# Patient Record
Sex: FEMALE | Race: WHITE | Employment: OTHER | URBAN - METROPOLITAN AREA
[De-identification: names, ages, dates, MRNs, and addresses within clinical notes are randomized per-mention and may not be internally consistent; named-entity substitution may affect disease eponyms.]

---

## 2021-07-26 ENCOUNTER — OFFICE VISIT (OUTPATIENT)
Dept: ORTHOPEDIC SURGERY | Age: 66
End: 2021-07-26
Payer: MEDICARE

## 2021-07-26 VITALS — HEIGHT: 65 IN | WEIGHT: 293 LBS | BODY MASS INDEX: 48.82 KG/M2

## 2021-07-26 DIAGNOSIS — E66.01 MORBID OBESITY WITH BMI OF 50.0-59.9, ADULT (HCC): ICD-10-CM

## 2021-07-26 DIAGNOSIS — M25.551 RIGHT HIP PAIN: Primary | ICD-10-CM

## 2021-07-26 DIAGNOSIS — M16.11 PRIMARY OSTEOARTHRITIS OF RIGHT HIP: ICD-10-CM

## 2021-07-26 DIAGNOSIS — Z01.818 PRE-OP TESTING: ICD-10-CM

## 2021-07-26 PROCEDURE — 1036F TOBACCO NON-USER: CPT | Performed by: ORTHOPAEDIC SURGERY

## 2021-07-26 PROCEDURE — 1090F PRES/ABSN URINE INCON ASSESS: CPT | Performed by: ORTHOPAEDIC SURGERY

## 2021-07-26 PROCEDURE — 99214 OFFICE O/P EST MOD 30 MIN: CPT | Performed by: ORTHOPAEDIC SURGERY

## 2021-07-26 PROCEDURE — 1123F ACP DISCUSS/DSCN MKR DOCD: CPT | Performed by: ORTHOPAEDIC SURGERY

## 2021-07-26 PROCEDURE — G8417 CALC BMI ABV UP PARAM F/U: HCPCS | Performed by: ORTHOPAEDIC SURGERY

## 2021-07-26 PROCEDURE — 3017F COLORECTAL CA SCREEN DOC REV: CPT | Performed by: ORTHOPAEDIC SURGERY

## 2021-07-26 PROCEDURE — 4040F PNEUMOC VAC/ADMIN/RCVD: CPT | Performed by: ORTHOPAEDIC SURGERY

## 2021-07-26 PROCEDURE — G8400 PT W/DXA NO RESULTS DOC: HCPCS | Performed by: ORTHOPAEDIC SURGERY

## 2021-07-26 PROCEDURE — G8427 DOCREV CUR MEDS BY ELIG CLIN: HCPCS | Performed by: ORTHOPAEDIC SURGERY

## 2021-07-26 RX ORDER — LATANOPROST 50 UG/ML
SOLUTION/ DROPS OPHTHALMIC
COMMUNITY
Start: 2021-06-23

## 2021-07-26 NOTE — LETTER
[facility] Ortho & Spine  Surgery Scheduling Form:  [facility]    DEMOGRAPHICS:                                                                                                              .    Patient Name:  Ekta Law  Patient :  1955   Patient SS#:      Patient Phone:  [phone] (home)  Alt. Patient Phone:    Patient Address:  [id number]    PCP:  Ricci EGAN    Payor/Plan Subscr  Sex Relation Sub. Ins. ID Effective Group Num   1. MEDICARE - ME* MARKLEY,MARGARET 1955 Female Self [id number] 20                                    [address] [address]   2. Rigoberto Melara* 1955 Female Self [id number]                                   [address]     DIAGNOSIS & PROCEDURE:                                                                                            .    Diagnosis:   Right  hip osteoarthritis M13.11  Operation:  Right  total hip arthroplasty posterior approach 41575  Location: [facility] MAIN  IF LEFT HIP, SCHEDULE ONLY IN ROOM 4  IF RIGHT HIP, SCHEDULE ONLY IN ROOM 3  Provider:  Suhail Sanders. MARK Rand INFORMATION:                                                                                         .    Requested Date:   OR Time:  12:00                      Patient Arrival Time:  10:00  OR Time Required: 140  Minutes  Anesthesia:  General  Equipment:   Janae ADVANCED  Mini C-Arm:  No   Standard C-Arm:  Yes  Status:  SDA  PAT Required:  Yes  Comments:Allergies: Keflex [cephalexin] and Codeine  Body mass index is 51.25 kg/m².        21   BILLING INFORMATION:                                                                                                    .    Procedure:       CPT Code Modifier  Right  total hip arthroplasty posterior approach    ORTHOPAEDIC SURGERY PRE-SURGICAL PHYSICIAN ORDERS    Ekta Law  1955  Date of Surgery: 11-8-21 Right  total hip arthroplasty posterior approach    Keflex [cephalexin] and Codeine  History & Physical:   [ ] By Surgeon   By PCP [ X]  Referrals:  [ ] Medical/Cardiac Clearance by _____________________________  [place] ] Joint Replacement Class  [ ] Physical Therapy  [ ] Crutch Walking Instructions   Weight Bearing Status _____________  [ ] Occupational Therapy  [place] ] Smoking Cessation Instructions  [ ] Other ________________________________________________    Pre Admission Testing:  [ ] Hemoglobin & Hematocrit      [X] Comprehensive Metabolic Panel  [place] ] CBC with differential              [place] ] Type & Screen  [ ] CBC without differential       [place] ] Type & Crossmatch 2 units-if total hip  [place] ] PT/INR                                   [ ] Autologous Blood _____ units  [place] ] PTT                                      [place] ]  EKG  [ ] Urinalysis                               [place] ]  25 Hydroxy Vitamin D  [place] ] Urinalysis reflex to C & S      [X] Hemoglobin A1C  [ ] Basic Metabolic Panel         [place] ] MRSA-nasal  [ X] Other Anesthesia Guidelines    Orders to be initiated upon admission to same day surgery:  [place] ] Fasting blood sugar by fingerstick [ ] Aloha Robe  [place] ] PT/INR (pt takes Warafin/Coumadin)     [ ] Interscalene Right or Left  [place] ] HCG _X___ Urine _____ Serum              [ ] Femoral Right or Left  [X] Rapid Covid test if patient does not have proof of vaccination  [ ] Other ______________________________________________    IV Fluids and Medications:  1. Place IV catheter for IV access. The RN may use 0.1ml of 1% Lidocaine SQ      Per site for IV starts up to maximum of 0.5ml.  2. Infuse Lactated Ringers IV fluid at 50ml per hour. For diabetic or renal impaired patient, infuse 0.9% Normal Saline at 50ml/hr. 3. Cefazolin 2g IV <119.9kg (264lbs) OR 3g if >120kg (142TGU), given within one hour of incision time. For those allergic to Cephalosporins, give Clindamycin 900mg IV within one hour of incision time.    If the pre-op nasal culture for MRSA/MSSA was positive, add Vancomycin 1 gm IVPB if <80kg (176lbs) OR 1.5gm  80kg-120kg (176-264lbs) OR 2gm  >120kg (264lbs). Reduce dose of Vancomycin to 500 mg IVPB if PT < 55 kg or serum creatinine > 2 mg/dl (Vancomycin must be administered over 1 hour).   4. Other medications: _________________________________________    Additional Orders:  [place] ] Knee high anti-embolism stockings and antithrombic compression pumps (apply in Pre-op)        Physican Signature:                                                           Date: 7/26/2021 Time: 10:13 AM

## 2021-07-26 NOTE — PROGRESS NOTES
Leni Osgood Abbott, MD  [address], [address] [place] [address] Drive  [address]  [address] [facility], [address]    History of Present Illness:  Chief Complaint   Patient presents with    Hip Pain     New problem, RT hip pain for 2 years, NKISusie Molina is a 77 y.o. female here for evaluation of right hip pain. She is here today with her relative. Pain assessment has been completed and is documented below. Patient was referred here for orthopaedic evaluation by Joaquina Houston MD.  She complains of right hip pain for the past 2 years. She denies any recent falls or injuries for the past 2 years. She feels that her pain occasionally refers down into her right knee. She has started using CBD oil and has been able to discontinue some of her medications. Two of them being pain medication and blood pressure medication. She has been diagnosed as diabetic and has been trying to monitor her diet. She has been loosing some weight. Her next goal, is to get down to 287 lb and then eventually down to 250 lb. When she gained herself around tax season, she was up to 320 lb. She has since, retired and feels that it will contribute to helping her loose weight. She loves to back goodies and is trying to give them away as well. However, she hasn't been able to cook, as her kitchen is being remolded for the past 3 weeks.   She feels that the x-rays today, increased her pain      Pain Assessment  Location of Pain: Pelvis  Location Modifiers: Right  Severity of Pain: 5  Quality of Pain: Aching, Dull  Duration of Pain: Persistent  Frequency of Pain: Constant  Aggravating Factors: Walking, Standing, Stairs  Limiting Behavior: Yes  Relieving Factors: Rest  Result of Injury: No  Work-Related Injury: No  Are there other pain locations you wish to document?: No    Medication Review:  Current Outpatient Medications   Medication Sig Dispense Refill    latanoprost (XALATAN) 0.005 % ophthalmic solution INSTILL 1 DROP INTO EACH EYE AT BEDTIME      meloxicam (MOBIC) 15 MG tablet       carvedilol (COREG) 6.25 MG tablet Take 6.25 mg by mouth 2 times daily (with meals)      pravastatin (PRAVACHOL) 20 MG tablet Take 20 mg by mouth nightly       hydrochlorothiazide (HYDRODIURIL) 25 MG tablet Take 25 mg by mouth daily      NONFORMULARY cbd prn       No current facility-administered medications for this visit. Review of Systems:  Relevant review of systems reviewed and can be found in the Media section of patient's chart. Medical History:  Past Medical History:   Diagnosis Date    Arthritis     Asthma     mild    Diabetes mellitus ([address]     borderline    High cholesterol     Hypertension         Past Surgical History:   Procedure Laterality Date    TOTAL KNEE ARTHROPLASTY Right 07/14/2015    TOTAL KNEE ARTHROPLASTY Left 10/14/15    TKA      Allergies, social and family histories, and medications were reviewed and updated as appropriate. General Exam:  Vital Signs:  Ht 5' 5\" (1.651 m)   Wt (!) 308 lb (139.7 kg)   BMI 51.25 kg/m²    Constitutional: Patient is adequately groomed with morbid obesity noted. Mental Status: The patient is oriented to time, place and person. The patient's mood and affect are appropriate. Neurological: The patient has good coordination. There is no focal weakness or sensory deficit. Focal examination of right hip is as follows: Adiposity makes muscle contours difficult to assess but no gross atrophy. Pain with logroll of the right hip as well as any active hip flexion or abduction. Flexion limited to 85 degrees. Gross motor strength intact throughout the right lower extremity with initiation of hip flexion and abduction 4/5 secondary to pain. Calf soft with negative Homans' sign. Well-healed incision from right total knee arthroplasty.     Radiology:     X-rays obtained today have been reviewed in the office:  2 views with AP pelvis; right hip. Impression: No evidence of acute fracture or dislocation. No lytic or blastic areas within the iliac wings or femoral metaphyseal regions. Servere right hip arthritis spherical shape of the femoral head and superior wear resulting in slight shortening of the right lower extremity. Left hip shows well-preserved femoral acetabular joint space. Office Orders/Procedures:  Orders Placed This Encounter   Procedures    Culture, MRSA, Screening     Standing Status:   Future     Number of Occurrences:   1     Standing Expiration Date:   7/26/2022    XR HIP 2-3 VW W PELVIS RIGHT     Standing Status:   Future     Number of Occurrences:   1     Standing Expiration Date:   8/26/2021    APTT     Standing Status:   Future     Number of Occurrences:   1     Standing Expiration Date:   7/26/2022     Order Specific Question:   Daily Heparin Dose? Answer:   na    CBC Auto Differential     Standing Status:   Future     Number of Occurrences:   1     Standing Expiration Date:   7/26/2022    Comprehensive Metabolic Panel     Standing Status:   Future     Number of Occurrences:   1     Standing Expiration Date:   7/26/2022    Hemoglobin A1C     Standing Status:   Future     Number of Occurrences:   1     Standing Expiration Date:   7/26/2022    Protime-INR     Standing Status:   Future     Number of Occurrences:   1     Standing Expiration Date:   7/26/2022     Order Specific Question:   Daily Coumadin Dose? Answer:   na    Urinalysis Reflex to Culture     Standing Status:   Future     Number of Occurrences:   1     Standing Expiration Date:   7/26/2022     Order Specific Question:   SPECIFY(EX-CATH,MIDSTREAM,CYSTO,ETC)?      Answer:   midstream    Vitamin D 25 Hydroxy     Standing Status:   Future     Number of Occurrences:   1     Standing Expiration Date:   7/26/2022    Ambulatory referral to Physical Therapy     Referral Priority:   Routine     Referral Type: Eval and Treat     Referral Reason:   Specialty Services Required     Number of Visits Requested:   1    EKG 12 Lead     Standing Status:   Future     Number of Occurrences:   1     Standing Expiration Date:   7/26/2022     Order Specific Question:   Reason for Exam?     Answer:   Pre-op    Type and Screen     Standing Status:   Future     Number of Occurrences:   1     Standing Expiration Date:   7/26/2022       Impression:   Diagnosis Orders   1. Right hip pain  XR HIP 2-3 VW W PELVIS RIGHT   2. Primary osteoarthritis of right hip  APTT    CBC Auto Differential    Comprehensive Metabolic Panel    EKG 12 Lead    Hemoglobin A1C    Culture, MRSA, Screening    Protime-INR    Type and Screen    Urinalysis Reflex to Culture    Vitamin D 25 Hydroxy    Ambulatory referral to Physical Therapy   3. Morbid obesity with BMI of 50.0-59.9, adult ([facility] [address]     4. Pre-op testing  APTT    CBC Auto Differential    Comprehensive Metabolic Panel    EKG 12 Lead    Hemoglobin A1C    Culture, MRSA, Screening    Protime-INR    Type and Screen    Urinalysis Reflex to Culture    Vitamin D 25 Hydroxy    Ambulatory referral to Physical Therapy        Treatment Plan:  I have discussed treatment options with the patient and her relative. After reviewing her x-rays, I believe that she would be a candidate for a right total hip replacement. If she could loose about 15 - 20 lbs that would help to be able to proceed with surgery, as every pound down helps her to reduce her risk. She will also need to continue to bring her sugar levels down to reduce her risk factors. We discussed the option of proceeding onto elective right total hip arthroplasty. I reviewed with her the nature the procedure as well as the risks and advantages of joint replacement. We reviewed the risks of surgery and she understands that they include but are not limited to:  Infection, risk to neurovascular structures, stiffness and pain, potential for further surgery, anesthetic misadventure, component failure or dislocation, fracture of the bone, medical complications such as heart attacks, strokes, blood clots and pneumonia all of which could threaten her life or limb. We reviewed discharge destination as outlined below. We also reviewed the demand matching grid and will use advanced demand Janae bearing surface system. She will undergo her preadmission testing as well as preoperative physical therapy assessment. She will also attend the preoperative joint education class. Unless there are items that we need to address through testing that would delay her surgery, at this point I will see her at the time of surgery and she will follow back. Should additional questions arise prior to surgery, she was asked to call the office for any clarifications that are necessary. RAPT  RISK ASSESSMENT and PREDICTION TOOL    Name: Felisa Conley  YOB: 1955  Surgeon: Dr Billy Campbell         Value Score    1). What is your age group? 50 - 65 years  = 2      66 - 75 years = 1     > 75 years = 0       Your score = 1   2). Gender? Male = 2     Female = 1       Your score = 1   3). How far on average can you walk? Two blocks or more (+/- rest) = 2    (a block is 200 aseomg=315 ft)  1 - 2 blocks (+/- rest) = 1     Housebound (most of time) = 0       Your score = 2   4). Which gait aid do you use? None = 2    (more often than not) Single-point stick = 1     Crutches/walker = 0       Your score = 0   5). Do you use community supports? None or one per week = 1    (home help, meals on wheels, district nursing) Two or more per week = 0       Your score = 1   6). Will you live with someone who can care for you after your operation? yes = 3     no = 0       Your score = 3    Your Total Score (out of 12) = 8       Key: Destination at discharge from acute care predicted by score.   Score < 6  = extended inpatient rehabilitation  Score 6 - 9  = additional intervention to discharge

## 2021-10-08 NOTE — PROGRESS NOTES
The patient will attend class on___11/1/2021____________  The patent will get ordered PATs on__ week 10/18/21_________________________________  The patient will see PCP within 30 days of scheduled surgery___ 10/19/2021___________  COVID_11/1/21 [facility]________

## 2021-10-08 NOTE — PROGRESS NOTES
Name_______________________________________Printed:____________________  Date and time of surgery__11/8 1200______________________Arrival Time:_1000_______________   1. The instructions given regarding when and if a patient needs to stop oral intake prior to surgery varies. Follow the specific instructions you were given                  _x__Nothing to eat or to drink after Midnight the night before.                   ____Carbo loading or ERAS instructions will be given to select patients-if you have been given those instructions -please do the following                           The evening before your surgery after dinner before midnight drink 40 ounces of gatorade. If you are diabetic use sugar free. The morning of surgery drink 40 ounces of water. This needs to be finished 3 hours prior to your surgery start time. 2. Take the following pills with a small sip of water on the morning of surgery__ coreg_________________________________________________                  Do not take blood pressure medications ending in pril or sartan the irina prior to surgery or the morning of surgery_   3. Aspirin, Ibuprofen, Advil, Naproxen, Vitamin E and other Anti-inflammatory products and supplements should be stopped for 5 -7days before surgery or as directed by your physician. 4. Check with your Doctor regarding stopping Plavix, Coumadin,Eliquis, Lovenox,Effient,Pradaxa,Xarelto, Fragmin or other blood thinners and follow their instructions. 5. Do not smoke, and do not drink any alcoholic beverages 24 hours prior to surgery. This includes NA Beer. Refrain from the usage of any recreational drugs. 6. You may brush your teeth and gargle the morning of surgery. DO NOT SWALLOW WATER   7. You MUST make arrangements for a responsible adult to stay on site while you are here and take you home after your surgery. You will not be allowed to leave alone or drive yourself home.   It is strongly suggested someone stay with you the first 24 hrs. Your surgery will be cancelled if you do not have a ride home. 8. A parent/legal guardian must accompany a child scheduled for surgery and plan to stay at the hospital until the child is discharged. Please do not bring other children with you. 9. Please wear simple, loose fitting clothing to the hospital.  [place] not bring valuables (money, credit cards, checkbooks, etc.) Do not wear any makeup (including no eye makeup) or nail polish on your fingers or toes. 10. DO NOT wear any jewelry or piercings on day of surgery. All body piercing jewelry must be removed. 11. If you have ___dentures, they will be removed before going to the OR; we will provide you a container. If you wear ___contact lenses or ___glasses, they will be removed; please bring a case for them. 12. Please see your family doctor/pediatrician for a history & physical and/or concerning medications. Bring any test results/reports from your physician's office. PCP__________________Phone___________H&P Appt. Date________             13 If you  have a Living Will and Durable Power of  for Healthcare, please bring in a copy. 15. Notify your Surgeon if you develop any illness between now and surgery  time, cough, cold, fever, sore throat, nausea, vomiting, etc.  Please notify your surgeon if you experience dizziness, shortness of breath or blurred vision between now & the time of your surgery             15. DO NOT shave your operative site 96 hours prior to surgery. For face & neck surgery, men may use an electric razor 48 hours prior to surgery. 16. Shower the night before or morning of surgery using an antibacterial soap or as you have been instructed. 17. To provide excellent care visitors will be limited to one in the room at any given time. 18.  Please bring picture ID and insurance card.              19.  Visit our web site for additional information:  [email or web address]              20.During flu season no children under the age of 15 are permitted in the hospital for the safety of all patients. 21. If you take a long acting insulin in the evening only  take half of your usual  dose the night  before your procedure              22. If you use a c-pap please bring DOS if staying overnight,             23.For your convenience [facility] has a pharmacy on site to fill your prescriptions. 24. If you use oxygen and have a portable tank please bring it  with you the DOS             25. Bring a complete list of all your medications with name and dose include any supplements. 26. Other__________________________________________   *Please call pre admission testing if you any further questions   Chaka Sanz   [address]    [place] [address] [place]  [phone]   [facility] [address] [place]SE [place]   [phone]           COVID TESTING    __x_ Covid test to be done 3-5 days prior to scheduled surgery -patient aware they are REQUIRED to bring a copy of the negative result DOS-if they receive a positive result to notify their surgeon         If known - indicate where patient is getting covid test done ____11/1/2021 [facility] when comes for class_______________________________________________________    ___ Rapid - DOS    ___ Other___________________________________      Karole Pun POLICY(subject to change)    There is a one visitor policy at [facility] for all surgeries and endoscopies. Whether the visitor can stay or will be asked to wait in the car will depend on the current policy and if social distancing can be maintained. The policy is subject to change at any time. Please make sure the visitor has a cell phone that is on,charged and able to accept calls, as this may be the way that the staff communicates with them. Pain management is NO VISITOR policyThe patients ride is expected to remain in the car with a cell phone for communication. If the ride is leaving the hospital grounds please make sure they are back in time for pickup. Have the patient inform the staff on arrival what their rides plans are while the patient is in the facility. At the MAIN there is one visitor allowed. Please note that the visitor policy is subject to change. All above information reviewed with patient in person or by phone. Patient verbalizes understanding. All questions and concerns addressed.                                                                                                  Patient/Rep____per phone/pt________________                                                                                                                                    PRE OP INSTRUCTIONS

## 2021-10-12 ENCOUNTER — HOSPITAL ENCOUNTER (OUTPATIENT)
Age: 66
Discharge: HOME | End: 2021-10-12
Payer: MEDICARE

## 2021-10-12 DIAGNOSIS — M16.11 PRIMARY OSTEOARTHRITIS OF RIGHT HIP: ICD-10-CM

## 2021-10-12 DIAGNOSIS — Z01.818 PRE-OP TESTING: ICD-10-CM

## 2021-10-12 LAB
A/G RATIO: 1.6 (ref 1.1–2.2)
ABO/RH: NORMAL
ALBUMIN SERPL-MCNC: 4.5 G/DL (ref 3.4–5)
ALP BLD-CCNC: 63 U/L (ref 40–129)
ALT SERPL-CCNC: 28 U/L (ref 10–40)
ANION GAP SERPL CALCULATED.3IONS-SCNC: 17 MMOL/L (ref 3–16)
ANTIBODY SCREEN: NORMAL
APTT: 32.8 SEC (ref 26.2–38.6)
AST SERPL-CCNC: 23 U/L (ref 15–37)
BACTERIA: ABNORMAL /HPF
BASOPHILS ABSOLUTE: 0.1 K/UL (ref 0–0.2)
BASOPHILS RELATIVE PERCENT: 0.9 %
BILIRUB SERPL-MCNC: 0.9 MG/DL (ref 0–1)
BILIRUBIN URINE: ABNORMAL
BLOOD, URINE: NEGATIVE
BUN BLDV-MCNC: 16 MG/DL (ref 7–20)
CALCIUM SERPL-MCNC: 10 MG/DL (ref 8.3–10.6)
CHLORIDE BLD-SCNC: 96 MMOL/L (ref 99–110)
CLARITY: ABNORMAL
CO2: 25 MMOL/L (ref 21–32)
COLOR: YELLOW
CREAT SERPL-MCNC: <0.5 MG/DL (ref 0.6–1.2)
EKG ATRIAL RATE: 75 BPM
EKG DIAGNOSIS: NORMAL
EKG P AXIS: 26 DEGREES
EKG P-R INTERVAL: 244 MS
EKG Q-T INTERVAL: 700 MS
EKG QRS DURATION: 108 MS
EKG QTC CALCULATION (BAZETT): 781 MS
EKG R AXIS: 2 DEGREES
EKG T AXIS: 43 DEGREES
EKG VENTRICULAR RATE: 75 BPM
EOSINOPHILS ABSOLUTE: 0.2 K/UL (ref 0–0.6)
EOSINOPHILS RELATIVE PERCENT: 3.2 %
EPITHELIAL CELLS, UA: 11 /HPF (ref 0–5)
GFR AFRICAN AMERICAN: >60
GFR NON-AFRICAN AMERICAN: >60
GLOBULIN: 2.8 G/DL
GLUCOSE BLD-MCNC: 167 MG/DL (ref 70–99)
GLUCOSE URINE: 100 MG/DL
HCT VFR BLD CALC: 46.5 % (ref 36–48)
HEMOGLOBIN: 15.6 G/DL (ref 12–16)
INR BLD: 1.02 (ref 0.88–1.12)
KETONES, URINE: ABNORMAL MG/DL
LEUKOCYTE ESTERASE, URINE: ABNORMAL
LYMPHOCYTES ABSOLUTE: 1.3 K/UL (ref 1–5.1)
LYMPHOCYTES RELATIVE PERCENT: 20.9 %
MCH RBC QN AUTO: 29.6 PG (ref 26–34)
MCHC RBC AUTO-ENTMCNC: 33.5 G/DL (ref 31–36)
MCV RBC AUTO: 88.5 FL (ref 80–100)
MICROSCOPIC EXAMINATION: YES
MONOCYTES ABSOLUTE: 0.5 K/UL (ref 0–1.3)
MONOCYTES RELATIVE PERCENT: 8 %
NEUTROPHILS ABSOLUTE: 4.1 K/UL (ref 1.7–7.7)
NEUTROPHILS RELATIVE PERCENT: 67 %
NITRITE, URINE: NEGATIVE
PDW BLD-RTO: 14.6 % (ref 12.4–15.4)
PH UA: 6 (ref 5–8)
PLATELET # BLD: 260 K/UL (ref 135–450)
PMV BLD AUTO: 8.2 FL (ref 5–10.5)
POTASSIUM SERPL-SCNC: 3.3 MMOL/L (ref 3.5–5.1)
PROTEIN UA: NEGATIVE MG/DL
PROTHROMBIN TIME: 11.5 SEC (ref 9.9–12.7)
RBC # BLD: 5.26 M/UL (ref 4–5.2)
RBC UA: 7 /HPF (ref 0–4)
SODIUM BLD-SCNC: 138 MMOL/L (ref 136–145)
SPECIFIC GRAVITY UA: 1.02 (ref 1–1.03)
TOTAL PROTEIN: 7.3 G/DL (ref 6.4–8.2)
URINE REFLEX TO CULTURE: YES
URINE TYPE: ABNORMAL
UROBILINOGEN, URINE: 1 E.U./DL
VITAMIN D 25-HYDROXY: 14.6 NG/ML
WBC # BLD: 6.1 K/UL (ref 4–11)
WBC UA: 89 /HPF (ref 0–5)

## 2021-10-12 PROCEDURE — 85610 PROTHROMBIN TIME: CPT

## 2021-10-12 PROCEDURE — 93010 ELECTROCARDIOGRAM REPORT: CPT | Performed by: INTERNAL MEDICINE

## 2021-10-12 PROCEDURE — 85730 THROMBOPLASTIN TIME PARTIAL: CPT

## 2021-10-12 PROCEDURE — 83036 HEMOGLOBIN GLYCOSYLATED A1C: CPT

## 2021-10-12 PROCEDURE — 86900 BLOOD TYPING SEROLOGIC ABO: CPT

## 2021-10-12 PROCEDURE — 80053 COMPREHEN METABOLIC PANEL: CPT

## 2021-10-12 PROCEDURE — 86850 RBC ANTIBODY SCREEN: CPT

## 2021-10-12 PROCEDURE — 86901 BLOOD TYPING SEROLOGIC RH(D): CPT

## 2021-10-12 PROCEDURE — 85025 COMPLETE CBC W/AUTO DIFF WBC: CPT

## 2021-10-12 PROCEDURE — 82306 VITAMIN D 25 HYDROXY: CPT

## 2021-10-12 PROCEDURE — 81001 URINALYSIS AUTO W/SCOPE: CPT

## 2021-10-12 PROCEDURE — 93005 ELECTROCARDIOGRAM TRACING: CPT

## 2021-10-12 PROCEDURE — 87086 URINE CULTURE/COLONY COUNT: CPT

## 2021-10-12 PROCEDURE — 87081 CULTURE SCREEN ONLY: CPT

## 2021-10-12 PROCEDURE — 36415 COLL VENOUS BLD VENIPUNCTURE: CPT

## 2021-10-13 LAB
ESTIMATED AVERAGE GLUCOSE: 177.2 MG/DL
HBA1C MFR BLD: 7.8 %
URINE CULTURE, ROUTINE: NORMAL

## 2021-10-14 LAB — MRSA CULTURE ONLY: NORMAL

## 2021-10-19 ENCOUNTER — ANESTHESIA EVENT (OUTPATIENT)
Dept: OPERATING ROOM | Age: 66
End: 2021-10-19
Payer: MEDICARE

## 2021-10-19 ENCOUNTER — TELEPHONE (OUTPATIENT)
Dept: ORTHOPEDIC SURGERY | Age: 66
End: 2021-10-19

## 2021-11-01 ENCOUNTER — HOSPITAL ENCOUNTER (OUTPATIENT)
Age: 66
Discharge: HOME | End: 2021-11-01
Payer: MEDICARE

## 2021-11-01 DIAGNOSIS — M16.11 PRIMARY OSTEOARTHRITIS OF RIGHT HIP: Primary | ICD-10-CM

## 2021-11-01 DIAGNOSIS — Z01.818 PREOP TESTING: ICD-10-CM

## 2021-11-01 PROCEDURE — U0003 INFECTIOUS AGENT DETECTION BY NUCLEIC ACID (DNA OR RNA); SEVERE ACUTE RESPIRATORY SYNDROME CORONAVIRUS 2 (SARS-COV-2) (CORONAVIRUS DISEASE [COVID-19]), AMPLIFIED PROBE TECHNIQUE, MAKING USE OF HIGH THROUGHPUT TECHNOLOGIES AS DESCRIBED BY CMS-2020-01-R: HCPCS

## 2021-11-01 PROCEDURE — U0005 INFEC AGEN DETEC AMPLI PROBE: HCPCS

## 2021-11-01 NOTE — PROGRESS NOTES
Patient attended Joint Education class on 11/1/2021. Patient verified surgery for Total Hip replacement and received patient information and educational Joint folder including education handouts on common medications, pre-operative checklist, and points to remember . Patient given handout instructions on Pre-operative Showering techniques and the use of anti-septic 5 days before surgery. Paper copy of powerpoint given to patient. Hibiclens bottle given to patient to take home. Patient aware they need to have labs, covid testing and H&P done before surgery. If they have had their covid vaccines, they have been asked to bring their immunization card on the day of surgery. We also discussed in depth about pain medication and we cannot refill prescriptions early due to state regulations. Answered all of patient's questions in class. Post-test score 5/5    DOS: 11/8/2021  Dr Rosa Davis: home care. Patient offered [facility] introductory home care and prehab visit. She accepted. Faxed order and facesheet to [facility].     Selvin Apodaca RN  Orthopedic Navigator  [phone]

## 2021-11-02 LAB — SARS-COV-2: NOT DETECTED

## 2021-11-02 NOTE — PROGRESS NOTES
Informed patient that [facility] is unable to service the [facility] for her home pre-hab visit. Tried asking other [facility] but they do not offer introductory home care visits/pre-hab visits.

## 2021-11-05 RX ORDER — MULTIVIT-MIN/IRON/FOLIC ACID/K 18-600-40
2000 CAPSULE ORAL DAILY
Qty: 30 CAPSULE | Refills: 5 | Status: SHIPPED | OUTPATIENT
Start: 2021-11-05 | End: 2021-12-05

## 2021-11-05 NOTE — TELEPHONE ENCOUNTER
Sarah Graham vitamin D levels were found to be low during the preoperative screening. Maintaining vitamin D levels within a normal range are correlated with better healing of joint replacements and lower risk of component failure. A prescription for vitamin D has been sent to her pharmacy and should be taken daily. This should be continued for 6 months and follow-up with reassessment with her primary care physician regarding continued treatment and testing would be recommended after 6 months. She needs to call her PCP to see if she should be taking a potassium supplement.

## 2021-11-05 NOTE — DISCHARGE INSTR - OTHER ORDERS
333  [facility] for Joint Replacement Model  Notification Letter    [facility] is participating in a New Care Improvement Initiative from Ainsley Campos    [facility] is participating in a Medicare initiative called the [address] [place] [address] for Joint Replacement (CJR) model. The CJR model aims to promote quality and financial accountability for care surrounding lower-extremity joint replacement (LEJR) procedures, commonly referred to as hip and knee replacements and/or other major leg procedures. [address] participation in the [address] [place] [address],[address] [place] [address] should not restrict your access to care for your medical condition or your freedom to choose your health care providers and services. All existing Medicare beneficiary protections continue to be available to you. These include the ability to report concerns of substandard care to Quality Improvement Organizations and 1-800-MEDICARE. The CJR model aims to help give you better care. The CJR model aims to support better and more efficient care for beneficiaries undergoing LEJR procedures. A CJR episode of care is typically defined as an admission of an eligible Medicare beneficiary to a hospital participating in the [address] [place] [address],[address] [place] [address] that eventually results in a discharge paid under Medicare Severity-Diagnosis Related Groups (MS-DRG) 469 (major joint replacement or reattachment of lower extremity with major complications or comorbidities) or 470 (major joint replacement or reattachment of lower extremity without major complications or comorbidities). The CJR episode of care continues for 90 days following discharge.  This model tests bundled payment and quality measurement for an episode of care associated with LEJR procedures to encourage hospitals, physicians, and post-acute care providers to work together to improve the quality and coordination of care from the initial hospitalization through recovery. Through this bundled payment model, [facility] will receive additional payments if quality and spending performance are strong or, if not, potentially have to repay Medicare for a portion of the spending for care surrounding a lower extremity joint replacement procedure. Medicare is using the CJR model to encourage [facility] to work more closely with your doctors and other health care providers that help patients recover after discharge from the hospital, including nursing homes (skilled nursing facilities), home health agencies, inpatient rehabilitation facilities, and long-term care hospitals. The goal of the model is to encourage these providers and suppliers to provide you with better, more coordinated care during and following your hospital stay. The model is expected to lower the cost of care to [facility] but your costs for covered care will not increase due to these changes. [facility] is working closely with the doctors and other health care providers and suppliers who will care for you during and following your hospital stay and extending through the recovery period. By working together, your health care providers and suppliers are planning more efficient, high quality care as you undergo treatment. Medicare will monitor your care to ensure you and others are receiving high quality care. It's your choice which hospital, doctor, or other providers you use. You have the right to choose which hospital, doctor, or other post-hospital stay health care provider you use. To find a different doctor, visit Medicare's Physician Compare website, HDTapes.co.nz, or call 1-800-MEDICARE (428 5359). TTY users should call 7-133.173.2773. To find a different hospital, visit Anal[employer]Mailana or call 1-800-MEDICARE (151 8499).  TTY users should call 4-635.762.2894. To find a different skilled nursing facility, visit [facility] website, [email or web address] [email or web address]/, or call 1-800-MEDICARE (5-983.133.5492). TTY users should call 8-795.578.6740. To find a different home health agency, visit [employer] website, [employer], or call 1-800-MEDICARE (0-664.151.2529). TTY users should call 3-110.176.9804. If you believe that your care is adversely affected or have concerns about substandard care, you may call 1-800-MEDICARE or contact your state's Quality Improvement Organization by going to: [employer]. For an explanation of how patients can access their health care records and beneficiary claims data, please visit [facility]. Get more information     If you have questions or want more information about the Comprehensive Care for Joint Replacement (CJR) model, call Cassius Torres RN, Care Transitions, [facility] at [phone] or call 1-800-MEDICARE. You can also find additional information at https://innovation.cms.gov/initiatives/cjr.

## 2021-11-08 ENCOUNTER — ANESTHESIA (OUTPATIENT)
Dept: OPERATING ROOM | Age: 66
End: 2021-11-08
Payer: MEDICARE

## 2021-11-08 ENCOUNTER — HOSPITAL ENCOUNTER (OUTPATIENT)
Age: 66
Setting detail: OBSERVATION
Discharge: HOME | End: 2021-11-09
Attending: ORTHOPAEDIC SURGERY | Admitting: ORTHOPAEDIC SURGERY
Payer: MEDICARE

## 2021-11-08 ENCOUNTER — APPOINTMENT (OUTPATIENT)
Dept: GENERAL RADIOLOGY | Age: 66
End: 2021-11-08
Attending: ORTHOPAEDIC SURGERY
Payer: MEDICARE

## 2021-11-08 VITALS
RESPIRATION RATE: 25 BRPM | OXYGEN SATURATION: 99 % | SYSTOLIC BLOOD PRESSURE: 109 MMHG | TEMPERATURE: 96.8 F | DIASTOLIC BLOOD PRESSURE: 56 MMHG

## 2021-11-08 DIAGNOSIS — Z01.818 PREOP TESTING: Primary | ICD-10-CM

## 2021-11-08 DIAGNOSIS — M16.11 PRIMARY OSTEOARTHRITIS OF RIGHT HIP: ICD-10-CM

## 2021-11-08 LAB
ABO/RH: NORMAL
ANTIBODY SCREEN: NORMAL
GLUCOSE BLD-MCNC: 161 MG/DL (ref 70–99)
GLUCOSE BLD-MCNC: 182 MG/DL (ref 70–99)
GLUCOSE BLD-MCNC: 227 MG/DL (ref 70–99)
GLUCOSE BLD-MCNC: 245 MG/DL (ref 70–99)
PERFORMED ON: ABNORMAL

## 2021-11-08 PROCEDURE — G0378 HOSPITAL OBSERVATION PER HR: HCPCS

## 2021-11-08 PROCEDURE — 99024 POSTOP FOLLOW-UP VISIT: CPT | Performed by: NURSE PRACTITIONER

## 2021-11-08 PROCEDURE — 2500000003 HC RX 250 WO HCPCS: Performed by: NURSE PRACTITIONER

## 2021-11-08 PROCEDURE — 6360000002 HC RX W HCPCS: Performed by: ORTHOPAEDIC SURGERY

## 2021-11-08 PROCEDURE — 2500000003 HC RX 250 WO HCPCS: Performed by: NURSE ANESTHETIST, CERTIFIED REGISTERED

## 2021-11-08 PROCEDURE — 2720000010 HC SURG SUPPLY STERILE: Performed by: ORTHOPAEDIC SURGERY

## 2021-11-08 PROCEDURE — 86900 BLOOD TYPING SEROLOGIC ABO: CPT

## 2021-11-08 PROCEDURE — 6370000000 HC RX 637 (ALT 250 FOR IP): Performed by: NURSE PRACTITIONER

## 2021-11-08 PROCEDURE — C1776 JOINT DEVICE (IMPLANTABLE): HCPCS | Performed by: ORTHOPAEDIC SURGERY

## 2021-11-08 PROCEDURE — 97165 OT EVAL LOW COMPLEX 30 MIN: CPT

## 2021-11-08 PROCEDURE — 97162 PT EVAL MOD COMPLEX 30 MIN: CPT

## 2021-11-08 PROCEDURE — 94150 VITAL CAPACITY TEST: CPT

## 2021-11-08 PROCEDURE — 6360000002 HC RX W HCPCS: Performed by: NURSE ANESTHETIST, CERTIFIED REGISTERED

## 2021-11-08 PROCEDURE — 27130 TOTAL HIP ARTHROPLASTY: CPT | Performed by: NURSE PRACTITIONER

## 2021-11-08 PROCEDURE — 6360000002 HC RX W HCPCS: Performed by: ANESTHESIOLOGY

## 2021-11-08 PROCEDURE — 97535 SELF CARE MNGMENT TRAINING: CPT

## 2021-11-08 PROCEDURE — 2580000003 HC RX 258: Performed by: NURSE PRACTITIONER

## 2021-11-08 PROCEDURE — 73501 X-RAY EXAM HIP UNI 1 VIEW: CPT

## 2021-11-08 PROCEDURE — 3700000001 HC ADD 15 MINUTES (ANESTHESIA): Performed by: ORTHOPAEDIC SURGERY

## 2021-11-08 PROCEDURE — 97530 THERAPEUTIC ACTIVITIES: CPT

## 2021-11-08 PROCEDURE — 7100000001 HC PACU RECOVERY - ADDTL 15 MIN: Performed by: ORTHOPAEDIC SURGERY

## 2021-11-08 PROCEDURE — 3700000000 HC ANESTHESIA ATTENDED CARE: Performed by: ORTHOPAEDIC SURGERY

## 2021-11-08 PROCEDURE — 27130 TOTAL HIP ARTHROPLASTY: CPT | Performed by: ORTHOPAEDIC SURGERY

## 2021-11-08 PROCEDURE — 2700000000 HC OXYGEN THERAPY PER DAY

## 2021-11-08 PROCEDURE — 3600000004 HC SURGERY LEVEL 4 BASE: Performed by: ORTHOPAEDIC SURGERY

## 2021-11-08 PROCEDURE — 94760 N-INVAS EAR/PLS OXIMETRY 1: CPT

## 2021-11-08 PROCEDURE — 2580000003 HC RX 258: Performed by: NURSE ANESTHETIST, CERTIFIED REGISTERED

## 2021-11-08 PROCEDURE — 3600000014 HC SURGERY LEVEL 4 ADDTL 15MIN: Performed by: ORTHOPAEDIC SURGERY

## 2021-11-08 PROCEDURE — 86850 RBC ANTIBODY SCREEN: CPT

## 2021-11-08 PROCEDURE — 36415 COLL VENOUS BLD VENIPUNCTURE: CPT

## 2021-11-08 PROCEDURE — 6370000000 HC RX 637 (ALT 250 FOR IP): Performed by: ANESTHESIOLOGY

## 2021-11-08 PROCEDURE — 2580000003 HC RX 258: Performed by: ORTHOPAEDIC SURGERY

## 2021-11-08 PROCEDURE — 2709999900 HC NON-CHARGEABLE SUPPLY: Performed by: ORTHOPAEDIC SURGERY

## 2021-11-08 PROCEDURE — 7100000000 HC PACU RECOVERY - FIRST 15 MIN: Performed by: ORTHOPAEDIC SURGERY

## 2021-11-08 PROCEDURE — 2500000003 HC RX 250 WO HCPCS: Performed by: ORTHOPAEDIC SURGERY

## 2021-11-08 PROCEDURE — 86901 BLOOD TYPING SEROLOGIC RH(D): CPT

## 2021-11-08 PROCEDURE — APPNB45 APP NON BILLABLE 31-45 MINUTES: Performed by: NURSE PRACTITIONER

## 2021-11-08 DEVICE — LINER ACET HW F 40 MM VIVACIT-E G7: Type: IMPLANTABLE DEVICE | Site: HIP | Status: FUNCTIONAL

## 2021-11-08 DEVICE — HEAD FEM DIA40MM HIP BIOLOX DELT OPT FOR G7 ACET SYS: Type: IMPLANTABLE DEVICE | Site: HIP | Status: FUNCTIONAL

## 2021-11-08 DEVICE — IMPLANTABLE DEVICE: Type: IMPLANTABLE DEVICE | Site: HIP | Status: FUNCTIONAL

## 2021-11-08 DEVICE — BONE SCREW 6.5X25 SELF-TAP: Type: IMPLANTABLE DEVICE | Site: HIP | Status: FUNCTIONAL

## 2021-11-08 DEVICE — SHELL ACET SZ F DIA54MM 4 H OSSEOTI LIMIT H 2 MOBILITY G7: Type: IMPLANTABLE DEVICE | Site: HIP | Status: FUNCTIONAL

## 2021-11-08 DEVICE — SLEEVE FEM -6MM OFFSET HIP TYP 1 TAPR FOR CERAMIC BIOLOX: Type: IMPLANTABLE DEVICE | Site: HIP | Status: FUNCTIONAL

## 2021-11-08 RX ORDER — DEXTROSE MONOHYDRATE 25 G/50ML
12.5 INJECTION, SOLUTION INTRAVENOUS PRN
Status: DISCONTINUED | OUTPATIENT
Start: 2021-11-08 | End: 2021-11-09 | Stop reason: HOSPADM

## 2021-11-08 RX ORDER — GLYCOPYRROLATE 1 MG/5 ML
SYRINGE (ML) INTRAVENOUS PRN
Status: DISCONTINUED | OUTPATIENT
Start: 2021-11-08 | End: 2021-11-08 | Stop reason: SDUPTHER

## 2021-11-08 RX ORDER — APREPITANT 40 MG/1
40 CAPSULE ORAL ONCE
Status: COMPLETED | OUTPATIENT
Start: 2021-11-08 | End: 2021-11-08

## 2021-11-08 RX ORDER — EPHEDRINE SULFATE/0.9% NACL/PF 50 MG/5 ML
SYRINGE (ML) INTRAVENOUS PRN
Status: DISCONTINUED | OUTPATIENT
Start: 2021-11-08 | End: 2021-11-08 | Stop reason: SDUPTHER

## 2021-11-08 RX ORDER — SODIUM CHLORIDE 9 MG/ML
25 INJECTION, SOLUTION INTRAVENOUS PRN
Status: DISCONTINUED | OUTPATIENT
Start: 2021-11-08 | End: 2021-11-09 | Stop reason: HOSPADM

## 2021-11-08 RX ORDER — MAGNESIUM HYDROXIDE 1200 MG/15ML
LIQUID ORAL CONTINUOUS PRN
Status: COMPLETED | OUTPATIENT
Start: 2021-11-08 | End: 2021-11-08

## 2021-11-08 RX ORDER — OXYCODONE HYDROCHLORIDE AND ACETAMINOPHEN 5; 325 MG/1; MG/1
1 TABLET ORAL
Status: DISCONTINUED | OUTPATIENT
Start: 2021-11-08 | End: 2021-11-08 | Stop reason: HOSPADM

## 2021-11-08 RX ORDER — SUCCINYLCHOLINE/SOD CL,ISO/PF 200MG/10ML
SYRINGE (ML) INTRAVENOUS PRN
Status: DISCONTINUED | OUTPATIENT
Start: 2021-11-08 | End: 2021-11-08 | Stop reason: SDUPTHER

## 2021-11-08 RX ORDER — CARVEDILOL 6.25 MG/1
6.25 TABLET ORAL 2 TIMES DAILY WITH MEALS
Status: DISCONTINUED | OUTPATIENT
Start: 2021-11-09 | End: 2021-11-09 | Stop reason: HOSPADM

## 2021-11-08 RX ORDER — SODIUM CHLORIDE 0.9 % (FLUSH) 0.9 %
5-40 SYRINGE (ML) INJECTION EVERY 12 HOURS SCHEDULED
Status: DISCONTINUED | OUTPATIENT
Start: 2021-11-08 | End: 2021-11-09 | Stop reason: HOSPADM

## 2021-11-08 RX ORDER — INSULIN LISPRO 100 [IU]/ML
0-3 INJECTION, SOLUTION INTRAVENOUS; SUBCUTANEOUS NIGHTLY
Status: DISCONTINUED | OUTPATIENT
Start: 2021-11-08 | End: 2021-11-09 | Stop reason: HOSPADM

## 2021-11-08 RX ORDER — ACETAMINOPHEN 325 MG/1
650 TABLET ORAL ONCE
Status: COMPLETED | OUTPATIENT
Start: 2021-11-08 | End: 2021-11-08

## 2021-11-08 RX ORDER — DEXAMETHASONE SODIUM PHOSPHATE 4 MG/ML
INJECTION, SOLUTION INTRA-ARTICULAR; INTRALESIONAL; INTRAMUSCULAR; INTRAVENOUS; SOFT TISSUE PRN
Status: DISCONTINUED | OUTPATIENT
Start: 2021-11-08 | End: 2021-11-08 | Stop reason: SDUPTHER

## 2021-11-08 RX ORDER — ONDANSETRON 2 MG/ML
INJECTION INTRAMUSCULAR; INTRAVENOUS PRN
Status: DISCONTINUED | OUTPATIENT
Start: 2021-11-08 | End: 2021-11-08 | Stop reason: SDUPTHER

## 2021-11-08 RX ORDER — CELECOXIB 200 MG/1
200 CAPSULE ORAL ONCE
Status: COMPLETED | OUTPATIENT
Start: 2021-11-08 | End: 2021-11-08

## 2021-11-08 RX ORDER — HYDRALAZINE HYDROCHLORIDE 20 MG/ML
5 INJECTION INTRAMUSCULAR; INTRAVENOUS EVERY 10 MIN PRN
Status: DISCONTINUED | OUTPATIENT
Start: 2021-11-08 | End: 2021-11-08 | Stop reason: HOSPADM

## 2021-11-08 RX ORDER — LATANOPROST 50 UG/ML
1 SOLUTION/ DROPS OPHTHALMIC DAILY
Status: DISCONTINUED | OUTPATIENT
Start: 2021-11-09 | End: 2021-11-09 | Stop reason: HOSPADM

## 2021-11-08 RX ORDER — CLINDAMYCIN PHOSPHATE 900 MG/50ML
900 INJECTION INTRAVENOUS EVERY 8 HOURS
Status: COMPLETED | OUTPATIENT
Start: 2021-11-08 | End: 2021-11-09

## 2021-11-08 RX ORDER — BUPIVACAINE HYDROCHLORIDE AND EPINEPHRINE 5; 5 MG/ML; UG/ML
INJECTION, SOLUTION PERINEURAL
Status: COMPLETED | OUTPATIENT
Start: 2021-11-08 | End: 2021-11-08

## 2021-11-08 RX ORDER — OXYCODONE HYDROCHLORIDE 5 MG/1
10 TABLET ORAL EVERY 4 HOURS PRN
Status: DISCONTINUED | OUTPATIENT
Start: 2021-11-08 | End: 2021-11-09 | Stop reason: HOSPADM

## 2021-11-08 RX ORDER — SODIUM CHLORIDE, SODIUM LACTATE, POTASSIUM CHLORIDE, CALCIUM CHLORIDE 600; 310; 30; 20 MG/100ML; MG/100ML; MG/100ML; MG/100ML
INJECTION, SOLUTION INTRAVENOUS CONTINUOUS
Status: DISCONTINUED | OUTPATIENT
Start: 2021-11-08 | End: 2021-11-08

## 2021-11-08 RX ORDER — DIPHENHYDRAMINE HYDROCHLORIDE 50 MG/ML
25 INJECTION INTRAMUSCULAR; INTRAVENOUS EVERY 6 HOURS PRN
Status: DISCONTINUED | OUTPATIENT
Start: 2021-11-08 | End: 2021-11-09 | Stop reason: HOSPADM

## 2021-11-08 RX ORDER — ONDANSETRON 2 MG/ML
4 INJECTION INTRAMUSCULAR; INTRAVENOUS EVERY 6 HOURS PRN
Status: DISCONTINUED | OUTPATIENT
Start: 2021-11-08 | End: 2021-11-09 | Stop reason: HOSPADM

## 2021-11-08 RX ORDER — ONDANSETRON 2 MG/ML
4 INJECTION INTRAMUSCULAR; INTRAVENOUS
Status: DISCONTINUED | OUTPATIENT
Start: 2021-11-08 | End: 2021-11-08 | Stop reason: HOSPADM

## 2021-11-08 RX ORDER — SODIUM CHLORIDE 0.9 % (FLUSH) 0.9 %
10 SYRINGE (ML) INJECTION PRN
Status: DISCONTINUED | OUTPATIENT
Start: 2021-11-08 | End: 2021-11-08 | Stop reason: HOSPADM

## 2021-11-08 RX ORDER — HYDROMORPHONE HCL 110MG/55ML
0.5 PATIENT CONTROLLED ANALGESIA SYRINGE INTRAVENOUS EVERY 5 MIN PRN
Status: COMPLETED | OUTPATIENT
Start: 2021-11-08 | End: 2021-11-08

## 2021-11-08 RX ORDER — SODIUM CHLORIDE 9 MG/ML
INJECTION, SOLUTION INTRAVENOUS CONTINUOUS PRN
Status: DISCONTINUED | OUTPATIENT
Start: 2021-11-08 | End: 2021-11-08 | Stop reason: SDUPTHER

## 2021-11-08 RX ORDER — SODIUM CHLORIDE 0.9 % (FLUSH) 0.9 %
10 SYRINGE (ML) INJECTION EVERY 12 HOURS SCHEDULED
Status: DISCONTINUED | OUTPATIENT
Start: 2021-11-08 | End: 2021-11-08 | Stop reason: HOSPADM

## 2021-11-08 RX ORDER — PRAVASTATIN SODIUM 20 MG
20 TABLET ORAL NIGHTLY
Status: DISCONTINUED | OUTPATIENT
Start: 2021-11-08 | End: 2021-11-09 | Stop reason: HOSPADM

## 2021-11-08 RX ORDER — SODIUM CHLORIDE 9 MG/ML
INJECTION, SOLUTION INTRAVENOUS CONTINUOUS
Status: DISCONTINUED | OUTPATIENT
Start: 2021-11-08 | End: 2021-11-09 | Stop reason: HOSPADM

## 2021-11-08 RX ORDER — PHENYLEPHRINE HCL IN 0.9% NACL 1 MG/10 ML
SYRINGE (ML) INTRAVENOUS PRN
Status: DISCONTINUED | OUTPATIENT
Start: 2021-11-08 | End: 2021-11-08 | Stop reason: SDUPTHER

## 2021-11-08 RX ORDER — MAGNESIUM HYDROXIDE 1200 MG/15ML
LIQUID ORAL
Status: COMPLETED | OUTPATIENT
Start: 2021-11-08 | End: 2021-11-08

## 2021-11-08 RX ORDER — FENTANYL CITRATE 50 UG/ML
INJECTION, SOLUTION INTRAMUSCULAR; INTRAVENOUS PRN
Status: DISCONTINUED | OUTPATIENT
Start: 2021-11-08 | End: 2021-11-08 | Stop reason: SDUPTHER

## 2021-11-08 RX ORDER — SENNA AND DOCUSATE SODIUM 50; 8.6 MG/1; MG/1
1 TABLET, FILM COATED ORAL 2 TIMES DAILY
Status: DISCONTINUED | OUTPATIENT
Start: 2021-11-08 | End: 2021-11-09 | Stop reason: HOSPADM

## 2021-11-08 RX ORDER — SODIUM CHLORIDE 9 MG/ML
25 INJECTION, SOLUTION INTRAVENOUS PRN
Status: DISCONTINUED | OUTPATIENT
Start: 2021-11-08 | End: 2021-11-08 | Stop reason: HOSPADM

## 2021-11-08 RX ORDER — SODIUM CHLORIDE 9 MG/ML
INJECTION, SOLUTION INTRAVENOUS CONTINUOUS
Status: DISCONTINUED | OUTPATIENT
Start: 2021-11-08 | End: 2021-11-08

## 2021-11-08 RX ORDER — INSULIN LISPRO 100 [IU]/ML
0.08 INJECTION, SOLUTION INTRAVENOUS; SUBCUTANEOUS
Status: DISCONTINUED | OUTPATIENT
Start: 2021-11-08 | End: 2021-11-09 | Stop reason: HOSPADM

## 2021-11-08 RX ORDER — LIDOCAINE HYDROCHLORIDE 10 MG/ML
1 INJECTION, SOLUTION EPIDURAL; INFILTRATION; INTRACAUDAL; PERINEURAL
Status: DISCONTINUED | OUTPATIENT
Start: 2021-11-08 | End: 2021-11-08 | Stop reason: HOSPADM

## 2021-11-08 RX ORDER — CLINDAMYCIN PHOSPHATE 900 MG/50ML
900 INJECTION INTRAVENOUS
Status: COMPLETED | OUTPATIENT
Start: 2021-11-08 | End: 2021-11-08

## 2021-11-08 RX ORDER — HYDROCHLOROTHIAZIDE 25 MG/1
25 TABLET ORAL DAILY
Status: DISCONTINUED | OUTPATIENT
Start: 2021-11-09 | End: 2021-11-09 | Stop reason: HOSPADM

## 2021-11-08 RX ORDER — SODIUM CHLORIDE 0.9 % (FLUSH) 0.9 %
5-40 SYRINGE (ML) INJECTION PRN
Status: DISCONTINUED | OUTPATIENT
Start: 2021-11-08 | End: 2021-11-09 | Stop reason: HOSPADM

## 2021-11-08 RX ORDER — KETAMINE HCL IN NACL, ISO-OSM 100MG/10ML
SYRINGE (ML) INJECTION PRN
Status: DISCONTINUED | OUTPATIENT
Start: 2021-11-08 | End: 2021-11-08 | Stop reason: SDUPTHER

## 2021-11-08 RX ORDER — MORPHINE SULFATE 4 MG/ML
4 INJECTION, SOLUTION INTRAMUSCULAR; INTRAVENOUS
Status: DISCONTINUED | OUTPATIENT
Start: 2021-11-08 | End: 2021-11-09 | Stop reason: HOSPADM

## 2021-11-08 RX ORDER — INSULIN LISPRO 100 [IU]/ML
0-6 INJECTION, SOLUTION INTRAVENOUS; SUBCUTANEOUS
Status: DISCONTINUED | OUTPATIENT
Start: 2021-11-08 | End: 2021-11-09 | Stop reason: HOSPADM

## 2021-11-08 RX ORDER — MEPERIDINE HYDROCHLORIDE 25 MG/ML
12.5 INJECTION INTRAMUSCULAR; INTRAVENOUS; SUBCUTANEOUS EVERY 5 MIN PRN
Status: DISCONTINUED | OUTPATIENT
Start: 2021-11-08 | End: 2021-11-08 | Stop reason: HOSPADM

## 2021-11-08 RX ORDER — DIPHENHYDRAMINE HCL 25 MG
25 TABLET ORAL EVERY 6 HOURS PRN
Status: DISCONTINUED | OUTPATIENT
Start: 2021-11-08 | End: 2021-11-09 | Stop reason: HOSPADM

## 2021-11-08 RX ORDER — ONDANSETRON 4 MG/1
4 TABLET, ORALLY DISINTEGRATING ORAL EVERY 8 HOURS PRN
Status: DISCONTINUED | OUTPATIENT
Start: 2021-11-08 | End: 2021-11-09 | Stop reason: HOSPADM

## 2021-11-08 RX ORDER — LIDOCAINE HYDROCHLORIDE 10 MG/ML
0.5 INJECTION, SOLUTION EPIDURAL; INFILTRATION; INTRACAUDAL; PERINEURAL ONCE
Status: DISCONTINUED | OUTPATIENT
Start: 2021-11-08 | End: 2021-11-08 | Stop reason: HOSPADM

## 2021-11-08 RX ORDER — DEXTROSE MONOHYDRATE 50 MG/ML
100 INJECTION, SOLUTION INTRAVENOUS PRN
Status: DISCONTINUED | OUTPATIENT
Start: 2021-11-08 | End: 2021-11-09 | Stop reason: HOSPADM

## 2021-11-08 RX ORDER — ROCURONIUM BROMIDE 10 MG/ML
INJECTION, SOLUTION INTRAVENOUS PRN
Status: DISCONTINUED | OUTPATIENT
Start: 2021-11-08 | End: 2021-11-08 | Stop reason: SDUPTHER

## 2021-11-08 RX ORDER — OXYCODONE HYDROCHLORIDE 5 MG/1
5 TABLET ORAL EVERY 4 HOURS PRN
Status: DISCONTINUED | OUTPATIENT
Start: 2021-11-08 | End: 2021-11-09 | Stop reason: HOSPADM

## 2021-11-08 RX ORDER — ACETAMINOPHEN 500 MG
1000 TABLET ORAL 3 TIMES DAILY
Status: DISCONTINUED | OUTPATIENT
Start: 2021-11-08 | End: 2021-11-09 | Stop reason: HOSPADM

## 2021-11-08 RX ORDER — PROPOFOL 10 MG/ML
INJECTION, EMULSION INTRAVENOUS PRN
Status: DISCONTINUED | OUTPATIENT
Start: 2021-11-08 | End: 2021-11-08 | Stop reason: SDUPTHER

## 2021-11-08 RX ORDER — VANCOMYCIN HYDROCHLORIDE 1 G/20ML
INJECTION, POWDER, LYOPHILIZED, FOR SOLUTION INTRAVENOUS
Status: COMPLETED | OUTPATIENT
Start: 2021-11-08 | End: 2021-11-08

## 2021-11-08 RX ORDER — VITAMIN B COMPLEX
2000 TABLET ORAL DAILY
Status: DISCONTINUED | OUTPATIENT
Start: 2021-11-09 | End: 2021-11-09 | Stop reason: HOSPADM

## 2021-11-08 RX ORDER — MORPHINE SULFATE 2 MG/ML
2 INJECTION, SOLUTION INTRAMUSCULAR; INTRAVENOUS
Status: DISCONTINUED | OUTPATIENT
Start: 2021-11-08 | End: 2021-11-09 | Stop reason: HOSPADM

## 2021-11-08 RX ORDER — LABETALOL HYDROCHLORIDE 5 MG/ML
5 INJECTION, SOLUTION INTRAVENOUS EVERY 10 MIN PRN
Status: DISCONTINUED | OUTPATIENT
Start: 2021-11-08 | End: 2021-11-08 | Stop reason: HOSPADM

## 2021-11-08 RX ORDER — NICOTINE POLACRILEX 4 MG
15 LOZENGE BUCCAL PRN
Status: DISCONTINUED | OUTPATIENT
Start: 2021-11-08 | End: 2021-11-09 | Stop reason: HOSPADM

## 2021-11-08 RX ORDER — LIDOCAINE HYDROCHLORIDE 20 MG/ML
INJECTION, SOLUTION EPIDURAL; INFILTRATION; INTRACAUDAL; PERINEURAL PRN
Status: DISCONTINUED | OUTPATIENT
Start: 2021-11-08 | End: 2021-11-08 | Stop reason: SDUPTHER

## 2021-11-08 RX ADMIN — Medication 180 MG: at 12:00

## 2021-11-08 RX ADMIN — Medication 10 MG: at 12:52

## 2021-11-08 RX ADMIN — INSULIN LISPRO 1 UNITS: 100 INJECTION, SOLUTION INTRAVENOUS; SUBCUTANEOUS at 23:28

## 2021-11-08 RX ADMIN — TRANEXAMIC ACID 1000 MG: 1 INJECTION, SOLUTION INTRAVENOUS at 13:16

## 2021-11-08 RX ADMIN — ROCURONIUM BROMIDE 10 MG: 10 INJECTION, SOLUTION INTRAVENOUS at 12:00

## 2021-11-08 RX ADMIN — APREPITANT 40 MG: 40 CAPSULE ORAL at 10:59

## 2021-11-08 RX ADMIN — LIDOCAINE HYDROCHLORIDE 100 MG: 20 INJECTION, SOLUTION EPIDURAL; INFILTRATION; INTRACAUDAL; PERINEURAL at 12:03

## 2021-11-08 RX ADMIN — SUGAMMADEX 100 MG: 100 INJECTION, SOLUTION INTRAVENOUS at 13:51

## 2021-11-08 RX ADMIN — ROCURONIUM BROMIDE 20 MG: 10 INJECTION, SOLUTION INTRAVENOUS at 13:00

## 2021-11-08 RX ADMIN — SODIUM CHLORIDE: 9 INJECTION, SOLUTION INTRAVENOUS at 15:35

## 2021-11-08 RX ADMIN — INSULIN LISPRO 10 UNITS: 100 INJECTION, SOLUTION INTRAVENOUS; SUBCUTANEOUS at 18:31

## 2021-11-08 RX ADMIN — Medication 0.2 MG: at 13:41

## 2021-11-08 RX ADMIN — ACETAMINOPHEN 1000 MG: 500 TABLET ORAL at 20:25

## 2021-11-08 RX ADMIN — SODIUM CHLORIDE: 9 INJECTION, SOLUTION INTRAVENOUS at 13:00

## 2021-11-08 RX ADMIN — Medication 10 MG: at 13:10

## 2021-11-08 RX ADMIN — ONDANSETRON 4 MG: 2 INJECTION INTRAMUSCULAR; INTRAVENOUS at 13:10

## 2021-11-08 RX ADMIN — ACETAMINOPHEN 650 MG: 325 TABLET ORAL at 10:59

## 2021-11-08 RX ADMIN — Medication 10 MG: at 13:04

## 2021-11-08 RX ADMIN — DEXAMETHASONE SODIUM PHOSPHATE 8 MG: 4 INJECTION, SOLUTION INTRAMUSCULAR; INTRAVENOUS at 13:10

## 2021-11-08 RX ADMIN — TRANEXAMIC ACID 1000 MG: 1 INJECTION, SOLUTION INTRAVENOUS at 11:50

## 2021-11-08 RX ADMIN — SODIUM CHLORIDE: 9 INJECTION, SOLUTION INTRAVENOUS at 11:56

## 2021-11-08 RX ADMIN — Medication 100 MCG: at 12:16

## 2021-11-08 RX ADMIN — Medication 100 MCG: at 13:14

## 2021-11-08 RX ADMIN — CELECOXIB 200 MG: 200 CAPSULE ORAL at 10:59

## 2021-11-08 RX ADMIN — SODIUM CHLORIDE: 9 INJECTION, SOLUTION INTRAVENOUS at 10:59

## 2021-11-08 RX ADMIN — HYDROMORPHONE HYDROCHLORIDE 0.5 MG: 2 INJECTION, SOLUTION INTRAMUSCULAR; INTRAVENOUS; SUBCUTANEOUS at 15:12

## 2021-11-08 RX ADMIN — INSULIN GLARGINE 32 UNITS: 100 INJECTION, SOLUTION SUBCUTANEOUS at 23:29

## 2021-11-08 RX ADMIN — HYDROMORPHONE HYDROCHLORIDE 0.5 MG: 2 INJECTION, SOLUTION INTRAMUSCULAR; INTRAVENOUS; SUBCUTANEOUS at 14:47

## 2021-11-08 RX ADMIN — ACETAMINOPHEN 1000 MG: 500 TABLET ORAL at 17:32

## 2021-11-08 RX ADMIN — PRAVASTATIN SODIUM 20 MG: 20 TABLET ORAL at 20:25

## 2021-11-08 RX ADMIN — ROCURONIUM BROMIDE 40 MG: 10 INJECTION, SOLUTION INTRAVENOUS at 12:08

## 2021-11-08 RX ADMIN — HYDROMORPHONE HYDROCHLORIDE 0.5 MG: 2 INJECTION, SOLUTION INTRAMUSCULAR; INTRAVENOUS; SUBCUTANEOUS at 14:39

## 2021-11-08 RX ADMIN — Medication 200 MCG: at 13:41

## 2021-11-08 RX ADMIN — HYDROMORPHONE HYDROCHLORIDE 0.5 MG: 2 INJECTION, SOLUTION INTRAMUSCULAR; INTRAVENOUS; SUBCUTANEOUS at 14:56

## 2021-11-08 RX ADMIN — Medication 100 MCG: at 12:33

## 2021-11-08 RX ADMIN — Medication 10 MG: at 13:33

## 2021-11-08 RX ADMIN — CLINDAMYCIN PHOSPHATE 900 MG: 900 INJECTION, SOLUTION INTRAVENOUS at 12:05

## 2021-11-08 RX ADMIN — Medication 0.2 MG: at 12:33

## 2021-11-08 RX ADMIN — Medication 100 MCG: at 12:59

## 2021-11-08 RX ADMIN — INSULIN LISPRO 2 UNITS: 100 INJECTION, SOLUTION INTRAVENOUS; SUBCUTANEOUS at 18:31

## 2021-11-08 RX ADMIN — Medication 30 MG: at 13:08

## 2021-11-08 RX ADMIN — DOCUSATE SODIUM 50 MG AND SENNOSIDES 8.6 MG 1 TABLET: 8.6; 5 TABLET, FILM COATED ORAL at 20:25

## 2021-11-08 RX ADMIN — Medication 10 MG: at 12:36

## 2021-11-08 RX ADMIN — CLINDAMYCIN PHOSPHATE 900 MG: 900 INJECTION, SOLUTION INTRAVENOUS at 20:28

## 2021-11-08 RX ADMIN — FENTANYL CITRATE 50 MCG: 50 INJECTION, SOLUTION INTRAMUSCULAR; INTRAVENOUS at 12:00

## 2021-11-08 RX ADMIN — ROCURONIUM BROMIDE 20 MG: 10 INJECTION, SOLUTION INTRAVENOUS at 12:23

## 2021-11-08 RX ADMIN — PROPOFOL 200 MG: 10 INJECTION, EMULSION INTRAVENOUS at 12:00

## 2021-11-08 RX ADMIN — Medication 200 MCG: at 14:00

## 2021-11-08 ASSESSMENT — PULMONARY FUNCTION TESTS
PIF_VALUE: 3
PIF_VALUE: 20
PIF_VALUE: 17
PIF_VALUE: 20
PIF_VALUE: 21
PIF_VALUE: 0
PIF_VALUE: 21
PIF_VALUE: 20
PIF_VALUE: 19
PIF_VALUE: 20
PIF_VALUE: 20
PIF_VALUE: 3
PIF_VALUE: 20
PIF_VALUE: 1
PIF_VALUE: 18
PIF_VALUE: 21
PIF_VALUE: 20
PIF_VALUE: 21
PIF_VALUE: 13
PIF_VALUE: 21
PIF_VALUE: 21
PIF_VALUE: 22
PIF_VALUE: 3
PIF_VALUE: 21
PIF_VALUE: 19
PIF_VALUE: 21
PIF_VALUE: 20
PIF_VALUE: 20
PIF_VALUE: 21
PIF_VALUE: 21
PIF_VALUE: 20
PIF_VALUE: 21
PIF_VALUE: 20
PIF_VALUE: 20
PIF_VALUE: 21
PIF_VALUE: 21
PIF_VALUE: 1
PIF_VALUE: 20
PIF_VALUE: 19
PIF_VALUE: 21
PIF_VALUE: 1
PIF_VALUE: 21
PIF_VALUE: 20
PIF_VALUE: 20
PIF_VALUE: 19
PIF_VALUE: 20
PIF_VALUE: 20
PIF_VALUE: 21
PIF_VALUE: 21
PIF_VALUE: 1
PIF_VALUE: 20
PIF_VALUE: 20
PIF_VALUE: 21
PIF_VALUE: 21
PIF_VALUE: 20
PIF_VALUE: 20
PIF_VALUE: 15
PIF_VALUE: 17
PIF_VALUE: 21
PIF_VALUE: 2
PIF_VALUE: 20
PIF_VALUE: 21
PIF_VALUE: 9
PIF_VALUE: 0
PIF_VALUE: 21
PIF_VALUE: 14
PIF_VALUE: 20
PIF_VALUE: 21
PIF_VALUE: 20
PIF_VALUE: 21
PIF_VALUE: 21
PIF_VALUE: 0
PIF_VALUE: 20
PIF_VALUE: 26
PIF_VALUE: 20
PIF_VALUE: 21
PIF_VALUE: 20
PIF_VALUE: 21
PIF_VALUE: 20
PIF_VALUE: 16
PIF_VALUE: 20
PIF_VALUE: 3
PIF_VALUE: 20
PIF_VALUE: 23
PIF_VALUE: 21
PIF_VALUE: 1
PIF_VALUE: 20
PIF_VALUE: 21
PIF_VALUE: 21
PIF_VALUE: 20
PIF_VALUE: 0
PIF_VALUE: 20
PIF_VALUE: 20
PIF_VALUE: 21
PIF_VALUE: 2
PIF_VALUE: 23
PIF_VALUE: 21
PIF_VALUE: 20
PIF_VALUE: 21
PIF_VALUE: 20
PIF_VALUE: 15
PIF_VALUE: 20
PIF_VALUE: 21
PIF_VALUE: 3
PIF_VALUE: 19
PIF_VALUE: 20
PIF_VALUE: 21
PIF_VALUE: 21

## 2021-11-08 ASSESSMENT — PAIN DESCRIPTION - PAIN TYPE
TYPE: SURGICAL PAIN

## 2021-11-08 ASSESSMENT — PAIN - FUNCTIONAL ASSESSMENT: PAIN_FUNCTIONAL_ASSESSMENT: ACTIVITIES ARE NOT PREVENTED

## 2021-11-08 ASSESSMENT — PAIN SCALES - GENERAL
PAINLEVEL_OUTOF10: 0
PAINLEVEL_OUTOF10: 2
PAINLEVEL_OUTOF10: 3
PAINLEVEL_OUTOF10: 7
PAINLEVEL_OUTOF10: 2
PAINLEVEL_OUTOF10: 0
PAINLEVEL_OUTOF10: 3
PAINLEVEL_OUTOF10: 9
PAINLEVEL_OUTOF10: 10
PAINLEVEL_OUTOF10: 3
PAINLEVEL_OUTOF10: 2

## 2021-11-08 ASSESSMENT — PAIN DESCRIPTION - LOCATION
LOCATION: HIP

## 2021-11-08 ASSESSMENT — PAIN DESCRIPTION - ORIENTATION
ORIENTATION: RIGHT

## 2021-11-08 ASSESSMENT — PAIN DESCRIPTION - DESCRIPTORS
DESCRIPTORS: ACHING
DESCRIPTORS: DULL

## 2021-11-08 ASSESSMENT — PAIN DESCRIPTION - ONSET: ONSET: ON-GOING

## 2021-11-08 ASSESSMENT — PAIN DESCRIPTION - FREQUENCY: FREQUENCY: CONTINUOUS

## 2021-11-08 ASSESSMENT — PAIN DESCRIPTION - PROGRESSION: CLINICAL_PROGRESSION: NOT CHANGED

## 2021-11-08 ASSESSMENT — LIFESTYLE VARIABLES: SMOKING_STATUS: 0

## 2021-11-08 NOTE — PROGRESS NOTES
Incentive Spirometry education and demonstration completed by Respiratory Therapy Yes      Response to education: Very Good     Teaching Time: 5 minutes    Minimum Predicted Vital Capacity - 510 mL. Patient's Actual Vital Capacity - 1000 mL. Turning over to Nursing for routine follow-up Yes.     Comments: continue spirometer q2h w/a    Electronically signed by Barbi Isabel RCP on 11/8/2021 at 4:24 PM

## 2021-11-08 NOTE — H&P
Patient seen and examined. I have reviewed the history and physical and examined the patient and find no relevant changes. Surgery plan reviewed. BP (!) 184/91   Pulse 81   Temp 96 °F (35.6 °C) (Temporal)   Resp 20   Ht 5' 5\" (1.651 m)   Wt 285 lb (129.3 kg)   SpO2 94%   BMI 47.43 kg/m²     The patient was counseled at length about the risks of yolanda Covid-19 during their perioperative period and any recovery window from their procedure. The patient was made aware that yolanda Covid-19  may worsen their prognosis for recovering from their procedure  and lend to a higher morbidity and/or mortality risk. All material risks, benefits, and reasonable alternatives including postponing the procedure were discussed. The patient does wish to proceed with the procedure at this time. Site marked and consent verified. All questions answered and antibiotic verified. Ready for right total hip arthroplasty    Annika Allison.  [place], [address] [place] [address] and Sports Medicine  11/08/21  11:17 AM

## 2021-11-08 NOTE — BRIEF OP NOTE
Brief Postoperative Note      Patient: Lisa Marks  YOB: 1955  MRN: [id number]    Date of Procedure: 11/8/2021    Pre-Op Diagnosis: M13.11 RIGHT HIP OSTEOARTHRITIS  Morbid Obesity BMI 47.43    Post-Op Diagnosis: Same       Procedure(s):  RIGHT TOTAL HIP ARTHROPLASTY POSTERIOR APPROACH - Andrea Fairchild ADVANCED    Surgeon(s):  Rand Jonas MD    Assistant:  Surgical Assistant: Liat Guillen    Anesthesia: General    Estimated Blood Loss (mL): 331     Complications: None    Specimens:   * No specimens in log *    Implants:  Implant Name Type Inv. Item Serial No.  Lot No. LRB No. Used Action   BONE SCREW 6.5X25 SELF-TAP  BONE SCREW 6.5X25 SELF-TAP  [employer]- [id number] Right 1 Implanted   SHELL ACET SZ F [id number] 4 H OSSEOTI LIMIT H 2 MOBILITY G7  SHELL ACET SZ F [id number] 4 H OSSEOTI LIMIT H 2 MOBILITY G7  [employer]- [id number] Right 1 Implanted   LINER ACET HW F 40 MM VIVACIT-E G7  LINER ACET HW F 40 MM VIVACIT-E G7  [employer]- [id number] Right 1 Implanted   HEAD FEM [id number] HIP BIOLOX DELT OPT FOR G7 ACET SYS  HEAD FEM [id number] HIP BIOLOX DELT OPT FOR G7 ACET SYS  [employer]- [id number] Right 1 Implanted   TLOC 133 MP SP T1 PPS HO 8X101  TLOC 133 MP SP T1 PPS HO 8X101  [employer]- [id number] Right 1 Implanted   SLEEVE FEM -6MM OFFSET HIP TYP 1 TAPR FOR CERAMIC BIOLOX  SLEEVE FEM -6MM OFFSET HIP TYP 1 TAPR FOR [place]  [employer]- [id number] Right 1 Implanted         Drains: * No LDAs found *    Findings: severe arthritic changes right hip joint.     Electronically signed by Lars Mendoza MD on 11/8/2021 at 1:57 PM

## 2021-11-08 NOTE — ANESTHESIA POSTPROCEDURE EVALUATION
Department of Anesthesiology  Postprocedure Note    Patient: Jayant Wiley  MRN: [id number]  [place]: 1955  Date of evaluation: 11/8/2021  Time:  2:20 PM     Procedure Summary     Date: 11/08/21 Room / Location: [address]    Anesthesia Start: 1156 Anesthesia Stop: 9788    Procedure: RIGHT TOTAL HIP ARTHROPLASTY POSTERIOR APPROACH - Stephanie Renee (Right Hip) Diagnosis: (M13.11 RIGHT HIP OSTEOARTHRITIS)    Surgeons: Wagner Ojeda MD Responsible Provider: Lynnette Lam MD    Anesthesia Type: general ASA Status: 3          Anesthesia Type: general    Michelle Phase I: Michelle Score: 8    Michelle Phase II:      Last vitals: Reviewed and per EMR flowsheets.        Anesthesia Post Evaluation    Patient location during evaluation: PACU  Patient participation: complete - patient participated  Level of consciousness: awake and alert  Airway patency: patent  Nausea & Vomiting: no vomiting and no nausea  Complications: no  Cardiovascular status: hemodynamically stable  Respiratory status: acceptable  Hydration status: stable

## 2021-11-08 NOTE — PROGRESS NOTES
in place; Bed alarm in place; Call light within reach; Nurse notified; Left in bed; Patient at risk for falls; Gait belt           Patient Diagnosis(es): The encounter diagnosis was Preop testing. has a past medical history of Arthritis, Asthma, Diabetes mellitus ([address] High cholesterol, and Hypertension. has a past surgical history that includes Total knee arthroplasty (Right, 07/14/2015); Total knee arthroplasty (Left, 10/14/15); and Total hip arthroplasty (Right, 11/8/2021).     Treatment Diagnosis: Above deficits associated with R LOUIS      Restrictions  Restrictions/Precautions  Restrictions/Precautions: Fall Risk, Weight Bearing (HIGH FALL RISK)  Lower Extremity Weight Bearing Restrictions  Right Lower Extremity Weight Bearing: Weight Bearing As Tolerated  Position Activity Restriction  Hip Precautions: No hip flexion > 90 degrees, No ADduction, No hip internal rotation, Posterior hip precautions (Per chart review, posterior hip precautions and no SLR of surgical extremity)  Other position/activity restrictions: S/P R LOUIS 11/8/21    Subjective   General  Chart Reviewed: Yes  Family / Caregiver Present: No (Sister leaving as therapy entered room)  Diagnosis: R LOUIS  Subjective  Subjective: Patient supine in bed, agreeable to evaluation  Patient Currently in Pain: Yes  Pain Assessment  Pain Assessment: 0-10  Pain Level: 3  Pain Type: Surgical pain  Pain Location: Hip  Pain Orientation: Right  Pain Descriptors: Aching  Pre Treatment Pain Screening  Intervention List: Patient able to continue with treatment  Vital Signs  Level of Consciousness: Alert (0)  Patient Currently in Pain: Yes  Oxygen Therapy  SpO2: 95 %  Pulse Oximeter Device Mode: Intermittent  O2 Device: Nasal cannula  O2 Flow Rate (L/min): 2.5 L/min  Social/Functional History  Social/Functional History  Lives With: Alone (pt states sister will be staying with her at home upon d/c)  Type of Home: House  Home Layout: One level, Able to Live on Main level with bedroom/bathroom, Performs ADL's on one level  Home Access: Stairs to enter with rails  Entrance Stairs - Number of Steps: 2 MILLIE  Entrance Stairs - Rails: Both  Bathroom Shower/Tub: Walk-in shower  Bathroom Toilet: Handicap height  Bathroom Equipment: Grab bars in shower, Hand-held shower, Shower chair, Grab bars around toilet  Home Equipment: 4 wheeled walker, Cane, Crutches, Standard walker, Grab bars  ADL Assistance: Independent  Homemaking Assistance: Independent (has groceries delivered)  Ambulation Assistance: Independent  Transfer Assistance: Independent  Active : Yes  Occupation: Retired  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 Objective   Vision: Impaired  Vision Exceptions: Wears glasses at all times  Hearing: Exceptions to [facility]  Hearing Exceptions: Hard of hearing/hearing concerns    Orientation  Overall Orientation Status: Within Functional Limits     Balance  Sitting Balance: Stand by assistance  Standing Balance: Unable to assess(comment) (Patient groggy, briefly light headed at EOB, unable to safely attempt stance)  ADL  Feeding: Setup  Grooming: Setup  LE Dressing: Dependent/Total (removing foam pillow, donning socks)  Toileting: None  Tone RUE  RUE Tone: Normotonic  Tone LUE  LUE Tone: Normotonic  Coordination  Movements Are Fluid And Coordinated: Yes     Bed mobility  Supine to Sit: 2 Person assistance (modA of 2)  Sit to Supine: 2 Person assistance (maxA + modA)  Scooting: Stand by assistance   Patient tolerated sitting EOB 20-25 minutes, groggy and closing eyes, able to wean to 1L O2, saturations 94% (from 3L beginning of session)  Transfers  Sit to stand: Unable to assess  Stand to sit: Unable to assess  Vision - Basic Assessment  Patient Visual Report: No visual complaint reported.   Cognition  Overall Cognitive Status: [facility]  Perception  Overall Perceptual Status: WFL     Sensation  Overall Sensation Status: WFL        LUE AROM (degrees)  LUE AROM : WFL  RUE AROM (degrees)  RUE AROM : WFL  LUE Strength  L Hand General: 5/5  RUE Strength  R Hand General: 5/5     Plan  Times per week: 7  Times per day: Daily  Current Treatment Recommendations: Strengthening, Balance Training, Functional Mobility Training, Endurance Training, Safety Education & Training, Self-Care / ADL, Patient/Caregiver Education & Training, Equipment Evaluation, Education, & procurement      AM-PAC Score        AM-PAC Inpatient Daily Activity Raw Score: 18 (11/08/21 1724)  AM-PAC Inpatient ADL T-Scale Score : 38.66 (11/08/21 1724)  ADL Inpatient CMS 0-100% Score: 46.65 (11/08/21 1724)  ADL Inpatient CMS G-Code Modifier : CK (11/08/21 1724)    Goals  Short term goals  Time Frame for Short term goals: Discharge  Short term goal 1: Bed mobility mod I  Short term goal 2: Functional ADL transfers mod I  Short term goal 3: Functional mobility mod I  Short term goal 4: UB ADLs I, LB mod I  Short term goal 5: Functional stance 5-7 minutes mod I  Long term goals  Time Frame for Long term goals : LTG=STG  Patient Goals   Patient goals : Home       Therapy Time   Individual Concurrent Group Co-treatment   Time In 1627         Time Out 1720         Minutes 53              Timed Code Treatment Minutes:   38    Total Treatment Minutes:  53 (total time split with PT 2/2 pt observation)      Shandra Mclean OTR/LADONNA [id number]

## 2021-11-08 NOTE — PROGRESS NOTES
Physical Therapy    Facility/Department: [address] ORTHO/NEURO NURSING  Initial Assessment    NAME: Natalie Moreno  : 1955  MRN: [id number]    Date of Service: 2021    Discharge Recommendations: Natalie Moreno scored a 7/24 on the AM-PAC short mobility form. Current research shows that an AM-PAC score of 18 or greater is typically associated with a discharge to the patient's home setting. Based on the patient's AM-PAC score and their current functional mobility deficits, it is recommended that the patient have 2-3 sessions per week of Physical Therapy at d/c to increase the patient's independence. At this time, this patient demonstrates the endurance and safety to discharge home with HHPT and a follow up treatment frequency of 2-3x/wk. Please see assessment section for further patient specific details. HOME HEALTH CARE: LEVEL 3 SAFETY  - Initial home health evaluation to occur within 24-48 hours, in patient home   - Therapy evaluations in home within 24-48 hours of discharge; including DME and home safety   - Frontload therapy 5 days, then 3x a week   - Therapy to evaluate if patient has [id number] needs for personal care   -  evaluation within 24-48 hours, includes evaluation of resources and insurance to determine [place], [place], LTC, and Medicaid options     If patient discharges prior to next session this note will serve as a discharge summary. Please see below for the latest assessment towards goals. PT Equipment Recommendations  Equipment Needed: Yes  Mobility Devices: Alease Peat: Rolling    Assessment   Body structures, Functions, Activity limitations: Decreased functional mobility ; Decreased ROM; Decreased strength; Decreased balance; Decreased endurance  Assessment: Pt presents with the above deficits s/p R LOUIS impairing her ability to perform functional mobility safely and independently.  Pt with PLOF of independence and currently required 2 person assist for bed mobility and min A for scooting at EOB this date. Pt would benefit from acute PT services to address deficits. Treatment Diagnosis: impaired functional mobility  Prognosis: Good; Fair  Decision Making: Medium Complexity  Clinical Presentation: evolving  PT Education: Goals; PT Role; Plan of Care; Home Exercise Program; Precautions; Orientation; General Safety; Weight-bearing Education; Functional Mobility Training  Patient Education: d/c recommendations, hip precautions, HEP--pt verbalizing understanding  Barriers to Learning: none  REQUIRES PT FOLLOW UP: Yes  Activity Tolerance  Activity Tolerance: Other  Activity Tolerance: pt reporting ligtheadedness upon sitting EOB and groggy while seated EOB; SpO2 at 96% on 4L O2, decreased to 1L and remained at 93% at end of session--RN aware. Patient Diagnosis(es): The encounter diagnosis was Preop testing. has a past medical history of Arthritis, Asthma, Diabetes mellitus ([facility] [address] High cholesterol, and Hypertension. has a past surgical history that includes Total knee arthroplasty (Right, 07/14/2015); Total knee arthroplasty (Left, 10/14/15); and Total hip arthroplasty (Right, 11/8/2021).     Restrictions  Restrictions/Precautions  Restrictions/Precautions: Fall Risk, Weight Bearing (HIGH FALL RISK)  Lower Extremity Weight Bearing Restrictions  Right Lower Extremity Weight Bearing: Weight Bearing As Tolerated  Position Activity Restriction  Hip Precautions: No hip flexion > 90 degrees, No ADduction, No hip internal rotation, Posterior hip precautions (Per chart review, posterior hip precautions and no SLR of surgical extremity)  Other position/activity restrictions: S/P R LOUIS 11/8/21     Vision/Hearing  Vision: Impaired  Vision Exceptions: Wears glasses at all times  Hearing: Exceptions to [facility]  Hearing Exceptions: Hard of hearing/hearing concerns       Subjective  General  Chart Reviewed: Yes  Response To Previous Treatment: Not applicable  Family / Caregiver Present: No  Diagnosis: R LOUIS  Follows Commands: Within Functional Limits  General Comment  Comments: Pt supine in bed upon arrival.  Subjective  Subjective: Pt agreeable to PT/OT max, reporting 1/10 pain in RLE. Pain Screening  Patient Currently in Pain: Yes  Vital Signs  Patient Currently in Pain: Yes       Orientation  Orientation  Overall Orientation Status: Within Functional Limits     Social/Functional History  Social/Functional History  Lives With: Alone (pt states sister will be staying with her at home upon d/c)  Type of Home: [address] [place] [address],[address]: One level, Able to Live on Main level with bedroom/bathroom, Performs ADL's on one level  Home Access: Stairs to enter with rails  Entrance Stairs - Number of Steps: 2 MILLIE  Entrance Stairs - Rails: Both  Bathroom Shower/Tub: Walk-in shower  Bathroom Toilet: Handicap height  Bathroom Equipment: Grab bars in shower, Hand-held shower, Shower chair, Grab bars around toilet  Home Equipment: 4 wheeled walker, Cane, Crutches, Standard walker, Grab bars, Lift chair  ADL Assistance: Independent  Homemaking Assistance: Independent (has groceries delivered)  Ambulation Assistance: Independent  Transfer Assistance: Independent  Active : Yes  Occupation: Retired  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      Cognition   Cognition  Overall Cognitive Status: WFL    Objective  AROM RLE (degrees)  RLE AROM: Exceptions  RLE General AROM: able to perform ~50% AROM of hip and able to perform 100% knee and ankle AROM  AROM LLE (degrees)  LLE AROM : WF  Strength RLE  Strength RLE: Exception  Comment: grossly 2-/5  Strength LLE  Strength LLE: WFL  Comment: grossly 3+/5  Tone RLE  RLE Tone: Normotonic  Tone LLE  LLE Tone: Normotonic  Motor Control  Gross Motor?: WFL     Bed mobility  Supine to Sit: Dependent/Total (modA of 2)  Sit to Supine: Dependent/Total (maxA + modA)  Scooting: Minimal assistance; Stand by assistance (SBA scooting towards EOB; Min A scooting towards HOB)  Comment: required increased time to perform bed mobility tasks, increased assist required for guiding RLE during tasks  Transfers  Comment: Not addressed due to safety concerns as pt reporing lightheadedness seated EOB and grogginess while seated EOB  Ambulation  Ambulation?: No (deferred due to safety concerns)  Stairs/Curb  Stairs?: No     Balance  Posture: Fair  Sitting - Static: Good; - (SBA seated EOB ~20-25 minutes while pt eating from dinner tray)  Sitting - Dynamic: Good; - (SBA seated EOB)  Standing - Static:  (SUMIT)  Standing - Dynamic:  (SUMIT)        Plan   Plan  Times per week: 7x (BID)  Times per day: Twice a day  Current Treatment Recommendations: Strengthening, Balance Training, Functional Mobility Training, Transfer Training, Gait Training, Stair training, Endurance Training, Neuromuscular Re-education, Equipment Evaluation, Education, & procurement, Patient/Caregiver Education & Training, Safety Education & Training, Home Exercise Program  Safety Devices  Type of devices:  All fall risk precautions in place, Call light within reach, Bed alarm in place, Left in bed, Patient at risk for falls, Nurse notified  Restraints  Initially in place: No    G-Code       OutComes Score                                                  AM-PAC Score  AM-PAC Inpatient Mobility Raw Score : 7 (11/08/21 1736)  AM-PAC Inpatient T-Scale Score : 26.42 (11/08/21 1736)  Mobility Inpatient CMS 0-100% Score: 92.36 (11/08/21 1736)  Mobility Inpatient CMS G-Code Modifier : CM (11/08/21 1736)          Goals  Short term goals  Time Frame for Short term goals: upon d/c  Short term goal 1: Pt will perform bed mobility with SBA  Short term goal 2: Pt will perform transfers with LRAD and SBA  Short term goal 3: Pt will ambulate 48' with LRAD and SBA  Short term goal 4: Pt will negotiate 2 steps with BHR and SBA  Short term goal 5: Pt will perform car transfer with LRAD and SBA  Patient Goals   Patient goals : pt did not state       Therapy Time   Individual Concurrent Group Co-treatment   Time In 1623         Time Out 1716         Minutes 53              Timed Code Treatment Minutes:   8    Total Treatment Minutes:  53  Time spent with pt shared with OT as pt is under observation status. As such, pt is being billed 2 units for evaluation.     [address] [place], [place] [id number]

## 2021-11-08 NOTE — PROGRESS NOTES
Pain improving per patient. VSS. Surgical dressing c/d/i. Phase 1 recovery completed, will transfer to T.

## 2021-11-08 NOTE — PROGRESS NOTES
[facility] Orthopedic Surgery   Progress Note    CHIEF COMPLAINT/DIAGNOSIS: S/p RIGHT Total Hip Arthroplasty    SUBJECTIVE: The patient is sitting up in bed on the floor; describes mild hip pain. Sister will be staying with her in private home with two steps to enter. Wants scripts filled at [facility] on [address]. Wants PT at home x 2 weeks and has a RW. OBJECTIVE  Physical    VITALS:  BP (!) 184/91   Pulse 81   Temp 96 °F (35.6 °C) (Temporal)   Resp 20   Ht 5' 5\" (1.651 m)   Wt 285 lb (129.3 kg)   SpO2 94%   BMI 47.43 kg/m²     GENERAL: Alert and oriented x3, in no acute distress. MUSCULOSKELETAL:   Able to dorsi and plantarflex the ankle without issue. INCISION:  Covered with post-op dressing which is c/d/I. ROSIO functioning. Sensory:  Intact to light touch in peroneal and tibial distributions. Vascular:   2+ DP pulses with brisk cap refill;  calf soft and nontender    Data    ALL MEDICATIONS HAVE BEEN REVIEWED    CBC: No results for input(s): WBC, HGB, HCT, PLT in the last 72 hours. BMP: No results for input(s): NA, K, CL, CO2, PHOS, BUN, CREATININE in the last 72 hours. Invalid input(s): CA  INR: No results for input(s): INR in the last 72 hours. Post-op films show stable total hip arthroplasty construct without acute complication. ASSESSMENT:  S/p RIGHT Total Hip Arthroplasty (11/8/21), POD#0  DM II  Obesity  HTN  Asthma    PLAN:   - WB status:  WBAT ; Posterior hip precautions - reviewed with pt. Abduction pillow in place POD#1  - DVT prophylaxis: Eliquis 2.5mg BID x 30 days  - PT/OT  - Pain Control: Current regimen. Due to orthopaedic surgical procedure/condition, patient may require pain medication for up to 6-8 weeks. - FEN: post-op TJA insulin protocol  - ID:  Cleocin x 2 doses post-op. - Dispo: home with home PT tomorrow    Follow-up with Dr. Junito Grimaldo as scheduled. Office # [phone]  No future appointments.     LEE Valadez - CNP  11/8/2021  1:40 PM

## 2021-11-08 NOTE — ANESTHESIA PRE PROCEDURE
Department of Anesthesiology  Preprocedure Note       Name:  Ekta Law   Age:  77 y.o.  :  1955                                          MRN:  [id number]         Date:  2021      Surgeon: Lord Ramirez):  Ethel Lundborg, MD    Procedure: Procedure(s):  RIGHT TOTAL HIP ARTHROPLASTY POSTERIOR APPROACH - [place] ADVANCED    Medications prior to admission:   Prior to Admission medications    Medication Sig Start Date End Date Taking?  Authorizing Provider   NONFORMULARY cbd prn   Yes Historical Provider, MD   Cholecalciferol (VITAMIN D) 50 MCG ( UT) CAPS capsule Take 2,000 Units by mouth daily 21  Ethel Lundborg, MD   latanoprost (XALATAN) 0.005 % ophthalmic solution INSTILL 1 DROP INTO [facility] EYE AT BEDTIME 21   Historical Provider, MD   meloxicam (MOBIC) 15 MG tablet  16   Historical Provider, MD   carvedilol (COREG) 6.25 MG tablet Take 6.25 mg by mouth 2 times daily (with meals)    Historical Provider, MD   pravastatin (PRAVACHOL) 20 MG tablet Take 20 mg by mouth nightly     Historical Provider, MD   hydrochlorothiazide (HYDRODIURIL) 25 MG tablet Take 25 mg by mouth daily    Historical Provider, MD       Current medications:    Current Facility-Administered Medications   Medication Dose Route Frequency Provider Last Rate Last Admin    0.9 % sodium chloride infusion   IntraVENous Continuous Ethel Lundborg, MD        lidocaine PF 1 % injection 0.5 mL  0.5 mL IntraDERmal Once Ethel Lundborg, MD        clindamycin (CLEOCIN) 900 mg in dextrose 5 % 50 mL IVPB  900 mg IntraVENous On Call to [facility], MD        ortho mix injection   Injection On Call Ethel Lundborg, MD        tranexamic acid (CYKLOKAPRON) 1,000 mg in sodium chloride 0.9 % 50 mL IVPB  1,000 mg IntraVENous Once Ethel Lundborg, MD        tranexamic acid (CYKLOKAPRON) 1,000 mg in sodium chloride 0.9 % 50 mL IVPB  1,000 mg IntraVENous On Call to [facility], MD        meperidine (DEMEROL) injection 12.5 mg  12.5 mg IntraVENous Q5 Min PRN Leia Moon MD        HYDROmorphone (DILAUDID) injection 0.5 mg  0.5 mg IntraVENous Q5 Min PRN Leia Moon MD        oxyCODONE-acetaminophen (PERCOCET) 5-325 MG per tablet 1 tablet  1 tablet Oral Once PRN Leia Moon MD        ondansetron [facility]) injection 4 mg  4 mg IntraVENous Once PRN Leia Moon MD        labetalol (NORMODYNE;TRANDATE) injection 5 mg  5 mg IntraVENous Q10 Min PRN Leia Moon MD        hydrALAZINE (APRESOLINE) injection 5 mg  5 mg IntraVENous Q10 Min PRN Leia Moon MD        celecoxib (CELEBREX) capsule 200 mg  200 mg Oral Once Leia Moon MD        acetaminophen (TYLENOL) tablet 650 mg  650 mg Oral Once Leia Moon MD        aprepitant (EMEND) capsule 40 mg  40 mg Oral Once Leia Moon MD           Allergies: Allergies   Allergen Reactions    Keflex [Cephalexin] Itching and Rash    Codeine Nausea And Vomiting       Problem List:    Patient Active Problem List   Diagnosis Code    Asthma J45.909    Gastroesophageal reflux disease K21.9    Hypertension I10    Adiposity E66.9    S/P total knee arthroplasty Z96.659    Primary osteoarthritis of left knee M17.12    Morbid obesity with BMI of 50.0-59.9, adult ([facility]) E66.01, Z68.43       Past Medical History:        Diagnosis Date    Arthritis     Asthma     mild    Diabetes mellitus ([facility] [address]     borderline    High cholesterol     Hypertension        Past Surgical History:        Procedure Laterality Date    TOTAL KNEE ARTHROPLASTY Right 07/14/2015    TOTAL KNEE ARTHROPLASTY Left 10/14/15    TKA       Social History:    Social History     Tobacco Use    Smoking status: Former Smoker    Smokeless tobacco: Never Used   Substance Use Topics    Alcohol use:  No                                Counseling given: Not Answered      Vital Signs (Current):   Vitals: 10/08/21 1327   Weight: 289 lb (131.1 kg)   Height: 5' 5\" (1.651 m)                                              BP Readings from Last 3 Encounters:   09/21/16 116/72   01/18/16 114/65   12/21/15 130/73       NPO Status:                                                                                 BMI:   Wt Readings from Last 3 Encounters:   10/08/21 289 lb (131.1 kg)   07/26/21 (!) 308 lb (139.7 kg)   09/21/16 (!) 310 lb (140.6 kg)     Body mass index is 48.09 kg/m². CBC:   Lab Results   Component Value Date    WBC 6.1 10/12/2021    RBC 5.26 10/12/2021    HGB 15.6 10/12/2021    HCT 46.5 10/12/2021    MCV 88.5 10/12/2021    RDW 14.6 10/12/2021     10/12/2021       CMP:   Lab Results   Component Value Date     10/12/2021    K 3.3 10/12/2021    CL 96 10/12/2021    CO2 25 10/12/2021    BUN 16 10/12/2021    CREATININE <0.5 10/12/2021    GFRAA >60 10/12/2021    AGRATIO 1.6 10/12/2021    LABGLOM >60 10/12/2021    GLUCOSE 167 10/12/2021    PROT 7.3 10/12/2021    CALCIUM 10.0 10/12/2021    BILITOT 0.9 10/12/2021    ALKPHOS 63 10/12/2021    AST 23 10/12/2021    ALT 28 10/12/2021       POC Tests: No results for input(s): POCGLU, POCNA, POCK, POCCL, POCBUN, POCHEMO, POCHCT in the last 72 hours.     Coags:   Lab Results   Component Value Date    PROTIME 11.5 10/12/2021    INR 1.02 10/12/2021    APTT 32.8 10/12/2021       HCG (If Applicable): No results found for: PREGTESTUR, PREGSERUM, HCG, HCGQUANT     ABGs: No results found for: PHART, PO2ART, NSQ2REV, PCJ9BFO, BEART, I6BYHJCW     Type & Screen (If Applicable):  Lab Results   Component Value Date    LABABO O 10/07/2015    [address] Positive 10/07/2015       Drug/Infectious Status (If Applicable):  No results found for: HIV, HEPCAB    COVID-19 Screening (If Applicable):   Lab Results   Component Value Date    COVID19 Not Detected 11/01/2021           Anesthesia Evaluation  Patient summary reviewed and Nursing notes reviewed no history of anesthetic complications: Airway: Mallampati: III  TM distance: >3 FB   Neck ROM: full  Mouth opening: > = 3 FB Dental: normal exam         Pulmonary:normal exam  breath sounds clear to auscultation  (+) asthma (mild per report):     (-) sleep apnea and not a current smoker                           Cardiovascular:    (+) hypertension:, hyperlipidemia    (-) past MI, CAD, CABG/stent, dysrhythmias,  angina and  CHF    ECG reviewed  Rhythm: regular  Rate: normal           Beta Blocker:  Dose within 24 Hrs         Neuro/Psych:   Negative Neuro/Psych ROS     (-) seizures, TIA and CVA           GI/Hepatic/Renal:   (+) GERD: well controlled, morbid obesity     (-) liver disease and no renal disease       Endo/Other:    (+) Diabetes (borderline per report but a1c 7.8)Type II DM, , : arthritis: OA., .    (-) hypothyroidism, hyperthyroidism               Abdominal:             Vascular: Other Findings:             Anesthesia Plan      general     ASA 3       Induction: intravenous. MIPS: Postoperative opioids intended and Prophylactic antiemetics administered. Anesthetic plan and risks discussed with patient. Plan discussed with CRNA.                   Luis Zavaleta MD   11/8/2021

## 2021-11-08 NOTE — PROGRESS NOTES
Patient to PACU from OR. Drowsy. VSS. Surgical dressing to right hip c/d/i. Abductor pillow in place. +1 bilat pedal pulses. Will monitor.

## 2021-11-08 NOTE — PROGRESS NOTES
Patient admitted to room 4473 from PACU, patient alert and oriented x4, VSS. Patient BP elevated, patient states high BP is her baseline. Incision to right hip is CD&I. Abductor pillow is in place and explanation to keep in place for 24 hours. Patient verbalizes understanding. Neuro checks WNL. Patient states her pain level is 3/10 and denies needing pain medicine. Call light within, bed alarm on.  Will continue to monitor  Ghislaine Barillas RN

## 2021-11-09 VITALS
BODY MASS INDEX: 47.48 KG/M2 | WEIGHT: 285 LBS | TEMPERATURE: 98.2 F | HEIGHT: 65 IN | SYSTOLIC BLOOD PRESSURE: 138 MMHG | RESPIRATION RATE: 16 BRPM | HEART RATE: 69 BPM | OXYGEN SATURATION: 93 % | DIASTOLIC BLOOD PRESSURE: 77 MMHG

## 2021-11-09 LAB
ANION GAP SERPL CALCULATED.3IONS-SCNC: 13 MMOL/L (ref 3–16)
BUN BLDV-MCNC: 14 MG/DL (ref 7–20)
CALCIUM SERPL-MCNC: 8.9 MG/DL (ref 8.3–10.6)
CHLORIDE BLD-SCNC: 102 MMOL/L (ref 99–110)
CO2: 23 MMOL/L (ref 21–32)
CREAT SERPL-MCNC: <0.5 MG/DL (ref 0.6–1.2)
GFR AFRICAN AMERICAN: >60
GFR NON-AFRICAN AMERICAN: >60
GLUCOSE BLD-MCNC: 164 MG/DL (ref 70–99)
GLUCOSE BLD-MCNC: 210 MG/DL (ref 70–99)
GLUCOSE BLD-MCNC: 240 MG/DL (ref 70–99)
HCT VFR BLD CALC: 38.7 % (ref 36–48)
HEMOGLOBIN: 12.7 G/DL (ref 12–16)
MCH RBC QN AUTO: 29 PG (ref 26–34)
MCHC RBC AUTO-ENTMCNC: 32.9 G/DL (ref 31–36)
MCV RBC AUTO: 88 FL (ref 80–100)
PDW BLD-RTO: 13.8 % (ref 12.4–15.4)
PERFORMED ON: ABNORMAL
PERFORMED ON: ABNORMAL
PLATELET # BLD: 141 K/UL (ref 135–450)
PMV BLD AUTO: 8.5 FL (ref 5–10.5)
POTASSIUM SERPL-SCNC: 3.5 MMOL/L (ref 3.5–5.1)
RBC # BLD: 4.4 M/UL (ref 4–5.2)
SODIUM BLD-SCNC: 138 MMOL/L (ref 136–145)
WBC # BLD: 7 K/UL (ref 4–11)

## 2021-11-09 PROCEDURE — 80048 BASIC METABOLIC PNL TOTAL CA: CPT

## 2021-11-09 PROCEDURE — 6370000000 HC RX 637 (ALT 250 FOR IP): Performed by: NURSE PRACTITIONER

## 2021-11-09 PROCEDURE — APPNB45 APP NON BILLABLE 31-45 MINUTES: Performed by: NURSE PRACTITIONER

## 2021-11-09 PROCEDURE — 99024 POSTOP FOLLOW-UP VISIT: CPT | Performed by: NURSE PRACTITIONER

## 2021-11-09 PROCEDURE — 97116 GAIT TRAINING THERAPY: CPT

## 2021-11-09 PROCEDURE — 97530 THERAPEUTIC ACTIVITIES: CPT

## 2021-11-09 PROCEDURE — 85027 COMPLETE CBC AUTOMATED: CPT

## 2021-11-09 PROCEDURE — 2580000003 HC RX 258: Performed by: NURSE PRACTITIONER

## 2021-11-09 PROCEDURE — 2500000003 HC RX 250 WO HCPCS: Performed by: NURSE PRACTITIONER

## 2021-11-09 PROCEDURE — G0378 HOSPITAL OBSERVATION PER HR: HCPCS

## 2021-11-09 RX ORDER — HYDROCODONE BITARTRATE AND ACETAMINOPHEN 5; 325 MG/1; MG/1
1-2 TABLET ORAL EVERY 6 HOURS PRN
Qty: 42 TABLET | Refills: 0 | Status: SHIPPED | OUTPATIENT
Start: 2021-11-09 | End: 2021-11-16

## 2021-11-09 RX ADMIN — INSULIN LISPRO 10 UNITS: 100 INJECTION, SOLUTION INTRAVENOUS; SUBCUTANEOUS at 12:32

## 2021-11-09 RX ADMIN — Medication 2000 UNITS: at 08:49

## 2021-11-09 RX ADMIN — SODIUM CHLORIDE 25 ML: 9 INJECTION, SOLUTION INTRAVENOUS at 04:29

## 2021-11-09 RX ADMIN — INSULIN LISPRO 10 UNITS: 100 INJECTION, SOLUTION INTRAVENOUS; SUBCUTANEOUS at 08:57

## 2021-11-09 RX ADMIN — INSULIN LISPRO 1 UNITS: 100 INJECTION, SOLUTION INTRAVENOUS; SUBCUTANEOUS at 12:33

## 2021-11-09 RX ADMIN — OXYCODONE 10 MG: 5 TABLET ORAL at 08:51

## 2021-11-09 RX ADMIN — HYDROCHLOROTHIAZIDE 25 MG: 25 TABLET ORAL at 08:50

## 2021-11-09 RX ADMIN — APIXABAN 2.5 MG: 2.5 TABLET, FILM COATED ORAL at 08:50

## 2021-11-09 RX ADMIN — SODIUM CHLORIDE, PRESERVATIVE FREE 10 ML: 5 INJECTION INTRAVENOUS at 08:56

## 2021-11-09 RX ADMIN — CARVEDILOL 6.25 MG: 6.25 TABLET, FILM COATED ORAL at 08:50

## 2021-11-09 RX ADMIN — OXYCODONE 5 MG: 5 TABLET ORAL at 00:18

## 2021-11-09 RX ADMIN — CLINDAMYCIN PHOSPHATE 900 MG: 900 INJECTION, SOLUTION INTRAVENOUS at 04:31

## 2021-11-09 RX ADMIN — ACETAMINOPHEN 1000 MG: 500 TABLET ORAL at 08:49

## 2021-11-09 RX ADMIN — INSULIN LISPRO 2 UNITS: 100 INJECTION, SOLUTION INTRAVENOUS; SUBCUTANEOUS at 08:58

## 2021-11-09 RX ADMIN — DOCUSATE SODIUM 50 MG AND SENNOSIDES 8.6 MG 1 TABLET: 8.6; 5 TABLET, FILM COATED ORAL at 08:49

## 2021-11-09 ASSESSMENT — PAIN - FUNCTIONAL ASSESSMENT
PAIN_FUNCTIONAL_ASSESSMENT: PREVENTS OR INTERFERES SOME ACTIVE ACTIVITIES AND ADLS
PAIN_FUNCTIONAL_ASSESSMENT: PREVENTS OR INTERFERES SOME ACTIVE ACTIVITIES AND ADLS

## 2021-11-09 ASSESSMENT — PAIN SCALES - GENERAL
PAINLEVEL_OUTOF10: 1
PAINLEVEL_OUTOF10: 6
PAINLEVEL_OUTOF10: 0
PAINLEVEL_OUTOF10: 6
PAINLEVEL_OUTOF10: 0

## 2021-11-09 ASSESSMENT — PAIN DESCRIPTION - PROGRESSION: CLINICAL_PROGRESSION: GRADUALLY WORSENING

## 2021-11-09 ASSESSMENT — PAIN DESCRIPTION - ORIENTATION
ORIENTATION: RIGHT

## 2021-11-09 ASSESSMENT — PAIN DESCRIPTION - ONSET
ONSET: ON-GOING
ONSET: ON-GOING

## 2021-11-09 ASSESSMENT — PAIN DESCRIPTION - LOCATION
LOCATION: HIP

## 2021-11-09 ASSESSMENT — PAIN DESCRIPTION - FREQUENCY
FREQUENCY: INTERMITTENT
FREQUENCY: CONTINUOUS

## 2021-11-09 ASSESSMENT — PAIN DESCRIPTION - DESCRIPTORS
DESCRIPTORS: DULL;SHARP
DESCRIPTORS: DULL;SHARP

## 2021-11-09 ASSESSMENT — PAIN DESCRIPTION - PAIN TYPE
TYPE: SURGICAL PAIN

## 2021-11-09 NOTE — PLAN OF CARE
Problem: Discharge Planning:  Goal: Knowledge of discharge instructions  Description: Knowledge of discharge instructions  11/8/2021 1850 by Jenelle Schulz RN  Outcome: Ongoing     Problem: Infection - Surgical Site:  Goal: Signs of wound healing will improve  Description: Signs of wound healing will improve  11/9/2021 0216 by Anjelica Lee RN  Outcome: Ongoing  11/8/2021 1850 by Jenelle Schulz RN  Outcome: Ongoing     Problem: Mobility - Impaired:  Goal: Achieve maximum mobility level  Description: Achieve maximum mobility level  11/9/2021 0216 by Anjelica Lee RN  Outcome: Ongoing  11/8/2021 1850 by Jenelle Schulz RN  Outcome: Ongoing     Problem: Pain - Acute:  Goal: Pain level will decrease  Description: Pain level will decrease  11/9/2021 0216 by Anjelica Lee RN  Outcome: Ongoing  11/8/2021 1850 by Jenelle Schulz RN  Outcome: Ongoing     Problem: Falls - Risk of:  Goal: Will remain free from falls  Description: Will remain free from falls  11/9/2021 0216 by Anjelica Lee RN  Outcome: Ongoing  11/8/2021 1850 by Jenelle Schulz RN  Outcome: Ongoing  Goal: Absence of physical injury  Description: Absence of physical injury  11/9/2021 0216 by Anjelica Lee RN  Outcome: Ongoing  11/8/2021 1850 by Jenelle Schulz RN  Outcome: Ongoing     Problem: Metabolic:  Goal: Ability to maintain appropriate glucose levels will improve  Description: Ability to maintain appropriate glucose levels will improve  11/9/2021 0216 by Anjelica Lee RN  Outcome: Ongoing  11/8/2021 1850 by Jenelle Schulz RN  Outcome: Ongoing     Problem: Pain:  Goal: Pain level will decrease  Description: Pain level will decrease  11/9/2021 0216 by Anjelica Lee RN  Outcome: Ongoing  11/8/2021 1850 by Jenelle Schulz RN  Outcome: Ongoing  Goal: Control of acute pain  Description: Control of acute pain  11/9/2021 0216 by Anjelica Lee RN  Outcome: Ongoing  11/8/2021 1850 by Jenelle Schulz RN  Outcome: Ongoing  Goal: Control of chronic pain  Description: Control of chronic pain  11/8/2021 1850 by Josue Valle, RN  Outcome: Ongoing

## 2021-11-09 NOTE — PROGRESS NOTES
Assessment complete. Alert, oriented x4. Dressing to right hip clean, dry, intact. Patient reported need to void. Encouraged to ambulate to bathroom, refused d/t fear of the \"wooziness\" she had felt previously when working with therapy. Requested to use bedpan, voided. After bedpan removed, patient incontinent of urine. Cleaned, changed, repositioned. IS and SCDs educated, encouraged. The care plan and education has been reviewed and mutually agreed upon with the patient. In bed, alarm on, bed in lowest position, call light and table within reach. No further needs expressed at this time. 2968  Patient dangled, stood at bedside. Not feeling like she could ambulate at this time d/t pain and weakness in surgical leg. Incontinent of urine while sleeping and while standing at bedside. Cleaned, changed, repositioned. Complaining of increased pain after standing, given PRN oxycodone per MAR. Patient taking in adequate PO, voiding often; IVF stopped per administration instructions. SpO2 ranging 89% to 91% on room air; SpO2 94% on 1L per nasal cannula. 0630  Patient incontinent of urine while sleeping. Expresses need to void. Encouraged to ambulate to bathroom, refused. Voided per bedpan. Patient states that she will walk with therapy today.

## 2021-11-09 NOTE — OP NOTE
Operative Note      Patient: Susan Farah  YOB: 1955  MRN: [id number]    Date of Procedure: 11/8/2021    Pre-Op Diagnosis: M13.11 RIGHT HIP OSTEOARTHRITIS  Morbid Obesity BMI 47.43    Post-Op Diagnosis: Same       Procedure(s):  RIGHT TOTAL HIP ARTHROPLASTY POSTERIOR APPROACH - [place] ADVANCED    Surgeon(s):  Ekaterina Mckee MD    Assistant:   Surgical Assistant: LEE Ann-CNP    Anesthesia: General    Estimated Blood Loss (mL): 032     Complications: None    Specimens:   * No specimens in log *    Implants:  Implant Name Type Inv. Item Serial No.  Lot No. LRB No. Used Action   BONE SCREW 6.5X25 SELF-TAP  BONE SCREW 6.5X25 SELF-TAP  [employer]- [id number] Right 1 Implanted   SHELL ACET SZ F [id number] 4 H OSSEOTI LIMIT H 2 MOBILITY G7  SHELL ACET SZ F [id number] 4 H OSSEOTI LIMIT H 2 MOBILITY G7  [employer]- [id number] Right 1 Implanted   LINER ACET HW F 40 MM VIVACIT-E G7  LINER ACET HW F 40 MM VIVACIT-E G7  [employer]- [id number] Right 1 Implanted   HEAD FEM [id number] HIP BIOLOX DELT OPT FOR G7 ACET SYS  HEAD FEM [id number] HIP BIOLOX DELT OPT FOR G7 ACET SYS  [employer]- [id number] Right 1 Implanted   TLOC 133 MP SP T1 PPS HO [id number]  TLOC 133 MP SP T1 PPS HO 8X101  [employer]- [id number] Right 1 Implanted   SLEEVE FEM -6MM OFFSET HIP TYP 1 TAPR FOR CERAMIC BIOLOX  SLEEVE FEM -6MM OFFSET HIP TYP 1 TAPR FOR CERAMIC BIOLOX  [employer]- [id number] Right 1 Implanted         Drains: * No LDAs found *    Findings: severe arthritic changes right hip    Detailed Description of Procedure:   Operative Report: Indications: The patient is a 77 y.o. female with persistent right hip pain that interferes with daily activity. The she has failed conservative treatment and after reviewing the risks, benefits and alternatives, she has elected to undergo a total hip arthroplasty.   I have reviewed the benefits and draw backs of a posterior approach and she is prepared to proceed, consent was obtained and all questions were answered to her satisfaction. Description:   The patient was identified in the preoperative holding area and the correct site of the right hip was marked. She was then brought to the operating room and transferred to the operative table. General anesthesia was administered. Patient was then positioned into the lateral decubitus position with the right lower extremity up. An axillary roll was positioned and care was taken to ensure the upper extremities were padded and secured. Padding was also provided to the peroneal nerve distribution on the down leg. An EKG lead was applied to the patella on the down leg to aid in intraoperative limb length assessment. The pelvis was secured using the padded hip  positioner system. The right hip area was then prepped and draped in standard sterile fashion. Surgical time out was called verifying necessary data including the administration of preoperative antibiotics. A #10 blade was used to create an incision centered over the posterior one third of the greater trochanter for a posterior approach to the hip. Subcutaneous tissue was dissected with electrocautery and hemostasis was achieved. The iliotibial band was identified and split with a knife and curved Reyes scissors distally and then carried proximally over the greater trochanter and the gluteus medius fascia was split. A Charnley retractor was positioned. The trochanteric bursa was excised. The limb was placed in internal rotation and the short external rotators were taken down from the lateral edge of the bone using electrocautery beginning at the piriformis tendon and extending to the quadratus tendon. This was tagged for later repair. Care was taken to ensure that the sciatic nerve remained posterior to the dissection.   The acetabular labrum was released and the hip was dislocated. The level of the lesser trochanter was identified and the femoral neck cut was then planned and executed with a sagittal saw. The femoral head was removed. The acetabulum was then verified to be free of debris. The femoral head size measured 49-50 millimeters. The acetabulum was then exposed with retractors positioned anteriorly and posteriorly. Care was taken to ensure that the residual labral tissue was excised. Beginning with a 47mm reamer, the acetabulum was reamed sequentially in 2 mm increments to prepare for cup implantation. Care was taken to ensure appropriate abduction and anteversion angles were achieved using visual and bony landmarks. Once we had a good bed of bleeding bone the 54mm acetabular cup was impacted with again careful attention to abduction angle and anteversion approximating 40 degrees and 15-20 degrees respectively. Screw holes were positioned into the acetabular save zone. Once the cup was fully impacted the impactor handle was removed and the acetabular cancellus screw was placed to add initial stability. Area was again thoroughly irrigated and the acetabular liner with 10 degree elevated lip was positioned to reduce risk of posterior dislocation and impacted into position. Attention was then turned to the proximal femur. An elevator was placed beneath the calcar and the femoral canal was then prepared 1st with a box chisel to gain entry followed by an entry reamer. Lateralizing reamer was utilized to avoid varus positioning of the stem. The canal was sequentially broached. Final appropriate size broach was 8. Trialing was performed with a size 40 mm -6 mm head. This showed appropriate restoration of limb length as estimated only operating table. There was good stability to flexion with internal rotation at 90°. Good stability to full extension and external rotation. Hip was dislocated and the trial head and broach were then removed.   Area was thoroughly irrigated with a solution of dilute chlorhexidine followed by pulsatile lavage. Final femoral implant was impacted to the position similar to the trials. Trial reduction was again repeated with the same trial head. Once this was verified to be the appropriate size the final femoral head was impacted on a clean dry trunnion. Hip was again reduced showing good stability and range of motion similar to the trials with no evidence of easy dislocation. The limb was then placed in well-padded Reyes stand with mild abduction and neutral rotation. The short external rotators and posterior capsule were reapproximated with #2 FiberWire through 3 drill tunnels in the greater trochanter. The iliotibial band and gluteus fascia was reapproximated with running #2 Stratafix suture. Skin was closed with 2-0 Vicryl and running 3-0 Monocryl. Dermabond and pernio dressing were applied and allowed to dry. Silver impregnated Therabond dressing was applied. The patient was then returned to the supine position and an abduction pillow was placed. All needle and sponge counts matched the initial count per circulating and scrub personnel x2 prior to ending the case. She was awakened and taken to the recovery room in stable condition having tolerated the procedure well with digits of the operating limb showing good perfusion. ERIC Richardson assisted me during this surgery. His services were required to assist with the procedure by providing help with patient positioning and prepping, exposure, retraction and closure. Due to the patients obesity additional time and effort was required to successfully complete this surgery including extra time spent positioning and prepping the patient, performing surgical exposure and closure of the wound. The time in this case beyond our average operative time for this operation was 30 minutes. Paloma Herr, [address] [place] [address] Orthopaedics and Sports Medicine

## 2021-11-09 NOTE — PROGRESS NOTES
Patient alert and oriented, IV removed with no complications. Reviewed discharge, follow up, and medication instructions with patient. Patient states understanding. Educated patient  to wear vivian hose for 2 weeks and to remove at night and use incentive spirometer and take all medication as directed. Patient given instructions that due to state law, pain medication was written for 7 days and cannot be filled early. Instructed patient to take small dose of narcotic as possible and call the office if refill is needed after 7 days. Waiting on PT eval before discharge. (1) assistive equipment

## 2021-11-09 NOTE — PROGRESS NOTES
[facility] Orthopedic Surgery   Progress Note    CHIEF COMPLAINT/DIAGNOSIS: S/p RIGHT Total Hip Arthroplasty    SUBJECTIVE: The patient is sitting up in the chair. Sister at bedside. He reports doing much better with therapy this morning compared to yesterday. Incontinent of urine which is her baseline. Eating and drinking well. Feels ready to d/c home after afternoon session PT. Wants PT at home x 2 weeks and has a RW. OBJECTIVE  Physical    VITALS:  BP (!) 153/75   Pulse 74   Temp 97.6 °F (36.4 °C) (Oral)   Resp 16   Ht 5' 5\" (1.651 m)   Wt 285 lb (129.3 kg)   SpO2 94%   BMI 47.43 kg/m²     GENERAL: Alert and oriented x3, in no acute distress. MUSCULOSKELETAL:   Able to dorsi and plantarflex the ankle without issue. INCISION:  Covered with post-op dressing which is c/d/I. ROSIO functioning. Sensory:  Intact to light touch in peroneal and tibial distributions. Vascular:   2+ DP pulses with brisk cap refill;  calf soft and nontender    Data    ALL MEDICATIONS HAVE BEEN REVIEWED    CBC:   Recent Labs     11/09/21  0441   WBC 7.0   HGB 12.7   HCT 38.7        BMP:   Recent Labs     11/09/21  0441      K 3.5      CO2 23   BUN 14   CREATININE <0.5*     INR: No results for input(s): INR in the last 72 hours. Post-op films show stable total hip arthroplasty construct without acute complication. ASSESSMENT:  S/p RIGHT Total Hip Arthroplasty (11/8/21), POD#1  DM II  Obesity  HTN  Asthma    PLAN:   - WB status:  WBAT ; Posterior hip precautions - reviewed with pt. Abduction pillow in place POD#1  - DVT prophylaxis: Eliquis 2.5mg BID x 30 days  - PT/OT  - Pain Control: norco to outpatient pharamcy; Due to orthopaedic surgical procedure/condition, patient may require pain medication for up to 6-8 weeks. - FEN: post-op TJA insulin protocol  - ID:  Cleocin x 2 doses post-op completed. - Dispo: home with home PT today    Follow-up with Dr. Mady Vasquez as scheduled.    Office # [phone]  No future appointments.     LEE Art CNP  11/9/2021  8:27 AM

## 2021-11-09 NOTE — DISCHARGE SUMMARY
Patient ID:  Lisseth Molina  [id number]  1955    Admit date: 11/8/2021    Discharge date: 11/9/21    Attending Physician: Clement Massey MD     Admission Diagnoses:   Osteoarthritis of right hip, unspecified osteoarthritis type [M16.11]    Discharge Diagnoses: Active Problems:    Osteoarthritis of right hip  Resolved Problems:    * No resolved hospital problems. *    Past Medical History:   Diagnosis Date    Arthritis     Asthma     mild    Diabetes mellitus ([address]     borderline    High cholesterol     Hypertension        Indication for Admission: Lisseth Molina is a 77 y.o. female who presented with right hip osteoarthritis that was not responsive to conservative measures. she carries a history of DM II, HLD, HTN, ASthma, Obesity. Operations/Procedures Performed:   1. right total hip arthroplasty    Hospital Course: Patient admitted on 11/8/2021 and underwent abovementioned procedure(s) on 11/8/21. Tolerated the procedure well with no complications. Please see full operative report for further details regarding the operation. Postoperatively transferred to the floor in stable condition. Pain controlled post-op with IV/oral pain medication. Diet was advanced and tolerated this well. At time of discharge, the patient was tolerating oral food and hydration, voiding spontaneously, had return of bowel function, was ambulating without difficulty, and pain was controlled on oral medications. The patient was determined to be suitable for discharge and the patient felt comfortable with that decision. Consults: Physical and Occupational Therapy, Social Work    Significant Diagnostic Studies:   Stable post-op films.     Discharge Exam:  BP (!) 153/75   Pulse 82   Temp 98 °F (36.7 °C) (Oral)   Resp 16   Ht 5' 5\" (1.651 m)   Wt 285 lb (129.3 kg)   SpO2 93%   BMI 47.43 kg/m²     Gen - NAD, AOx3   - voiding spontaneously  Ext - smooth nonpainful ROM of operative hip  Operative leg NVI  Discharge condition:  Stable    Discharge Medications:  ALL MEDICATIONS HAVE BEEN REVIEWED:  Current Discharge Medication List      START taking these medications    Details   HYDROcodone-acetaminophen (NORCO) 5-325 MG per tablet Take 1-2 tablets by mouth every 6 hours as needed for Pain for up to 7 days. Intended supply: 3 days. Take lowest dose possible to manage pain  Qty: 42 tablet, Refills: 0    Comments: Reduce doses taken as pain becomes manageable  Associated Diagnoses: Primary osteoarthritis of right hip      apixaban (ELIQUIS) 2.5 MG TABS tablet Take 1 tablet by mouth 2 times daily  Qty: 60 tablet, Refills: 0         CONTINUE these medications which have NOT CHANGED    Details   Cholecalciferol (VITAMIN D) 50 MCG (2000 UT) CAPS capsule Take 2,000 Units by mouth daily  Qty: 30 capsule, Refills: 5      latanoprost (XALATAN) 0.005 % ophthalmic solution INSTILL 1 DROP INTO EACH EYE AT BEDTIME      carvedilol (COREG) 6.25 MG tablet Take 6.25 mg by mouth 2 times daily (with meals)      pravastatin (PRAVACHOL) 20 MG tablet Take 20 mg by mouth nightly       hydrochlorothiazide (HYDRODIURIL) 25 MG tablet Take 25 mg by mouth daily      NONFORMULARY cbd prn         STOP taking these medications       meloxicam (MOBIC) 15 MG tablet Comments:   Reason for Stopping:             Due to patient's orthopaedic surgical procedure/condition, patient may require pain medication for up to 6-8 weeks. Disposition: home with home PT    Patient Instructions: Activity: Ambulate with assistive device very hour while awake; Postero-lateral Hip precautions. Wound Care: Keep dressing (ROSIO) in place x 7 days then remove and leave prineo in place until follow-up appt. Anticoagulation:  Eliquis 2.5mg BID x 30 days    Follow-Up:  Dr. Avelina Long at [facility] office in 2 weeks.      Ruthy Fleischer, APRN - CNP  11/9/2021  10:15 AM    CC: PCP

## 2021-11-09 NOTE — PROGRESS NOTES
CLINICAL PHARMACY NOTE: MEDS TO BEDS    Total # of Prescriptions Filled: 2   The following medications were delivered to the patient:  · Norco 5-325 mg  · Eliquis 2.5 mg    Additional Documentation:    Delivered to Patient=signed  Liz Medrano CPhT

## 2021-11-09 NOTE — DISCHARGE INSTR - COC
Continuity of Care Form    Patient Name: Gilbert Chen   :  1955  MRN:  [id number]    Admit date:  2021  Discharge date:  ***    Code Status Order: Full Code   Advance Directives:      Admitting Physician:  Kateryna Tidwell MD  PCP: [facility]    Discharging Nurse: Uzair Luna Unit/Room#: 9LW-4782/1251-67  Discharging Unit Phone Number: [phone]    Emergency Contact:   Extended Emergency Contact Information  Primary Emergency Contact: [facility] [id number] [place] [address] Phone: [phone]  Mobile Phone: [phone]  Relation: Brother/Sister    Past Surgical History:  Past Surgical History:   Procedure Laterality Date    TOTAL HIP ARTHROPLASTY Right 2021    RIGHT TOTAL HIP ARTHROPLASTY POSTERIOR APPROACH - JOSÉ ANTONIO ADVANCED performed by Kateryna Tidwell MD at [place] [address] Right 2015    TOTAL KNEE ARTHROPLASTY Left 10/14/15    TKA       Immunization History:   Immunization History   Administered Date(s) Administered    COVID-19, J&J, PF, 0.5 mL 03/10/2021       Active Problems:  Patient Active Problem List   Diagnosis Code    Asthma J45.909    Gastroesophageal reflux disease K21.9    Hypertension I10    Adiposity E66.9    S/P total knee arthroplasty Z96.659    Primary osteoarthritis of left knee M17.12    Morbid obesity with BMI of 50.0-59.9, adult ([address] E66.01, Z68.43    Osteoarthritis of right hip M16.11       Isolation/Infection:   Isolation            No Isolation          Patient Infection Status       Infection Onset Added Last Indicated Last Indicated By Review Planned Expiration Resolved Resolved By    None active    Resolved    COVID-19 Rule Out 21 COVID-19 (Ordered)   21 Rule-Out Test Resulted            Nurse Assessment:  Last Vital Signs: BP (!) 153/75   Pulse 74   Temp 97.6 °F (36.4 °C) (Oral)   Resp 16   Ht 5' 5\" (1.651 m)   Wt 285 lb (129.3 kg) SpO2 94%   BMI 47.43 kg/m²     Last documented pain score (0-10 scale): Pain Level: 0  Last Weight:   Wt Readings from Last 1 Encounters:   11/08/21 285 lb (129.3 kg)     Mental Status:  oriented, alert, coherent, logical, thought processes intact, and able to concentrate and follow conversation    IV Access:  - None    Nursing Mobility/ADLs:  Walking   Assisted  Transfer  Assisted  Bathing  Assisted  Dressing  Assisted  Toileting  Assisted  Feeding  [facility] Delivery   whole    Wound Care Documentation and Therapy:        Elimination:  Continence: Bowel: Yes  Bladder: No, wears briefs  Urinary Catheter: None   Colostomy/Ileostomy/Ileal Conduit: no       Date of Last BM: unknown    Intake/Output Summary (Last 24 hours) at 11/9/2021 0827  Last data filed at 11/9/2021 0600  Gross per 24 hour   Intake 2360 ml   Output 1300 ml   Net 1060 ml     I/O last 3 completed shifts: In: 2360 [P.O.:960; I.V.:1400]  Out: 1300 [Urine:950; Blood:350]    Safety Concerns: At Risk for Falls    Impairments/Disabilities:      None    Nutrition Therapy:  Current Nutrition Therapy:   - Oral Diet:  General    Routes of Feeding: Oral  Liquids: No Restrictions  Daily Fluid Restriction: no  Last Modified Barium Swallow with Video (Video Swallowing Test): not done    Treatments at the Time of Hospital Discharge:   Respiratory Treatments: incentive spirometer -10x every hour while awake for 5 days post-op  Oxygen Therapy:  is not on home oxygen therapy. Ventilator:    - No ventilator support    Rehab Therapies: Physical Therapy  Weight Bearing Status/Restrictions: No weight bearing restirctions  Other Medical Equipment (for information only, NOT a DME order):  walker  Other Treatments: Wound Care:   -  Remove overdressing in 1 week;  Maintain Prineo dressing (glued clear dressing over incision);  keep in place until your 2-week post op visit with Dr. Geovanna Hauser. -  Keep incision clean at all times.  Keep pets away from your incision. May shower; no baths. - Continue use of cooling pad/ice pack as needed for pain. - Wear compression hose during the day until your first post op office appointment. - Do only those exercises prescribed by your therapist while in the hospital.    - Avoid constipation by drinking plenty of water and using laxatives/stool softeners as needed. - Do not take NSAIDS (Ibuprofen, Aleve, Advil, diclofenac, naproxen, etc) while taking Eliquis x 30 days. If you take narcotic pain medication, start with the lowest dose (1 tablet) and use 2  tablets only for severe breakthrough pain; if you exhaust your supply before your 2-week follow up appointment,  call the office at [phone]. A refill will not be provided early. 1 tablet is for pain levels between 4-6/10.   2 tablets are for pain levels 7/10. Take nothing or just over the counter medication for pain levels 1-3/10. DVT prophylaxis. Eliquis 2.5 mg twice daily for 30 days to begin day after DC from acute hospital.    (This will replace any home aspirin you might be taking for 30 days)    Follow-Up:  No future appointments. ([phone])    Postero-lateral Approach Hip Precautions:  POSTERIOR HIP PRECAUTIONS:  Dont cross your legs. A pillow or triangular-shaped wedge may be used to block the legs from crossing. Don't roll your leg inward. Dont bend the hip past ninety degrees. To avoid bending past ninety degrees when sitting in a chair, lean back slightly. Dont bend over past 90 degrees at the waist, which may occur if you bend over at the waist to tie your shoes or  items off the floor. Instead, use a reacher to put on your shoes and socks or to  items from the floor. May discard excess narcotic medications at the following facilities:    [facility]. [facility]. [facility]. [address]   [facility] [address]        Patient's personal belongings (please select all that are sent with patient):  Glasses    RN SIGNATURE:  Electronically signed by Latanya Payne RN on 11/9/21 at 8:56 AM EST    CASE MANAGEMENT/SOCIAL WORK SECTION    Inpatient Status Date: 11/8/2021    Readmission Risk Assessment Score:  Readmission Risk              Risk of Unplanned Readmission:  0           Discharging to Facility/ Agency   Name: [address] [place] [address],[facility], [id number]  Phone:[phone]      / signature: Electronically signed by Latanya Payne RN on 11/9/21 at 8:56 AM EST    PHYSICIAN SECTION    Prognosis: Good    Condition at Discharge: Stable    Rehab Potential (if transferring to Rehab): Good    Recommended Labs or Other Treatments After Discharge: ***    Physician Certification: I certify the above information and transfer of Sola Matthews  is necessary for the continuing treatment of the diagnosis listed and that she requires Home Care for less 30 days.      Update Admission H&P: Changes in H&P as follows - S/p RIGHT LOUIS    PHYSICIAN SIGNATURE:  Electronically signed by LEE Dumont CNP on 11/9/21 at 8:28 AM EST

## 2021-11-09 NOTE — PLAN OF CARE
Problem: Discharge Planning:  Goal: Knowledge of discharge instructions  Description: Knowledge of discharge instructions  Outcome: Ongoing     Problem: Infection - Surgical Site:  Goal: Signs of wound healing will improve  Description: Signs of wound healing will improve  11/9/2021 1029 by Malka Carver RN  Outcome: Ongoing  11/9/2021 0216 by Annamarie Chu RN  Outcome: Ongoing     Problem: Mobility - Impaired:  Goal: Achieve maximum mobility level  Description: Achieve maximum mobility level  11/9/2021 1029 by Malka Carver RN  Outcome: Ongoing  11/9/2021 0216 by Annamarie Chu RN  Outcome: Ongoing     Problem: Pain - Acute:  Goal: Pain level will decrease  Description: Pain level will decrease  11/9/2021 1029 by Malka Carver RN  Outcome: Ongoing  11/9/2021 0216 by Annamarie Chu RN  Outcome: Ongoing     Problem: Falls - Risk of:  Goal: Will remain free from falls  Description: Will remain free from falls  11/9/2021 1029 by Malka Carver RN  Outcome: Ongoing  11/9/2021 0216 by Annamarie Chu RN  Outcome: Ongoing  Goal: Absence of physical injury  Description: Absence of physical injury  11/9/2021 1029 by Malka Carver RN  Outcome: Ongoing  11/9/2021 0216 by Annamarie Chu RN  Outcome: Ongoing     Problem: Metabolic:  Goal: Ability to maintain appropriate glucose levels will improve  Description: Ability to maintain appropriate glucose levels will improve  11/9/2021 1029 by Malka Carver RN  Outcome: Ongoing  11/9/2021 0216 by Annamarie Chu RN  Outcome: Ongoing     Problem: Pain:  Goal: Pain level will decrease  Description: Pain level will decrease  11/9/2021 1029 by Malka Carver RN  Outcome: Ongoing  11/9/2021 0216 by Annamarie Chu RN  Outcome: Ongoing  Goal: Control of acute pain  Description: Control of acute pain  11/9/2021 1029 by Malka Carver RN  Outcome: Ongoing  11/9/2021 0216 by Annamarie Chu RN  Outcome: Ongoing  Goal: Control of chronic pain  Description: Control of chronic pain  Outcome: Ongoing

## 2021-11-09 NOTE — PROGRESS NOTES
Occupational Therapy  Facility/Department: [address] ORTHO/NEURO NURSING  Daily Treatment Note  NAME: Nicolas Horne  : 1955  MRN: [id number]    Date of Service: 2021    Discharge Recommendations: Nicolas Horne scored a 18/24 on the AM-PAC ADL Inpatient form. Current research shows that an AM-PAC score of 18 or greater is typically associated with a discharge to the patient's home setting. Based on the patient's AM-PAC score, and their current ADL deficits, it is recommended that the patient have 2-3 sessions per week of Occupational Therapy at d/c to increase the patient's independence. At this time, this patient demonstrates the endurance and safety to discharge home with [facility] and a follow up treatment frequency of 2-3x/wk. Please see assessment section for further patient specific details. HOME HEALTH CARE: LEVEL 1 STANDARD    - Initial home health evaluation to occur within 24-48 hours, in patient home   - Therapy to evaluate with goal of regaining prior level of functioning   - Therapy to evaluate if patient has [address] needs for personal care    If patient discharges prior to next session this note will serve as a discharge summary. Please see below for the latest assessment towards goals. OT Equipment Recommendations  Equipment Needed: No  Other: pt reports she has LB dressing A such as reacher, sock aid, and shoe horn    Assessment   Performance deficits / Impairments: Decreased functional mobility ; Decreased ADL status; Decreased endurance; Decreased balance; Decreased high-level IADLs  Assessment: Pt is limited in the above areas impacting independence in ADLs and functional mobility. Pt would benefit from skilled inpatient OT services to address these deficits.   Treatment Diagnosis: Above deficits associated with R LOUIS  Prognosis: Good  OT Education: OT Role; Plan of Care; Equipment; ADL Adaptive Strategies; Transfer Training  Patient Education: discharge recommendations--patient verbalized understanding  REQUIRES OT FOLLOW UP: Yes  Activity Tolerance  Activity Tolerance: Patient Tolerated treatment well  Safety Devices  Safety Devices in place: Yes  Type of devices: All fall risk precautions in place; Call light within reach; Nurse notified; Gait belt; Left in chair; Chair alarm in place         Patient Diagnosis(es): The primary encounter diagnosis was Preop testing. A diagnosis of Primary osteoarthritis of right hip was also pertinent to this visit. has a past medical history of Arthritis, Asthma, Diabetes mellitus ([address] High cholesterol, and Hypertension. has a past surgical history that includes Total knee arthroplasty (Right, 07/14/2015); Total knee arthroplasty (Left, 10/14/15); and Total hip arthroplasty (Right, 11/8/2021). Restrictions  Restrictions/Precautions  Restrictions/Precautions: Fall Risk, Weight Bearing (high fall risk)  Lower Extremity Weight Bearing Restrictions  Right Lower Extremity Weight Bearing: Weight Bearing As Tolerated  Position Activity Restriction  Hip Precautions: No hip flexion > 90 degrees, No ADduction, No hip internal rotation, Posterior hip precautions  Other position/activity restrictions: S/P R LOUIS 11/8/21  Subjective   General  Chart Reviewed: Yes  Response to previous treatment: Patient with no complaints from previous session  Family / Caregiver Present: Yes (sister arrived near end of session)  Diagnosis: R LOUIS  Subjective  Subjective: Pt supine in bed upon arrival; pleasant and agreeable to cotx.   Pain Assessment  Pain Assessment: 0-10  Pain Level: 6  Pain Type: Surgical pain  Pain Location: Hip  Pain Orientation: Right  Pre Treatment Pain Screening  Intervention List: Patient able to continue with treatment; Nurse/Physician notified  Vital Signs  Patient Currently in Pain: Yes   Orientation  Orientation  Overall Orientation Status: Within Functional Limits  Objective    ADL  Grooming: Setup (cleaning hands with wipes)  LE Bathing: Minimal assistance (washing upper LEs with wipes due to incontinence)  LE Dressing: Maximum assistance (dep don socks, max-mod for underwear doffing/donning)  Toileting: Minimal assistance (pericare and underwear management)  Additional Comments: Pt incontinent of urine following STS from EOB. Ambulated to toilet to finish voiding bladder and complete pericare. Balance  Sitting Balance: Supervision  Standing Balance: Contact guard assistance  Standing Balance  Time: ~10 min total  Activity: functional mobility, transfers, toileting  Comment: RW  Functional Mobility  Functional - Mobility Device: Rolling Walker  Activity: To/from bathroom; Other  Assist Level: Contact guard assistance  Functional Mobility Comments: ~40 ft total in room + ascended/descended 1 step  Toilet Transfers  Toilet - Technique: Ambulating;  To left  Equipment Used: Standard toilet  Toilet Transfer: 2 Person assistance  Toilet Transfers Comments: Stand to sit at toilet min A with max A + min A for sit to stand due to pt \"stuck\" on toilet; pt has handicap height toilet at home with grab bar on L and sink on R  Bed mobility  Supine to Sit: Stand by assistance  Sit to Supine: Stand by assistance  Scooting: Stand by assistance  Comment: increased time  Transfers  Stand Step Transfers: Contact guard assistance  Sit to stand: Minimal assistance  Stand to sit: Minimal assistance  Transfer Comments: EOB>ambulating>toilet>ambulating>recliner>ambulating>recliner                       Cognition  Overall Cognitive Status: WFL     Perception  Overall Perceptual Status: WFL                                   Plan   Plan  Times per week: 7  Times per day: Daily  Current Treatment Recommendations: Strengthening, Balance Training, Functional Mobility Training, Endurance Training, Safety Education & Training, Self-Care / ADL, Patient/Caregiver Education & Training, Equipment Evaluation, Education, & procurement  G-Code     OutComes

## 2021-11-09 NOTE — PROGRESS NOTES
Assessment completed. Patient laying in bed with abductor pillow in place, alert and oriented x 4 and able to make all her needs known. Able to wiggle all her toes and denied any pain at this time. Dressing to right hip CDI. Medications administered as ordered. POC and education reviewed with patient and mutually agreed upon. Safety interventions in place. All needs met at this time. Call light inr each. Will continue to monitor.

## 2021-11-09 NOTE — PROGRESS NOTES
Physical Therapy  Facility/Department: [address] ORTHO/NEURO NURSING  Daily Treatment Note  NAME: Last Zee  : 1955  MRN: [id number]    Date of Service: 2021    Discharge Recommendations: Last Zee scored a 18/24 on the AM-PAC short mobility form. Current research shows that an AM-PAC score of 18 or greater is typically associated with a discharge to the patient's home setting. Based on the patient's AM-PAC score and their current functional mobility deficits, it is recommended that the patient have 2-3 sessions per week of Physical Therapy at d/c to increase the patient's independence. At this time, this patient demonstrates the endurance and safety to discharge home with home services and a follow up treatment frequency of 2-3x/wk. Please see assessment section for further patient specific details. If patient discharges prior to next session this note will serve as a discharge summary. Please see below for the latest assessment towards goals. HOME HEALTH CARE: LEVEL 1 STANDARD    - Initial home health evaluation to occur within 24-48 hours, in patient home   - Therapy to evaluate with goal of regaining prior level of functioning   - Therapy to evaluate if patient has [address] needs for personal care      PT Equipment Recommendations  Equipment Needed: Yes  Mobility Devices: Jarod Pierre: Rolling    Assessment   Body structures, Functions, Activity limitations: Decreased functional mobility ; Decreased ROM; Decreased strength; Decreased balance; Decreased endurance  Assessment: pt reported that during her prior session she was still very tired from surgery and unable to participate as well, however she was feeling better today and showed significant imrpovement. Pt was able to perform transfers and ambulate, and was able to perform bed mobility with less assistance.  Pt still requires assistance with all activities and would require skilled PT to address these issues as well as the listed impairments. Treatment Diagnosis: impaired functional mobility  Prognosis: Good  PT Education: Goals; PT Role; Plan of Care; Home Exercise Program; Precautions; Orientation; General Safety; Weight-bearing Education; Functional Mobility Training  Patient Education: d/c recommendations, hip precautions, HEP--pt verbalizing understanding  Barriers to Learning: none  REQUIRES PT FOLLOW UP: Yes  Activity Tolerance  Activity Tolerance: Patient Tolerated treatment well     Patient Diagnosis(es): The primary encounter diagnosis was Preop testing. A diagnosis of Primary osteoarthritis of right hip was also pertinent to this visit. has a past medical history of Arthritis, Asthma, Diabetes mellitus ([facility] [address] High cholesterol, and Hypertension. has a past surgical history that includes Total knee arthroplasty (Right, 07/14/2015); Total knee arthroplasty (Left, 10/14/15); and Total hip arthroplasty (Right, 11/8/2021). Restrictions  Restrictions/Precautions  Restrictions/Precautions: Fall Risk, Weight Bearing (high fall risk)  Lower Extremity Weight Bearing Restrictions  Right Lower Extremity Weight Bearing: Weight Bearing As Tolerated  Position Activity Restriction  Hip Precautions: No hip flexion > 90 degrees, No ADduction, No hip internal rotation, Posterior hip precautions  Other position/activity restrictions: S/P R LOUIS 11/8/21  Subjective   General  Chart Reviewed: Yes  Response To Previous Treatment: Patient with no complaints from previous session.   Family / Caregiver Present: Yes (pt's sister arrived during the middle of the session)  Subjective  Subjective: pt supine in bed upon arrival and agreeable to PT  Pain Screening  Patient Currently in Pain: Yes  Pain Assessment  Pain Level: 6  Vital Signs  Patient Currently in Pain: Yes       Orientation  Orientation  Overall Orientation Status: Within Functional Limits  Cognition   Cognition  Overall Cognitive Status: WFL  Objective   Bed mobility  Supine to Sit: Stand by assistance  Sit to Supine: Stand by assistance  Scooting: Stand by assistance  Comment: pt required increased time to perform bed mobility but was able to complete without physical assist  Transfers  Sit to Stand: Minimal Assistance (Min A from EOB and recliner; max+min A from toilet with the use of grab bar)  Stand to sit: Minimal Assistance  Car Transfer:  (Car transfer discussed, pt is unable to ambulate to the car simulator. Pt was able to correctly describe her car transfer technique.)  Ambulation  Ambulation?: Yes  Ambulation 1  Surface: level tile  Device: Rolling Walker  Assistance: Contact guard assistance  Quality of Gait: pt utlized the RW well and was able to maintain a normal gait pattern aside from a slight decrease in stance time on her R LE  Gait Deviations: Decreased step length; Decreased step height; Slow Bev  Distance: 30 ft x 2, 10'   Comments: ambulated to bathroom, to recliner, up to curb step, around the room, and back to recliner  Stairs/Curb  Stairs?: Yes  Stairs  # Steps : 1  Curbs: 4\" (pt utilized RW to simulate the railing she has on her steps at home)  Device: Rolling walker  Assistance: Contact guard assistance  Comment: pt was instructed on the proper seqencing for stair climbing and was able to complete with no issue and followed commands perfectly     Balance  Posture: Fair  Sitting - Static: Good; - (IND)  Sitting - Dynamic: Good; - (svn to dress)  Standing - Static: Good; - (svn with RW)  Standing - Dynamic: Fair; + (CGA with ambulation)       Second session: PT applied Jem Hose stockings onto B legs of pt. Pt then performed STS with SBA and ambulated to the bathroom with RW and CGA. Pt then toileted and cleaned, and then required mod A plus min A to stand from the toilet, and pt then ambulated back to EOB. Pt dressed herself with SBA and ambulated 79 ft with RW and CGA. Pt was then educated on HEP.   Pt verbalized understanding and was left in chair with alarm on, needs in reach. Pt felt ready to DC home and did not wish to practice stairs or the car transfer again. AM-PAC Score  AM-PAC Inpatient Mobility Raw Score : 18 (11/09/21 1014)  AM-PAC Inpatient T-Scale Score : 43.63 (11/09/21 1014)  Mobility Inpatient CMS 0-100% Score: 46.58 (11/09/21 1014)  Mobility Inpatient CMS G-Code Modifier : CK (11/09/21 1014)          Goals  Short term goals  Time Frame for Short term goals: upon d/c  Short term goal 1: Pt will perform bed mobility with SBA - met 11/9  Short term goal 2: Pt will perform transfers with LRAD and SBA  Short term goal 3: Pt will ambulate 48' with LRAD and SBA  Short term goal 4: Pt will negotiate 2 steps with BHR and SBA  Short term goal 5: Pt will perform car transfer with LRAD and SBA  Short term goal 6: pt will perform bed mobility independently  Patient Goals   Patient goals : pt did not state    Plan    Plan  Times per week: 7x (BID)  Times per day: Daily  Current Treatment Recommendations: Strengthening, Balance Training, Functional Mobility Training, Transfer Training, Gait Training, Stair training, Endurance Training, Neuromuscular Re-education, Equipment Evaluation, Education, & procurement, Patient/Caregiver Education & Training, Safety Education & Training, Home Exercise Program  Safety Devices  Type of devices: All fall risk precautions in place, Call light within reach, Patient at risk for falls, Nurse notified, Chair alarm in place, Gait belt, Left in chair  Restraints  Initially in place: No     Therapy Time   Individual Concurrent Group Co-treatment   Time In       0904   Time Out       0943   Minutes       44         Second Session Therapy Time:   Individual Concurrent Group Co-treatment   Time In 1317        Time Out 1354        Minutes 37          Timed Code Treatment Minutes:    76  Total Treatment Minutes:   [address], [facility]  Mara Zurita, PT    I agree with the above note.   PT directly observed the SPT with the patient.   Nabeel Green, [place] DPT [id number]

## 2021-11-10 ENCOUNTER — CARE COORDINATION (OUTPATIENT)
Dept: CASE MANAGEMENT | Age: 66
End: 2021-11-10

## 2021-11-10 ENCOUNTER — TELEPHONE (OUTPATIENT)
Dept: INPATIENT UNIT | Age: 66
End: 2021-11-10

## 2021-11-10 NOTE — CARE COORDINATION
[facility] Transitions Initial Follow Up Call    Spoke with: Flor Dorado    Incision status: Dressing clean, dry, and intact    Edema/Swelling: No, patient is using ice. Pain level and status: \"1\" when she is at rest, does have slight discomfort with ambulation. Use of pain medications: Patient is trying not to take pain medication, she has taken twice since she has been home and it has been effective. Bowels: No, CTN encouraged stool softeners and explained rationale. She declined and does not think she is going to have any difficulty with BM's. Home Care Agency active: They have contacted patient and will be starting care today at 2:00. Outpatient therapy: Not indicated at this time. Do you have all of your medications: Yes    Changes in medications: started Eliquis & Norco    Follow up appointments:    No future appointments. Call within 2 business days of discharge: Yes    Patient: Flor Dorado Patient : 1955   MRN: [id number]  Reason for Admission: R Total hip arthroplasty  Discharge Date: 21 RARS: No data recorded    Last Discharge [facility]       Complaint Diagnosis Description Type Department Provider    21  Preop testing . .. Admission (Discharged) Moncho Cardoza MD           Spoke with: Flor Dorado      Transitions of Care Initial Call    Was this an external facility discharge? No Discharge Facility:     Challenges to be reviewed by the provider   Additional needs identified to be addressed with provider: No  none             Method of communication with provider : none      Advance Care Planning:   Does patient have an Advance Directive: not on file; education provided. Was this a readmission?  No  Patient stated reason for admission: Hip replacement  Patients top risk factors for readmission: medical condition-.    Care Transition Nurse (CTN) contacted the patient by telephone to perform post hospital discharge assessment. Verified name and  with patient as identifiers. Provided introduction to self, and explanation of the CTN role. CTN reviewed discharge instructions, medical action plan and red flags with patient who verbalized understanding. Patient given an opportunity to ask questions and does not have any further questions or concerns at this time. Were discharge instructions available to patient? Yes. Reviewed appropriate site of care based on symptoms and resources available to patient including: PCP, Urgent care clinics, Home health and When to call 911. The patient agrees to contact the PCP office for questions related to their healthcare. Medication reconciliation was performed with patient, who verbalizes understanding of administration of home medications. Advised obtaining a 90-day supply of all daily and as-needed medications. Reviewed and educated patient on any new and changed medications related to discharge diagnosis. Was patient discharged with a pulse oximeter? No   CTN provided contact information. Plan for follow-up call in 5-7 days based on severity of symptoms and risk factors. Plan for next call: symptom management-., self management-. and follow up appointment-. Non-face-to-face services provided:  Obtained and reviewed discharge summary and/or continuity of care documents    Care Transitions 24 Hour Call    Care Transitions Interventions         Follow Up  No future appointments.     Gladis Arce RN

## 2021-11-16 ENCOUNTER — CARE COORDINATION (OUTPATIENT)
Dept: CASE MANAGEMENT | Age: 66
End: 2021-11-16

## 2021-11-16 NOTE — CARE COORDINATION
Spoke with patient. Incision status: States dressing dry and intact with no drainage. Edema/Swelling: States no swelling. States wearing vivian hose. Pain level and status: States pain is tolerable. Use of pain medications: States taking Norco prior to therapist coming. Bowels: No complaints. Home Care Agency active: Continues with therapy. Do you have all of your medications: Yes, taking eliquis. Changes in medications: No    Follow up appointments:    No future appointments.   Beatrice Romo BSN  Care Transition Nurse for [facility] bundle  [phone]

## 2021-11-22 ENCOUNTER — CARE COORDINATION (OUTPATIENT)
Dept: CASE MANAGEMENT | Age: 66
End: 2021-11-22

## 2021-11-22 ENCOUNTER — TELEPHONE (OUTPATIENT)
Dept: ORTHOPEDIC SURGERY | Age: 66
End: 2021-11-22

## 2021-11-22 DIAGNOSIS — M16.11 ARTHRITIS OF RIGHT HIP: Primary | ICD-10-CM

## 2021-11-22 RX ORDER — HYDROCODONE BITARTRATE AND ACETAMINOPHEN 5; 325 MG/1; MG/1
1-2 TABLET ORAL EVERY 8 HOURS PRN
Qty: 42 TABLET | Refills: 0 | Status: SHIPPED | OUTPATIENT
Start: 2021-11-22 | End: 2021-11-29

## 2021-11-22 NOTE — TELEPHONE ENCOUNTER
[facility] [phone], is on 6th visit for patient. Patient is not able to attend out patient. Let them know how to proceed.

## 2021-11-29 ENCOUNTER — CARE COORDINATION (OUTPATIENT)
Dept: CASE MANAGEMENT | Age: 66
End: 2021-11-29

## 2021-11-29 NOTE — CARE COORDINATION
Spoke with patient. Incision status: States free from redness or drainage. Edema/Swelling: States swelling present. Pain level and status: States pain is tolerable. Use of pain medications: States took pain med today and has not had one in a few days. Bowels: No complaints. Home Care Agency active: Continues, but will be evaluated for next steps on Wednesday. Do you have all of your medications: Yes    Changes in medications: No    Follow up appointments:    No future appointments.   Ana Laura Mccallum BSN  Care Transition Nurse for ortho bundle  [phone]

## 2021-12-06 ENCOUNTER — CARE COORDINATION (OUTPATIENT)
Dept: CASE MANAGEMENT | Age: 66
End: 2021-12-06

## 2021-12-06 NOTE — CARE COORDINATION
Spoke with patient. States doing well. Incision status: No complications with incision. Edema/Swelling: States no swelling. Pain level and status: States pain is tolerable. Bowels: No complaints. Home Care Agency active: States they should be discharging this week. Do you have all of your medications: Yes    Changes in medications: No    Follow up appointments:    No future appointments.   Ritesh INTERIANO  Care Transition Nurse for ortho bundle  [phone]

## 2021-12-14 ENCOUNTER — CARE COORDINATION (OUTPATIENT)
Dept: CASE MANAGEMENT | Age: 66
End: 2021-12-14

## 2021-12-14 NOTE — CARE COORDINATION
Spoke with patient. Incision status: States free from redness or drainage. Edema/Swelling: States no issues with swelling. Pain level and status: States pain is tolerable. Bowels: No complaints. Home Care Agency active: States therapy is discharging this week. Do you have all of your medications: Yes    Changes in medications: No    Follow up appointments:    No future appointments.   Gill Kaufman BSN  Care Transition Nurse for ortho bundle  [phone]

## 2021-12-22 ENCOUNTER — CARE COORDINATION (OUTPATIENT)
Dept: CASE MANAGEMENT | Age: 66
End: 2021-12-22

## 2021-12-22 NOTE — CARE COORDINATION
Spoke with patient. Incision status: States free from redness or drainage. Edema/Swelling: States no complications with swelling. Pain level and status: States no complications with pain    Bowels: No complaints. Home Care Agency active: Discharged. Doing HEP. Do you have all of your medications: Yes    Changes in medications: No    Follow up appointments:    No future appointments.   Poncho INTERIANO  Care Transition Nurse for ortho bundle  [phone]

## 2022-01-04 ENCOUNTER — CARE COORDINATION (OUTPATIENT)
Dept: CASE MANAGEMENT | Age: 67
End: 2022-01-04

## 2022-01-04 NOTE — CARE COORDINATION
Called patient for updates. Left message for return call.   Reva Israel BSN  Care Transition Nurse for ortho bundle  [phone]

## 2022-01-19 ENCOUNTER — CARE COORDINATION (OUTPATIENT)
Dept: CASE MANAGEMENT | Age: 67
End: 2022-01-19

## 2022-01-19 NOTE — CARE COORDINATION
Called patient for updates. Left message for return call.   Reva Israel BSN  Care Transition Nurse for ortho bundle  [phone]

## 2022-02-03 ENCOUNTER — CARE COORDINATION (OUTPATIENT)
Dept: CASE MANAGEMENT | Age: 67
End: 2022-02-03

## 2022-02-03 NOTE — CARE COORDINATION
Called patient for updates. Left message that bundle program and follow up phone calls are ending. Left message to follow up with Dr. Lottie Carvajal for any complications/concerns. Left message for return call.   Eloisa Metz BSN  Care Transition Nurse for ortho bundle  [phone]

## 2022-11-21 ENCOUNTER — HOSPITAL ENCOUNTER (OUTPATIENT)
Dept: MRI IMAGING | Age: 67
Discharge: HOME | End: 2022-11-21
Payer: MEDICARE

## 2022-11-21 DIAGNOSIS — D33.3 ACOUSTIC NEUROMA (HCC): ICD-10-CM

## 2022-11-21 DIAGNOSIS — D32.9 MENINGIOMA (HCC): ICD-10-CM

## 2022-11-21 PROCEDURE — A9576 INJ PROHANCE MULTIPACK: HCPCS | Performed by: NEUROLOGICAL SURGERY

## 2022-11-21 PROCEDURE — 70552 MRI BRAIN STEM W/DYE: CPT

## 2022-11-21 PROCEDURE — 6360000004 HC RX CONTRAST MEDICATION: Performed by: NEUROLOGICAL SURGERY

## 2022-11-21 RX ADMIN — GADOTERIDOL 20 ML: 279.3 INJECTION, SOLUTION INTRAVENOUS at 11:11

## 2022-11-22 NOTE — PROGRESS NOTES
Place patient label inside box (if no patient label, complete below)  Name:  :  MR#:     Cira Almeida / PROCEDURE  I (we), Morteza Siegelr (Patient Name) authorize DR. JAMES Del Valle (Provider / Tahira Wilson) and/or such assistants as may be selected by him/her, to perform the following operation/procedure(s): RIGHT TRANSMASTOID / ANTERIOR MIDDLE CRANIAL FOSSA , ABDOMINAL FAT GRAFT EXTERNAL AUDITORY CANAL CLOSURE        Note: If unable to obtain consent prior to an emergent procedure, document the emergent reason in the medical record. This procedure has been explained to my (our) satisfaction and included in the explanation was: The intended benefit, nature, and extent of the procedure to be performed; The significant risks involved and the probability of success; Alternative procedures and methods of treatment; The dangers and probable consequences of such alternatives (including no procedure or treatment); The expected consequences of the procedure on my future health; Whether other qualified individuals would be performing important surgical tasks and/or whether  would be present to advise or support the procedure. I (we) understand that there are other risks of infection and other serious complications in the pre-operative/procedural and postoperative/procedural stages of my (our) care. I (we) have asked all of the questions which I (we) thought were important in deciding whether or not to undergo treatment or diagnosis. These questions have been answered to my (our) satisfaction. I (we) understand that no assurance can be given that the procedure will be a success, and no guarantee or warranty of success has been given to me (us).     It has been explained to me (us) that during the course of the operation/procedure, unforeseen conditions may be revealed that necessitate extension of the original procedure(s) or different procedure(s) than those set forth in Paragraph 1. I (we) authorize and request that the above-named physician, his/her assistants or his/her designees, perform procedures as necessary and desirable if deemed to be in my (our) best interest.     Revised 8/2/2021                                                                          Page 1 of 2       I acknowledge that health care personnel may be observing this procedure for the purpose of medical education or other specified purposes as may be necessary as requested and/or approved by my (our) physician. I (we) consent to the disposal by the hospital Pathologist of the removed tissue, parts or organs in accordance with hospital policy. I do ____ do not ____ consent to the use of a local infiltration pain blocking agent that will be used by my provider/surgical provider to help alleviate pain during my procedure. I do ____ do not ____ consent to an emergent blood transfusion in the case of a life-threatening situation that requires blood components to be administered. This consent is valid for 24 hours from the beginning of the procedure. This patient does ____ or does not ____ currently have a DNR status/order. If DNR order is in place, obtain Addendum to the Surgical Consent for ALL Patients with a DNR Order to address cas-operative status for limited intervention or DNR suspension.      I have read and fully understand the above Consent for Operation/Procedure and that all blanks were completed before I signed the consent.   _____________________________       _____________________      ____/____am/pm  Signature of Patient or legal representative      Printed Name / Relationship            Date / Time   ____________________________       _____________________      ____/____am/pm  Witness to Signature                                    Printed Name                    Date / Time    If patient is unable to sign or is a minor, complete the following)  Patient is a minor, ____ years of age, or unable to sign because:   ______________________________________________________________________________________________    If a phone consent is obtained, consent will be documented by using two health care professionals, each affirming that the consenting party has no questions and gives consent for the procedure discussed with the physician/provider.   _____________________          ____________________       _____/_____am/pm   2nd witness to phone consent        Printed name           Date / Time    Informed Consent:  I have provided the explanation described above in section 1 to the patient and/or legal representative.  I have provided the patient and/or legal representative with an opportunity to ask any questions about the proposed operation/procedure.   ___________________________          ____________________         ____/____am/pm  Provider / Proceduralist                            Printed name            Date / Time  Revised 8/2/2021                                                                      Page 2 of 2

## 2022-11-22 NOTE — PROGRESS NOTES
11/22/22 @ [facility] REQUESTING CARDIAC CLEARANCE AND EKG TRACING FROM DR. Sakshi George [phone]/PX    11/23 1340 CE shows note that stress test was completed, but no results and no update to cardiology or PCP notes. Called cardiologist nurse line ([phone]) and LM- need cardiologist to review stress test and fax or enter cardiac clearance ASAP. Notified Ban Go (at [employer]) via message and spoke with Lamar Zuñiga (at [employer]). Lamar Zuñiga will ask Ban Go to also follow up on. Also notified that we LM for patient with instructions, but did not reach for interview. Fredrick Borrego, nurse from cardio, called back. Dr. Teresita Rivera is out of town this week and next week. She will ask doctor on call to review stress test and see if they will clear patient. Called surgeon back and [facility]- notified of above. /    11/23 1540 Cardiac clearance received via fax. Notified Ban Go, at surgeon office. Ban Go will look to see if she has EKG tracing, and will fax if she has. Called PCP and spoke with Grecia- updated that patient had stress test today and was cleared per cardiology. Requested she  notify doctor and fax EKG tracing.  Fredrick Leblanc

## 2022-11-22 NOTE — PROGRESS NOTES
Place patient label inside box (if no patient label, complete below)  Name:  :  MR#:     Waldo Calender / PROCEDURE  I (we)Clara (Patient Name) authorize DR. JEANIE Escalante 32. Kirsten Beck (Provider / Lamar Tamez) and/or such assistants as may be selected by him/her, to perform the following operation/procedure(s): STAGE 1, RIGHT TRANSLABYRINTHINE / RETROLABYRINTHINE CRANIOTOMY POSSIBLE PETROSECTOMY       Note: If unable to obtain consent prior to an emergent procedure, document the emergent reason in the medical record. This procedure has been explained to my (our) satisfaction and included in the explanation was: The intended benefit, nature, and extent of the procedure to be performed; The significant risks involved and the probability of success; Alternative procedures and methods of treatment; The dangers and probable consequences of such alternatives (including no procedure or treatment); The expected consequences of the procedure on my future health; Whether other qualified individuals would be performing important surgical tasks and/or whether  would be present to advise or support the procedure. I (we) understand that there are other risks of infection and other serious complications in the pre-operative/procedural and postoperative/procedural stages of my (our) care. I (we) have asked all of the questions which I (we) thought were important in deciding whether or not to undergo treatment or diagnosis. These questions have been answered to my (our) satisfaction. I (we) understand that no assurance can be given that the procedure will be a success, and no guarantee or warranty of success has been given to me (us).     It has been explained to me (us) that during the course of the operation/procedure, unforeseen conditions may be revealed that necessitate extension of the original procedure(s) or different procedure(s) than those set forth in Paragraph 1. I (we) authorize and request that the above-named physician, his/her assistants or his/her designees, perform procedures as necessary and desirable if deemed to be in my (our) best interest.     Revised 8/2/2021                                                                          Page 1 of 2       I acknowledge that health care personnel may be observing this procedure for the purpose of medical education or other specified purposes as may be necessary as requested and/or approved by my (our) physician. I (we) consent to the disposal by the hospital Pathologist of the removed tissue, parts or organs in accordance with hospital policy. I do ____ do not ____ consent to the use of a local infiltration pain blocking agent that will be used by my provider/surgical provider to help alleviate pain during my procedure. I do ____ do not ____ consent to an emergent blood transfusion in the case of a life-threatening situation that requires blood components to be administered. This consent is valid for 24 hours from the beginning of the procedure. This patient does ____ or does not ____ currently have a DNR status/order. If DNR order is in place, obtain Addendum to the Surgical Consent for ALL Patients with a DNR Order to address cas-operative status for limited intervention or DNR suspension.      I have read and fully understand the above Consent for Operation/Procedure and that all blanks were completed before I signed the consent.   _____________________________       _____________________      ____/____am/pm  Signature of Patient or legal representative      Printed Name / Relationship            Date / Time   ____________________________       _____________________      ____/____am/pm  Witness to Signature                                    Printed Name                    Date / Time    If patient is unable to sign or is a minor, complete the following)  Patient is a minor, ____ years of age, or unable to sign because:   ______________________________________________________________________________________________    If a phone consent is obtained, consent will be documented by using two health care professionals, each affirming that the consenting party has no questions and gives consent for the procedure discussed with the physician/provider.   _____________________          ____________________       _____/_____am/pm   2nd witness to phone consent        Printed name           Date / Time    Informed Consent:  I have provided the explanation described above in section 1 to the patient and/or legal representative.  I have provided the patient and/or legal representative with an opportunity to ask any questions about the proposed operation/procedure.   ___________________________          ____________________         ____/____am/pm  Provider / Proceduralist                            Printed name            Date / Time  Revised 8/2/2021                                                                      Page 2 of 2

## 2022-11-23 RX ORDER — VERAPAMIL HYDROCHLORIDE 120 MG/1
TABLET, FILM COATED ORAL 3 TIMES DAILY
Status: ON HOLD | COMMUNITY
End: 2022-11-30 | Stop reason: CLARIF

## 2022-11-23 RX ORDER — METHIMAZOLE 5 MG/1
10 TABLET ORAL DAILY
Status: ON HOLD | COMMUNITY

## 2022-11-23 RX ORDER — ALBUTEROL SULFATE 90 UG/1
2 AEROSOL, METERED RESPIRATORY (INHALATION) EVERY 4 HOURS PRN
Status: ON HOLD | COMMUNITY
Start: 2021-10-18

## 2022-11-23 RX ORDER — MELOXICAM 15 MG/1
15 TABLET ORAL DAILY
Status: ON HOLD | COMMUNITY
End: 2022-12-07 | Stop reason: HOSPADM

## 2022-11-23 RX ORDER — METFORMIN HYDROCHLORIDE 500 MG/1
1000 TABLET, EXTENDED RELEASE ORAL
Status: ON HOLD | COMMUNITY

## 2022-11-23 RX ORDER — LOSARTAN POTASSIUM 50 MG/1
50 TABLET ORAL DAILY
Status: ON HOLD | COMMUNITY

## 2022-11-23 RX ORDER — OMEPRAZOLE 20 MG/1
CAPSULE, DELAYED RELEASE ORAL
Status: ON HOLD | COMMUNITY
End: 2022-11-30 | Stop reason: CLARIF

## 2022-11-23 NOTE — PROGRESS NOTES
PRE-OP INSTRUCTIONS FOR SURGICAL PATIENTS          Our Pre-admission Testing Nurses tried and were unable to reach you today. Please read the attached instructions if you did not listen to your voicemail. Follow all instructions provided to you from your surgeon's office, including your ARRIVAL TIME. Arrange for someone to drive you home and be with you for the first 24 hours after discharge. NOTE: at this time ONLY 2 ADULTS may accompany you   One person MUST stay at hospital entire time if outpatient surgery  Enter the MAIN entrance located on [address] and report to the surgical desk on the LEFT side of the lobby. Please park in the parking garage or there is free Guide available after 7am for your use. Bring your insurance card & photo ID with you to register. Bring your medication list with you with dose and frequency listed (including over the counter medications)  Contact your ordering physician/surgeon for medication instructions as soon as possible, especially if taking blood thinners, aspirin, heart, or diabetic medication. A Pre-Surgical History and Physical MUST be completed WITHIN 30 DAYS OR LESS prior to your procedure by your Physician or an Urgent Care. - ON CHART  DO NOT EAT ANYTHING AFTER MIDNIGHT PRIOT TO SURGERY. You may have sips of WATER ONLY WITH MEDICATIONS PHYSICIAN DIRECTED YOU TO TAKE. - PLEASE TAKE CARVEDILOL(COREG) DAY OF SURGERY PER ANESTHESIA  No gum, candy, mints, or ice chips day of procedure. Please refrain from drinking alcohol the day before or day of your procedure. Do not use any recreational marijuana at least 24 hours or street drugs (heroin, cocaine) at minimum 5 days prior to your procedure. Please do not smoke the day of your procedure. Dress in loose, comfortable clothing appropriate for redressing after your procedure. Do not wear jewelry (including body piercings), make-up, fingernail polish, lotion, powders, or metal hairclips. Contacts, glasses, dentures, and hearing aids will need to be removed prior to surgery. Bring your case(s) to protect them while you are in surgery. If you use a CPAP, please bring it with you on the day of your procedure. Do not shave or wax for 72 hours prior to procedure near your operative site. Leave all other valuables at home. IF there is a change in your physical condition for some reason, such as: a cold, fever, rash, cuts, sores, or any other infection, especially near your surgical site, contact your surgeon's office immediately. Instructions left on patient's voicemail.   Wandy Anne RN.11/23/2022 .8:14 AM

## 2022-11-28 ENCOUNTER — ANESTHESIA EVENT (OUTPATIENT)
Dept: OPERATING ROOM | Age: 67
DRG: 025 | End: 2022-11-28
Payer: MEDICARE

## 2022-11-28 ENCOUNTER — HOSPITAL ENCOUNTER (INPATIENT)
Age: 67
LOS: 24 days | Discharge: NURSING, REHAB OR LONG-TERM CARE | DRG: 025 | End: 2022-12-22
Attending: NEUROLOGICAL SURGERY | Admitting: NEUROLOGICAL SURGERY
Payer: MEDICARE

## 2022-11-28 ENCOUNTER — ANESTHESIA EVENT (OUTPATIENT)
Dept: OPERATING ROOM | Age: 67
End: 2022-11-28
Payer: MEDICARE

## 2022-11-28 ENCOUNTER — ANESTHESIA (OUTPATIENT)
Dept: OPERATING ROOM | Age: 67
End: 2022-11-28
Payer: MEDICARE

## 2022-11-28 DIAGNOSIS — D33.3 ACOUSTIC NEUROMA (HCC): ICD-10-CM

## 2022-11-28 DIAGNOSIS — D32.9 MENINGIOMA (HCC): ICD-10-CM

## 2022-11-28 DIAGNOSIS — R26.89 BALANCE PROBLEM: ICD-10-CM

## 2022-11-28 DIAGNOSIS — R42 DIZZINESS: ICD-10-CM

## 2022-11-28 PROBLEM — D32.0 CEREBELLOPONTINE ANGLE MENINGIOMA (HCC): Status: ACTIVE | Noted: 2022-11-28

## 2022-11-28 LAB
ABO/RH: NORMAL
ANTIBODY SCREEN: NORMAL
BASE EXCESS ARTERIAL: -3 (ref -3–3)
CALCIUM IONIZED: 1.15 MMOL/L (ref 1.12–1.32)
GLUCOSE BLD-MCNC: 144 MG/DL (ref 70–99)
GLUCOSE BLD-MCNC: 185 MG/DL (ref 70–99)
GLUCOSE BLD-MCNC: 237 MG/DL (ref 70–99)
HCO3 ARTERIAL: 21.2 MMOL/L (ref 21–29)
O2 SAT, ARTERIAL: 99 % (ref 93–100)
PCO2 ARTERIAL: 32.9 MM HG (ref 35–45)
PERFORMED ON: ABNORMAL
PH ARTERIAL: 7.42 (ref 7.35–7.45)
PO2 ARTERIAL: 123.2 MM HG (ref 75–108)
POC POTASSIUM: 3.9 MMOL/L (ref 3.5–5.1)
POC SAMPLE TYPE: ABNORMAL
POC SODIUM: 140 MMOL/L (ref 136–145)
TCO2 ARTERIAL: 22 MMOL/L

## 2022-11-28 PROCEDURE — 2580000003 HC RX 258: Performed by: NEUROLOGICAL SURGERY

## 2022-11-28 PROCEDURE — 82947 ASSAY GLUCOSE BLOOD QUANT: CPT

## 2022-11-28 PROCEDURE — 3700000000 HC ANESTHESIA ATTENDED CARE: Performed by: NEUROLOGICAL SURGERY

## 2022-11-28 PROCEDURE — 7100000000 HC PACU RECOVERY - FIRST 15 MIN: Performed by: NEUROLOGICAL SURGERY

## 2022-11-28 PROCEDURE — 6370000000 HC RX 637 (ALT 250 FOR IP): Performed by: NEUROLOGICAL SURGERY

## 2022-11-28 PROCEDURE — 2500000003 HC RX 250 WO HCPCS

## 2022-11-28 PROCEDURE — 6370000000 HC RX 637 (ALT 250 FOR IP)

## 2022-11-28 PROCEDURE — C1713 ANCHOR/SCREW BN/BN,TIS/BN: HCPCS | Performed by: NEUROLOGICAL SURGERY

## 2022-11-28 PROCEDURE — 2500000003 HC RX 250 WO HCPCS: Performed by: STUDENT IN AN ORGANIZED HEALTH CARE EDUCATION/TRAINING PROGRAM

## 2022-11-28 PROCEDURE — 6360000002 HC RX W HCPCS

## 2022-11-28 PROCEDURE — 82803 BLOOD GASES ANY COMBINATION: CPT

## 2022-11-28 PROCEDURE — 0NB50ZX EXCISION OF RIGHT TEMPORAL BONE, OPEN APPROACH, DIAGNOSTIC: ICD-10-PCS | Performed by: NEUROLOGICAL SURGERY

## 2022-11-28 PROCEDURE — 2580000003 HC RX 258: Performed by: STUDENT IN AN ORGANIZED HEALTH CARE EDUCATION/TRAINING PROGRAM

## 2022-11-28 PROCEDURE — 7100000001 HC PACU RECOVERY - ADDTL 15 MIN: Performed by: NEUROLOGICAL SURGERY

## 2022-11-28 PROCEDURE — 2580000003 HC RX 258: Performed by: ANESTHESIOLOGY

## 2022-11-28 PROCEDURE — 2500000003 HC RX 250 WO HCPCS: Performed by: OTOLARYNGOLOGY

## 2022-11-28 PROCEDURE — 2500000003 HC RX 250 WO HCPCS: Performed by: NEUROLOGICAL SURGERY

## 2022-11-28 PROCEDURE — 86850 RBC ANTIBODY SCREEN: CPT

## 2022-11-28 PROCEDURE — 82330 ASSAY OF CALCIUM: CPT

## 2022-11-28 PROCEDURE — 6360000002 HC RX W HCPCS: Performed by: NURSE ANESTHETIST, CERTIFIED REGISTERED

## 2022-11-28 PROCEDURE — 09U507Z SUPPLEMENT RIGHT MIDDLE EAR WITH AUTOLOGOUS TISSUE SUBSTITUTE, OPEN APPROACH: ICD-10-PCS | Performed by: NEUROLOGICAL SURGERY

## 2022-11-28 PROCEDURE — 2580000003 HC RX 258

## 2022-11-28 PROCEDURE — 0JB80ZZ EXCISION OF ABDOMEN SUBCUTANEOUS TISSUE AND FASCIA, OPEN APPROACH: ICD-10-PCS | Performed by: NEUROLOGICAL SURGERY

## 2022-11-28 PROCEDURE — 2720000010 HC SURG SUPPLY STERILE: Performed by: NEUROLOGICAL SURGERY

## 2022-11-28 PROCEDURE — 09TD0ZZ RESECTION OF RIGHT INNER EAR, OPEN APPROACH: ICD-10-PCS | Performed by: OTOLARYNGOLOGY

## 2022-11-28 PROCEDURE — 86901 BLOOD TYPING SEROLOGIC RH(D): CPT

## 2022-11-28 PROCEDURE — 3600000014 HC SURGERY LEVEL 4 ADDTL 15MIN: Performed by: NEUROLOGICAL SURGERY

## 2022-11-28 PROCEDURE — 09BB0ZX EXCISION OF RIGHT MASTOID SINUS, OPEN APPROACH, DIAGNOSTIC: ICD-10-PCS | Performed by: NEUROLOGICAL SURGERY

## 2022-11-28 PROCEDURE — 84295 ASSAY OF SERUM SODIUM: CPT

## 2022-11-28 PROCEDURE — 6360000002 HC RX W HCPCS: Performed by: NEUROLOGICAL SURGERY

## 2022-11-28 PROCEDURE — 2709999900 HC NON-CHARGEABLE SUPPLY: Performed by: NEUROLOGICAL SURGERY

## 2022-11-28 PROCEDURE — 3700000001 HC ADD 15 MINUTES (ANESTHESIA): Performed by: NEUROLOGICAL SURGERY

## 2022-11-28 PROCEDURE — 3600000004 HC SURGERY LEVEL 4 BASE: Performed by: NEUROLOGICAL SURGERY

## 2022-11-28 PROCEDURE — 2000000000 HC ICU R&B

## 2022-11-28 PROCEDURE — 2700000000 HC OXYGEN THERAPY PER DAY

## 2022-11-28 PROCEDURE — 84132 ASSAY OF SERUM POTASSIUM: CPT

## 2022-11-28 PROCEDURE — 94761 N-INVAS EAR/PLS OXIMETRY MLT: CPT

## 2022-11-28 PROCEDURE — 0NB50ZZ EXCISION OF RIGHT TEMPORAL BONE, OPEN APPROACH: ICD-10-PCS | Performed by: OTOLARYNGOLOGY

## 2022-11-28 PROCEDURE — 86900 BLOOD TYPING SEROLOGIC ABO: CPT

## 2022-11-28 PROCEDURE — 94664 DEMO&/EVAL PT USE INHALER: CPT

## 2022-11-28 PROCEDURE — 4A11X4G MONITORING OF PERIPHERAL NERVOUS ELECTRICAL ACTIVITY, INTRAOPERATIVE, EXTERNAL APPROACH: ICD-10-PCS | Performed by: NEUROLOGICAL SURGERY

## 2022-11-28 DEVICE — SCREW UN3 SLFTP 1.5X4MM: Type: IMPLANTABLE DEVICE | Site: CRANIAL | Status: FUNCTIONAL

## 2022-11-28 DEVICE — STRAIGHT PLATE, 12MM BAR
Type: IMPLANTABLE DEVICE | Site: CRANIAL | Status: FUNCTIONAL
Brand: UNIVERSAL NEURO 3

## 2022-11-28 RX ORDER — SODIUM CHLORIDE 0.9 % (FLUSH) 0.9 %
5-40 SYRINGE (ML) INJECTION PRN
Status: CANCELLED | OUTPATIENT
Start: 2022-11-28

## 2022-11-28 RX ORDER — DEXTROSE MONOHYDRATE 100 MG/ML
INJECTION, SOLUTION INTRAVENOUS CONTINUOUS PRN
Status: DISCONTINUED | OUTPATIENT
Start: 2022-11-28 | End: 2022-12-22 | Stop reason: HOSPADM

## 2022-11-28 RX ORDER — ACETAMINOPHEN 325 MG/1
650 TABLET ORAL EVERY 4 HOURS PRN
Status: DISCONTINUED | OUTPATIENT
Start: 2022-11-28 | End: 2022-12-22 | Stop reason: HOSPADM

## 2022-11-28 RX ORDER — FENTANYL CITRATE 50 UG/ML
INJECTION, SOLUTION INTRAMUSCULAR; INTRAVENOUS PRN
Status: DISCONTINUED | OUTPATIENT
Start: 2022-11-28 | End: 2022-11-28 | Stop reason: SDUPTHER

## 2022-11-28 RX ORDER — LEVETIRACETAM 5 MG/ML
500 INJECTION INTRAVASCULAR EVERY 12 HOURS
Status: DISCONTINUED | OUTPATIENT
Start: 2022-11-28 | End: 2022-11-28

## 2022-11-28 RX ORDER — SODIUM CHLORIDE 9 MG/ML
INJECTION, SOLUTION INTRAVENOUS PRN
Status: DISCONTINUED | OUTPATIENT
Start: 2022-11-28 | End: 2022-12-22 | Stop reason: HOSPADM

## 2022-11-28 RX ORDER — SODIUM CHLORIDE 0.9 % (FLUSH) 0.9 %
5-40 SYRINGE (ML) INJECTION EVERY 12 HOURS SCHEDULED
Status: CANCELLED | OUTPATIENT
Start: 2022-11-28

## 2022-11-28 RX ORDER — VERAPAMIL HYDROCHLORIDE 120 MG/1
120 TABLET, FILM COATED ORAL EVERY 8 HOURS SCHEDULED
Status: CANCELLED | OUTPATIENT
Start: 2022-11-28

## 2022-11-28 RX ORDER — MANNITOL 20 G/100ML
INJECTION, SOLUTION INTRAVENOUS PRN
Status: DISCONTINUED | OUTPATIENT
Start: 2022-11-28 | End: 2022-11-28 | Stop reason: SDUPTHER

## 2022-11-28 RX ORDER — PHENYLEPHRINE HYDROCHLORIDE 10 MG/ML
INJECTION INTRAVENOUS PRN
Status: DISCONTINUED | OUTPATIENT
Start: 2022-11-28 | End: 2022-11-28 | Stop reason: SDUPTHER

## 2022-11-28 RX ORDER — SODIUM CHLORIDE 0.9 % (FLUSH) 0.9 %
5-40 SYRINGE (ML) INJECTION PRN
Status: DISCONTINUED | OUTPATIENT
Start: 2022-11-28 | End: 2022-11-28 | Stop reason: HOSPADM

## 2022-11-28 RX ORDER — CLINDAMYCIN PHOSPHATE 900 MG/50ML
900 INJECTION INTRAVENOUS ONCE
Status: COMPLETED | OUTPATIENT
Start: 2022-11-28 | End: 2022-11-28

## 2022-11-28 RX ORDER — SODIUM CHLORIDE 0.9 % (FLUSH) 0.9 %
5-40 SYRINGE (ML) INJECTION EVERY 12 HOURS SCHEDULED
Status: DISCONTINUED | OUTPATIENT
Start: 2022-11-28 | End: 2022-11-28 | Stop reason: HOSPADM

## 2022-11-28 RX ORDER — ALBUTEROL SULFATE 2.5 MG/3ML
2.5 SOLUTION RESPIRATORY (INHALATION) EVERY 4 HOURS PRN
Status: DISCONTINUED | OUTPATIENT
Start: 2022-11-28 | End: 2022-12-22 | Stop reason: HOSPADM

## 2022-11-28 RX ORDER — OXYCODONE HYDROCHLORIDE 5 MG/1
5 TABLET ORAL EVERY 4 HOURS PRN
Status: CANCELLED | OUTPATIENT
Start: 2022-11-28

## 2022-11-28 RX ORDER — INSULIN LISPRO 100 [IU]/ML
0-4 INJECTION, SOLUTION INTRAVENOUS; SUBCUTANEOUS NIGHTLY
Status: CANCELLED | OUTPATIENT
Start: 2022-11-28

## 2022-11-28 RX ORDER — LATANOPROST 50 UG/ML
1 SOLUTION/ DROPS OPHTHALMIC NIGHTLY
Status: DISCONTINUED | OUTPATIENT
Start: 2022-11-28 | End: 2022-12-22 | Stop reason: HOSPADM

## 2022-11-28 RX ORDER — ONDANSETRON 4 MG/1
4 TABLET, ORALLY DISINTEGRATING ORAL EVERY 8 HOURS PRN
Status: CANCELLED | OUTPATIENT
Start: 2022-11-28

## 2022-11-28 RX ORDER — ENOXAPARIN SODIUM 100 MG/ML
30 INJECTION SUBCUTANEOUS 2 TIMES DAILY
Status: CANCELLED | OUTPATIENT
Start: 2022-11-28

## 2022-11-28 RX ORDER — INSULIN LISPRO 100 [IU]/ML
0-4 INJECTION, SOLUTION INTRAVENOUS; SUBCUTANEOUS
Status: DISCONTINUED | OUTPATIENT
Start: 2022-11-29 | End: 2022-12-03

## 2022-11-28 RX ORDER — OXYCODONE HYDROCHLORIDE 5 MG/1
5 TABLET ORAL EVERY 4 HOURS PRN
Status: DISCONTINUED | OUTPATIENT
Start: 2022-11-28 | End: 2022-12-16

## 2022-11-28 RX ORDER — LABETALOL HYDROCHLORIDE 5 MG/ML
10 INJECTION, SOLUTION INTRAVENOUS
Status: DISCONTINUED | OUTPATIENT
Start: 2022-11-28 | End: 2022-12-01

## 2022-11-28 RX ORDER — ONDANSETRON 2 MG/ML
4 INJECTION INTRAMUSCULAR; INTRAVENOUS EVERY 6 HOURS PRN
Status: DISCONTINUED | OUTPATIENT
Start: 2022-11-28 | End: 2022-12-22 | Stop reason: HOSPADM

## 2022-11-28 RX ORDER — LIDOCAINE HYDROCHLORIDE AND EPINEPHRINE 10; 10 MG/ML; UG/ML
INJECTION, SOLUTION INFILTRATION; PERINEURAL PRN
Status: DISCONTINUED | OUTPATIENT
Start: 2022-11-28 | End: 2022-11-28 | Stop reason: HOSPADM

## 2022-11-28 RX ORDER — ACETAMINOPHEN 325 MG/1
650 TABLET ORAL EVERY 6 HOURS
Status: CANCELLED | OUTPATIENT
Start: 2022-11-28

## 2022-11-28 RX ORDER — SODIUM CHLORIDE 9 MG/ML
INJECTION, SOLUTION INTRAVENOUS PRN
Status: CANCELLED | OUTPATIENT
Start: 2022-11-28

## 2022-11-28 RX ORDER — INSULIN LISPRO 100 [IU]/ML
0-8 INJECTION, SOLUTION INTRAVENOUS; SUBCUTANEOUS
Status: CANCELLED | OUTPATIENT
Start: 2022-11-28

## 2022-11-28 RX ORDER — LOSARTAN POTASSIUM 50 MG/1
50 TABLET ORAL NIGHTLY
Status: DISCONTINUED | OUTPATIENT
Start: 2022-11-28 | End: 2022-12-01

## 2022-11-28 RX ORDER — ONDANSETRON 4 MG/1
4 TABLET, ORALLY DISINTEGRATING ORAL EVERY 8 HOURS PRN
Status: DISCONTINUED | OUTPATIENT
Start: 2022-11-28 | End: 2022-12-22 | Stop reason: HOSPADM

## 2022-11-28 RX ORDER — PROPOFOL 10 MG/ML
INJECTION, EMULSION INTRAVENOUS PRN
Status: DISCONTINUED | OUTPATIENT
Start: 2022-11-28 | End: 2022-11-28 | Stop reason: SDUPTHER

## 2022-11-28 RX ORDER — LEVETIRACETAM 5 MG/ML
500 INJECTION INTRAVASCULAR EVERY 12 HOURS
Status: CANCELLED | OUTPATIENT
Start: 2022-11-28

## 2022-11-28 RX ORDER — SODIUM CHLORIDE, SODIUM LACTATE, POTASSIUM CHLORIDE, CALCIUM CHLORIDE 600; 310; 30; 20 MG/100ML; MG/100ML; MG/100ML; MG/100ML
INJECTION, SOLUTION INTRAVENOUS CONTINUOUS
Status: DISCONTINUED | OUTPATIENT
Start: 2022-11-28 | End: 2022-11-28 | Stop reason: HOSPADM

## 2022-11-28 RX ORDER — METHIMAZOLE 5 MG/1
10 TABLET ORAL DAILY
Status: DISCONTINUED | OUTPATIENT
Start: 2022-11-29 | End: 2022-12-03

## 2022-11-28 RX ORDER — PROCHLORPERAZINE EDISYLATE 5 MG/ML
5 INJECTION INTRAMUSCULAR; INTRAVENOUS
Status: DISCONTINUED | OUTPATIENT
Start: 2022-11-28 | End: 2022-11-28 | Stop reason: HOSPADM

## 2022-11-28 RX ORDER — POLYETHYLENE GLYCOL 3350 17 G/17G
17 POWDER, FOR SOLUTION ORAL DAILY PRN
Status: CANCELLED | OUTPATIENT
Start: 2022-11-28

## 2022-11-28 RX ORDER — MIDAZOLAM HYDROCHLORIDE 1 MG/ML
INJECTION INTRAMUSCULAR; INTRAVENOUS PRN
Status: DISCONTINUED | OUTPATIENT
Start: 2022-11-28 | End: 2022-11-28 | Stop reason: SDUPTHER

## 2022-11-28 RX ORDER — PREDNISONE 20 MG/1
20 TABLET ORAL DAILY
Status: ON HOLD | COMMUNITY
End: 2022-11-30 | Stop reason: CLARIF

## 2022-11-28 RX ORDER — SODIUM CHLORIDE 9 MG/ML
INJECTION, SOLUTION INTRAVENOUS CONTINUOUS PRN
Status: DISCONTINUED | OUTPATIENT
Start: 2022-11-28 | End: 2022-11-28 | Stop reason: SDUPTHER

## 2022-11-28 RX ORDER — MORPHINE SULFATE 2 MG/ML
2 INJECTION, SOLUTION INTRAMUSCULAR; INTRAVENOUS
Status: DISCONTINUED | OUTPATIENT
Start: 2022-11-28 | End: 2022-12-16

## 2022-11-28 RX ORDER — LEVETIRACETAM 500 MG/5ML
INJECTION, SOLUTION, CONCENTRATE INTRAVENOUS PRN
Status: DISCONTINUED | OUTPATIENT
Start: 2022-11-28 | End: 2022-11-28 | Stop reason: SDUPTHER

## 2022-11-28 RX ORDER — SODIUM CHLORIDE 0.9 % (FLUSH) 0.9 %
5-40 SYRINGE (ML) INJECTION PRN
Status: DISCONTINUED | OUTPATIENT
Start: 2022-11-28 | End: 2022-12-13

## 2022-11-28 RX ORDER — SODIUM CHLORIDE 9 MG/ML
INJECTION, SOLUTION INTRAVENOUS PRN
Status: DISCONTINUED | OUTPATIENT
Start: 2022-11-28 | End: 2022-11-28 | Stop reason: HOSPADM

## 2022-11-28 RX ORDER — CARVEDILOL 6.25 MG/1
6.25 TABLET ORAL 2 TIMES DAILY WITH MEALS
Status: CANCELLED | OUTPATIENT
Start: 2022-11-28

## 2022-11-28 RX ORDER — OXYCODONE HYDROCHLORIDE 5 MG/1
10 TABLET ORAL EVERY 4 HOURS PRN
Status: DISCONTINUED | OUTPATIENT
Start: 2022-11-28 | End: 2022-12-16

## 2022-11-28 RX ORDER — ONDANSETRON 2 MG/ML
4 INJECTION INTRAMUSCULAR; INTRAVENOUS EVERY 6 HOURS PRN
Status: CANCELLED | OUTPATIENT
Start: 2022-11-28

## 2022-11-28 RX ORDER — DEXAMETHASONE SODIUM PHOSPHATE 4 MG/ML
INJECTION, SOLUTION INTRA-ARTICULAR; INTRALESIONAL; INTRAMUSCULAR; INTRAVENOUS; SOFT TISSUE PRN
Status: DISCONTINUED | OUTPATIENT
Start: 2022-11-28 | End: 2022-11-28 | Stop reason: SDUPTHER

## 2022-11-28 RX ORDER — PROMETHAZINE HYDROCHLORIDE 25 MG/ML
6.25 INJECTION, SOLUTION INTRAMUSCULAR; INTRAVENOUS EVERY 6 HOURS PRN
Status: DISCONTINUED | OUTPATIENT
Start: 2022-11-28 | End: 2022-12-22 | Stop reason: HOSPADM

## 2022-11-28 RX ORDER — HYDROMORPHONE HCL 110MG/55ML
PATIENT CONTROLLED ANALGESIA SYRINGE INTRAVENOUS PRN
Status: DISCONTINUED | OUTPATIENT
Start: 2022-11-28 | End: 2022-11-28 | Stop reason: SDUPTHER

## 2022-11-28 RX ORDER — PANTOPRAZOLE SODIUM 40 MG/1
40 TABLET, DELAYED RELEASE ORAL
Status: CANCELLED | OUTPATIENT
Start: 2022-11-29

## 2022-11-28 RX ORDER — LIDOCAINE HYDROCHLORIDE 10 MG/ML
1 INJECTION, SOLUTION EPIDURAL; INFILTRATION; INTRACAUDAL; PERINEURAL
Status: DISCONTINUED | OUTPATIENT
Start: 2022-11-28 | End: 2022-11-28 | Stop reason: HOSPADM

## 2022-11-28 RX ORDER — INSULIN LISPRO 100 [IU]/ML
0-4 INJECTION, SOLUTION INTRAVENOUS; SUBCUTANEOUS NIGHTLY
Status: DISCONTINUED | OUTPATIENT
Start: 2022-11-28 | End: 2022-12-03

## 2022-11-28 RX ORDER — LEVETIRACETAM 500 MG/1
500 TABLET ORAL EVERY 12 HOURS
Status: DISCONTINUED | OUTPATIENT
Start: 2022-11-28 | End: 2022-11-28

## 2022-11-28 RX ORDER — LABETALOL HYDROCHLORIDE 5 MG/ML
10 INJECTION, SOLUTION INTRAVENOUS
Status: DISCONTINUED | OUTPATIENT
Start: 2022-11-28 | End: 2022-11-28 | Stop reason: HOSPADM

## 2022-11-28 RX ORDER — GLYCOPYRROLATE 0.2 MG/ML
INJECTION INTRAMUSCULAR; INTRAVENOUS PRN
Status: DISCONTINUED | OUTPATIENT
Start: 2022-11-28 | End: 2022-11-28 | Stop reason: SDUPTHER

## 2022-11-28 RX ORDER — ALBUTEROL SULFATE 2.5 MG/3ML
2.5 SOLUTION RESPIRATORY (INHALATION) EVERY 4 HOURS PRN
Status: CANCELLED | OUTPATIENT
Start: 2022-11-28

## 2022-11-28 RX ORDER — HYDRALAZINE HYDROCHLORIDE 20 MG/ML
10 INJECTION INTRAMUSCULAR; INTRAVENOUS
Status: DISCONTINUED | OUTPATIENT
Start: 2022-11-28 | End: 2022-12-22 | Stop reason: HOSPADM

## 2022-11-28 RX ORDER — SODIUM CHLORIDE 0.9 % (FLUSH) 0.9 %
5-40 SYRINGE (ML) INJECTION EVERY 12 HOURS SCHEDULED
Status: DISCONTINUED | OUTPATIENT
Start: 2022-11-28 | End: 2022-12-22 | Stop reason: HOSPADM

## 2022-11-28 RX ORDER — HYDRALAZINE HYDROCHLORIDE 20 MG/ML
10 INJECTION INTRAMUSCULAR; INTRAVENOUS
Status: DISCONTINUED | OUTPATIENT
Start: 2022-11-28 | End: 2022-11-28 | Stop reason: HOSPADM

## 2022-11-28 RX ORDER — KETAMINE HCL IN NACL, ISO-OSM 20 MG/2 ML
SYRINGE (ML) INJECTION PRN
Status: DISCONTINUED | OUTPATIENT
Start: 2022-11-28 | End: 2022-11-28 | Stop reason: SDUPTHER

## 2022-11-28 RX ORDER — HYDRALAZINE HYDROCHLORIDE 20 MG/ML
INJECTION INTRAMUSCULAR; INTRAVENOUS
Status: COMPLETED
Start: 2022-11-28 | End: 2022-11-28

## 2022-11-28 RX ORDER — ONDANSETRON 2 MG/ML
4 INJECTION INTRAMUSCULAR; INTRAVENOUS
Status: DISCONTINUED | OUTPATIENT
Start: 2022-11-28 | End: 2022-11-28 | Stop reason: HOSPADM

## 2022-11-28 RX ORDER — ESMOLOL HYDROCHLORIDE 10 MG/ML
INJECTION INTRAVENOUS PRN
Status: DISCONTINUED | OUTPATIENT
Start: 2022-11-28 | End: 2022-11-28 | Stop reason: SDUPTHER

## 2022-11-28 RX ORDER — EPHEDRINE SULFATE 50 MG/ML
INJECTION INTRAVENOUS PRN
Status: DISCONTINUED | OUTPATIENT
Start: 2022-11-28 | End: 2022-11-28 | Stop reason: SDUPTHER

## 2022-11-28 RX ORDER — PRAVASTATIN SODIUM 20 MG
20 TABLET ORAL NIGHTLY
Status: CANCELLED | OUTPATIENT
Start: 2022-11-28

## 2022-11-28 RX ORDER — OXYCODONE HYDROCHLORIDE 5 MG/1
10 TABLET ORAL EVERY 4 HOURS PRN
Status: CANCELLED | OUTPATIENT
Start: 2022-11-28

## 2022-11-28 RX ORDER — REMIFENTANIL HYDROCHLORIDE 1 MG/ML
INJECTION, POWDER, LYOPHILIZED, FOR SOLUTION INTRAVENOUS PRN
Status: DISCONTINUED | OUTPATIENT
Start: 2022-11-28 | End: 2022-11-28 | Stop reason: SDUPTHER

## 2022-11-28 RX ORDER — SUCCINYLCHOLINE/SOD CL,ISO/PF 200MG/10ML
SYRINGE (ML) INTRAVENOUS PRN
Status: DISCONTINUED | OUTPATIENT
Start: 2022-11-28 | End: 2022-11-28 | Stop reason: SDUPTHER

## 2022-11-28 RX ORDER — MORPHINE SULFATE 4 MG/ML
2 INJECTION, SOLUTION INTRAMUSCULAR; INTRAVENOUS
Status: CANCELLED | OUTPATIENT
Start: 2022-11-28

## 2022-11-28 RX ORDER — MEPERIDINE HYDROCHLORIDE 25 MG/ML
12.5 INJECTION INTRAMUSCULAR; INTRAVENOUS; SUBCUTANEOUS EVERY 5 MIN PRN
Status: DISCONTINUED | OUTPATIENT
Start: 2022-11-28 | End: 2022-11-28 | Stop reason: HOSPADM

## 2022-11-28 RX ORDER — SODIUM CHLORIDE 9 MG/ML
INJECTION, SOLUTION INTRAVENOUS CONTINUOUS
Status: DISCONTINUED | OUTPATIENT
Start: 2022-11-28 | End: 2022-12-02

## 2022-11-28 RX ORDER — PROPOFOL 10 MG/ML
INJECTION, EMULSION INTRAVENOUS CONTINUOUS PRN
Status: DISCONTINUED | OUTPATIENT
Start: 2022-11-28 | End: 2022-11-28 | Stop reason: SDUPTHER

## 2022-11-28 RX ORDER — NITROGLYCERIN 5 MG/ML
INJECTION, SOLUTION INTRAVENOUS PRN
Status: DISCONTINUED | OUTPATIENT
Start: 2022-11-28 | End: 2022-11-28 | Stop reason: SDUPTHER

## 2022-11-28 RX ORDER — SODIUM CHLORIDE, SODIUM LACTATE, POTASSIUM CHLORIDE, AND CALCIUM CHLORIDE .6; .31; .03; .02 G/100ML; G/100ML; G/100ML; G/100ML
IRRIGANT IRRIGATION PRN
Status: DISCONTINUED | OUTPATIENT
Start: 2022-11-28 | End: 2022-11-28 | Stop reason: HOSPADM

## 2022-11-28 RX ORDER — OXYCODONE HYDROCHLORIDE 5 MG/1
5 TABLET ORAL
Status: DISCONTINUED | OUTPATIENT
Start: 2022-11-28 | End: 2022-11-28 | Stop reason: HOSPADM

## 2022-11-28 RX ORDER — ONDANSETRON 2 MG/ML
INJECTION INTRAMUSCULAR; INTRAVENOUS PRN
Status: DISCONTINUED | OUTPATIENT
Start: 2022-11-28 | End: 2022-11-28 | Stop reason: SDUPTHER

## 2022-11-28 RX ORDER — SENNA AND DOCUSATE SODIUM 50; 8.6 MG/1; MG/1
1 TABLET, FILM COATED ORAL 2 TIMES DAILY
Status: DISCONTINUED | OUTPATIENT
Start: 2022-11-28 | End: 2022-12-11

## 2022-11-28 RX ORDER — SODIUM CHLORIDE 450 MG/100ML
INJECTION, SOLUTION INTRAVENOUS CONTINUOUS
Status: CANCELLED | OUTPATIENT
Start: 2022-11-28

## 2022-11-28 RX ADMIN — HYDROMORPHONE HYDROCHLORIDE 0.8 MG: 2 INJECTION, SOLUTION INTRAMUSCULAR; INTRAVENOUS; SUBCUTANEOUS at 15:58

## 2022-11-28 RX ADMIN — PROPOFOL 120 MG: 10 INJECTION, EMULSION INTRAVENOUS at 07:45

## 2022-11-28 RX ADMIN — PROPOFOL 50 MG: 10 INJECTION, EMULSION INTRAVENOUS at 08:15

## 2022-11-28 RX ADMIN — DEXAMETHASONE SODIUM PHOSPHATE 8 MG: 4 INJECTION, SOLUTION INTRAMUSCULAR; INTRAVENOUS at 07:45

## 2022-11-28 RX ADMIN — MIDAZOLAM HYDROCHLORIDE 2 MG: 2 INJECTION, SOLUTION INTRAMUSCULAR; INTRAVENOUS at 07:42

## 2022-11-28 RX ADMIN — FENTANYL CITRATE 50 MCG: 50 INJECTION, SOLUTION INTRAMUSCULAR; INTRAVENOUS at 07:45

## 2022-11-28 RX ADMIN — SODIUM CHLORIDE, POTASSIUM CHLORIDE, SODIUM LACTATE AND CALCIUM CHLORIDE: 600; 310; 30; 20 INJECTION, SOLUTION INTRAVENOUS at 11:08

## 2022-11-28 RX ADMIN — HYDRALAZINE HYDROCHLORIDE 10 MG: 20 INJECTION INTRAMUSCULAR; INTRAVENOUS at 20:24

## 2022-11-28 RX ADMIN — FENTANYL CITRATE 50 MCG: 50 INJECTION, SOLUTION INTRAMUSCULAR; INTRAVENOUS at 08:35

## 2022-11-28 RX ADMIN — Medication 100 MG: at 07:45

## 2022-11-28 RX ADMIN — SODIUM CHLORIDE, POTASSIUM CHLORIDE, SODIUM LACTATE AND CALCIUM CHLORIDE: 600; 310; 30; 20 INJECTION, SOLUTION INTRAVENOUS at 06:51

## 2022-11-28 RX ADMIN — HYDROMORPHONE HYDROCHLORIDE 0.6 MG: 2 INJECTION, SOLUTION INTRAMUSCULAR; INTRAVENOUS; SUBCUTANEOUS at 16:47

## 2022-11-28 RX ADMIN — NITROGLYCERIN 100 MCG: 5 INJECTION, SOLUTION INTRAVENOUS at 08:43

## 2022-11-28 RX ADMIN — NITROGLYCERIN 100 MCG: 5 INJECTION, SOLUTION INTRAVENOUS at 08:38

## 2022-11-28 RX ADMIN — PROPOFOL 50 MG: 10 INJECTION, EMULSION INTRAVENOUS at 08:34

## 2022-11-28 RX ADMIN — NITROGLYCERIN 100 MCG: 5 INJECTION, SOLUTION INTRAVENOUS at 08:41

## 2022-11-28 RX ADMIN — NITROGLYCERIN 100 MCG: 5 INJECTION, SOLUTION INTRAVENOUS at 08:42

## 2022-11-28 RX ADMIN — PROPOFOL 50 MG: 10 INJECTION, EMULSION INTRAVENOUS at 08:40

## 2022-11-28 RX ADMIN — NITROGLYCERIN 50 MCG: 5 INJECTION, SOLUTION INTRAVENOUS at 08:45

## 2022-11-28 RX ADMIN — NITROGLYCERIN 50 MCG: 5 INJECTION, SOLUTION INTRAVENOUS at 08:36

## 2022-11-28 RX ADMIN — EPHEDRINE SULFATE 5 MG: 50 INJECTION INTRAVENOUS at 08:30

## 2022-11-28 RX ADMIN — LEVETIRACETAM 1000 MG: 100 INJECTION, SOLUTION INTRAVENOUS at 11:11

## 2022-11-28 RX ADMIN — ONDANSETRON 4 MG: 2 INJECTION INTRAMUSCULAR; INTRAVENOUS at 07:45

## 2022-11-28 RX ADMIN — Medication 20 MG: at 07:45

## 2022-11-28 RX ADMIN — PROPOFOL 125 MCG/KG/MIN: 10 INJECTION, EMULSION INTRAVENOUS at 07:45

## 2022-11-28 RX ADMIN — SODIUM CHLORIDE, PRESERVATIVE FREE 10 ML: 5 INJECTION INTRAVENOUS at 20:39

## 2022-11-28 RX ADMIN — PHENYLEPHRINE HYDROCHLORIDE 10 MCG/MIN: 10 INJECTION, SOLUTION INTRAMUSCULAR; INTRAVENOUS; SUBCUTANEOUS at 09:02

## 2022-11-28 RX ADMIN — SODIUM CHLORIDE, POTASSIUM CHLORIDE, SODIUM LACTATE AND CALCIUM CHLORIDE: 600; 310; 30; 20 INJECTION, SOLUTION INTRAVENOUS at 06:20

## 2022-11-28 RX ADMIN — LATANOPROST 1 DROP: 50 SOLUTION OPHTHALMIC at 21:37

## 2022-11-28 RX ADMIN — GLYCOPYRROLATE 0.2 MG: 0.2 INJECTION INTRAMUSCULAR; INTRAVENOUS at 08:26

## 2022-11-28 RX ADMIN — SODIUM CHLORIDE: 9 INJECTION, SOLUTION INTRAVENOUS at 11:45

## 2022-11-28 RX ADMIN — PROMETHAZINE HYDROCHLORIDE 6.25 MG: 25 INJECTION INTRAMUSCULAR; INTRAVENOUS at 21:36

## 2022-11-28 RX ADMIN — SODIUM CHLORIDE 500 MG: 9 INJECTION, SOLUTION INTRAVENOUS at 22:31

## 2022-11-28 RX ADMIN — ONDANSETRON 4 MG: 2 INJECTION INTRAMUSCULAR; INTRAVENOUS at 16:37

## 2022-11-28 RX ADMIN — NITROGLYCERIN 100 MCG: 5 INJECTION, SOLUTION INTRAVENOUS at 08:37

## 2022-11-28 RX ADMIN — PHENYLEPHRINE HYDROCHLORIDE 50 MCG: 10 INJECTION INTRAVENOUS at 09:04

## 2022-11-28 RX ADMIN — Medication 20 MG: at 16:37

## 2022-11-28 RX ADMIN — NITROGLYCERIN 50 MCG: 5 INJECTION, SOLUTION INTRAVENOUS at 08:47

## 2022-11-28 RX ADMIN — HYDROMORPHONE HYDROCHLORIDE 0.6 MG: 2 INJECTION, SOLUTION INTRAMUSCULAR; INTRAVENOUS; SUBCUTANEOUS at 16:55

## 2022-11-28 RX ADMIN — PHENYLEPHRINE HYDROCHLORIDE 20 MCG/MIN: 10 INJECTION, SOLUTION INTRAMUSCULAR; INTRAVENOUS; SUBCUTANEOUS at 08:25

## 2022-11-28 RX ADMIN — ESMOLOL HYDROCHLORIDE 20 MG: 100 INJECTION, SOLUTION INTRAVENOUS at 08:36

## 2022-11-28 RX ADMIN — EPHEDRINE SULFATE 10 MG: 50 INJECTION INTRAVENOUS at 08:09

## 2022-11-28 RX ADMIN — PROPOFOL 50 MG: 10 INJECTION, EMULSION INTRAVENOUS at 16:48

## 2022-11-28 RX ADMIN — PHENYLEPHRINE HYDROCHLORIDE 20 MCG/MIN: 10 INJECTION, SOLUTION INTRAMUSCULAR; INTRAVENOUS; SUBCUTANEOUS at 08:06

## 2022-11-28 RX ADMIN — ONDANSETRON 4 MG: 2 INJECTION INTRAMUSCULAR; INTRAVENOUS at 20:35

## 2022-11-28 RX ADMIN — SODIUM CHLORIDE: 9 INJECTION, SOLUTION INTRAVENOUS at 20:30

## 2022-11-28 RX ADMIN — CLINDAMYCIN PHOSPHATE 900 MG: 900 INJECTION, SOLUTION INTRAVENOUS at 14:14

## 2022-11-28 RX ADMIN — SODIUM CHLORIDE 5 MG/HR: 9 INJECTION, SOLUTION INTRAVENOUS at 20:49

## 2022-11-28 RX ADMIN — PROPOFOL 100 MG: 10 INJECTION, EMULSION INTRAVENOUS at 08:36

## 2022-11-28 RX ADMIN — NITROGLYCERIN 100 MCG: 5 INJECTION, SOLUTION INTRAVENOUS at 08:40

## 2022-11-28 RX ADMIN — MANNITOL 30 G: 20 INJECTION, SOLUTION INTRAVENOUS at 11:17

## 2022-11-28 RX ADMIN — CLINDAMYCIN PHOSPHATE 900 MG: 900 INJECTION, SOLUTION INTRAVENOUS at 07:57

## 2022-11-28 RX ADMIN — LABETALOL HYDROCHLORIDE 10 MG: 5 INJECTION, SOLUTION INTRAVENOUS at 20:36

## 2022-11-28 RX ADMIN — REMIFENTANIL HYDROCHLORIDE 0.12 MCG/KG/MIN: 1 INJECTION, POWDER, LYOPHILIZED, FOR SOLUTION INTRAVENOUS at 07:45

## 2022-11-28 RX ADMIN — GLYCOPYRROLATE 0.2 MG: 0.2 INJECTION INTRAMUSCULAR; INTRAVENOUS at 08:00

## 2022-11-28 RX ADMIN — Medication 160 MG: at 07:46

## 2022-11-28 ASSESSMENT — PAIN - FUNCTIONAL ASSESSMENT: PAIN_FUNCTIONAL_ASSESSMENT: 0-10

## 2022-11-28 ASSESSMENT — PAIN SCALES - GENERAL
PAINLEVEL_OUTOF10: 0
PAINLEVEL_OUTOF10: 0

## 2022-11-28 NOTE — H&P
Andres Annette    [id number]    [facility] Same Day Surgery Update H & P  Department of General Surgery   Surgical Service   Pre-operative History and Physical  Last H & P within the last 30 days. DIAGNOSIS:   Acoustic neuroma (HCC) [D33.3]  Balance problem [R26.89]  Meningioma ([address] [D32.9]  Dizziness [R42]    Procedure(s):  STAGE 1, RIGHT TRANSLABYRINTHINE / RETROLABYRINTHINE CRANIOTOMY POSSIBLE PETROSECTOMY  RIGHT TRANSMASTOID / ANTERIOR MIDDLE CRANIAL FOSSA , ABDOMINAL FAT GRAFT EXTERNAL AUDITORY CANAL CLOSURE    History obtained from: Patient interview and EHR      HISTORY OF PRESENT ILLNESS:   Patient is a morbidly obese (Body mass index is 50.82 kg/m².), 79 y.o. female  with c/o dizziness, HA and vertigo in the setting of large right petroclival mass which is consistent with meningioma. She presents today for the above procedures with Oriana Fowler, Missy Guzman and Paul. Illness Screening: Patient denies fever, chills, worsening cough, or close contact with sick individuals. Past Medical History:        Diagnosis Date    Arthritis     Asthma     mild    Brain tumor ([address]     Diabetes mellitus ([address]     borderline    High cholesterol     Hypertension     Imbalance     DUE TO TUMOR- USING ANKLE BRACE / [place] PER PREOP H&P    Vertigo      Past Surgical History:        Procedure Laterality Date    TOTAL HIP ARTHROPLASTY Right 11/8/2021    RIGHT TOTAL HIP ARTHROPLASTY POSTERIOR APPROACH - JOSÉ ANTONIO ADVANCED performed by Tre Doll MD at [facility] Right 07/14/2015    TOTAL KNEE ARTHROPLASTY Left 10/14/15    TKA       Medications Prior to Admission:      Prior to Admission medications    Medication Sig Start Date End Date Taking?  Authorizing Provider   predniSONE (DELTASONE) 20 MG tablet Take 20 mg by mouth daily Cpmpleted therapy yesterday after 3 day course   Yes Historical Provider, MD   losartan (COZAAR) 50 MG tablet Take by mouth daily 12/6/21  Yes Historical Provider, MD   albuterol sulfate HFA (PROVENTIL;VENTOLIN;PROAIR) 108 (90 Base) MCG/ACT inhaler Inhale 2 puffs into the lungs every 4 hours as needed 10/18/21  Yes Historical Provider, MD   meloxicam (MOBIC) 15 MG tablet TAKE 1 TABLET BY MOUTH ONCE DAILY 9/26/22   Historical Provider, MD   methIMAzole (TAPAZOLE) 5 MG tablet TAKE 2 TABLETS BY MOUTH ONCE DAILY 10/25/22   Historical Provider, MD   verapamil (CALAN) 120 MG tablet Take by mouth 3 times daily  Patient not taking: Reported on 11/28/2022    Historical Provider, MD   omeprazole (PRILOSEC) 20 MG delayed release capsule Take by mouth every morning (before breakfast)  Patient not taking: Reported on 11/28/2022    Historical Provider, MD   metFORMIN (GLUCOPHAGE-XR) 500 MG extended release tablet TAKE 2 TABLETS BY MOUTH AFTER EVENING MEAL 9/7/22   Historical Provider, MD   Cholecalciferol (VITAMIN D) 50 MCG (2000 UT) CAPS capsule Take 2,000 Units by mouth daily 11/5/21 12/5/21  Elizabeth Hadley MD   NONFORMULARY cbd prn    Historical Provider, MD   latanoprost (XALATAN) 0.005 % ophthalmic solution INSTILL 1 DROP INTO EACH EYE AT BEDTIME 6/23/21   Historical Provider, MD   carvedilol (COREG) 6.25 MG tablet Take 6.25 mg by mouth 2 times daily (with meals)    Historical Provider, MD   pravastatin (PRAVACHOL) 20 MG tablet Take 20 mg by mouth nightly     Historical Provider, MD   hydrochlorothiazide (HYDRODIURIL) 25 MG tablet Take 25 mg by mouth daily    Historical Provider, MD         Allergies:  Keflex [cephalexin], Codeine, and Tomato    PHYSICAL EXAM:      BP (!) 171/76   Pulse 60   Temp 97.9 °F (36.6 °C) (Oral)   Resp 20   Ht 5' 5\" (1.651 m)   Wt (!) 305 lb 6.4 oz (138.5 kg)   SpO2 96%   BMI 50.82 kg/m²      Airway:  Airway patent with no audible stridor    Heart:  Regular rate and rhythm, No murmur noted    Lungs:  No increased work of breathing, good air exchange, clear to auscultation bilaterally, no crackles or wheezing    Abdomen:  Soft, non-distended, non-tender, no rebound tenderness or guarding, and no masses palpated    ASSESSMENT AND PLAN     Patient is a 79 y.o. female with above specified procedure planned. 1.  The patients history and physical was obtained and signed off by the pre-admission testing department. Patient seen and focused exam done today- no new changes since last physical exam on 11/18/22     2. Access to ancillary services are available per request of the provider.     LEE Denton - SHELLY     11/28/2022

## 2022-11-28 NOTE — PROGRESS NOTES
Patient arrived to PACU post STAGE 1, RIGHT TRANSLABYRINTHINE / RETROLABYRINTHINE CRANIOTOMY, PETROSECTOMY - Right with Dr. Ishan Hess. VSS on arrival with oral airway in place and non-rebreather. CRNA gave PACU RN report at bedside stating no complications during procedure. Dressing to surgical site clean, dry and intact. Patient shows no signs of pain at this time. Will continue to monitor.

## 2022-11-28 NOTE — PROGRESS NOTES
Reported BP and heart rate to Dr Hetal Alvarez and aware she did not take beta blocker today. He said to not give beta blocker.

## 2022-11-28 NOTE — ANESTHESIA PROCEDURE NOTES
Arterial Line:    An arterial line was placed using surface landmarks, in the OR for the following indication(s): continuous blood pressure monitoring and blood sampling needed. A 20 gauge (size), 1 and 3/8 inch (length), Angiocath (type) catheter was placed, Seldinger technique not used, into the right radial artery, secured by tape. Anesthesia type: Local    Events:  patient tolerated procedure well with no complications and EBL 0mL. 11/28/2022 7:45 AM11/28/2022 7:53 AM  Anesthesiologist: Justino Hernandez MD  Performed: Anesthesiologist   Preanesthetic Checklist  Completed: patient identified, IV checked, site marked, risks and benefits discussed, surgical/procedural consents, equipment checked, pre-op evaluation, timeout performed, anesthesia consent given, oxygen available and monitors applied/VS acknowledged

## 2022-11-28 NOTE — ANESTHESIA POSTPROCEDURE EVALUATION
Department of Anesthesiology  Postprocedure Note    Patient: Ashley Remy  MRN: [id number]  [place]: 1955  Date of evaluation: 11/28/2022      Procedure Summary     Date: 11/28/22 Room / Location: [address]    Anesthesia Start: 0730 Anesthesia Stop: 3999    Procedures:       STAGE 1, RIGHT TRANSLABYRINTHINE / RETROLABYRINTHINE CRANIOTOMY, PETROSECTOMY (Right: Head)      RIGHT TRANSMASTOID / ANTERIOR MIDDLE CRANIAL FOSSA , ABDOMINAL FAT GRAFT EXTERNAL AUDITORY CANAL CLOSURE (Right: Ear) Diagnosis:       Acoustic neuroma ([address]      Balance problem      Meningioma ([address]      Dizziness      (Acoustic neuroma ([address] [D33.3] Balance problem [R26.89] Meningioma ([address] [D32.9] Dizziness [R42])    Surgeons: Cindy Melendrez. Madeline Cody MD; Gustabo Olson MD Responsible Provider: Ari Rodriguez DO    Anesthesia Type: general ASA Status: 3          Anesthesia Type: No value filed.     Michelle Phase I: Michelle Score: 4    Michelle Phase II:        Anesthesia Post Evaluation    Patient location during evaluation: PACU  Patient participation: complete - patient participated  Level of consciousness: awake and alert  Airway patency: patent  Nausea & Vomiting: no nausea and no vomiting  Cardiovascular status: blood pressure returned to baseline  Respiratory status: acceptable  Hydration status: euvolemic

## 2022-11-28 NOTE — H&P
[facility]   H&P    Patient Name: Jeanie Guzmán  Medical Record Number:  [id number]  Primary Care Physician:  Pacheco Aguirre  Date of Admission: 2022  Date of Consultation: 2022    ASSESSMENT  Jesus Heard is a 79 y.o. female,   Patient's main symptom is vertigo, which started in  with falls. Has about 2 episodes a week with true vertigo, has fallen out of bed, has more frequent 'dizziness'. Feels her hearing has decreased. Pt admitted earlier today for two staged petroclival meningioma resection at [facility], [facility]. on  and . Audiogram: AU Mild to moderate SNHL w excellent WRS  ENO% degen compared to uneffected side (nl). VNG: suggestive of CNS pathology (abn smooth pursuits and saccade latencies, neg headshake, with nystagmus w and w/o fixation). ABR abnl on right. PLAN:  Right two staged petroclival meningioma resection via :  POSSIBLE MCF, TL, TRANSMASTOID   ABDOMINAL FAT GRAFT   POSSIBLE EAC CLOSURE   EVOKES       This assessment and plan, and all management questions were discussed with the above mentioned Neurotology attending who agreed.       Patient Active Problem List   Diagnosis    Asthma    Gastroesophageal reflux disease    Hypertension    Adiposity    S/P total knee arthroplasty    Primary osteoarthritis of left knee    Morbid obesity with BMI of 50.0-59.9, adult (HCC)    Osteoarthritis of right hip     Past Surgical History:   Procedure Laterality Date    TOTAL HIP ARTHROPLASTY Right 2021    RIGHT TOTAL HIP ARTHROPLASTY POSTERIOR APPROACH - JOSÉ ANTONIO ADVANCED performed by Lennox Ennis MD at [address] [place] [address] Right 2015    TOTAL KNEE ARTHROPLASTY Left 10/14/15    TKA     Family History   Problem Relation Age of Onset    Cancer Mother     Hypotension Mother     Cancer Father     Cancer Sister     Hypotension Sister Cancer Maternal Grandmother     Cancer Maternal Grandfather     Cancer Paternal Grandmother     Cancer Paternal Grandfather     Hypotension Other     Cancer Other     Diabetes Other     Osteoarthritis Other      Social History     Socioeconomic History    Marital status:      Spouse name: Not on file    Number of children: Not on file    Years of education: Not on file    Highest education level: Not on file   Occupational History    Not on file   Tobacco Use    Smoking status: Former    Smokeless tobacco: Never   Vaping Use    Vaping Use: Not on file   Substance and Sexual Activity    Alcohol use: No    Drug use: Never    Sexual activity: Not on file   Other Topics Concern    Not on file   Social History Narrative    Not on file     Social Determinants of Health     Financial Resource Strain: Not on file   Food Insecurity: Not on file   Transportation Needs: Not on file   Physical Activity: Not on file   Stress: Not on file   Social Connections: Not on file   Intimate Partner Violence: Not on file   Housing Stability: Not on file       DRUG/FOOD ALLERGIES: Keflex [cephalexin], Codeine, and Tomato      CURRENT MEDICATIONS  Prior to Admission medications    Medication Sig Start Date End Date Taking?  Authorizing Provider   predniSONE (DELTASONE) 20 MG tablet Take 20 mg by mouth daily Cpmpleted therapy yesterday after 3 day course   Yes Historical Provider, MD   losartan (COZAAR) 50 MG tablet Take by mouth daily 12/6/21  Yes Historical Provider, MD   albuterol sulfate HFA (PROVENTIL;VENTOLIN;PROAIR) 108 (90 Base) MCG/ACT inhaler Inhale 2 puffs into the lungs every 4 hours as needed 10/18/21  Yes Historical Provider, MD   meloxicam (MOBIC) 15 MG tablet TAKE 1 TABLET BY MOUTH ONCE DAILY 9/26/22   Historical Provider, MD   methIMAzole (TAPAZOLE) 5 MG tablet TAKE 2 TABLETS BY MOUTH ONCE DAILY 10/25/22   Historical Provider, MD   verapamil (CALAN) 120 MG tablet Take by mouth 3 times daily  Patient not taking: Reported on 11/28/2022    Historical Provider, MD   omeprazole (PRILOSEC) 20 MG delayed release capsule Take by mouth every morning (before breakfast)  Patient not taking: Reported on 11/28/2022    Historical Provider, MD   metFORMIN (GLUCOPHAGE-XR) 500 MG extended release tablet TAKE 2 TABLETS BY MOUTH AFTER EVENING MEAL 9/7/22   Historical Provider, MD   Cholecalciferol (VITAMIN D) 50 MCG (2000 UT) CAPS capsule Take 2,000 Units by mouth daily 11/5/21 12/5/21  Gogo Del Real MD   NONFORMULARY cbd prn    Historical Provider, MD   latanoprost (XALATAN) 0.005 % ophthalmic solution INSTILL 1 DROP INTO EACH EYE AT BEDTIME 6/23/21   Historical Provider, MD   carvedilol (COREG) 6.25 MG tablet Take 6.25 mg by mouth 2 times daily (with meals)    Historical Provider, MD   pravastatin (PRAVACHOL) 20 MG tablet Take 20 mg by mouth nightly     Historical Provider, MD   hydrochlorothiazide (HYDRODIURIL) 25 MG tablet Take 25 mg by mouth daily    Historical Provider, MD          Inpatient Meds:  Scheduled:   clindamycin (CLEOCIN) IV  900 mg IntraVENous Once    sodium chloride flush  5-40 mL IntraVENous 2 times per day       Continuous:   lactated ringers 50 mL/hr at 11/28/22 0620    sodium chloride      lactated ringers 50 mL/hr at 11/28/22 0651       REVIEW OF SYSTEMS  The following systems were reviewed and revealed the following in addition to any already discussed in the HPI:      PHYSICAL EXAM  BP (!) 171/76   Pulse 60   Temp 97.9 °F (36.6 °C) (Oral)   Resp 20   Ht 5' 5\" (1.651 m)   Wt (!) 305 lb 6.4 oz (138.5 kg)   SpO2 96%   BMI 50.82 kg/m²     GENERAL: No Acute Distress, Alert and Oriented  EYES: PERRLA, EOMI, Anti-icteric  NOSE: No epistaxis, nasal mucosa within normal limits, no purulent drainage  Ears: External ears have a normal appearance with no masses or lesions. Tuning fork tests performed at 512 Hz. Butt midline. Rinne was positive bilaterally.       Procedure Note:   Otomicroscopic exam was done of both ear's. On the left, other than cerumen the EAC is wnl, TM nl, -fistula test.  On the right other than cerumen, the EAC is wnl, TM nl, -fistula test  NEUROLOGIC: 1/6 House-Brackmann Scale, symmetric, sensation equal bilaterally; no Nystagmus  ORAL CAVITY/OROPHARYNX: No masses or lesions palpated, uvula is midline, moist mucous membranes, pharyngeal walls symmetric  NECK: Normal range of motion, no thyromegaly, trachea is midline, no lymphadenopathy, no neck masses, no crepitus  CHEST: Normal respiratory effort, no retractions    LABORATORY  Past 24 hour labs:   Lab Results   Component Value Date    WBC 7.0 11/09/2021    HGB 12.7 11/09/2021    HCT 38.7 11/09/2021    MCV 88.0 11/09/2021     11/09/2021     Lab Results   Component Value Date    GLUCOSE 240 (H) 11/09/2021    BUN 14 11/09/2021    CREATININE <0.5 (L) 11/09/2021    K 3.5 11/09/2021     11/09/2021     11/09/2021    CALCIUM 8.9 11/09/2021     No results found for: MG  No results found for: PHOS  Lab Results   Component Value Date    ALKPHOS 63 10/12/2021    ALT 28 10/12/2021    AST 23 10/12/2021    BILITOT 0.9 10/12/2021    PROT 7.3 10/12/2021     No results found for: LDH  No results found for: PTT  No results found for: AMYLASE  No results found for: LIPASE    RADIOLOGY  Head MRI 7/18/2022  scans and report reviewed :  36.1 x 24 x 35 mm extra-axial homogeneously enhancing intra and extracanalicular mass lesion epicentered within the right cerebellopontine angle most consistent with meningioma. There is mass effect upon the right cerebellar hemisphere, medulla, nestor and midbrain with abutment of the basilar artery, right vertebral artery, cavernous sinus and right tentorium. There is mass effect upon the fourth ventricle and cerebral aqueduct with no evidence of hydrocephalus. 16.3 x 11 x 13.1 mm extra-axial homogeneously enhancing high right frontal parafalcine meningioma. Bilateral cerebral hemisphere nonacute ischemic changes. No acute ischemic changes are present.

## 2022-11-28 NOTE — ANESTHESIA PRE PROCEDURE
Department of Anesthesiology  Preprocedure Note       Name:  Claudetta Dose   Age:  79 y.o.  :  1955                                          MRN:  [id number]         Date:  2022      Surgeon: Edison Lockhart):  Alberto Davis. MD Abdirahman Carmen MD Johanne Patee, MD Johanne Patee, MD    Procedure: Procedure(s):  STAGE 1, RIGHT TRANSLABYRINTHINE / RETROLABYRINTHINE CRANIOTOMY POSSIBLE PETROSECTOMY  RIGHT TRANSMASTOID / ANTERIOR MIDDLE CRANIAL FOSSA , ABDOMINAL FAT GRAFT EXTERNAL AUDITORY CANAL CLOSURE    Medications prior to admission:   Prior to Admission medications    Medication Sig Start Date End Date Taking?  Authorizing Provider   predniSONE (DELTASONE) 20 MG tablet Take 20 mg by mouth daily Cpmpleted therapy yesterday after 3 day course   Yes Historical Provider, MD   losartan (COZAAR) 50 MG tablet Take by mouth daily 21  Yes Historical Provider, MD   albuterol sulfate HFA (PROVENTIL;VENTOLIN;PROAIR) 108 (90 Base) MCG/ACT inhaler Inhale 2 puffs into the lungs every 4 hours as needed 10/18/21  Yes Historical Provider, MD   meloxicam (MOBIC) 15 MG tablet TAKE 1 TABLET BY MOUTH ONCE DAILY 22   Historical Provider, MD   methIMAzole (TAPAZOLE) 5 MG tablet TAKE 2 TABLETS BY MOUTH ONCE DAILY 10/25/22   Historical Provider, MD   verapamil (CALAN) 120 MG tablet Take by mouth 3 times daily  Patient not taking: Reported on 2022    Historical Provider, MD   omeprazole (PRILOSEC) 20 MG delayed release capsule Take by mouth every morning (before breakfast)  Patient not taking: Reported on 2022    Historical Provider, MD   metFORMIN (GLUCOPHAGE-XR) 500 MG extended release tablet TAKE 2 TABLETS BY MOUTH AFTER EVENING MEAL 22   Historical Provider, MD   Cholecalciferol (VITAMIN D) 50 MCG (2000 UT) CAPS capsule Take 2,000 Units by mouth daily 21  Marquis Conley MD   NONFORMULARY cbd prn    Historical Provider, MD   latanoprost (XALATAN) 0.005 % ophthalmic solution INSTILL 1 DROP INTO EACH EYE AT BEDTIME 6/23/21   Historical Provider, MD   carvedilol (COREG) 6.25 MG tablet Take 6.25 mg by mouth 2 times daily (with meals)    Historical Provider, MD   pravastatin (PRAVACHOL) 20 MG tablet Take 20 mg by mouth nightly     Historical Provider, MD   hydrochlorothiazide (HYDRODIURIL) 25 MG tablet Take 25 mg by mouth daily    Historical Provider, MD       Current medications:    Current Facility-Administered Medications   Medication Dose Route Frequency Provider Last Rate Last Admin    clindamycin (CLEOCIN) 900 mg in dextrose 5 % 50 mL IVPB  900 mg IntraVENous Once Denzel Melgar MD        lidocaine PF 1 % injection 1 mL  1 mL IntraDERmal Once PRN Haile Poe MD        lactated ringers infusion   IntraVENous Continuous Haile Poe MD 50 mL/hr at 11/28/22 0620 New Bag at 11/28/22 0620    sodium chloride flush 0.9 % injection 5-40 mL  5-40 mL IntraVENous 2 times per day Haile Poe MD        sodium chloride flush 0.9 % injection 5-40 mL  5-40 mL IntraVENous PRN Haile Poe MD        0.9 % sodium chloride infusion   IntraVENous PRN Haile Poe MD        lactated ringers infusion   IntraVENous Continuous Emilee Mayers MD 50 mL/hr at 11/28/22 0651 New Bag at 11/28/22 0996       Allergies:     Allergies   Allergen Reactions    Keflex [Cephalexin] Itching and Rash    Codeine Nausea And Vomiting    Tomato Rash       Problem List:    Patient Active Problem List   Diagnosis Code    Asthma J45.909    Gastroesophageal reflux disease K21.9    Hypertension I10    Adiposity E66.9    S/P total knee arthroplasty Z96.659    Primary osteoarthritis of left knee M17.12    Morbid obesity with BMI of 50.0-59.9, adult ([facility]) E66.01, Z68.43    Osteoarthritis of right hip M16.11       Past Medical History:        Diagnosis Date    Arthritis     Asthma     mild    [facility] [address]     Diabetes mellitus ([facility] [address]     borderline    High cholesterol     Hypertension     Imbalance     DUE TO TUMOR- USING ANKLE BRACE / WALKER PER PREOP H&P    Vertigo        Past Surgical History:        Procedure Laterality Date    TOTAL HIP ARTHROPLASTY Right 11/8/2021    RIGHT TOTAL HIP ARTHROPLASTY POSTERIOR APPROACH - JOSÉ ANTONIO ADVANCED performed by Justino Tanner MD at [address] [facility], [facility] Right 07/14/2015    TOTAL KNEE ARTHROPLASTY Left 10/14/15    TKA       Social History:    Social History     Tobacco Use    Smoking status: Former    Smokeless tobacco: Never   Substance Use Topics    Alcohol use: No                                Counseling given: Not Answered      Vital Signs (Current):   Vitals:    11/23/22 0833 11/28/22 0551 11/28/22 0606   BP:   (!) 171/76   Pulse:   60   Resp:   20   Temp:   97.9 °F (36.6 °C)   TempSrc:   Oral   SpO2:   96%   Weight: 299 lb (135.6 kg) (!) 305 lb 6.4 oz (138.5 kg)    Height: 5' 5\" (1.651 m) 5' 5\" (1.651 m)                                               BP Readings from Last 3 Encounters:   11/28/22 (!) 171/76   11/09/21 138/77   11/08/21 (!) 109/56       NPO Status: Time of last liquid consumption: 2100                        Time of last solid consumption: 1700                        Date of last liquid consumption: 11/27/22                        Date of last solid food consumption: 11/27/22    BMI:   Wt Readings from Last 3 Encounters:   11/28/22 (!) 305 lb 6.4 oz (138.5 kg)   11/08/21 285 lb (129.3 kg)   07/26/21 (!) 308 lb (139.7 kg)     Body mass index is 50.82 kg/m².     CBC:   Lab Results   Component Value Date/Time    WBC 7.0 11/09/2021 04:41 AM    RBC 4.40 11/09/2021 04:41 AM    HGB 12.7 11/09/2021 04:41 AM    HCT 38.7 11/09/2021 04:41 AM    MCV 88.0 11/09/2021 04:41 AM    RDW 13.8 11/09/2021 04:41 AM     11/09/2021 04:41 AM       CMP:   Lab Results   Component Value Date/Time     11/09/2021 04:41 AM    K 3.5 11/09/2021 04:41 AM     11/09/2021 04:41 AM    CO2 23 11/09/2021 04:41 AM BUN 14 11/09/2021 04:41 AM    CREATININE <0.5 11/09/2021 04:41 AM    GFRAA >60 11/09/2021 04:41 AM    AGRATIO 1.6 10/12/2021 12:08 PM    LABGLOM >60 11/09/2021 04:41 AM    GLUCOSE 240 11/09/2021 04:41 AM    PROT 7.3 10/12/2021 12:08 PM    CALCIUM 8.9 11/09/2021 04:41 AM    BILITOT 0.9 10/12/2021 12:08 PM    ALKPHOS 63 10/12/2021 12:08 PM    AST 23 10/12/2021 12:08 PM    ALT 28 10/12/2021 12:08 PM       POC Tests:   Recent Labs     11/28/22  0637   POCGLU 144*       Coags:   Lab Results   Component Value Date/Time    PROTIME 11.5 10/12/2021 12:08 PM    INR 1.02 10/12/2021 12:08 PM    APTT 32.8 10/12/2021 12:08 PM       HCG (If Applicable): No results found for: PREGTESTUR, PREGSERUM, HCG, HCGQUANT     ABGs: No results found for: PHART, PO2ART, SDO9CFN, GPF0RKL, BEART, C0DIPTRT     Type & Screen (If Applicable):  Lab Results   Component Value Date    LABABO O 10/07/2015    [address] Positive 10/07/2015       Drug/Infectious Status (If Applicable):  No results found for: HIV, HEPCAB    COVID-19 Screening (If Applicable):   Lab Results   Component Value Date/Time    COVID19 Not Detected 11/01/2021 03:02 PM           Anesthesia Evaluation  Patient summary reviewed and Nursing notes reviewed no history of anesthetic complications:   Airway: Mallampati: II  TM distance: >3 FB   Neck ROM: full  Mouth opening: > = 3 FB   Dental: normal exam         Pulmonary:normal exam    (+) asthma:                            Cardiovascular:    (+) hypertension:,                   Neuro/Psych:   Negative Neuro/Psych ROS              GI/Hepatic/Renal:   (+) GERD:, morbid obesity          Endo/Other:    (+) DiabetesType II DM, , .                 Abdominal:   (+) obese,           Vascular: negative vascular ROS. Other Findings:           Anesthesia Plan      general     ASA 3       Induction: intravenous. MIPS: Postoperative opioids intended and Prophylactic antiemetics administered.   Anesthetic plan and risks discussed with patient. Plan discussed with CRNA.     Attending anesthesiologist reviewed and agrees with Preprocedure content                Stefan Huff MD   11/28/2022

## 2022-11-29 ENCOUNTER — ANESTHESIA (OUTPATIENT)
Dept: OPERATING ROOM | Age: 67
DRG: 025 | End: 2022-11-29
Payer: MEDICARE

## 2022-11-29 LAB
ANION GAP SERPL CALCULATED.3IONS-SCNC: 11 MMOL/L (ref 3–16)
APTT: 24.6 SEC (ref 23–34.3)
BASE EXCESS ARTERIAL: -3 (ref -3–3)
BASE EXCESS ARTERIAL: -3 (ref -3–3)
BASOPHILS ABSOLUTE: 0 K/UL (ref 0–0.2)
BASOPHILS RELATIVE PERCENT: 0.1 %
BUN BLDV-MCNC: 13 MG/DL (ref 7–20)
CALCIUM IONIZED: 1.13 MMOL/L (ref 1.12–1.32)
CALCIUM IONIZED: 1.15 MMOL/L (ref 1.12–1.32)
CALCIUM SERPL-MCNC: 7.6 MG/DL (ref 8.3–10.6)
CHLORIDE BLD-SCNC: 110 MMOL/L (ref 99–110)
CO2: 22 MMOL/L (ref 21–32)
CREAT SERPL-MCNC: <0.5 MG/DL (ref 0.6–1.2)
EOSINOPHILS ABSOLUTE: 0 K/UL (ref 0–0.6)
EOSINOPHILS RELATIVE PERCENT: 0 %
GFR SERPL CREATININE-BSD FRML MDRD: >60 ML/MIN/{1.73_M2}
GLUCOSE BLD-MCNC: 148 MG/DL (ref 70–99)
GLUCOSE BLD-MCNC: 152 MG/DL (ref 70–99)
GLUCOSE BLD-MCNC: 170 MG/DL (ref 70–99)
GLUCOSE BLD-MCNC: 174 MG/DL (ref 70–99)
GLUCOSE BLD-MCNC: 184 MG/DL (ref 70–99)
GLUCOSE BLD-MCNC: 185 MG/DL (ref 70–99)
GLUCOSE BLD-MCNC: 185 MG/DL (ref 70–99)
GLUCOSE BLD-MCNC: 223 MG/DL (ref 70–99)
HCO3 ARTERIAL: 21.7 MMOL/L (ref 21–29)
HCO3 ARTERIAL: 21.9 MMOL/L (ref 21–29)
HCT VFR BLD CALC: 35.8 % (ref 36–48)
HEMOGLOBIN: 12.2 G/DL (ref 12–16)
INR BLD: 1.09 (ref 0.87–1.14)
LACTATE: 2.19 MMOL/L (ref 0.4–2)
LACTATE: 2.45 MMOL/L (ref 0.4–2)
LYMPHOCYTES ABSOLUTE: 0.6 K/UL (ref 1–5.1)
LYMPHOCYTES RELATIVE PERCENT: 6.1 %
MCH RBC QN AUTO: 30.7 PG (ref 26–34)
MCHC RBC AUTO-ENTMCNC: 34 G/DL (ref 31–36)
MCV RBC AUTO: 90.3 FL (ref 80–100)
MONOCYTES ABSOLUTE: 0.8 K/UL (ref 0–1.3)
MONOCYTES RELATIVE PERCENT: 8 %
NEUTROPHILS ABSOLUTE: 8.5 K/UL (ref 1.7–7.7)
NEUTROPHILS RELATIVE PERCENT: 85.8 %
O2 SAT, ARTERIAL: 100 % (ref 93–100)
O2 SAT, ARTERIAL: 98 % (ref 93–100)
PCO2 ARTERIAL: 34.4 MM HG (ref 35–45)
PCO2 ARTERIAL: 35.5 MM HG (ref 35–45)
PDW BLD-RTO: 14.7 % (ref 12.4–15.4)
PERFORMED ON: ABNORMAL
PH ARTERIAL: 7.39 (ref 7.35–7.45)
PH ARTERIAL: 7.41 (ref 7.35–7.45)
PLATELET # BLD: 161 K/UL (ref 135–450)
PMV BLD AUTO: 7.8 FL (ref 5–10.5)
PO2 ARTERIAL: 102.3 MM HG (ref 75–108)
PO2 ARTERIAL: 222.4 MM HG (ref 75–108)
POC POTASSIUM: 3.6 MMOL/L (ref 3.5–5.1)
POC POTASSIUM: 3.8 MMOL/L (ref 3.5–5.1)
POC SAMPLE TYPE: ABNORMAL
POC SAMPLE TYPE: ABNORMAL
POC SODIUM: 142 MMOL/L (ref 136–145)
POC SODIUM: 142 MMOL/L (ref 136–145)
POTASSIUM REFLEX MAGNESIUM: 3.7 MMOL/L (ref 3.5–5.1)
PROTHROMBIN TIME: 14.1 SEC (ref 11.7–14.5)
RBC # BLD: 3.96 M/UL (ref 4–5.2)
SODIUM BLD-SCNC: 143 MMOL/L (ref 136–145)
TCO2 ARTERIAL: 23 MMOL/L
TCO2 ARTERIAL: 23 MMOL/L
WBC # BLD: 9.9 K/UL (ref 4–11)

## 2022-11-29 PROCEDURE — 2580000003 HC RX 258: Performed by: NEUROLOGICAL SURGERY

## 2022-11-29 PROCEDURE — 3600000014 HC SURGERY LEVEL 4 ADDTL 15MIN: Performed by: NEUROLOGICAL SURGERY

## 2022-11-29 PROCEDURE — 2500000003 HC RX 250 WO HCPCS: Performed by: STUDENT IN AN ORGANIZED HEALTH CARE EDUCATION/TRAINING PROGRAM

## 2022-11-29 PROCEDURE — 84132 ASSAY OF SERUM POTASSIUM: CPT

## 2022-11-29 PROCEDURE — 6360000002 HC RX W HCPCS: Performed by: NEUROLOGICAL SURGERY

## 2022-11-29 PROCEDURE — 6370000000 HC RX 637 (ALT 250 FOR IP): Performed by: NURSE ANESTHETIST, CERTIFIED REGISTERED

## 2022-11-29 PROCEDURE — 94761 N-INVAS EAR/PLS OXIMETRY MLT: CPT

## 2022-11-29 PROCEDURE — 85610 PROTHROMBIN TIME: CPT

## 2022-11-29 PROCEDURE — 2709999900 HC NON-CHARGEABLE SUPPLY: Performed by: NEUROLOGICAL SURGERY

## 2022-11-29 PROCEDURE — 88331 PATH CONSLTJ SURG 1 BLK 1SPC: CPT

## 2022-11-29 PROCEDURE — 2500000003 HC RX 250 WO HCPCS: Performed by: NURSE ANESTHETIST, CERTIFIED REGISTERED

## 2022-11-29 PROCEDURE — 2580000003 HC RX 258: Performed by: STUDENT IN AN ORGANIZED HEALTH CARE EDUCATION/TRAINING PROGRAM

## 2022-11-29 PROCEDURE — 2720000010 HC SURG SUPPLY STERILE: Performed by: NEUROLOGICAL SURGERY

## 2022-11-29 PROCEDURE — 85025 COMPLETE CBC W/AUTO DIFF WBC: CPT

## 2022-11-29 PROCEDURE — 2000000000 HC ICU R&B

## 2022-11-29 PROCEDURE — 84295 ASSAY OF SERUM SODIUM: CPT

## 2022-11-29 PROCEDURE — 6360000002 HC RX W HCPCS: Performed by: NURSE ANESTHETIST, CERTIFIED REGISTERED

## 2022-11-29 PROCEDURE — 6360000002 HC RX W HCPCS: Performed by: FAMILY MEDICINE

## 2022-11-29 PROCEDURE — 85730 THROMBOPLASTIN TIME PARTIAL: CPT

## 2022-11-29 PROCEDURE — 80048 BASIC METABOLIC PNL TOTAL CA: CPT

## 2022-11-29 PROCEDURE — 6370000000 HC RX 637 (ALT 250 FOR IP): Performed by: OTOLARYNGOLOGY

## 2022-11-29 PROCEDURE — 36415 COLL VENOUS BLD VENIPUNCTURE: CPT

## 2022-11-29 PROCEDURE — 6360000002 HC RX W HCPCS: Performed by: STUDENT IN AN ORGANIZED HEALTH CARE EDUCATION/TRAINING PROGRAM

## 2022-11-29 PROCEDURE — 82330 ASSAY OF CALCIUM: CPT

## 2022-11-29 PROCEDURE — 3700000001 HC ADD 15 MINUTES (ANESTHESIA): Performed by: NEUROLOGICAL SURGERY

## 2022-11-29 PROCEDURE — 2580000003 HC RX 258: Performed by: NURSE ANESTHETIST, CERTIFIED REGISTERED

## 2022-11-29 PROCEDURE — 82947 ASSAY GLUCOSE BLOOD QUANT: CPT

## 2022-11-29 PROCEDURE — 2500000003 HC RX 250 WO HCPCS: Performed by: OTOLARYNGOLOGY

## 2022-11-29 PROCEDURE — 2700000000 HC OXYGEN THERAPY PER DAY

## 2022-11-29 PROCEDURE — 3700000000 HC ANESTHESIA ATTENDED CARE: Performed by: NEUROLOGICAL SURGERY

## 2022-11-29 PROCEDURE — 6370000000 HC RX 637 (ALT 250 FOR IP): Performed by: NEUROLOGICAL SURGERY

## 2022-11-29 PROCEDURE — C1713 ANCHOR/SCREW BN/BN,TIS/BN: HCPCS | Performed by: NEUROLOGICAL SURGERY

## 2022-11-29 PROCEDURE — C1762 CONN TISS, HUMAN(INC FASCIA): HCPCS | Performed by: NEUROLOGICAL SURGERY

## 2022-11-29 PROCEDURE — 3600000004 HC SURGERY LEVEL 4 BASE: Performed by: NEUROLOGICAL SURGERY

## 2022-11-29 PROCEDURE — 82803 BLOOD GASES ANY COMBINATION: CPT

## 2022-11-29 PROCEDURE — 83605 ASSAY OF LACTIC ACID: CPT

## 2022-11-29 DEVICE — GRAFT SFT TISS W5XL19.2CM SPNL HUM FRZN FASC LATA: Type: IMPLANTABLE DEVICE | Status: FUNCTIONAL

## 2022-11-29 RX ORDER — FAMOTIDINE 10 MG/ML
INJECTION, SOLUTION INTRAVENOUS PRN
Status: DISCONTINUED | OUTPATIENT
Start: 2022-11-29 | End: 2022-11-29 | Stop reason: SDUPTHER

## 2022-11-29 RX ORDER — VANCOMYCIN HYDROCHLORIDE 1 G/20ML
INJECTION, POWDER, LYOPHILIZED, FOR SOLUTION INTRAVENOUS PRN
Status: DISCONTINUED | OUTPATIENT
Start: 2022-11-29 | End: 2022-11-29 | Stop reason: ALTCHOICE

## 2022-11-29 RX ORDER — SODIUM CHLORIDE, SODIUM LACTATE, POTASSIUM CHLORIDE, CALCIUM CHLORIDE 600; 310; 30; 20 MG/100ML; MG/100ML; MG/100ML; MG/100ML
INJECTION, SOLUTION INTRAVENOUS CONTINUOUS PRN
Status: DISCONTINUED | OUTPATIENT
Start: 2022-11-29 | End: 2022-11-29 | Stop reason: SDUPTHER

## 2022-11-29 RX ORDER — LIDOCAINE HYDROCHLORIDE 10 MG/ML
INJECTION, SOLUTION EPIDURAL; INFILTRATION; INTRACAUDAL; PERINEURAL PRN
Status: DISCONTINUED | OUTPATIENT
Start: 2022-11-29 | End: 2022-11-29 | Stop reason: ALTCHOICE

## 2022-11-29 RX ORDER — LEVETIRACETAM 500 MG/5ML
INJECTION, SOLUTION, CONCENTRATE INTRAVENOUS PRN
Status: DISCONTINUED | OUTPATIENT
Start: 2022-11-29 | End: 2022-11-29 | Stop reason: SDUPTHER

## 2022-11-29 RX ORDER — DEXAMETHASONE SODIUM PHOSPHATE 4 MG/ML
INJECTION, SOLUTION INTRA-ARTICULAR; INTRALESIONAL; INTRAMUSCULAR; INTRAVENOUS; SOFT TISSUE PRN
Status: DISCONTINUED | OUTPATIENT
Start: 2022-11-29 | End: 2022-11-29 | Stop reason: SDUPTHER

## 2022-11-29 RX ORDER — SODIUM CHLORIDE, SODIUM LACTATE, POTASSIUM CHLORIDE, AND CALCIUM CHLORIDE .6; .31; .03; .02 G/100ML; G/100ML; G/100ML; G/100ML
IRRIGANT IRRIGATION PRN
Status: DISCONTINUED | OUTPATIENT
Start: 2022-11-29 | End: 2022-11-29 | Stop reason: ALTCHOICE

## 2022-11-29 RX ORDER — FIBRINOGEN HUMAN AND THROMBIN HUMAN 90; 500 [IU]/ML; [IU]/ML
SOLUTION TOPICAL PRN
Status: DISCONTINUED | OUTPATIENT
Start: 2022-11-29 | End: 2022-11-29 | Stop reason: ALTCHOICE

## 2022-11-29 RX ORDER — MANNITOL 250 MG/ML
INJECTION, SOLUTION INTRAVENOUS PRN
Status: DISCONTINUED | OUTPATIENT
Start: 2022-11-29 | End: 2022-11-29 | Stop reason: SDUPTHER

## 2022-11-29 RX ORDER — SUCCINYLCHOLINE/SOD CL,ISO/PF 100 MG/5ML
SYRINGE (ML) INTRAVENOUS PRN
Status: DISCONTINUED | OUTPATIENT
Start: 2022-11-29 | End: 2022-11-29 | Stop reason: SDUPTHER

## 2022-11-29 RX ORDER — GLYCOPYRROLATE 1 MG/5 ML
SYRINGE (ML) INTRAVENOUS PRN
Status: DISCONTINUED | OUTPATIENT
Start: 2022-11-29 | End: 2022-11-29 | Stop reason: SDUPTHER

## 2022-11-29 RX ORDER — SODIUM CHLORIDE 9 MG/ML
INJECTION, SOLUTION INTRAVENOUS CONTINUOUS PRN
Status: DISCONTINUED | OUTPATIENT
Start: 2022-11-29 | End: 2022-11-29 | Stop reason: SDUPTHER

## 2022-11-29 RX ORDER — ONDANSETRON 2 MG/ML
INJECTION INTRAMUSCULAR; INTRAVENOUS PRN
Status: DISCONTINUED | OUTPATIENT
Start: 2022-11-29 | End: 2022-11-29 | Stop reason: SDUPTHER

## 2022-11-29 RX ORDER — FENTANYL CITRATE 50 UG/ML
INJECTION, SOLUTION INTRAMUSCULAR; INTRAVENOUS PRN
Status: DISCONTINUED | OUTPATIENT
Start: 2022-11-29 | End: 2022-11-29 | Stop reason: SDUPTHER

## 2022-11-29 RX ORDER — REMIFENTANIL HYDROCHLORIDE 1 MG/ML
INJECTION, POWDER, LYOPHILIZED, FOR SOLUTION INTRAVENOUS CONTINUOUS PRN
Status: DISCONTINUED | OUTPATIENT
Start: 2022-11-29 | End: 2022-11-29 | Stop reason: SDUPTHER

## 2022-11-29 RX ORDER — LIDOCAINE HYDROCHLORIDE 20 MG/ML
INJECTION, SOLUTION INFILTRATION; PERINEURAL PRN
Status: DISCONTINUED | OUTPATIENT
Start: 2022-11-29 | End: 2022-11-29 | Stop reason: SDUPTHER

## 2022-11-29 RX ORDER — CLINDAMYCIN PHOSPHATE 900 MG/50ML
INJECTION INTRAVENOUS PRN
Status: DISCONTINUED | OUTPATIENT
Start: 2022-11-29 | End: 2022-11-29 | Stop reason: SDUPTHER

## 2022-11-29 RX ORDER — PROPOFOL 10 MG/ML
INJECTION, EMULSION INTRAVENOUS PRN
Status: DISCONTINUED | OUTPATIENT
Start: 2022-11-29 | End: 2022-11-29 | Stop reason: SDUPTHER

## 2022-11-29 RX ORDER — HYDROMORPHONE HCL 110MG/55ML
PATIENT CONTROLLED ANALGESIA SYRINGE INTRAVENOUS PRN
Status: DISCONTINUED | OUTPATIENT
Start: 2022-11-29 | End: 2022-11-29 | Stop reason: SDUPTHER

## 2022-11-29 RX ORDER — PROPOFOL 10 MG/ML
INJECTION, EMULSION INTRAVENOUS CONTINUOUS PRN
Status: DISCONTINUED | OUTPATIENT
Start: 2022-11-29 | End: 2022-11-29 | Stop reason: SDUPTHER

## 2022-11-29 RX ADMIN — HYDROMORPHONE HYDROCHLORIDE 0.2 MG: 2 INJECTION, SOLUTION INTRAMUSCULAR; INTRAVENOUS; SUBCUTANEOUS at 19:38

## 2022-11-29 RX ADMIN — HYDROMORPHONE HYDROCHLORIDE 0.4 MG: 2 INJECTION, SOLUTION INTRAMUSCULAR; INTRAVENOUS; SUBCUTANEOUS at 19:52

## 2022-11-29 RX ADMIN — SODIUM CHLORIDE, SODIUM LACTATE, POTASSIUM CHLORIDE, AND CALCIUM CHLORIDE: .6; .31; .03; .02 INJECTION, SOLUTION INTRAVENOUS at 10:48

## 2022-11-29 RX ADMIN — LIDOCAINE HYDROCHLORIDE 100 MG: 20 INJECTION, SOLUTION INFILTRATION; PERINEURAL at 07:46

## 2022-11-29 RX ADMIN — HYDROMORPHONE HYDROCHLORIDE 1 MG: 2 INJECTION, SOLUTION INTRAMUSCULAR; INTRAVENOUS; SUBCUTANEOUS at 19:16

## 2022-11-29 RX ADMIN — LABETALOL HYDROCHLORIDE 10 MG: 5 INJECTION, SOLUTION INTRAVENOUS at 21:10

## 2022-11-29 RX ADMIN — SODIUM CHLORIDE 15 MG/HR: 9 INJECTION, SOLUTION INTRAVENOUS at 22:32

## 2022-11-29 RX ADMIN — CLINDAMYCIN PHOSPHATE 900 MG: 900 INJECTION, SOLUTION INTRAVENOUS at 08:30

## 2022-11-29 RX ADMIN — INSULIN HUMAN 6 UNITS: 100 INJECTION, SOLUTION PARENTERAL at 12:16

## 2022-11-29 RX ADMIN — Medication 0.2 MG: at 07:44

## 2022-11-29 RX ADMIN — LEVETIRACETAM 1000 MG: 100 INJECTION, SOLUTION INTRAVENOUS at 08:30

## 2022-11-29 RX ADMIN — HYDROMORPHONE HYDROCHLORIDE 0.4 MG: 2 INJECTION, SOLUTION INTRAMUSCULAR; INTRAVENOUS; SUBCUTANEOUS at 20:09

## 2022-11-29 RX ADMIN — Medication 0.2 MG: at 10:49

## 2022-11-29 RX ADMIN — SODIUM CHLORIDE 5 MG/HR: 9 INJECTION, SOLUTION INTRAVENOUS at 00:00

## 2022-11-29 RX ADMIN — ONDANSETRON 4 MG: 2 INJECTION INTRAMUSCULAR; INTRAVENOUS at 22:17

## 2022-11-29 RX ADMIN — SODIUM CHLORIDE: 9 INJECTION, SOLUTION INTRAVENOUS at 13:45

## 2022-11-29 RX ADMIN — Medication 0.2 MG: at 15:33

## 2022-11-29 RX ADMIN — MANNITOL 36 G: 12.5 INJECTION, SOLUTION INTRAVENOUS at 08:49

## 2022-11-29 RX ADMIN — LEVETIRACETAM 1000 MG: 500 INJECTION, SOLUTION, CONCENTRATE INTRAVENOUS at 18:58

## 2022-11-29 RX ADMIN — REMIFENTANIL HYDROCHLORIDE 0.2 MCG/KG/MIN: 1 INJECTION, POWDER, LYOPHILIZED, FOR SOLUTION INTRAVENOUS at 07:50

## 2022-11-29 RX ADMIN — FENTANYL CITRATE 300 MCG: 50 INJECTION, SOLUTION INTRAMUSCULAR; INTRAVENOUS at 07:45

## 2022-11-29 RX ADMIN — SODIUM CHLORIDE: 9 INJECTION, SOLUTION INTRAVENOUS at 08:33

## 2022-11-29 RX ADMIN — PROPOFOL 80 MG: 10 INJECTION, EMULSION INTRAVENOUS at 12:00

## 2022-11-29 RX ADMIN — SODIUM CHLORIDE, SODIUM LACTATE, POTASSIUM CHLORIDE, AND CALCIUM CHLORIDE: .6; .31; .03; .02 INJECTION, SOLUTION INTRAVENOUS at 08:00

## 2022-11-29 RX ADMIN — DEXAMETHASONE SODIUM PHOSPHATE 10 MG: 4 INJECTION, SOLUTION INTRAMUSCULAR; INTRAVENOUS at 07:51

## 2022-11-29 RX ADMIN — INSULIN HUMAN 6 UNITS: 100 INJECTION, SOLUTION PARENTERAL at 13:15

## 2022-11-29 RX ADMIN — HYDRALAZINE HYDROCHLORIDE 10 MG: 20 INJECTION INTRAMUSCULAR; INTRAVENOUS at 21:07

## 2022-11-29 RX ADMIN — Medication 0.2 MG: at 13:17

## 2022-11-29 RX ADMIN — Medication 0.2 MG: at 08:16

## 2022-11-29 RX ADMIN — SODIUM CHLORIDE 5 MG/HR: 9 INJECTION, SOLUTION INTRAVENOUS at 21:10

## 2022-11-29 RX ADMIN — FAMOTIDINE 20 MG: 10 INJECTION, SOLUTION INTRAVENOUS at 07:39

## 2022-11-29 RX ADMIN — LATANOPROST 1 DROP: 50 SOLUTION OPHTHALMIC at 21:34

## 2022-11-29 RX ADMIN — ONDANSETRON 8 MG: 2 INJECTION INTRAMUSCULAR; INTRAVENOUS at 07:45

## 2022-11-29 RX ADMIN — MORPHINE SULFATE 2 MG: 2 INJECTION, SOLUTION INTRAMUSCULAR; INTRAVENOUS at 23:48

## 2022-11-29 RX ADMIN — PROPOFOL 100 MCG/KG/MIN: 10 INJECTION, EMULSION INTRAVENOUS at 08:26

## 2022-11-29 RX ADMIN — SODIUM CHLORIDE, PRESERVATIVE FREE 10 ML: 5 INJECTION INTRAVENOUS at 21:33

## 2022-11-29 RX ADMIN — SODIUM CHLORIDE, SODIUM LACTATE, POTASSIUM CHLORIDE, AND CALCIUM CHLORIDE: .6; .31; .03; .02 INJECTION, SOLUTION INTRAVENOUS at 14:55

## 2022-11-29 RX ADMIN — INSULIN HUMAN 7 UNITS: 100 INJECTION, SOLUTION PARENTERAL at 18:14

## 2022-11-29 RX ADMIN — CLINDAMYCIN PHOSPHATE 900 MG: 900 INJECTION, SOLUTION INTRAVENOUS at 16:19

## 2022-11-29 RX ADMIN — PROPOFOL 120 MCG/KG/MIN: 10 INJECTION, EMULSION INTRAVENOUS at 07:49

## 2022-11-29 RX ADMIN — PROPOFOL 200 MG: 10 INJECTION, EMULSION INTRAVENOUS at 07:47

## 2022-11-29 RX ADMIN — Medication 0.2 MG: at 17:11

## 2022-11-29 RX ADMIN — Medication 100 MG: at 07:47

## 2022-11-29 RX ADMIN — PHENYLEPHRINE HYDROCHLORIDE 25 MCG/MIN: 10 INJECTION, SOLUTION INTRAMUSCULAR; INTRAVENOUS; SUBCUTANEOUS at 08:12

## 2022-11-29 RX ADMIN — SODIUM CHLORIDE: 9 INJECTION, SOLUTION INTRAVENOUS at 15:39

## 2022-11-29 RX ADMIN — INSULIN HUMAN 6 UNITS: 100 INJECTION, SOLUTION PARENTERAL at 14:34

## 2022-11-29 ASSESSMENT — PAIN SCALES - GENERAL: PAINLEVEL_OUTOF10: 0

## 2022-11-29 NOTE — ANESTHESIA PRE PROCEDURE
Department of Anesthesiology  Preprocedure Note       Name:  Faye Piper   Age:  79 y.o.  :  1955                                          MRN:  [id number]         Date:  2022      Surgeon: Carlos Angel):  Tito Tejada. MD Mark Tom MD    Procedure: Procedure(s):  STAGE II: RESECTION OF RIGHT PETROCLIVAL MENINGIOMA    Medications prior to admission:   Prior to Admission medications    Medication Sig Start Date End Date Taking?  Authorizing Provider   predniSONE (DELTASONE) 20 MG tablet Take 20 mg by mouth daily Cpmpleted therapy yesterday after 3 day course   Yes Historical Provider, MD   losartan (COZAAR) 50 MG tablet Take by mouth daily 21  Yes Historical Provider, MD   albuterol sulfate HFA (PROVENTIL;VENTOLIN;PROAIR) 108 (90 Base) MCG/ACT inhaler Inhale 2 puffs into the lungs every 4 hours as needed 10/18/21  Yes Historical Provider, MD   meloxicam (MOBIC) 15 MG tablet TAKE 1 TABLET BY MOUTH ONCE DAILY 22   Historical Provider, MD   methIMAzole (TAPAZOLE) 5 MG tablet TAKE 2 TABLETS BY MOUTH ONCE DAILY 10/25/22   Historical Provider, MD   verapamil (CALAN) 120 MG tablet Take by mouth 3 times daily  Patient not taking: Reported on 2022    Historical Provider, MD   omeprazole (PRILOSEC) 20 MG delayed release capsule Take by mouth every morning (before breakfast)  Patient not taking: Reported on 2022    Historical Provider, MD   metFORMIN (GLUCOPHAGE-XR) 500 MG extended release tablet TAKE 2 TABLETS BY MOUTH AFTER EVENING MEAL 22   Historical Provider, MD   Cholecalciferol (VITAMIN D) 50 MCG (2000 UT) CAPS capsule Take 2,000 Units by mouth daily 21  Winnie Cotton MD   NONFORMULARY cbd prn    Historical Provider, MD   latanoprost (XALATAN) 0.005 % ophthalmic solution INSTILL 1 DROP INTO EACH EYE AT BEDTIME 21   Historical Provider, MD   carvedilol (COREG) 6.25 MG tablet Take 6.25 mg by mouth 2 times daily (with meals)    Historical Provider, MD   pravastatin (PRAVACHOL) 20 MG tablet Take 20 mg by mouth nightly     Historical Provider, MD   hydrochlorothiazide (HYDRODIURIL) 25 MG tablet Take 25 mg by mouth daily    Historical Provider, MD       Current medications:    Current Facility-Administered Medications   Medication Dose Route Frequency Provider Last Rate Last Admin    sodium chloride flush 0.9 % injection 5-40 mL  5-40 mL IntraVENous 2 times per day Denzel Hess MD   10 mL at 11/28/22 2039    sodium chloride flush 0.9 % injection 5-40 mL  5-40 mL IntraVENous PRN Denzel Melgar MD        0.9 % sodium chloride infusion   IntraVENous PRN Denzel Melgar MD        0.9 % sodium chloride infusion   IntraVENous Continuous Denzel Hess  mL/hr at 11/28/22 2030 New Bag at 11/28/22 2030    acetaminophen (TYLENOL) tablet 650 mg  650 mg Oral Q4H PRN Denzel Melgar MD        oxyCODONE (ROXICODONE) immediate release tablet 5 mg  5 mg Oral Q4H PRN Denzel Melgar MD        Or    oxyCODONE (ROXICODONE) immediate release tablet 10 mg  10 mg Oral Q4H PRN Denzel Melgar MD        morphine (PF) injection 2 mg  2 mg IntraVENous Q3H PRN Denzel Hess MD        [Held by provider] sennosides-docusate sodium (SENOKOT-S) 8.6-50 MG tablet 1 tablet  1 tablet Oral BID Denzel Melgar MD        ondansetron (ZOFRAN-ODT) disintegrating tablet 4 mg  4 mg Oral Q8H PRN Denzel Melgar MD        Or    ondansetron (ZOFRAN) injection 4 mg  4 mg IntraVENous Q6H PRN Denzel Hess MD   4 mg at 11/28/22 2035    hydrALAZINE (APRESOLINE) injection 10 mg  10 mg IntraVENous Q1H PRN Jane Carl MD   10 mg at 11/28/22 2024    labetalol (NORMODYNE;TRANDATE) injection 10 mg  10 mg IntraVENous Q1H PRN Jane Carl MD   10 mg at 11/28/22 2036    niCARdipine (CARDENE) 25 mg in sodium chloride 0.9 % 250 mL infusion  2.5-15 mg/hr IntraVENous Continuous PRN Jane Carl MD 50 mL/hr at 11/29/22 0000 5 mg/hr at 11/29/22 0000    levETIRAcetam (KEPPRA) 500 mg in sodium chloride 0.9 % 100 mL IVPB  500 mg IntraVENous Q12H Denzel Anderson MD   Stopped at 11/28/22 2259    albuterol (PROVENTIL) nebulizer solution 2.5 mg  2.5 mg Nebulization Q4H PRN Casey Kent MD        [Held by provider] losartan (COZAAR) tablet 50 mg  50 mg Oral Nightly Cristopher Cam MD        latanoprost (XALATAN) 0.005 % ophthalmic solution 1 drop  1 drop Both Eyes Nightly Cristopher Cam MD   1 drop at 11/28/22 2137    insulin lispro (1 Unit Dial) (HUMALOG/ADMELOG) pen 0-4 Units  0-4 Units SubCUTAneous TID WC Cristopher Cam MD        insulin lispro (1 Unit Dial) (HUMALOG/ADMELOG) pen 0-4 Units  0-4 Units SubCUTAneous Nightly Casey Kent MD        glucose chewable tablet 16 g  4 tablet Oral PRN Cristopher Cam MD        dextrose bolus 10% 125 mL  125 mL IntraVENous PRN Cristopher Cam MD        Or    dextrose bolus 10% 250 mL  250 mL IntraVENous PRN Cristopher Cam MD        glucagon (rDNA) injection 1 mg  1 mg SubCUTAneous PRN Casey Kent MD        dextrose 10 % infusion   IntraVENous Continuous PRN Casey Kent MD        [Held by provider] methIMAzole (TAPAZOLE) tablet 10 mg  10 mg Oral Daily Cristopher Cam MD        promethazine [facility]) injection 6.25 mg  6.25 mg IntraMUSCular Q6H PRN Cristopher Cam MD   6.25 mg at 11/28/22 2136       Allergies:     Allergies   Allergen Reactions    Keflex [Cephalexin] Itching and Rash    Codeine Nausea And Vomiting    Tomato Rash       Problem List:    Patient Active Problem List   Diagnosis Code    Asthma J45.909    Gastroesophageal reflux disease K21.9    Hypertension I10    Adiposity E66.9    S/P total knee arthroplasty Z96.659    Primary osteoarthritis of left knee M17.12    Morbid obesity with BMI of 50.0-59.9, adult ([facility]) E66.01, Z68.43    Osteoarthritis of right hip M16.11    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       Past Medical History:        Diagnosis Date    Arthritis     Asthma     mild    Brain tumor ([address]     Diabetes mellitus (HCC)     borderline    High cholesterol     Hypertension     Imbalance     DUE TO TUMOR- USING ANKLE BRACE / WALKER PER PREOP H&P    Loss of hearing     bilateral reported    Vertigo        Past Surgical History:        Procedure Laterality Date    MASTOIDECTOMY Right 11/28/2022    RIGHT TRANSMASTOID / ANTERIOR MIDDLE CRANIAL FOSSA , ABDOMINAL FAT GRAFT EXTERNAL AUDITORY CANAL CLOSURE performed by Ishmael Flannery MD at [address] Right 11/28/2022    STAGE 1, RIGHT TRANSLABYRINTHINE / RETROLABYRINTHINE CRANIOTOMY, PETROSECTOMY performed by Jayant Nascimento. Sanna Cardoso MD at [address] Right 11/8/2021    RIGHT TOTAL HIP ARTHROPLASTY POSTERIOR APPROACH - Dank Meeks ADVANCED performed by Paxton Bunn MD at [facility] Right 07/14/2015    TOTAL KNEE ARTHROPLASTY Left 10/14/15    TKA       Social History:    Social History     Tobacco Use    Smoking status: Former    Smokeless tobacco: Never   Substance Use Topics    Alcohol use:  No                                Counseling given: Not Answered      Vital Signs (Current):   Vitals:    11/29/22 0300 11/29/22 0400 11/29/22 0500 11/29/22 0600   BP: (!) 126/59 121/61 (!) 128/56 127/62   Pulse: 77 77 76 73   Resp: 15 15 16 16   Temp:  98.2 °F (36.8 °C)     TempSrc:       SpO2: 95% 95% 93% 94%   Weight:    (!) 301 lb 5.9 oz (136.7 kg)   Height:                                                  BP Readings from Last 3 Encounters:   11/29/22 127/62   11/09/21 138/77   11/08/21 (!) 109/56       NPO Status: Time of last liquid consumption: 2100                        Time of last solid consumption: 1700                        Date of last liquid consumption: 11/27/22                        Date of last solid food consumption: 11/27/22    BMI:   Wt Readings from Last 3 Encounters:   11/29/22 (!) 301 lb 5.9 oz (136.7 kg)   11/08/21 285 lb (129.3 kg)   07/26/21 (!) 308 lb (139.7 kg)     Body mass index is 50.15 kg/m².     CBC:   Lab Results   Component Value Date/Time    WBC 9.9 11/29/2022 04:19 AM    RBC 3.96 11/29/2022 04:19 AM    HGB 12.2 11/29/2022 04:19 AM    HCT 35.8 11/29/2022 04:19 AM    MCV 90.3 11/29/2022 04:19 AM    RDW 14.7 11/29/2022 04:19 AM     11/29/2022 04:19 AM       CMP:   Lab Results   Component Value Date/Time     11/29/2022 04:19 AM    K 3.7 11/29/2022 04:19 AM     11/29/2022 04:19 AM    CO2 22 11/29/2022 04:19 AM    BUN 13 11/29/2022 04:19 AM    CREATININE <0.5 11/29/2022 04:19 AM    GFRAA >60 11/09/2021 04:41 AM    AGRATIO 1.6 10/12/2021 12:08 PM    LABGLOM >60 11/29/2022 04:19 AM    GLUCOSE 185 11/29/2022 04:19 AM    PROT 7.3 10/12/2021 12:08 PM    CALCIUM 7.6 11/29/2022 04:19 AM    BILITOT 0.9 10/12/2021 12:08 PM    ALKPHOS 63 10/12/2021 12:08 PM    AST 23 10/12/2021 12:08 PM    ALT 28 10/12/2021 12:08 PM       POC Tests:   Recent Labs     11/28/22  1246 11/28/22  1751   POCGLU 237* 185*   POCNA 140  --    POCK 3.9  --        Coags:   Lab Results   Component Value Date/Time    PROTIME 14.1 11/29/2022 04:19 AM    INR 1.09 11/29/2022 04:19 AM    APTT 24.6 11/29/2022 04:19 AM       HCG (If Applicable): No results found for: PREGTESTUR, PREGSERUM, HCG, HCGQUANT     ABGs:   Lab Results   Component Value Date/Time    PHART 7.417 11/28/2022 12:46 PM    PO2ART 123.2 11/28/2022 12:46 PM    LJD1LNS 32.9 11/28/2022 12:46 PM    YVT3AZH 21.2 11/28/2022 12:46 PM    BEART -3 11/28/2022 12:46 PM    F4QOOFIP 99 11/28/2022 12:46 PM        Type & Screen (If Applicable):  Lab Results   Component Value Date    LABABO O 10/07/2015    LABRH Positive 10/07/2015       Drug/Infectious Status (If Applicable):  No results found for: HIV, HEPCAB    COVID-19 Screening (If Applicable):   Lab Results Component Value Date/Time    COVID19 Not Detected 11/01/2021 03:02 PM           Anesthesia Evaluation  Patient summary reviewed and Nursing notes reviewed no history of anesthetic complications:   Airway: Mallampati: II  TM distance: >3 FB   Neck ROM: full  Mouth opening: > = 3 FB   Dental:          Pulmonary:   (+) asthma:                            Cardiovascular:    (+) hypertension:,                   Neuro/Psych:   Negative Neuro/Psych ROS              GI/Hepatic/Renal:   (+) GERD:, morbid obesity          Endo/Other:    (+) DiabetesType II DM, , .                 Abdominal:             Vascular: negative vascular ROS. Other Findings:           Anesthesia Plan      general and TIVA     ASA 1    (79-year-old female presents for STAGE II: RESECTION OF RIGHT PETROCLIVAL MENINGIOMA. Plan general anesthesia via TIVA with ASA standard monitors. Questions answered. Patient agreeable with anesthetic plan.  )  Induction: intravenous. Anesthetic plan and risks discussed with patient. Plan discussed with CRNA.     Attending anesthesiologist reviewed and agrees with Betsy Frank MD   11/29/2022

## 2022-11-29 NOTE — CONSULTS
ICU Consult Note    Admit Date: 11/28/2022  Day: 1  Vent Day: None  IV Access:Peripheral  IV Fluids:None  Vasopressors:None                Antibiotics: None  Diet: ADULT DIET; Regular  Diet NPO    CC: Stage 1 and 2 Cerebellopontine Angle Meningioma Removal    Interval history:   - Pt had stage 1 of 2 procedures for the removal of a cerebellopontine angle meningioma. - Pt is being admitted to the ICU for monitoring for neurological changes and will be assessed every hour  - Pt is on Keppra 500 BID, is NPO @ midnight, and is having q1h neuro and q4h neurovascular checks    HPI:   Abraham Sacks is a 80 yo F w/ pmhx of HLD, asthma, T2DM, GERD, HTN, and cerebellopontine angle meningioma. Pts main symptoms started in June with feelings of vertigo and falls. She stated that she had 2 episodes a week with true vertigo and had fallen out of bed even with frequent spells of dizziness. She also felt as if her hearing has been impaired. In July of 2022, pt MRI showed large right cerebellopontine angle mass and a smaller left anterior parafalcine extra-axial lesion. Pt was admitted today for a two staged petroclival meningioma resection at the [facility], [facility] on 11/28 and 11/29. She had stage one of the meningioma resection this morning. Pt is being admitted to the ICU for monitoring of any change in her neurological level or neurovascular status. She is on Keppra 500 BID and will be NPO at midnight for the second part of the procedure tomorrow, 11/29.      Medications:     Scheduled Meds:   sodium chloride flush  5-40 mL IntraVENous 2 times per day    sennosides-docusate sodium  1 tablet Oral BID    levETIRAcetam  500 mg IntraVENous Q12H     Continuous Infusions:   sodium chloride      sodium chloride 125 mL/hr at 11/28/22 2030    niCARdipine 5 mg/hr (11/28/22 2049)     PRN Meds:sodium chloride flush, sodium chloride, acetaminophen, oxyCODONE **OR** oxyCODONE, morphine, ondansetron **OR** ondansetron, hydrALAZINE, labetalol, niCARdipine    Objective:   Vitals:   T-max:  Patient Vitals for the past 8 hrs:   BP Temp Temp src Pulse Resp SpO2   11/28/22 1945 (!) 160/77 97.9 °F (36.6 °C) Temporal 71 14 95 %   11/28/22 1930 (!) 174/82 -- -- 67 14 95 %   11/28/22 1915 (!) 168/82 -- -- 65 14 95 %   11/28/22 1900 (!) 164/80 -- -- 62 13 94 %   11/28/22 1845 (!) 161/76 -- -- 66 13 93 %   11/28/22 1830 (!) 163/76 -- -- 66 14 94 %   11/28/22 1816 -- -- -- 70 14 90 %   11/28/22 1815 (!) 166/74 -- -- 74 14 (!) 88 %   11/28/22 1800 (!) 159/75 -- -- 69 15 99 %   11/28/22 1755 (!) 155/79 -- -- 71 15 99 %   11/28/22 1750 (!) 162/75 -- -- 71 14 99 %   11/28/22 1745 (!) 149/78 -- -- 72 15 99 %   11/28/22 1743 (!) 152/81 97.2 °F (36.2 °C) Temporal 70 14 98 %       Intake/Output Summary (Last 24 hours) at 11/28/2022 2052  Last data filed at 11/28/2022 1952  Gross per 24 hour   Intake 2656.63 ml   Output 1880 ml   Net 776.63 ml       Review of Systems   Unable to perform ROS: Other (Pt was unable to give a adequate ROS due to her post op nausea)     Physical Exam  Vitals and nursing note reviewed. Constitutional:       General: She is in acute distress. Appearance: She is ill-appearing and toxic-appearing. HENT:      Head: Normocephalic and atraumatic. Eyes:      Extraocular Movements: Extraocular movements intact. Pupils: Pupils are equal, round, and reactive to light. Cardiovascular:      Rate and Rhythm: Normal rate and regular rhythm. Pulses: Normal pulses. Heart sounds: Normal heart sounds. Pulmonary:      Effort: Pulmonary effort is normal.      Breath sounds: Normal breath sounds. Comments: 4L NC  Abdominal:      General: Abdomen is flat. Palpations: Abdomen is soft. Neurological:      General: No focal deficit present. Mental Status: She is alert.       Comments: No focal deficit but also not able to give proper ROS and express her knowledge of person place or time due to her severe nausea LABS:    CBC: No results for input(s): WBC, HGB, HCT, PLT, MCV in the last 72 hours. Renal:  No results for input(s): NA, K, CL, CO2, BUN, CREATININE, GLUCOSE, CALCIUM, MG, PHOS, ANIONGAP in the last 72 hours. Hepatic: No results for input(s): AST, ALT, BILITOT, BILIDIR, PROT, LABALBU, ALKPHOS in the last 72 hours. Troponin: No results for input(s): TROPONINI in the last 72 hours. BNP: No results for input(s): BNP in the last 72 hours. Lipids: No results for input(s): CHOL, HDL in the last 72 hours. Invalid input(s): LDLCALCU, TRIGLYCERIDE  ABGs:    Recent Labs     11/28/22  1246   PHART 7.417   MOD2RSY 32.9*   PO2ART 123.2*   ETD7ZAM 21.2   BEART -3   V4BYLMDZ 99   OYN3YSW 22       INR: No results for input(s): INR in the last 72 hours. Lactate: No results for input(s): LACTATE in the last 72 hours. Cultures:  -----------------------------------------------------------------  RAD:   No orders to display         Assessment/Plan:   Otilio Simon is a 78 yo F w/ pmhx of HLD, asthma, T2DM, GERD, HTN, and cerebellopontine angle meningioma.     Resection of Cerebellopontine Angle Meningioma  Pt has completed stage 1 of 2 procedures and is now in the ICU for closer monitoring.   - q1h neuro checks  - q4h neurovascular checks  - NSGY + ENT on board  - Keppra 500mg IV BID, seizure prophylaxis  - NPO @ midnight  - Stage 1 of 2    Asthma  - Pt Albuterol has been restarted    HLD  - Losartan restarted     HTN  - Pt on nicardipine gtt         Code Status: FULL  FEN: NPO @ midnight; regular diet  PPX: None  DISPO: ICU    Penny Davis DO, PGY-1  11/28/22  8:52 PM    This patient has been staffed and discussed with Dr. Leonila Ramos MD

## 2022-11-29 NOTE — PROGRESS NOTES
4 Eyes Admission Assessment     I agree as the admission nurse that 2 RN's have performed a thorough Head to Toe Skin Assessment on the patient. ALL assessment sites listed below have been assessed on admission. Areas assessed by both nurses:   [x]   Head, Face, and Ears   [x]   Shoulders, Back, and Chest  [x]   Arms, Elbows, and Hands   [x]   Coccyx, Sacrum, and Ischium Skin breakdown in medial portion of coccyx  [x]   Legs, Feet, and Heels        Does the Patient have Skin Breakdown?   Yes a wound was noted on the Admission Assessment and an LDA was Initiated documentation include the Ingrid-wound, Wound Assessment, Measurements, Dressing Treatment, Drainage, and Color\",         Jose Prevention initiated:  Yes   Wound Care Orders initiated:  No      WOC nurse consulted for Pressure Injury (Stage 3,4, Unstageable, DTI, NWPT, and Complex wounds) or Jose score 18 or lower:  No      Nurse 1 eSignature: Electronically signed by Renate Agarwal RN on 11/29/22 at 7:15 AM EST    **SHARE this note so that the co-signing nurse is able to place an eSignature**    Nurse 2 eSignature: Electronically signed by Nas Lilly RN on 11/29/22 at 6:32 AM EST

## 2022-11-29 NOTE — CONSULTS
ICU Progress Note    Admit Date: 11/28/2022  Day: 1  IV Access: Peripheral  IV Fluids: None  Vasopressors: None       Antibiotics: None  Diet: Diet NPO Exceptions are: Sips of Water with Meds    CC: Vertigo, falls    Interval history: Patient was seen at the bedside this morning. No acute events overnight. The patient's vitals were largely stable with a blood pressure range being around 120s over 60s. Heart rate noted to be around 75. Patient was afebrile for the duration of the night. Patient was continued on 4 L via nasal cannula. Her labs were appreciated and they were largely stable. Patient going to part to a staged procedure today with Dr. Ray Razo and Dr. Rula Krishnamurthy.    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 The patient's main symptoms began in June with feelings of vertigo and falls. During that week of the onset of her symptoms, she had 2 episodes of true vertigo and fell out of bed. Patient states that she was frequently dizzy as well. This prompted her to go to her primary care physician who ordered an MRI. That MRI subsequently showed this large right cerebellopontine angle mass and then a smaller left anterior parafalcine extra-axial lesion. Patient was admitted to the [facility], [facility]. ICU today status post part one of his staged procedure for those meningioma and masses. Patient admitted to the ICU specifically for neuro monitoring as well as neurovascular checks. Patient has been n.p.o. since midnight. Will have second part of procedure today.     Medications:     Scheduled Meds:   sodium chloride flush  5-40 mL IntraVENous 2 times per day    [Held by provider] sennosides-docusate sodium  1 tablet Oral BID    levETIRAcetam  500 mg IntraVENous Q12H    [Held by provider] losartan  50 mg Oral Nightly    latanoprost  1 drop Both Eyes Nightly    insulin lispro  0-4 Units SubCUTAneous TID WC    insulin lispro  0-4 Units SubCUTAneous Nightly    [Held by provider] methIMAzole  10 mg Oral Daily     Continuous Infusions:   sodium chloride      sodium chloride 125 mL/hr at 11/29/22 0730    niCARdipine Stopped (11/29/22 0730)    dextrose       PRN Meds:sodium chloride flush, sodium chloride, acetaminophen, oxyCODONE **OR** oxyCODONE, morphine, ondansetron **OR** ondansetron, hydrALAZINE, labetalol, niCARdipine, albuterol, glucose, dextrose bolus **OR** dextrose bolus, glucagon (rDNA), dextrose, promethazine    Objective:   Vitals:   T-max:  Patient Vitals for the past 8 hrs:   BP Temp Pulse Resp SpO2 Weight   11/29/22 0600 127/62 -- 73 16 94 % (!) 301 lb 5.9 oz (136.7 kg)   11/29/22 0500 (!) 128/56 -- 76 16 93 % --   11/29/22 0400 121/61 98.2 °F (36.8 °C) 77 15 95 % --   11/29/22 0300 (!) 126/59 -- 77 15 95 % --   11/29/22 0200 (!) 121/55 -- 77 15 95 % --   11/29/22 0100 (!) 117/59 -- 75 14 95 % --   11/29/22 0000 128/60 98 °F (36.7 °C) 76 14 93 % --       Intake/Output Summary (Last 24 hours) at 11/29/2022 0755  Last data filed at 11/29/2022 0600  Gross per 24 hour   Intake 4376.63 ml   Output 3280 ml   Net 1096.63 ml       Physical Exam  Constitutional:       General: She is awake. Appearance: She is morbidly obese. Eyes:      Extraocular Movements: Extraocular movements intact. Neck:      Vascular: No JVD. Cardiovascular:      Rate and Rhythm: Normal rate and regular rhythm. Pulses:           Radial pulses are 2+ on the right side and 2+ on the left side. Heart sounds: Normal heart sounds, S1 normal and S2 normal. No murmur heard. Pulmonary:      Effort: Pulmonary effort is normal.      Breath sounds: Normal breath sounds and air entry. Abdominal:      General: Abdomen is flat. Bowel sounds are normal.      Palpations: Abdomen is soft. Neurological:      Mental Status: She is alert. Psychiatric:         Behavior: Behavior is cooperative. LABS:    CBC:   Recent Labs     11/29/22 0419   WBC 9.9   HGB 12.2   HCT 35.8*      MCV 90.3     Renal:    Recent Labs     11/29/22 0419      K 3.7      CO2 22   BUN 13   CREATININE <0.5*   GLUCOSE 185*   CALCIUM 7.6*   ANIONGAP 11     Hepatic: No results for input(s): AST, ALT, BILITOT, BILIDIR, PROT, LABALBU, ALKPHOS in the last 72 hours. Troponin: No results for input(s): TROPONINI in the last 72 hours. BNP: No results for input(s): BNP in the last 72 hours. Lipids: No results for input(s): CHOL, HDL in the last 72 hours. Invalid input(s): LDLCALCU, TRIGLYCERIDE  ABGs:    Recent Labs     11/28/22  1246   PHART 7.417   FSZ0ICC 32.9*   PO2ART 123.2*   EZX3VIA 21.2   BEART -3   V4LHXBXB 99   VAI6XUJ 22       INR:   Recent Labs     11/29/22 0419   INR 1.09     Lactate: No results for input(s): LACTATE in the last 72 hours. 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 Site cerebellopontine angle meningioma and smaller left parafalcine extra- axial lesion status post part 1 of staged procedure. Every hour neurochecks  Every 4 hours neurovascular checks  Neurosurgery and otolaryngology following  Will continue Keppra 500 IV twice daily for seizure prophylaxis  Patient has been n.p.o. since midnight  SBP will be maintained below 160  Patient currently on nicardipine drip  As needed labetalol 10 and as needed hydralazine 10 on board if needed. We will plan for part 2 of stage procedure today 11/29/2022. Chronic medical conditions  Asthma- patient's albuterol restarted.   Hypertension- blood pressure currently controlled with as needed labetalol 10 and hydralazine 10. Also on nicardipine drip. Hyperlipidemia- patient is at home statin currently on hold. Code Status:Full Code  FEN: Diet NPO Exceptions are: Sips of Water with Meds  PPX:  Keppra  DISPO: ICU    Tavo Olivo MD  PGY1, Internal Medicine  11/29/22  7:55 AM    This patient has been staffed and discussed with Kathryn Oshea MD     Pulmonary & Critical Care    Patient was unable to be seen today. At intiation of rounds today at 9 AM patient was already in OR and was not back in ICU when I left at 7 PM. Will see in AM.    Per resident report BP well controlled with nicardipine and no resp issues.  Chronic medical problems at baseline    Kathryn Oshea MD

## 2022-11-29 NOTE — PROGRESS NOTES
Occupational Therapy/Physical Therapy  Hold Evaluation    OT/PT orders received and chart reviewed. Will complete OT/PT evaluation once patient is appropriate. Pt expected to have part 2 of stage procedure today 11/29/2022. Thank you.     Garrett Campbell OTR/LADONNA Silva , PT, DPT #[id number]

## 2022-11-29 NOTE — RT PROTOCOL NOTE
RT Inhaler-Nebulizer Bronchodilator Protocol Note    There is a bronchodilator order in the chart from a provider indicating to follow the RT Bronchodilator Protocol and there is an Initiate RT Inhaler-Nebulizer Bronchodilator Protocol order as well (see protocol at bottom of note). CXR Findings:  No results found. The findings from the last RT Protocol Assessment were as follows:   History Pulmonary Disease: Chronic pulmonary disease  Respiratory Pattern: Regular pattern and RR 12-20 bpm  Breath Sounds: Clear breath sounds  Cough: Indication for Bronchodilator Therapy:    Bronchodilator Assessment Score:      Aerosolized bronchodilator medication orders have been revised according to the RT Inhaler-Nebulizer Bronchodilator Protocol below. Respiratory Therapist to perform RT Therapy Protocol Assessment initially then follow the protocol. Repeat RT Therapy Protocol Assessment PRN for score 0-3 or on second treatment, BID, and PRN for scores above 3. No Indications - adjust the frequency to every 6 hours PRN wheezing or bronchospasm, if no treatments needed after 48 hours then discontinue using Per Protocol order mode. If indication present, adjust the RT bronchodilator orders based on the Bronchodilator Assessment Score as indicated below. Use Inhaler orders unless patient has one or more of the following: on home nebulizer, not able to hold breath for 10 seconds, is not alert and oriented, cannot activate and use MDI correctly, or respiratory rate 25 breaths per minute or more, then use the equivalent nebulizer order(s) with same Frequency and PRN reasons based on the score. If a patient is on this medication at home then do not decrease Frequency below that used at home. 0-3 - enter or revise RT bronchodilator order(s) to equivalent RT Bronchodilator order with Frequency of every 4 hours PRN for wheezing or increased work of breathing using Per Protocol order mode.         4-6 - enter or

## 2022-11-29 NOTE — PROGRESS NOTES
PACU Transfer to Floor Note    Procedure(s):  STAGE 1, RIGHT TRANSLABYRINTHINE / RETROLABYRINTHINE CRANIOTOMY, PETROSECTOMY  RIGHT TRANSMASTOID / ANTERIOR MIDDLE CRANIAL FOSSA , ABDOMINAL FAT GRAFT EXTERNAL AUDITORY CANAL CLOSURE    Current Allergies: Keflex [cephalexin], Codeine, and Tomato    Pt meets criteria as per Ez Score and ASPAN Standards to transfer to next phase of care. Recent Labs     11/28/22  1246 11/28/22  1751   POCGLU 237* 185*       Vitals:    11/28/22 1945   BP: (!) 160/77   Pulse: 71   Resp: 14   Temp: 97.9 °F (36.6 °C)   SpO2: 95%     Vitals within 20% of pt's admission vitals as per EZ SCORE    SpO2: 95 %    O2 Flow Rate (L/min): 4 L/min      Intake/Output Summary (Last 24 hours) at 11/28/2022 2007  Last data filed at 11/28/2022 1952  Gross per 24 hour   Intake 2656.63 ml   Output 1880 ml   Net 776.63 ml       Pain assessment:  none    Pain Level: 0    Patient was assessed for alterations to skin integrity. There were not alterations observed. Is patient incontinent: no, mckee in place. Patient has all belongings at discharge from PACU. PACU RN called and updated patient's family. Handoff report given at bedside to receiving RN.   Family updated and directed to pt room 8334      11/28/2022 8:07 PM

## 2022-11-29 NOTE — CARE COORDINATION
Case Management Assessment  Initial Evaluation    Date/Time of Evaluation: 11/29/2022 9:07 AM  Assessment Completed by: Zbigniew Amaro RN    If patient is discharged prior to next notation, then this note serves as note for discharge by case management. Patient Name: Cyn Morocho                   YOB: 1955  Diagnosis: Acoustic neuroma [facility]) [D33.3]  Balance problem [R26.89]  Meningioma ([facility] [address] [D32.9]  Dizziness [R42]  Cerebellopontine angle meningioma ([facility] [address] [D32.0]                   Date / Time: 11/28/2022  5:33 AM    Patient Admission Status: Inpatient   Readmission Risk (Low < 19, Mod (19-27), High > 27): Readmission Risk Score: 12.2    Current PCP: [facility]  PCP verified by CM? Yes    Chart Reviewed: Yes      History Provided by: Other (see comment) (PT'S SISTER, ENEDINA)  Patient Orientation: Alert and Oriented (AT BASELINE)    Patient Cognition: Alert (AT BASELINE)    Hospitalization in the last 30 days (Readmission):  No    If yes, Readmission Assessment in CM Navigator will be completed. Advance Directives:      Code Status: Full Code   Patient's Primary Decision Maker is:        Discharge Planning:    Patient lives with: Alone Type of Home: House  Primary Care Giver: Self  Patient Support Systems include: Children, Family Members   Current Financial resources: Medicare  Current community resources:    Current services prior to admission: None            Current DME:  has a rollator walker            Type of Home Care services:       ADLS  Prior functional level: Independent in ADLs/IADLs  Current functional level: Other (see comment) (TBD-PT/OT eval's pending)    Family can provide assistance at DC: Other (comment) (pt lives alone)  Plans to Return to Present Housing: Yes  Potential Assistance needed at discharge:  Outpatient PT/OT, Other (Comment) (acute rehab)            Potential DME:  deferred  Patient expects to discharge to: Acute rehab  Plan for transportation at discharge:  tbd    Financial    Payor: MEDICARE / Plan: MEDICARE PART A AND B / Product Type: *No Product type* /     Does insurance require precert for SNF: No    Potential assistance Purchasing Medications:    Meds-to-Beds request:        [facility],[address], [place] Avendano [phone] Kira Gandhi [phone]  [address] [place] [address] [address]  Phone: [phone] Fax: [address], [facility] [place] [address]  Phone: [phone] Fax: [phone]      Notes: Additional Case Management Notes: Ms. Stevenson Anna is presently in the OR for the 2nd part of her crani with mass resection this morning. A call was placed to her sister, Hemalatha Perez, who provided history. Ms. Stevenson Anna had told her she is open to acute rehab at discharge if needed. She has been dizzy for several weeks and wants to return home and remain as independent as possible. She is presenlty independent with all self care and functional mobility. She is an active . Will discuss options for post-acute care rehab following PT/OT evaluations have been completed.       Sterling Lance RN  Case Management Department  [phone]

## 2022-11-30 ENCOUNTER — APPOINTMENT (OUTPATIENT)
Dept: GENERAL RADIOLOGY | Age: 67
DRG: 025 | End: 2022-11-30
Attending: NEUROLOGICAL SURGERY
Payer: MEDICARE

## 2022-11-30 ENCOUNTER — APPOINTMENT (OUTPATIENT)
Dept: CT IMAGING | Age: 67
DRG: 025 | End: 2022-11-30
Attending: NEUROLOGICAL SURGERY
Payer: MEDICARE

## 2022-11-30 LAB
GLUCOSE BLD-MCNC: 171 MG/DL (ref 70–99)
GLUCOSE BLD-MCNC: 196 MG/DL (ref 70–99)
GLUCOSE BLD-MCNC: 216 MG/DL (ref 70–99)
GLUCOSE BLD-MCNC: 216 MG/DL (ref 70–99)
MAGNESIUM: 1.4 MG/DL (ref 1.8–2.4)
PERFORMED ON: ABNORMAL
PRO-BNP: 1585 PG/ML (ref 0–124)

## 2022-11-30 PROCEDURE — 2580000003 HC RX 258: Performed by: ANESTHESIOLOGY

## 2022-11-30 PROCEDURE — 99024 POSTOP FOLLOW-UP VISIT: CPT | Performed by: NURSE PRACTITIONER

## 2022-11-30 PROCEDURE — 97530 THERAPEUTIC ACTIVITIES: CPT

## 2022-11-30 PROCEDURE — 94761 N-INVAS EAR/PLS OXIMETRY MLT: CPT

## 2022-11-30 PROCEDURE — 2500000003 HC RX 250 WO HCPCS: Performed by: STUDENT IN AN ORGANIZED HEALTH CARE EDUCATION/TRAINING PROGRAM

## 2022-11-30 PROCEDURE — 6360000002 HC RX W HCPCS: Performed by: STUDENT IN AN ORGANIZED HEALTH CARE EDUCATION/TRAINING PROGRAM

## 2022-11-30 PROCEDURE — 99223 1ST HOSP IP/OBS HIGH 75: CPT | Performed by: INTERNAL MEDICINE

## 2022-11-30 PROCEDURE — 97535 SELF CARE MNGMENT TRAINING: CPT

## 2022-11-30 PROCEDURE — 2700000000 HC OXYGEN THERAPY PER DAY

## 2022-11-30 PROCEDURE — 83880 ASSAY OF NATRIURETIC PEPTIDE: CPT

## 2022-11-30 PROCEDURE — 36415 COLL VENOUS BLD VENIPUNCTURE: CPT

## 2022-11-30 PROCEDURE — 92610 EVALUATE SWALLOWING FUNCTION: CPT

## 2022-11-30 PROCEDURE — 97161 PT EVAL LOW COMPLEX 20 MIN: CPT

## 2022-11-30 PROCEDURE — 6360000002 HC RX W HCPCS: Performed by: NEUROLOGICAL SURGERY

## 2022-11-30 PROCEDURE — 2580000003 HC RX 258: Performed by: NEUROLOGICAL SURGERY

## 2022-11-30 PROCEDURE — 97167 OT EVAL HIGH COMPLEX 60 MIN: CPT

## 2022-11-30 PROCEDURE — 70450 CT HEAD/BRAIN W/O DYE: CPT

## 2022-11-30 PROCEDURE — 2000000000 HC ICU R&B

## 2022-11-30 PROCEDURE — 6370000000 HC RX 637 (ALT 250 FOR IP)

## 2022-11-30 PROCEDURE — 71045 X-RAY EXAM CHEST 1 VIEW: CPT

## 2022-11-30 PROCEDURE — 6360000002 HC RX W HCPCS: Performed by: INTERNAL MEDICINE

## 2022-11-30 PROCEDURE — 92526 ORAL FUNCTION THERAPY: CPT

## 2022-11-30 PROCEDURE — 2580000003 HC RX 258: Performed by: STUDENT IN AN ORGANIZED HEALTH CARE EDUCATION/TRAINING PROGRAM

## 2022-11-30 PROCEDURE — 83735 ASSAY OF MAGNESIUM: CPT

## 2022-11-30 RX ORDER — LIDOCAINE HYDROCHLORIDE 10 MG/ML
INJECTION, SOLUTION EPIDURAL; INFILTRATION; INTRACAUDAL; PERINEURAL
Status: DISPENSED
Start: 2022-11-30 | End: 2022-12-01

## 2022-11-30 RX ORDER — METOCLOPRAMIDE HYDROCHLORIDE 5 MG/ML
10 INJECTION INTRAMUSCULAR; INTRAVENOUS
Status: COMPLETED | OUTPATIENT
Start: 2022-11-30 | End: 2022-12-01

## 2022-11-30 RX ORDER — SODIUM CHLORIDE 0.9 % (FLUSH) 0.9 %
5-40 SYRINGE (ML) INJECTION EVERY 12 HOURS SCHEDULED
Status: DISCONTINUED | OUTPATIENT
Start: 2022-11-30 | End: 2022-12-05

## 2022-11-30 RX ORDER — SODIUM CHLORIDE 0.9 % (FLUSH) 0.9 %
5-40 SYRINGE (ML) INJECTION PRN
Status: DISCONTINUED | OUTPATIENT
Start: 2022-11-30 | End: 2022-12-05

## 2022-11-30 RX ORDER — HYDRALAZINE HYDROCHLORIDE 20 MG/ML
10 INJECTION INTRAMUSCULAR; INTRAVENOUS
Status: DISCONTINUED | OUTPATIENT
Start: 2022-11-30 | End: 2022-12-01

## 2022-11-30 RX ORDER — MEPERIDINE HYDROCHLORIDE 25 MG/ML
12.5 INJECTION INTRAMUSCULAR; INTRAVENOUS; SUBCUTANEOUS EVERY 5 MIN PRN
Status: DISCONTINUED | OUTPATIENT
Start: 2022-11-30 | End: 2022-12-05

## 2022-11-30 RX ORDER — OXYCODONE HYDROCHLORIDE 5 MG/1
10 TABLET ORAL PRN
Status: DISCONTINUED | OUTPATIENT
Start: 2022-11-30 | End: 2022-12-01

## 2022-11-30 RX ORDER — ESMOLOL HYDROCHLORIDE 10 MG/ML
25-300 INJECTION, SOLUTION INTRAVENOUS CONTINUOUS
Status: DISCONTINUED | OUTPATIENT
Start: 2022-11-30 | End: 2022-12-01

## 2022-11-30 RX ORDER — LABETALOL HYDROCHLORIDE 5 MG/ML
10 INJECTION, SOLUTION INTRAVENOUS
Status: DISCONTINUED | OUTPATIENT
Start: 2022-11-30 | End: 2022-12-01

## 2022-11-30 RX ORDER — MAGNESIUM SULFATE IN WATER 40 MG/ML
4000 INJECTION, SOLUTION INTRAVENOUS ONCE
Status: COMPLETED | OUTPATIENT
Start: 2022-11-30 | End: 2022-11-30

## 2022-11-30 RX ORDER — OXYCODONE HYDROCHLORIDE 5 MG/1
5 TABLET ORAL PRN
Status: DISCONTINUED | OUTPATIENT
Start: 2022-11-30 | End: 2022-12-01

## 2022-11-30 RX ORDER — SODIUM CHLORIDE, SODIUM LACTATE, POTASSIUM CHLORIDE, CALCIUM CHLORIDE 600; 310; 30; 20 MG/100ML; MG/100ML; MG/100ML; MG/100ML
INJECTION, SOLUTION INTRAVENOUS CONTINUOUS
Status: DISCONTINUED | OUTPATIENT
Start: 2022-11-30 | End: 2022-12-02

## 2022-11-30 RX ORDER — DIPHENHYDRAMINE HYDROCHLORIDE 50 MG/ML
12.5 INJECTION INTRAMUSCULAR; INTRAVENOUS
Status: ACTIVE | OUTPATIENT
Start: 2022-11-30 | End: 2022-12-01

## 2022-11-30 RX ORDER — SODIUM CHLORIDE 9 MG/ML
INJECTION, SOLUTION INTRAVENOUS PRN
Status: DISCONTINUED | OUTPATIENT
Start: 2022-11-30 | End: 2022-12-05

## 2022-11-30 RX ORDER — FUROSEMIDE 10 MG/ML
40 INJECTION INTRAMUSCULAR; INTRAVENOUS 2 TIMES DAILY
Status: DISCONTINUED | OUTPATIENT
Start: 2022-11-30 | End: 2022-12-01

## 2022-11-30 RX ORDER — FUROSEMIDE 10 MG/ML
40 INJECTION INTRAMUSCULAR; INTRAVENOUS ONCE
Status: COMPLETED | OUTPATIENT
Start: 2022-11-30 | End: 2022-11-30

## 2022-11-30 RX ORDER — SODIUM CHLORIDE 9 MG/ML
25 INJECTION, SOLUTION INTRAVENOUS PRN
Status: DISCONTINUED | OUTPATIENT
Start: 2022-11-30 | End: 2022-12-05

## 2022-11-30 RX ADMIN — LABETALOL HYDROCHLORIDE 10 MG: 5 INJECTION, SOLUTION INTRAVENOUS at 11:08

## 2022-11-30 RX ADMIN — HYDRALAZINE HYDROCHLORIDE 10 MG: 20 INJECTION INTRAMUSCULAR; INTRAVENOUS at 06:09

## 2022-11-30 RX ADMIN — SODIUM CHLORIDE 12.5 MG/HR: 9 INJECTION, SOLUTION INTRAVENOUS at 16:34

## 2022-11-30 RX ADMIN — MAGNESIUM SULFATE HEPTAHYDRATE 4000 MG: 40 INJECTION, SOLUTION INTRAVENOUS at 11:15

## 2022-11-30 RX ADMIN — INSULIN LISPRO 1 UNITS: 100 INJECTION, SOLUTION INTRAVENOUS; SUBCUTANEOUS at 16:34

## 2022-11-30 RX ADMIN — SODIUM CHLORIDE 15 MG/HR: 9 INJECTION, SOLUTION INTRAVENOUS at 13:09

## 2022-11-30 RX ADMIN — SODIUM CHLORIDE 12.5 MG/HR: 9 INJECTION, SOLUTION INTRAVENOUS at 18:40

## 2022-11-30 RX ADMIN — SODIUM CHLORIDE 500 MG: 9 INJECTION, SOLUTION INTRAVENOUS at 05:49

## 2022-11-30 RX ADMIN — HYDRALAZINE HYDROCHLORIDE 10 MG: 20 INJECTION INTRAMUSCULAR; INTRAVENOUS at 10:36

## 2022-11-30 RX ADMIN — SODIUM CHLORIDE 15 MG/HR: 9 INJECTION, SOLUTION INTRAVENOUS at 03:21

## 2022-11-30 RX ADMIN — SODIUM CHLORIDE, PRESERVATIVE FREE 10 ML: 5 INJECTION INTRAVENOUS at 09:27

## 2022-11-30 RX ADMIN — SODIUM CHLORIDE 15 MG/HR: 9 INJECTION, SOLUTION INTRAVENOUS at 09:43

## 2022-11-30 RX ADMIN — HYDRALAZINE HYDROCHLORIDE 10 MG: 20 INJECTION INTRAMUSCULAR; INTRAVENOUS at 01:30

## 2022-11-30 RX ADMIN — SODIUM CHLORIDE 15 MG/HR: 9 INJECTION, SOLUTION INTRAVENOUS at 04:50

## 2022-11-30 RX ADMIN — FUROSEMIDE 40 MG: 10 INJECTION, SOLUTION INTRAMUSCULAR; INTRAVENOUS at 18:14

## 2022-11-30 RX ADMIN — MORPHINE SULFATE 2 MG: 2 INJECTION, SOLUTION INTRAMUSCULAR; INTRAVENOUS at 13:48

## 2022-11-30 RX ADMIN — INSULIN LISPRO 1 UNITS: 100 INJECTION, SOLUTION INTRAVENOUS; SUBCUTANEOUS at 13:52

## 2022-11-30 RX ADMIN — FUROSEMIDE 40 MG: 10 INJECTION, SOLUTION INTRAMUSCULAR; INTRAVENOUS at 10:41

## 2022-11-30 RX ADMIN — LABETALOL HYDROCHLORIDE 10 MG: 5 INJECTION, SOLUTION INTRAVENOUS at 02:30

## 2022-11-30 RX ADMIN — SODIUM CHLORIDE 10 MG/HR: 9 INJECTION, SOLUTION INTRAVENOUS at 22:56

## 2022-11-30 RX ADMIN — SODIUM CHLORIDE, PRESERVATIVE FREE 10 ML: 5 INJECTION INTRAVENOUS at 07:48

## 2022-11-30 RX ADMIN — SODIUM CHLORIDE 500 MG: 9 INJECTION, SOLUTION INTRAVENOUS at 18:21

## 2022-11-30 RX ADMIN — HYDRALAZINE HYDROCHLORIDE 10 MG: 20 INJECTION INTRAMUSCULAR; INTRAVENOUS at 16:58

## 2022-11-30 RX ADMIN — SODIUM CHLORIDE, PRESERVATIVE FREE 10 ML: 5 INJECTION INTRAVENOUS at 09:26

## 2022-11-30 RX ADMIN — SODIUM CHLORIDE 15 MG/HR: 9 INJECTION, SOLUTION INTRAVENOUS at 00:17

## 2022-11-30 RX ADMIN — SODIUM CHLORIDE 15 MG/HR: 9 INJECTION, SOLUTION INTRAVENOUS at 11:28

## 2022-11-30 RX ADMIN — SODIUM CHLORIDE 15 MG/HR: 9 INJECTION, SOLUTION INTRAVENOUS at 01:58

## 2022-11-30 RX ADMIN — SODIUM CHLORIDE 15 MG/HR: 9 INJECTION, SOLUTION INTRAVENOUS at 06:10

## 2022-11-30 RX ADMIN — SODIUM CHLORIDE 15 MG/HR: 9 INJECTION, SOLUTION INTRAVENOUS at 07:59

## 2022-11-30 RX ADMIN — SODIUM CHLORIDE, POTASSIUM CHLORIDE, SODIUM LACTATE AND CALCIUM CHLORIDE: 600; 310; 30; 20 INJECTION, SOLUTION INTRAVENOUS at 09:28

## 2022-11-30 RX ADMIN — SODIUM CHLORIDE 12.5 MG/HR: 9 INJECTION, SOLUTION INTRAVENOUS at 20:38

## 2022-11-30 RX ADMIN — MORPHINE SULFATE 2 MG: 2 INJECTION, SOLUTION INTRAMUSCULAR; INTRAVENOUS at 02:30

## 2022-11-30 RX ADMIN — SODIUM CHLORIDE 15 MG/HR: 9 INJECTION, SOLUTION INTRAVENOUS at 14:52

## 2022-11-30 RX ADMIN — SODIUM CHLORIDE, PRESERVATIVE FREE 10 ML: 5 INJECTION INTRAVENOUS at 22:27

## 2022-11-30 ASSESSMENT — PAIN SCALES - GENERAL
PAINLEVEL_OUTOF10: 6
PAINLEVEL_OUTOF10: 0

## 2022-11-30 NOTE — ANESTHESIA POSTPROCEDURE EVALUATION
Department of Anesthesiology  Postprocedure Note    Patient: Cordella Boas  MRN: [id number]  YOB: 1955  Date of evaluation: 11/29/2022      Procedure Summary     Date: 11/29/22 Room / Location: [facility] [address]  / [facility]    Anesthesia Start: 0730 Anesthesia Stop: 2059    Procedure: STAGE II: RESECTION OF RIGHT PETROCLIVAL MENINGIOMA Diagnosis:       Meningioma ([facility] [address]      (Meningioma ([facility] [address] [D32.9])    Surgeons: Celeste Day. Ishan Hess MD Responsible Provider: Zonia Louie MD    Anesthesia Type: general, TIVA ASA Status: 3          Anesthesia Type: No value filed.     Michelle Phase I: Michelle Score: 8    Michelle Phase II:        Anesthesia Post Evaluation    Patient location during evaluation: PACU  Level of consciousness: awake  Complications: no  Multimodal analgesia pain management approach

## 2022-11-30 NOTE — OP NOTE
Operative Note      Patient: Rafaela Najjar  YOB: 1955  MRN: [id number]    Date of Procedure: 11/29/2022    Pre-Op Diagnosis: Meningioma ([address] [D32.9]    Post-Op Diagnosis: Same       PROCEDURE: EUSTACHIAN TUBE OBLITERATION, MASTOID OBLITERATION, ABDOMINAL FAT GRAFT, CRANIOPLASTY, EXTERNAL AUDITORY CANAL CLOSURE, OPERATING MICROSCOPE, INTRAOPERATIVE MONITORING    Surgeon(s):  MD Tonia Morales. MD Edgard Martínez Ma, MD    Assistant:   FARIDA Stewart    Anesthesia: General    Estimated Blood Loss (mL): 702     Complications: None    Specimens:   ID Type Source Tests Collected by Time Destination   A :  RIGHT CP ANGLE MASS Tissue Tissue SURGICAL PATHOLOGY Denzel Ramachandran MD 11/29/2022 1147    B : RIGHT CP ANGLE MASS Tissue Tissue SURGICAL PATHOLOGY Denzel Ramachandran MD 11/29/2022 1908        Implants:  Implant Name Type Inv. Item Serial No.  Lot No. LRB No. Used Action   GRAFT SFT TISS W5XL19.2CM [facility] [employer] - [id number]  GRAFT SFT TISS W5XL19.2CM [facility] [employer] [id number] [employer]WD  N/A 1 Implanted         Drains:   Open Drain 11/28/22 Right RUQ (Active)   Site Description Clean, dry & intact 11/29/22 0600   Dressing Status Clean, dry & intact 11/29/22 0600   Status Other (Comment) 11/28/22 1748       Urinary Catheter 11/28/22 Aleman (Active)   Urine Color Yellow 11/29/22 0600   Urine Appearance Clear 11/29/22 0600   Collection Container Standard 11/29/22 0600   Securement Method Securing device (Describe) 11/29/22 0600   Catheter Best Practices  Drainage tube clipped to bed;Catheter secured to thigh; Tamper seal intact; Bag below bladder;Bag not on floor; Lack of dependent loop in tubing;Drainage bag less than half full 11/29/22 0600   Status Draining 11/29/22 0600   Output (mL) 1400 mL 11/29/22 0600       Findings: SUBTOTAL RESECTION OF MENINGIOMA WITH TUMOR LEFT ALONG IAC, MECKEL'S CAVE, AND ALONG BASILAR ARTERY. Detailed Description of Procedure:   PLEASE SEE NEUROSURGERY NOTE FOR MORE INFORMATION. Findings: 1. GROSS TOTAL TUMOR RESECTION 2. ANTERIORLY DISPLACED SIGMOID SINUS 3. GOOD RESPONSE TO FACIAL NERVE STIMULATION AT BRAINSTEM. Detailed Description of Procedure:   PROCEDURE IN DETAIL:  Under universal protocol and informed consent, the patient was brought to the operating room. SHE was placed supine on the table and general endotracheal anesthesia was induced. The head was placed in a Chang skull clamp and affixed to the operating table. SHE remained supine with the head turned away from the tumor side. Electrodes were placed for FACIAL NERVE AND SSEP/MEP monitoring throughout the case. Postauricular area was clipped of hair, and the proposed incision was marked. The area was infiltrated with 1% lidocaine with epinephrine 1:100,000, and then prepped and draped in a sterile fashion. The right lower quadrant abdominal region was also prepped in anticipation of fat graft harvest.     THE PRIOR INCISION WAS OPENED AFTER REMOVING STAPLES. BONE FLAP WAS REMOVED. CSF WAS NOTED TO BE EMANATING FROM EAC. AFTER LANA TELLEZ AND HIGINIO REMOVED TUMOR, I TRANSECTED THE EAR CANAL AND OVERSEWED IT. ALL SKIN MEDIAL TO THE CLOSURE WAS RESECTION. THE FACIAL RECESS PROCEDURE WAS THEN PERFORMED. I REMOVED THE INCUS. I DRILLED OFF THE HEAD OF THE MALLEUS. THE TENSOR TYMPANI TENDON WAS CUT. THE EUSTACHIAN TUBE ORIFICE WAS DRILLED OUT. MUSCLE WAS THEN PLACED FOLLOWED BY BONE WAX AND OXYCEL. SCM MUSCLE WAS ALSO PLACED INTO THE MIDDLE EAR. After tumor removal, we harvested a small fat graft in the right lower quadrant of the abdomen through linear incision USING THE INCISION FROM THE DAY BEFORE. Hemostasis was obtained, small suction drain was placed, and the wound was closed in layers with absorbable suture. I TACKED DURA UP TO THE SKULL. I PLACED FASCIA MAXIMUS AND ABDOMINAL FAT GRAFT.   ADHERUS WAS PLACED. TISSEEL FIBRIN GLUE WAS APPLIED. The incision was then closed over this in layers with absorbable suture followed by a 4-0 nylon running suture for the skin. The patient was removed from pins and a mastoid dressing was then applied. SHE was returned to the anesthesia service for awakening. LUMBAR DRAIN WAS ATTEMPTED BY THE NEUROSURGERY SERVICE. COMPLICATIONS:  none     SPECIMENS:  RIGHT IAC/CPA TUMOR     DRAINS:  One drain in the right lower quadrant. IMPLANTS:  CRANIOPLASTY PLATES. BLOOD LOSS:  500CC     FLUIDS:  Per Anesthesia record. DISPOSITION:  To recovery room, then intensive care unit.       Electronically signed by Sandra Holden MD on 11/29/2022 at 8:23 PM

## 2022-11-30 NOTE — OP NOTE
Operative Note      Patient: Faye Piper  YOB: 1955  MRN: [id number]    Date of Procedure: 11/28/22    Pre-Op Diagnosis: Meningioma ([address] [D32.9]    Post-Op Diagnosis: Same       Procedure(s):  RIGHT TRANSLABYRINTHINE AND EXTENDED MIDDLE CRANIAL FOSSA APPROACH TO PETROCLIVAL MENINGIOMA, DECOMPRESSION OF LABYRINTHINE AND MASTOID SEGMENT OF FACIAL NERVE, OPERATING MICROSCOPE, INTRAOPERATIVE MONITORING    Surgeon(s):  MD [facility]. Ric Calvin MD        Assistant:   Lakeisha KAMINSKI AND FARIDA DRUMMOND    Anesthesia: General    Estimated Blood Loss (mL): 676     Complications: None    Specimens:   ID Type Source Tests Collected by Time Destination   A :  RIGHT CP ANGLE MASS Tissue Tissue SURGICAL PATHOLOGY Denzel Calvin MD 11/29/2022 1147    B : RIGHT CP ANGLE MASS Tissue Tissue SURGICAL PATHOLOGY Denzel Calvin MD 11/29/2022 1908        Implants:  Implant Name Type Inv. Item Serial No.  Lot No. LRB No. Used Action   GRAFT SFT TISS W5XL19.2CM [facility] [employer] - [id number]  GRAFT SFT TISS W5XL19.2CM [facility] [employer] [id number] [employer]WD  N/A 1 Implanted         Drains:   Open Drain 11/28/22 Right RUQ (Active)   Site Description Clean, dry & intact 11/29/22 0600   Dressing Status Clean, dry & intact 11/29/22 0600   Status Other (Comment) 11/28/22 1748       Urinary Catheter 11/28/22 Aleman (Active)   Urine Color Yellow 11/29/22 0600   Urine Appearance Clear 11/29/22 0600   Collection Container Standard 11/29/22 0600   Securement Method Securing device (Describe) 11/29/22 0600   Catheter Best Practices  Drainage tube clipped to bed;Catheter secured to thigh; Tamper seal intact; Bag below bladder;Bag not on floor; Lack of dependent loop in tubing;Drainage bag less than half full 11/29/22 0600   Status Draining 11/29/22 0600   Output (mL) 1400 mL 11/29/22 0600       Findings: 1. ANTERIORLY DISPLACED SIGMOID SINUS 2. KAWASE'S APPROACH PERFORMED 3. LABYRINTHINE SEGMENT DECOMPRESSION    Detailed Description of Procedure:   Findings: 1. GROSS TOTAL TUMOR RESECTION 2. ANTERIORLY DISPLACED SIGMOID SINUS 3. GOOD RESPONSE TO FACIAL NERVE STIMULATION AT BRAINSTEM. SEE MY CLINIC NOTES FOR MORE INFORMATION REGARDING INDICATIONS AND DISCUSSION OF R/C/H/A/B. Detailed Description of Procedure:   PROCEDURE IN DETAIL:  Under universal protocol and informed consent, the patient was brought to the operating room. SHE was placed supine on the table and general endotracheal anesthesia was induced. The head was placed in a Chang skull clamp and affixed to the operating table. SHE remained supine with the head turned away from the tumor side. Electrodes were placed for FACIAL NERVE AND SSEP/MEP monitoring throughout the case. Postauricular area was clipped of hair, and the proposed incision was marked. The area was infiltrated with 1% lidocaine with epinephrine 1:100,000, and then prepped and draped in a sterile fashion. The right lower quadrant abdominal region was also prepped in anticipation of fat graft harvest. C-SHAPED INCISION WAS CREATED INTO THE SCALP. THE postauricular incision was fashioned and carried down to the level of the loose areolar tissue lateral to the temporalis fascia. The ear was widely undermined at this level.  eriosteal incisions were then made, about the inferior edge of the temporalis with a perpendicular extension down the mastoid. Sharp periosteal elevators were used to widely expose the mastoid cortex and identify the external auditory canal.  Self-retaining fish retractors were placed. Using the operating microscope, a wide cortical mastoidectomy was first performed. The tegmen was identified. The sigmoid sinus was identified. The tegmen was followed medially into the mastoid antrum. The sigmoid sinus, pre-sigmoid dura, and postsigmoid dura were all identified and skeletonized.   The facial nerve was identified in its mastoid segment with a large sandra sekou. We then proceeded with labyrinthectomy and removed all bone posterior and superior to the mastoid and tympanic segments of the facial nerve back to the  posterior and middle fossa dura. This dissection was carried medially and anteriorly until the internal auditory canal was reached. Working just superior to the jugular bulb, the cochlear aqueduct was identified and opened. This led to egress of CSF relaxing the posterior fossa. Working medially, we then removed all remaining bone off of the posterior fossa dura and the entire length of the internal auditory canal.  The canal was skeletonized at least 180 degrees in circumference. This was followed into the vestibule and the transverse crest was identified. The bone over the sigmoid sinus was REMOVED. The bone over the tegmen was REMIOVED. AFTER THIS WAS DONE, THE CRANIOTOMY WAS PERFORMED. 4X5CM IN SIZE. PERFORMED WITH 4 CUTTING SEKOU. PT GIVEN MANNITOL AND KEPPRA. DRILLING WAS DONE IN KAWASE'S SPACE. LABYRINTHINE SEGMENT WAS DECOMPRESSED. CRANIOPLASTY WAS PERFORMED. After tumor removal, we harvested a small fat graft in the right lower quadrant of the abdomen through linear incision. Hemostasis was obtained, small suction drain was placed, and the wound was closed in layers with absorbable suture. THE WOUND WAS THEN CLOSED IN LAYERS WITH STAPLES IN THE SKIN. PRESSURE DRESSING WAS APPLIED. BLOOD LOSS:  Per Anesthesia record. FLUIDS:  Per Anesthesia record. DISPOSITION:  To recovery room, then intensive care unit. FACIAL MEP WAS DECREASED AT THE END OF THE CASE.       Electronically signed by Chicho Copeland MD on 11/29/2022 at 7:59 PM

## 2022-11-30 NOTE — SIGNIFICANT EVENT
We were called by nursing team at [place] [address] to come to pt bedside due to change in neuro exam. Pt has forced right sided gaze deviation. Pt is able to track my finger appropriately but eyes will drift to the right after. STAT CT head has been ordered. UPDATE 11/30 0230: Pt CT shows expected changes from procedure and spoke with radiology who did not express any concern with post operative changes on CT.      Bryan Stewart, DO PGY-1  Internal Medicine Resident

## 2022-11-30 NOTE — PROGRESS NOTES
ICU Progress Note    Admit Date: 11/28/2022  Day: 2  IV Access: Peripheral  IV Fluids: NS @ 100ml/hr  Vasopressors: None       Antibiotics: None  Diet: Diet NPO Exceptions are: Sips of Water with Meds    CC: vertigo, falls. Interval history: Overnight at around 12:45 AM, patient was noted to have a rightward gaze deviation. Patient was able to track appropriately but eyes deviated to the right without intention. A repeat CT of the head was obtained which was unremarkable and showed expected postop changes. The overnight team spoke directly with the interpreting radiologist about the results. The neurosurgical service was contacted this morning about the overnight event and believes that this is likely expected postop changes. Patient was seen at the bedside this morning. No acute complaints overnight other than the above. Patient's vitals have been reviewed and are largely stable. Patient currently on 10 L via nasal cannula. EVD noted to be in place. 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 The patient's main symptoms began in June with feelings of vertigo and falls. During that week of the onset of her symptoms, she had 2 episodes of true vertigo and fell out of bed. Patient states that she was frequently dizzy as well. This prompted her to go to her primary care physician who ordered an MRI. That MRI subsequently showed this large right cerebellopontine angle mass and then a smaller left anterior parafalcine extra-axial lesion. Patient was admitted to the [facility]. ICU today status post part one of his staged procedure for those meningioma and masses. Patient admitted to the ICU specifically for neuro monitoring as well as neurovascular checks.   Patient had part 2 of her stage procedure yesterday. Patient tolerated the procedure well. Medications:     Scheduled Meds:   sodium chloride flush  5-40 mL IntraVENous 2 times per day    [Held by provider] sennosides-docusate sodium  1 tablet Oral BID    levETIRAcetam  500 mg IntraVENous Q12H    [Held by provider] losartan  50 mg Oral Nightly    latanoprost  1 drop Both Eyes Nightly    insulin lispro  0-4 Units SubCUTAneous TID WC    insulin lispro  0-4 Units SubCUTAneous Nightly    [Held by provider] methIMAzole  10 mg Oral Daily     Continuous Infusions:   sodium chloride      sodium chloride 125 mL/hr at 11/29/22 0730    niCARdipine 15 mg/hr (11/30/22 0759)    dextrose       PRN Meds:sodium chloride flush, sodium chloride, acetaminophen, oxyCODONE **OR** oxyCODONE, morphine, ondansetron **OR** ondansetron, hydrALAZINE, labetalol, niCARdipine, albuterol, glucose, dextrose bolus **OR** dextrose bolus, glucagon (rDNA), dextrose, promethazine    Objective:   Vitals:   T-max:  Patient Vitals for the past 8 hrs:   BP Temp Temp src Pulse Resp SpO2 Weight   11/30/22 0800 (!) 166/43 98.1 °F (36.7 °C) Oral 82 24 93 % --   11/30/22 0641 -- -- -- 82 20 92 % --   11/30/22 0600 -- -- -- 76 -- -- --   11/30/22 0554 -- -- -- -- -- -- (!) 326 lb 11.5 oz (148.2 kg)   11/30/22 0500 -- -- -- 79 16 90 % --   11/30/22 0400 -- 98.9 °F (37.2 °C) -- 71 18 92 % --   11/30/22 0300 -- -- -- 70 18 92 % --   11/30/22 0200 (!) 145/54 -- -- 77 19 94 % --   11/30/22 0127 -- -- -- 79 21 95 % --   11/30/22 0100 (!) 162/64 -- -- 77 19 93 % --       Intake/Output Summary (Last 24 hours) at 11/30/2022 0840  Last data filed at 11/30/2022 0743  Gross per 24 hour   Intake 6150 ml   Output 2530 ml   Net 3620 ml       Physical Exam  Constitutional:       Appearance: She is morbidly obese. Interventions: Nasal cannula in place. HENT:      Head:      Comments: EVD noted to be in place. Eyes:      Comments: Patient has a rightward gaze deviation in both eyes.    Cardiovascular:      Rate and Rhythm: Normal rate and regular rhythm. Pulses:           Radial pulses are 2+ on the right side and 2+ on the left side. Heart sounds: Normal heart sounds, S1 normal and S2 normal. No murmur heard. Pulmonary:      Effort: Pulmonary effort is normal.      Breath sounds: Normal breath sounds and air entry. Abdominal:      General: Abdomen is protuberant. Bowel sounds are normal.      Palpations: Abdomen is soft. Tenderness: There is no abdominal tenderness. Musculoskeletal:      Right lower leg: Edema present. Left lower leg: Edema present. Neurological:      Mental Status: She is lethargic. Cranial Nerves: Cranial nerves 2-12 are intact. Comments: Patient very somnolent during neurologic examination. Patient had rightward gaze deviation but was able to follow when prompted to do so. No focal neurologic deficit other than the above. LABS:    CBC:   Recent Labs     11/29/22 0419   WBC 9.9   HGB 12.2   HCT 35.8*      MCV 90.3     Renal:    Recent Labs     11/29/22 0419      K 3.7      CO2 22   BUN 13   CREATININE <0.5*   GLUCOSE 185*   CALCIUM 7.6*   ANIONGAP 11     Hepatic: No results for input(s): AST, ALT, BILITOT, BILIDIR, PROT, LABALBU, ALKPHOS in the last 72 hours. Troponin: No results for input(s): TROPONINI in the last 72 hours. BNP: No results for input(s): BNP in the last 72 hours. Lipids: No results for input(s): CHOL, HDL in the last 72 hours.     Invalid input(s): LDLCALCU, TRIGLYCERIDE  ABGs:    Recent Labs     11/28/22  1246 11/29/22  0937 11/29/22  1203   PHART 7.417 7.412 7.395   GPZ4SHY 32.9* 34.4* 35.5   PO2ART 123.2* 222.4* 102.3   LAS9ONC 21.2 21.9 21.7   BEART -3 -3 -3   N5ZOIPMN 99 100 98   VCS2AQC 22 23 23       INR:   Recent Labs     11/29/22  0419   INR 1.09     Lactate:   Recent Labs     11/29/22  0937 11/29/22  1203   LACTATE 2.45* 2.19* Cultures:  -----------------------------------------------------------------  RAD:   CT HEAD WO CONTRAST   Final Result      1. Interval postsurgical changes from resection of right cerebellopontine angle mass with associated right temporal mastoid resection and frontotemporal craniotomy and fat graft placement. 2.  Thin hyperdensity along the fat graft may represent small amount of postsurgical blood products. There is also a small focus of subarachnoid hemorrhage along the right temporal lobe. 3.  Mild pneumocephalus and bilateral subgaleal fluid is also likely postsurgical.               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 Left cerebellopontine angle meningioma and smaller left parafalcine extra- axial lesion status post both parts of the procedure. Every hour neurochecks  Every 4 hours neurovascular checks  Neurosurgery and otolaryngology following  Will continue Keppra 500 IV twice daily for seizure prophylaxis  SBP will be maintained below 160  As needed labetalol 10 and as needed hydralazine 10 on board if needed. Nicardipine gtt PRN  We will plan for part 2 of stage procedure today 11/29/2022. EVD in place- NSGY managing  PT/OT Eval  SLP  Chronic medical conditions  Asthma- patient's albuterol restarted. Hypertension- blood pressure currently controlled with as needed labetalol 10 and hydralazine 10. Also has nicardipine gtt as PRN. Currently BP is below 160. Hyperlipidemia- patient is at home statin currently on hold.       Code Status:Full Code  FEN: Diet NPO Exceptions are: Sips of Water with Meds  PPX:  Keppra  DISPO: ICU    Alpa Hanley MD  PGY1, Internal Medicine  11/30/22  8:40 AM    This patient has been staffed and discussed with Juanito Vance MD    Pulmonary & Critical Care    Patient seen and examined. I agree with Dr. Fan's history, physical, lab findings, assessment and plan. Patient POD #1 of stage II resection of right petroclival meningioma. Overnight patient had some right gaze deviation. Stat head CT performed with only expected changes from the procedure. Neurosurgery on-call notified    This morning she was tachypneic with increased work of breathing with an abdominal breathing pattern and requiring 10 L of oxygen. Review of chart shows that she is up approximately 5.5 L and weight is up about 20 pounds from admission. She does wake up and follows commands and is oriented    I obtained a chest x-ray which to me look like some pulmonary edema although radiology did not necessarily agree. She has no signs or symptoms of pneumonia. She does look edematous. Review of echo done earlier this month shows grade 1 diastolic dysfunction with a dilated right side as well. She denies any history of VIJAY although has a physique compatible with that.  proBNP is 1585    I gave her 40 of IV Lasix and her breathing pattern looks more comfortable and oxygen has been weaned to 7 L. However she is still getting a significant volume in due to her nicardipine so after a diuresis of 2 L she is approximately net even.   I will schedule her Lasix 40 mg IV twice daily    I also will have an esmolol drip available if needed for uncontrolled hypertension despite nicardipine    Patient therapy saw her and recommends her to maintain n.p.o. status    Neurosurgery managing postoperative neurosurgical care    Chaparro Hartman MD

## 2022-11-30 NOTE — PLAN OF CARE
Problem: Chronic Conditions and Co-morbidities  Goal: Patient's chronic conditions and co-morbidity symptoms are monitored and maintained or improved  Outcome: Progressing     Problem: Pain  Goal: Verbalizes/displays adequate comfort level or baseline comfort level  Outcome: Progressing     Problem: Safety - Adult  Goal: Free from fall injury  Outcome: Progressing  Flowsheets (Taken 11/30/2022 1753)  Free From Fall Injury: Instruct family/caregiver on patient safety     Problem: Skin/Tissue Integrity  Goal: Absence of new skin breakdown  Description: 1. Monitor for areas of redness and/or skin breakdown  2. Assess vascular access sites hourly  3. Every 4-6 hours minimum:  Change oxygen saturation probe site  4. Every 4-6 hours:  If on nasal continuous positive airway pressure, respiratory therapy assess nares and determine need for appliance change or resting period.   Outcome: Progressing

## 2022-11-30 NOTE — PROGRESS NOTES
Speech Language Pathology    Received evaluation order. Spoke with RN; pt currently working with OT/PT. Will plan to re-attempt later today. Joanne Sands, Guerline Vásquez, [id number]  Pg.  # [phone]

## 2022-11-30 NOTE — PROGRESS NOTES
Clinical Pharmacy Progress Note  Medication History     Admit Date: 11/28/2022    List of of current medications patient is taking is complete. Home Medication list in EPIC updated to reflect changes noted below. Source of information: pharmacy fills, CareEverywhere documentation, RN review with patient in pre-op phase    [facility]'s [facility]     Changes made to medication list:   Medications removed:   Vitamin D OTC  Omeprazole 20mg daily  Prednisone - pt prescribed a 3 day course on 11/22; completed PTA  Verapamil 120mg TID    Current Outpatient Medications   Medication Instructions    albuterol sulfate HFA (PROVENTIL;VENTOLIN;PROAIR) 108 (90 Base) MCG/ACT inhaler 2 puffs, Inhalation, EVERY 4 HOURS PRN    carvedilol (COREG) 6.25 mg, Oral, 2 TIMES DAILY WITH MEALS    hydroCHLOROthiazide (HYDRODIURIL) 25 mg, Oral, DAILY    latanoprost (XALATAN) 0.005 % ophthalmic solution 1 drop, Both Eyes, NIGHTLY    losartan (COZAAR) 50 mg, Oral, DAILY    medical marijuana 1 each, Oral, PRN    meloxicam (MOBIC) 15 mg, Oral, DAILY    metFORMIN (GLUCOPHAGE-XR) 1,000 mg, Oral, DAILY WITH DINNER    methIMAzole (TAPAZOLE) 10 mg, Oral, DAILY    pravastatin (PRAVACHOL) 20 mg, Oral, NIGHTLY       Please call with any questions.   Marco Hernandez PharmD., [facility]   11/30/2022 10:38 AM  Wireless: [phone]

## 2022-11-30 NOTE — PROGRESS NOTES
Occupational Therapy  Facility/Department: [facility] ICU  Occupational Therapy Initial Assessment    Name: Last Wong  : 1955  MRN: [id number]  Date of Service: 2022    Discharge Recommendations: Last Wong scored a 10/24 on the AM-PAC ADL Inpatient form. Current research shows that an AM-PAC score of 17 or less is typically not associated with a discharge to the patient's home setting. Based on the patient's AM-PAC score and their current ADL deficits, it is recommended that the patient have 5-7 sessions per week of Occupational Therapy at d/c to increase the patient's independence. At this time, this patient demonstrates complex nursing, medical, and rehabilitative needs, and would benefit from intensive rehabilitation services upon discharge from the Inpatient setting. This patient demonstrates the ability to participate in and benefit from an intensive therapy program with a coordinated interdisciplinary team approach to foster frequent, structured, and documented communication among disciplines, who will work together to establish, prioritize, and achieve treatment goals. Please see assessment section for further patient specific details. If patient discharges prior to next session this note will serve as a discharge summary. Please see below for the latest assessment towards goals. OT Equipment Recommendations  Equipment Needed: No  Other: defer to next level of care     Patient Diagnosis(es): Diagnoses of Acoustic neuroma [facility]), Balance problem, Meningioma ([facility] [address] and Dizziness were pertinent to this visit. Past Medical History:  has a past medical history of Arthritis, Asthma, Brain tumor ([facility] [address] Diabetes mellitus ([facility] [address] High cholesterol, Hypertension, Imbalance, Loss of hearing, and Vertigo. Past Surgical History:  has a past surgical history that includes Total knee arthroplasty (Right, 2015); Total knee arthroplasty (Left, 10/14/15);  Total hip arthroplasty (Right, 11/8/2021); Neuroma surgery (Right, 11/28/2022); mastoidectomy (Right, 11/28/2022); and craniotomy (N/A, 11/29/2022). Treatment Diagnosis: impaired ADLs and functional mobility    Assessment   Performance deficits / Impairments: Decreased functional mobility ; Decreased endurance;Decreased ADL status; Decreased ROM; Decreased balance;Decreased strength;Decreased vision/visual deficit; Decreased safe awareness;Decreased cognition;Decreased high-level IADLs    Assessment: Pt is 78 yo female from home alone, presenting POD #1 from 2-stage craniotomy for meningioma resection. Pt is independent with ADLs, cognition, and functional mobility at baseline. Presently, pt is functioning significantly below her baseline. Pt requires Min A for supine to sit, Min x2 for sit to supine and Total x3 for supine scooting. Pt sat EOB for ~7 min with Min A, achieving periods of CGA with LUE support. Pt requiring assistance for all ADLs and currently NPO. Pt demonstrates good initiation and participation with therapy session but was limited by high BP this date, pain, and fatigue. Per ICU RN request, sit<>stand transfer was not attempted. Expect that patient will progress significantly with intensive and consistent therapy at discharge. Pt would highly benefit from this level of care from a functional standpoint and from a medical standpoint for ongoing management post 2 part tumor resection in order to promote a full recovery to her prior independent level of function and independence. Will continue to follow on OT POC. Treatment Diagnosis: impaired ADLs and functional mobility    Prognosis: Fair  Decision Making: High Complexity  REQUIRES OT FOLLOW-UP: Yes  Activity Tolerance  Activity Tolerance: Patient limited by fatigue;Treatment limited secondary to medical complications (free text); Treatment limited secondary to decreased cognition  Activity Tolerance Comments: systolic BPs in 208H, peaked to 180        Plan   Occupational Therapy Plan  Times Per Week: 5-7  Times Per Day: Once a day  Current Treatment Recommendations: Strengthening, ROM, Cognitive reorientation, Balance training, Pain management, Self-Care / ADL, Functional mobility training, Safety education & training, Home management training, Endurance training, Neuromuscular re-education, Positioning     Restrictions  Position Activity Restriction  Other position/activity restrictions: Up with assistance, ambulate the patient, up in the chair, HOB elevated to 30 degrees    Subjective   General  Chart Reviewed: Yes  Patient assessed for rehabilitation services?: Yes  Additional Pertinent Hx: 79 y.o. female  with c/o dizziness, HA and vertigo in the setting of large right petroclival mass which is consistent with meningioma. Pt is POD #2 from STAGE 1, RIGHT TRANSLABYRINTHINE / RETROLABYRINTHINE CRANIOTOMY, PETROSECTOMY - Right and RIGHT TRANSMASTOID / ANTERIOR MIDDLE CRANIAL FOSSA , ABDOMINAL FAT GRAFT EXTERNAL AUDITORY CANAL CLOSURE. Pt is POD #1 from  STAGE II: RESECTION OF RIGHT PETROCLIVAL MENINGIOMA, EUSTACHIAN TUBE OBLITERATION, MASTOID OBLITERATION, ABDOMINAL FAT GRAFT, CRANIOPLASTY, EXTERNAL AUDITORY CANAL CLOSURE, OPERATING MICROSCOPE, INTRAOPERATIVE MONITORING  Family / Caregiver Present: No  Referring Practitioner: Irlanda Romo. Jennifer Gupta MD  Diagnosis: Cerebellopontine angle meningioma  Subjective  Subjective: Pt met reclined in bed, pleasant and agreeable to OT. Pt is questionable historian.  Pt reports pain in her head, does not rate    Social/Functional History  Social/Functional History  Lives With: Alone  Type of Home: House  Home Layout: One level, Able to Live on Main level with bedroom/bathroom, Performs ADL's on one level  Home Access: Stairs to enter with rails  Entrance Stairs - Number of Steps: 2  Bathroom Shower/Tub: Walk-in shower  Bathroom Equipment: Shower chair, Grab bars in shower, Hand-held shower  Home Equipment: [place], [place], [address] aid (walker, unsure what kind)  Has the patient had two or more falls in the past year or any fall with injury in the past year?: No  ADL Assistance: [address]: Independent  Homemaking Responsibilities: Yes  Ambulation Assistance: Independent (with walker, unable to determine which kind)  Transfer Assistance: Independent  Active : Yes  Leisure & Hobbies: crafts  Additional Comments: pt is questionable to poor historian; pt reports no one can stay with her upon going home     Objective   Heart Rate: 79  Heart Rate Source: Monitor  BP: (!) 166/43  BP Method: Other (Comment) (arterial)  Patient Position: Semi fowlers  MAP (Calculated): 84  Resp: 24  SpO2: 90 %  O2 Device: High flow nasal cannula    Observation/Palpation  Observation: Arterial A line. Head incision wrapped in gauze. Catheter placed. some right sided facial droop, unable to track fully to the left    Safety Devices  Type of Devices: Bed alarm in place;Call light within reach;Nurse notified; Left in bed;Patient at risk for falls (RN present at end of [facility] for imaging)  Restraints  Restraints Initially in Place: No    Balance  Sitting: Impaired (Min A to periods of CGA at EOB ~7 mins with heavy LUE support)  Sitting - Static: Fair (occasional)  Sitting - Dynamic: Fair (occasional)    AROM: Generally decreased, functional  Strength: Generally decreased, functional  Coordination: Generally decreased, functional  Tone: Normal  Sensation:  (unable to determine, pt reports normal sensation)    ADL  Grooming: Maximum assistance;Dependent/Total  Grooming Skilled Clinical Factors: to wipe face with wash cloth  LE Dressing: Dependent/Total  LE Dressing Skilled Clinical Factors: to don socks  Toileting Skilled Clinical Factors: catheter    Bed mobility  Rolling to Right: 2 Person assistance;Maximum assistance (to place wedge)  Supine to Sit: Minimal assistance (HOB elevated; guiding hand placment, minimal BLE advancement)  Sit to Supine: Minimal assistance;2 Person assistance (trunk and BLE assist +line management)  Scooting: Dependent/Total;2 Person assistance (Dep x3 to scoot supine in bed; Pt scooted forward to EOB with Min A)    Vision - Basic Assessment  Prior Vision: Wears glasses all the time  Patient Visual Report: Diplopia  Oculo Motor Control: Impaired  Impairments: Tracking;Scanning;Alignment - Double Vision (pt unable to track or scan full to the Left side; takes extended time and cues)  Vision  Vision: Impaired  Vision Exceptions: Wears glasses at all times  Hearing  Hearing: Within functional limits    Cognition  Overall Cognitive Status: Exceptions  Arousal/Alertness: Delayed responses to stimuli (intermittent delay in responses)  Following Commands: Follows one step commands consistently  Attention Span: Attends with cues to redirect  Memory: Decreased recall of recent events  Safety Judgement: Decreased awareness of need for safety;Decreased awareness of need for assistance  Problem Solving: Assistance required to identify errors made;Assistance required to implement solutions;Assistance required to generate solutions;Assistance required to correct errors made  Insights: Decreased awareness of deficits  Initiation: Requires cues for some  Sequencing: Requires cues for some    Orientation  Overall Orientation Status: Impaired  Orientation Level: Oriented to place; Disoriented to time;Oriented to situation;Oriented to person (\"surgery. Kathryn Ruffing on my ear\")    Education Given To: Patient  Education Provided: Role of Therapy;Plan of Care;Orientation  Education Method: Verbal  Barriers to Learning: Cognition  Education Outcome: Verbalized understanding;Continued education needed    AM-PAC Score  AM-PAC Inpatient Daily Activity Raw Score: 10 (11/30/22 1217)  AM-PAC Inpatient ADL T-Scale Score : 27.31 (11/30/22 1217)  ADL Inpatient CMS 0-100% Score: 74.7 (11/30/22 1217)  ADL Inpatient CMS G-Code Modifier : CL (11/30/22 1217)    Goals  Short Term Goals  Time Frame for Short Term Goals: by discharge  Short Term Goal 1: Pt will perform bed mobility with CGA to decrease caregiver burden  Short Term Goal 2: Pt will sit EOB 5 mins with no more than CGA  Short Term Goal 3: Pt will perform grooming activity with Min A and set-up  Short Term Goal 4: Pt will tolerate sit to stand transfer  Patient Goals   Patient goals : not stated       Therapy Time   Individual Concurrent Group Co-treatment   Time In 1006         Time Out 1046         Minutes 40         Timed Code Treatment Minutes: Alvin Iyer 193, S/OT  Therapist was present, directed the patient's care, made skilled judgement, and was responsible for assessment and treatment of the patient.    Alfredo Pop, OTR/L

## 2022-11-30 NOTE — PROGRESS NOTES
NEUROSURGERY POST-OP PROGRESS NOTE    Patient Name: Missy Ramos YOB: 1955   Sex: Female Age: 79 yrs     Medical Record Number: [id number] Acct Number: [de-identified]   Room Number: 6178/2410-51 Hospital Day: Hospital Day: 3     Interval History:  Post-operative Day# 1 s/p Procedure(s) (LRB):  STAGE II: RESECTION OF RIGHT PETROCLIVAL MENINGIOMA (N/A)    Subjective: Pt sitting up in bed. Can get eye closed Right facial droop when smiling. HB 3. Minimal pain. Pt has some dysarthria.     Objective:    VITAL SIGNS   BP (!) 145/54   Pulse 82   Temp 98.9 °F (37.2 °C)   Resp 20   Ht 5' 5\" (1.651 m)   Wt (!) 326 lb 11.5 oz (148.2 kg)   SpO2 92%   BMI 54.37 kg/m²    Height Height: 5' 5\" (165.1 cm)   Weight Weight: (!) 326 lb 11.5 oz (148.2 kg)          Allergies Allergies   Allergen Reactions    Keflex [Cephalexin] Itching and Rash    Codeine Nausea And Vomiting    Tomato Rash      NPO Status Diet NPO Exceptions are: Sips of Water with Meds   Isolation No active isolations     LABS   Basic Metabolic Profile Recent Labs     11/29/22 0419         CO2 22   BUN 13   CREATININE <0.5*   GLUCOSE 185*      Complete Blood Count Recent Labs     11/29/22 0419   WBC 9.9   RBC 3.96*      Coagulation Studies Recent Labs     11/29/22 0419   INR 1.09        MEDICATIONS   Inpatient Medications     sodium chloride flush, 5-40 mL, IntraVENous, 2 times per day    [Held by provider] sennosides-docusate sodium, 1 tablet, Oral, BID    levETIRAcetam, 500 mg, IntraVENous, Q12H    [Held by provider] losartan, 50 mg, Oral, Nightly    latanoprost, 1 drop, Both Eyes, Nightly    insulin lispro, 0-4 Units, SubCUTAneous, TID WC    insulin lispro, 0-4 Units, SubCUTAneous, Nightly    [Held by provider] methIMAzole, 10 mg, Oral, Daily   Infusions    sodium chloride      sodium chloride 125 mL/hr at 11/29/22 0730    niCARdipine 15 mg/hr (11/30/22 0610)    dextrose        Antibiotics   Recent Abx Admin                     clindamycin (CLEOCIN) 900 mg in dextrose 5 % 50 mL IVPB (mg) 900 mg Given 11/29/22 1619     900 mg Given  0830    vancomycin (VANCOCIN) injection (mg) 3,000 mg Given 11/29/22 1031                     Neurologic Exam:  Mental status: awake and alert and oriented x3 confused to time    Cranial Nerves:  II: Visual acuity not tested, visual fields full, denies new visual changes / +diplopia  III, IV, VI: PERRL, 3 mm bilaterally, EOMI, Patient had rightward gaze deviation but was able to follow when prompted   V: Facial sensation intact bilaterally to touch  VII: R sided facial drrop  VIII: Hearing intact on L to spoken voice  IX: Palate movement equal bilaterally  XI: Shoulder shrug equal bilaterally  XII: Tongue deviates to R      Musculoskeletal:   Gait: Not tested   Tone: normal  Sensory: intact to all extremities  Motor strength:    Right  Left    Right  Left    Deltoid  5 5  Hip Flex  5 5   Biceps  5 5  Knee Extensors  5 5   Triceps  5 5  Knee Flexors  5 5   Wrist Ext  5 5  Ankle Dorsiflex. 5 5   Wrist Flex  5 5  Ankle Plantarflex. 5 5   Handgrip  5 5  Ext Ernie Longus  5 5   Thumb Ext  5 5         Mastoid dressing intact, clean and dry      Respiratory:  Unlabored respiratory pattern    Abdomen:   Soft, ND   Last BMpreop    Cardiovascular:  Warm, well perfused    Assessment   Patient is a 75 yo F s/p Procedure(s) (LRB):  STAGE II: RESECTION OF RIGHT PETROCLIVAL MENINGIOMA (N/A) per Dr. Cindy William . Pt educated from [employer] to Neurosurgery\" book pg 25-27, 33-38.     Plan:  Neurologic exam frequency:q4  Mobility:PT/OT eval  SLP eval  Aleman:remove  DVT Prophylaxis: SCDs and heparin  Keppra as ordered  Bowel Regimen: senna and glycolax  Pain control:alex   Nicardipine, keep sbp < 160  Incisional Care:Mastoid dressing in place  MRI today  Dispo Planning:ICU    Patient was seen with Dr. Lorenza Shirley who agrees with above assessment and plan. Electronically signed by: LEE Huang CNP, 11/30/2022 6:47 AM   Neurosurgery Nurse Practitioner  [phone]   I spent 30 minutes in the care of this patient.   Over 50% of that time was in face-to-face counseling regarding post op progression, pain management strategies, and answering patient and family questions

## 2022-11-30 NOTE — PROGRESS NOTES
Physical Therapy  Facility/Department: [facility]  Physical Therapy Initial Assessment/Treatment    Name: Alise Raymond  : 1955  MRN: [id number]  Date of Service: 2022    Discharge Recommendations:  Alise Raymond scored a 6/24 on the AM-PAC short mobility form. Current research shows that an AM-PAC score of 17 or less is typically not associated with a discharge to the patient's home setting. Based on the patient's AM-PAC score and their current functional mobility deficits, it is recommended that the patient have 5-7 sessions per week of Physical Therapy at d/c to increase the patient's independence. At this time, this patient demonstrates complex nursing, medical, and rehabilitative needs, and would benefit from intensive rehabilitation services upon discharge from the Inpatient setting. This patient demonstrates the ability to participate in and benefit from an intensive therapy program with a coordinated interdisciplinary team approach to foster frequent, structured, and documented communication among disciplines, who will work together to establish, prioritize, and achieve treatment goals. Please see assessment section for further patient specific details. If patient discharges prior to next session this note will serve as a discharge summary. Please see below for the latest assessment towards goals. Patient would benefit from continued therapy after discharge   PT Equipment Recommendations  Equipment Needed:  (defer)      Patient Diagnosis(es): Diagnoses of Acoustic neuroma [facility]), Balance problem, Meningioma ([facility] [address] and Dizziness were pertinent to this visit. Past Medical History:  has a past medical history of Arthritis, Asthma, Brain tumor ([facility] [address] Diabetes mellitus ([facility] [address] High cholesterol, Hypertension, Imbalance, Loss of hearing, and Vertigo. Past Surgical History:  has a past surgical history that includes Total knee arthroplasty (Right, 2015);  Total knee arthroplasty (Left, 10/14/15); Total hip arthroplasty (Right, 11/8/2021); Neuroma surgery (Right, 11/28/2022); mastoidectomy (Right, 11/28/2022); and craniotomy (N/A, 11/29/2022). Assessment   Body Structures, Functions, Activity Limitations Requiring Skilled Therapeutic Intervention: Decreased functional mobility   Assessment: Pt functioning below baseline. Requires assist x 2 for all bed mobility & up to min A for sitting balance at EOB. Recommend further inpt PT upon D/C. Will contnue PT while here to maximize independence. Treatment Diagnosis: decreased functional mobility  Therapy Prognosis: Good  Decision Making: Low Complexity  Requires PT Follow-Up: Yes  Activity Tolerance  Activity Tolerance: Patient tolerated evaluation without incident     Plan   Physcial Therapy Plan  General Plan: 5-7 times per week  Current Treatment Recommendations: Strengthening, Functional mobility training, Transfer training, Gait training, Endurance training, Safety education & training, Therapeutic activities  Safety Devices  Type of Devices: Bed alarm in place, Call light within reach, Nurse notified, Left in bed, Patient at risk for falls  Restraints  Restraints Initially in Place: No     Restrictions  Position Activity Restriction  Other position/activity restrictions: Up with assistance, ambulate the patient, up in the chair, HOB elevated to 30 degrees     Subjective   Pain: denies  General  Chart Reviewed: Yes  Patient assessed for rehabilitation services?: Yes  Additional Pertinent Hx: Pt is a 78 yo female that presents to the hospital for a procedure;STAGE 1, RIGHT TRANSLABYRINTHINE / RETROLABYRINTHINE CRANIOTOMY POSSIBLE PETROSECTOMY  RIGHT TRANSMASTOID / ANTERIOR MIDDLE CRANIAL FOSSA , ABDOMINAL FAT GRAFT EXTERNAL AUDITORY CANAL CLOSURE on 11/28. PMH; Loss of hearing bilaterally, arthritis, brain tumor, diabetes.   Family / Caregiver Present: No  Referring Practitioner: Sarmad Sidhu MD  Referral Date : 11/28/22  Diagnosis: Cerebellopontine angle meningioma  Follows Commands: Within Functional Limits (increased time fir some commands)  General Comment  Comments: RN requesting no transfer OOB today. May sit EOB. Subjective  Subjective: Pt supine in bed & willing to participate. Social/Functional History  Social/Functional History  Lives With: Alone  Type of Home: House  Home Layout: One level, Able to Live on Main level with bedroom/bathroom, Performs ADL's on one level  Home Access: Stairs to enter with rails  Entrance Stairs - Number of Steps: 2  Bathroom Shower/Tub: Walk-in shower  Bathroom Equipment: Shower chair, Grab bars in shower, Hand-held shower  Home Equipment: [place], [employer] [address], Sock aid (walker, unsure what kind)  Has the patient had two or more falls in the past year or any fall with injury in the past year?: No  ADL Assistance: [address] [place] [address] Avenue: Independent  Homemaking Responsibilities: Yes  Ambulation Assistance: Independent (with walker, unable to determine which kind)  Transfer Assistance: Independent  Active : Yes  Leisure & Hobbies: crafts  Additional Comments: pt is questionable to poor historian; pt reports no one can stay with her upon going home  Vision/Hearing       Cognition   Orientation  Overall Orientation Status: Impaired  Orientation Level: Oriented to place; Disoriented to time;Oriented to situation;Oriented to person     Objective   Heart Rate: 76  Heart Rate Source: Monitor  BP: (!) 164/50  BP Location: Arterial  BP Method: Other (Comment) (arterial)  Patient Position: Semi fowlers  MAP (Calculated): 88  Resp: 20  SpO2: 92 %  O2 Device: High flow nasal cannula              AROM RLE (degrees)  RLE AROM: WFL  AROM LLE (degrees)  LLE AROM : WFL  Strength RLE  Comment: no MMT. pt able to perform SLR (minmal clearance)  Strength LLE  Comment: no MMT.  pt able to perform SLR (minmal clearance)        Bed Mobility Training  Bed Mobility Training: Yes  Rolling: Assist X2 (max A x 2)  Supine to Sit: Assist X2 (max A x 2)  Sit to Supine: Assist X2 (max A x 2)  Scooting: Total assistance (x 1-2 seated, x 2 supine)  Balance  Sitting: Impaired  Sitting - Static:  (pt sat at EOB for 7 min with min A to CGA.  pt c/o feeling \"wonky\", requesting to lay back down.)  Sitting - Dynamic: Fair (occasional)                                                                      AM-PAC Score  AM-PAC Inpatient Mobility Raw Score : 6 (11/30/22 1325)  AM-PAC Inpatient T-Scale Score : 23.55 (11/30/22 1325)  Mobility Inpatient CMS 0-100% Score: 100 (11/30/22 1325)  Mobility Inpatient CMS G-Code Modifier : CN (11/30/22 1325)           Goals  Short Term Goals  Time Frame for Short Term Goals: D/C  Short Term Goal 1: supine<->sit mod A x 1-2  Short Term Goal 2: pt will sit at EOB for 5 min with SBA  Short Term Goal 3: pt will tolerate sit<->stand assessment  Short Term Goal 4: pt will tolerate bed->chair assessment  Short Term Goal 5: pt will tolerate gait assessment  Patient Goals   Patient Goals : not stated       Education  Patient Education  Education Given To: Patient  Education Provided: Role of Therapy  Education Method: Verbal  Education Outcome: Verbalized understanding      Therapy Time   Individual Concurrent Group Co-treatment   Time In 1006         Time Out 1044         Minutes [address], PT

## 2022-11-30 NOTE — PROGRESS NOTES
Speech Language Pathology  Facility/Department: [facility] ICU   CLINICAL BEDSIDE SWALLOW EVALUATION/Tx    NAME: Jackelin Eckert  : 1955  MRN: [id number]    ADMISSION DATE: 2022  ADMITTING DIAGNOSIS: has Asthma; Gastroesophageal reflux disease; Hypertension; Adiposity; S/P total knee arthroplasty; Primary osteoarthritis of left knee; Morbid obesity with BMI of 50.0-59.9, adult ([address] Osteoarthritis of right hip; and Cerebellopontine angle meningioma ([facility] [address] on their problem list.  ONSET DATE: 2022    Recent Chest Xray: (2022)  Impression       Patchy left upper lobe airspace disease, atelectasis versus pneumonia. CT of Head: (2022)  Impression       1. Interval postsurgical changes from resection of right cerebellopontine angle mass with associated right temporal mastoid resection and frontotemporal craniotomy and fat graft placement. 2.  Thin hyperdensity along the fat graft may represent small amount of postsurgical blood products. There is also a small focus of subarachnoid hemorrhage along the right temporal lobe. 3.  Mild pneumocephalus and bilateral subgaleal fluid is also likely postsurgical     Date of Eval: 2022  Evaluating Therapist: PINA Santos    Current Diet level:  Current Diet : NPO    Primary Complaint  Patient Complaint: Did not state. Pain:  Pain Assessment  Pain Assessment: None - Denies Pain  Pain Level: 0  Patient's Stated Pain Goal: 0 - No pain  Multiple Pain Sites: No    Reason for Referral  Jackelin Eckert was referred for a bedside swallow evaluation to assess the efficiency of her swallow function, identify signs and symptoms of aspiration and make recommendations regarding safe dietary consistencies, effective compensatory strategies, and safe eating environment. Impression  Dysphagia Diagnosis: Severe oral stage dysphagia; Suspected needs further assessment  Dysphagia Impression : Severe oral stage dysphagia, with suspected pharyngeal component, needs further assessment. Patient awake but lethargic, on 10 L O2 via nc. On oral motor exam, patient with right sided weakness, with reduced lingual coordination and impaired labial seal. Xerostomia. Speech is dysarthric, some dysphonia. Trialed ice chips, thins via tsp/cup/straw, puree. Pt with positive oral acceptance of tsp trials, however immediate anterior loss for all thin liquid trials via tsp/cup, with patient unable to utilize straw. Suspect reduced A-P transit, with significant residue of puree (suctioned). Some laryngeal swallow movement perceived, voice remained dry, no overt signs of aspiration. However given severity of oral dysfunction in conjunction with dysarthria, lethargy, and increased O2 requirements in setting of recent cerebellopontine angle meningioma surgery, suspect patient is a high aspiration (including silent aspiration) risk. Recommend continue NPO, alternative means nutrition/hydration, frequent oral hygiene, and ice chips / tsp h2o. Will continue to follow. Dysphagia Outcome Severity Scale: Level 1: Severe dysphagia- NPO. Unable to tolerate any PO safely     Treatment Plan  Requires SLP Intervention: Yes  Duration of Treatment: 1-2 wks or LOS  D/C Recommendations: Ongoing speech therapy is recommended during this hospitalization    Recommended Diet and Intervention  NPO  Recommended Form of Meds: Via alternative means of nutrition  Recommendations: NPO;Dysphagia treatment;Consider alternative nutrition;Consider ice chips PRN  Therapeutic Interventions: Therapeutic PO trials with SLP;Oral care; Patient/Family education;Oral motor exercises; Laryngeal exercises    Compensatory Swallowing Strategies  TBD    Treatment/Goals  Short-term Goals  Timeframe for Short-term Goals: 1-2 wks or LOS  Goal 1: Patient will participate in further assessment of swallow function as appropriate.   Goal 2: Patient/caregiver will demonstrate understanding of swallowing concerns/recommendations. 11/30: Educated pt to purpose of visit, s/s of aspiration, concern if aspiration occurs, swallow function, safety strategies (upright positioning, oral hygiene rationale), effortful swallow exercise, need for eventual instrumental assessment. Pt indicated some understanding, but suspect needs reinforcement. Cont goal    General  Chart Reviewed: Yes    Per ICU physician HPI (11/30/2022):   'HPI: Faye Piper is a 28-year-old female with past medical history of hyperlipidemia, asthma, type 2 diabetes, GERD, hypertension, and more recently cerebellopontine angle meningioma with a smaller left parafalcine extra-axial lesion. The patient's main symptoms began in June with feelings of vertigo and falls. During that week of the onset of her symptoms, she had 2 episodes of true vertigo and fell out of bed. Patient states that she was frequently dizzy as well. This prompted her to go to her primary care physician who ordered an MRI. That MRI subsequently showed this large right cerebellopontine angle mass and then a smaller left anterior parafalcine extra-axial lesion. Patient was admitted to the [facility], [facility]. ICU today status post part one of his staged procedure for those meningioma and masses. Patient admitted to the ICU specifically for neuro monitoring as well as neurovascular checks. Patient had part 2 of her stage procedure yesterday. Patient tolerated the procedure well.'    Subjective: Patient awake but lethargic, resting in bed, on 10 L O2 via HFNC. Behavior/Cognition: Requires cueing;Lethargic;Cooperative  Respiratory Status: O2 via nasual cannula  O2 Device: Nasal cannula  Liters of Oxygen: 10 L  Communication Observation: Dysarthria  Follows Directions: Simple  Patient Positioning: Upright in bed  Baseline Vocal Quality: Dysphonic  Volitional Cough: Strong  Prior Dysphagia History: None found in chart review. Consistencies Administered: Pureed; Thin - cup; Thin - straw;Thin - teaspoon; Ice Chips    Vision/Hearing  Vision  Vision Exceptions: Wears glasses at all times  Hearing  Hearing: Within functional limits    Oral Motor Deficits  Oral/Motor  Oral Hygiene: Dried secretions    Prognosis  Individuals consulted  Consulted and agree with results and recommendations: Patient;RN  RN Name: An Patricia    Education  Patient Education: Educated pt to purpose of visit, role of SLP. Patient Education Response: Verbalizes understanding;Needs reinforcement  Safety Devices in place: Yes  Type of devices: All fall risk precautions in place; Left in bed;Bed alarm in place;Call light within reach;Nurse notified       Therapy Time  SLP Individual Minutes  Time In: 1030  Time Out: 1100  Minutes: 30     SLP Total Treatment Time  Timed Code Treatment Minutes: 0 Minutes  Total Treatment Time: 30    Plan  Diet Recommendations: NPO, temporary alternative means nutrition/hydration  - small amounts ice chips / h2o via tsp with nursing  *oral hygiene x 3 daily*    Discharge Plan:  TBD  Discussed with RN, An Patricia. Needs within reach. Electronically Signed by:  Rahel Sultana M.A., [address]   Speech-Language Pathologist  Pager #[phone]    This document will serve as a discharge summary if pt discharges before next treatment.

## 2022-12-01 ENCOUNTER — APPOINTMENT (OUTPATIENT)
Dept: GENERAL RADIOLOGY | Age: 67
DRG: 025 | End: 2022-12-01
Attending: NEUROLOGICAL SURGERY
Payer: MEDICARE

## 2022-12-01 ENCOUNTER — APPOINTMENT (OUTPATIENT)
Dept: CT IMAGING | Age: 67
DRG: 025 | End: 2022-12-01
Attending: NEUROLOGICAL SURGERY
Payer: MEDICARE

## 2022-12-01 PROBLEM — D33.3 ACOUSTIC NEUROMA (HCC): Status: ACTIVE | Noted: 2022-12-01

## 2022-12-01 LAB
ANION GAP SERPL CALCULATED.3IONS-SCNC: 12 MMOL/L (ref 3–16)
ANION GAP SERPL CALCULATED.3IONS-SCNC: 8 MMOL/L (ref 3–16)
BASE EXCESS ARTERIAL: 3 (ref -3–3)
BASE EXCESS ARTERIAL: 9.6 MMOL/L (ref -3–3)
BASOPHILS ABSOLUTE: 0 K/UL (ref 0–0.2)
BASOPHILS RELATIVE PERCENT: 0.2 %
BUN BLDV-MCNC: 15 MG/DL (ref 7–20)
BUN BLDV-MCNC: 16 MG/DL (ref 7–20)
CALCIUM IONIZED: 1.04 MMOL/L (ref 1.12–1.32)
CALCIUM IONIZED: 1.09 MMOL/L (ref 1.12–1.32)
CALCIUM SERPL-MCNC: 7.6 MG/DL (ref 8.3–10.6)
CALCIUM SERPL-MCNC: 7.7 MG/DL (ref 8.3–10.6)
CARBOXYHEMOGLOBIN ARTERIAL: 1.1 % (ref 0–1.5)
CHLORIDE BLD-SCNC: 110 MMOL/L (ref 99–110)
CHLORIDE BLD-SCNC: 114 MMOL/L (ref 99–110)
CO2: 24 MMOL/L (ref 21–32)
CO2: 26 MMOL/L (ref 21–32)
CREAT SERPL-MCNC: <0.5 MG/DL (ref 0.6–1.2)
CREAT SERPL-MCNC: <0.5 MG/DL (ref 0.6–1.2)
EOSINOPHILS ABSOLUTE: 0 K/UL (ref 0–0.6)
EOSINOPHILS RELATIVE PERCENT: 0.1 %
GFR SERPL CREATININE-BSD FRML MDRD: >60 ML/MIN/{1.73_M2}
GFR SERPL CREATININE-BSD FRML MDRD: >60 ML/MIN/{1.73_M2}
GLUCOSE BLD-MCNC: 199 MG/DL (ref 70–99)
GLUCOSE BLD-MCNC: 199 MG/DL (ref 70–99)
GLUCOSE BLD-MCNC: 204 MG/DL (ref 70–99)
GLUCOSE BLD-MCNC: 213 MG/DL (ref 70–99)
GLUCOSE BLD-MCNC: 219 MG/DL (ref 70–99)
GLUCOSE BLD-MCNC: 222 MG/DL (ref 70–99)
GLUCOSE BLD-MCNC: 222 MG/DL (ref 70–99)
HCO3 ARTERIAL: 26.9 MMOL/L (ref 21–29)
HCO3 ARTERIAL: 34 MMOL/L (ref 21–29)
HCT VFR BLD CALC: 31.9 % (ref 36–48)
HEMOGLOBIN, ART, EXTENDED: 12.2 G/DL
HEMOGLOBIN: 10.9 G/DL (ref 12–16)
LYMPHOCYTES ABSOLUTE: 0.6 K/UL (ref 1–5.1)
LYMPHOCYTES RELATIVE PERCENT: 6 %
MAGNESIUM: 2 MG/DL (ref 1.8–2.4)
MAGNESIUM: 2.3 MG/DL (ref 1.8–2.4)
MCH RBC QN AUTO: 31.3 PG (ref 26–34)
MCHC RBC AUTO-ENTMCNC: 34.1 G/DL (ref 31–36)
MCV RBC AUTO: 91.6 FL (ref 80–100)
METHEMOGLOBIN ARTERIAL: 0.2 % (ref 0–1.4)
MONOCYTES ABSOLUTE: 0.8 K/UL (ref 0–1.3)
MONOCYTES RELATIVE PERCENT: 7.6 %
NEUTROPHILS ABSOLUTE: 8.9 K/UL (ref 1.7–7.7)
NEUTROPHILS RELATIVE PERCENT: 86.1 %
O2 SAT, ARTERIAL: 86 % (ref 93–100)
O2 SAT, ARTERIAL: 98 % (ref 93–100)
PCO2 ARTERIAL: 37.4 MM HG (ref 35–45)
PCO2 ARTERIAL: 43.1 MMHG (ref 35–45)
PDW BLD-RTO: 15.2 % (ref 12.4–15.4)
PERFORMED ON: ABNORMAL
PH ARTERIAL: 7.47 (ref 7.35–7.45)
PH ARTERIAL: 7.5 (ref 7.35–7.45)
PH VENOUS: 7.48 (ref 7.35–7.45)
PLATELET # BLD: 140 K/UL (ref 135–450)
PMV BLD AUTO: 7.7 FL (ref 5–10.5)
PO2 ARTERIAL: 48.4 MM HG (ref 75–108)
PO2 ARTERIAL: 90.2 MMHG (ref 75–108)
POC SAMPLE TYPE: ABNORMAL
POTASSIUM REFLEX MAGNESIUM: 2.8 MMOL/L (ref 3.5–5.1)
POTASSIUM SERPL-SCNC: 2.9 MMOL/L (ref 3.5–5.1)
POTASSIUM SERPL-SCNC: 2.9 MMOL/L (ref 3.5–5.1)
POTASSIUM SERPL-SCNC: 3.1 MMOL/L (ref 3.5–5.1)
PROCALCITONIN: 0.08 NG/ML (ref 0–0.15)
RBC # BLD: 3.48 M/UL (ref 4–5.2)
SODIUM BLD-SCNC: 146 MMOL/L (ref 136–145)
SODIUM BLD-SCNC: 148 MMOL/L (ref 136–145)
TCO2 ARTERIAL: 28 MMOL/L
TCO2 ARTERIAL: 35 MMOL/L
WBC # BLD: 10.4 K/UL (ref 4–11)

## 2022-12-01 PROCEDURE — 71275 CT ANGIOGRAPHY CHEST: CPT

## 2022-12-01 PROCEDURE — 6360000002 HC RX W HCPCS: Performed by: ANESTHESIOLOGY

## 2022-12-01 PROCEDURE — 82947 ASSAY GLUCOSE BLOOD QUANT: CPT

## 2022-12-01 PROCEDURE — 6370000000 HC RX 637 (ALT 250 FOR IP): Performed by: INTERNAL MEDICINE

## 2022-12-01 PROCEDURE — 2700000000 HC OXYGEN THERAPY PER DAY

## 2022-12-01 PROCEDURE — 6370000000 HC RX 637 (ALT 250 FOR IP)

## 2022-12-01 PROCEDURE — 2580000003 HC RX 258

## 2022-12-01 PROCEDURE — 6360000004 HC RX CONTRAST MEDICATION: Performed by: NEUROLOGICAL SURGERY

## 2022-12-01 PROCEDURE — 2500000003 HC RX 250 WO HCPCS

## 2022-12-01 PROCEDURE — 2580000003 HC RX 258: Performed by: NURSE PRACTITIONER

## 2022-12-01 PROCEDURE — 2580000003 HC RX 258: Performed by: NEUROLOGICAL SURGERY

## 2022-12-01 PROCEDURE — 2000000000 HC ICU R&B

## 2022-12-01 PROCEDURE — 84132 ASSAY OF SERUM POTASSIUM: CPT

## 2022-12-01 PROCEDURE — 94761 N-INVAS EAR/PLS OXIMETRY MLT: CPT

## 2022-12-01 PROCEDURE — 2500000003 HC RX 250 WO HCPCS: Performed by: STUDENT IN AN ORGANIZED HEALTH CARE EDUCATION/TRAINING PROGRAM

## 2022-12-01 PROCEDURE — 99233 SBSQ HOSP IP/OBS HIGH 50: CPT | Performed by: INTERNAL MEDICINE

## 2022-12-01 PROCEDURE — 6360000002 HC RX W HCPCS: Performed by: STUDENT IN AN ORGANIZED HEALTH CARE EDUCATION/TRAINING PROGRAM

## 2022-12-01 PROCEDURE — 97530 THERAPEUTIC ACTIVITIES: CPT

## 2022-12-01 PROCEDURE — 85025 COMPLETE CBC W/AUTO DIFF WBC: CPT

## 2022-12-01 PROCEDURE — 2500000003 HC RX 250 WO HCPCS: Performed by: INTERNAL MEDICINE

## 2022-12-01 PROCEDURE — 82803 BLOOD GASES ANY COMBINATION: CPT

## 2022-12-01 PROCEDURE — 6360000002 HC RX W HCPCS

## 2022-12-01 PROCEDURE — 74230 X-RAY XM SWLNG FUNCJ C+: CPT

## 2022-12-01 PROCEDURE — 92611 MOTION FLUOROSCOPY/SWALLOW: CPT

## 2022-12-01 PROCEDURE — 83735 ASSAY OF MAGNESIUM: CPT

## 2022-12-01 PROCEDURE — 6360000002 HC RX W HCPCS: Performed by: NEUROLOGICAL SURGERY

## 2022-12-01 PROCEDURE — 36415 COLL VENOUS BLD VENIPUNCTURE: CPT

## 2022-12-01 PROCEDURE — 6360000002 HC RX W HCPCS: Performed by: INTERNAL MEDICINE

## 2022-12-01 PROCEDURE — 2580000003 HC RX 258: Performed by: ANESTHESIOLOGY

## 2022-12-01 PROCEDURE — 99024 POSTOP FOLLOW-UP VISIT: CPT | Performed by: NURSE PRACTITIONER

## 2022-12-01 PROCEDURE — 2580000003 HC RX 258: Performed by: STUDENT IN AN ORGANIZED HEALTH CARE EDUCATION/TRAINING PROGRAM

## 2022-12-01 PROCEDURE — 71045 X-RAY EXAM CHEST 1 VIEW: CPT

## 2022-12-01 PROCEDURE — 80048 BASIC METABOLIC PNL TOTAL CA: CPT

## 2022-12-01 PROCEDURE — 2580000003 HC RX 258: Performed by: INTERNAL MEDICINE

## 2022-12-01 PROCEDURE — 92526 ORAL FUNCTION THERAPY: CPT

## 2022-12-01 PROCEDURE — 6360000002 HC RX W HCPCS: Performed by: NURSE PRACTITIONER

## 2022-12-01 PROCEDURE — 82330 ASSAY OF CALCIUM: CPT

## 2022-12-01 PROCEDURE — 84145 PROCALCITONIN (PCT): CPT

## 2022-12-01 RX ORDER — POLYETHYLENE GLYCOL 3350 17 G/17G
17 POWDER, FOR SOLUTION ORAL DAILY
Status: DISCONTINUED | OUTPATIENT
Start: 2022-12-01 | End: 2022-12-16

## 2022-12-01 RX ORDER — POTASSIUM CHLORIDE 7.45 MG/ML
10 INJECTION INTRAVENOUS
Status: DISCONTINUED | OUTPATIENT
Start: 2022-12-01 | End: 2022-12-01

## 2022-12-01 RX ORDER — TETRAHYDROZOLINE HCL 0.05 %
1 DROPS OPHTHALMIC (EYE) 3 TIMES DAILY
Status: DISCONTINUED | OUTPATIENT
Start: 2022-12-01 | End: 2022-12-16

## 2022-12-01 RX ORDER — FUROSEMIDE 10 MG/ML
80 INJECTION INTRAMUSCULAR; INTRAVENOUS 2 TIMES DAILY
Status: DISCONTINUED | OUTPATIENT
Start: 2022-12-01 | End: 2022-12-02

## 2022-12-01 RX ORDER — ESMOLOL HYDROCHLORIDE 10 MG/ML
25-300 INJECTION, SOLUTION INTRAVENOUS CONTINUOUS PRN
Status: DISCONTINUED | OUTPATIENT
Start: 2022-12-01 | End: 2022-12-02

## 2022-12-01 RX ORDER — LOSARTAN POTASSIUM 50 MG/1
50 TABLET ORAL ONCE
Status: COMPLETED | OUTPATIENT
Start: 2022-12-01 | End: 2022-12-01

## 2022-12-01 RX ORDER — POTASSIUM CHLORIDE 7.45 MG/ML
10 INJECTION INTRAVENOUS
Status: COMPLETED | OUTPATIENT
Start: 2022-12-01 | End: 2022-12-01

## 2022-12-01 RX ORDER — POTASSIUM CHLORIDE 7.45 MG/ML
10 INJECTION INTRAVENOUS
Status: COMPLETED | OUTPATIENT
Start: 2022-12-01 | End: 2022-12-02

## 2022-12-01 RX ORDER — LOSARTAN POTASSIUM 50 MG/1
50 TABLET ORAL DAILY
Status: DISCONTINUED | OUTPATIENT
Start: 2022-12-01 | End: 2022-12-01

## 2022-12-01 RX ORDER — CARVEDILOL 6.25 MG/1
6.25 TABLET ORAL 2 TIMES DAILY WITH MEALS
Status: DISCONTINUED | OUTPATIENT
Start: 2022-12-01 | End: 2022-12-01

## 2022-12-01 RX ORDER — FUROSEMIDE 10 MG/ML
40 INJECTION INTRAMUSCULAR; INTRAVENOUS ONCE
Status: COMPLETED | OUTPATIENT
Start: 2022-12-01 | End: 2022-12-01

## 2022-12-01 RX ORDER — ENALAPRILAT 2.5 MG/2ML
1.25 INJECTION INTRAVENOUS EVERY 6 HOURS SCHEDULED
Status: DISCONTINUED | OUTPATIENT
Start: 2022-12-01 | End: 2022-12-01

## 2022-12-01 RX ORDER — LOSARTAN POTASSIUM 100 MG/1
100 TABLET ORAL DAILY
Status: DISCONTINUED | OUTPATIENT
Start: 2022-12-02 | End: 2022-12-22 | Stop reason: HOSPADM

## 2022-12-01 RX ORDER — CARVEDILOL 12.5 MG/1
12.5 TABLET ORAL 2 TIMES DAILY WITH MEALS
Status: DISCONTINUED | OUTPATIENT
Start: 2022-12-01 | End: 2022-12-22 | Stop reason: HOSPADM

## 2022-12-01 RX ORDER — HYDRALAZINE HYDROCHLORIDE 20 MG/ML
10 INJECTION INTRAMUSCULAR; INTRAVENOUS
Status: COMPLETED | OUTPATIENT
Start: 2022-12-01 | End: 2022-12-01

## 2022-12-01 RX ORDER — ACYCLOVIR 200 MG/1
400 CAPSULE ORAL 3 TIMES DAILY
Status: DISCONTINUED | OUTPATIENT
Start: 2022-12-01 | End: 2022-12-01

## 2022-12-01 RX ORDER — LABETALOL HYDROCHLORIDE 5 MG/ML
10 INJECTION, SOLUTION INTRAVENOUS
Status: COMPLETED | OUTPATIENT
Start: 2022-12-01 | End: 2022-12-01

## 2022-12-01 RX ORDER — HEPARIN SODIUM 5000 [USP'U]/ML
5000 INJECTION, SOLUTION INTRAVENOUS; SUBCUTANEOUS EVERY 8 HOURS SCHEDULED
Status: DISCONTINUED | OUTPATIENT
Start: 2022-12-01 | End: 2022-12-14

## 2022-12-01 RX ADMIN — HEPARIN SODIUM 5000 UNITS: 5000 INJECTION INTRAVENOUS; SUBCUTANEOUS at 06:23

## 2022-12-01 RX ADMIN — POTASSIUM CHLORIDE 10 MEQ: 10 INJECTION, SOLUTION INTRAVENOUS at 08:21

## 2022-12-01 RX ADMIN — LOSARTAN POTASSIUM 50 MG: 50 TABLET, FILM COATED ORAL at 11:33

## 2022-12-01 RX ADMIN — ONDANSETRON 4 MG: 2 INJECTION INTRAMUSCULAR; INTRAVENOUS at 20:01

## 2022-12-01 RX ADMIN — INSULIN LISPRO 1 UNITS: 100 INJECTION, SOLUTION INTRAVENOUS; SUBCUTANEOUS at 16:54

## 2022-12-01 RX ADMIN — MORPHINE SULFATE 2 MG: 2 INJECTION, SOLUTION INTRAMUSCULAR; INTRAVENOUS at 02:05

## 2022-12-01 RX ADMIN — SODIUM CHLORIDE 10 MG/HR: 9 INJECTION, SOLUTION INTRAVENOUS at 03:34

## 2022-12-01 RX ADMIN — TETRAHYDROZOLINE HCL 0.05% 1 DROP: 0.05 SOLUTION/ DROPS INTRAOCULAR at 19:34

## 2022-12-01 RX ADMIN — LABETALOL HYDROCHLORIDE 10 MG: 5 INJECTION, SOLUTION INTRAVENOUS at 16:11

## 2022-12-01 RX ADMIN — HYDRALAZINE HYDROCHLORIDE 10 MG: 20 INJECTION INTRAMUSCULAR; INTRAVENOUS at 18:13

## 2022-12-01 RX ADMIN — LOSARTAN POTASSIUM 50 MG: 50 TABLET, FILM COATED ORAL at 17:03

## 2022-12-01 RX ADMIN — LATANOPROST 1 DROP: 50 SOLUTION OPHTHALMIC at 19:34

## 2022-12-01 RX ADMIN — TETRAHYDROZOLINE HCL 0.05% 1 DROP: 0.05 SOLUTION/ DROPS INTRAOCULAR at 14:00

## 2022-12-01 RX ADMIN — TETRAHYDROZOLINE HCL 0.05% 1 DROP: 0.05 SOLUTION/ DROPS INTRAOCULAR at 09:21

## 2022-12-01 RX ADMIN — SODIUM CHLORIDE 12.5 MG/HR: 9 INJECTION, SOLUTION INTRAVENOUS at 09:54

## 2022-12-01 RX ADMIN — ACYCLOVIR SODIUM 400 MG: 50 INJECTION, SOLUTION INTRAVENOUS at 09:20

## 2022-12-01 RX ADMIN — POTASSIUM CHLORIDE 10 MEQ: 10 INJECTION, SOLUTION INTRAVENOUS at 05:56

## 2022-12-01 RX ADMIN — HEPARIN SODIUM 5000 UNITS: 5000 INJECTION INTRAVENOUS; SUBCUTANEOUS at 21:16

## 2022-12-01 RX ADMIN — POTASSIUM BICARBONATE 20 MEQ: 782 TABLET, EFFERVESCENT ORAL at 12:04

## 2022-12-01 RX ADMIN — SODIUM CHLORIDE 7.5 MG/HR: 9 INJECTION, SOLUTION INTRAVENOUS at 16:08

## 2022-12-01 RX ADMIN — POTASSIUM CHLORIDE 10 MEQ: 10 INJECTION, SOLUTION INTRAVENOUS at 09:27

## 2022-12-01 RX ADMIN — CARVEDILOL 6.25 MG: 6.25 TABLET, FILM COATED ORAL at 11:33

## 2022-12-01 RX ADMIN — SODIUM CHLORIDE 10 MG/HR: 9 INJECTION, SOLUTION INTRAVENOUS at 05:57

## 2022-12-01 RX ADMIN — POTASSIUM CHLORIDE 10 MEQ: 10 INJECTION, SOLUTION INTRAVENOUS at 22:27

## 2022-12-01 RX ADMIN — SODIUM CHLORIDE 500 MG: 9 INJECTION, SOLUTION INTRAVENOUS at 18:30

## 2022-12-01 RX ADMIN — CARVEDILOL 12.5 MG: 6.25 TABLET, FILM COATED ORAL at 17:03

## 2022-12-01 RX ADMIN — SODIUM CHLORIDE, PRESERVATIVE FREE 10 ML: 5 INJECTION INTRAVENOUS at 08:16

## 2022-12-01 RX ADMIN — IOPAMIDOL 75 ML: 755 INJECTION, SOLUTION INTRAVENOUS at 17:46

## 2022-12-01 RX ADMIN — FUROSEMIDE 80 MG: 10 INJECTION, SOLUTION INTRAMUSCULAR; INTRAVENOUS at 18:33

## 2022-12-01 RX ADMIN — ACYCLOVIR SODIUM 400 MG: 50 INJECTION, SOLUTION INTRAVENOUS at 17:30

## 2022-12-01 RX ADMIN — POTASSIUM BICARBONATE 40 MEQ: 782 TABLET, EFFERVESCENT ORAL at 16:54

## 2022-12-01 RX ADMIN — POTASSIUM CHLORIDE 10 MEQ: 7.46 INJECTION, SOLUTION INTRAVENOUS at 19:19

## 2022-12-01 RX ADMIN — LABETALOL HYDROCHLORIDE 10 MG: 5 INJECTION, SOLUTION INTRAVENOUS at 09:04

## 2022-12-01 RX ADMIN — SODIUM CHLORIDE 12.5 MG/HR: 9 INJECTION, SOLUTION INTRAVENOUS at 08:38

## 2022-12-01 RX ADMIN — FUROSEMIDE 40 MG: 10 INJECTION, SOLUTION INTRAMUSCULAR; INTRAVENOUS at 08:16

## 2022-12-01 RX ADMIN — POTASSIUM CHLORIDE 10 MEQ: 10 INJECTION, SOLUTION INTRAVENOUS at 10:22

## 2022-12-01 RX ADMIN — ONDANSETRON 4 MG: 2 INJECTION INTRAMUSCULAR; INTRAVENOUS at 02:08

## 2022-12-01 RX ADMIN — POTASSIUM CHLORIDE 10 MEQ: 10 INJECTION, SOLUTION INTRAVENOUS at 07:00

## 2022-12-01 RX ADMIN — FUROSEMIDE 40 MG: 10 INJECTION, SOLUTION INTRAMUSCULAR; INTRAVENOUS at 12:41

## 2022-12-01 RX ADMIN — INSULIN LISPRO 1 UNITS: 100 INJECTION, SOLUTION INTRAVENOUS; SUBCUTANEOUS at 12:23

## 2022-12-01 RX ADMIN — SODIUM CHLORIDE 500 MG: 9 INJECTION, SOLUTION INTRAVENOUS at 05:32

## 2022-12-01 RX ADMIN — HYDRALAZINE HYDROCHLORIDE 10 MG: 20 INJECTION INTRAMUSCULAR; INTRAVENOUS at 01:02

## 2022-12-01 RX ADMIN — HEPARIN SODIUM 5000 UNITS: 5000 INJECTION INTRAVENOUS; SUBCUTANEOUS at 14:00

## 2022-12-01 RX ADMIN — POTASSIUM CHLORIDE 10 MEQ: 7.46 INJECTION, SOLUTION INTRAVENOUS at 20:05

## 2022-12-01 RX ADMIN — SODIUM CHLORIDE 10 MG/HR: 9 INJECTION, SOLUTION INTRAVENOUS at 01:02

## 2022-12-01 RX ADMIN — METOCLOPRAMIDE 10 MG: 5 INJECTION, SOLUTION INTRAMUSCULAR; INTRAVENOUS at 02:08

## 2022-12-01 RX ADMIN — POTASSIUM CHLORIDE 10 MEQ: 10 INJECTION, SOLUTION INTRAVENOUS at 23:30

## 2022-12-01 ASSESSMENT — PAIN SCALES - GENERAL
PAINLEVEL_OUTOF10: 0
PAINLEVEL_OUTOF10: 0

## 2022-12-01 NOTE — PROGRESS NOTES
No changes in Pt's neuro exam.     Pt back to bed, via steady and x2 RN. No drainage noted for Pts abd surgical site or Pt's facial/cranial surgical site.      Lani Gaffney, RN

## 2022-12-01 NOTE — PROGRESS NOTES
Pt's oxygen requiremts climbing back up. Pt's potassium was low, RN notified medical residents replacements ordered.      Pt's neuro exam remains unchanged    Blanca Loja RN

## 2022-12-01 NOTE — PROGRESS NOTES
ICU Progress Note    Admit Date: 11/28/2022  Day: 2  IV Access: Peripheral  IV Fluids: NS @ 100ml/hr  Vasopressors: None       Antibiotics: None  Diet: Diet NPO Exceptions are: Sips of Water with Meds    CC: vertigo, falls. Interval history:   - NAEO  - Pt is more alert today than she was before. Still has right sided facial droop likely from the procedure. Denies any nausea vomiting or pain. - She states that she is hungry and wants to eat, she will be getting an SLP eval this morning - if she fails it an NG tube will be placed  - Potassium has been replaced  - Lost net 1.8L in total yesterday and is up 3.5L throughout this stay, she could benefit from an increase in Lasix, Cr remains good  - Pt has not had any bowel movements or has passed gas which is likely due to not eating and having been on opioid pain meds. Movantic and bowel regimen could help this process. HPI: Jeanie Guzmán is a 80-year-old female with past medical history of hyperlipidemia, asthma, type 2 diabetes, GERD, hypertension, and more recently cerebellopontine angle meningioma with a smaller left parafalcine extra-axial lesion. The patient's main symptoms began in June with feelings of vertigo and falls. During that week of the onset of her symptoms, she had 2 episodes of true vertigo and fell out of bed. Patient states that she was frequently dizzy as well. This prompted her to go to her primary care physician who ordered an MRI. That MRI subsequently showed this large right cerebellopontine angle mass and then a smaller left anterior parafalcine extra-axial lesion. Patient was admitted to the [facility], [facility]. ICU today status post part one of his staged procedure for those meningioma and masses. Patient admitted to the ICU specifically for neuro monitoring as well as neurovascular checks. Patient had part 2 of her stage procedure yesterday. Patient tolerated the procedure well.     Medications:     Scheduled Meds: heparin (porcine)  5,000 Units SubCUTAneous 3 times per day    potassium chloride  10 mEq IntraVENous Q1H    sodium chloride flush  5-40 mL IntraVENous 2 times per day    sodium chloride flush  5-40 mL IntraVENous 2 times per day    furosemide  40 mg IntraVENous BID    sodium chloride flush  5-40 mL IntraVENous 2 times per day    [Held by provider] sennosides-docusate sodium  1 tablet Oral BID    levETIRAcetam  500 mg IntraVENous Q12H    [Held by provider] losartan  50 mg Oral Nightly    latanoprost  1 drop Both Eyes Nightly    insulin lispro  0-4 Units SubCUTAneous TID WC    insulin lispro  0-4 Units SubCUTAneous Nightly    [Held by provider] methIMAzole  10 mg Oral Daily     Continuous Infusions:   esmolol      niCARdipine      sodium chloride      [Held by provider] lactated ringers 125 mL/hr at 11/30/22 0928    sodium chloride      sodium chloride      [Held by provider] sodium chloride 125 mL/hr at 11/29/22 0730    dextrose       PRN Meds:esmolol, sodium chloride flush, sodium chloride, meperidine, HYDROmorphone, HYDROmorphone, oxyCODONE **OR** oxyCODONE, diphenhydrAMINE, labetalol **OR** hydrALAZINE, sodium chloride flush, sodium chloride, sodium chloride flush, sodium chloride, acetaminophen, oxyCODONE **OR** oxyCODONE, morphine, ondansetron **OR** ondansetron, hydrALAZINE, labetalol, albuterol, glucose, dextrose bolus **OR** dextrose bolus, glucagon (rDNA), dextrose, promethazine    Objective:   Vitals:   T-max:  Patient Vitals for the past 8 hrs:   Temp Temp src Pulse Resp SpO2   12/01/22 0500 -- -- 77 16 92 %   12/01/22 0400 97.9 °F (36.6 °C) Oral 84 20 --   12/01/22 0300 -- -- 85 14 --   12/01/22 0235 -- -- -- 18 --   12/01/22 0200 -- -- 86 19 94 %   12/01/22 0100 -- -- 79 13 95 %   12/01/22 0000 98.5 °F (36.9 °C) -- 75 18 93 %   11/30/22 2300 -- -- 84 21 93 %   11/30/22 2200 -- -- 73 18 93 %         Intake/Output Summary (Last 24 hours) at 12/1/2022 0548  Last data filed at 12/1/2022 0300  Gross per 24 hour   Intake 2315 ml   Output 4200 ml   Net -1885 ml         Physical Exam  Constitutional:       Appearance: She is morbidly obese. Interventions: Nasal cannula in place. HENT:      Head:      Comments: EVD noted to be in place. Eyes:      Comments: Patient has a rightward gaze deviation in both eyes. Cardiovascular:      Rate and Rhythm: Normal rate and regular rhythm. Pulses:           Radial pulses are 2+ on the right side and 2+ on the left side. Heart sounds: Normal heart sounds, S1 normal and S2 normal. No murmur heard. Pulmonary:      Effort: Pulmonary effort is normal.      Breath sounds: Normal breath sounds and air entry. Abdominal:      General: Abdomen is protuberant. Bowel sounds are normal.      Palpations: Abdomen is soft. Tenderness: There is no abdominal tenderness. Musculoskeletal:      Right lower leg: Edema present. Left lower leg: Edema present. Neurological:      Mental Status: She is lethargic. Cranial Nerves: Cranial nerves 2-12 are intact. Comments: Patient very somnolent during neurologic examination. Patient had rightward gaze deviation but was able to follow when prompted to do so. No focal neurologic deficit other than the above. LABS:    CBC:   Recent Labs     11/29/22 0419 12/01/22  0431   WBC 9.9 10.4   HGB 12.2 10.9*   HCT 35.8* 31.9*    140   MCV 90.3 91.6       Renal:    Recent Labs     11/29/22 0419 11/30/22  0514 12/01/22  0431     --  146*   K 3.7  --  2.8*     --  114*   CO2 22  --  24   BUN 13  --  16   CREATININE <0.5*  --  <0.5*   GLUCOSE 185*  --  204*   CALCIUM 7.6*  --  7.6*   MG  --  1.40* 2.30   ANIONGAP 11  --  8       Hepatic: No results for input(s): AST, ALT, BILITOT, BILIDIR, PROT, LABALBU, ALKPHOS in the last 72 hours. Troponin: No results for input(s): TROPONINI in the last 72 hours. BNP: No results for input(s): BNP in the last 72 hours.   Lipids: No results for input(s): CHOL, HDL in the last 72 hours. Invalid input(s): LDLCALCU, TRIGLYCERIDE  ABGs:    Recent Labs     11/28/22  1246 11/29/22  0937 11/29/22  1203   PHART 7.417 7.412 7.395   AAM8PMN 32.9* 34.4* 35.5   PO2ART 123.2* 222.4* 102.3   QZO8UFB 21.2 21.9 21.7   BEART -3 -3 -3   K5IQHQYB 99 100 98   BCZ3DRM 22 23 23         INR:   Recent Labs     11/29/22  0419   INR 1.09       Lactate:   Recent Labs     11/29/22  0937 11/29/22  1203   LACTATE 2.45* 2.19*       Cultures:  -----------------------------------------------------------------  RAD:   XR CHEST PORTABLE   Final Result      Patchy left upper lobe airspace disease, atelectasis versus pneumonia. CT HEAD WO CONTRAST   Final Result      1. Interval postsurgical changes from resection of right cerebellopontine angle mass with associated right temporal mastoid resection and frontotemporal craniotomy and fat graft placement. 2.  Thin hyperdensity along the fat graft may represent small amount of postsurgical blood products. There is also a small focus of subarachnoid hemorrhage along the right temporal lobe. 3.  Mild pneumocephalus and bilateral subgaleal fluid is also likely postsurgical.               Assessment/Plan:   Marilou De Leon is a 26-year-old female with past medical history of hyperlipidemia, asthma, type 2 diabetes, GERD, hypertension, and more recently cerebellopontine angle meningioma with a smaller left parafalcine extra-axial lesion. Left cerebellopontine angle meningioma and smaller left parafalcine extra- axial lesion status post both parts of the procedure. Every hour neurochecks  Every 4 hours neurovascular checks  Neurosurgery and otolaryngology following  Will continue Keppra 500 IV twice daily for seizure prophylaxis  SBP will be maintained below 160  As needed labetalol 10 and as needed hydralazine 10 on board if needed.   Nicardipine gtt PRN  EVD in place- NSGY managing  PT/OT Eval  SLP  Chronic medical conditions  Asthma- patient's albuterol restarted. Hypertension- blood pressure currently controlled with as needed labetalol 10 and hydralazine 10. Also has nicardipine gtt as PRN. Currently BP is below 160. Hyperlipidemia- patient is at home statin currently on hold. Code Status:Full Code  FEN: Diet NPO Exceptions are: Sips of Water with Meds  PPX:  Keppra  DISPO: ICU    Calvin Plasencia DO  PGY1, Internal Medicine  12/01/22  5:48 AM    Pulmonary & Critical Care     Patient seen and examined. I agree with Dr. Alejandro Nelson history, physical, lab findings, assessment and plan. 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 Patient had MBS and speech therapy recommends puréed with thin liquids and meds in purée     Work of breathing is quite a bit better today than yesterday. With Lasix 40 mg IV twice daily she had 5 L of urine output however had 3.8 L in for a net negative of 1.2 L. She is requiring a lot of nicardipine for blood pressure control and she is getting a significant volume input from that.   Nonetheless her ox requirement has gone from 10 L yesterday to 5 L today     Will double concentrate nicardipine  I have started her home regimen of Coreg 6.25 twice daily and Marie 50 mg daily and with that her nicardipine has been able to to be weaned to 5 goal SBP less than 160  Increase Lasix to 80 mg IV twice daily  Potassium has been replaced with 70 mill equivalents and will recheck potassium level this afternoon    Neurosurgery managing postoperative neurosurgical care     Full code    Kathryn Oshea MD

## 2022-12-01 NOTE — PROGRESS NOTES
X-ray obtained, ABG,    Due to results Pt taken down to CT of chest to rule out PE. On travel down to CT Pt placed on Non-Rebreather, oxygenation reading improved with Non-rebreather.        Geremias Hadley RN

## 2022-12-01 NOTE — PROGRESS NOTES
Physical Therapy  Facility/Department: [facility] ICU  Daily Treatment Note  NAME: Kay Farris  : 1955  MRN: [id number]    Date of Service: 2022    Discharge Recommendations:Bianca De La Cruz scored a 6/24 on the AM-PAC short mobility form. Current research shows that an AM-PAC score of 17 or less is typically not associated with a discharge to the patient's home setting. Based on the patient's AM-PAC score and their current functional mobility deficits, it is recommended that the patient have 5-7 sessions per week of Physical Therapy at d/c to increase the patient's independence. At this time, this patient demonstrates complex nursing, medical, and rehabilitative needs, and would benefit from intensive rehabilitation services upon discharge from the Inpatient setting. This patient demonstrates the ability to participate in and benefit from an intensive therapy program with a coordinated interdisciplinary team approach to foster frequent, structured, and documented communication among disciplines, who will work together to establish, prioritize, and achieve treatment goals. Please see assessment section for further patient specific details. If patient discharges prior to next session this note will serve as a discharge summary. Please see below for the latest assessment towards goals. Patient would benefit from continued therapy after discharge   PT Equipment Recommendations  Equipment Needed:  (defer)    Patient Diagnosis(es): Diagnoses of Acoustic neuroma [facility]), Balance problem, Meningioma ([facility] [address] and Dizziness were pertinent to this visit. Assessment   Assessment: Pt presents with improved tolerance for activity today and was able to stand a few times with therapy though was very fatigued. She was able to stand with decreasing assist levels throughout the session though did better with the aimee stedy; unable to stand without it today.  She would benefit from further IP PT to improve her independence and safety wtih mobility due to her weakness, impaired balance, and impaired activity tolerance. Activity Tolerance: Patient tolerated treatment well;Patient limited by fatigue (BP remained in 140s-150s for majority of session, one jump up t0 164, recovered when seated in chair; SpO2 94-96 on 5 L via NC)  Equipment Needed:  (defer)     Plan    Physcial Therapy Plan  General Plan: 5-7 times per week  Current Treatment Recommendations: Strengthening; Functional mobility training;Transfer training;Gait training; Endurance training; Safety education & training; Therapeutic activities     Restrictions  Position Activity Restriction  Other position/activity restrictions: Up with assistance, ambulate the patient, up in the chair, HOB elevated to 30 degrees     Subjective    Subjective  Subjective: \"I am doing well. \" Pt presents supine in bed and willing to work with therapist.  Pain: denies  Orientation  Overall Orientation Status: Impaired  Orientation Level: Oriented X4  Cognition  Overall Cognitive Status: Exceptions  Arousal/Alertness: Appropriate responses to stimuli  Following Commands: Follows one step commands with increased time; Follows one step commands with repetition (some repetition required)  Attention Span: Attends with cues to redirect  Memory: Decreased recall of recent events  Safety Judgement: Decreased awareness of need for safety;Decreased awareness of need for assistance  Problem Solving: Assistance required to identify errors made;Assistance required to implement solutions;Assistance required to generate solutions;Assistance required to correct errors made  Insights: Decreased awareness of deficits  Initiation: Requires cues for some  Sequencing: Requires cues for some     Objective   Vitals     Bed Mobility Training  Bed Mobility Training: Yes  Supine to Sit: Stand-by assistance (HOB slightly elevated; max cues and additional time, effortful)  Scooting: Contact-guard assistance (to EOB)  Balance  Sitting: Without support (CGA-SBA at EOB, some swaying in all directions. Pt endorsed feeling nauseous and some dizziness)  Sitting - Static: Fair (occasional)  Sitting - Dynamic: Fair (occasional)  Standing: With support (BUE support via RW, CGA in Stedy for short static stance)  Transfer Training  Transfer Training: Yes  Sit to Stand: Assist X2;Maximum assistance (first trial to Stedy: Max x2, unable to achieve full stance. second trail up to stedy: Max x2, pt with increased initiation and engagement, achieved full stance; x2 trials with CGA from [address] [place] [address])  Stand to Sit: Assist X2;Maximum assistance (for controlled descent into chair)  Bed to Chair: Total assistance; Adaptive equipment (via aimee stedy)  Gait Training  Gait Training: No           Safety Devices  Type of Devices: Patient at risk for falls; Left in chair;Call light within reach; Chair alarm in place;Gait belt;Nurse notified; Heels elevated for pressure relief (rafat lift pad placed under pt in chair, RN aware)  Restraints  Restraints Initially in Place: No       Goals  Short Term Goals  Time Frame for Short Term Goals: D/C  Short Term Goal 1: supine<->sit mod A x 1-2  Short Term Goal 2: pt will sit at EOB for 5 min with SBA  Short Term Goal 3: pt will tolerate sit<->stand assessment  Short Term Goal 4: pt will tolerate bed->chair assessment  Short Term Goal 5: pt will tolerate gait assessment  Patient Goals   Patient Goals : not stated    Education  Patient Education  Education Given To: Patient  Education Provided: Role of Therapy  Education Method: Verbal  Education Outcome: Verbalized understanding    Therapy Time   Individual Concurrent Group Co-treatment   Time In 1122         Time Out 1200         Minutes 38         Timed Code Treatment Minutes: 38 Minutes   Timed Code Treatment Minutes:  38 Minutes    Total Treatment Minutes:  38 minutes      Derek Sweeney PT

## 2022-12-01 NOTE — CARE COORDINATION
Case management is following for discharge planning. The chart was reviewed. Therapy worked with Ms. Saritha Fleming today and recommended acute rehab. PT AM-PAC: 6 / 24 per last evaluation on: 12/1    OT AM-PAC: 11 / 24 per last evaluation on: 12/1    Met with the pt and her sister at the bedside to discuss options for post acute care. She is open to acute rehab. Her top choices are [facility] or [facility]. She has traditional Medicare and will not need a pre-cert for admission. CM will follow and make referrals once she is more medically stable.     Electronically signed by BOYD Chowdhury RN-[facility]  Case Management  [phone]

## 2022-12-01 NOTE — PROGRESS NOTES
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 Subjective:   Patient reports mild pain VAS 2 that is well controlled. She denies nausea/vomiting/ Vertigo. Denies any chest pain or lower extremities pain. Pt complained about Breathing difficulties  earlier today with desaturations. Pt evaluated and treated by PT/OT earlier today. Objective:    Pt awake Looks tired. AF, O2 dropped to 84% (now 99% under mask O2 15L ! Sitting up in bed. Facial nerve same HB - 4-3  Mastoid wrap well placed and completely dry. Incision looks fine no evidence for leaking. Right neck ecchymotic. Without worsening. Abdominal wound - well closed, no sign for hematoma, bleeding or infection. Lower extremities without evidence for DVT in palpation. X-ray Mild cardiomegaly is stable. Patchy airspace disease the lung parenchyma is similar. No pneumothorax.,   ABG, - PO2 48 PH 7.4 PCO2 37. Due to results Pt taken down to CT of chest to rule out PE.   CTA chest:      Significantly limited study due to motion. 1. Significantly limited study due to streak artifact and suboptimal bolus. No evidence of a central embolus. 2. Multifocal airspace consolidation is present, suboptimally evaluated due to motion. This presumably reflects pneumonia. Follow-up chest CT is recommended to document resolution. 3. There is a large mass in the right hepatic lobe measuring 8.2 x 7.4 cm, demonstrating peripheral nodular enhancement. There is significant amount of artifact through this lesion. It is still favored to reflect a hemangioma. Recommend a multiphasic CT    of the abdomen for further assessment. This could be performed on a nonurgent basis, if clinically appropriate. 4. Partially calcified nodule arising from the left adrenal gland, most likely benign and possibly reflecting old adrenal hemorrhage. Assessment:   1 day s/p resection of meningioma. 2 days s/p TL and MCF approach. Dyspnea, PE was ruled out   Pt under SQ Heparin 5000 units 3 times/ day   - contact internal medicine physician (On call team was contacted, her condition was presented and discussed)    After CTA chest ruled out PE , Volume overload and restrictive lung disease were suspected.    (I gave my number to ICU team and internal medicine)       Plan:   - pt to continue Heparin   - repeat ABG in 2 hours   - Will update Dr. Jo Ann Vargas and Dr. Carlos Sheikh   - contact internal medicine physician (On call team was contacted, her condition was presented and discussed)   --Continue to see PT/OT ( Scheduled for today)   --Will keep an eye on mastoid dressing.   --SCDs and SQH  --Remainder of care per IM and NSGY teams  - will reassess again in morning round

## 2022-12-01 NOTE — PROGRESS NOTES
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 Subjective:   Patient reports mild pain VAS 3 that is well controlled. She denies nausea/vomiting/ Vertigo. Denies any chest pain or lower extremities pain     Objective:    Pt awake this morning able to answer all orientation questions and follow commands on all four extremities   AF, O2 weaned from 10L to 7L. Sat. 92%-93%  Sitting up in bed. Facial nerve HB 3 (Complete eye closure)   Mastoid wrap well placed and completely dry. Incision looks fine no evidence for leaking. Right neck ecchymotic. Without worsening. No evidence of loss of abduction. EOMI. Abdominal wound - well closed, no sign for hematoma, bleeding or infection. Lower extremities without evidence for DVT in palpation.    - No LD     Assessment:   1 day s/p resection of meningioma. 2 days s/p TL and MCF approach. She is doing well today. Plan:   --Continue to see PT/OT ( Scheduled for today)   --Will keep an eye on mastoid dressing.   --SCDs and SQH  --Remainder of care per IM and NSGY teams  - will reassess again in evening round.

## 2022-12-01 NOTE — PLAN OF CARE
Problem: Chronic Conditions and Co-morbidities  Goal: Patient's chronic conditions and co-morbidity symptoms are monitored and maintained or improved  11/30/2022 2229 by Janusz Lozano RN  Outcome: Progressing  11/30/2022 1323 by Clayton Wolf RN  Outcome: Progressing     Problem: Discharge Planning  Goal: Discharge to home or other facility with appropriate resources  Outcome: Progressing     Problem: Pain  Goal: Verbalizes/displays adequate comfort level or baseline comfort level  11/30/2022 2229 by Janusz Lozano RN  Outcome: Progressing  11/30/2022 1323 by Clayton Wolf RN  Outcome: Progressing     Problem: Safety - Adult  Goal: Free from fall injury  11/30/2022 1323 by Clayton Wolf RN  Outcome: Progressing  Flowsheets (Taken 11/30/2022 0848)  Free From Fall Injury: Instruct family/caregiver on patient safety     Problem: Skin/Tissue Integrity  Goal: Absence of new skin breakdown  Description: 1. Monitor for areas of redness and/or skin breakdown  2. Assess vascular access sites hourly  3. Every 4-6 hours minimum:  Change oxygen saturation probe site  4. Every 4-6 hours:  If on nasal continuous positive airway pressure, respiratory therapy assess nares and determine need for appliance change or resting period. 11/30/2022 1323 by Clayton Wolf RN  Outcome: Progressing     Problem: SLP Adult - Impaired Swallowing  Goal: By Discharge: Advance to least restrictive diet without signs or symptoms of aspiration for planned discharge setting. See evaluation for individualized goals.   11/30/2022 1254 by PINA Cazares  Outcome: Progressing

## 2022-12-01 NOTE — PROGRESS NOTES
Patient opens eyes to voice, oriented to person, place, time. Neuro assessment is consistent and unchanged. R sided facial droop and dysarthria noted. 40 mg Lasix given per orders, O2 weaned from 10L to 7 L. Will continue to monitor closely.

## 2022-12-01 NOTE — PROCEDURES
INSTRUMENTAL SWALLOW REPORT  MODIFIED BARIUM SWALLOW    NAME: Rafaela Najjar   : 1955  MRN: [id number]       Date of Eval: 2022     Ordering Physician: Dr. Kartik Ramachandran  Radiologist: Dr. Melvin Kruse     Referring Diagnosis: Dysphagia    Past Medical History:  has a past medical history of Arthritis, Asthma, Brain tumor ([facility] [address] Diabetes mellitus ([facility] [address] High cholesterol, Hypertension, Imbalance, Loss of hearing, and Vertigo. Past Surgical History:  has a past surgical history that includes Total knee arthroplasty (Right, 2015); Total knee arthroplasty (Left, 10/14/15); Total hip arthroplasty (Right, 2021); Neuroma surgery (Right, 2022); mastoidectomy (Right, 2022); and craniotomy (N/A, 2022). Type of Study: Initial MBS    Recent Chest Xray: (2022)  Impression       Patchy left upper lobe airspace disease, atelectasis versus pneumonia. CT of Head: (2022)  Impression       1. Interval postsurgical changes from resection of right cerebellopontine angle mass with associated right temporal mastoid resection and frontotemporal craniotomy and fat graft placement. 2.  Thin hyperdensity along the fat graft may represent small amount of postsurgical blood products. There is also a small focus of subarachnoid hemorrhage along the right temporal lobe. 3.  Mild pneumocephalus and bilateral subgaleal fluid is also likely postsurgical     Patient Complaints/Reason for Referral:  Rafaela Najjar was referred for a MBS to assess the efficiency of his/her swallow function, assess for aspiration, and to make recommendations regarding safe dietary consistencies, effective compensatory strategies, and safe eating environment.   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 The patient's main symptoms began in June with feelings of vertigo and falls. During that week of the onset of her symptoms, she had 2 episodes of true vertigo and fell out of bed. Patient states that she was frequently dizzy as well. This prompted her to go to her primary care physician who ordered an MRI. That MRI subsequently showed this large right cerebellopontine angle mass and then a smaller left anterior parafalcine extra-axial lesion. Patient was admitted to the [facility]. ICU today status post part one of his staged procedure for those meningioma and masses. Patient admitted to the ICU specifically for neuro monitoring as well as neurovascular checks. Patient had part 2 of her stage procedure yesterday. Patient tolerated the procedure well.'  Subjective  Subjective: Received pt awake, alert, seated up in bed, on 6L O2 via nc. ICU rn present. Behavior/Cognition/Vision/Hearing:  Behavior/Cognition: Requires cueing;Lethargic;Cooperative  Vision Exceptions: Wears glasses at all times  Hearing: Within functional limits    Impressions:  Oral Phase  Moderate dysfunction characterized by right-sided oral weakness with reduced labial seal and coordination, resulting in anterior loss of liquid via tsp and inability to use straw when placed on right or midline. For left-sided straw placement, pt able to create appropriate seal/suction. Prolonged mastication of soft solids, with slowed a-p transit, mild stasis. Pharyngeal Phase  Grossly WFL. Overall timely swallow initiation with appropriate hyolaryngeal mechanics. Single instance trace flash (self clearing) penetration of thins when straw was placed on right side. Otherwise good airway protection throughout with no aspiration. No significant stasis.   Upper Esophageal Screen  Indirectly assessed; appeared unremarkable    Treatment Dx and ICD 10: dysphagia   Patient Position: Lateral and upright    Consistencies Administered: Minced and Moist;Pureed; Thin teaspoon; Thin straw;Mildly Thick teaspoon;Mildly Thick straw    Dysphagia Outcome Severity Scale: Level 4: Mild moderate dysphagia- Intermittent supervision/cueing. One - two diet consistencies restricted  Penetration-Aspiration Scale (PAS): 2 - Material enters the airway, remains above the vocal folds, and is ejected from the airway    Recommended Diet:  Solid consistency: Pureed  Liquid consistency: Thin  Liquid administration via: Straw;Spoon    Medication administration: PO    Safe Swallow Protocol:  Supervision: Close  Compensatory Swallowing Strategies : Upright as possible for all oral intake;Eat/Feed slowly; Check for pocketing of food on the Right; Alternate solids and liquids (straw and food placement on LEFT)    Recommendations/Treatment  Requires SLP Intervention: Yes     D/C Recommendations: Ongoing speech therapy is recommended during this hospitalization  Postural Changes and/or Swallow Maneuvers: Upright 90 degrees    Recommended Exercises:    Therapeutic Interventions: Diet tolerance monitoring;Oral motor exercises; Therapeutic PO trials with SLP;Patient/Family education    Education: Images and recommendations were reviewed with the patient following this exam.   Patient Education: Educated pt to purpose of visit, role of SLP. Patient Education Response: Verbalizes understanding;Needs reinforcement    Safety Devices  Safety Devices in place: Yes  Type of devices: All fall risk precautions in place; Other (comment) (handed off to RN (escorting pt back to floor))    Goals:    Goal 1: Patient will participate in further assessment of swallow function as appropriate. 12/1: Patient seen bedside. Awake, and much more alert this am. Speech much clearer today vs yesterday. Asking for water/food.  Improved oral control (able to use straw), with reduced labial loss, and apparent improved oral clearance of puree bolus. Recommend proceed with MBS. Patient agreeable. D/C Goal, met via repeat BSE and MBS. Goal 2: Patient/caregiver will demonstrate understanding of swallowing concerns/recommendations. 11/30: Educated pt to purpose of visit, s/s of aspiration, concern if aspiration occurs, swallow function, safety strategies (upright positioning, oral hygiene rationale), effortful swallow exercise, need for eventual instrumental assessment. Pt indicated some understanding, but suspect needs reinforcement. Cont goal  12/1: Patient educated to swallow function, MBS purpose/results, strategies (left sided placement), and diet recommendations (starting on puree vs soft solids to facilitate oral prep). Pt stated good comprehension. Continue to reinforce. Cont goal    New goal  Goal 3: Patient will tolerate least restrictive diet without overt signs of aspiration or associated decline in respiratory status. Pain   Pain Level: 0    Therapy Time:  7211-6579 (29 minutes)    Plan  Diet Recommendations: Puree / Thin Liquids via straw (Left-sided placement) / meds in puree  - general aspiration precautions  - assist feed  - check for pocketing on Right  *oral hygiene x 2 daily*    Discharge Plan:  TBD  Discussed with RN, Shanice Davis. Needs within reach. Electronically Signed by:  Burton Sanchez M.A., Tay Son   Speech-Language Pathologist  Pager #[phone]    This document will serve as a discharge summary if pt discharges before next treatment.

## 2022-12-01 NOTE — CARE COORDINATION
Case management is following for discharge planning. The chart was reviewed. Therapy worked with Ms. Preethi De La Cruz yesterday. They have recommended acute rehab. CM will mett with the pt to discuss options for post-acute care. PT AM-PAC: 6 / 24 per last evaluation on: 11/30    OT AM-PAC: 10 / 24 per last evaluation on: 11/30    DME Needs for discharge: deferred    Case Management Notes: Ms. Preethi De La Cruz is from home alone in her own house. She is independent with self care and functional mobility at baseline. She is an active . Sunita Stubbs and her family were provided with choice of provider; she and her family are in agreement with the discharge plan.       Jeffery Monahan RN  [facility]  Case Management Department  [phone]

## 2022-12-01 NOTE — PROGRESS NOTES
Pt down for MBS, Pt will be able to slowly advance diet, Pt able to take PO pills. Pt work with PT/OT, up in chair, x2 with steady.      Mikaela Vides RN

## 2022-12-01 NOTE — PROGRESS NOTES
NEUROSURGERY POST-OP PROGRESS NOTE    Patient Name: Sofia Bean YOB: 1955   Sex: Female Age: 79 yrs     Medical Record Number: [id number] Acct Number: [de-identified]   Room Number: 8790/3032-23 Hospital Day: Hospital Day: 4     Interval History:  Post-operative Day# 2 s/p Procedure(s) (LRB):  STAGE II: RESECTION OF RIGHT PETROCLIVAL MENINGIOMA (N/A)    Subjective: Pt speech is better. Speech was working with her and she is doing better today. Facial nerve HB3, complete eye closure.     Objective:    VITAL SIGNS   BP (!) 158/52   Pulse 79   Temp 98.3 °F (36.8 °C) (Oral)   Resp 24   Ht 5' 5\" (1.651 m)   Wt (!) 326 lb 8 oz (148.1 kg)   SpO2 94%   BMI 54.33 kg/m²    Height Height: 5' 5\" (165.1 cm)   Weight Weight: (!) 326 lb 8 oz (148.1 kg)          Allergies Allergies   Allergen Reactions    Keflex [Cephalexin] Itching and Rash    Codeine Nausea And Vomiting    Tomato Rash      NPO Status Diet NPO Exceptions are: Sips of Water with Meds   Isolation No active isolations     LABS   Basic Metabolic Profile Recent Labs     11/29/22 0419 11/30/22 0514 12/01/22 0431     --  146*     --  114*   CO2 22  --  24   BUN 13  --  16   CREATININE <0.5*  --  <0.5*   GLUCOSE 185*  --  204*   MG  --  1.40* 2.30      Complete Blood Count Recent Labs     11/29/22 0419 12/01/22 0431   WBC 9.9 10.4   RBC 3.96* 3.48*      Coagulation Studies Recent Labs     11/29/22 0419   INR 1.09        MEDICATIONS   Inpatient Medications     heparin (porcine), 5,000 Units, SubCUTAneous, 3 times per day    [Held by provider] polyethylene glycol, 17 g, Oral, Daily    acyclovir, 400 mg, IntraVENous, Q8H    tetrahydrozoline, 1 drop, Both Eyes, TID    furosemide, 80 mg, IntraVENous, BID    furosemide, 40 mg, IntraVENous, Once    losartan, 50 mg, Oral, Daily carvedilol, 6.25 mg, Oral, BID WC    potassium bicarb-citric acid, 20 mEq, Oral, Daily    sodium chloride flush, 5-40 mL, IntraVENous, 2 times per day    sodium chloride flush, 5-40 mL, IntraVENous, 2 times per day    sodium chloride flush, 5-40 mL, IntraVENous, 2 times per day    [Held by provider] sennosides-docusate sodium, 1 tablet, Oral, BID    levETIRAcetam, 500 mg, IntraVENous, Q12H    latanoprost, 1 drop, Both Eyes, Nightly    insulin lispro, 0-4 Units, SubCUTAneous, TID WC    insulin lispro, 0-4 Units, SubCUTAneous, Nightly    [Held by provider] methIMAzole, 10 mg, Oral, Daily   Infusions    esmolol      niCARdipene (CARDENE) 50 mg in 250 mL 0.9 % sodium chloride infusion 12.5 mg/hr (12/01/22 0954)    sodium chloride      [Held by provider] lactated ringers 125 mL/hr at 11/30/22 9594    sodium chloride      sodium chloride      [Held by provider] sodium chloride 125 mL/hr at 11/29/22 0730    dextrose        Antibiotics   Recent Abx Admin                     acyclovir (ZOVIRAX) 400 mg in dextrose 5 % 100 mL IVPB (mg) 400 mg New Bag 12/01/22 0920                     Neurologic Exam:  Mental status: awake and alert and oriented x4    Cranial Nerves:  II: Visual acuity not tested, denies new visual changes / diplopia  III, IV, VI: PERRL, 3 mm bilaterally, EOMI, Patient had rightward gaze deviation but was able to follow when prompted  V: Facial sensation intact bilaterally to touch  VII: R sided facial droop  VIII: Hearing intact bilaterally to spoken voice on L  IX: Palate movement equal bilaterally  XI: Shoulder shrug equal bilaterally  XII: Tongue deviates to R and tip curled      Musculoskeletal:   Gait: Not tested   Tone: normal  Sensory: intact to all extremities  Motor strength:    Right  Left    Right  Left    Deltoid  5 5  Hip Flex  5 5   Biceps  5 5  Knee Extensors  5 5   Triceps  5 5  Knee Flexors  5 5   Wrist Ext  5 5  Ankle Dorsiflex. 5 5   Wrist Flex  5 5  Ankle Plantarflex.   5 5   Handgrip  5 5 Ext Ernie Longus  5 5   Thumb Ext  5 5         Mastoid dressing intact, clean and dry      Respiratory:  Unlabored respiratory pattern    Abdomen:   Soft, ND   Last BMpreop    Cardiovascular:  Warm, well perfused    Assessment   Patient is a 75 yo F s/p Procedure(s) (LRB):  STAGE II: RESECTION OF RIGHT PETROCLIVAL MENINGIOMA (N/A) per Dr. Marco Hanson . Pt educated from [employer] to Neurosurgery\" book pg 25-27, 33-38. Plan:  Neurologic exam frequency:q4  Mobility:PT/OT   SLP eval  DVT Prophylaxis: SCDs and heparin  Keppra as ordered  Bowel Regimen: senna and glycolax  Pain control:alex   Nicardipine, keep sbp < 160  Incisional Care:Mastoid dressing in place check q 2 hr and let NS know if leaking  MRI today  Acyclovir started  Dispo Planning:ICU    Patient was seen with Dr. Marco Hanson who agrees with above assessment and plan. Electronically signed by: LEE Crandall CNP, 12/1/2022 11:55 AM   Neurosurgery Nurse Practitioner  [phone]   I spent 30 minutes in the care of this patient.   Over 50% of that time was in face-to-face counseling regarding post op progression, pain management strategies, and answering patient and family questions

## 2022-12-01 NOTE — PROGRESS NOTES
Pt awake this morning able to answer all orientation questions and follow commands on all four extremities. Pt's mouth cleaned with swabs x2, Pt complaining of dryness. Pt's abdominal dressing changed, no drainage. Pt placed on new specialty bed.      Brian Echevarria RN

## 2022-12-01 NOTE — PROGRESS NOTES
Occupational Therapy  Facility/Department: [address]  Daily Treatment Note  NAME: Scott Davis  : 1955  MRN: [id number]    Date of Service: 2022    Discharge Recommendations: Scott Davis scored a  on the AM-PAC ADL Inpatient form. Current research shows that an AM-PAC score of 17 or less is typically not associated with a discharge to the patient's home setting. Based on the patient's AM-PAC score and their current ADL deficits, it is recommended that the patient have 5-7 sessions per week of Occupational Therapy at d/c to increase the patient's independence. At this time, this patient demonstrates complex nursing, medical, and rehabilitative needs, and would benefit from intensive rehabilitation services upon discharge from the Inpatient setting. This patient demonstrates the ability to participate in and benefit from an intensive therapy program with a coordinated interdisciplinary team approach to foster frequent, structured, and documented communication among disciplines, who will work together to establish, prioritize, and achieve treatment goals. Please see assessment section for further patient specific details. If patient discharges prior to next session this note will serve as a discharge summary. Please see below for the latest assessment towards goals. OT Equipment Recommendations  Equipment Needed: No  Other: defer to next level of care    Patient Diagnosis(es): Diagnoses of Acoustic neuroma [facility]), Balance problem, Meningioma ([facility] [address] and Dizziness were pertinent to this visit. Assessment    Assessment: Pt tolerated treatment well this date, with increased alertness and engagement. Pt performed supine to sit transfer with SBA, increased time and effort. Pt sat EOB with SBA to instances of CGA at EOB, with some swaying noted.  Pt performed a sit to stand transfer up [place] with Max x2, demonstrating increased engagement in the second trial. Pt is limited by fatigue and nausea this date. Pt is independent with all ADLs, functional mobility, and driving at baseline, and is highly motivated to return to this level of function. Recommend intensive inpatient OT services at [place] to promote return to functional baseline. Will continue to follow on acute OT POC. Activity Tolerance: Patient tolerated treatment well (systolic BP remained in 439A-075Q for majority of session, one jump up to 164, recovered when seated in chair; SpO2 93-95 on 5 L via high flow NC)  Equipment Needed: No  Other: defer to next level of care      Plan   Occupational Therapy Plan  Times Per Week: 5-7  Times Per Day: Once a day  Current Treatment Recommendations: Strengthening;ROM;Cognitive reorientation;Balance training;Pain management;Self-Care / ADL; Functional mobility training; Safety education & training;Home management training; Endurance training;Neuromuscular re-education;Positioning     Restrictions   Up as tolerated. HOB elevated 30 degrees    Subjective   Subjective  Subjective: Pt met reclined in bed, pleasant and agreeable to therapy. No complaints of pain, but pt endorses some nausea and dizziness with movement. Radial A-line and several IV's placed, 5L O2 via NC    Orientation  Orientation Level: Oriented X4    Cognition  Overall Cognitive Status: Exceptions  Arousal/Alertness: Appropriate responses to stimuli  Following Commands: Follows one step commands with increased time; Follows one step commands with repetition (some repetition required)  Attention Span: Attends with cues to redirect  Safety Judgement: Decreased awareness of need for safety;Decreased awareness of need for assistance  Problem Solving: Assistance required to identify errors made;Assistance required to implement solutions;Assistance required to generate solutions;Assistance required to correct errors made  Insights: Decreased awareness of deficits  Initiation: Requires cues for some  Sequencing: Requires cues for some Objective    Bed Mobility Training  Bed Mobility Training: Yes  Supine to Sit: Stand-by assistance (HOB slightly elevated; max cues and additional time, effortful)  Scooting: Contact-guard assistance (forward to EOB)    Balance  Sitting: Without support (CGA-SBA at EOB for 4 mins, some swaying in all directions. Pt endorsed feeling nauseous and dizzy)  Sitting - Static: Fair (occasional)  Sitting - Dynamic: Fair (occasional)  Standing: With support (BUE support via RW, CGA in [place] for short static stance)    Transfer Training  Transfer Training: Yes  Sit to Stand: Assist X2;Maximum assistance (cues for hand placement. first trial to [facility]: Max x2, unable to achieve full stance. second trail up to [facility]: Max x2, pt with increased initiation and engagement, achieved full stance;  x2 trials with CGA from [address] [place] [address])  Stand to Sit: Assist X2;Maximum assistance (for controlled descent into chair)  Bed to Chair: Total assistance; Adaptive equipment (via [employer])    ADL  Grooming: Setup;Verbal cueing;Stand by assistance; Increased time to complete  Grooming Skilled Clinical Factors: to wipe face with wash cloth seated in chair  LE Dressing: Dependent/Total  LE Dressing Skilled Clinical Factors: to fix socks  Toileting Skilled Clinical Factors: catheter    Safety Devices  Type of Devices: Patient at risk for falls; Left in chair;Call light within reach; Chair alarm in place;Gait belt;Nurse notified; Heels elevated for pressure relief (rafat lift pad placed under pt in chair, RN aware)  Restraints  Restraints Initially in Place: No     Patient Education  Education Given To: Patient  Education Provided: Role of Therapy;Plan of Care;Transfer Training;Equipment  Education Method: Verbal  Barriers to Learning: Cognition  Education Outcome: Verbalized understanding;Continued education needed    Goals  Short Term Goals  Time Frame for Short Term Goals: by discharge  Short Term Goal 1: Pt will perform bed mobility with CGA to decrease caregiver burden- goal met 12/1, continue goal to ensure consistency  Short Term Goal 2: Pt will sit EOB 5 mins with no more than CGA- progressing  Short Term Goal 3: Pt will perform grooming activity with Min A and set-up- goal met 12/1. New Goal: Pt will perform grooming ax with set-up and supervision  Short Term Goal 4: Pt will tolerate sit to stand transfer- goal met 12/1. NEW GOAL: sit to stand transfer with Mod x2  Patient Goals   Patient goals : not stated       Therapy Time   Individual Concurrent Group Co-treatment   Time In 1122         Time Out 1200         Minutes 38         Timed Code Treatment Minutes: [address], S/OT  Therapist was present, directed the patient's care, made skilled judgement, and was responsible for assessment and treatment of the patient.    Ron Pederson OTR/L

## 2022-12-01 NOTE — PROGRESS NOTES
Perfect serve went to Dr. Miguelangel Marie about recent events to keep all of team up to date on Pt's status. Pt's oxygen saturation in upper 90's with Non-Rebreather, Pt able to come off, but oxygen reading slowly drops.        Kat Sims, RN

## 2022-12-01 NOTE — PROGRESS NOTES
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 S:  Patient reports pain is well controlled. She denies nausea/vomiting. Denies vertigo. O:    AF, Sating well on 7 L via NC  Sitting up in bed. Oriented x 4  Facial nerve HB IV  Mastoid wrap saturated on right sided; clear/reddish color on dressing. Incision leaking CSF inferiorly. Right neck ecchymotic. EAC closure with some crusting. No leakage through this site. Pseudomeningocele present. No evidence of loss of abduction. EOMI. Procedure:  --Right mastoid dressing removed. Skin cleaned with betadine. 2-0 Sillk mattress suture placed at site of leak. No additional leak identified. A:    1 day s/p resection of meningioma. 2 days s/p TL and MCF approach. She is doing well.      P:    --Goal for tomorrow is to get her up and moving  --Will keep an eye on mastoid dressing.   --SCDs and SQH  --Remainder of care per IM and NSGY teams

## 2022-12-02 LAB
ANION GAP SERPL CALCULATED.3IONS-SCNC: 7 MMOL/L (ref 3–16)
BASOPHILS ABSOLUTE: 0 K/UL (ref 0–0.2)
BASOPHILS RELATIVE PERCENT: 0.2 %
BUN BLDV-MCNC: 16 MG/DL (ref 7–20)
CALCIUM SERPL-MCNC: 7.8 MG/DL (ref 8.3–10.6)
CHLORIDE BLD-SCNC: 110 MMOL/L (ref 99–110)
CO2: 31 MMOL/L (ref 21–32)
CREAT SERPL-MCNC: <0.5 MG/DL (ref 0.6–1.2)
EOSINOPHILS ABSOLUTE: 0 K/UL (ref 0–0.6)
EOSINOPHILS RELATIVE PERCENT: 0.3 %
GFR SERPL CREATININE-BSD FRML MDRD: >60 ML/MIN/{1.73_M2}
GLUCOSE BLD-MCNC: 185 MG/DL (ref 70–99)
GLUCOSE BLD-MCNC: 210 MG/DL (ref 70–99)
GLUCOSE BLD-MCNC: 216 MG/DL (ref 70–99)
GLUCOSE BLD-MCNC: 231 MG/DL (ref 70–99)
GLUCOSE BLD-MCNC: 263 MG/DL (ref 70–99)
HCT VFR BLD CALC: 31.2 % (ref 36–48)
HEMOGLOBIN: 10.8 G/DL (ref 12–16)
LYMPHOCYTES ABSOLUTE: 0.7 K/UL (ref 1–5.1)
LYMPHOCYTES RELATIVE PERCENT: 7.8 %
MAGNESIUM: 2.1 MG/DL (ref 1.8–2.4)
MCH RBC QN AUTO: 31.5 PG (ref 26–34)
MCHC RBC AUTO-ENTMCNC: 34.6 G/DL (ref 31–36)
MCV RBC AUTO: 91.1 FL (ref 80–100)
MONOCYTES ABSOLUTE: 0.6 K/UL (ref 0–1.3)
MONOCYTES RELATIVE PERCENT: 7 %
NEUTROPHILS ABSOLUTE: 7.8 K/UL (ref 1.7–7.7)
NEUTROPHILS RELATIVE PERCENT: 84.7 %
PDW BLD-RTO: 15.1 % (ref 12.4–15.4)
PERFORMED ON: ABNORMAL
PLATELET # BLD: 163 K/UL (ref 135–450)
PMV BLD AUTO: 7.9 FL (ref 5–10.5)
POTASSIUM REFLEX MAGNESIUM: 3.5 MMOL/L (ref 3.5–5.1)
RBC # BLD: 3.43 M/UL (ref 4–5.2)
SODIUM BLD-SCNC: 148 MMOL/L (ref 136–145)
WBC # BLD: 9.2 K/UL (ref 4–11)

## 2022-12-02 PROCEDURE — 83735 ASSAY OF MAGNESIUM: CPT

## 2022-12-02 PROCEDURE — 6370000000 HC RX 637 (ALT 250 FOR IP)

## 2022-12-02 PROCEDURE — 94761 N-INVAS EAR/PLS OXIMETRY MLT: CPT

## 2022-12-02 PROCEDURE — 6360000002 HC RX W HCPCS: Performed by: NEUROLOGICAL SURGERY

## 2022-12-02 PROCEDURE — 2500000003 HC RX 250 WO HCPCS: Performed by: INTERNAL MEDICINE

## 2022-12-02 PROCEDURE — 99233 SBSQ HOSP IP/OBS HIGH 50: CPT | Performed by: INTERNAL MEDICINE

## 2022-12-02 PROCEDURE — 97530 THERAPEUTIC ACTIVITIES: CPT

## 2022-12-02 PROCEDURE — 6360000002 HC RX W HCPCS: Performed by: NURSE PRACTITIONER

## 2022-12-02 PROCEDURE — 36415 COLL VENOUS BLD VENIPUNCTURE: CPT

## 2022-12-02 PROCEDURE — 2580000003 HC RX 258: Performed by: NEUROLOGICAL SURGERY

## 2022-12-02 PROCEDURE — 6360000002 HC RX W HCPCS: Performed by: STUDENT IN AN ORGANIZED HEALTH CARE EDUCATION/TRAINING PROGRAM

## 2022-12-02 PROCEDURE — 80048 BASIC METABOLIC PNL TOTAL CA: CPT

## 2022-12-02 PROCEDURE — 2580000003 HC RX 258: Performed by: NURSE PRACTITIONER

## 2022-12-02 PROCEDURE — 36569 INSJ PICC 5 YR+ W/O IMAGING: CPT

## 2022-12-02 PROCEDURE — 02HV33Z INSERTION OF INFUSION DEVICE INTO SUPERIOR VENA CAVA, PERCUTANEOUS APPROACH: ICD-10-PCS | Performed by: NURSE PRACTITIONER

## 2022-12-02 PROCEDURE — 2500000003 HC RX 250 WO HCPCS: Performed by: NURSE PRACTITIONER

## 2022-12-02 PROCEDURE — C1751 CATH, INF, PER/CENT/MIDLINE: HCPCS

## 2022-12-02 PROCEDURE — 6370000000 HC RX 637 (ALT 250 FOR IP): Performed by: NEUROLOGICAL SURGERY

## 2022-12-02 PROCEDURE — 2000000000 HC ICU R&B

## 2022-12-02 PROCEDURE — 85025 COMPLETE CBC W/AUTO DIFF WBC: CPT

## 2022-12-02 PROCEDURE — 6360000002 HC RX W HCPCS

## 2022-12-02 PROCEDURE — 2700000000 HC OXYGEN THERAPY PER DAY

## 2022-12-02 PROCEDURE — 92523 SPEECH SOUND LANG COMPREHEN: CPT

## 2022-12-02 PROCEDURE — 99024 POSTOP FOLLOW-UP VISIT: CPT | Performed by: NURSE PRACTITIONER

## 2022-12-02 PROCEDURE — 6370000000 HC RX 637 (ALT 250 FOR IP): Performed by: INTERNAL MEDICINE

## 2022-12-02 PROCEDURE — 94150 VITAL CAPACITY TEST: CPT

## 2022-12-02 PROCEDURE — 92526 ORAL FUNCTION THERAPY: CPT

## 2022-12-02 PROCEDURE — 2580000003 HC RX 258: Performed by: INTERNAL MEDICINE

## 2022-12-02 RX ORDER — FUROSEMIDE 10 MG/ML
40 INJECTION INTRAMUSCULAR; INTRAVENOUS DAILY
Status: DISCONTINUED | OUTPATIENT
Start: 2022-12-02 | End: 2022-12-10

## 2022-12-02 RX ORDER — LIDOCAINE HYDROCHLORIDE 10 MG/ML
5 INJECTION, SOLUTION EPIDURAL; INFILTRATION; INTRACAUDAL; PERINEURAL ONCE
Status: COMPLETED | OUTPATIENT
Start: 2022-12-02 | End: 2022-12-02

## 2022-12-02 RX ORDER — MECOBALAMIN 5000 MCG
5 TABLET,DISINTEGRATING ORAL NIGHTLY
Status: DISCONTINUED | OUTPATIENT
Start: 2022-12-02 | End: 2022-12-22 | Stop reason: HOSPADM

## 2022-12-02 RX ORDER — SODIUM CHLORIDE 0.9 % (FLUSH) 0.9 %
5-40 SYRINGE (ML) INJECTION EVERY 12 HOURS SCHEDULED
Status: DISCONTINUED | OUTPATIENT
Start: 2022-12-02 | End: 2022-12-22 | Stop reason: HOSPADM

## 2022-12-02 RX ORDER — SODIUM CHLORIDE 0.9 % (FLUSH) 0.9 %
5-40 SYRINGE (ML) INJECTION PRN
Status: DISCONTINUED | OUTPATIENT
Start: 2022-12-02 | End: 2022-12-22 | Stop reason: HOSPADM

## 2022-12-02 RX ORDER — SODIUM CHLORIDE 9 MG/ML
25 INJECTION, SOLUTION INTRAVENOUS PRN
Status: DISCONTINUED | OUTPATIENT
Start: 2022-12-02 | End: 2022-12-22 | Stop reason: HOSPADM

## 2022-12-02 RX ADMIN — POTASSIUM CHLORIDE 10 MEQ: 10 INJECTION, SOLUTION INTRAVENOUS at 02:34

## 2022-12-02 RX ADMIN — POTASSIUM CHLORIDE 10 MEQ: 10 INJECTION, SOLUTION INTRAVENOUS at 00:31

## 2022-12-02 RX ADMIN — ONDANSETRON 4 MG: 4 TABLET, ORALLY DISINTEGRATING ORAL at 14:12

## 2022-12-02 RX ADMIN — LATANOPROST 1 DROP: 50 SOLUTION OPHTHALMIC at 19:53

## 2022-12-02 RX ADMIN — Medication 5 MG: at 00:58

## 2022-12-02 RX ADMIN — INSULIN LISPRO 2 UNITS: 100 INJECTION, SOLUTION INTRAVENOUS; SUBCUTANEOUS at 12:39

## 2022-12-02 RX ADMIN — POTASSIUM CHLORIDE 30 MEQ: 1500 TABLET, EXTENDED RELEASE ORAL at 09:54

## 2022-12-02 RX ADMIN — HEPARIN SODIUM 5000 UNITS: 5000 INJECTION INTRAVENOUS; SUBCUTANEOUS at 14:12

## 2022-12-02 RX ADMIN — ACYCLOVIR SODIUM 400 MG: 50 INJECTION, SOLUTION INTRAVENOUS at 17:57

## 2022-12-02 RX ADMIN — LOSARTAN POTASSIUM 100 MG: 100 TABLET, FILM COATED ORAL at 09:39

## 2022-12-02 RX ADMIN — SODIUM CHLORIDE 7.5 MG/HR: 9 INJECTION, SOLUTION INTRAVENOUS at 04:35

## 2022-12-02 RX ADMIN — SODIUM CHLORIDE 500 MG: 9 INJECTION, SOLUTION INTRAVENOUS at 05:07

## 2022-12-02 RX ADMIN — POTASSIUM CHLORIDE 10 MEQ: 10 INJECTION, SOLUTION INTRAVENOUS at 01:31

## 2022-12-02 RX ADMIN — TETRAHYDROZOLINE HCL 0.05% 1 DROP: 0.05 SOLUTION/ DROPS INTRAOCULAR at 19:53

## 2022-12-02 RX ADMIN — INSULIN GLARGINE 10 UNITS: 100 INJECTION, SOLUTION SUBCUTANEOUS at 17:32

## 2022-12-02 RX ADMIN — ACYCLOVIR SODIUM 400 MG: 50 INJECTION, SOLUTION INTRAVENOUS at 09:40

## 2022-12-02 RX ADMIN — ACYCLOVIR SODIUM 400 MG: 50 INJECTION, SOLUTION INTRAVENOUS at 00:29

## 2022-12-02 RX ADMIN — CARVEDILOL 12.5 MG: 6.25 TABLET, FILM COATED ORAL at 09:38

## 2022-12-02 RX ADMIN — TETRAHYDROZOLINE HCL 0.05% 1 DROP: 0.05 SOLUTION/ DROPS INTRAOCULAR at 09:48

## 2022-12-02 RX ADMIN — TETRAHYDROZOLINE HCL 0.05% 1 DROP: 0.05 SOLUTION/ DROPS INTRAOCULAR at 14:13

## 2022-12-02 RX ADMIN — SODIUM CHLORIDE 500 MG: 9 INJECTION, SOLUTION INTRAVENOUS at 17:23

## 2022-12-02 RX ADMIN — SODIUM CHLORIDE, PRESERVATIVE FREE 10 ML: 5 INJECTION INTRAVENOUS at 19:52

## 2022-12-02 RX ADMIN — CARVEDILOL 12.5 MG: 6.25 TABLET, FILM COATED ORAL at 17:21

## 2022-12-02 RX ADMIN — HEPARIN SODIUM 5000 UNITS: 5000 INJECTION INTRAVENOUS; SUBCUTANEOUS at 05:11

## 2022-12-02 RX ADMIN — HEPARIN SODIUM 5000 UNITS: 5000 INJECTION INTRAVENOUS; SUBCUTANEOUS at 21:29

## 2022-12-02 RX ADMIN — FUROSEMIDE 80 MG: 10 INJECTION, SOLUTION INTRAMUSCULAR; INTRAVENOUS at 09:39

## 2022-12-02 RX ADMIN — LIDOCAINE HYDROCHLORIDE 5 ML: 10 INJECTION, SOLUTION EPIDURAL; INFILTRATION; INTRACAUDAL; PERINEURAL at 13:31

## 2022-12-02 RX ADMIN — INSULIN LISPRO 1 UNITS: 100 INJECTION, SOLUTION INTRAVENOUS; SUBCUTANEOUS at 17:19

## 2022-12-02 RX ADMIN — HYDRALAZINE HYDROCHLORIDE 10 MG: 20 INJECTION INTRAMUSCULAR; INTRAVENOUS at 01:03

## 2022-12-02 ASSESSMENT — PAIN SCALES - GENERAL
PAINLEVEL_OUTOF10: 0

## 2022-12-02 NOTE — PROGRESS NOTES
Shift handoff and assessment complete. Pt is now on 15 L HFNC satting 96% at this time. Pt is mouth breathing on assessment. Pt had 1 episode of small, clear emesis. Prn zofran given per order. Dressings on head and abdomen remain clean, dry, and intact. No changes in neuro assessment. Will cont to monitor.

## 2022-12-02 NOTE — PROGRESS NOTES
Neuro unchanged/ intact/ remains with right facial droop + loss of hearing on right side otherwise bilat strong moving / denies any dizziness/numbness/tingling/ pt was restarted on cardene gtt last night/ has scheduled coreg then will wean off   gtt maintaining Bp goal / pt o2 decreased to 4-5L/min  vi NC/ may consider picc line placement/ if IV antibiotic/ antivirals/ IV meds needed/  difficult stick/diet ordered this am/ speech following/ see orders/ notes/ flow sheet    7444 round with Neuro Mariusz Staley ok for Picc placement + ok to DC a line/see orders/ notes

## 2022-12-02 NOTE — DISCHARGE INSTRUCTIONS
Caring for Your Peripherally Inserted Central Catheter (PICC)      You are going home with a peripherally inserted catheter (PICC). This small, soft tube has been placed in a vein in your arm. It is often used when treatment requires medications or nutrition for weeks or months. At home, you need to take care of your PICC to keep it working. And since a PICC line has such a high infection risk, you must take extra care washing your hands and preventing the spread of germs. This sheet will help you remember what to do to care for your PICC at home. Understanding Your Role  . A nurse or other healthcare provider will teach you and your caregivers how to care for the PICC. Before leaving the hospital, make sure that you understand what to do at home, how long you may need the PICC, and when to have a follow up visit. . You will likely be told to flush the PICC with saline or heparin solution. You may also be told to change the catheters injection caps and change the dressing (Bandage). Or, a nurse may do this for you during a follow-up visit. Only do these things if you are told to, following the instructions you were given. Protecting the PICC  If the PICC gets damaged, it wont work right and could raise you chance of infection. Call your healthcare team right away if any damage occurs. To protect your PICC at home:  . Prevent infection. Use good hand hygiene by following the guidelines on this sheet. Dont touch the catheter or the dressing unless you need to. Always clean your hands before and after you come in contact with any part of the PICC. Your caregivers, family members, any visitors should use good hand hygiene also. [place] Keep the PICC dry. The catheter and dressing must stay dry. Dont take baths, go swimming, use a hot tub, or do other activities that could get the PICC wet. When showering, cover the area with plastic wrap or another cover as recommended by your healthcare provider.  Keep the area out of the water spray. If the dressing gets wet, change it only if you have been shown how. Otherwise, call your healthcare team right away. . Avoid damage. Dont use any sharp or pointy objects around the catheter. This includes scissors, pins, knives, razors, or anything else that could puncture or cut it. Also, dont let anything pull or rub on the catheter, such as clothing. . Watch for signs of problems. Pay attention to how much of the catheter that sticks out from your skin. If this changes at all, let your healthcare provider know. Also watch for cracks, leaks, or other damage. If the dressing becomes dirty, loose, or wet, change it (if you have been instructed to) or call your healthcare team.  . Avoid lowering your chest below your waist. This included bending at the waist for actions like tying your shoes. When your chest is positioned below your waist, especially for a long time, the catheters internal tip could slip out of place in the vein. . Tell your healthcare team if you vomit or have severe coughing. This can make the catheter slip out of place. Protecting Your Arm  The arm with the PICC is at risk for developing blood clots (thrombosis). This is a serious complication. To help prevent it: [place] As much as possible, use the arm with the PICC in it for normal daily activities. Lack of movement can lead to blood clots, so its important to move your arm as you normally would. . Avoid activities or exercises that require major use of your arm, such as sports, unless your healthcare provider says its okay. [place] Avoid activities that cause discomfort in your arm. Talk to your healthcare team if you have concerns about pain or range of motion. . Dont lift anything heavier than 10 pounds with the affected arm. . Drink plenty of water. Staying hydrated helps keep blood clots from forming. Prevent Infection with Good Hand Hygiene  A PICC can let germs into your body.  This can lead to serious and sometimes deadly infections. To prevent infection, its very important that you, your caregivers and others around you use good hand hygiene. This means washing your hands well with soap and water, and cleaning them with alcohol based hand gel as directed. Never touch the PICC or dressing without first using one of the methods. To wash your hands with soap and water:  . Wet your hands with warm water. (Avoid hot water, which can cause skin irritation when you wash your hands often). . Apply enough soap to cover the entire surface of your hands, including fingers. . Rub your hands together vigorously for at least 15 seconds. Make sure to rub the front and back of each hand up to the wrist, your fingers and fingernails, between the fingers, and each thumb. . Rinse your hands with warm water  . Dry your hands completely with a new paper towel. Dont use a cloth towel or other reusable towel. These can harbor germs. . Use the paper towel to turn off the faucet, then throw it away. If you are in a bathroom, also use a paper towel to open the door instead of touching the handle. When you dont have access to soap and water: Use alcohol-based hand gel. The gel should have at least 60% alcohol. Follow the instructions on the package. Your healthcare team can answer any questions you have about when to use hand gel, or when its better to wash with soap and water. When to Call Your Healthcare Provider  Call your provider right away if you have any of the following:  . Pain or burning in your shoulder, chest, back, arm, or leg  . Fever of 100.4 F or higher  . Chills  . Signs of infection at the catheter site (pain, redness, drainage, burning, or stinging  . Coughing, wheezing, or shortness of breath  . A racing or irregular heartbeat  . Muscle stiffness or trouble moving  . Tightness in your arm, above the catheter site  . Gurgling noises coming from the catheter  .  The catheter falls out, breaks, cracks, leaks, or has other damage       References:  [facility].no. org/articles/healthsheets/2584

## 2022-12-02 NOTE — PROGRESS NOTES
NEUROSURGERY POST-OP PROGRESS NOTE    Patient Name: Verona Blanco YOB: 1955   Sex: Female Age: 79 yrs     Medical Record Number: [id number] Acct Number: [de-identified]   Room Number: 2262/5230-75 Hospital Day: Hospital Day: 5     Interval History:  Post-operative Day# 3 s/p Procedure(s) (LRB):  STAGE II: RESECTION OF RIGHT PETROCLIVAL MENINGIOMA (N/A)    Subjective: Pt speech continues to improve. HB 3 with complete eye closure. Objective:    VITAL SIGNS   BP (!) 136/43   Pulse 75   Temp 98.3 °F (36.8 °C) (Oral)   Resp 19   Ht 5' 5\" (1.651 m)   Wt (!) 325 lb (147.4 kg)   SpO2 93%   BMI 54.08 kg/m²    Height Height: 5' 5\" (165.1 cm)   Weight Weight: (!) 325 lb (147.4 kg)          Allergies Allergies   Allergen Reactions    Keflex [Cephalexin] Itching and Rash    Codeine Nausea And Vomiting    Tomato Rash      NPO Status ADULT DIET; Dysphagia - Pureed   Isolation No active isolations     LABS   Basic Metabolic Profile Recent Labs     12/01/22  0431 12/01/22  1612 12/02/22  0414   * 148* 148*   * 110 110   CO2 24 26 31   BUN 16 15 16   CREATININE <0.5* <0.5* <0.5*   GLUCOSE 204* 222* 216*   MG 2.30 2.00 2.10      Complete Blood Count Recent Labs     12/01/22  0431 12/02/22  0414   WBC 10.4 9.2   RBC 3.48* 3.43*      Coagulation Studies No results for input(s): PTT, INR in the last 72 hours.     Invalid input(s): PLATELETS, PROA, PT, PTTA     MEDICATIONS   Inpatient Medications     melatonin, 5 mg, Oral, Nightly    lidocaine 1 % injection, 5 mL, IntraDERmal, Once    sodium chloride flush, 5-40 mL, IntraVENous, 2 times per day    furosemide, 40 mg, IntraVENous, Daily    heparin (porcine), 5,000 Units, SubCUTAneous, 3 times per day    [Held by provider] polyethylene glycol, 17 g, Oral, Daily    acyclovir, 400 mg, IntraVENous, Q8H    tetrahydrozoline, 1 drop, Both Eyes, TID    [Held by provider] potassium bicarb-citric acid, 40 mEq, Oral, BID    carvedilol, 12.5 mg, Oral, BID WC    losartan, 100 mg, Oral, Daily    sodium chloride flush, 5-40 mL, IntraVENous, 2 times per day    sodium chloride flush, 5-40 mL, IntraVENous, 2 times per day    sodium chloride flush, 5-40 mL, IntraVENous, 2 times per day    [Held by provider] sennosides-docusate sodium, 1 tablet, Oral, BID    levETIRAcetam, 500 mg, IntraVENous, Q12H    latanoprost, 1 drop, Both Eyes, Nightly    insulin lispro, 0-4 Units, SubCUTAneous, TID WC    insulin lispro, 0-4 Units, SubCUTAneous, Nightly    [Held by provider] methIMAzole, 10 mg, Oral, Daily   Infusions    sodium chloride      niCARdipene (CARDENE) 50 mg in 250 mL 0.9 % sodium chloride infusion 7.5 mg/hr (12/02/22 0435)    sodium chloride      sodium chloride      sodium chloride      dextrose        Antibiotics   Recent Abx Admin                     acyclovir (ZOVIRAX) 400 mg in dextrose 5 % 100 mL IVPB (mg) 400 mg New Bag 12/02/22 0940     400 mg New Bag  0029     400 mg New Bag 12/01/22 1730                     Neurologic Exam:  Mental status: awake and alert and oriented x4    Cranial Nerves:  II: Visual acuity not tested, denies new visual changes / diplopia  III, IV, VI: PERRL, 3 mm bilaterally, EOMI,Patient had rightward gaze deviation but was able to follow when prompted   V: Facial sensation intact bilaterally to touch  VII: R sided facial droop  VIII: Hearing intact bilaterally to spoken voice on L, no hearing on R  IX: Palate movement equal bilaterally  XI: Shoulder shrug equal bilaterally  XII: Tongue deviates to R      Musculoskeletal:   Gait: Not tested   Tone: normal  Sensory: intact to all extremities  Motor strength:    Right  Left    Right  Left    Deltoid  5 5  Hip Flex  5 5   Biceps  5 5  Knee Extensors  5 5   Triceps  5 5  Knee Flexors  5 5   Wrist Ext  5 5  Ankle Dorsiflex.   5 5   Wrist Flex  5 5 Ankle Plantarflex. 5 5   Handgrip  5 5  Ext Ernie Longus  5 5   Thumb Ext  5 5         Mastoid dressing: intact, clean and dry      Respiratory:  Unlabored respiratory pattern    Abdomen:   Soft, ND   Last BMpreop but just started with food this am    Cardiovascular:  Warm, well perfused    Assessment   Patient is a 80 yo F s/p Procedure(s) (LRB):  STAGE II: RESECTION OF RIGHT PETROCLIVAL MENINGIOMA (N/A) per Dr. Afsaneh Owen . Pt educated from [employer] Neurosurgery\" book pg 25-27, 33-38. Plan:  Neurologic exam frequency:q4  Mobility:PT/OT   SLP continue  DC arterial line  PICC line  DVT Prophylaxis: SCDs and heparin  Keppra as ordered  Bowel Regimen: senna and glycolax will add movantik  Pain control:alex   Nicardipine, keep sbp < 160  Incisional Care:Mastoid dressing in place check q 4 hr and let NS know if leaking  MRI today  Acyclovir for 10 days  Dispo Planning:ICU    Patient was discussed with Dr. Earline Cabot who agrees with above assessment and plan. Electronically signed by: LEE Boss CNP, 12/2/2022 11:19 AM   Neurosurgery Nurse Practitioner  [phone] Critical Care:  Due to the immediate potential for life-threatening deterioration due to neurological failure, I spent 30 minutes providing critical care. This time excludes time spent performing procedures but includes time spent on direct patient care, history retrieval, review of the chart, and discussions with patient, family, and consultant(s).

## 2022-12-02 NOTE — PROGRESS NOTES
ICU Progress Note    Admit Date: 11/28/2022  Day: 2  IV Access: Peripheral  IV Fluids: NS @ 100ml/hr  Vasopressors: None       Antibiotics: None  Diet: ADULT DIET; Dysphagia - Pureed    CC: vertigo, falls. Interval history:   - NAEO  - Denies any nausea vomiting or pain. 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 The patient's main symptoms began in June with feelings of vertigo and falls. During that week of the onset of her symptoms, she had 2 episodes of true vertigo and fell out of bed. Patient states that she was frequently dizzy as well. This prompted her to go to her primary care physician who ordered an MRI. That MRI subsequently showed this large right cerebellopontine angle mass and then a smaller left anterior parafalcine extra-axial lesion. Patient was admitted to the [facility], [facility]. ICU today status post part one of his staged procedure for those meningioma and masses. Patient admitted to the ICU specifically for neuro monitoring as well as neurovascular checks. Patient had part 2 of her stage procedure yesterday. Patient tolerated the procedure well.     Medications:     Scheduled Meds:   melatonin  5 mg Oral Nightly    heparin (porcine)  5,000 Units SubCUTAneous 3 times per day    [Held by provider] polyethylene glycol  17 g Oral Daily    acyclovir  400 mg IntraVENous Q8H    tetrahydrozoline  1 drop Both Eyes TID    furosemide  80 mg IntraVENous BID    [Held by provider] potassium bicarb-citric acid  40 mEq Oral BID    carvedilol  12.5 mg Oral BID WC    losartan  100 mg Oral Daily    sodium chloride flush 5-40 mL IntraVENous 2 times per day    sodium chloride flush  5-40 mL IntraVENous 2 times per day    sodium chloride flush  5-40 mL IntraVENous 2 times per day    [Held by provider] sennosides-docusate sodium  1 tablet Oral BID    levETIRAcetam  500 mg IntraVENous Q12H    latanoprost  1 drop Both Eyes Nightly    insulin lispro  0-4 Units SubCUTAneous TID WC    insulin lispro  0-4 Units SubCUTAneous Nightly    [Held by provider] methIMAzole  10 mg Oral Daily     Continuous Infusions:   esmolol      niCARdipene (CARDENE) 50 mg in 250 mL 0.9 % sodium chloride infusion 7.5 mg/hr (12/02/22 0435)    sodium chloride      [Held by provider] lactated ringers 125 mL/hr at 11/30/22 0928    sodium chloride      sodium chloride      [Held by provider] sodium chloride 125 mL/hr at 11/29/22 0730    dextrose       PRN Meds:esmolol, sodium chloride flush, sodium chloride, meperidine, HYDROmorphone, HYDROmorphone, sodium chloride flush, sodium chloride, sodium chloride flush, sodium chloride, acetaminophen, oxyCODONE **OR** oxyCODONE, morphine, ondansetron **OR** ondansetron, hydrALAZINE, albuterol, glucose, dextrose bolus **OR** dextrose bolus, glucagon (rDNA), dextrose, promethazine    Objective:   Vitals:   T-max:  Patient Vitals for the past 8 hrs:   BP Temp Temp src Pulse Resp SpO2 Weight   12/02/22 0800 (!) 137/58 98.3 °F (36.8 °C) Oral 73 16 95 % --   12/02/22 0700 (!) 140/60 -- -- 69 18 92 % --   12/02/22 0615 -- -- -- 69 18 93 % --   12/02/22 0600 (!) 142/57 -- -- 70 19 94 % (!) 325 lb (147.4 kg)   12/02/22 0545 -- -- -- 70 18 94 % --   12/02/22 0530 -- -- -- 68 18 93 % --   12/02/22 0515 -- -- -- 73 18 93 % --   12/02/22 0500 (!) 130/56 -- -- 70 19 94 % --   12/02/22 0445 -- -- -- 67 18 93 % --   12/02/22 0430 -- -- -- 71 17 92 % --   12/02/22 0415 -- -- -- 69 20 93 % --   12/02/22 0400 (!) 142/63 98.5 °F (36.9 °C) Oral 71 18 95 % --   12/02/22 0345 -- -- -- 69 19 95 % --   12/02/22 0330 -- -- -- 69 18 95 % --   12/02/22 0315 -- -- -- 68 18 94 % --   12/02/22 0300 (!) 133/57 -- -- 72 18 92 % --   12/02/22 0245 -- -- -- 70 19 92 % --   12/02/22 0230 -- -- -- 74 25 92 % --   12/02/22 0215 -- -- -- 73 17 93 % --   12/02/22 0200 (!) 141/59 -- -- 76 22 90 % --   12/02/22 0145 -- -- -- 74 18 95 % --   12/02/22 0130 -- -- -- 72 18 95 % --   12/02/22 0115 -- -- -- 73 18 95 % --   12/02/22 0109 -- -- -- 73 16 96 % --         Intake/Output Summary (Last 24 hours) at 12/2/2022 0908  Last data filed at 12/2/2022 0800  Gross per 24 hour   Intake 2838 ml   Output 6700 ml   Net -3862 ml         Physical Exam  Constitutional:       Appearance: She is morbidly obese. Interventions: Nasal cannula in place. HENT:      Head:      Comments: EVD noted to be in place. Eyes:      Comments: Patient has a rightward gaze deviation in both eyes. Cardiovascular:      Rate and Rhythm: Normal rate and regular rhythm. Pulses:           Radial pulses are 2+ on the right side and 2+ on the left side. Heart sounds: Normal heart sounds, S1 normal and S2 normal. No murmur heard. Pulmonary:      Effort: Pulmonary effort is normal.      Breath sounds: Normal breath sounds and air entry. Abdominal:      General: Abdomen is protuberant. Bowel sounds are normal.      Palpations: Abdomen is soft. Tenderness: There is no abdominal tenderness. Musculoskeletal:      Right lower leg: Edema present. Left lower leg: Edema present. Neurological:      Mental Status: She is lethargic. Cranial Nerves: Cranial nerves 2-12 are intact. Comments: Patient very somnolent during neurologic examination. Patient had rightward gaze deviation but was able to follow when prompted to do so. No focal neurologic deficit other than the above.          LABS:    CBC:   Recent Labs     12/01/22  0431 12/02/22  0414   WBC 10.4 9.2   HGB 10.9* 10.8*   HCT 31.9* 31.2*    163   MCV 91.6 91.1       Renal:    Recent Labs     12/01/22  8527 12/01/22  1612 12/01/22  2121 12/02/22  0414   * 148*  --  148*   K 2.8* 2.9*  2.9* 3.1* 3.5   * 110  --  110   CO2 24 26  --  31   BUN 16 15  --  16   CREATININE <0.5* <0.5*  --  <0.5*   GLUCOSE 204* 222*  --  216*   CALCIUM 7.6* 7.7*  --  7.8*   MG 2.30 2.00  --  2.10   ANIONGAP 8 12  --  7       Hepatic: No results for input(s): AST, ALT, BILITOT, BILIDIR, PROT, LABALBU, ALKPHOS in the last 72 hours. Troponin: No results for input(s): TROPONINI in the last 72 hours. BNP: No results for input(s): BNP in the last 72 hours. Lipids: No results for input(s): CHOL, HDL in the last 72 hours. Invalid input(s): LDLCALCU, TRIGLYCERIDE  ABGs:    Recent Labs     11/29/22  1203 12/01/22  1714 12/01/22 2121   PHART 7.395 7.466* 7.503*   YHA1NRG 35.5 37.4 43.1   PO2ART 102.3 48.4* 90.2   KOF0VCB 21.7 26.9 34*   BEART -3 3 9.6*   J5OAJDQS 98 86* 98   HFE1LFL 23 28 35         INR:   No results for input(s): INR in the last 72 hours. Lactate:   Recent Labs     11/29/22  0937 11/29/22  1203   LACTATE 2.45* 2.19*       Cultures:  -----------------------------------------------------------------  RAD:   CTA PULMONARY W CONTRAST   Final Result      Significantly limited study due to motion. 1. Significantly limited study due to streak artifact and suboptimal bolus. No evidence of a central embolus. 2. Multifocal airspace consolidation is present, suboptimally evaluated due to motion. This presumably reflects pneumonia. Follow-up chest CT is recommended to document resolution. 3. There is a large mass in the right hepatic lobe measuring 8.2 x 7.4 cm, demonstrating peripheral nodular enhancement. There is significant amount of artifact through this lesion. It is still favored to reflect a hemangioma. Recommend a multiphasic CT    of the abdomen for further assessment. This could be performed on a nonurgent basis, if clinically appropriate.    4. Partially calcified nodule arising from the left adrenal gland, most likely benign and possibly reflecting old adrenal hemorrhage. XR CHEST PORTABLE   Final Result      Similar appearance of patchy airspace disease. FL MODIFIED BARIUM SWALLOW W VIDEO   Final Result      1. Laryngeal penetration, but no evidence of tracheal aspiration. XR CHEST PORTABLE   Final Result      Patchy left upper lobe airspace disease, atelectasis versus pneumonia. CT HEAD WO CONTRAST   Final Result      1. Interval postsurgical changes from resection of right cerebellopontine angle mass with associated right temporal mastoid resection and frontotemporal craniotomy and fat graft placement. 2.  Thin hyperdensity along the fat graft may represent small amount of postsurgical blood products. There is also a small focus of subarachnoid hemorrhage along the right temporal lobe. 3.  Mild pneumocephalus and bilateral subgaleal fluid is also likely postsurgical.               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 Left cerebellopontine angle meningioma and smaller left parafalcine extra- axial lesion status post both parts of the procedure. Every hour neurochecks  Every 4 hours neurovascular checks  Neurosurgery and otolaryngology following  Will continue Keppra 500 IV twice daily for seizure prophylaxis  SBP will be maintained below 160  As needed labetalol 10 and as needed hydralazine 10 on board if needed. Nicardipine gtt PRN  EVD in place- NSGY managing  PT/OT Eval  SLP eval - Diet: thin and pureed diet   Chronic medical conditions  Asthma- patient's albuterol restarted. Hypertension- blood pressure currently controlled with as needed labetalol 10 and hydralazine 10. Also has nicardipine gtt as PRN. Currently BP is below 160.  Coreg started  Hyperlipidemia- patient is at home statin      Code Status:Full Code  FEN: ADULT DIET; Dysphagia - Pureed  PPX:  Keppra  DISPO: ICU    Calvin Plasencia DO  PGY1, Internal Medicine  12/02/22  9:08 AM    Pulmonary & Critical Care     Patient seen and examined. I agree with Dr. Alejandro Nelson history, physical, lab findings, assessment and plan. 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 Yesterday afternoon she developed worsening hypoxemia requiring oxygen be titrated from approximately 4 L to 15 L. Clinically patient was unchanged and she did not give any complaints of worsening dyspnea nor did she have any chest pain. Nasal cannula was in place and ABG was performed to confirm degree of hypoxemia and from an arterial line PaO2 was 48. Chest x-ray was performed and based on my interpretation I thought her pulmonary edema was moderately improved. Out of concern for PE causing this a CTPA was performed. I reviewed both the images and the report and while I agree that it was a suboptimal study for PE I do not see any evidence of a PE. However I do disagree with radiology and that I do not feel strongly that she has a pneumonia but to me it looks more like atelectasis. This would be more consistent with her clinical picture as she is morbidly obese, postop and not very active. She is not really gotten much in the way of incentive spirometry. Furthermore she has no leukocytosis, no phlegm production, no fever, and a normal procalcitonin. Nighttime nursing was wondering if there is some issue in oxygen delivery via the nasal cannula. After making some adjustment they repeated an ABG and PaO2 was 90. Currently her oxygen saturation is 95% on 4 L, does not appear to be tachypneic or have increased work of breathing, and states her breathing is decent and not any worse than it was yesterday morning.     With lasix 80 mg IV every 12 hours yesterday she had a net -3.6 L diuresis. Given her improvement in oxygenation and large diuresis yesterday I will back off to Lasix 40 mg IV daily. Blood pressure better controlled at 136/43 on Coreg 12.5 p.o. twice daily and losartan 100 mg daily. Nicardipine is now off    Diabetes is suboptimally controlled with blood sugars around 220. I will keep her off metformin given her recent contrast with her CTPA.   We will start Lantus 10 units daily + SSI with goal blood sugars 140- 180    Nursing is going to get patient out of bed and we will start incentive spirometry     Neurosurgery managing postoperative neurosurgical care     Full code     Keeley Ng MD

## 2022-12-02 NOTE — PROGRESS NOTES
Speech Language Pathology  Facility/Department: [facility]  Initial Speech/Language/Cognitive Assessment    NAME: Sofia Bean  : 1955   MRN: [id number]  ADMISSION DATE: 2022  ADMITTING DIAGNOSIS: has Asthma; Gastroesophageal reflux disease; Hypertension; Adiposity; S/P total knee arthroplasty; Primary osteoarthritis of left knee; Morbid obesity with BMI of 50.0-59.9, adult ([facility] [address] Osteoarthritis of right hip; Cerebellopontine angle meningioma ([facility] [address] and Acoustic neuroma ([facility] [address] on their problem list.  DATE ONSET: 2022    Date of Eval: 2022   Evaluating Therapist: PINA Lind    Prior MRI Brain: (2022)  Large right cerebellopontine angle mass as detailed, most likely representing a meningioma. See above for details. Additional smaller left anterior parafalcine extra-axial lesion, compatible with additional small meningioma. Recent CT Head: (2022)  1. Interval postsurgical changes from resection of right cerebellopontine angle mass with associated right temporal mastoid resection and frontotemporal craniotomy and fat graft placement. 2.  Thin hyperdensity along the fat graft may represent small amount of postsurgical blood products. There is also a small focus of subarachnoid hemorrhage along the right temporal lobe. 3.  Mild pneumocephalus and bilateral subgaleal fluid is also likely postsurgical.     Primary Complaint: dysarthria    Pain:  Pain Assessment  Pain Assessment: None - Denies Pain  Pain Level: 0  Patient's Stated Pain Goal: 0 - No pain  Multiple Pain Sites: No    Vision/ Hearing  Vision  Vision Exceptions: Wears glasses at all times  Hearing  Hearing: Within functional limits    Assessment:  Diagnosis: Mild-moderate dysarthria characterized by blended word boundaries and imprecise articulation (~ 70% intelligbile short phrases in quiet environment). Benefits from slow pace and over-articulation strategies.  Vocal quality clear and strong. Patient able to follow simple directions and answer basic questions. No word finding difficulties noted at conversation level. Further assessment limited by patient fatigue. Recommend ongoing speech therapy to further assess and address. Recommendations:  Recommendations  Requires SLP Intervention: Yes  Patient Education: Educated pt to purpose of visit, role of SLP. Patient Education Response: Verbalizes understanding;Needs reinforcement  Timed Code Treatment Minutes: 0 Minutes  Total Treatment Time: 14  Duration of Treatment: 1-2 wks or LOS    Plan:   Speech Therapy Prognosis  Prognosis: Good  Individuals consulted  Consulted and agree with results and recommendations: Patient;RN  RN Name: Akiko Cardoza Devices in place: Yes  Type of devices: All fall risk precautions in place;Nurse notified    Goals:  Short Term Goals  Time Frame for Short Term Goals: 1-2 wks or LOS  Goal 1: Patient will recall and implement strategies to improve speech clarity to 90% with min cues. Goal 2: Patient will participate in ongoing assessment of cognitive-communication skills. Patient/family involved in developing goals and treatment plan: yes, patient    Subjective:   Previous level of function and limitations: Independent     Social/Functional History  Lives With: Alone  Active : Yes  Leisure & Hobbies: [employer]  Vision  Vision Exceptions: Wears glasses at all times  Hearing  Hearing: Within functional limits     Objective:  Oral Motor   Labial: Impaired coordination;Right droop; Flaccid; Decreased seal  Dentition: Intact  Oral Hygiene: Moist;Clean  Lingual: Incoordinated  Velum: Unable to visualize  Mandible: No impairment    Motor Speech  Dysarthric Characteristics: Blended word boundaries; Imprecise  Intelligibility: Impaired  Overall Impairment Severity: Mild-moderate  Compensatory Strategies for Motor Speech: slow rate, over-articulation, pacing/breath support    Auditory Comprehension  Comprehension: Within Functional Limits    Expression  Primary Mode of Expression: Verbal    Verbal Expression  Verbal Expression: Within functional limits    Cognition:   Orientation  Overall Orientation Status: Within Functional Limits  Attention  Attention: Within Functional Limits  Memory  Memory: Unable to assess  Problem Solving  Problem Solving: Unable to assess  Numeric Reasoning  Numeric Reasoning: Unable to assess    Prognosis:  Speech Therapy Prognosis  Prognosis: Good  Individuals consulted  Consulted and agree with results and recommendations: Patient;RN  RN Name: Shannan Alfaro    Education:  Patient Education: Educated pt to purpose of visit, role of SLP. Patient Education Response: Verbalizes understanding;Needs reinforcement  Safety Devices in place: Yes  Type of devices:  All fall risk precautions in place;Nurse notified    Therapy Time:   Individual Concurrent Group Co-treatment   Time In 0833         Time Out 0847         Minutes 14            Timed Code Treatment Minutes: 0 Minutes  Total Treatment Time: 14    Electronically signed by PINA Adams on 12/2/2022 at 1:03 PM

## 2022-12-02 NOTE — PLAN OF CARE
Injury: Instruct family/caregiver on patient safety     Problem: Skin/Tissue Integrity  Goal: Absence of new skin breakdown  Description: 1. Monitor for areas of redness and/or skin breakdown  2. Assess vascular access sites hourly  3. Every 4-6 hours minimum:  Change oxygen saturation probe site  4. Every 4-6 hours:  If on nasal continuous positive airway pressure, respiratory therapy assess nares and determine need for appliance change or resting period.   12/2/2022 0326 by Kit Che RN  Outcome: Progressing     Problem: ABCDS Injury Assessment  Goal: Absence of physical injury  12/2/2022 0326 by Kit Che RN  Outcome: Progressing

## 2022-12-02 NOTE — PROGRESS NOTES
Speech Language Pathology  Facility/Department: [facility] ICU  Dysphagia Daily Treatment Note    NAME: Madai Tiwari  : 1955  MRN: [id number]    Patient Diagnosis(es):   Patient Active Problem List    Diagnosis Date Noted    Acoustic neuroma ([address] 2022    Cerebellopontine angle meningioma ([address] 2022    Osteoarthritis of right hip 2021    Morbid obesity with BMI of 50.0-59.9, adult ([address] 2021    Primary osteoarthritis of left knee 2015    Asthma 2015    Gastroesophageal reflux disease 2015    Adiposity 2015    S/P total knee arthroplasty 2015    Hypertension 2014     Allergies: Allergies   Allergen Reactions    Keflex [Cephalexin] Itching and Rash    Codeine Nausea And Vomiting    Tomato Rash     Onset Date: 2022    CXR (2022)-  Similar appearance of patchy airspace disease. Chart reviewed. Medical Diagnosis: Acoustic neuroma ([address] [D33.3]  Balance problem [R26.89]  Meningioma ([address] [D32.9]  Dizziness [R42]  Cerebellopontine angle meningioma ([address] [D32.0]   Treatment Diagnosis: Dysphagia    BSE Impression (2022)-  Dysphagia Impression : Severe oral stage dysphagia, with suspected pharyngeal component, needs further assessment. Patient awake but lethargic, on 10 L O2 via nc. On oral motor exam, patient with right sided weakness, with reduced lingual coordination and impaired labial seal. Xerostomia. Speech is dysarthric, some dysphonia. Trialed ice chips, thins via tsp/cup/straw, puree. Pt with positive oral acceptance of tsp trials, however immediate anterior loss for all thin liquid trials via tsp/cup, with patient unable to utilize straw. Suspect reduced A-P transit, with significant residue of puree (suctioned). Some laryngeal swallow movement perceived, voice remained dry, no overt signs of aspiration.  However given severity of oral dysfunction in conjunction with dysarthria, lethargy, and increased O2 requirements in setting of recent cerebellopontine angle meningioma surgery, suspect patient is a high aspiration (including silent aspiration) risk. Recommend continue NPO, alternative means nutrition/hydration, frequent oral hygiene, and ice chips / tsp h2o. Will continue to follow. Dysphagia Diagnosis: Severe oral stage dysphagia, Suspected needs further assessment    MBS results (12/02/2022)-  Oral Phase  Moderate dysfunction characterized by right-sided oral weakness with reduced labial seal and coordination, resulting in anterior loss of liquid via tsp and inability to use straw when placed on right or midline. For left-sided straw placement, pt able to create appropriate seal/suction. Prolonged mastication of soft solids, with slowed a-p transit, mild stasis. Pharyngeal Phase  Grossly WFL. Overall timely swallow initiation with appropriate hyolaryngeal mechanics. Single instance trace flash (self clearing) penetration of thins when straw was placed on right side. Otherwise good airway protection throughout with no aspiration. No significant stasis. Upper Esophageal Screen  Indirectly assessed; appeared unremarkable    Pain: none indicated by any means    Current Diet : ADULT DIET; Dysphagia - Pureed   Recommended Form of Meds: Via alternative means of nutrition  Compensatory Swallowing Strategies : Upright as possible for all oral intake, Eat/Feed slowly, Check for pocketing of food on the Right, Alternate solids and liquids (straw and food placement on LEFT)     Treatment:  Pt seen bedside to address the following goals:  Goal 1: Patient will participate in further assessment of swallow function as appropriate. 12/1: Patient seen bedside. Awake, and much more alert this am. Speech much clearer today vs yesterday. Asking for water/food. Improved oral control (able to use straw), with reduced labial loss, and apparent improved oral clearance of puree bolus. Recommend proceed with MBS. Patient agreeable.   D/C Goal, met via repeat BSE and MBS. Goal 2: Patient/caregiver will demonstrate understanding of swallowing concerns/recommendations. 11/30: Educated pt to purpose of visit, s/s of aspiration, concern if aspiration occurs, swallow function, safety strategies (upright positioning, oral hygiene rationale), effortful swallow exercise, need for eventual instrumental assessment. Pt indicated some understanding, but suspect needs reinforcement. Cont goal  12/1: Patient educated to swallow function, MBS purpose/results, strategies (left sided placement), and diet recommendations (starting on puree vs soft solids to facilitate oral prep). Pt stated good comprehension. Continue to reinforce. Cont goal  12/2: reviewed results of yesterday's MBS, diet recommendations, left sided bolus placement, bolus size, positioning. Pt indicated comprehension. Cont goal     Goal 3: Patient will tolerate least restrictive diet without overt signs of aspiration or associated decline in respiratory status. 12/2: Reviewed chart and discussed with RN; pt NPO yesterday d/t respiratory status, however was advanced back to puree this am. With patient awake, alert, pleasant, cooperative, positioned up in bed, on 4.5 L O2 via nc, assessed tolerance thins via straw and puree; pt with good oral containment and straw use for left sided placement (ongoing right sided weakness), positive swallow movement, good oral clearance. Following large sip, patient with delayed cough, however not clearly related to swallow as was productive of sputum. No issues on subsequent trials, with cues for small bites/sips.   Cont goal    Patient/Family/Caregiver Education:  See goal 2 above    Compensatory Strategies:  Compensatory Swallowing Strategies : Upright as possible for all oral intake, Eat/Feed slowly, Check for pocketing of food on the Right, Alternate solids and liquids (straw and food placement on LEFT)      Plan:  Continue dysphagia treatment with goals per plan of care.  Diet Recommendations: Puree / Thin Liquids via straw (Left-sided placement) / meds in puree  - general aspiration precautions  - assist feed  - check for pocketing on Right  *oral hygiene x 2 daily*  DC recommendation: TBD  Treatment: 15  D/W nursingAna  Needs met prior to leaving room, call button in reach. [place], [place], [address] [place] [address], [phone]  Pg.  # [phone]    If patient is discharged prior to next treatment, this note will serve as the discharge summary

## 2022-12-02 NOTE — PLAN OF CARE
Problem: Pain  Goal: Verbalizes/displays adequate comfort level or baseline comfort level  Outcome: Progressing   Pt stating not in pain     Problem: Skin/Tissue Integrity  Goal: Absence of new skin breakdown  Description: 1. Monitor for areas of redness and/or skin breakdown  2. Assess vascular access sites hourly  3. Every 4-6 hours minimum:  Change oxygen saturation probe site  4. Every 4-6 hours:  If on nasal continuous positive airway pressure, respiratory therapy assess nares and determine need for appliance change or resting period.   Outcome: Progressing   Pt on specialty bed

## 2022-12-02 NOTE — PROCEDURES
SINGLE PICC PROCEDURE NOTE  Chart reviewed for allergies, diagnosis, labs, known contraindications, reason for line placement and planned length of treatment. Informed consent noted to be signed and on chart. Insertion procedure discussed with patient/family member. Three patient identifiers - Patient name,   and MRN -  completed &  confirmed verbally. Time out performed Hat, mask and eye shield donned. PICC site cleaned with chlorhexidine wipes then scrubbed with Chloraprep  One-Step applicator for 30 seconds x 1. Hand Hygiene  performed with 3% Chlorhexidine surgical scrub x1 min prior to  Sterile gloves, sterile gown being donned. Patient draped using maximal sterile barrier technique ( head to toe ). PICC site scrubbed a 2nd time with Chloraprep One-Step applicator x 30 sec. Modified Seldinger  technique/ultrasound assisted and tip locating system utilized for insertion. 1% Lidocaine 5 ml injected intradermal pre-insertion. PICC tip location in the SVC confirmed by ECG technology. Positive brisk blood return obtained from all lumens  and each lumen flushed with 10 mls  0.9% Sterile Sodium Chloride. All lumens flush easily with no resistance. Valve placed on all lumens followed by Alcohol Swab Caps on end of each. CHG dressing in place. Catheter secured with non-sutured locking device per hospital protocol. Sterile Tegaderm applied over PICC site. Sterile field maintained during procedure. PICC insertion, rhythm and positioning wire (utilized prn) accounted for post procedure and disposed of in sharps. Appearance of site is Clean dry and intact without bleeding or edema. All edges of Tegaderm occlusive. Site marked with date and initials of RN placing line. Teaching performed to pt/family and noted in education section. Bed placed in low position post-procedure.  Top 2 side rails in up position call button in reach.Pt. Response to procedure tolerated well. RN notified of all of the above. A Single Lumen Power Injectable PICC was trimmed at 46 CM and placed KIMBERLY per  basilic vein, 0 cm showing from insertion site.

## 2022-12-02 NOTE — PROGRESS NOTES
Physical Therapy  Facility/Department: [facility]  Daily Treatment Note  NAME: Bijan Nichole  : 1955  MRN: [id number]    Date of Service: 2022    Discharge Recommendations:Bianca Childress scored a  on the AM-PAC short mobility form. Current research shows that an AM-PAC score of 17 or less is typically not associated with a discharge to the patient's home setting. Based on the patient's AM-PAC score and their current functional mobility deficits, it is recommended that the patient have 5-7 sessions per week of Physical Therapy at d/c to increase the patient's independence. At this time, this patient demonstrates complex nursing, medical, and rehabilitative needs, and would benefit from intensive rehabilitation services upon discharge from the Inpatient setting. This patient demonstrates the ability to participate in and benefit from an intensive therapy program with a coordinated interdisciplinary team approach to foster frequent, structured, and documented communication among disciplines, who will work together to establish, prioritize, and achieve treatment goals. Please see assessment section for further patient specific details. If patient discharges prior to next session this note will serve as a discharge summary. Please see below for the latest assessment towards goals. Patient would benefit from continued therapy after discharge   PT Equipment Recommendations  Equipment Needed:  (defer)    Patient Diagnosis(es): Diagnoses of Acoustic neuroma [facility]), Balance problem, Meningioma ([facility] and Dizziness were pertinent to this visit. Assessment   Assessment: Pt with continuing improved assist levels today requiring just min A +CGA to stedy and then able to stand from recliner to RW with CGA; however her activity tolerance was very limited and she fatigued very quickly. Pt also limited by nausea and dizziness. She has impaired balance and LE strength but is motivated to improve.  She would benefit from further IP PT at discharge to improve her independence and safety with mobility. Activity Tolerance: Patient tolerated treatment well;Patient limited by fatigue;Patient limited by endurance  Equipment Needed:  (defer)     Plan    Physcial Therapy Plan  General Plan: 5-7 times per week  Current Treatment Recommendations: Strengthening; Functional mobility training;Transfer training;Gait training; Endurance training; Safety education & training; Therapeutic activities     Restrictions  Position Activity Restriction  Other position/activity restrictions: Up with assistance, ambulate the patient, up in the chair, HOB elevated to 30 degrees     Subjective    Subjective  Subjective: \"I am tired. \" The pt presents supine in bed and willing to work with therapist.  Pain: denies  Orientation  Overall Orientation Status: Impaired  Orientation Level: Oriented X4  Cognition  Overall Cognitive Status: Exceptions  Arousal/Alertness: Appropriate responses to stimuli  Following Commands: Follows one step commands with increased time; Follows one step commands with repetition  Attention Span: Attends with cues to redirect  Memory: Decreased recall of recent events  Safety Judgement: Decreased awareness of need for safety;Decreased awareness of need for assistance  Problem Solving: Assistance required to identify errors made;Assistance required to implement solutions;Assistance required to generate solutions;Assistance required to correct errors made  Insights: Decreased awareness of deficits  Initiation: Requires cues for some  Sequencing: Requires cues for some  Cognition Comment: Pt becoming more lethargic throughout session     Objective   Vitals     Bed Mobility Training  Bed Mobility Training: Yes  Supine to Sit: Stand-by assistance (HOB slightly elevated; max cues and additional time, effortful)  Scooting: Contact-guard assistance (to EOB)  Balance  Sitting: Without support (CGA-SBA at EOB, some swaying in all directions. Pt endorsed feeling nauseous and some dizziness)  Sitting - Static: Fair (occasional)  Sitting - Dynamic: Fair (occasional)  Standing: With support (CGA-SBA at EOB, some swaying in all directions. Pt endorsed feeling nauseous and some dizziness)  Transfer Training  Transfer Training: Yes  Sit to Stand: Assist X2 (min A +CGA from bed to stedy and then CGA x 2 from recliner to walker. Pt able to perform a few weight shifts lifting feet harry in stedy)  Stand to Sit: Assist X2;Minimum assistance (for controlled descent to chair)  Bed to Chair: Total assistance; Adaptive equipment (via aimee stedy)  Gait Training  Gait Training: No           Safety Devices  Type of Devices: Patient at risk for falls; Left in chair;Call light within reach; Chair alarm in place;Gait belt;Nurse notified; Heels elevated for pressure relief (RN in room at end of session)  Restraints  Restraints Initially in Place: No       Goals  Short Term Goals  Time Frame for Short Term Goals: D/C  Short Term Goal 1: supine<->sit mod A x 1-2  Short Term Goal 2: pt will sit at EOB for 5 min with SBA  Short Term Goal 3: pt will tolerate sit<->stand assessment  Short Term Goal 4: pt will tolerate bed->chair assessment  Short Term Goal 5: pt will tolerate gait assessment  Patient Goals   Patient Goals : not stated    Education  Patient Education  Education Given To: Patient  Education Provided: Role of Therapy  Education Method: Verbal  Education Outcome: Verbalized understanding    Therapy Time   Individual Concurrent Group Co-treatment   Time In 1438         Time Out 1502         Minutes 24         Timed Code Treatment Minutes: 24 Minutes   Timed Code Treatment Minutes:  24 Minutes    Total Treatment Minutes:  24 minutes      Rod Jerome PT

## 2022-12-03 LAB
ANION GAP SERPL CALCULATED.3IONS-SCNC: 5 MMOL/L (ref 3–16)
BASOPHILS ABSOLUTE: 0 K/UL (ref 0–0.2)
BASOPHILS RELATIVE PERCENT: 0.2 %
BUN BLDV-MCNC: 19 MG/DL (ref 7–20)
CALCIUM SERPL-MCNC: 8 MG/DL (ref 8.3–10.6)
CHLORIDE BLD-SCNC: 105 MMOL/L (ref 99–110)
CO2: 33 MMOL/L (ref 21–32)
CREAT SERPL-MCNC: <0.5 MG/DL (ref 0.6–1.2)
EOSINOPHILS ABSOLUTE: 0.1 K/UL (ref 0–0.6)
EOSINOPHILS RELATIVE PERCENT: 1.1 %
GFR SERPL CREATININE-BSD FRML MDRD: >60 ML/MIN/{1.73_M2}
GLUCOSE BLD-MCNC: 168 MG/DL (ref 70–99)
GLUCOSE BLD-MCNC: 180 MG/DL (ref 70–99)
GLUCOSE BLD-MCNC: 214 MG/DL (ref 70–99)
GLUCOSE BLD-MCNC: 221 MG/DL (ref 70–99)
GLUCOSE BLD-MCNC: 222 MG/DL (ref 70–99)
HCT VFR BLD CALC: 32.2 % (ref 36–48)
HEMOGLOBIN: 10.9 G/DL (ref 12–16)
LYMPHOCYTES ABSOLUTE: 0.9 K/UL (ref 1–5.1)
LYMPHOCYTES RELATIVE PERCENT: 10.8 %
MAGNESIUM: 2.2 MG/DL (ref 1.8–2.4)
MCH RBC QN AUTO: 30.9 PG (ref 26–34)
MCHC RBC AUTO-ENTMCNC: 33.8 G/DL (ref 31–36)
MCV RBC AUTO: 91.4 FL (ref 80–100)
MONOCYTES ABSOLUTE: 0.6 K/UL (ref 0–1.3)
MONOCYTES RELATIVE PERCENT: 6.9 %
NEUTROPHILS ABSOLUTE: 7 K/UL (ref 1.7–7.7)
NEUTROPHILS RELATIVE PERCENT: 81 %
PDW BLD-RTO: 15.1 % (ref 12.4–15.4)
PERFORMED ON: ABNORMAL
PLATELET # BLD: 183 K/UL (ref 135–450)
PMV BLD AUTO: 7.9 FL (ref 5–10.5)
POTASSIUM REFLEX MAGNESIUM: 3.2 MMOL/L (ref 3.5–5.1)
RBC # BLD: 3.52 M/UL (ref 4–5.2)
SODIUM BLD-SCNC: 143 MMOL/L (ref 136–145)
WBC # BLD: 8.7 K/UL (ref 4–11)

## 2022-12-03 PROCEDURE — 6370000000 HC RX 637 (ALT 250 FOR IP)

## 2022-12-03 PROCEDURE — 6360000002 HC RX W HCPCS: Performed by: STUDENT IN AN ORGANIZED HEALTH CARE EDUCATION/TRAINING PROGRAM

## 2022-12-03 PROCEDURE — 6360000002 HC RX W HCPCS: Performed by: NURSE PRACTITIONER

## 2022-12-03 PROCEDURE — 6370000000 HC RX 637 (ALT 250 FOR IP): Performed by: INTERNAL MEDICINE

## 2022-12-03 PROCEDURE — 6370000000 HC RX 637 (ALT 250 FOR IP): Performed by: STUDENT IN AN ORGANIZED HEALTH CARE EDUCATION/TRAINING PROGRAM

## 2022-12-03 PROCEDURE — 2000000000 HC ICU R&B

## 2022-12-03 PROCEDURE — 94761 N-INVAS EAR/PLS OXIMETRY MLT: CPT

## 2022-12-03 PROCEDURE — 2580000003 HC RX 258: Performed by: NURSE PRACTITIONER

## 2022-12-03 PROCEDURE — 83735 ASSAY OF MAGNESIUM: CPT

## 2022-12-03 PROCEDURE — 6370000000 HC RX 637 (ALT 250 FOR IP): Performed by: NURSE PRACTITIONER

## 2022-12-03 PROCEDURE — 6360000002 HC RX W HCPCS: Performed by: NEUROLOGICAL SURGERY

## 2022-12-03 PROCEDURE — 6360000002 HC RX W HCPCS

## 2022-12-03 PROCEDURE — 99233 SBSQ HOSP IP/OBS HIGH 50: CPT | Performed by: INTERNAL MEDICINE

## 2022-12-03 PROCEDURE — 2700000000 HC OXYGEN THERAPY PER DAY

## 2022-12-03 PROCEDURE — 2580000003 HC RX 258: Performed by: NEUROLOGICAL SURGERY

## 2022-12-03 PROCEDURE — 85025 COMPLETE CBC W/AUTO DIFF WBC: CPT

## 2022-12-03 PROCEDURE — 6370000000 HC RX 637 (ALT 250 FOR IP): Performed by: NEUROLOGICAL SURGERY

## 2022-12-03 PROCEDURE — 94150 VITAL CAPACITY TEST: CPT

## 2022-12-03 PROCEDURE — 6360000002 HC RX W HCPCS: Performed by: INTERNAL MEDICINE

## 2022-12-03 PROCEDURE — 80048 BASIC METABOLIC PNL TOTAL CA: CPT

## 2022-12-03 RX ORDER — LEVETIRACETAM 500 MG/1
500 TABLET ORAL 2 TIMES DAILY
Status: DISCONTINUED | OUTPATIENT
Start: 2022-12-03 | End: 2022-12-11

## 2022-12-03 RX ORDER — INSULIN LISPRO 100 [IU]/ML
0-8 INJECTION, SOLUTION INTRAVENOUS; SUBCUTANEOUS
Status: DISCONTINUED | OUTPATIENT
Start: 2022-12-03 | End: 2022-12-04

## 2022-12-03 RX ORDER — METHIMAZOLE 5 MG/1
10 TABLET ORAL DAILY
Status: DISCONTINUED | OUTPATIENT
Start: 2022-12-03 | End: 2022-12-22 | Stop reason: HOSPADM

## 2022-12-03 RX ORDER — INSULIN LISPRO 100 [IU]/ML
0-4 INJECTION, SOLUTION INTRAVENOUS; SUBCUTANEOUS NIGHTLY
Status: DISCONTINUED | OUTPATIENT
Start: 2022-12-03 | End: 2022-12-04

## 2022-12-03 RX ORDER — POTASSIUM CHLORIDE 20 MEQ/1
40 TABLET, EXTENDED RELEASE ORAL EVERY 6 HOURS
Status: COMPLETED | OUTPATIENT
Start: 2022-12-03 | End: 2022-12-03

## 2022-12-03 RX ORDER — LABETALOL HYDROCHLORIDE 5 MG/ML
5 INJECTION, SOLUTION INTRAVENOUS EVERY 4 HOURS PRN
Status: DISCONTINUED | OUTPATIENT
Start: 2022-12-03 | End: 2022-12-22 | Stop reason: HOSPADM

## 2022-12-03 RX ADMIN — HYDRALAZINE HYDROCHLORIDE 10 MG: 20 INJECTION INTRAMUSCULAR; INTRAVENOUS at 16:48

## 2022-12-03 RX ADMIN — CARVEDILOL 12.5 MG: 6.25 TABLET, FILM COATED ORAL at 08:08

## 2022-12-03 RX ADMIN — HEPARIN SODIUM 5000 UNITS: 5000 INJECTION INTRAVENOUS; SUBCUTANEOUS at 14:17

## 2022-12-03 RX ADMIN — ACYCLOVIR SODIUM 400 MG: 50 INJECTION, SOLUTION INTRAVENOUS at 00:23

## 2022-12-03 RX ADMIN — POTASSIUM CHLORIDE 40 MEQ: 1500 TABLET, EXTENDED RELEASE ORAL at 12:25

## 2022-12-03 RX ADMIN — FUROSEMIDE 40 MG: 10 INJECTION, SOLUTION INTRAMUSCULAR; INTRAVENOUS at 08:45

## 2022-12-03 RX ADMIN — INSULIN GLARGINE 15 UNITS: 100 INJECTION, SOLUTION SUBCUTANEOUS at 20:19

## 2022-12-03 RX ADMIN — OXYCODONE 5 MG: 5 TABLET ORAL at 20:18

## 2022-12-03 RX ADMIN — OXYCODONE 5 MG: 5 TABLET ORAL at 01:01

## 2022-12-03 RX ADMIN — INSULIN LISPRO 2 UNITS: 100 INJECTION, SOLUTION INTRAVENOUS; SUBCUTANEOUS at 12:27

## 2022-12-03 RX ADMIN — LATANOPROST 1 DROP: 50 SOLUTION OPHTHALMIC at 20:18

## 2022-12-03 RX ADMIN — HEPARIN SODIUM 5000 UNITS: 5000 INJECTION INTRAVENOUS; SUBCUTANEOUS at 22:10

## 2022-12-03 RX ADMIN — HEPARIN SODIUM 5000 UNITS: 5000 INJECTION INTRAVENOUS; SUBCUTANEOUS at 05:09

## 2022-12-03 RX ADMIN — LOSARTAN POTASSIUM 100 MG: 100 TABLET, FILM COATED ORAL at 08:46

## 2022-12-03 RX ADMIN — METHIMAZOLE 10 MG: 5 TABLET ORAL at 12:25

## 2022-12-03 RX ADMIN — TETRAHYDROZOLINE HCL 0.05% 1 DROP: 0.05 SOLUTION/ DROPS INTRAOCULAR at 14:17

## 2022-12-03 RX ADMIN — ACYCLOVIR SODIUM 400 MG: 50 INJECTION, SOLUTION INTRAVENOUS at 08:41

## 2022-12-03 RX ADMIN — POTASSIUM CHLORIDE 40 MEQ: 1500 TABLET, EXTENDED RELEASE ORAL at 05:45

## 2022-12-03 RX ADMIN — INSULIN LISPRO 2 UNITS: 100 INJECTION, SOLUTION INTRAVENOUS; SUBCUTANEOUS at 16:56

## 2022-12-03 RX ADMIN — TETRAHYDROZOLINE HCL 0.05% 1 DROP: 0.05 SOLUTION/ DROPS INTRAOCULAR at 08:47

## 2022-12-03 RX ADMIN — CARVEDILOL 12.5 MG: 6.25 TABLET, FILM COATED ORAL at 16:50

## 2022-12-03 RX ADMIN — TETRAHYDROZOLINE HCL 0.05% 1 DROP: 0.05 SOLUTION/ DROPS INTRAOCULAR at 20:18

## 2022-12-03 RX ADMIN — SODIUM CHLORIDE, PRESERVATIVE FREE 10 ML: 5 INJECTION INTRAVENOUS at 08:46

## 2022-12-03 RX ADMIN — LEVETIRACETAM 500 MG: 500 TABLET, FILM COATED ORAL at 17:43

## 2022-12-03 RX ADMIN — HYDRALAZINE HYDROCHLORIDE 10 MG: 20 INJECTION INTRAMUSCULAR; INTRAVENOUS at 02:06

## 2022-12-03 RX ADMIN — ACYCLOVIR SODIUM 400 MG: 50 INJECTION, SOLUTION INTRAVENOUS at 17:00

## 2022-12-03 RX ADMIN — SODIUM CHLORIDE 500 MG: 9 INJECTION, SOLUTION INTRAVENOUS at 05:09

## 2022-12-03 RX ADMIN — POLYETHYLENE GLYCOL 3350 17 G: 17 POWDER, FOR SOLUTION ORAL at 12:24

## 2022-12-03 RX ADMIN — Medication 5 MG: at 20:18

## 2022-12-03 ASSESSMENT — PAIN SCALES - GENERAL
PAINLEVEL_OUTOF10: 6
PAINLEVEL_OUTOF10: 0
PAINLEVEL_OUTOF10: 6
PAINLEVEL_OUTOF10: 0

## 2022-12-03 ASSESSMENT — PAIN DESCRIPTION - LOCATION
LOCATION: BACK
LOCATION: BACK

## 2022-12-03 ASSESSMENT — PAIN DESCRIPTION - DESCRIPTORS
DESCRIPTORS: ACHING
DESCRIPTORS: ACHING

## 2022-12-03 ASSESSMENT — PAIN DESCRIPTION - ORIENTATION: ORIENTATION: MID

## 2022-12-03 NOTE — PROGRESS NOTES
ICU Progress Note    Admit Date: 11/28/2022  Diet: ADULT DIET; Dysphagia - Pureed    CC: vertigo, falls. 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 -Afeb other vitals stable. BP on the higher side hovering between 150 and 160. Needed hydralazine 10 mg once at 2 am.  On 2 L this am.  -s/p PICC line  -CBC/RFP stable. K 3.2 > replaced. Glucose high at 214. Will inc lantus 10 > 13 and LDSS to MDSS. Off cardene since 9 am yesterday. No drains in place. No evidence of CSF leak through incision. No CSF rhinorrhea. IS use:  Yes  Clinically she feels good. 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 The patient's main symptoms began in June with feelings of vertigo and falls. During that week of the onset of her symptoms, she had 2 episodes of true vertigo and fell out of bed. Patient states that she was frequently dizzy as well. This prompted her to go to her primary care physician who ordered an MRI. That MRI subsequently showed this large right cerebellopontine angle mass and then a smaller left anterior parafalcine extra-axial lesion. Patient was admitted to the [facility]. ICU today status post part one of his staged procedure for those meningioma and masses. Patient admitted to the ICU specifically for neuro monitoring as well as neurovascular checks. Patient had part 2 of her stage procedure yesterday. Patient tolerated the procedure well.     Medications:     Scheduled Meds:   potassium chloride  40 mEq Oral Q6H    melatonin  5 mg Oral Nightly    sodium chloride flush  5-40 mL IntraVENous 2 times per day    furosemide  40 mg IntraVENous Daily    insulin glargine  10 Units SubCUTAneous Nightly    heparin (porcine)  5,000 Units SubCUTAneous 3 times per day    [Held by provider] polyethylene glycol  17 g Oral Daily    acyclovir  400 mg IntraVENous Q8H    tetrahydrozoline  1 drop Both Eyes TID    carvedilol  12.5 mg Oral BID     losartan  100 mg Oral Daily    sodium chloride flush  5-40 mL IntraVENous 2 times per day    sodium chloride flush  5-40 mL IntraVENous 2 times per day    sodium chloride flush  5-40 mL IntraVENous 2 times per day    [Held by provider] sennosides-docusate sodium  1 tablet Oral BID    levETIRAcetam  500 mg IntraVENous Q12H    latanoprost  1 drop Both Eyes Nightly    insulin lispro  0-4 Units SubCUTAneous TID     insulin lispro  0-4 Units SubCUTAneous Nightly    [Held by provider] methIMAzole  10 mg Oral Daily     Continuous Infusions:   sodium chloride      niCARdipene (CARDENE) 50 mg in 250 mL 0.9 % sodium chloride infusion Stopped (12/02/22 1000)    sodium chloride      sodium chloride      sodium chloride      dextrose       PRN Meds:sodium chloride flush, sodium chloride, sodium chloride flush, sodium chloride, meperidine, HYDROmorphone, HYDROmorphone, sodium chloride flush, sodium chloride, sodium chloride flush, sodium chloride, acetaminophen, oxyCODONE **OR** oxyCODONE, morphine, ondansetron **OR** ondansetron, hydrALAZINE, albuterol, glucose, dextrose bolus **OR** dextrose bolus, glucagon (rDNA), dextrose, promethazine    Objective:   Vitals:   T-max:  Patient Vitals for the past 8 hrs:   BP Temp Temp src Pulse Resp SpO2   12/03/22 0700 (!) 148/63 98.3 °F (36.8 °C) Oral 62 16 95 %   12/03/22 0600 (!) 161/65 -- -- 67 23 94 %   12/03/22 0500 139/62 -- -- 63 18 90 %   12/03/22 0400 (!) 140/64 98 °F (36.7 °C) Oral 62 16 92 %   12/03/22 0300 (!) 139/55 -- -- 62 14 93 %   12/03/22 0206 (!) 163/73 -- -- -- -- --   12/03/22 0200 (!) 163/73 -- -- 61 16 93 %   12/03/22 0131 -- -- -- -- 15 --   12/03/22 0100 (!) 171/73 -- -- 65 19 94 %   12/03/22 0000 (!) 152/65 97.9 °F (36.6 °C) Oral 61 15 93 %         Intake/Output Summary (Last 24 hours) at 12/3/2022 0735  Last data filed at 12/3/2022 2444  Gross per 24 hour   Intake 2302 ml   Output 2860 ml   Net -558 ml         Physical Exam  Constitutional:       Appearance: She is morbidly obese. Interventions: Nasal cannula in place. Eyes:      Pupils: Pupils are equal, round, and reactive to light. Cardiovascular:      Rate and Rhythm: Normal rate and regular rhythm. Pulses:           Radial pulses are 2+ on the right side and 2+ on the left side. Heart sounds: Normal heart sounds, S1 normal and S2 normal. No murmur heard. Pulmonary:      Effort: Pulmonary effort is normal.      Breath sounds: Normal breath sounds and air entry. Abdominal:      General: Abdomen is protuberant. Bowel sounds are normal.      Palpations: Abdomen is soft. Tenderness: There is no abdominal tenderness. Musculoskeletal:         General: No signs of injury. Right lower leg: No edema. Left lower leg: No edema. Skin:     General: Skin is warm. Capillary Refill: Capillary refill takes less than 2 seconds. Neurological:      Mental Status: She is oriented to person, place, and time. She is lethargic. Cranial Nerves: Cranial nerves 2-12 are intact. Psychiatric:         Mood and Affect: Mood normal.         LABS:    CBC:   Recent Labs     12/01/22  0431 12/02/22  0414 12/03/22  0301   WBC 10.4 9.2 8.7   HGB 10.9* 10.8* 10.9*   HCT 31.9* 31.2* 32.2*    163 183   MCV 91.6 91.1 91.4       Renal:    Recent Labs     12/01/22  1612 12/01/22  2121 12/02/22  0414 12/03/22  0301   *  --  148* 143   K 2.9*  2.9* 3.1* 3.5 3.2*     --  110 105   CO2 26  --  31 33*   BUN 15  --  16 19   CREATININE <0.5*  --  <0.5* <0.5*   GLUCOSE 222*  --  216* 214*   CALCIUM 7.7*  --  7.8* 8.0*   MG 2.00  --  2.10 2.20   ANIONGAP 12  --  7 5       Hepatic: No results for input(s): AST, ALT, BILITOT, BILIDIR, PROT, LABALBU, ALKPHOS in the last 72 hours.   Troponin: No results for input(s): TROPONINI in the last 72 hours. BNP: No results for input(s): BNP in the last 72 hours. Lipids: No results for input(s): CHOL, HDL in the last 72 hours. Invalid input(s): LDLCALCU, TRIGLYCERIDE  ABGs:    Recent Labs     12/01/22  1714 12/01/22  2121   PHART 7.466* 7.503*   [id number] 37.4 43.1   PO2ART 48.4* 90.2   [id number] 26.9 34*   BEART 3 9.6*   [id number] 86* 98   MMI2MLX 28 35         INR:   No results for input(s): INR in the last 72 hours. Lactate:   No results for input(s): LACTATE in the last 72 hours. Cultures:  -----------------------------------------------------------------  RAD:   CTA PULMONARY W CONTRAST   Final Result      Significantly limited study due to motion. 1. Significantly limited study due to streak artifact and suboptimal bolus. No evidence of a central embolus. 2. Multifocal airspace consolidation is present, suboptimally evaluated due to motion. This presumably reflects pneumonia. Follow-up chest CT is recommended to document resolution. 3. There is a large mass in the right hepatic lobe measuring 8.2 x 7.4 cm, demonstrating peripheral nodular enhancement. There is significant amount of artifact through this lesion. It is still favored to reflect a hemangioma. Recommend a multiphasic CT    of the abdomen for further assessment. This could be performed on a nonurgent basis, if clinically appropriate. 4. Partially calcified nodule arising from the left adrenal gland, most likely benign and possibly reflecting old adrenal hemorrhage. XR CHEST PORTABLE   Final Result      Similar appearance of patchy airspace disease. FL MODIFIED BARIUM SWALLOW W VIDEO   Final Result      1. Laryngeal penetration, but no evidence of tracheal aspiration. XR CHEST PORTABLE   Final Result      Patchy left upper lobe airspace disease, atelectasis versus pneumonia. CT HEAD WO CONTRAST   Final Result      1.   Interval postsurgical changes from resection of right cerebellopontine angle mass with associated right temporal mastoid resection and frontotemporal craniotomy and fat graft placement. 2.  Thin hyperdensity along the fat graft may represent small amount of postsurgical blood products. There is also a small focus of subarachnoid hemorrhage along the right temporal lobe. 3.  Mild pneumocephalus and bilateral subgaleal fluid is also likely postsurgical.               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 Left cerebellopontine angle meningioma and smaller left parafalcine extra- axial lesion status post both parts of the procedure. Every 4 hours neurovascular checks  Neurosurgery and otolaryngology following  Will continue Keppra 500 IV twice daily for seizure prophylaxis  SBP will be maintained below 160  As needed labetalol 10 and as needed hydralazine 10 on board if needed. EVD in place- NSGY managing  Chronic medical conditions  Asthma- patient's albuterol restarted. Hypertension- blood pressure currently controlled with as needed labetalol 10 and hydralazine 10. Also has nicardipine gtt as PRN. Currently BP is below 160. Coreg 12.5 mg BID. Losartan 100 mg QD Consider norvasc  Hyperlipidemia- patient is at home statin  DM:       -increase lantus to 13 u       -inc LDSS to MDSS    Consider TFX if OK with NSGY    Code Status:Full Code  FEN: ADULT DIET; Dysphagia - Pureed  PPX:  Keppra, heparin TID  DISPO: ICU    Kale Deluca MD PGY-3  PGY3, Internal Medicine  12/03/22  7:35 AM    Pulmonary & Critical Care     Patient seen and examined. I agree with Dr. Liz Mukherjee history, physical, lab findings, assessment and plan. 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       Every day she seems to gradually get better/stronger. She is up in a chair. O2 requirement is 2 L with O2 sat 97%. When on RA she desats into the 80's with sleep    FSBS still in low 200's - increase lantus to 15 units and SSI to med dose from low    BP mostly within goal. Remains off nicardipine. Coreg at 12.5 BID and HR low 60's. Losartan at 100. Cont with prn hydralazine/labetolol.  Cont lasix 40 IV daily as still some volume up    Change Keppra to PO and resume miralax    DIsposition per Srinivas Palencia MD

## 2022-12-03 NOTE — PROGRESS NOTES
Neurosurgery Progress Note    Patient seen and examined on 12/03/22. No acute events overnight. Denies continued diplopia. Incision without drainage. A/P: 80 yo woman POD #4 s/p resection of right petroclival meningioma    -Neuro exam stable  -Frequent neuro checks  -PT/OT  -Pain control  -DVT prophylaxis: SCDs and heparin  -MRI pending  -Incisional care: Mastoid dressing in place.  Check q 4hr and notify NS if drainage  -Dispo planning: ICU        [address], [address] [facility] (o)

## 2022-12-03 NOTE — PLAN OF CARE
Problem: Chronic Conditions and Co-morbidities  Goal: Patient's chronic conditions and co-morbidity symptoms are monitored and maintained or improved  12/3/2022 1715 by Parul Henriquez RN  Outcome: Progressing  Flowsheets (Taken 12/3/2022 0718)  Care Plan - Patient's Chronic Conditions and Co-Morbidity Symptoms are Monitored and Maintained or Improved:   Collaborate with multidisciplinary team to address chronic and comorbid conditions and prevent exacerbation or deterioration   Update acute care plan with appropriate goals if chronic or comorbid symptoms are exacerbated and prevent overall improvement and discharge  12/3/2022 0402 by Joanette Bernheim, RN  Outcome: Progressing  Flowsheets  Taken 12/3/2022 [address] - Patient's Chronic Conditions and Co-Morbidity Symptoms are Monitored and Maintained or Improved: Monitor and assess patient's chronic conditions and comorbid symptoms for stability, deterioration, or improvement  Taken 12/3/2022 0000  Care Plan - Patient's Chronic Conditions and Co-Morbidity Symptoms are Monitored and Maintained or Improved: Monitor and assess patient's chronic conditions and comorbid symptoms for stability, deterioration, or improvement  Taken 12/2/2022 [address] - Patient's Chronic Conditions and Co-Morbidity Symptoms are Monitored and Maintained or Improved: Monitor and assess patient's chronic conditions and comorbid symptoms for stability, deterioration, or improvement     Problem: Discharge Planning  Goal: Discharge to home or other facility with appropriate resources  12/3/2022 1715 by Parul Henriquez RN  Outcome: Progressing  Flowsheets (Taken 12/3/2022 9743)  Discharge to home or other facility with appropriate resources:   Identify barriers to discharge with patient and caregiver   Arrange for needed discharge resources and transportation as appropriate   Identify discharge learning needs (meds, wound care, etc)  12/3/2022 0402 by Joanette Bernheim, RN  Outcome: Progressing  Flowsheets  Taken 12/3/2022 0400  Discharge to home or other facility with appropriate resources: Identify barriers to discharge with patient and caregiver  Taken 12/3/2022 0000  Discharge to home or other facility with appropriate resources: Identify barriers to discharge with patient and caregiver  Taken 12/2/2022 2000  Discharge to home or other facility with appropriate resources: Identify barriers to discharge with patient and caregiver     Problem: Pain  Goal: Verbalizes/displays adequate comfort level or baseline comfort level  12/3/2022 1715 by Jessica Alpers, RN  Outcome: Progressing  Flowsheets (Taken 12/3/2022 0700)  Verbalizes/displays adequate comfort level or baseline comfort level:   Encourage patient to monitor pain and request assistance   Assess pain using appropriate pain scale   Administer analgesics based on type and severity of pain and evaluate response   Implement non-pharmacological measures as appropriate and evaluate response   Consider cultural and social influences on pain and pain management   Notify Licensed Independent Practitioner if interventions unsuccessful or patient reports new pain  12/3/2022 0402 by Prince Anna RN  Outcome: Progressing  Flowsheets  Taken 12/3/2022 0400  Verbalizes/displays adequate comfort level or baseline comfort level: Encourage patient to monitor pain and request assistance  Taken 12/2/2022 2000  Verbalizes/displays adequate comfort level or baseline comfort level:   Encourage patient to monitor pain and request assistance   Assess pain using appropriate pain scale     Problem: Safety - Adult  Goal: Free from fall injury  12/3/2022 1715 by Jessica Alpers, RN  Outcome: Progressing  [address] (Taken 12/3/2022 1212)  Free From Fall Injury: Instruct family/caregiver on patient safety  12/3/2022 0402 by Prince Anna RN  Outcome: Progressing  Flowsheets  Taken 12/2/2022 2038 by Prince Anna RN  Free From Fall Injury: Instruct family/caregiver on patient safety  Taken 12/2/2022 1821 by Ashok Coy RN  Free From Fall Injury: Instruct family/caregiver on patient safety     Problem: Skin/Tissue Integrity  Goal: Absence of new skin breakdown  Description: 1. Monitor for areas of redness and/or skin breakdown  2. Assess vascular access sites hourly  3. Every 4-6 hours minimum:  Change oxygen saturation probe site  4. Every 4-6 hours:  If on nasal continuous positive airway pressure, respiratory therapy assess nares and determine need for appliance change or resting period.   12/3/2022 1715 by Ashok Coy RN  Outcome: Progressing  12/3/2022 0402 by Markel Heard RN  Outcome: Progressing     Problem: ABCDS Injury Assessment  Goal: Absence of physical injury  12/3/2022 1715 by Ashok Coy RN  Outcome: Progressing  Flowsheets (Taken 12/3/2022 1212)  Absence of Physical Injury: Implement safety measures based on patient assessment  12/3/2022 0402 by Markel Heard RN  Outcome: Progressing

## 2022-12-03 NOTE — PROGRESS NOTES
Neuro unchanged/ pt slept well per handoff/ rylee is out/ shelly external cath in place/ assist x2 with walker and gait-belt OOB to chair for breakfast/ see notes/ flow sheet/ orders    1000 round with ICU Team/ Plan is to continue diuresing/ encourage IS/ pt remains on 2l/min via NC /wean as tolerates/ when on room air pt de sats 87-89% at rest/ see orders/ notes/ /flow sheet    1100 assist x2 with gait-belt + aimee steady back to bed at this time per pts request/denies any dizziness/ headache/ visual deficits/ states she feels tired and getting up takes a lot out of her/ cont to encourage + support pts participation with ADLs throughout recovery/ pt is compliant and cooperative with all tasks/ see flow sheet

## 2022-12-03 NOTE — PROGRESS NOTES
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 Subjective:  Patient is doing overall well, reports no pain, denies nausea/vomiting/ Vertigo. Denies chest neither lower extremities pain. Pt denies breathing difficulties    Pt evaluated and treated by PT/OT earlier today, sat on chair for 4 hours. No BM yet but gives adequate amounts of urine    Objective:    Pt awake Looks fine without distress or dyspnea    AF, O2 95-96% nasal cannula 2L O2   Sitting up in bed. Facial nerve same HB - 4/5 no worsening. (Complete eye closure but asymmetry at rest)   Mastoid wrap well placed and completely dry. Incision looks fine no evidence for leaking. Right neck ecchymotic. Without worsening. Abdominal wound - well closed, no sign for hematoma, bleeding or infection. Lower extremities without evidence for DVT in palpation. Assessment:   1 day s/p resection of meningioma. 2 days s/p TL and MCF approach.    Dyspnea- resolved   Pt under SQ Heparin 5000 units 3 times/ day     Plan:   - pt to continue Heparin   --Continue to see PT/OT ( Scheduled for today)   --Will keep an eye on mastoid dressing.   --SCDs and SQH  --Remainder of care per IM and NSGY teams  - will reassess again in morning round

## 2022-12-03 NOTE — PLAN OF CARE
Problem: Chronic Conditions and Co-morbidities  Goal: Patient's chronic conditions and co-morbidity symptoms are monitored and maintained or improved  Outcome: Progressing  Flowsheets  Taken 12/3/2022 0400  Care Plan - Patient's Chronic Conditions and Co-Morbidity Symptoms are Monitored and Maintained or Improved: Monitor and assess patient's chronic conditions and comorbid symptoms for stability, deterioration, or improvement  Taken 12/3/2022 0000  Care Plan - Patient's Chronic Conditions and Co-Morbidity Symptoms are Monitored and Maintained or Improved: Monitor and assess patient's chronic conditions and comorbid symptoms for stability, deterioration, or improvement  Taken 12/2/2022 [address] - Patient's Chronic Conditions and Co-Morbidity Symptoms are Monitored and Maintained or Improved: Monitor and assess patient's chronic conditions and comorbid symptoms for stability, deterioration, or improvement     Problem: Discharge Planning  Goal: Discharge to home or other facility with appropriate resources  Outcome: Progressing  Flowsheets  Taken 12/3/2022 0400  Discharge to home or other facility with appropriate resources: Identify barriers to discharge with patient and caregiver  Taken 12/3/2022 0000  Discharge to home or other facility with appropriate resources: Identify barriers to discharge with patient and caregiver  Taken 12/2/2022 2000  Discharge to home or other facility with appropriate resources: Identify barriers to discharge with patient and caregiver     Problem: Pain  Goal: Verbalizes/displays adequate comfort level or baseline comfort level  Outcome: Progressing  Flowsheets (Taken 12/2/2022 2000)  Verbalizes/displays adequate comfort level or baseline comfort level:   Encourage patient to monitor pain and request assistance   Assess pain using appropriate pain scale     Problem: Safety - Adult  Goal: Free from fall injury  Outcome: Progressing  Flowsheets  Taken 12/2/2022 2038 by Christopher Diamond Madina Snider RN  Free From Fall Injury: Instruct family/caregiver on patient safety  Taken 12/2/2022 1821 by Chase High RN  Free From Fall Injury: Instruct family/caregiver on patient safety     Problem: Skin/Tissue Integrity  Goal: Absence of new skin breakdown  Description: 1. Monitor for areas of redness and/or skin breakdown  2. Assess vascular access sites hourly  3. Every 4-6 hours minimum:  Change oxygen saturation probe site  4. Every 4-6 hours:  If on nasal continuous positive airway pressure, respiratory therapy assess nares and determine need for appliance change or resting period.   Outcome: Progressing     Problem: ABCDS Injury Assessment  Goal: Absence of physical injury  Outcome: Progressing

## 2022-12-03 NOTE — PROGRESS NOTES
Neuro unchanged from dayshift. R facial droop. All extremities moving and strong. Pt denies pain/SOB/dizziness. On 2 L NC. SBP maintained <160. PICC line drawing back blood appropriately/intermittent infusions. Will cont to encourage active Rom and deep breathing.

## 2022-12-03 NOTE — RT PROTOCOL NOTE
RT Nebulizer Bronchodilator Protocol Note    There is a bronchodilator order in the chart from a provider indicating to follow the RT Bronchodilator Protocol and there is an Initiate RT Bronchodilator Protocol order as well (see protocol at bottom of note). CXR Findings:  XR CHEST PORTABLE    Result Date: 12/1/2022  Similar appearance of patchy airspace disease. The findings from the last RT Protocol Assessment were as follows:  Smoking: Chronic pulmonary disease  Respiratory Pattern: Regular pattern and RR 12-20 bpm  Breath Sounds: Clear breath sounds  Cough: Strong, spontaneous, non-productive  Indication for Bronchodilator Therapy:    Bronchodilator Assessment Score: 2    Aerosolized bronchodilator medication orders have been revised according to the RT Nebulizer Bronchodilator Protocol below. Respiratory Therapist to perform RT Therapy Protocol Assessment initially then follow the protocol. Repeat RT Therapy Protocol Assessment PRN for score 0-3 or on second treatment, BID, and PRN for scores above 3. No Indications - adjust the frequency to every 6 hours PRN wheezing or bronchospasm, if no treatments needed after 48 hours then discontinue using Per Protocol order mode. If indication present, adjust the RT bronchodilator orders based on the Bronchodilator Assessment Score as indicated below. If a patient is on this medication at home then do not decrease Frequency below that used at home. 0-3 - enter or revise RT bronchodilator order(s) to equivalent RT Bronchodilator order with Frequency of every 4 hours PRN for wheezing or increased work of breathing using Per Protocol order mode. 4-6 - enter or revise RT Bronchodilator order(s) to two equivalent RT bronchodilator orders with one order with BID Frequency and one order with Frequency of every 4 hours PRN wheezing or increased work of breathing using Per Protocol order mode.          7-10 - enter or revise RT Bronchodilator order(s) to two equivalent RT bronchodilator orders with one order with TID Frequency and one order with Frequency of every 4 hours PRN wheezing or increased work of breathing using Per Protocol order mode. 11-13 - enter or revise RT Bronchodilator order(s) to one equivalent RT bronchodilator order with QID Frequency and an Albuterol order with Frequency of every 4 hours PRN wheezing or increased work of breathing using Per Protocol order mode. Greater than 13 - enter or revise RT Bronchodilator order(s) to one equivalent RT bronchodilator order with every 4 hours Frequency and an Albuterol order with Frequency of every 2 hours PRN wheezing or increased work of breathing using Per Protocol order mode. RT to enter RT Home Evaluation for COPD & MDI Assessment order using Per Protocol order mode.     Electronically signed by Dawn De Souza RCP on 12/3/2022 at 10:01 AM

## 2022-12-04 LAB
ALBUMIN SERPL-MCNC: 3.1 G/DL (ref 3.4–5)
ALP BLD-CCNC: 48 U/L (ref 40–129)
ALT SERPL-CCNC: 11 U/L (ref 10–40)
ANION GAP SERPL CALCULATED.3IONS-SCNC: 5 MMOL/L (ref 3–16)
AST SERPL-CCNC: 12 U/L (ref 15–37)
BASOPHILS ABSOLUTE: 0 K/UL (ref 0–0.2)
BASOPHILS RELATIVE PERCENT: 0.2 %
BILIRUB SERPL-MCNC: 0.5 MG/DL (ref 0–1)
BILIRUBIN DIRECT: <0.2 MG/DL (ref 0–0.3)
BILIRUBIN, INDIRECT: ABNORMAL MG/DL (ref 0–1)
BUN BLDV-MCNC: 18 MG/DL (ref 7–20)
CALCIUM SERPL-MCNC: 8.4 MG/DL (ref 8.3–10.6)
CHLORIDE BLD-SCNC: 105 MMOL/L (ref 99–110)
CO2: 34 MMOL/L (ref 21–32)
CREAT SERPL-MCNC: <0.5 MG/DL (ref 0.6–1.2)
EOSINOPHILS ABSOLUTE: 0.2 K/UL (ref 0–0.6)
EOSINOPHILS RELATIVE PERCENT: 2.8 %
GFR SERPL CREATININE-BSD FRML MDRD: >60 ML/MIN/{1.73_M2}
GLUCOSE BLD-MCNC: 171 MG/DL (ref 70–99)
GLUCOSE BLD-MCNC: 200 MG/DL (ref 70–99)
GLUCOSE BLD-MCNC: 202 MG/DL (ref 70–99)
GLUCOSE BLD-MCNC: 215 MG/DL (ref 70–99)
GLUCOSE BLD-MCNC: 232 MG/DL (ref 70–99)
HCT VFR BLD CALC: 31.8 % (ref 36–48)
HEMOGLOBIN: 10.9 G/DL (ref 12–16)
LYMPHOCYTES ABSOLUTE: 1.2 K/UL (ref 1–5.1)
LYMPHOCYTES RELATIVE PERCENT: 14.3 %
MAGNESIUM: 2.1 MG/DL (ref 1.8–2.4)
MCH RBC QN AUTO: 31.4 PG (ref 26–34)
MCHC RBC AUTO-ENTMCNC: 34.2 G/DL (ref 31–36)
MCV RBC AUTO: 92 FL (ref 80–100)
MONOCYTES ABSOLUTE: 0.6 K/UL (ref 0–1.3)
MONOCYTES RELATIVE PERCENT: 7.6 %
NEUTROPHILS ABSOLUTE: 6.1 K/UL (ref 1.7–7.7)
NEUTROPHILS RELATIVE PERCENT: 75.1 %
PDW BLD-RTO: 15 % (ref 12.4–15.4)
PERFORMED ON: ABNORMAL
PLATELET # BLD: 184 K/UL (ref 135–450)
PMV BLD AUTO: 7.7 FL (ref 5–10.5)
POTASSIUM REFLEX MAGNESIUM: 3.6 MMOL/L (ref 3.5–5.1)
RBC # BLD: 3.46 M/UL (ref 4–5.2)
SODIUM BLD-SCNC: 144 MMOL/L (ref 136–145)
TOTAL PROTEIN: 5 G/DL (ref 6.4–8.2)
WBC # BLD: 8.1 K/UL (ref 4–11)

## 2022-12-04 PROCEDURE — 6360000002 HC RX W HCPCS

## 2022-12-04 PROCEDURE — 83735 ASSAY OF MAGNESIUM: CPT

## 2022-12-04 PROCEDURE — 94150 VITAL CAPACITY TEST: CPT

## 2022-12-04 PROCEDURE — 6370000000 HC RX 637 (ALT 250 FOR IP): Performed by: OTOLARYNGOLOGY

## 2022-12-04 PROCEDURE — 36415 COLL VENOUS BLD VENIPUNCTURE: CPT

## 2022-12-04 PROCEDURE — 6370000000 HC RX 637 (ALT 250 FOR IP): Performed by: NURSE PRACTITIONER

## 2022-12-04 PROCEDURE — 2700000000 HC OXYGEN THERAPY PER DAY

## 2022-12-04 PROCEDURE — 6370000000 HC RX 637 (ALT 250 FOR IP): Performed by: STUDENT IN AN ORGANIZED HEALTH CARE EDUCATION/TRAINING PROGRAM

## 2022-12-04 PROCEDURE — 2580000003 HC RX 258: Performed by: ANESTHESIOLOGY

## 2022-12-04 PROCEDURE — 6360000002 HC RX W HCPCS: Performed by: NURSE PRACTITIONER

## 2022-12-04 PROCEDURE — 80048 BASIC METABOLIC PNL TOTAL CA: CPT

## 2022-12-04 PROCEDURE — 99233 SBSQ HOSP IP/OBS HIGH 50: CPT | Performed by: INTERNAL MEDICINE

## 2022-12-04 PROCEDURE — 2000000000 HC ICU R&B

## 2022-12-04 PROCEDURE — 85025 COMPLETE CBC W/AUTO DIFF WBC: CPT

## 2022-12-04 PROCEDURE — 80076 HEPATIC FUNCTION PANEL: CPT

## 2022-12-04 PROCEDURE — 6370000000 HC RX 637 (ALT 250 FOR IP): Performed by: NEUROLOGICAL SURGERY

## 2022-12-04 PROCEDURE — 6370000000 HC RX 637 (ALT 250 FOR IP)

## 2022-12-04 PROCEDURE — 6360000002 HC RX W HCPCS: Performed by: STUDENT IN AN ORGANIZED HEALTH CARE EDUCATION/TRAINING PROGRAM

## 2022-12-04 PROCEDURE — 2580000003 HC RX 258: Performed by: NURSE PRACTITIONER

## 2022-12-04 PROCEDURE — 36592 COLLECT BLOOD FROM PICC: CPT

## 2022-12-04 PROCEDURE — 94761 N-INVAS EAR/PLS OXIMETRY MLT: CPT

## 2022-12-04 PROCEDURE — 6370000000 HC RX 637 (ALT 250 FOR IP): Performed by: INTERNAL MEDICINE

## 2022-12-04 PROCEDURE — 6360000002 HC RX W HCPCS: Performed by: INTERNAL MEDICINE

## 2022-12-04 RX ORDER — INSULIN LISPRO 100 [IU]/ML
0-4 INJECTION, SOLUTION INTRAVENOUS; SUBCUTANEOUS NIGHTLY
Status: DISCONTINUED | OUTPATIENT
Start: 2022-12-04 | End: 2022-12-22 | Stop reason: HOSPADM

## 2022-12-04 RX ORDER — POLYVINYL ALCOHOL 14 MG/ML
2 SOLUTION/ DROPS OPHTHALMIC
Status: DISCONTINUED | OUTPATIENT
Start: 2022-12-04 | End: 2022-12-16

## 2022-12-04 RX ORDER — INSULIN LISPRO 100 [IU]/ML
0-16 INJECTION, SOLUTION INTRAVENOUS; SUBCUTANEOUS
Status: DISCONTINUED | OUTPATIENT
Start: 2022-12-04 | End: 2022-12-22 | Stop reason: HOSPADM

## 2022-12-04 RX ORDER — ALPRAZOLAM 0.25 MG/1
0.25 TABLET ORAL ONCE
Status: DISCONTINUED | OUTPATIENT
Start: 2022-12-04 | End: 2022-12-04

## 2022-12-04 RX ORDER — POTASSIUM CHLORIDE 20 MEQ/1
40 TABLET, EXTENDED RELEASE ORAL ONCE
Status: COMPLETED | OUTPATIENT
Start: 2022-12-04 | End: 2022-12-04

## 2022-12-04 RX ADMIN — TETRAHYDROZOLINE HCL 0.05% 1 DROP: 0.05 SOLUTION/ DROPS INTRAOCULAR at 15:24

## 2022-12-04 RX ADMIN — POLYVINYL ALCOHOL 2 DROP: 14 SOLUTION/ DROPS OPHTHALMIC at 21:24

## 2022-12-04 RX ADMIN — POLYVINYL ALCOHOL 2 DROP: 14 SOLUTION/ DROPS OPHTHALMIC at 15:22

## 2022-12-04 RX ADMIN — LATANOPROST 1 DROP: 50 SOLUTION OPHTHALMIC at 21:16

## 2022-12-04 RX ADMIN — METHIMAZOLE 10 MG: 5 TABLET ORAL at 07:54

## 2022-12-04 RX ADMIN — ACYCLOVIR SODIUM 400 MG: 50 INJECTION, SOLUTION INTRAVENOUS at 08:10

## 2022-12-04 RX ADMIN — POLYETHYLENE GLYCOL 3350 17 G: 17 POWDER, FOR SOLUTION ORAL at 07:55

## 2022-12-04 RX ADMIN — POLYVINYL ALCOHOL 2 DROP: 14 SOLUTION/ DROPS OPHTHALMIC at 15:23

## 2022-12-04 RX ADMIN — POLYVINYL ALCOHOL 2 DROP: 14 SOLUTION/ DROPS OPHTHALMIC at 17:02

## 2022-12-04 RX ADMIN — POLYVINYL ALCOHOL 2 DROP: 14 SOLUTION/ DROPS OPHTHALMIC at 18:37

## 2022-12-04 RX ADMIN — HEPARIN SODIUM 5000 UNITS: 5000 INJECTION INTRAVENOUS; SUBCUTANEOUS at 15:24

## 2022-12-04 RX ADMIN — SENNOSIDES AND DOCUSATE SODIUM 1 TABLET: 50; 8.6 TABLET ORAL at 08:01

## 2022-12-04 RX ADMIN — ACYCLOVIR SODIUM 400 MG: 50 INJECTION, SOLUTION INTRAVENOUS at 17:02

## 2022-12-04 RX ADMIN — INSULIN LISPRO 4 UNITS: 100 INJECTION, SOLUTION INTRAVENOUS; SUBCUTANEOUS at 16:56

## 2022-12-04 RX ADMIN — INSULIN LISPRO 4 UNITS: 100 INJECTION, SOLUTION INTRAVENOUS; SUBCUTANEOUS at 12:01

## 2022-12-04 RX ADMIN — SENNOSIDES AND DOCUSATE SODIUM 1 TABLET: 50; 8.6 TABLET ORAL at 21:16

## 2022-12-04 RX ADMIN — POLYVINYL ALCOHOL 2 DROP: 14 SOLUTION/ DROPS OPHTHALMIC at 20:53

## 2022-12-04 RX ADMIN — HYDRALAZINE HYDROCHLORIDE 10 MG: 20 INJECTION INTRAMUSCULAR; INTRAVENOUS at 08:16

## 2022-12-04 RX ADMIN — Medication 5 MG: at 21:16

## 2022-12-04 RX ADMIN — LEVETIRACETAM 500 MG: 500 TABLET, FILM COATED ORAL at 21:16

## 2022-12-04 RX ADMIN — CARVEDILOL 12.5 MG: 6.25 TABLET, FILM COATED ORAL at 07:54

## 2022-12-04 RX ADMIN — HYDRALAZINE HYDROCHLORIDE 10 MG: 20 INJECTION INTRAMUSCULAR; INTRAVENOUS at 05:08

## 2022-12-04 RX ADMIN — INSULIN GLARGINE 15 UNITS: 100 INJECTION, SOLUTION SUBCUTANEOUS at 21:20

## 2022-12-04 RX ADMIN — TETRAHYDROZOLINE HCL 0.05% 1 DROP: 0.05 SOLUTION/ DROPS INTRAOCULAR at 08:07

## 2022-12-04 RX ADMIN — FUROSEMIDE 40 MG: 10 INJECTION, SOLUTION INTRAMUSCULAR; INTRAVENOUS at 07:55

## 2022-12-04 RX ADMIN — HEPARIN SODIUM 5000 UNITS: 5000 INJECTION INTRAVENOUS; SUBCUTANEOUS at 05:08

## 2022-12-04 RX ADMIN — LEVETIRACETAM 500 MG: 500 TABLET, FILM COATED ORAL at 07:53

## 2022-12-04 RX ADMIN — HEPARIN SODIUM 5000 UNITS: 5000 INJECTION INTRAVENOUS; SUBCUTANEOUS at 21:21

## 2022-12-04 RX ADMIN — POTASSIUM CHLORIDE 40 MEQ: 1500 TABLET, EXTENDED RELEASE ORAL at 12:00

## 2022-12-04 RX ADMIN — Medication 10 ML: at 08:08

## 2022-12-04 RX ADMIN — LOSARTAN POTASSIUM 100 MG: 100 TABLET, FILM COATED ORAL at 07:54

## 2022-12-04 RX ADMIN — ACYCLOVIR SODIUM 400 MG: 50 INJECTION, SOLUTION INTRAVENOUS at 00:21

## 2022-12-04 RX ADMIN — TETRAHYDROZOLINE HCL 0.05% 1 DROP: 0.05 SOLUTION/ DROPS INTRAOCULAR at 21:14

## 2022-12-04 RX ADMIN — CARVEDILOL 12.5 MG: 6.25 TABLET, FILM COATED ORAL at 17:02

## 2022-12-04 ASSESSMENT — PAIN SCALES - GENERAL
PAINLEVEL_OUTOF10: 0

## 2022-12-04 NOTE — PROGRESS NOTES
Patient calm and cooperative with this nurse's questions and commands. Patient is able to turn self and call out appropriately. Family is with patient and eating breakfast. Patient is hemodynamically stable, will continue to monitor.

## 2022-12-04 NOTE — PROGRESS NOTES
ICU Progress Note    Admit Date: 11/28/2022  Diet: ADULT DIET; Dysphagia - Pureed    CC: vertigo, falls. 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 -Afeb other vitals stable. BP on the higher side hovering between 150 and 160. Needed hydralazine 10 mg X2 last 24 hours. ON coreg 12.5 BID, IV lasix 40, losartan 100 QD. Off nicardipine     Remains on 2 L sat > 95  -s/p PICC line  -Labs reviewed   -BG still running high, dextrose with acyclovir  IS use:  Yes    Pt was seen at bedside today AM, resting comfortably. Does not have any new complaints. Endorses appetite. Voiding as usual. Has not had BM for a while, passing gas. Denies F/C, N/V, CP, SOB, HA, vision changes, weakness in arms and feet. 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 The patient's main symptoms began in June with feelings of vertigo and falls. During that week of the onset of her symptoms, she had 2 episodes of true vertigo and fell out of bed. Patient states that she was frequently dizzy as well. This prompted her to go to her primary care physician who ordered an MRI. That MRI subsequently showed this large right cerebellopontine angle mass and then a smaller left anterior parafalcine extra-axial lesion. Patient was admitted to the [facility]. ICU today status post part one of his staged procedure for those meningioma and masses. Patient admitted to the ICU specifically for neuro monitoring as well as neurovascular checks. Patient had part 2 of her stage procedure yesterday. Patient tolerated the procedure well.     Medications:     Scheduled Meds:   insulin lispro  0-16 Units SubCUTAneous TID WC    insulin lispro  0-4 Units SubCUTAneous Nightly    levETIRAcetam  500 mg Oral BID    methIMAzole  10 mg Oral Daily    insulin glargine  15 Units SubCUTAneous Nightly    melatonin  5 mg Oral Nightly    sodium chloride flush  5-40 mL IntraVENous 2 times per day    furosemide  40 mg IntraVENous Daily    heparin (porcine)  5,000 Units SubCUTAneous 3 times per day    polyethylene glycol  17 g Oral Daily    acyclovir  400 mg IntraVENous Q8H    tetrahydrozoline  1 drop Both Eyes TID    carvedilol  12.5 mg Oral BID WC    losartan  100 mg Oral Daily    sodium chloride flush  5-40 mL IntraVENous 2 times per day    sodium chloride flush  5-40 mL IntraVENous 2 times per day    sodium chloride flush  5-40 mL IntraVENous 2 times per day    sennosides-docusate sodium  1 tablet Oral BID    latanoprost  1 drop Both Eyes Nightly     Continuous Infusions:   sodium chloride      sodium chloride      sodium chloride      sodium chloride      dextrose       PRN Meds:labetalol, sodium chloride flush, sodium chloride, sodium chloride flush, sodium chloride, meperidine, HYDROmorphone, HYDROmorphone, sodium chloride flush, sodium chloride, sodium chloride flush, sodium chloride, acetaminophen, oxyCODONE **OR** oxyCODONE, morphine, ondansetron **OR** ondansetron, hydrALAZINE, albuterol, glucose, dextrose bolus **OR** dextrose bolus, glucagon (rDNA), dextrose, promethazine    Objective:   Vitals:   T-max:  Patient Vitals for the past 8 hrs:   BP Temp Temp src Pulse Resp SpO2 Weight   12/04/22 0813 -- -- -- 63 16 94 % --   12/04/22 0600 (!) 150/68 -- -- 66 22 95 % (!) 320 lb (145.2 kg)   12/04/22 0531 136/64 -- -- 63 16 94 % --   12/04/22 0500 (!) 178/70 -- -- 61 17 96 % --   12/04/22 0400 (!) 164/72 98.2 °F (36.8 °C) Oral 56 15 94 % --   12/04/22 0300 (!) 152/65 -- -- 58 15 96 % --   12/04/22 0200 (!) 144/69 -- -- 57 16 97 % --   12/04/22 0100 (!) 141/67 -- -- 57 15 -- --       Intake/Output Summary (Last 24 hours) at 12/4/2022 0857  Last data filed at 12/4/2022 0600  Gross per 24 hour   Intake 1116.48 ml   Output 2250 ml   Net -1133.52 ml       Physical Exam  Constitutional:       Appearance: She is morbidly obese. Interventions: Nasal cannula in place. Eyes:      Pupils: Pupils are equal, round, and reactive to light. Cardiovascular:      Rate and Rhythm: Normal rate and regular rhythm. Pulses:           Radial pulses are 2+ on the right side and 2+ on the left side. Heart sounds: Normal heart sounds, S1 normal and S2 normal. No murmur heard. Pulmonary:      Effort: Pulmonary effort is normal.      Breath sounds: Normal breath sounds and air entry. Abdominal:      General: Abdomen is protuberant. Bowel sounds are normal.      Palpations: Abdomen is soft. Tenderness: There is no abdominal tenderness. Musculoskeletal:         General: No signs of injury. Right lower leg: No edema. Left lower leg: No edema. Skin:     General: Skin is warm. Capillary Refill: Capillary refill takes less than 2 seconds. Neurological:      Mental Status: She is oriented to person, place, and time. She is lethargic. Cranial Nerves: Cranial nerves 2-12 are intact. Psychiatric:         Mood and Affect: Mood normal.     LABS:  CBC:   Recent Labs     12/02/22  0414 12/03/22  0301 12/04/22  0344   WBC 9.2 8.7 8.1   HGB 10.8* 10.9* 10.9*   HCT 31.2* 32.2* 31.8*    183 184   MCV 91.1 91.4 92.0     Renal:    Recent Labs     12/02/22  0414 12/03/22  0301 12/04/22  0344   * 143 144   K 3.5 3.2* 3.6    105 105   CO2 31 33* 34*   BUN 16 19 18   CREATININE <0.5* <0.5* <0.5*   GLUCOSE 216* 214* 200*   CALCIUM 7.8* 8.0* 8.4   MG 2.10 2.20 2.10   ANIONGAP 7 5 5     Hepatic: No results for input(s): AST, ALT, BILITOT, BILIDIR, PROT, LABALBU, ALKPHOS in the last 72 hours. Troponin: No results for input(s): TROPONINI in the last 72 hours. BNP: No results for input(s): BNP in the last 72 hours. Lipids: No results for input(s): CHOL, HDL in the last 72 hours.     Invalid input(s): LDLCALCU, TRIGLYCERIDE  ABGs:    Recent Labs     12/01/22  1714 12/01/22  2121   PHART 7.466* 7.503*   [id number] 37.4 43.1   PO2ART 48.4* 90.2   [id number] 26.9 34*   BEART 3 9.6*   [id number] 86* 98   GLJ7LNL 28 35       INR:   No results for input(s): INR in the last 72 hours. Lactate:   No results for input(s): LACTATE in the last 72 hours. Cultures:  -----------------------------------------------------------------  RAD:   CTA PULMONARY W CONTRAST   Final Result      Significantly limited study due to motion. 1. Significantly limited study due to streak artifact and suboptimal bolus. No evidence of a central embolus. 2. Multifocal airspace consolidation is present, suboptimally evaluated due to motion. This presumably reflects pneumonia. Follow-up chest CT is recommended to document resolution. 3. There is a large mass in the right hepatic lobe measuring 8.2 x 7.4 cm, demonstrating peripheral nodular enhancement. There is significant amount of artifact through this lesion. It is still favored to reflect a hemangioma. Recommend a multiphasic CT    of the abdomen for further assessment. This could be performed on a nonurgent basis, if clinically appropriate. 4. Partially calcified nodule arising from the left adrenal gland, most likely benign and possibly reflecting old adrenal hemorrhage. XR CHEST PORTABLE   Final Result      Similar appearance of patchy airspace disease. FL MODIFIED BARIUM SWALLOW W VIDEO   Final Result      1. Laryngeal penetration, but no evidence of tracheal aspiration. XR CHEST PORTABLE   Final Result      Patchy left upper lobe airspace disease, atelectasis versus pneumonia. CT HEAD WO CONTRAST   Final Result      1. Interval postsurgical changes from resection of right cerebellopontine angle mass with associated right temporal mastoid resection and frontotemporal craniotomy and fat graft placement.    2.  Thin hyperdensity along the fat graft may represent small amount of postsurgical blood products. There is also a small focus of subarachnoid hemorrhage along the right temporal lobe. 3.  Mild pneumocephalus and bilateral subgaleal fluid is also likely postsurgical.               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 Constipation  - miralax, senokot S    Left cerebellopontine angle meningioma and smaller left parafalcine extra- axial lesion status post both parts of the procedure. - Every 4 hours neurovascular checks  - Neurosurgery and otolaryngology following  - Will continue Keppra 500 IV twice daily for seizure prophylaxis  - SBP will be maintained below 160  As needed labetalol 10 and as needed hydralazine 10 on board if needed. - EVD in place- NSGY managing  - Still on Acyclovir d4/5    Hepatic Mass  Large mass in the right hepatic lobe measuring 8.2 x 7.4 cm, demonstrating peripheral nodular enhancement. There is significant amount of artifact through this lesion. It is still favored to reflect a hemangioma. Recommend a multiphasic CT when possible. Chronic medical conditions  Asthma- patient's albuterol restarted. Hypertension- better controlled. Losartan 100 QD, coreg 12.5 BID, IV lasix 40. Labetolol 10mg PRN  Hyperlipidemia- patient is at home statin  Hyperthyroidism- home methimazole  DM:       -increase lantus to 15 u       -inc LDSS to HDSS    Code Status:Full Code  FEN: ADULT DIET; Dysphagia - Pureed  PPX:  Keppra, heparin TID  DISPO: Will discuss with Sarai Castellanos MD  12/04/22  8:57 AM    Pulmonary & Critical Care     Patient seen and examined. I agree with Dr. Burgess Gutierrez history, physical, lab findings, assessment and plan. 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        Every day she seems to gradually get better/stronger. O2 requirement is 2 L with O2 sat 100%. When on RA she has occasional desat into the 80's with sleep. I recommended to her and family she get a PSG as outpt when she recovers     FSBS still ~ 200's - continue lantus 15 units and change SSI to high dose from med. May need higher lantus dose tmrw     BP mostly within goal. Remains off nicardipine. Coreg at 12.5 BID and HR low 60's. Losartan at 100. Cont with prn hydralazine/labetolol.  Cont lasix 40 IV daily as still some volume up and give potassium 40 mEq x 1     Cont miralax and start senna s for constipation    Multiphasic CT through liver when closer to D/C to eval liver mass, possible hemiangioma     DIsposition per Kinjal Parmar MD

## 2022-12-04 NOTE — PROGRESS NOTES
Neuro assessment unchanged throughout shift. Pt remains on 2 L NC. Encouraged to use IS/cough/q2h turns. No drainage from either incision sites. Pt denies pain, nausea SOB. Pt denies further needs at this time. Pt resting comfortably with call light within reach.

## 2022-12-04 NOTE — PLAN OF CARE
Problem: Chronic Conditions and Co-morbidities  Goal: Patient's chronic conditions and co-morbidity symptoms are monitored and maintained or improved  12/4/2022 0405 by Wilber Andre RN  Outcome: Progressing  Flowsheets (Taken 12/3/2022 2000)  Care Plan - Patient's Chronic Conditions and Co-Morbidity Symptoms are Monitored and Maintained or Improved: Collaborate with multidisciplinary team to address chronic and comorbid conditions and prevent exacerbation or deterioration     Problem: Discharge Planning  Goal: Discharge to home or other facility with appropriate resources  12/4/2022 0405 by Wilber Andre RN  Outcome: Progressing  Flowsheets (Taken 12/3/2022 2000)  Discharge to home or other facility with appropriate resources: Identify barriers to discharge with patient and caregiver     Problem: Pain  Goal: Verbalizes/displays adequate comfort level or baseline comfort level  12/4/2022 0405 by Wilber Andre RN  Outcome: Progressing  Flowsheets  Taken 12/4/2022 0000  Verbalizes/displays adequate comfort level or baseline comfort level: Encourage patient to monitor pain and request assistance  Taken 12/3/2022 2000  Verbalizes/displays adequate comfort level or baseline comfort level: Encourage patient to monitor pain and request assistance     Problem: Safety - Adult  Goal: Free from fall injury  12/4/2022 0405 by Wilber Andre RN  Outcome: Progressing  Flowsheets (Taken 12/3/2022 2233)  Free From Fall Injury: Instruct family/caregiver on patient safety     Problem: Skin/Tissue Integrity  Goal: Absence of new skin breakdown  Description: 1. Monitor for areas of redness and/or skin breakdown  2. Assess vascular access sites hourly  3. Every 4-6 hours minimum:  Change oxygen saturation probe site  4. Every 4-6 hours:  If on nasal continuous positive airway pressure, respiratory therapy assess nares and determine need for appliance change or resting period.   12/4/2022 0405 by Wilber Andre RN  Outcome: Progressing     Problem: ABCDS Injury Assessment  Goal: Absence of physical injury  12/4/2022 0405 by Chyrl Spurling, RN  Outcome: Progressing  Flowsheets (Taken 12/3/2022 2233)  Absence of Physical Injury: Implement safety measures based on patient assessment

## 2022-12-04 NOTE — PROGRESS NOTES
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 Subjective:  Patient is doing overall well, reports no pain, denies nausea/vomiting/ Vertigo. Denies chest neither lower extremities pain. Pt denies breathing difficulties    No BM yet but gives adequate amounts of urine (Pt on Senna and Miralax)  Denies headaches. Objective:    Pt awake Looks fine without distress or dyspnea    AF, O2 93-94% nasal cannula 2L O2   Sitting up in bed. Facial nerve same HB - 5 no worsening. (Complete eye closure (might be passive) but asymmetry at rest)   Mastoid wrap replaced(Fell down during the night) and completely dry. Incision looks fine no evidence for leaking. Right neck ecchymotic. Without worsening. Abdominal wound - well closed, no sign for hematoma, bleeding or infection. Lower extremities without evidence for DVT in palpation. Assessment:   1 day s/p resection of meningioma. 2 days s/p TL and MCF approach. Pt under SQ Heparin 5000 units 3 times/ day   Refresh artificial tears and eye moister chamber(currently uses dark eye patch, Dr. Joslyn Wagner will bring transparent eye patch later today)    Met her son and sister earlier today. Discussed healing process, facial nerve function, plan of care and expectations.  (I gave them my e-mail address as well for further questions or concerns)      Plan:   - pt to continue Heparin   - artificial tears every 2 hours + eye patch  --Continue to see PT/OT ( Scheduled for today)   --Will keep an eye on mastoid dressing.   --SCDs and SQH  --Remainder of care per IM and NSGY teams  - will reassess again in evening rounds   -Follow up BM, Bm mandatory prior to discharge to rehab.  Program

## 2022-12-04 NOTE — PROGRESS NOTES
S&O:  Pt is doing overall well tonight   Denies pain and dyspnea   No BM yet   No CSF rhinorrhea. AF, 2 L O2 via nasal cannula. Facial nerve same (HB5)   Incision - Dry no evidence for hematoma   Abdomen - incision well closed   Lower extremities - no sign for DVT     A:    80 yo woman with R petroclival meningioma who underwent TL and MCF on 11/28 followed by resection on 11/29. P:  --Continue eye care. --Please obtain moisture chamber for right eye and refresh artificial tears. --Out of bed to chair. Ambulate with PT.  --Bowel movement prior to discharge.    --Remainder of care per NSU and IM team.

## 2022-12-04 NOTE — PLAN OF CARE
Problem: Chronic Conditions and Co-morbidities  Goal: Patient's chronic conditions and co-morbidity symptoms are monitored and maintained or improved  12/4/2022 1104 by Jess Ayala RN  Outcome: Progressing  12/4/2022 0405 by Prince Anna RN  Outcome: Progressing  Flowsheets (Taken 12/3/2022 2000)  Care Plan - Patient's Chronic Conditions and Co-Morbidity Symptoms are Monitored and Maintained or Improved: Collaborate with multidisciplinary team to address chronic and comorbid conditions and prevent exacerbation or deterioration     Problem: Discharge Planning  Goal: Discharge to home or other facility with appropriate resources  12/4/2022 1104 by Jess Ayala RN  Outcome: Progressing  12/4/2022 0405 by Prince Anna RN  Outcome: Progressing  Flowsheets (Taken 12/3/2022 2000)  Discharge to home or other facility with appropriate resources: Identify barriers to discharge with patient and caregiver     Problem: Pain  Goal: Verbalizes/displays adequate comfort level or baseline comfort level  12/4/2022 1104 by Jess Ayala RN  Outcome: Progressing  12/4/2022 0405 by Prince Anna RN  Outcome: Progressing  Flowsheets  Taken 12/4/2022 0000  Verbalizes/displays adequate comfort level or baseline comfort level: Encourage patient to monitor pain and request assistance  Taken 12/3/2022 2000  Verbalizes/displays adequate comfort level or baseline comfort level: Encourage patient to monitor pain and request assistance     Problem: Safety - Adult  Goal: Free from fall injury  12/4/2022 1104 by Jess Ayala RN  Outcome: Progressing  Flowsheets (Taken 12/4/2022 1100)  Free From Fall Injury: Instruct family/caregiver on patient safety  12/4/2022 0405 by Prince Anna RN  Outcome: Progressing  Flowsheets (Taken 12/3/2022 2233)  Free From Fall Injury: Instruct family/caregiver on patient safety     Problem: Skin/Tissue Integrity  Goal: Absence of new skin breakdown  Description: 1.   Monitor for areas of redness and/or skin breakdown  2. Assess vascular access sites hourly  3. Every 4-6 hours minimum:  Change oxygen saturation probe site  4. Every 4-6 hours:  If on nasal continuous positive airway pressure, respiratory therapy assess nares and determine need for appliance change or resting period.   12/4/2022 1104 by Dominic Pederson RN  Outcome: Progressing  12/4/2022 0405 by Orlando Samayoa RN  Outcome: Progressing     Problem: ABCDS Injury Assessment  Goal: Absence of physical injury  12/4/2022 1104 by Dominic Pederson RN  Outcome: Progressing  Flowsheets (Taken 12/4/2022 1100)  Absence of Physical Injury: Implement safety measures based on patient assessment  12/4/2022 0405 by Orlando Samayoa RN  Outcome: Progressing  Flowsheets (Taken 12/3/2022 2233)  Absence of Physical Injury: Implement safety measures based on patient assessment

## 2022-12-05 LAB
ANION GAP SERPL CALCULATED.3IONS-SCNC: 7 MMOL/L (ref 3–16)
BASOPHILS ABSOLUTE: 0 K/UL (ref 0–0.2)
BASOPHILS RELATIVE PERCENT: 0.1 %
BUN BLDV-MCNC: 23 MG/DL (ref 7–20)
CALCIUM SERPL-MCNC: 8.6 MG/DL (ref 8.3–10.6)
CHLORIDE BLD-SCNC: 101 MMOL/L (ref 99–110)
CO2: 33 MMOL/L (ref 21–32)
CREAT SERPL-MCNC: <0.5 MG/DL (ref 0.6–1.2)
EOSINOPHILS ABSOLUTE: 0.3 K/UL (ref 0–0.6)
EOSINOPHILS RELATIVE PERCENT: 3.5 %
GFR SERPL CREATININE-BSD FRML MDRD: >60 ML/MIN/{1.73_M2}
GLUCOSE BLD-MCNC: 170 MG/DL (ref 70–99)
GLUCOSE BLD-MCNC: 192 MG/DL (ref 70–99)
GLUCOSE BLD-MCNC: 199 MG/DL (ref 70–99)
GLUCOSE BLD-MCNC: 233 MG/DL (ref 70–99)
GLUCOSE BLD-MCNC: 240 MG/DL (ref 70–99)
HCT VFR BLD CALC: 32.3 % (ref 36–48)
HEMOGLOBIN: 10.9 G/DL (ref 12–16)
LYMPHOCYTES ABSOLUTE: 1 K/UL (ref 1–5.1)
LYMPHOCYTES RELATIVE PERCENT: 11.4 %
Lab: NORMAL
Lab: NORMAL
MAGNESIUM: 2 MG/DL (ref 1.8–2.4)
MCH RBC QN AUTO: 31 PG (ref 26–34)
MCHC RBC AUTO-ENTMCNC: 33.9 G/DL (ref 31–36)
MCV RBC AUTO: 91.5 FL (ref 80–100)
MONOCYTES ABSOLUTE: 0.7 K/UL (ref 0–1.3)
MONOCYTES RELATIVE PERCENT: 7.8 %
NEUTROPHILS ABSOLUTE: 6.7 K/UL (ref 1.7–7.7)
NEUTROPHILS RELATIVE PERCENT: 77.2 %
PDW BLD-RTO: 14.9 % (ref 12.4–15.4)
PERFORMED ON: ABNORMAL
PLATELET # BLD: 194 K/UL (ref 135–450)
PMV BLD AUTO: 7.7 FL (ref 5–10.5)
POTASSIUM SERPL-SCNC: 3.8 MMOL/L (ref 3.5–5.1)
RBC # BLD: 3.53 M/UL (ref 4–5.2)
REPORT: NORMAL
REPORT: NORMAL
SODIUM BLD-SCNC: 141 MMOL/L (ref 136–145)
THIS TEST SENT TO: NORMAL
THIS TEST SENT TO: NORMAL
WBC # BLD: 8.7 K/UL (ref 4–11)

## 2022-12-05 PROCEDURE — 6360000002 HC RX W HCPCS

## 2022-12-05 PROCEDURE — 6370000000 HC RX 637 (ALT 250 FOR IP)

## 2022-12-05 PROCEDURE — 6360000002 HC RX W HCPCS: Performed by: NURSE PRACTITIONER

## 2022-12-05 PROCEDURE — 94761 N-INVAS EAR/PLS OXIMETRY MLT: CPT

## 2022-12-05 PROCEDURE — 2580000003 HC RX 258: Performed by: NEUROLOGICAL SURGERY

## 2022-12-05 PROCEDURE — 99232 SBSQ HOSP IP/OBS MODERATE 35: CPT | Performed by: INTERNAL MEDICINE

## 2022-12-05 PROCEDURE — 92526 ORAL FUNCTION THERAPY: CPT

## 2022-12-05 PROCEDURE — 92507 TX SP LANG VOICE COMM INDIV: CPT

## 2022-12-05 PROCEDURE — 6360000002 HC RX W HCPCS: Performed by: INTERNAL MEDICINE

## 2022-12-05 PROCEDURE — 80048 BASIC METABOLIC PNL TOTAL CA: CPT

## 2022-12-05 PROCEDURE — 85025 COMPLETE CBC W/AUTO DIFF WBC: CPT

## 2022-12-05 PROCEDURE — 6370000000 HC RX 637 (ALT 250 FOR IP): Performed by: STUDENT IN AN ORGANIZED HEALTH CARE EDUCATION/TRAINING PROGRAM

## 2022-12-05 PROCEDURE — 6370000000 HC RX 637 (ALT 250 FOR IP): Performed by: INTERNAL MEDICINE

## 2022-12-05 PROCEDURE — 2580000003 HC RX 258: Performed by: NURSE PRACTITIONER

## 2022-12-05 PROCEDURE — 83735 ASSAY OF MAGNESIUM: CPT

## 2022-12-05 PROCEDURE — 2700000000 HC OXYGEN THERAPY PER DAY

## 2022-12-05 PROCEDURE — 6370000000 HC RX 637 (ALT 250 FOR IP): Performed by: NEUROLOGICAL SURGERY

## 2022-12-05 PROCEDURE — 2580000003 HC RX 258

## 2022-12-05 PROCEDURE — 2580000003 HC RX 258: Performed by: ANESTHESIOLOGY

## 2022-12-05 PROCEDURE — 6370000000 HC RX 637 (ALT 250 FOR IP): Performed by: NURSE PRACTITIONER

## 2022-12-05 PROCEDURE — 1200000000 HC SEMI PRIVATE

## 2022-12-05 PROCEDURE — 36592 COLLECT BLOOD FROM PICC: CPT

## 2022-12-05 PROCEDURE — 94150 VITAL CAPACITY TEST: CPT

## 2022-12-05 RX ORDER — ACYCLOVIR 200 MG/1
400 CAPSULE ORAL 3 TIMES DAILY
Status: DISPENSED | OUTPATIENT
Start: 2022-12-05 | End: 2022-12-11

## 2022-12-05 RX ADMIN — SENNOSIDES AND DOCUSATE SODIUM 1 TABLET: 50; 8.6 TABLET ORAL at 08:34

## 2022-12-05 RX ADMIN — POLYVINYL ALCOHOL 2 DROP: 14 SOLUTION/ DROPS OPHTHALMIC at 14:05

## 2022-12-05 RX ADMIN — ACYCLOVIR SODIUM 400 MG: 50 INJECTION, SOLUTION INTRAVENOUS at 00:48

## 2022-12-05 RX ADMIN — POLYVINYL ALCOHOL 2 DROP: 14 SOLUTION/ DROPS OPHTHALMIC at 16:41

## 2022-12-05 RX ADMIN — SODIUM CHLORIDE, PRESERVATIVE FREE 10 ML: 5 INJECTION INTRAVENOUS at 08:36

## 2022-12-05 RX ADMIN — POLYVINYL ALCOHOL 2 DROP: 14 SOLUTION/ DROPS OPHTHALMIC at 02:37

## 2022-12-05 RX ADMIN — POLYVINYL ALCOHOL 2 DROP: 14 SOLUTION/ DROPS OPHTHALMIC at 00:48

## 2022-12-05 RX ADMIN — ACYCLOVIR SODIUM 400 MG: 50 INJECTION, SOLUTION INTRAVENOUS at 08:31

## 2022-12-05 RX ADMIN — POLYVINYL ALCOHOL 2 DROP: 14 SOLUTION/ DROPS OPHTHALMIC at 08:32

## 2022-12-05 RX ADMIN — INSULIN GLARGINE 15 UNITS: 100 INJECTION, SOLUTION SUBCUTANEOUS at 22:04

## 2022-12-05 RX ADMIN — Medication 5 MG: at 20:55

## 2022-12-05 RX ADMIN — POLYVINYL ALCOHOL 2 DROP: 14 SOLUTION/ DROPS OPHTHALMIC at 16:00

## 2022-12-05 RX ADMIN — POLYVINYL ALCOHOL 2 DROP: 14 SOLUTION/ DROPS OPHTHALMIC at 22:28

## 2022-12-05 RX ADMIN — ACYCLOVIR 400 MG: 200 CAPSULE ORAL at 20:55

## 2022-12-05 RX ADMIN — POLYVINYL ALCOHOL 2 DROP: 14 SOLUTION/ DROPS OPHTHALMIC at 05:41

## 2022-12-05 RX ADMIN — POLYVINYL ALCOHOL 2 DROP: 14 SOLUTION/ DROPS OPHTHALMIC at 10:37

## 2022-12-05 RX ADMIN — SODIUM CHLORIDE, PRESERVATIVE FREE 10 ML: 5 INJECTION INTRAVENOUS at 21:02

## 2022-12-05 RX ADMIN — SODIUM CHLORIDE, PRESERVATIVE FREE 10 ML: 5 INJECTION INTRAVENOUS at 08:34

## 2022-12-05 RX ADMIN — TETRAHYDROZOLINE HCL 0.05% 1 DROP: 0.05 SOLUTION/ DROPS INTRAOCULAR at 21:03

## 2022-12-05 RX ADMIN — ACYCLOVIR 400 MG: 200 CAPSULE ORAL at 14:05

## 2022-12-05 RX ADMIN — LEVETIRACETAM 500 MG: 500 TABLET, FILM COATED ORAL at 08:34

## 2022-12-05 RX ADMIN — HEPARIN SODIUM 5000 UNITS: 5000 INJECTION INTRAVENOUS; SUBCUTANEOUS at 20:55

## 2022-12-05 RX ADMIN — SENNOSIDES AND DOCUSATE SODIUM 1 TABLET: 50; 8.6 TABLET ORAL at 20:56

## 2022-12-05 RX ADMIN — POLYVINYL ALCOHOL 2 DROP: 14 SOLUTION/ DROPS OPHTHALMIC at 11:56

## 2022-12-05 RX ADMIN — LATANOPROST 1 DROP: 50 SOLUTION OPHTHALMIC at 21:02

## 2022-12-05 RX ADMIN — METHIMAZOLE 10 MG: 5 TABLET ORAL at 08:32

## 2022-12-05 RX ADMIN — CARVEDILOL 12.5 MG: 6.25 TABLET, FILM COATED ORAL at 08:34

## 2022-12-05 RX ADMIN — HEPARIN SODIUM 5000 UNITS: 5000 INJECTION INTRAVENOUS; SUBCUTANEOUS at 14:05

## 2022-12-05 RX ADMIN — POLYETHYLENE GLYCOL 3350 17 G: 17 POWDER, FOR SOLUTION ORAL at 08:32

## 2022-12-05 RX ADMIN — INSULIN LISPRO 4 UNITS: 100 INJECTION, SOLUTION INTRAVENOUS; SUBCUTANEOUS at 16:41

## 2022-12-05 RX ADMIN — SODIUM CHLORIDE, PRESERVATIVE FREE 10 ML: 5 INJECTION INTRAVENOUS at 08:35

## 2022-12-05 RX ADMIN — TETRAHYDROZOLINE HCL 0.05% 1 DROP: 0.05 SOLUTION/ DROPS INTRAOCULAR at 08:32

## 2022-12-05 RX ADMIN — LEVETIRACETAM 500 MG: 500 TABLET, FILM COATED ORAL at 20:55

## 2022-12-05 RX ADMIN — Medication 10 ML: at 08:35

## 2022-12-05 RX ADMIN — POLYVINYL ALCOHOL 2 DROP: 14 SOLUTION/ DROPS OPHTHALMIC at 20:59

## 2022-12-05 RX ADMIN — HEPARIN SODIUM 5000 UNITS: 5000 INJECTION INTRAVENOUS; SUBCUTANEOUS at 05:58

## 2022-12-05 RX ADMIN — INSULIN LISPRO 4 UNITS: 100 INJECTION, SOLUTION INTRAVENOUS; SUBCUTANEOUS at 11:56

## 2022-12-05 RX ADMIN — FUROSEMIDE 40 MG: 10 INJECTION, SOLUTION INTRAMUSCULAR; INTRAVENOUS at 08:34

## 2022-12-05 RX ADMIN — CARVEDILOL 12.5 MG: 6.25 TABLET, FILM COATED ORAL at 16:41

## 2022-12-05 RX ADMIN — POLYVINYL ALCOHOL 2 DROP: 14 SOLUTION/ DROPS OPHTHALMIC at 03:45

## 2022-12-05 RX ADMIN — LOSARTAN POTASSIUM 100 MG: 100 TABLET, FILM COATED ORAL at 08:34

## 2022-12-05 RX ADMIN — TETRAHYDROZOLINE HCL 0.05% 1 DROP: 0.05 SOLUTION/ DROPS INTRAOCULAR at 14:05

## 2022-12-05 ASSESSMENT — PAIN SCALES - GENERAL
PAINLEVEL_OUTOF10: 0

## 2022-12-05 NOTE — PLAN OF CARE
Problem: Chronic Conditions and Co-morbidities  Goal: Patient's chronic conditions and co-morbidity symptoms are monitored and maintained or improved  12/5/2022 1211 by Serafin Vazquez RN  Outcome: Progressing  12/5/2022 0115 by Vandana Etienne RN  Outcome: Progressing  Flowsheets (Taken 12/4/2022 2000)  Care Plan - Patient's Chronic Conditions and Co-Morbidity Symptoms are Monitored and Maintained or Improved: Monitor and assess patient's chronic conditions and comorbid symptoms for stability, deterioration, or improvement     Problem: Discharge Planning  Goal: Discharge to home or other facility with appropriate resources  12/5/2022 1211 by Serafin Vazquez RN  Outcome: Progressing  12/5/2022 0115 by Vandana Etienne RN  Outcome: Progressing  Flowsheets (Taken 12/4/2022 2000)  Discharge to home or other facility with appropriate resources:   Identify barriers to discharge with patient and caregiver   Arrange for needed discharge resources and transportation as appropriate     Problem: Pain  Goal: Verbalizes/displays adequate comfort level or baseline comfort level  12/5/2022 1211 by Serafin Vazquez RN  Outcome: Progressing  Flowsheets (Taken 12/5/2022 0800)  Verbalizes/displays adequate comfort level or baseline comfort level:   Encourage patient to monitor pain and request assistance   Assess pain using appropriate pain scale  12/5/2022 0115 by Vandana Etienne RN  Outcome: Progressing     Problem: Safety - Adult  Goal: Free from fall injury  12/5/2022 1211 by Serafin Vazquez RN  Outcome: Progressing  Flowsheets (Taken 12/5/2022 1200)  Free From Fall Injury: Instruct family/caregiver on patient safety  12/5/2022 0115 by Vandana Etienne RN  Outcome: Progressing  Flowsheets (Taken 12/5/2022 0112)  Free From Fall Injury:   Instruct family/caregiver on patient safety   Based on caregiver fall risk screen, instruct family/caregiver to ask for assistance with transferring infant if caregiver noted to have fall risk factors     Problem: Skin/Tissue Integrity  Goal: Absence of new skin breakdown  Description: 1. Monitor for areas of redness and/or skin breakdown  2. Assess vascular access sites hourly  3. Every 4-6 hours minimum:  Change oxygen saturation probe site  4. Every 4-6 hours:  If on nasal continuous positive airway pressure, respiratory therapy assess nares and determine need for appliance change or resting period.   12/5/2022 1211 by Maria Alejandra Perdomo RN  Outcome: Progressing  12/5/2022 0115 by Sly Victoria RN  Outcome: Progressing     Problem: ABCDS Injury Assessment  Goal: Absence of physical injury  12/5/2022 1211 by Maria Alejandra Perdomo RN  Outcome: Progressing  Flowsheets (Taken 12/5/2022 1200)  Absence of Physical Injury: Implement safety measures based on patient assessment  12/5/2022 0115 by Sly Victoria RN  Outcome: Progressing  Flowsheets (Taken 12/5/2022 0112)  Absence of Physical Injury: Implement safety measures based on patient assessment

## 2022-12-05 NOTE — PROGRESS NOTES
Speech Language Pathology  Facility/Department: [facility] ICU  Dysphagia Daily Treatment Note    NAME: Karlene Bautista  : 1955  MRN: [id number]    Patient Diagnosis(es):   Patient Active Problem List    Diagnosis Date Noted    Acoustic neuroma ([address] 2022    Cerebellopontine angle meningioma ([address] 2022    Osteoarthritis of right hip 2021    Morbid obesity with BMI of 50.0-59.9, adult ([address] 2021    Primary osteoarthritis of left knee 2015    Asthma 2015    Gastroesophageal reflux disease 2015    Adiposity 2015    S/P total knee arthroplasty 2015    Hypertension 2014     Allergies: Allergies   Allergen Reactions    Keflex [Cephalexin] Itching and Rash    Codeine Nausea And Vomiting    Tomato Rash     Onset Date: 2022    CXR (2022)-  Similar appearance of patchy airspace disease. Chart reviewed. Medical Diagnosis: Acoustic neuroma ([address] [D33.3]  Balance problem [R26.89]  Meningioma ([address] [D32.9]  Dizziness [R42]  Cerebellopontine angle meningioma ([address] [D32.0]   Treatment Diagnosis: Dysphagia    BSE Impression (2022)-  Dysphagia Impression : Severe oral stage dysphagia, with suspected pharyngeal component, needs further assessment. Patient awake but lethargic, on 10 L O2 via nc. On oral motor exam, patient with right sided weakness, with reduced lingual coordination and impaired labial seal. Xerostomia. Speech is dysarthric, some dysphonia. Trialed ice chips, thins via tsp/cup/straw, puree. Pt with positive oral acceptance of tsp trials, however immediate anterior loss for all thin liquid trials via tsp/cup, with patient unable to utilize straw. Suspect reduced A-P transit, with significant residue of puree (suctioned). Some laryngeal swallow movement perceived, voice remained dry, no overt signs of aspiration.  However given severity of oral dysfunction in conjunction with dysarthria, lethargy, and increased O2 requirements in setting of recent cerebellopontine angle meningioma surgery, suspect patient is a high aspiration (including silent aspiration) risk. Recommend continue NPO, alternative means nutrition/hydration, frequent oral hygiene, and ice chips / tsp h2o. Will continue to follow. Dysphagia Diagnosis: Severe oral stage dysphagia, Suspected needs further assessment    MBS results (12/02/2022)-  Oral Phase  Moderate dysfunction characterized by right-sided oral weakness with reduced labial seal and coordination, resulting in anterior loss of liquid via tsp and inability to use straw when placed on right or midline. For left-sided straw placement, pt able to create appropriate seal/suction. Prolonged mastication of soft solids, with slowed a-p transit, mild stasis. Pharyngeal Phase  Grossly WFL. Overall timely swallow initiation with appropriate hyolaryngeal mechanics. Single instance trace flash (self clearing) penetration of thins when straw was placed on right side. Otherwise good airway protection throughout with no aspiration. No significant stasis. Upper Esophageal Screen  Indirectly assessed; appeared unremarkable    Pain: none indicated by any means    Current Diet : ADULT DIET; Dysphagia - Pureed   Recommended Form of Meds: Via alternative means of nutrition     Treatment:  Pt seen bedside to address the following goals:  Goal 1: Patient will participate in further assessment of swallow function as appropriate. 12/1: Patient seen bedside. Awake, and much more alert this am. Speech much clearer today vs yesterday. Asking for water/food. Improved oral control (able to use straw), with reduced labial loss, and apparent improved oral clearance of puree bolus. Recommend proceed with MBS. Patient agreeable. D/C Goal, met via repeat BSE and MBS. Goal 2: Patient/caregiver will demonstrate understanding of swallowing concerns/recommendations.   11/30: Educated pt to purpose of visit, s/s of aspiration, concern if aspiration occurs, swallow function, safety strategies (upright positioning, oral hygiene rationale), effortful swallow exercise, need for eventual instrumental assessment. Pt indicated some understanding, but suspect needs reinforcement. Cont goal  12/1: Patient educated to swallow function, MBS purpose/results, strategies (left sided placement), and diet recommendations (starting on puree vs soft solids to facilitate oral prep). Pt stated good comprehension. Continue to reinforce. Cont goal  12/2: reviewed results of yesterday's MBS, diet recommendations, left sided bolus placement, bolus size, positioning. Pt indicated comprehension. Cont goal  12/5: pt and family members educated to purpose of visit, reviewed results of MBS and strategies to improve safety of swallow. Educated to recommendation for diet advancement and instruction to notify staff if any s/s of aspiration or difficulty with mastication occurs. Explained will cont advancement as pt tolerates. All stated comprehension  Goal met, cont to ensure consistency    Goal 3: Patient will tolerate least restrictive diet without overt signs of aspiration or associated decline in respiratory status. 12/2: Reviewed chart and discussed with RN; pt NPO yesterday d/t respiratory status, however was advanced back to puree this am. With patient awake, alert, pleasant, cooperative, positioned up in bed, on 4.5 L O2 via nc, assessed tolerance thins via straw and puree; pt with good oral containment and straw use for left sided placement (ongoing right sided weakness), positive swallow movement, good oral clearance. Following large sip, patient with delayed cough, however not clearly related to swallow as was productive of sputum. No issues on subsequent trials, with cues for small bites/sips. Cont goal  12/5: RN with no concerns with swallowing, states lungs are clear.    Pt agreeable to PO trials including pudding, thin liquids via straw and trials of soft solids. Pt consumed pudding and liquid with no overt signs of aspiration and no pocketing/oral residue noted. Pt demonstrated prolonged but adequate mastication with soft solids, cleared oral cavity completely. Pt used lingual sweep independently  Recommend upgrade to minced and moist texture with use of lingual sweep and liquid wash to ensure clearance of oral cavity  Cont goal    Patient/Family/Caregiver Education:  See goal 2 above    Compensatory Strategies:  Upright as possible for all oral intake  Eat/Feed slowly  Check for pocketing of food on the Right  Alternate solids and liquids   straw and food placement on LEFT     Plan:  Continue dysphagia treatment with goals per plan of care. Diet Recommendations: upgrade to Minced and moist / Thin Liquids via straw (Left-sided placement) / meds in puree  - assist feed  - check for pocketing on Right  DC recommendation: ongoing treatment indicated  Treatment: 15  D/W nursing, [facility]  Needs met prior to leaving room, call button in reach. Bharat Paiz M.S./[facility]-SLP #[id number]  Pg.  # [phone]  If patient is discharged prior to next treatment, this note will serve as the discharge summary

## 2022-12-05 NOTE — PROGRESS NOTES
Patient laying in bed, voided 1650 mL since administration of Lasix. Weaned down off 2 L nasal cannula to room air. Patient is hemodynamically stable, will continue to monitor.

## 2022-12-05 NOTE — PROGRESS NOTES
Dr. Joseline Álvarez at bedside discussing plan of care with patient and RN. Eye patch applied to R eye. Patient A&O, denies HA, endorsing some back pain she states is related to the bed. Ice packs applied per request. Will monitor.

## 2022-12-05 NOTE — CARE COORDINATION
Case management is following for discharge disposition. Therapy worked with Ms. Gen Melendrez last week and recommended acute rehab. The pt's fist choice is to stay here. PT AM-PAC: 6 / 24 per last evaluation on: 12/2    OT AM-PAC: 13 / 24 per last evaluation on: 12/2    DME Needs for discharge: deferred    Discharge Plan: [facility] ARU 1ST choice. Need updated therapy notes. No pre-cert. Pt has traditional Medicare. Case Management Notes: Ms. Gen Melendrez is from home alone in her own house. She is supported by her sister and other family members in the community. She is independent with self care, functional mobility, and an active  at baseline. Stu Neal and her family were provided with choice of provider; she and her family are in agreement with the discharge plan.     Clayton Valdes RN  [facility]  Case Management Department  [phone]

## 2022-12-05 NOTE — PROGRESS NOTES
Physician Progress Note      Elsa Lyn  CSN #:                  [id number]  :                       1955  ADMIT DATE:       2022 5:33 AM  [address] [place] DATE:  Shasha Aguilera  PROVIDER #:        Tesfaye Canas MD          QUERY TEXT:    Pt s/p resection meningioma  & . Pt noted to have fluid overload   postop and several desats on  to 84-86%, requiring NBR and 15L HFNC,   currently on 4.5L HFNC. If possible, please document in the progress notes if   you are evaluating and/or treating any of the following: The medical record reflects the following:  Risk Factors: fluid overload  Clinical Indicators:  sats: 84- 86-72%. Per Dr. Azul Brian  \"tachypneic   with increased work of breathing with an abdominal breathing pattern and   requiring 10 L of oxygen, gave her 40 of IV Lasix and her breathing pattern   looks more comfortable and oxygen has been weaned to 7 L\". Per Otolaryngology    \"O2 dropped to 84% (now 99% under mask O2 15L; CTA chest ruled out PE ,   Volume overload and restrictive lung disease were suspected\". - ProBNP:   8975.  CXR:  patchy airspace disease. Per Dr. Azul Brian  \"disagree with   radiology and that I do not feel strongly that  Treatment: NRB, 15L HFNC, currently 4.5L,  CTA Chest, CXR, ABG x 2, ProBNP,   Lasix 40 mg x 4, 80 mg BID, Zovirax. Options provided:  -- Acute respiratory failure with hypoxia due to fluid overload, resolving  -- Other - I will add my own diagnosis  -- Disagree - Not applicable / Not valid  -- Disagree - Clinically unable to determine / Unknown  -- Refer to Clinical Documentation Reviewer    PROVIDER RESPONSE TEXT:    This patient is in acute respiratory failure with hypoxia due to fluid   overload, resolving. Query created by:  Yvon Hall on 2022 1:58 PM      Electronically signed by:  Tesfaye Canas MD 2022 1:55 PM

## 2022-12-05 NOTE — PROGRESS NOTES
Speech Language Pathology  Facility/Department: [facility] ICU  Daily Treatment Note    NAME: Scott Davis  : 1955  MRN: [id number]    Patient Diagnosis(es):   Patient Active Problem List    Diagnosis Date Noted    Acoustic neuroma ([facility] 2022    [facility]bellopontine angle [facility]oma ([facility] 2022    Osteoarthritis of right hip 2021    Morbid obesity with BMI of 50.0-59.9, adult ([facility] 2021    Primary osteoarthritis of left knee 2015    Asthma 2015    Gastroesophageal reflux disease 2015    Adiposity 2015    S/P total knee arthroplasty 2015    Hypertension 2014     Allergies: Allergies   Allergen Reactions    Keflex [Cephalexin] Itching and Rash    Codeine Nausea And Vomiting    Tomato Rash       Prior MRI Brain: (2022)  Large right cerebellopontine angle mass as detailed, most likely representing a meningioma. See above for details. Additional smaller left anterior parafalcine extra-axial lesion, compatible with additional small meningioma. Recent CT Head: (2022)  1. Interval postsurgical changes from resection of right cerebellopontine angle mass with associated right temporal mastoid resection and frontotemporal craniotomy and fat graft placement. 2.  Thin hyperdensity along the fat graft may represent small amount of postsurgical blood products. There is also a small focus of subarachnoid hemorrhage along the right temporal lobe. 3.  Mild pneumocephalus and bilateral subgaleal fluid is also likely postsurgical       Chart reviewed. Pain: denies    Initial Assessment 22  Mild-moderate dysarthria characterized by blended word boundaries and imprecise articulation (~ 70% intelligbile short phrases in quiet environment). Benefits from slow pace and over-articulation strategies. Vocal quality clear and strong. Patient able to follow simple directions and answer basic questions.  No word finding difficulties noted at conversation level. Further assessment limited by patient fatigue. Recommend ongoing speech therapy to further assess and address. Medical diagnosis: Acoustic neuroma [facility]) [D33.3]  Balance problem [R26.89]  Meningioma ([facility] [D32.9]  Dizziness [R42]  Cerebellopontine angle meningioma ([facility] [D32.0]  Treatment diagnosis: dysarthria    Treatment:  Pt seen to address the following goals:  Goal 1: Patient will recall and implement strategies to improve speech clarity to 90% with min cues  12/5:  speech intelligibility is ~90%, however decreased in articulatory precision. pt educated to strategies for improved intelligibility and articulatory precision through verbal instruction and demonstration. Pt used strategies in structured sentence level tasks with min cues. In conversation pt required min-mod cues. Pt stated comprehension  Cont goal    Goal 2: Patient will participate in ongoing assessment of cognitive-communication skills. 12/5: pt oriented, able to state reason for hospitalization. Pt and family report no changes in cognition. Will cont to assess  Cont goal    Education:  Pt and family educated to purpose of visit and tasks presented and recommendation for ongoing treatment. All stated comprehension    Plan:  Continue speech/language therapy to address above goals, 3-5 x/week x LOS  DC recommendations: ongoing treatment indicated  D/W nursing  Needs met prior to leaving room, call button in reach. Treatment time: 15 sp tx Rosalynd Boast, M.S./[facility]-SLP #[id number]  Pg.  # [phone]  If patient is discharged prior to next treatment, this note will serve as the discharge summary

## 2022-12-05 NOTE — PROGRESS NOTES
ICU Progress Note    Admit Date: 11/28/2022  Diet: ADULT DIET; Dysphagia - Pureed    CC: vertigo, falls. 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 - Pt satting comfortably without the need for oxygen  - PT/OT have seen her and continue to work with her, she could be moved out of the ICU today  - On 15 Lantus and BG is controlled at 170 today   - Will get multiphasic CT during stay to look at mass on liver      HPI: Jimmie Fajardo is a 66-year-old female with past medical history of hyperlipidemia, asthma, type 2 diabetes, GERD, hypertension, and more recently cerebellopontine angle meningioma with a smaller left parafalcine extra-axial lesion. The patient's main symptoms began in June with feelings of vertigo and falls. During that week of the onset of her symptoms, she had 2 episodes of true vertigo and fell out of bed. Patient states that she was frequently dizzy as well. This prompted her to go to her primary care physician who ordered an MRI. That MRI subsequently showed this large right cerebellopontine angle mass and then a smaller left anterior parafalcine extra-axial lesion. Patient was admitted to the [facility]. ICU today status post part one of his staged procedure for those meningioma and masses. Patient admitted to the ICU specifically for neuro monitoring as well as neurovascular checks. Patient had part 2 of her stage procedure yesterday. Patient tolerated the procedure well.     Medications:     Scheduled Meds:   insulin lispro  0-16 Units SubCUTAneous TID WC    insulin lispro  0-4 Units SubCUTAneous Nightly    polyvinyl alcohol  2 drop Right Eye Q2H    levETIRAcetam  500 mg Oral BID    methIMAzole  10 mg Oral Daily    insulin glargine  15 Units SubCUTAneous Nightly    melatonin  5 mg Oral Nightly    sodium chloride flush  5-40 mL IntraVENous 2 times per day    furosemide  40 mg IntraVENous Daily    heparin (porcine) 5,000 Units SubCUTAneous 3 times per day    polyethylene glycol  17 g Oral Daily    acyclovir  400 mg IntraVENous Q8H    tetrahydrozoline  1 drop Both Eyes TID    carvedilol  12.5 mg Oral BID WC    losartan  100 mg Oral Daily    sodium chloride flush  5-40 mL IntraVENous 2 times per day    sodium chloride flush  5-40 mL IntraVENous 2 times per day    sodium chloride flush  5-40 mL IntraVENous 2 times per day    sennosides-docusate sodium  1 tablet Oral BID    latanoprost  1 drop Both Eyes Nightly     Continuous Infusions:   sodium chloride      sodium chloride      sodium chloride      sodium chloride      dextrose       PRN Meds:labetalol, sodium chloride flush, sodium chloride, sodium chloride flush, sodium chloride, meperidine, HYDROmorphone, HYDROmorphone, sodium chloride flush, sodium chloride, sodium chloride flush, sodium chloride, acetaminophen, oxyCODONE **OR** oxyCODONE, morphine, ondansetron **OR** ondansetron, hydrALAZINE, albuterol, glucose, dextrose bolus **OR** dextrose bolus, glucagon (rDNA), dextrose, promethazine    Objective:   Vitals:   T-max:  Patient Vitals for the past 8 hrs:   BP Temp Temp src Pulse Resp SpO2 Weight   12/05/22 0700 (!) 166/73 -- -- 65 18 (!) 88 % --   12/05/22 0600 (!) 150/68 -- -- 60 16 95 % --   12/05/22 0500 138/67 -- -- 57 17 94 % --   12/05/22 0400 (!) 148/68 98.4 °F (36.9 °C) Oral 58 16 94 % --   12/05/22 0300 (!) 151/72 -- -- 65 19 (!) 89 % (!) 356 lb (161.5 kg)   12/05/22 0200 (!) 158/66 -- -- 61 17 97 % --   12/05/22 0100 (!) 159/70 -- -- 59 15 94 % --         Intake/Output Summary (Last 24 hours) at 12/5/2022 0847  Last data filed at 12/5/2022 0600  Gross per 24 hour   Intake 595 ml   Output 2350 ml   Net -1755 ml         Physical Exam  Constitutional:       Appearance: She is morbidly obese. Interventions: Nasal cannula in place. Eyes:      Pupils: Pupils are equal, round, and reactive to light.    Cardiovascular:      Rate and Rhythm: Normal rate and regular rhythm. Pulses:           Radial pulses are 2+ on the right side and 2+ on the left side. Heart sounds: Normal heart sounds, S1 normal and S2 normal. No murmur heard. Pulmonary:      Effort: Pulmonary effort is normal.      Breath sounds: Normal breath sounds and air entry. Abdominal:      General: Abdomen is protuberant. Bowel sounds are normal.      Palpations: Abdomen is soft. Tenderness: There is no abdominal tenderness. Musculoskeletal:         General: No signs of injury. Right lower leg: No edema. Left lower leg: No edema. Skin:     General: Skin is warm. Capillary Refill: Capillary refill takes less than 2 seconds. Neurological:      Mental Status: She is oriented to person, place, and time. She is lethargic. Cranial Nerves: Cranial nerves 2-12 are intact. Psychiatric:         Mood and Affect: Mood normal.     LABS:  CBC:   Recent Labs     12/03/22  0301 12/04/22  0344 12/05/22  0554   WBC 8.7 8.1 8.7   HGB 10.9* 10.9* 10.9*   HCT 32.2* 31.8* 32.3*    184 194   MCV 91.4 92.0 91.5       Renal:    Recent Labs     12/03/22  0301 12/04/22  0344 12/05/22  0554    144 141   K 3.2* 3.6 3.8    105 101   CO2 33* 34* 33*   BUN 19 18 23*   CREATININE <0.5* <0.5* <0.5*   GLUCOSE 214* 200* 192*   CALCIUM 8.0* 8.4 8.6   MG 2.20 2.10 2.00   ANIONGAP 5 5 7       Hepatic:   Recent Labs     12/04/22  0344   AST 12*   ALT 11   BILITOT 0.5   BILIDIR <0.2   PROT 5.0*   LABALBU 3.1*   ALKPHOS 48     Troponin: No results for input(s): TROPONINI in the last 72 hours. BNP: No results for input(s): BNP in the last 72 hours. Lipids: No results for input(s): CHOL, HDL in the last 72 hours. Invalid input(s): LDLCALCU, TRIGLYCERIDE  ABGs:    No results for input(s): PHART, UMN2JFW, PO2ART, BQN0EPO, BEART, THGBART, [id number], LPV6GCQ in the last 72 hours. INR:   No results for input(s): INR in the last 72 hours.     Lactate:   No results for input(s): LACTATE in the last 72 hours. Cultures:  -----------------------------------------------------------------  RAD:   CTA PULMONARY W CONTRAST   Final Result      Significantly limited study due to motion. 1. Significantly limited study due to streak artifact and suboptimal bolus. No evidence of a central embolus. 2. Multifocal airspace consolidation is present, suboptimally evaluated due to motion. This presumably reflects pneumonia. Follow-up chest CT is recommended to document resolution. 3. There is a large mass in the right hepatic lobe measuring 8.2 x 7.4 cm, demonstrating peripheral nodular enhancement. There is significant amount of artifact through this lesion. It is still favored to reflect a hemangioma. Recommend a multiphasic CT    of the abdomen for further assessment. This could be performed on a nonurgent basis, if clinically appropriate. 4. Partially calcified nodule arising from the left adrenal gland, most likely benign and possibly reflecting old adrenal hemorrhage. XR CHEST PORTABLE   Final Result      Similar appearance of patchy airspace disease. FL MODIFIED BARIUM SWALLOW W VIDEO   Final Result      1. Laryngeal penetration, but no evidence of tracheal aspiration. XR CHEST PORTABLE   Final Result      Patchy left upper lobe airspace disease, atelectasis versus pneumonia. CT HEAD WO CONTRAST   Final Result      1. Interval postsurgical changes from resection of right cerebellopontine angle mass with associated right temporal mastoid resection and frontotemporal craniotomy and fat graft placement. 2.  Thin hyperdensity along the fat graft may represent small amount of postsurgical blood products. There is also a small focus of subarachnoid hemorrhage along the right temporal lobe.    3.  Mild pneumocephalus and bilateral subgaleal fluid is also likely postsurgical.               Assessment/Plan:   Sahil Haas is a 40-year-old female with past medical history of

## 2022-12-05 NOTE — PROGRESS NOTES
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)    Subjective: Neurologically stable, no new complaints today     Objective:    VITAL SIGNS   /63   Pulse 64   Temp 98.6 °F (37 °C) (Oral)   Resp 19   Ht 5' 5\" (1.651 m)   Wt (!) 356 lb (161.5 kg)   SpO2 (!) 89%   BMI 59.24 kg/m²    Height Height: 5' 5\" (165.1 cm)   Weight Weight: (!) 356 lb (161.5 kg)          Allergies Allergies   Allergen Reactions    Keflex [Cephalexin] Itching and Rash    Codeine Nausea And Vomiting    Tomato Rash      NPO Status ADULT DIET; Dysphagia - Pureed   Isolation No active isolations     LABS   Basic Metabolic Profile Recent Labs     12/03/22  0301 12/04/22  0344 12/05/22  0554    144 141    105 101   CO2 33* 34* 33*   BUN 19 18 23*   CREATININE <0.5* <0.5* <0.5*   GLUCOSE 214* 200* 192*   MG 2.20 2.10 2.00        Complete Blood Count Recent Labs     12/03/22  0301 12/04/22  0344 12/05/22  0554   WBC 8.7 8.1 8.7   RBC 3.52* 3.46* 3.53*        Coagulation Studies No results for input(s): PTT, INR in the last 72 hours.     Invalid input(s): PLATELETS, PROA, PT, PTTA     MEDICATIONS   Inpatient Medications     insulin lispro, 0-16 Units, SubCUTAneous, TID WC    insulin lispro, 0-4 Units, SubCUTAneous, Nightly    polyvinyl alcohol, 2 drop, Right Eye, Q2H    levETIRAcetam, 500 mg, Oral, BID    methIMAzole, 10 mg, Oral, Daily    insulin glargine, 15 Units, SubCUTAneous, Nightly    melatonin, 5 mg, Oral, Nightly    sodium chloride flush, 5-40 mL, IntraVENous, 2 times per day    furosemide, 40 mg, IntraVENous, Daily    heparin (porcine), 5,000 Units, SubCUTAneous, 3 times per day    polyethylene glycol, 17 g, Oral, Daily    acyclovir, 400 mg, IntraVENous, Q8H    tetrahydrozoline, 1 drop, Both Eyes, TID    carvedilol, 12.5 mg, Oral, BID WC    losartan, 100 mg, Oral, Daily    sodium chloride flush, 5-40 mL, IntraVENous, 2 times per day    sodium chloride flush, 5-40 mL, IntraVENous, 2 times per day    sodium chloride flush, 5-40 mL, IntraVENous, 2 times per day    sennosides-docusate sodium, 1 tablet, Oral, BID    latanoprost, 1 drop, Both Eyes, Nightly   Infusions    sodium chloride      sodium chloride      sodium chloride      sodium chloride      dextrose        Antibiotics   Recent Abx Admin                     acyclovir (ZOVIRAX) 400 mg in dextrose 5 % 100 mL IVPB (mg) 400 mg New Bag 12/05/22 0831     400 mg New Bag  0048     400 mg New Bag 12/04/22 1702                     Neurologic Exam:  Mental status: awake and alert and oriented x4    Cranial Nerves:  II: Visual acuity not tested, denies new visual changes / diplopia  III, IV, VI: PERRL, 3 mm bilaterally, EOMI,Patient had rightward gaze deviation but was able to follow when prompted   V: Facial sensation intact bilaterally to touch  VII: R sided facial droop  VIII: Hearing intact bilaterally to spoken voice on L, no hearing on R  IX: Palate movement equal bilaterally  XI: Shoulder shrug equal bilaterally  XII: Tongue deviates to R      Musculoskeletal:   Gait: Not tested   Tone: normal  Sensory: intact to all extremities  Motor strength:    Right  Left    Right  Left    Deltoid  5 5  Hip Flex  5 5   Biceps  5 5  Knee Extensors  5 5   Triceps  5 5  Knee Flexors  5 5   Wrist Ext  5 5  Ankle Dorsiflex. 5 5   Wrist Flex  5 5  Ankle Plantarflex.   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 Yahaira Yao:  Neurologic exam frequency:q4  Mobility:PT/OT   SLP continue  PICC line  DVT Prophylaxis: SCDs and heparin  Keppra as ordered  Bowel Regimen: senna and glycolax  Pain control:alex   Nicardipine, keep sbp < 160  Incisional Care:Mastoid dressing in place check q 4 hr and let NS know if leaking  Acyclovir for 10 days  Dispo Planning:ICU    Patient was discussed with Dr. Va Lim who agrees with above assessment and plan.      Electronically signed by: LEE Kelly CNP, 12/5/2022 11:57 AM   Neurosurgery Nurse Practitioner  [phone]

## 2022-12-05 NOTE — PLAN OF CARE
Problem: Chronic Conditions and Co-morbidities  Goal: Patient's chronic conditions and co-morbidity symptoms are monitored and maintained or improved  Outcome: Progressing  Flowsheets (Taken 12/4/2022 2000)  Care Plan - Patient's Chronic Conditions and Co-Morbidity Symptoms are Monitored and Maintained or Improved: Monitor and assess patient's chronic conditions and comorbid symptoms for stability, deterioration, or improvement     Problem: Discharge Planning  Goal: Discharge to home or other facility with appropriate resources  Outcome: Progressing  Flowsheets (Taken 12/4/2022 2000)  Discharge to home or other facility with appropriate resources:   Identify barriers to discharge with patient and caregiver   Arrange for needed discharge resources and transportation as appropriate     Problem: Pain  Goal: Verbalizes/displays adequate comfort level or baseline comfort level  Outcome: Progressing     Problem: Safety - Adult  Goal: Free from fall injury  Outcome: Progressing  Flowsheets (Taken 12/5/2022 0112)  Free From Fall Injury:   Instruct family/caregiver on patient safety   Based on caregiver fall risk screen, instruct family/caregiver to ask for assistance with transferring infant if caregiver noted to have fall risk factors     Problem: Skin/Tissue Integrity  Goal: Absence of new skin breakdown  Description: 1. Monitor for areas of redness and/or skin breakdown  2. Assess vascular access sites hourly  3. Every 4-6 hours minimum:  Change oxygen saturation probe site  4. Every 4-6 hours:  If on nasal continuous positive airway pressure, respiratory therapy assess nares and determine need for appliance change or resting period.   Outcome: Progressing     Problem: ABCDS Injury Assessment  Goal: Absence of physical injury  Outcome: Progressing  Flowsheets (Taken 12/5/2022 0112)  Absence of Physical Injury: Implement safety measures based on patient assessment

## 2022-12-05 NOTE — PROGRESS NOTES
Patient holding head dressing in hands when RN rounding on pt. Attempted to place back on head in correct position. Instructed pt to let RN know if dressing felt like it was not secure. Will monitor.

## 2022-12-06 LAB
ANION GAP SERPL CALCULATED.3IONS-SCNC: 6 MMOL/L (ref 3–16)
BASOPHILS ABSOLUTE: 0 K/UL (ref 0–0.2)
BASOPHILS RELATIVE PERCENT: 0.2 %
BUN BLDV-MCNC: 19 MG/DL (ref 7–20)
CALCIUM SERPL-MCNC: 8.4 MG/DL (ref 8.3–10.6)
CHLORIDE BLD-SCNC: 100 MMOL/L (ref 99–110)
CO2: 34 MMOL/L (ref 21–32)
CREAT SERPL-MCNC: <0.5 MG/DL (ref 0.6–1.2)
EOSINOPHILS ABSOLUTE: 0.3 K/UL (ref 0–0.6)
EOSINOPHILS RELATIVE PERCENT: 3.8 %
GFR SERPL CREATININE-BSD FRML MDRD: >60 ML/MIN/{1.73_M2}
GLUCOSE BLD-MCNC: 163 MG/DL (ref 70–99)
GLUCOSE BLD-MCNC: 171 MG/DL (ref 70–99)
GLUCOSE BLD-MCNC: 175 MG/DL (ref 70–99)
GLUCOSE BLD-MCNC: 189 MG/DL (ref 70–99)
GLUCOSE BLD-MCNC: 200 MG/DL (ref 70–99)
GLUCOSE BLD-MCNC: 222 MG/DL (ref 70–99)
HCT VFR BLD CALC: 31.4 % (ref 36–48)
HEMOGLOBIN: 10.6 G/DL (ref 12–16)
LYMPHOCYTES ABSOLUTE: 0.8 K/UL (ref 1–5.1)
LYMPHOCYTES RELATIVE PERCENT: 10.2 %
MAGNESIUM: 2 MG/DL (ref 1.8–2.4)
MCH RBC QN AUTO: 30.8 PG (ref 26–34)
MCHC RBC AUTO-ENTMCNC: 33.8 G/DL (ref 31–36)
MCV RBC AUTO: 90.9 FL (ref 80–100)
MONOCYTES ABSOLUTE: 0.6 K/UL (ref 0–1.3)
MONOCYTES RELATIVE PERCENT: 7.4 %
NEUTROPHILS ABSOLUTE: 6.3 K/UL (ref 1.7–7.7)
NEUTROPHILS RELATIVE PERCENT: 78.4 %
PDW BLD-RTO: 14.8 % (ref 12.4–15.4)
PERFORMED ON: ABNORMAL
PLATELET # BLD: 172 K/UL (ref 135–450)
PMV BLD AUTO: 7.5 FL (ref 5–10.5)
POTASSIUM SERPL-SCNC: 3.5 MMOL/L (ref 3.5–5.1)
RBC # BLD: 3.45 M/UL (ref 4–5.2)
SODIUM BLD-SCNC: 140 MMOL/L (ref 136–145)
WBC # BLD: 8 K/UL (ref 4–11)

## 2022-12-06 PROCEDURE — 2580000003 HC RX 258

## 2022-12-06 PROCEDURE — 6370000000 HC RX 637 (ALT 250 FOR IP)

## 2022-12-06 PROCEDURE — 36415 COLL VENOUS BLD VENIPUNCTURE: CPT

## 2022-12-06 PROCEDURE — 83735 ASSAY OF MAGNESIUM: CPT

## 2022-12-06 PROCEDURE — 80048 BASIC METABOLIC PNL TOTAL CA: CPT

## 2022-12-06 PROCEDURE — 94799 UNLISTED PULMONARY SVC/PX: CPT

## 2022-12-06 PROCEDURE — 97110 THERAPEUTIC EXERCISES: CPT

## 2022-12-06 PROCEDURE — 85025 COMPLETE CBC W/AUTO DIFF WBC: CPT

## 2022-12-06 PROCEDURE — 94150 VITAL CAPACITY TEST: CPT

## 2022-12-06 PROCEDURE — 6360000002 HC RX W HCPCS

## 2022-12-06 PROCEDURE — 94761 N-INVAS EAR/PLS OXIMETRY MLT: CPT

## 2022-12-06 PROCEDURE — 1200000000 HC SEMI PRIVATE

## 2022-12-06 PROCEDURE — 2700000000 HC OXYGEN THERAPY PER DAY

## 2022-12-06 PROCEDURE — 92507 TX SP LANG VOICE COMM INDIV: CPT

## 2022-12-06 PROCEDURE — 97530 THERAPEUTIC ACTIVITIES: CPT

## 2022-12-06 PROCEDURE — 92526 ORAL FUNCTION THERAPY: CPT

## 2022-12-06 PROCEDURE — 99024 POSTOP FOLLOW-UP VISIT: CPT | Performed by: NURSE PRACTITIONER

## 2022-12-06 PROCEDURE — 6370000000 HC RX 637 (ALT 250 FOR IP): Performed by: NURSE PRACTITIONER

## 2022-12-06 RX ADMIN — HEPARIN SODIUM 5000 UNITS: 5000 INJECTION INTRAVENOUS; SUBCUTANEOUS at 21:15

## 2022-12-06 RX ADMIN — LEVETIRACETAM 500 MG: 500 TABLET, FILM COATED ORAL at 21:15

## 2022-12-06 RX ADMIN — POLYVINYL ALCOHOL 2 DROP: 14 SOLUTION/ DROPS OPHTHALMIC at 00:02

## 2022-12-06 RX ADMIN — SODIUM CHLORIDE, PRESERVATIVE FREE 10 ML: 5 INJECTION INTRAVENOUS at 21:21

## 2022-12-06 RX ADMIN — HEPARIN SODIUM 5000 UNITS: 5000 INJECTION INTRAVENOUS; SUBCUTANEOUS at 06:33

## 2022-12-06 RX ADMIN — METHIMAZOLE 10 MG: 5 TABLET ORAL at 09:25

## 2022-12-06 RX ADMIN — SENNOSIDES AND DOCUSATE SODIUM 1 TABLET: 50; 8.6 TABLET ORAL at 21:15

## 2022-12-06 RX ADMIN — POLYVINYL ALCOHOL 2 DROP: 14 SOLUTION/ DROPS OPHTHALMIC at 06:02

## 2022-12-06 RX ADMIN — ACETAMINOPHEN 650 MG: 325 TABLET, FILM COATED ORAL at 03:19

## 2022-12-06 RX ADMIN — TETRAHYDROZOLINE HCL 0.05% 1 DROP: 0.05 SOLUTION/ DROPS INTRAOCULAR at 09:27

## 2022-12-06 RX ADMIN — ACETAMINOPHEN 650 MG: 325 TABLET, FILM COATED ORAL at 18:02

## 2022-12-06 RX ADMIN — ACYCLOVIR 400 MG: 200 CAPSULE ORAL at 09:25

## 2022-12-06 RX ADMIN — SODIUM CHLORIDE, PRESERVATIVE FREE 10 ML: 5 INJECTION INTRAVENOUS at 09:25

## 2022-12-06 RX ADMIN — POLYVINYL ALCOHOL 2 DROP: 14 SOLUTION/ DROPS OPHTHALMIC at 20:51

## 2022-12-06 RX ADMIN — POLYVINYL ALCOHOL 2 DROP: 14 SOLUTION/ DROPS OPHTHALMIC at 23:36

## 2022-12-06 RX ADMIN — SODIUM CHLORIDE, PRESERVATIVE FREE 10 ML: 5 INJECTION INTRAVENOUS at 09:42

## 2022-12-06 RX ADMIN — POLYVINYL ALCOHOL 2 DROP: 14 SOLUTION/ DROPS OPHTHALMIC at 07:50

## 2022-12-06 RX ADMIN — POLYVINYL ALCOHOL 2 DROP: 14 SOLUTION/ DROPS OPHTHALMIC at 14:30

## 2022-12-06 RX ADMIN — Medication 5 MG: at 21:15

## 2022-12-06 RX ADMIN — FUROSEMIDE 40 MG: 10 INJECTION, SOLUTION INTRAMUSCULAR; INTRAVENOUS at 09:25

## 2022-12-06 RX ADMIN — SODIUM CHLORIDE, PRESERVATIVE FREE 10 ML: 5 INJECTION INTRAVENOUS at 21:16

## 2022-12-06 RX ADMIN — LATANOPROST 1 DROP: 50 SOLUTION OPHTHALMIC at 21:16

## 2022-12-06 RX ADMIN — LOSARTAN POTASSIUM 100 MG: 100 TABLET, FILM COATED ORAL at 09:25

## 2022-12-06 RX ADMIN — ACYCLOVIR 400 MG: 200 CAPSULE ORAL at 16:09

## 2022-12-06 RX ADMIN — INSULIN LISPRO 4 UNITS: 100 INJECTION, SOLUTION INTRAVENOUS; SUBCUTANEOUS at 13:22

## 2022-12-06 RX ADMIN — POLYVINYL ALCOHOL 2 DROP: 14 SOLUTION/ DROPS OPHTHALMIC at 02:08

## 2022-12-06 RX ADMIN — POLYVINYL ALCOHOL 2 DROP: 14 SOLUTION/ DROPS OPHTHALMIC at 09:41

## 2022-12-06 RX ADMIN — HEPARIN SODIUM 5000 UNITS: 5000 INJECTION INTRAVENOUS; SUBCUTANEOUS at 15:11

## 2022-12-06 RX ADMIN — CARVEDILOL 12.5 MG: 6.25 TABLET, FILM COATED ORAL at 16:08

## 2022-12-06 RX ADMIN — POLYVINYL ALCOHOL 2 DROP: 14 SOLUTION/ DROPS OPHTHALMIC at 03:43

## 2022-12-06 RX ADMIN — TETRAHYDROZOLINE HCL 0.05% 1 DROP: 0.05 SOLUTION/ DROPS INTRAOCULAR at 16:09

## 2022-12-06 RX ADMIN — CARVEDILOL 12.5 MG: 6.25 TABLET, FILM COATED ORAL at 09:31

## 2022-12-06 RX ADMIN — POLYVINYL ALCOHOL 2 DROP: 14 SOLUTION/ DROPS OPHTHALMIC at 22:03

## 2022-12-06 RX ADMIN — SODIUM CHLORIDE, PRESERVATIVE FREE 10 ML: 5 INJECTION INTRAVENOUS at 10:02

## 2022-12-06 RX ADMIN — POLYVINYL ALCOHOL 2 DROP: 14 SOLUTION/ DROPS OPHTHALMIC at 16:09

## 2022-12-06 RX ADMIN — ACYCLOVIR 400 MG: 200 CAPSULE ORAL at 21:15

## 2022-12-06 RX ADMIN — TETRAHYDROZOLINE HCL 0.05% 1 DROP: 0.05 SOLUTION/ DROPS INTRAOCULAR at 21:17

## 2022-12-06 RX ADMIN — LEVETIRACETAM 500 MG: 500 TABLET, FILM COATED ORAL at 09:25

## 2022-12-06 RX ADMIN — POLYVINYL ALCOHOL 2 DROP: 14 SOLUTION/ DROPS OPHTHALMIC at 18:07

## 2022-12-06 RX ADMIN — INSULIN GLARGINE 15 UNITS: 100 INJECTION, SOLUTION SUBCUTANEOUS at 21:19

## 2022-12-06 RX ADMIN — SENNOSIDES AND DOCUSATE SODIUM 1 TABLET: 50; 8.6 TABLET ORAL at 09:25

## 2022-12-06 RX ADMIN — POLYVINYL ALCOHOL 2 DROP: 14 SOLUTION/ DROPS OPHTHALMIC at 13:20

## 2022-12-06 ASSESSMENT — PAIN DESCRIPTION - LOCATION: LOCATION: BACK

## 2022-12-06 ASSESSMENT — PAIN SCALES - GENERAL
PAINLEVEL_OUTOF10: 0
PAINLEVEL_OUTOF10: 8
PAINLEVEL_OUTOF10: 6
PAINLEVEL_OUTOF10: 0

## 2022-12-06 NOTE — PLAN OF CARE
Problem: Chronic Conditions and Co-morbidities  Goal: Patient's chronic conditions and co-morbidity symptoms are monitored and maintained or improved  12/5/2022 2146 by Jolly Bower RN  Outcome: Electa Sins (Taken 12/5/2022 1941)  Care Plan - Patient's Chronic Conditions and Co-Morbidity Symptoms are Monitored and Maintained or Improved: Monitor and assess patient's chronic conditions and comorbid symptoms for stability, deterioration, or improvement  12/5/2022 1211 by Rosario Ridley RN  Outcome: Progressing     Problem: Discharge Planning  Goal: Discharge to home or other facility with appropriate resources  12/5/2022 2146 by Jolly Bower RN  Outcome: Electa Sins (Taken 12/5/2022 1941)  Discharge to home or other facility with appropriate resources: Identify barriers to discharge with patient and caregiver  12/5/2022 1211 by Rosario Ridley RN  Outcome: Progressing     Problem: Pain  Goal: Verbalizes/displays adequate comfort level or baseline comfort level  12/5/2022 2146 by Jolly Bower RN  Outcome: Progressing  Flowsheets (Taken 12/5/2022 1941)  Verbalizes/displays adequate comfort level or baseline comfort level: Encourage patient to monitor pain and request assistance  12/5/2022 1211 by Rosario Ridley RN  Outcome: Progressing  Flowsheets (Taken 12/5/2022 0800)  Verbalizes/displays adequate comfort level or baseline comfort level:   Encourage patient to monitor pain and request assistance   Assess pain using appropriate pain scale     Problem: Safety - Adult  Goal: Free from fall injury  12/5/2022 2146 by Jolly Bower RN  Outcome: Electa Sins (Taken 12/5/2022 2000)  Free From Fall Injury: Instruct family/caregiver on patient safety  12/5/2022 1211 by Rosario Ridley RN  Outcome: Progressing  Flowsheets (Taken 12/5/2022 1200)  Free From Fall Injury: Instruct family/caregiver on patient safety     Problem: Skin/Tissue Integrity  Goal: Absence of new skin breakdown  Description: 1. Monitor for areas of redness and/or skin breakdown  2. Assess vascular access sites hourly  3. Every 4-6 hours minimum:  Change oxygen saturation probe site  4. Every 4-6 hours:  If on nasal continuous positive airway pressure, respiratory therapy assess nares and determine need for appliance change or resting period.   12/5/2022 2146 by Lola Tanner RN  Outcome: Progressing  12/5/2022 1211 by Scarlet Dandy, RN  Outcome: Progressing     Problem: ABCDS Injury Assessment  Goal: Absence of physical injury  12/5/2022 2146 by Lola Tanner RN  Outcome: Progressing  Flowsheets (Taken 12/5/2022 2000)  Absence of Physical Injury: Implement safety measures based on patient assessment  12/5/2022 1211 by Scarlet Dandy, RN  Outcome: Progressing  Flowsheets (Taken 12/5/2022 1200)  Absence of Physical Injury: Implement safety measures based on patient assessment

## 2022-12-06 NOTE — PROGRESS NOTES
Pt on NC of 3L. SPO2 96%. When patient knocks NC out of nose while sleeping SPO2 slips down from 86-89%. Replaced NC, no other needs at this time.

## 2022-12-06 NOTE — PLAN OF CARE
Problem: Chronic Conditions and Co-morbidities  Goal: Patient's chronic conditions and co-morbidity symptoms are monitored and maintained or improved  Outcome: Progressing  Flowsheets (Taken 12/6/2022 0800)  Care Plan - Patient's Chronic Conditions and Co-Morbidity Symptoms are Monitored and Maintained or Improved: Monitor and assess patient's chronic conditions and comorbid symptoms for stability, deterioration, or improvement     Problem: Discharge Planning  Goal: Discharge to home or other facility with appropriate resources  Outcome: Progressing  Flowsheets (Taken 12/6/2022 0800)  Discharge to home or other facility with appropriate resources:   Identify barriers to discharge with patient and caregiver   Arrange for needed discharge resources and transportation as appropriate     Problem: Discharge Planning  Goal: Discharge to home or other facility with appropriate resources  Outcome: Progressing  Flowsheets (Taken 12/6/2022 0800)  Discharge to home or other facility with appropriate resources:   Identify barriers to discharge with patient and caregiver   Arrange for needed discharge resources and transportation as appropriate     Problem: Pain  Goal: Verbalizes/displays adequate comfort level or baseline comfort level  Recent Flowsheet Documentation  Taken 12/6/2022 0600 by William Weems RN  Verbalizes/displays adequate comfort level or baseline comfort level: Encourage patient to monitor pain and request assistance  Taken 12/6/2022 0400 by Sharif Valdez RN  Verbalizes/displays adequate comfort level or baseline comfort level: Encourage patient to monitor pain and request assistance     Problem: Safety - Adult  Goal: Free from fall injury  Outcome: Progressing     Problem: Skin/Tissue Integrity  Goal: Absence of new skin breakdown  Description: 1. Monitor for areas of redness and/or skin breakdown  2. Assess vascular access sites hourly  3.   Every 4-6 hours minimum:  Change oxygen saturation probe site  4. Every 4-6 hours:  If on nasal continuous positive airway pressure, respiratory therapy assess nares and determine need for appliance change or resting period.   Outcome: Progressing     Problem: ABCDS Injury Assessment  Goal: Absence of physical injury  Outcome: Progressing     Problem: Nutrition Deficit:  Goal: Optimize nutritional status  Outcome: Progressing     Problem: Neurosensory - Adult  Goal: Absence of seizures  Outcome: Progressing  Goal: Remains free of injury related to seizures activity  Outcome: Progressing  Goal: Achieves maximal functionality and self care  Outcome: Progressing     Problem: Respiratory - Adult  Goal: Achieves optimal ventilation and oxygenation  Outcome: Progressing     Problem: Cardiovascular - Adult  Goal: Maintains optimal cardiac output and hemodynamic stability  Outcome: Progressing  Goal: Absence of cardiac dysrhythmias or at baseline  Outcome: Progressing     Problem: Skin/Tissue Integrity - Adult  Goal: Skin integrity remains intact  Outcome: Progressing  Flowsheets  Taken 12/6/2022 0800 by David Nieves RN  Skin Integrity Remains Intact:   Monitor for areas of redness and/or skin breakdown   Assess vascular access sites hourly  Taken 12/6/2022 0400 by Chalino Elmore RN  Skin Integrity Remains Intact: Monitor for areas of redness and/or skin breakdown  Goal: Incisions, wounds, or drain sites healing without S/S of infection  Outcome: Progressing  Goal: Oral mucous membranes remain intact  Outcome: Progressing

## 2022-12-06 NOTE — PROGRESS NOTES
Physical Therapy  Daily Treatment Note    Discharge Recommendations: Valorie Evans scored a 11/24 on the AM-PAC short mobility form. Current research shows that an AM-PAC score of 17 or less is typically not associated with a discharge to the patient's home setting. Based on the patient's AM-PAC score and their current functional mobility deficits, it is recommended that the patient have 5-7 sessions per week of Physical Therapy at d/c to increase the patient's independence. At this time, this patient demonstrates complex nursing, medical, and rehabilitative needs, and would benefit from intensive rehabilitation services upon discharge from the Inpatient setting. This patient demonstrates the ability to participate in and benefit from an intensive therapy program with a coordinated interdisciplinary team approach to foster frequent, structured, and documented communication among disciplines, who will work together to establish, prioritize, and achieve treatment goals. Please see assessment section for further patient specific details. Assessment:  Pt needing up to Min assist x 2 for transfers to [facility]. Limited standing tolerance due to fatigue. Pt is currently functioning well below her baseline s/p removal of brain tumor. She was independent with assistive device prior to admission. From home alone with steps to enter house. Pt with good effort despite feeling sleepy. Limited by weakness and fatigue. Pt would benefit from intensive IP PT at D/C to maximize strength and function. Equipment Needs: Defer to next level of care    Chart Reviewed: Yes     Other position/activity restrictions:  Up with assistance, ambulate the patient, up in the chair, HOB elevated to 30 degrees   Additional Pertinent Hx: Pt is a 80 yo female that presents to the hospital for a procedure;STAGE 1, RIGHT TRANSLABYRINTHINE / RETROLABYRINTHINE CRANIOTOMY POSSIBLE PETROSECTOMY  RIGHT TRANSMASTOID / ANTERIOR MIDDLE CRANIAL FOSSA , ABDOMINAL FAT GRAFT EXTERNAL AUDITORY CANAL CLOSURE on 11/28. PMH; Loss of hearing bilaterally, arthritis, brain tumor, diabetes. Diagnosis: Cerebellopontine angle meningioma   Treatment Diagnosis: decreased functional mobility    Subjective: Pt in bed initially. Sleepy, but agreeable to working with PT and getting OOB to chair today. \"I'm tired. \"    Pain: Denies    Objective:    Bed mobility  Supine to sit: Min assist x 1, HOB up with use of rail  Scooting: CGA to EOB    Transfers  Sit to stand: Dependent (Min assist x 2) from bed to [facility]; Min assist x  from [employer] to sit: Min assist x 1 onto [facility]; Min assist x 1 into chair. Cues for eccentric control. Bed > chair: Dependent via Frederich Gary  Other: RN present to assist with transfer to chair. Balance  Sat EOB ~5 minutes with CGA to SBA statically. Pt reported feeling dizzy initially, but improved over time. Static stance at [facility] CGA x 2 trials (~20 secs, 40 secs)    Exercises  While seated upright in chair:  2 sets x 12 reps B LAQ, hip abd/add, heel raises  Other: Rest breaks between sets. Patient Education  Role of PT. Calling for assist with needs. Expressed understanding. Safety Devices  Pt left with needs in reach. In chair (reclined) with chair alarm on. RN updated.      AM-PAC score  AM-PAC Inpatient Mobility Raw Score : 11  AM-PAC Inpatient T-Scale Score : 33.86  Mobility Inpatient CMS 0-100% Score: 72.57  Mobility Inpatient CMS G-Code Modifier : CL    Goals: (as determined and assessed by primary PT)  Time Frame for Short Term Goals: D/C  Short Term Goal 1: supine<->sit mod A x 1-2   Short Term Goal 2: pt will sit at EOB for 5 min with SBA   Short Term Goal 3: pt will tolerate sit<->stand assessment  Short Term Goal 4: pt will tolerate bed->chair assessment  Short Term Goal 5: pt will tolerate gait assessment    Plan:  Plan: 5-7 times per week  Current Treatment Recommendations: Strengthening, Functional mobility training, Transfer training, Gait training, Endurance training, Safety education & training, Therapeutic activities    Therapy Time    Individual  Concurrent  Group  Co-treatment    Time In  1325            Time Out  1407            Minutes  42              Timed Code Treatment Minutes: 42  Total Treatment Minutes: 42    Will continue per plan of care. Progress to walker for transfers/ambulation as able. If patient is discharged prior to next treatment, this note will serve as the discharge summary.     Antonio Spivey #[id number]

## 2022-12-06 NOTE — PROGRESS NOTES
Speech Language Pathology  Facility/Department: [facility]  Dysphagia Daily Treatment Note    NAME: Rafaela Najjar  : 1955  MRN: [id number]    Patient Diagnosis(es):   Patient Active Problem List    Diagnosis Date Noted    Acoustic neuroma ([address] 2022    Cerebellopontine angle meningioma ([address] 2022    Osteoarthritis of right hip 2021    Morbid obesity with BMI of 50.0-59.9, adult ([address] 2021    Primary osteoarthritis of left knee 2015    Asthma 2015    Gastroesophageal reflux disease 2015    Adiposity 2015    S/P total knee arthroplasty 2015    Hypertension 2014     Allergies: Allergies   Allergen Reactions    Keflex [Cephalexin] Itching and Rash    Codeine Nausea And Vomiting    Tomato Rash     Onset Date: 2022    CXR (2022)-  Similar appearance of patchy airspace disease. Chart reviewed. Medical Diagnosis: Acoustic neuroma ([address] [D33.3]  Balance problem [R26.89]  Meningioma ([address] [D32.9]  Dizziness [R42]  Cerebellopontine angle meningioma ([address] [D32.0]   Treatment Diagnosis: Dysphagia    BSE Impression (2022)-  Dysphagia Impression : Severe oral stage dysphagia, with suspected pharyngeal component, needs further assessment. Patient awake but lethargic, on 10 L O2 via nc. On oral motor exam, patient with right sided weakness, with reduced lingual coordination and impaired labial seal. Xerostomia. Speech is dysarthric, some dysphonia. Trialed ice chips, thins via tsp/cup/straw, puree. Pt with positive oral acceptance of tsp trials, however immediate anterior loss for all thin liquid trials via tsp/cup, with patient unable to utilize straw. Suspect reduced A-P transit, with significant residue of puree (suctioned). Some laryngeal swallow movement perceived, voice remained dry, no overt signs of aspiration.  However given severity of oral dysfunction in conjunction with dysarthria, lethargy, and increased O2 requirements in setting of recent cerebellopontine angle meningioma surgery, suspect patient is a high aspiration (including silent aspiration) risk. Recommend continue NPO, alternative means nutrition/hydration, frequent oral hygiene, and ice chips / tsp h2o. Will continue to follow. Dysphagia Diagnosis: Severe oral stage dysphagia, Suspected needs further assessment    MBS results (12/02/2022)-  Oral Phase  Moderate dysfunction characterized by right-sided oral weakness with reduced labial seal and coordination, resulting in anterior loss of liquid via tsp and inability to use straw when placed on right or midline. For left-sided straw placement, pt able to create appropriate seal/suction. Prolonged mastication of soft solids, with slowed a-p transit, mild stasis. Pharyngeal Phase  Grossly WFL. Overall timely swallow initiation with appropriate hyolaryngeal mechanics. Single instance trace flash (self clearing) penetration of thins when straw was placed on right side. Otherwise good airway protection throughout with no aspiration. No significant stasis. Upper Esophageal Screen  Indirectly assessed; appeared unremarkable    Pain: none indicated by any means    Current Diet : ADULT DIET; Dysphagia - Minced and Moist   Recommended Form of Meds: Via alternative means of nutrition     Treatment:  Pt seen bedside to address the following goals:  Goal 1: Patient will participate in further assessment of swallow function as appropriate. 12/1: Patient seen bedside. Awake, and much more alert this am. Speech much clearer today vs yesterday. Asking for water/food. Improved oral control (able to use straw), with reduced labial loss, and apparent improved oral clearance of puree bolus. Recommend proceed with MBS. Patient agreeable. D/C Goal, met via repeat BSE and MBS. Goal 2: Patient/caregiver will demonstrate understanding of swallowing concerns/recommendations.   11/30: Educated pt to purpose of visit, s/s of aspiration, concern if aspiration occurs, swallow function, safety strategies (upright positioning, oral hygiene rationale), effortful swallow exercise, need for eventual instrumental assessment. Pt indicated some understanding, but suspect needs reinforcement. Cont goal  12/1: Patient educated to swallow function, MBS purpose/results, strategies (left sided placement), and diet recommendations (starting on puree vs soft solids to facilitate oral prep). Pt stated good comprehension. Continue to reinforce. Cont goal  12/2: reviewed results of yesterday's MBS, diet recommendations, left sided bolus placement, bolus size, positioning. Pt indicated comprehension. Cont goal  12/5: pt and family members educated to purpose of visit, reviewed results of MBS and strategies to improve safety of swallow. Educated to recommendation for diet advancement and instruction to notify staff if any s/s of aspiration or difficulty with mastication occurs. Explained will cont advancement as pt tolerates. All stated comprehension  Goal met, cont to ensure consistency  12/6: No family present. Pt educated to strategies (especially use of finger sweep which pt able to demonstrate at time of review as well as recall and demonstrate 5 minutes later). Pt able to demonstrate placement of food pr straw on the left with min to no cues. Pt indicated able to masticate ground sausage from breakfast  \"did ok\" but it may make stomach upset and reported that ground hamburger analyzed with this SLP at lunch was a little \"difficult to swallow\" and \"hard to move back\" but wants to continue to try with present diet. Educated to trial these foods requiring some mastication for practice but try to pick smoother items overall at this time. D/W nursing re: possible introduction of [employer] and nursing to notify nutrition team re: that. Pt wants to keep strength up and get better.   Cont goal    Goal 3: Patient will tolerate least restrictive diet without overt signs of aspiration or associated decline in respiratory status. 12/2: Reviewed chart and discussed with RN; pt NPO yesterday d/t respiratory status, however was advanced back to puree this am. With patient awake, alert, pleasant, cooperative, positioned up in bed, on 4.5 L O2 via nc, assessed tolerance thins via straw and puree; pt with good oral containment and straw use for left sided placement (ongoing right sided weakness), positive swallow movement, good oral clearance. Following large sip, patient with delayed cough, however not clearly related to swallow as was productive of sputum. No issues on subsequent trials, with cues for small bites/sips. Cont goal  12/5: RN with no concerns with swallowing, states lungs are clear. Pt agreeable to PO trials including pudding, thin liquids via straw and trials of soft solids. Pt consumed pudding and liquid with no overt signs of aspiration and no pocketing/oral residue noted. Pt demonstrated prolonged but adequate mastication with soft solids, cleared oral cavity completely. Pt used lingual sweep independently  Recommend upgrade to minced and moist texture with use of lingual sweep and liquid wash to ensure clearance of oral cavity  Cont goal  12/6: RN reported lungs are clear. Pt given oral care with pt having some ground meat from breakfast on the right side between inner cheek and lower gum area which this  SLP cleared with oral swab. Pt analyzed with 1 bite of hamburger which pt was able to chew given extra time and swallow although pt reported somewhat difficult (pt indicated ground sausage was \"ok\" to chew this morning. 1-2 instances of mildly delayed cough with thins and/or sherbert (out of 10 plus trials). Pt educated and able to demonstrate finger sweep as a strategy.   Cont goal    Patient/Family/Caregiver Education:  See goal 2 above    Compensatory Strategies:  Upright as possible for all oral intake  Eat/Feed slowly  Check for pocketing of food on the Right  Alternate solids and liquids   straw and food placement on LEFT  Finger sweep during and after meals     Plan:  Continue dysphagia treatment with goals per plan of care. Diet Recommendations: upgrade to Minced and moist / Thin Liquids via straw (Left-sided placement) / meds in puree; finger sweep/oral care on the right during but especially at end of meals   - assist feed  - check for pocketing on Right  DC recommendation: ongoing treatment indicated  Treatment: 11  D/W nursing, Harles Every   Needs met prior to leaving room, call button in reach.     Lew Schaumann MA CCC/SLP [id number]  Speech Language Pathologist  Pager [phone]   If patient is discharged prior to next treatment, this note will serve as the discharge summary

## 2022-12-06 NOTE — PROGRESS NOTES
NEUROSURGERY POST-OP PROGRESS NOTE    Patient Name: Cordella Boas YOB: 1955   Sex: Female Age: 79 yrs     Medical Record Number: [id number] Acct Number: [de-identified]   Room Number: 5090/2318-87 Hospital Day: Hospital Day: 9     Interval History:  Post-operative Day# 7 s/p Procedure(s) (LRB):  STAGE II: RESECTION OF RIGHT PETROCLIVAL MENINGIOMA (N/A)    Subjective: Pt feeling well. Her dressing is dry and her speech has improved. Objective:    VITAL SIGNS   BP (!) 124/56   Pulse 60   Temp 98.4 °F (36.9 °C) (Axillary)   Resp 17   Ht 5' 5\" (1.651 m)   Wt (!) 348 lb 9.6 oz (158.1 kg)   SpO2 92%   BMI 58.01 kg/m²    Height Height: 5' 5\" (165.1 cm)   Weight Weight: (!) 348 lb 9.6 oz (158.1 kg)          Allergies Allergies   Allergen Reactions    Keflex [Cephalexin] Itching and Rash    Codeine Nausea And Vomiting    Tomato Rash      NPO Status ADULT DIET; Dysphagia - Minced and Moist  ADULT ORAL NUTRITION SUPPLEMENT; Lunch; Standard 4 oz Oral Supplement  ADULT ORAL NUTRITION SUPPLEMENT; Lunch; Frozen Oral Supplement   Isolation No active isolations     LABS   Basic Metabolic Profile Recent Labs     12/04/22 0344 12/05/22  0554 12/06/22  0500    141 140    101 100   CO2 34* 33* 34*   BUN 18 23* 19   CREATININE <0.5* <0.5* <0.5*   GLUCOSE 200* 192* 189*   MG 2.10 2.00 2.00      Complete Blood Count Recent Labs     12/04/22 0344 12/05/22  0554 12/06/22  0500   WBC 8.1 8.7 8.0   RBC 3.46* 3.53* 3.45*      Coagulation Studies No results for input(s): PTT, INR in the last 72 hours.     Invalid input(s): PLATELETS, PROA, PT, PTTA     MEDICATIONS   Inpatient Medications     acyclovir, 400 mg, Oral, TID    insulin lispro, 0-16 Units, SubCUTAneous, TID WC    insulin lispro, 0-4 Units, SubCUTAneous, Nightly    polyvinyl alcohol, 2 drop, Right Eye, Q2H    levETIRAcetam, 500 mg, Oral, BID    methIMAzole, 10 mg, Oral, Daily    insulin glargine, 15 Units, SubCUTAneous, Nightly    melatonin, 5 mg, Oral, Nightly    sodium chloride flush, 5-40 mL, IntraVENous, 2 times per day    furosemide, 40 mg, IntraVENous, Daily    heparin (porcine), 5,000 Units, SubCUTAneous, 3 times per day    polyethylene glycol, 17 g, Oral, Daily    tetrahydrozoline, 1 drop, Both Eyes, TID    carvedilol, 12.5 mg, Oral, BID WC    losartan, 100 mg, Oral, Daily    sodium chloride flush, 5-40 mL, IntraVENous, 2 times per day    sennosides-docusate sodium, 1 tablet, Oral, BID    latanoprost, 1 drop, Both Eyes, Nightly   Infusions    sodium chloride      sodium chloride      dextrose        Antibiotics   Recent Abx Admin                     acyclovir (ZOVIRAX) capsule 400 mg (mg) 400 mg Given 12/06/22 0925     400 mg Given 12/05/22 2055                     Neurologic Exam:  Mental status: awake and alert and oriented x4    Cranial Nerves:  II: Visual acuity not tested, visual fields full, denies new visual changes / diplopia  III, IV, VI: PERRL, 3 mm bilaterally, EOMI, no nystagmus noted  V: Facial sensation intact bilaterally to touch  VII: Face symmetric  VIII: Hearing intact bilaterally to spoken voice  IX: Palate movement equal bilaterally  XI: Shoulder shrug equal bilaterally  XII: Tongue protrudes to R      Musculoskeletal:   Gait: Not tested   Tone: normal  Sensory: intact to all extremities  Motor strength:    Right  Left    Right  Left    Deltoid  5 5  Hip Flex  5 5   Biceps  5 5  Knee Extensors  5 5   Triceps  5 5  Knee Flexors  5 5   Wrist Ext  5 5  Ankle Dorsiflex. 5 5   Wrist Flex  5 5  Ankle Plantarflex.   5 5   Handgrip  5 5  Ext Ernie Longus  5 5   Thumb Ext  5 5         Incision: intact, clean and dry  Drain:    Respiratory:  Unlabored respiratory pattern    Abdomen:   Soft, ND       Cardiovascular:  Warm, well perfused    Assessment   Patient is a 78 yo F s/p Procedure(s) (LRB):  STAGE II: RESECTION OF RIGHT PETROCLIVAL MENINGIOMA (N/A) per Dr. Ahn Macomb     Plan:  Neurologic exam frequency:q4  Mobility:PT/OT   SLP continue  PICC line  DVT Prophylaxis: SCDs and heparin  Keppra as ordered  Bowel Regimen: senna and glycolax  Pain control:alex   Nicardipine, keep sbp < 160  Incisional Care:Mastoid dressing in place check q 4 hr and let NS know if leaking  Acyclovir for 10 days  Dispo Planning:ICU     Patient was discussed with Dr. Jey Beard who agrees with above assessment and plan. Electronically signed by: LEE Reza CNP, 12/6/2022 2:47 PM   Neurosurgery Nurse Practitioner  [phone]   I spent 20 minutes in the care of this patient.   Over 50% of that time was in face-to-face counseling regarding post op progression, pain management strategies, and answering patient and family questions

## 2022-12-06 NOTE — PROGRESS NOTES
Speech Language Pathology  Facility/Department: [facility] ICU  Daily Treatment Note    NAME: Calvin Palacios  : 1955  MRN: [id number]    Patient Diagnosis(es):   Patient Active Problem List    Diagnosis Date Noted    Acoustic neuroma ([facility] 2022    Cerebellopontine angle meningioma ([facility] 2022    Osteoarthritis of right hip 2021    Morbid obesity with BMI of 50.0-59.9, adult ([facility] 2021    Primary osteoarthritis of left knee 2015    Asthma 2015    Gastroesophageal reflux disease 2015    Adiposity 2015    S/P total knee arthroplasty 2015    Hypertension 2014     Allergies: Allergies   Allergen Reactions    Keflex [Cephalexin] Itching and Rash    Codeine Nausea And Vomiting    Tomato Rash       Prior MRI Brain: (2022)  Large right cerebellopontine angle mass as detailed, most likely representing a meningioma. See above for details. Additional smaller left anterior parafalcine extra-axial lesion, compatible with additional small meningioma. Recent CT Head: (2022)  1. Interval postsurgical changes from resection of right cerebellopontine angle mass with associated right temporal mastoid resection and frontotemporal craniotomy and fat graft placement. 2.  Thin hyperdensity along the fat graft may represent small amount of postsurgical blood products. There is also a small focus of subarachnoid hemorrhage along the right temporal lobe. 3.  Mild pneumocephalus and bilateral subgaleal fluid is also likely postsurgical       Chart reviewed. Pain: denies    Initial Assessment 22  Mild-moderate dysarthria characterized by blended word boundaries and imprecise articulation (~ 70% intelligbile short phrases in quiet environment). Benefits from slow pace and over-articulation strategies. Vocal quality clear and strong. Patient able to follow simple directions and answer basic questions.  No word finding difficulties noted at conversation level. Further assessment limited by patient fatigue. Recommend ongoing speech therapy to further assess and address. Medical diagnosis: Acoustic neuroma [facility]) [D33.3]  Balance problem [R26.89]  Meningioma ([facility] [D32.9]  Dizziness [R42]  Cerebellopontine angle meningioma ([facility] [D32.0]  Treatment diagnosis: dysarthria    Treatment:  Pt seen to address the following goals:  Goal 1: Patient will recall and implement strategies to improve speech clarity to 90% with min cues  12/5:  speech intelligibility is ~90%, however decreased in articulatory precision. pt educated to strategies for improved intelligibility and articulatory precision through verbal instruction and demonstration. Pt used strategies in structured sentence level tasks with min cues. In conversation pt required min-mod cues. Pt stated comprehension  Cont goal  12/6: Pt speech intelligibility was functional but articulatory precision of sounds (especially /s/, /sh/, and /b/) was mildly distorted with reduced awareness but able to correct given min-mod cues during rote tasks and conversation. Pt did self correct speech error in conversation x1 at end of session. Pt educated to exaggerate and open/move articulators well with speaking. Pt utilized even a slower rate following initial review. Cont goal    Goal 2: Patient will participate in ongoing assessment of cognitive-communication skills. 12/5: pt oriented, able to state reason for hospitalization. Pt and family report no changes in cognition. Will cont to assess  Cont goal  12/6:  Pt oriented x3. 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 Pt able to recall 2/3 words following a 5 minute delay (3/3 with choices). Pt able to complete all basic math for money and time concepts including leaving a tip with no errors and minimal to no delays. Pt reported feeling tired so session ended earlier.  Cont goal    Education:  Pt and family educated to purpose of visit and tasks presented and recommendation for ongoing treatment. All stated comprehension    Plan:  Continue speech/language therapy to address above goals, 3-5 x/week x LOS  DC recommendations: ongoing treatment indicated  D/W nursing  Needs met prior to leaving room, call button in reach.   Treatment time: 12 sp tom Mcdonald MA CCC/SLP [id number]  Speech Language Pathologist  Pager [phone]   If patient is discharged prior to next treatment, this note will serve as the discharge summary

## 2022-12-06 NOTE — PROGRESS NOTES
Comprehensive Nutrition Assessment    RECOMMENDATIONS:  PO Diet: Continue dysphagia - minced and moist diet  ONS: Add Ensure compact QD  Nutrition Education: Education not indicated   Monitor nutrition adequacy, pertinent labs, bowel habits, wt changes, and clinical progress    NUTRITION ASSESSMENT:   Nutritional summary & status: LOS: Pt on Dysphagia 0 minced and moist diet. Intakes are variable between %. No wt loss prior to admission or reports of poor intakes/appetite. Receiving lasix w/ 1.65L output yesterday. On 3L O2 NC. Will add ONS QD to encourage increased intakes and monitor pt acceptance at f/u. Will continue to monitor. Admission/PMH: Cerebelloontine angle meningioma, HTN    MALNUTRITION ASSESSMENT  Context of Malnutrition: Acute Illness   Malnutrition Status: At risk for malnutrition (Comment)  Findings of the 6 clinical characteristics of malnutrition (Minimum of 2 out of 6 clinical characteristics is required to make the diagnosis of moderate or severe Protein Calorie Malnutrition based on AND/ASPEN Guidelines):  Energy Intake:  Mild decrease in energy intake (Comment)  Weight Loss:  No significant weight loss     Body Fat Loss:  No significant body fat loss     Muscle Mass Loss:  No significant muscle mass loss    Fluid Accumulation:  No significant fluid accumulation      NUTRITION DIAGNOSIS   Inadequate oral intake related to inadequate protein-energy intake as evidenced by intake 26-50%    Nutrition Monitoring and Evaluation:   Food/Nutrient Intake Outcomes:  Food and Nutrient Intake, Supplement Intake  Physical Signs/Symptoms Outcomes:  Nutrition Focused Physical Findings, Weight, Biochemical Data, Chewing or Swallowing     OBJECTIVE DATA: Significant to nutrition assessment  Nutrition Related Findings: . Active bowel sounds. +BM 12/6. 3L O2 NC. -3.9L.   Wounds: None  Nutrition Goals: Meet at least 75% of estimated needs, prior to discharge     [facility] DIET; Dysphagia - Minced and Moist  PO Intake: 26-50%, %   PO Supplement Intake:None Ordered  Additional Sources of Calories/IVF:N/A     ANTHROPOMETRICS  Current Height: 5' 5\" (165.1 cm)  Current Weight: (!) 348 lb 9.6 oz (158.1 kg)    Admission weight: 299 lb (135.6 kg) (PER CARDIO NOTE 11/18/22)  Ideal Body Weight (IBW): 125 lbs  (57 kg)    BMI: 58.1    COMPARATIVE STANDARDS  Energy (kcal):  7873-0799 (8-10 kcal/kg CBW)     Protein (g):  57-68 (1.0-1.2 g/kg IBW)       Fluid (mL/day):  1 mL/kcal or per MD    The patient will be monitored per nutrition standards of care. Consult dietitian if additional nutrition interventions are needed prior to RD reassessment.      Nikolas Sky, 66 [address], [address] [place] [address] Drive:  [phone]  Office:  [phone]

## 2022-12-07 PROBLEM — Z98.890 S/P CRANIOTOMY: Status: ACTIVE | Noted: 2022-12-07

## 2022-12-07 LAB
ANION GAP SERPL CALCULATED.3IONS-SCNC: 5 MMOL/L (ref 3–16)
BASOPHILS ABSOLUTE: 0 K/UL (ref 0–0.2)
BASOPHILS RELATIVE PERCENT: 0.3 %
BUN BLDV-MCNC: 15 MG/DL (ref 7–20)
CALCIUM SERPL-MCNC: 8.6 MG/DL (ref 8.3–10.6)
CHLORIDE BLD-SCNC: 101 MMOL/L (ref 99–110)
CO2: 33 MMOL/L (ref 21–32)
CREAT SERPL-MCNC: <0.5 MG/DL (ref 0.6–1.2)
EOSINOPHILS ABSOLUTE: 0.3 K/UL (ref 0–0.6)
EOSINOPHILS RELATIVE PERCENT: 4.3 %
GFR SERPL CREATININE-BSD FRML MDRD: >60 ML/MIN/{1.73_M2}
GLUCOSE BLD-MCNC: 152 MG/DL (ref 70–99)
GLUCOSE BLD-MCNC: 157 MG/DL (ref 70–99)
GLUCOSE BLD-MCNC: 165 MG/DL (ref 70–99)
GLUCOSE BLD-MCNC: 198 MG/DL (ref 70–99)
GLUCOSE BLD-MCNC: 208 MG/DL (ref 70–99)
HCT VFR BLD CALC: 32 % (ref 36–48)
HEMOGLOBIN: 10.7 G/DL (ref 12–16)
LYMPHOCYTES ABSOLUTE: 1 K/UL (ref 1–5.1)
LYMPHOCYTES RELATIVE PERCENT: 13 %
MAGNESIUM: 2 MG/DL (ref 1.8–2.4)
MCH RBC QN AUTO: 30.7 PG (ref 26–34)
MCHC RBC AUTO-ENTMCNC: 33.5 G/DL (ref 31–36)
MCV RBC AUTO: 91.8 FL (ref 80–100)
MONOCYTES ABSOLUTE: 0.7 K/UL (ref 0–1.3)
MONOCYTES RELATIVE PERCENT: 8.5 %
NEUTROPHILS ABSOLUTE: 5.8 K/UL (ref 1.7–7.7)
NEUTROPHILS RELATIVE PERCENT: 73.9 %
PDW BLD-RTO: 14.5 % (ref 12.4–15.4)
PERFORMED ON: ABNORMAL
PLATELET # BLD: 174 K/UL (ref 135–450)
PMV BLD AUTO: 8 FL (ref 5–10.5)
POTASSIUM SERPL-SCNC: 3.5 MMOL/L (ref 3.5–5.1)
RBC # BLD: 3.48 M/UL (ref 4–5.2)
SODIUM BLD-SCNC: 139 MMOL/L (ref 136–145)
WBC # BLD: 7.8 K/UL (ref 4–11)

## 2022-12-07 PROCEDURE — 6370000000 HC RX 637 (ALT 250 FOR IP): Performed by: NURSE PRACTITIONER

## 2022-12-07 PROCEDURE — 6370000000 HC RX 637 (ALT 250 FOR IP)

## 2022-12-07 PROCEDURE — 6360000002 HC RX W HCPCS

## 2022-12-07 PROCEDURE — 2580000003 HC RX 258

## 2022-12-07 PROCEDURE — 99024 POSTOP FOLLOW-UP VISIT: CPT | Performed by: NURSE PRACTITIONER

## 2022-12-07 PROCEDURE — 97129 THER IVNTJ 1ST 15 MIN: CPT

## 2022-12-07 PROCEDURE — 94761 N-INVAS EAR/PLS OXIMETRY MLT: CPT

## 2022-12-07 PROCEDURE — 2700000000 HC OXYGEN THERAPY PER DAY

## 2022-12-07 PROCEDURE — 85025 COMPLETE CBC W/AUTO DIFF WBC: CPT

## 2022-12-07 PROCEDURE — 92526 ORAL FUNCTION THERAPY: CPT

## 2022-12-07 PROCEDURE — 97530 THERAPEUTIC ACTIVITIES: CPT

## 2022-12-07 PROCEDURE — 92507 TX SP LANG VOICE COMM INDIV: CPT

## 2022-12-07 PROCEDURE — 97535 SELF CARE MNGMENT TRAINING: CPT

## 2022-12-07 PROCEDURE — 1200000000 HC SEMI PRIVATE

## 2022-12-07 PROCEDURE — 80048 BASIC METABOLIC PNL TOTAL CA: CPT

## 2022-12-07 PROCEDURE — 83735 ASSAY OF MAGNESIUM: CPT

## 2022-12-07 RX ADMIN — POLYVINYL ALCOHOL 2 DROP: 14 SOLUTION/ DROPS OPHTHALMIC at 18:27

## 2022-12-07 RX ADMIN — POLYETHYLENE GLYCOL 3350 17 G: 17 POWDER, FOR SOLUTION ORAL at 07:41

## 2022-12-07 RX ADMIN — CARVEDILOL 12.5 MG: 6.25 TABLET, FILM COATED ORAL at 16:29

## 2022-12-07 RX ADMIN — ACYCLOVIR 400 MG: 200 CAPSULE ORAL at 20:46

## 2022-12-07 RX ADMIN — SENNOSIDES AND DOCUSATE SODIUM 1 TABLET: 50; 8.6 TABLET ORAL at 20:47

## 2022-12-07 RX ADMIN — LATANOPROST 1 DROP: 50 SOLUTION OPHTHALMIC at 20:47

## 2022-12-07 RX ADMIN — TETRAHYDROZOLINE HCL 0.05% 1 DROP: 0.05 SOLUTION/ DROPS INTRAOCULAR at 07:46

## 2022-12-07 RX ADMIN — POLYVINYL ALCOHOL 2 DROP: 14 SOLUTION/ DROPS OPHTHALMIC at 04:37

## 2022-12-07 RX ADMIN — POLYVINYL ALCOHOL 2 DROP: 14 SOLUTION/ DROPS OPHTHALMIC at 12:38

## 2022-12-07 RX ADMIN — ACYCLOVIR 400 MG: 200 CAPSULE ORAL at 07:45

## 2022-12-07 RX ADMIN — LOSARTAN POTASSIUM 100 MG: 100 TABLET, FILM COATED ORAL at 07:41

## 2022-12-07 RX ADMIN — SODIUM CHLORIDE, PRESERVATIVE FREE 10 ML: 5 INJECTION INTRAVENOUS at 20:48

## 2022-12-07 RX ADMIN — POLYVINYL ALCOHOL 2 DROP: 14 SOLUTION/ DROPS OPHTHALMIC at 10:45

## 2022-12-07 RX ADMIN — Medication 5 MG: at 20:47

## 2022-12-07 RX ADMIN — SODIUM CHLORIDE, PRESERVATIVE FREE 10 ML: 5 INJECTION INTRAVENOUS at 07:46

## 2022-12-07 RX ADMIN — FUROSEMIDE 40 MG: 10 INJECTION, SOLUTION INTRAMUSCULAR; INTRAVENOUS at 07:41

## 2022-12-07 RX ADMIN — TETRAHYDROZOLINE HCL 0.05% 1 DROP: 0.05 SOLUTION/ DROPS INTRAOCULAR at 14:48

## 2022-12-07 RX ADMIN — SODIUM CHLORIDE, PRESERVATIVE FREE 10 ML: 5 INJECTION INTRAVENOUS at 07:41

## 2022-12-07 RX ADMIN — POLYVINYL ALCOHOL 2 DROP: 14 SOLUTION/ DROPS OPHTHALMIC at 19:33

## 2022-12-07 RX ADMIN — TETRAHYDROZOLINE HCL 0.05% 1 DROP: 0.05 SOLUTION/ DROPS INTRAOCULAR at 20:48

## 2022-12-07 RX ADMIN — SENNOSIDES AND DOCUSATE SODIUM 1 TABLET: 50; 8.6 TABLET ORAL at 07:41

## 2022-12-07 RX ADMIN — CARVEDILOL 12.5 MG: 6.25 TABLET, FILM COATED ORAL at 07:41

## 2022-12-07 RX ADMIN — POLYVINYL ALCOHOL 2 DROP: 14 SOLUTION/ DROPS OPHTHALMIC at 06:19

## 2022-12-07 RX ADMIN — HEPARIN SODIUM 5000 UNITS: 5000 INJECTION INTRAVENOUS; SUBCUTANEOUS at 22:26

## 2022-12-07 RX ADMIN — INSULIN GLARGINE 15 UNITS: 100 INJECTION, SOLUTION SUBCUTANEOUS at 20:49

## 2022-12-07 RX ADMIN — INSULIN LISPRO 4 UNITS: 100 INJECTION, SOLUTION INTRAVENOUS; SUBCUTANEOUS at 16:46

## 2022-12-07 RX ADMIN — HEPARIN SODIUM 5000 UNITS: 5000 INJECTION INTRAVENOUS; SUBCUTANEOUS at 06:19

## 2022-12-07 RX ADMIN — LEVETIRACETAM 500 MG: 500 TABLET, FILM COATED ORAL at 20:47

## 2022-12-07 RX ADMIN — POLYVINYL ALCOHOL 2 DROP: 14 SOLUTION/ DROPS OPHTHALMIC at 02:01

## 2022-12-07 RX ADMIN — HEPARIN SODIUM 5000 UNITS: 5000 INJECTION INTRAVENOUS; SUBCUTANEOUS at 14:47

## 2022-12-07 RX ADMIN — METHIMAZOLE 10 MG: 5 TABLET ORAL at 07:41

## 2022-12-07 RX ADMIN — LEVETIRACETAM 500 MG: 500 TABLET, FILM COATED ORAL at 07:41

## 2022-12-07 RX ADMIN — POLYVINYL ALCOHOL 2 DROP: 14 SOLUTION/ DROPS OPHTHALMIC at 14:47

## 2022-12-07 RX ADMIN — POLYVINYL ALCOHOL 2 DROP: 14 SOLUTION/ DROPS OPHTHALMIC at 22:26

## 2022-12-07 RX ADMIN — POLYVINYL ALCOHOL 2 DROP: 14 SOLUTION/ DROPS OPHTHALMIC at 07:42

## 2022-12-07 RX ADMIN — ACYCLOVIR 400 MG: 200 CAPSULE ORAL at 14:47

## 2022-12-07 RX ADMIN — POLYVINYL ALCOHOL 2 DROP: 14 SOLUTION/ DROPS OPHTHALMIC at 16:29

## 2022-12-07 ASSESSMENT — PULMONARY FUNCTION TESTS: PIF_VALUE: 16

## 2022-12-07 NOTE — PROGRESS NOTES
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 Subjective:  Patient is doing overall well, reports no pain, denies nausea/vomiting/ Vertigo. Pt denies breathing difficulties    Last BM 2 days ago, gives adequate amounts of urine (Pt on Senna and Miralax)  Denies headaches. Pt seen and treated by PT/OT - Pt began to ambulate with walker earlier today. Objective:   Pt awake Looks fine without distress or dyspnea    AF, O2 93-94% nasal cannula 2L O2   Sitting up in bed. Facial nerve same HB - 5 no worsening. (Complete eye closure (might be passive) but asymmetry at rest) - no worsening. Mastoid wrap replaced (Fell down during the day), completely dry. Incision looks fine no evidence for leaking. Pseudomeningocele - same     Right neck ecchymotic. Without worsening. Abdominal wound - well closed, no sign for hematoma, bleeding or infection. Lower extremities without evidence for DVT in palpation. Assessment:   1 day s/p resection of meningioma. 2 days s/p TL and MCF approach. Pt under SQ Heparin 5000 units 3 times/ day   Refresh artificial tears and eye moister chamber. Plan:   - pt to continue Heparin   - artificial tears every 2 hours + eye patch  --Continue to see PT/OT ( Scheduled for today)   --Will keep an eye on mastoid dressing.   --SCDs and SQH  --Remainder of care per IM and NSGY teams  - will reassess again in morning round  -Follow up BM  -  Ambulating with a walker, discharge to [facility]. Is pending.

## 2022-12-07 NOTE — PROGRESS NOTES
Speech Language Pathology  Facility/Department: [facility] ICU  Dysphagia Daily Treatment Note    NAME: Caroline Taylor  : 1955  MRN: [id number]    Patient Diagnosis(es):   Patient Active Problem List    Diagnosis Date Noted    Acoustic neuroma ([address] 2022    Cerebellopontine angle meningioma ([address] 2022    Osteoarthritis of right hip 2021    Morbid obesity with BMI of 50.0-59.9, adult ([address] 2021    Primary osteoarthritis of left knee 2015    Asthma 2015    Gastroesophageal reflux disease 2015    Adiposity 2015    S/P total knee arthroplasty 2015    Hypertension 2014     Allergies: Allergies   Allergen Reactions    Keflex [Cephalexin] Itching and Rash    Codeine Nausea And Vomiting    Tomato Rash     Onset Date: 2022    CXR (2022)-  Similar appearance of patchy airspace disease. Chart reviewed. Medical Diagnosis: Acoustic neuroma ([address] [D33.3]  Balance problem [R26.89]  Meningioma ([address] [D32.9]  Dizziness [R42]  Cerebellopontine angle meningioma ([address] [D32.0]   Treatment Diagnosis: Dysphagia    BSE Impression (2022)-  Dysphagia Impression : Severe oral stage dysphagia, with suspected pharyngeal component, needs further assessment. Patient awake but lethargic, on 10 L O2 via nc. On oral motor exam, patient with right sided weakness, with reduced lingual coordination and impaired labial seal. Xerostomia. Speech is dysarthric, some dysphonia. Trialed ice chips, thins via tsp/cup/straw, puree. Pt with positive oral acceptance of tsp trials, however immediate anterior loss for all thin liquid trials via tsp/cup, with patient unable to utilize straw. Suspect reduced A-P transit, with significant residue of puree (suctioned). Some laryngeal swallow movement perceived, voice remained dry, no overt signs of aspiration.  However given severity of oral dysfunction in conjunction with dysarthria, lethargy, and increased O2 requirements in setting of recent cerebellopontine angle meningioma surgery, suspect patient is a high aspiration (including silent aspiration) risk. Recommend continue NPO, alternative means nutrition/hydration, frequent oral hygiene, and ice chips / tsp h2o. Will continue to follow. Dysphagia Diagnosis: Severe oral stage dysphagia, Suspected needs further assessment    MBS results (12/02/2022)-  Oral Phase  Moderate dysfunction characterized by right-sided oral weakness with reduced labial seal and coordination, resulting in anterior loss of liquid via tsp and inability to use straw when placed on right or midline. For left-sided straw placement, pt able to create appropriate seal/suction. Prolonged mastication of soft solids, with slowed a-p transit, mild stasis. Pharyngeal Phase  Grossly WFL. Overall timely swallow initiation with appropriate hyolaryngeal mechanics. Single instance trace flash (self clearing) penetration of thins when straw was placed on right side. Otherwise good airway protection throughout with no aspiration. No significant stasis. Upper Esophageal Screen  Indirectly assessed; appeared unremarkable    Pain: none indicated by any means    Current Diet : ADULT DIET; Dysphagia - Minced and Moist  ADULT ORAL NUTRITION SUPPLEMENT; Lunch; Standard 4 oz Oral Supplement  ADULT ORAL NUTRITION SUPPLEMENT; Lunch; Frozen Oral Supplement   Recommended Form of Meds: Via alternative means of nutrition     Treatment:  Pt seen bedside to address the following goals:  Goal 1: Patient will participate in further assessment of swallow function as appropriate. 12/1: Patient seen bedside. Awake, and much more alert this am. Speech much clearer today vs yesterday. Asking for water/food. Improved oral control (able to use straw), with reduced labial loss, and apparent improved oral clearance of puree bolus. Recommend proceed with MBS. Patient agreeable. D/C Goal, met via repeat BSE and MBS.      Goal 2: Patient/caregiver will demonstrate understanding of swallowing concerns/recommendations. 11/30: Educated pt to purpose of visit, s/s of aspiration, concern if aspiration occurs, swallow function, safety strategies (upright positioning, oral hygiene rationale), effortful swallow exercise, need for eventual instrumental assessment. Pt indicated some understanding, but suspect needs reinforcement. Cont goal  12/1: Patient educated to swallow function, MBS purpose/results, strategies (left sided placement), and diet recommendations (starting on puree vs soft solids to facilitate oral prep). Pt stated good comprehension. Continue to reinforce. Cont goal  12/2: reviewed results of yesterday's MBS, diet recommendations, left sided bolus placement, bolus size, positioning. Pt indicated comprehension. Cont goal  12/5: pt and family members educated to purpose of visit, reviewed results of MBS and strategies to improve safety of swallow. Educated to recommendation for diet advancement and instruction to notify staff if any s/s of aspiration or difficulty with mastication occurs. Explained will cont advancement as pt tolerates. All stated comprehension  Goal met, cont to ensure consistency  12/6: No family present. Pt educated to strategies (especially use of finger sweep which pt able to demonstrate at time of review as well as recall and demonstrate 5 minutes later). Pt able to demonstrate placement of food pr straw on the left with min to no cues. Pt indicated able to masticate ground sausage from breakfast  \"did ok\" but it may make stomach upset and reported that ground hamburger analyzed with this SLP at lunch was a little \"difficult to swallow\" and \"hard to move back\" but wants to continue to try with present diet. Educated to trial these foods requiring some mastication for practice but try to pick smoother items overall at this time. D/W nursing re: possible introduction of [employer] and nursing to notify nutrition team re: that.   Pt wants to keep strength up and get better. Cont goal  12/7: Pt seated upright in bed agreeable to be seen with part of AM meal. Pt demonstrated slowed, yet functional mastication of minced and moist solids and no overt s/s penetration/aspiration with either consistency. Pt reported intermittent sensation globus, however with \"dryer\" solids (pt pointing to dry minced and moist sausage). SLP instructed pt to order gravy with dry meats or use additional puree to provide moisture to solid consistency. Pt demonstrated understanding and agreement. Continue current diet level at this time. Cont. Goal 3: Patient will tolerate least restrictive diet without overt signs of aspiration or associated decline in respiratory status. 12/2: Reviewed chart and discussed with RN; pt NPO yesterday d/t respiratory status, however was advanced back to puree this am. With patient awake, alert, pleasant, cooperative, positioned up in bed, on 4.5 L O2 via nc, assessed tolerance thins via straw and puree; pt with good oral containment and straw use for left sided placement (ongoing right sided weakness), positive swallow movement, good oral clearance. Following large sip, patient with delayed cough, however not clearly related to swallow as was productive of sputum. No issues on subsequent trials, with cues for small bites/sips. Cont goal  12/5: RN with no concerns with swallowing, states lungs are clear. Pt agreeable to PO trials including pudding, thin liquids via straw and trials of soft solids. Pt consumed pudding and liquid with no overt signs of aspiration and no pocketing/oral residue noted. Pt demonstrated prolonged but adequate mastication with soft solids, cleared oral cavity completely. Pt used lingual sweep independently  Recommend upgrade to minced and moist texture with use of lingual sweep and liquid wash to ensure clearance of oral cavity  Cont goal  12/6: RN reported lungs are clear.   Pt given oral care with pt having some ground meat from breakfast on the right side between inner cheek and lower gum area which this  SLP cleared with oral swab. Pt analyzed with 1 bite of hamburger which pt was able to chew given extra time and swallow although pt reported somewhat difficult (pt indicated ground sausage was \"ok\" to chew this morning. 1-2 instances of mildly delayed cough with thins and/or sherbert (out of 10 plus trials). Pt educated and able to demonstrate finger sweep as a strategy. Cont goal  12/7: SLP expressed continued use of strategies and pt independently utilized/recalled all with 100% accuracy. Pt observed using throughout meal and expressed increased ease of use during evening meal yesterday, as well as this AM. Cont. Patient/Family/Caregiver Education:  See goal 2 above    Compensatory Strategies:  Upright as possible for all oral intake  Eat/Feed slowly  Check for pocketing of food on the Right  Alternate solids and liquids   straw and food placement on LEFT  Finger sweep during and after meals     Plan:  Continue dysphagia treatment with goals per plan of care. Diet Recommendations: Minced and moist / Thin Liquids via straw  -Left-sided placement  -meds in puree  -finger/lingula sweep  -oral care at end of meals  - assist feed as needed  - check for pocketing on Right  DC recommendation: ongoing treatment indicated  Treatment: 10  D/W nursing: ipsy  Needs met prior to leaving room, call button in reach.     Electronically signed by:  Lola Durham M.A., [address] [place] [address]  Speech-Language Pathologist  Pg #: [phone]    If patient is discharged prior to next treatment, this note will serve as the discharge summary

## 2022-12-07 NOTE — CARE COORDINATION
Case management is following for discharge planning. The chart was reviewed. Therapy worked with Ms. Laverne Maldonado today. Acute Rehab has been recommended. A PMR consult is noted. [employer] Medicare is the payor. No pre-cert. PMR consult is needed. PT AM-PAC: 15 / 24 per last evaluation on: 12/7    OT AM-PAC: 15 / 24 per last evaluation on: 12/7    DME Needs for discharge: deferred    Discharge Plan: [facility] 1ST choice. Case Management Notes: Ms. Laverne Maldonado is from home alone in her own house. She is supported by her sister and other family members in the community. She is independent with self care, functional mobility, and an active  at baseline    Shlomo Dejuan and her family were provided with choice of provider; she and her family are in agreement with the discharge plan.       Reji Keane RN  [facility] CANDACE Jerez   Case Management Department  [phone]

## 2022-12-07 NOTE — PROGRESS NOTES
Physical Therapy  Facility/Department: [address] [place] [address] ICU  Daily Treatment Note  NAME: Ashley Remy  : 1955  MRN: [id number]    Date of Service: 2022    Discharge Recommendations:Bianca Mayers scored a 13/24 on the AM-PAC short mobility form. Current research shows that an AM-PAC score of 17 or less is typically not associated with a discharge to the patient's home setting. Based on the patient's AM-PAC score and their current functional mobility deficits, it is recommended that the patient have 5-7 sessions per week of Physical Therapy at d/c to increase the patient's independence. At this time, this patient demonstrates complex nursing, medical, and rehabilitative needs, and would benefit from intensive rehabilitation services upon discharge from the Inpatient setting. This patient demonstrates the ability to participate in and benefit from an intensive therapy program with a coordinated interdisciplinary team approach to foster frequent, structured, and documented communication among disciplines, who will work together to establish, prioritize, and achieve treatment goals. Please see assessment section for further patient specific details. If patient discharges prior to next session this note will serve as a discharge summary. Please see below for the latest assessment towards goals. Patient would benefit from continued therapy after discharge   PT Equipment Recommendations  Equipment Needed:  (defer)    Patient Diagnosis(es): Diagnoses of Acoustic neuroma [facility]), Balance problem, Meningioma ([facility] and Dizziness were pertinent to this visit. Assessment   Assessment: Pt with improving mobility and assist levels. She was able to take a few steps to a chair near the bed with use of uriel RW. The pt is motivated to get better though limited primarily by fatigue and was unable to perform further mobility activities.  Pt with LE weakness, impaired activity tolerance, impaired balance compared to baseline. She would benefit from intensive IP therapy upon discharge to improve her independence prior to return home. Activity Tolerance: Patient tolerated treatment well;Patient limited by fatigue;Patient limited by endurance  Equipment Needed:  (defer)     Plan    Physcial Therapy Plan  General Plan: 5-7 times per week  Current Treatment Recommendations: Strengthening; Functional mobility training;Transfer training;Gait training; Endurance training; Safety education & training; Therapeutic activities     Restrictions  Position Activity Restriction  Other position/activity restrictions: Up with assistance, ambulate the patient, up in the chair, HOB elevated to 30 degrees     Subjective    Subjective  Subjective: \"I don't know if I can get up; I just don't have a lot of energy. \"  Pain: denies  Orientation  Overall Orientation Status: Impaired  Orientation Level: Oriented X4  Cognition  Overall Cognitive Status: Exceptions  Arousal/Alertness: Delayed responses to stimuli  Following Commands: Follows one step commands with increased time; Follows one step commands with repetition  Attention Span: Attends with cues to redirect  Memory: Decreased recall of recent events  Safety Judgement: Decreased awareness of need for safety;Decreased awareness of need for assistance  Problem Solving: Assistance required to identify errors made;Assistance required to implement solutions;Assistance required to generate solutions;Assistance required to correct errors made  Insights: Decreased awareness of deficits  Initiation: Requires cues for some  Sequencing: Requires cues for some     Objective   Vitals     Bed Mobility Training  Bed Mobility Training: Yes  Supine to Sit: Stand-by assistance (HOB slightly raised with improved effort and time to complete)  Scooting: Stand-by assistance (to EOB)  Balance  Sitting: Intact (SBA EOB)  Sitting - Static: Fair (occasional)  Sitting - Dynamic: Fair (occasional)  Standing: With support (CGA-SBA at EOB. Pt endorsing some dizziness after sitting up and upon standing)  Transfer Training  Transfer Training: Yes  Sit to Stand: Assist X2 (mod A +min A from EOB; pt pulling on walker despite cueing for hand placement)  Stand to Sit: Contact-guard assistance  Gait Training  Gait Training: Yes  Gait  Overall Level of Assistance: Contact-guard assistance  Speed/Bev: Slow;Shuffled  Step Length: Left shortened;Right shortened  Stance: Right decreased; Left decreased  Distance (ft): 2 Feet  Assistive Device: Walker, rolling (bariatric)           Safety Devices  Type of Devices: Patient at risk for falls; Left in chair;Call light within reach; Chair alarm in place;Gait belt;Nurse notified; Heels elevated for pressure relief       Goals  Short Term Goals  Time Frame for Short Term Goals: D/C  Short Term Goal 1: supine<->sit mod A x 1-2- MET 12/6 Pt will perform bed mobility with SBA MET 12/7 update to bed mobility with independence  Short Term Goal 2: pt will sit at EOB for 5 min with SBA- MET 12/7  Short Term Goal 3: pt will tolerate sit<->stand assessment MET 12/6 Pt will transfer with CGA to RW  Short Term Goal 4: pt will tolerate bed->chair assessment with RW MET 12/7 Pt will perform stand pivot with RW and SBA  Short Term Goal 5: pt will tolerate gait assessment-MET 12/7 Pt will ambulate x 50' and CGA  Patient Goals   Patient Goals : not stated    Education  Patient Education  Education Given To: Patient  Education Provided: Role of Therapy;Plan of Care  Education Method: Verbal  Education Outcome: Verbalized understanding    Therapy Time   Individual Concurrent Group Co-treatment   Time In 1013         Time Out 1041         Minutes 28         Timed Code Treatment Minutes: [address] [facility]       Yusra Marie, LARRY

## 2022-12-07 NOTE — PROGRESS NOTES
NEUROSURGERY POST-OP PROGRESS NOTE    Patient Name: Cyn Morocho YOB: 1955   Sex: Female Age: 79 yrs     Medical Record Number: [id number] Acct Number: [de-identified]   Room Number: 5962/8681-95 Hospital Day: Hospital Day: 10     Interval History:  Post-operative Day# 8 s/p Procedure(s) (LRB):  STAGE II: RESECTION OF RIGHT PETROCLIVAL MENINGIOMA (N/A)    Subjective: speech improved    Objective:    VITAL SIGNS   /74   Pulse 60   Temp 97.9 °F (36.6 °C) (Oral)   Resp 22   Ht 5' 5\" (1.651 m)   Wt (!) 348 lb 9.6 oz (158.1 kg)   SpO2 95%   BMI 58.01 kg/m²    Height Height: 5' 5\" (165.1 cm)   Weight Weight: (!) 348 lb 9.6 oz (158.1 kg)          Allergies Allergies   Allergen Reactions    Keflex [Cephalexin] Itching and Rash    Codeine Nausea And Vomiting    Tomato Rash      NPO Status ADULT DIET; Dysphagia - Minced and Moist  ADULT ORAL NUTRITION SUPPLEMENT; Lunch; Standard 4 oz Oral Supplement  ADULT ORAL NUTRITION SUPPLEMENT; Lunch; Frozen Oral Supplement   Isolation No active isolations     LABS   Basic Metabolic Profile Recent Labs     12/05/22  0554 12/06/22  0500 12/07/22  0450    140 139    100 101   CO2 33* 34* 33*   BUN 23* 19 15   CREATININE <0.5* <0.5* <0.5*   GLUCOSE 192* 189* 157*   MG 2.00 2.00 2.00      Complete Blood Count Recent Labs     12/05/22  0554 12/06/22  0500 12/07/22  0450   WBC 8.7 8.0 7.8   RBC 3.53* 3.45* 3.48*      Coagulation Studies No results for input(s): PTT, INR in the last 72 hours.     Invalid input(s): PLATELETS, PROA, PT, PTTA     MEDICATIONS   Inpatient Medications     acyclovir, 400 mg, Oral, TID    insulin lispro, 0-16 Units, SubCUTAneous, TID WC    insulin lispro, 0-4 Units, SubCUTAneous, Nightly    polyvinyl alcohol, 2 drop, Right Eye, Q2H    levETIRAcetam, 500 mg, Oral, BID methIMAzole, 10 mg, Oral, Daily    insulin glargine, 15 Units, SubCUTAneous, Nightly    melatonin, 5 mg, Oral, Nightly    sodium chloride flush, 5-40 mL, IntraVENous, 2 times per day    furosemide, 40 mg, IntraVENous, Daily    heparin (porcine), 5,000 Units, SubCUTAneous, 3 times per day    polyethylene glycol, 17 g, Oral, Daily    tetrahydrozoline, 1 drop, Both Eyes, TID    carvedilol, 12.5 mg, Oral, BID WC    losartan, 100 mg, Oral, Daily    sodium chloride flush, 5-40 mL, IntraVENous, 2 times per day    sennosides-docusate sodium, 1 tablet, Oral, BID    latanoprost, 1 drop, Both Eyes, Nightly   Infusions    sodium chloride      sodium chloride      dextrose        Antibiotics   Recent Abx Admin                     acyclovir (ZOVIRAX) capsule 400 mg (mg) 400 mg Given 12/06/22 2115     400 mg Given  1609     400 mg Given  0925                     Neurologic Exam:  Mental status: awake and alert and oriented x4     Cranial Nerves:  II: Visual acuity not tested, visual fields full, denies new visual changes / diplopia  III, IV, VI: PERRL, 3 mm bilaterally, EOMI, no nystagmus noted  V: Facial sensation intact bilaterally to touch  VII: Face with R sided weakness, HB3 with full eye closure  VIII: Hearing intact bilaterally to spoken voice  IX: Palate movement equal bilaterally  XI: Shoulder shrug equal bilaterally  XII: Tongue midline        Musculoskeletal:   Gait: Not tested   Tone: normal  Sensory: intact to all extremities  Motor strength:     Right  Left      Right  Left    Deltoid  5 5   Hip Flex  5 5   Biceps  5 5   Knee Extensors  5 5   Triceps  5 5   Knee Flexors  5 5   Wrist Ext  5 5   Ankle Dorsiflex. 5 5   Wrist Flex  5 5   Ankle Plantarflex.   5 5   Handgrip  5 5   Ext Ernie Longus  5 5   Thumb Ext  5 5              Griggs dressing intact, clean and dry       Respiratory:  Unlabored respiratory pattern     Abdomen:   Soft, ND         Cardiovascular:  Warm, well perfused     Assessment   Patient is a 78 yo F s/p Procedure(s) (LRB):  STAGE II: RESECTION OF RIGHT PETROCLIVAL MENINGIOMA (N/A) per Dr. Merlin Powell     Plan:  Neurologic exam frequency:q4  Mobility:PT/OT   SLP continue  PICC line  DVT Prophylaxis: SCDs and heparin  Keppra as ordered  Bowel Regimen: senna and glycolax  Pain control:alex   Nicardipine, keep sbp < 160  Incisional Care:Mastoid dressing in place check q 4 hr and let NS know if leaking  Acyclovir for 10 days  Dispo Planning:ICU     Patient was discussed with Dr. Unique Castaneda who agrees with above assessment and plan. Electronically signed by: LEE Montoya CNP, 12/7/2022 7:03 AM   Neurosurgery Nurse Practitioner  [phone]   I spent 20 minutes in the care of this patient.   Over 50% of that time was in face-to-face counseling regarding post op progression, pain management strategies, and answering patient and family questions

## 2022-12-07 NOTE — OP NOTE
Operative Report    PATIENT NAME: Jackelin Eckert  YOB: 1955  MEDICAL RECORD# [id number]  SURGERY DATE: 11/28/2022  SURGEON:  Tim Padilla MD, PhD  Adeline Hernanedz: Alba Saldana MD   DICTATED BY: Tim Padilla MD, PhD    PREOPERATIVE DIAGNOSIS:  1. Right petroclival, homogeneously enhancing mass lesion    POSTOPERATIVE DIAGNOSIS:  1. Same    PROCEDURES PERFORMED:  1. Right translabrythine craniotomy, performed by Dr. Carlos Sheikh  2. Right retro-labyrinthine skull base approach, performed by Dr. Carlos Sheikh  3. Right middle fossa craniotomy, performed by Dr. Krishan Soler  4. Combined petrosectomy, performed Dr. Carlos Sheikh  5. Intraoperative monitoring for facial nerve function, ABRs,  sensory and motor function  6. Microdissection using the operating microscope  7. Abdominal fat grafting    ANESTHESIA:  General    INDICATION FOR SURGERY: The patient is a 79 y.o. who presented with vertigo, dizziness, falls and decrease in hearing. MRI revealed a homogeneously-enhancing mass lesion at the petroclival junction, suspicious for meningioma. Treatment options were presented to include observation, radiotherapy and microsurgical resection. The risks of surgery were discussed with the patient and her family to include (but not limited to): weakness of the right side up to and including paralysis, speech disturbance, visual disturbance, problems with swallowing, inability to completely remove the mass, facial palsy, CSF leak, need for further surgery, bleeding, infection, stroke, coma and death. The patient expressed understanding of these risks and wished to proceed with surgical extirpation of the mass. OPERATIVE FINDINGS: This was Stage 1 of a planned 2 stage approach. The bony exposure was obtained and the exposure closed and packed with a fat graft in preparation for tumor resection tomorrow. PROCEDURE IN DETAIL: Under universal protocol and informed consent, the patient was brought to the operating room.  General anesthesia was induced and the patient was intubated. The patient was placed in the supine position, the head was placed in the [facility] and turned to the patient's left. The incision and craniotomy were planned and the incision shaved. A time out was completed, the site was prepped and draped in the usual sterile fashion. Intravenous antibiotics were given by anesthesia. The arterial pCO2 was maintained at 28, 10 mg IVP decadron was given. Dr. Josemanuel Mason began the operation after a time out was completed in my presence. This portion of the operation will be dictated in detail in a separate operative note. Briefly, he created a retroauricular incision and performed a translabrythine skull base approach to the *** IAC. Once the dura over the distal IAC was opened, I entered the operation. The operating microscope was in the field for the approach, we continued to use it for microdissection and tumor resection. A microscissor and arachnoid knife was used to further open the posterior fossa dura exposing the mass within the CP angle and cerebellum. The tumor was appreciated displacing the facial nerve ventrally. A microscissor and #3 Rhoton suction were used to release the arachnoid attachments. The stimulator was used to stimulate the visible portion of the tumor capsule without facial nerve response. The nerve was located at it's entrance into the porus acousticus and stimulated with good response. A scissor was used to open the wall of the tumor for biopsy away from the course of the seventh nerve. The root entry zone of the facial nerve was then located at the inferior/ventral border of the mass within the CPA. This was stimulated for verification. A plane of dissection was then created along the tumor interface with the facial nerve. This plane was developed with the microdissector and the Cassandra stimulator probe up toward the IAC. This plane was protected with a micro-eder.  The Sonopet was then used to significantly debulk the central mass of the tumor, staying within the capsule. Attention was then turned to the facial nerve as it exited the porus into the cisternal segment. As it was dissected, the nerve was significantly adherent to the tumor capsule. We then turned back to the facial nerve along the brainstem. The tumor was progressively dissected, elevated from the brainstem/nerve and removed. This was continued until the majority of the mass was removed and only a very small intervening amount of tumor was attached to the nerve. This was then carefully dissected from the nerve with microscissors and the Prabst probe. A very thin rind of tumor was very adherent to the nerve across approximately 1-2 mm of the nerve. This was left in order to not permanently damage the nerve. The nerve was preserved along it's entire intracranial course and into the IAC. The facial nerve was stimulated at the brainstem and at its entrance into the [facility] with excellent response at >280 mV. Meticulous hemostasis was assured and the posterior fossa was irrigated copiously to clear the CSF of surgical byproducts. The operation was then again turned back to Dr. Hamlet Reyes who closed the wound. An abdominal fat graft was taken, fascia was placed over the dural defect, the entrance to the middle ear and eustachian tube as packed with muscle/bone wax and the mastoid defect packed with abdominal fat. The fascia and skin was re-approximated with 3-0 Vicryl and 3-0 Nylon respectively, and covered with PSO. A mastoid dressing was placed. I attest that I was present throughout the entire operation in accordance with CMS guidelines. ESTIMATED BLOOD LOSS: 50 mL    FLUIDS: Per Anesthesia. SPECIMENS: All specimens sent to pathology, frozen section consistent with vestibular schwannoma    COMPLICATIONS: None apparent.     DRAINS PLACED: None    DISPOSITION: The patient was brought to the PACU awake, following commands, and moving all 4 extremities.

## 2022-12-07 NOTE — PROGRESS NOTES
Speech Language Pathology  Facility/Department: [facility] ICU  Daily Speech/Cognitive Treatment Note    NAME: Galileo Tong  : 1955  MRN: [id number]    Patient Diagnosis(es):   Patient Active Problem List    Diagnosis Date Noted    Acoustic neuroma ([facility] 2022    Cerebellopontine angle meningioma ([facility] 2022    Osteoarthritis of right hip 2021    Morbid obesity with BMI of 50.0-59.9, adult ([facility] [address] [facility] 2021    Primary osteoarthritis of left knee 2015    Asthma 2015    Gastroesophageal reflux disease 2015    Adiposity 2015    S/P total knee arthroplasty 2015    Hypertension 2014     Allergies: Allergies   Allergen Reactions    Keflex [Cephalexin] Itching and Rash    Codeine Nausea And Vomiting    Tomato Rash       Prior MRI Brain: (2022)  Large right cerebellopontine angle mass as detailed, most likely representing a meningioma. See above for details. Additional smaller left anterior parafalcine extra-axial lesion, compatible with additional small meningioma. Recent CT Head: (2022)  1. Interval postsurgical changes from resection of right cerebellopontine angle mass with associated right temporal mastoid resection and frontotemporal craniotomy and fat graft placement. 2.  Thin hyperdensity along the fat graft may represent small amount of postsurgical blood products. There is also a small focus of subarachnoid hemorrhage along the right temporal lobe. 3.  Mild pneumocephalus and bilateral subgaleal fluid is also likely postsurgical       Chart reviewed. Pain: denies    Initial Assessment 22  Mild-moderate dysarthria characterized by blended word boundaries and imprecise articulation (~ 70% intelligbile short phrases in quiet environment). Benefits from slow pace and over-articulation strategies. Vocal quality clear and strong. Patient able to follow simple directions and answer basic questions.  No word finding difficulties noted at conversation level. Further assessment limited by patient fatigue. Recommend ongoing speech therapy to further assess and address. Medical diagnosis: Acoustic neuroma [facility]) [D33.3]  Balance problem [R26.89]  Meningioma ([facility] [address] [facility] [D32.9]  Dizziness [R42]  Cerebellopontine angle meningioma ([facility] [address] [D32.0]  Treatment diagnosis: dysarthria    Treatment:  Pt seen to address the following goals:  Goal 1: Patient will recall and implement strategies to improve speech clarity to 90% with min cues  12/5:  speech intelligibility is ~90%, however decreased in articulatory precision. pt educated to strategies for improved intelligibility and articulatory precision through verbal instruction and demonstration. Pt used strategies in structured sentence level tasks with min cues. In conversation pt required min-mod cues. Pt stated comprehension  Cont goal  12/6: Pt speech intelligibility was functional but articulatory precision of sounds (especially /s/, /sh/, and /b/) was mildly distorted with reduced awareness but able to correct given min-mod cues during rote tasks and conversation. Pt did self correct speech error in conversation x1 at end of session. Pt educated to exaggerate and open/move articulators well with speaking. Pt utilized even a slower rate following initial review. Cont goal  12/7: Pt able to recall all speech strategies given min cues. Pt demonstrated ~90% intelligibility in basic conversational tasks. Pt with increased use of over-articulation and breaking up 3-4 words at a time for clarity. Cont. Goal 2: Patient will participate in ongoing assessment of cognitive-communication skills. 12/5: pt oriented, able to state reason for hospitalization. Pt and family report no changes in cognition. Will cont to assess  Cont goal  12/6:  Pt oriented x3. 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   Pt able to recall 2/3 words following a 5 minute delay (3/3 with choices). Pt able to complete all basic math for money and time concepts including leaving a tip with no errors and minimal to no delays. Pt reported feeling tired so session ended earlier. Cont goal  12/7: SLP provided pt with prescription label and asked pt questions related. Pt answered with 100% accuracy and no cues required. Pt expressed manages own finances/medications/ADLs at baseline with independence. Pt mentioned she does feel close to cognitive baseline. Cont. Education:  Pt and family educated to purpose of visit and tasks presented and recommendation for ongoing treatment. All stated comprehension    Plan:  Continue speech/language therapy to address above goals, 3-5 x/week x LOS  DC recommendations: ongoing treatment indicated  D/W nursing: [facility]  Needs met prior to leaving room, call button in reach.   Treatment time: 20 minutes (sp+cog)      Electronically signed by:  Ashleigh Craig M.A., [address] [place] [address]  Speech-Language Pathologist  Pg #: [phone]    If patient is discharged prior to next treatment, this note will serve as the discharge summary

## 2022-12-07 NOTE — PLAN OF CARE
Problem: Chronic Conditions and Co-morbidities  Goal: Patient's chronic conditions and co-morbidity symptoms are monitored and maintained or improved  12/7/2022 0227 by Sydney Mike RN  Outcome: Progressing     Problem: Discharge Planning  Goal: Discharge to home or other facility with appropriate resources  12/7/2022 0227 by Sydney Mike RN  Outcome: Progressing     Problem: Pain  Goal: Verbalizes/displays adequate comfort level or baseline comfort level  Outcome: Progressing     Problem: Safety - Adult  Goal: Free from fall injury  12/7/2022 0227 by Sydney Mike RN  Outcome: Progressing     Problem: Skin/Tissue Integrity  Goal: Absence of new skin breakdown  Description: 1. Monitor for areas of redness and/or skin breakdown  2. Assess vascular access sites hourly  3. Every 4-6 hours minimum:  Change oxygen saturation probe site  4. Every 4-6 hours:  If on nasal continuous positive airway pressure, respiratory therapy assess nares and determine need for appliance change or resting period. 12/7/2022 0227 by Sydney Mike RN  Outcome: Progressing     Problem: ABCDS Injury Assessment  Goal: Absence of physical injury  12/7/2022 0227 by Sydney Mike RN  Outcome: Progressing     Problem: Nutrition Deficit:  Goal: Optimize nutritional status  12/7/2022 0227 by Sydney Mike RN  Outcome: Progressing     Problem: Neurosensory - Adult  Goal: Absence of seizures  12/7/2022 0227 by Sdyney Mike RN  Outcome: Progressing  Flowsheets (Taken 12/6/2022 2000)  Absence of seizures: Monitor for seizure activity.   If seizure occurs, document type and location of movements and any associated apnea     Problem: Neurosensory - Adult  Goal: Remains free of injury related to seizures activity  12/7/2022 0227 by Sydney Mike RN  Outcome: Progressing     Problem: Neurosensory - Adult  Goal: Achieves maximal functionality and self care  12/7/2022 0227 by Sydney Mike RN  Outcome: Progressing     Problem: Respiratory - Adult  Goal: Achieves optimal ventilation and oxygenation  12/7/2022 0227 by Noel Frey RN  Outcome: Progressing  Flowsheets (Taken 12/6/2022 2000)  Achieves optimal ventilation and oxygenation: Assess for changes in respiratory status     Problem: Cardiovascular - Adult  Goal: Maintains optimal cardiac output and hemodynamic stability  12/7/2022 0227 by Noel Frey RN  Outcome: Progressing     Problem: Cardiovascular - Adult  Goal: Absence of cardiac dysrhythmias or at baseline  12/7/2022 0227 by Noel Frey RN  Outcome: Progressing  Flowsheets (Taken 12/6/2022 2000)  Absence of cardiac dysrhythmias or at baseline: Monitor cardiac rate and rhythm     Problem: Skin/Tissue Integrity - Adult  Goal: Skin integrity remains intact  12/7/2022 0227 by Noel Frey RN  Outcome: Progressing  Flowsheets (Taken 12/6/2022 2000)  Skin Integrity Remains Intact: Monitor for areas of redness and/or skin breakdown     Problem: Skin/Tissue Integrity - Adult  Goal: Incisions, wounds, or drain sites healing without S/S of infection  12/7/2022 0227 by Noel Frey RN  Outcome: Progressing     Problem: Skin/Tissue Integrity - Adult  Goal: Oral mucous membranes remain intact  12/7/2022 0227 by Noel Frey RN  Outcome: Progressing     Problem: Infection - Adult  Goal: Absence of infection during hospitalization  Outcome: Progressing     Problem: Metabolic/Fluid and Electrolytes - Adult  Goal: Hemodynamic stability and optimal renal function maintained  Outcome: Progressing     Problem: Metabolic/Fluid and Electrolytes - Adult  Goal: Glucose maintained within prescribed range  Outcome: Progressing     Problem: Hematologic - Adult  Goal: Maintains hematologic stability  Outcome: Progressing  Flowsheets (Taken 12/6/2022 2000)  Maintains hematologic stability: Assess for signs and symptoms of bleeding or hemorrhage Skin Substitute Text: The defect edges were debeveled with a #15 scalpel blade.  Given the location of the defect, shape of the defect and the proximity to free margins a skin substitute graft was deemed most appropriate.  The graft material was trimmed to fit the size of the defect. The graft was then placed in the primary defect and oriented appropriately.

## 2022-12-07 NOTE — PROGRESS NOTES
MD Dr. Chrissy Castro @ bedside and updated on pt; mastoid wrap replaced per MD; pt tolerated without problems

## 2022-12-07 NOTE — PROGRESS NOTES
Occupational Therapy  Facility/Department: [facility] ICU  Daily Treatment Note  NAME: Martínez López  : 1955  MRN: [id number]    Date of Service: 2022    Discharge Recommendations: Martínez López scored a 15/24 on the AM-PAC ADL Inpatient form. Current research shows that an AM-PAC score of 17 or less is typically not associated with a discharge to the patient's home setting. Based on the patient's AM-PAC score and their current ADL deficits, it is recommended that the patient have 5-7 sessions per week of Occupational Therapy at d/c to increase the patient's independence. At this time, this patient demonstrates complex nursing, medical, and rehabilitative needs, and would benefit from intensive rehabilitation services upon discharge from the Inpatient setting. This patient demonstrates the ability to participate in and benefit from an intensive therapy program with a coordinated interdisciplinary team approach to foster frequent, structured, and documented communication among disciplines, who will work together to establish, prioritize, and achieve treatment goals. Please see assessment section for further patient specific details. If patient discharges prior to next session this note will serve as a discharge summary. Please see below for the latest assessment towards goals. OT Equipment Recommendations  Equipment Needed: No  Other: defer to next level of care    Patient Diagnosis(es): Diagnoses of Acoustic neuroma [facility]), Balance problem, Meningioma ([facility] [address] and Dizziness were pertinent to this visit. Assessment    Assessment: Pt tolerated treatment well and continues to progress towards goals. Pt stood up to RW with Mod+ Min and ambulated short distance to chair with CGA and RW for the first time. Pt became quickly fatigued after transfer to chair, reporting she has not been eating much and she thinks that is why. Pt performed grooming task with set-up.  Pt is limited by decreased activity tolerance, however, she continues to show motivation to keep progressing and tolerate 3 hours/day of therapy. Recommend continued intensive, inpatient OT services at [facility] to promote return to her previous high level of function and independence. Will continue to follow on OT POC. Activity Tolerance: Patient tolerated treatment well;Patient limited by fatigue;Patient limited by endurance  Equipment Needed: No  Other: defer to next level of care      Plan   Occupational Therapy Plan  Times Per Week: 5-7  Times Per Day: Once a day  Current Treatment Recommendations: Strengthening;ROM;Cognitive reorientation;Balance training;Pain management;Self-Care / ADL; Functional mobility training; Safety education & training;Home management training; Endurance training;Neuromuscular re-education;Positioning     Restrictions   Up with assist    Subjective   Subjective  Subjective: Pt met reclined in bed, pleasant and agreeable to therpy with encouragement. Pt endorses mild dizziness with movement and fatigue  Pain: No complaints of pain  Orientation  Overall Orientation Status: Impaired  Orientation Level: Oriented X4  Pain: denies  Cognition  Overall Cognitive Status: Exceptions  Arousal/Alertness: Delayed responses to stimuli  Following Commands: Follows one step commands with increased time; Follows one step commands with repetition  Attention Span: Attends with cues to redirect  Memory: Decreased recall of recent events  Safety Judgement: Decreased awareness of need for safety;Decreased awareness of need for assistance  Problem Solving: Assistance required to identify errors made;Assistance required to implement solutions;Assistance required to generate solutions;Assistance required to correct errors made  Insights: Decreased awareness of deficits  Initiation: Requires cues for some  Sequencing: Requires cues for some        Objective    Bed Mobility Training  Bed Mobility Training: Yes  Supine to Sit: Stand-by assistance (HOB slightly raised with improved effort and time to complete)  Scooting: Stand-by assistance (to EOB)    Balance  Sitting: Intact (SBA at EOB ~3 mins)  Sitting - Static: Fair (occasional)  Sitting - Dynamic: Fair (occasional)  Standing: With support (BUE support via RW. SBA-CGA)    Transfer Training  Transfer Training: Yes  Sit to Stand: Assist X2;Minimum assistance; Moderate assistance (Mod + Min from EOB up to RW. Pt pulling on walker despite cues)  Stand to Sit: Contact-guard assistance  Bed to Chair: Additional time;Contact-guard assistance;Assist X2;Adaptive equipment (CGA x2 with RW, short steps from EOB to recliner)    Gait Training  Gait Training: Yes  Gait  Overall Level of Assistance: Contact-guard assistance;Assist X2;Additional time; Adaptive equipment  Speed/Bev: Slow;Shuffled  Step Length: Left shortened;Right shortened  Stance: Right decreased; Left decreased  Distance (ft): 2 Feet  Assistive Device: Walker, rolling;Gait belt    ADL  Grooming: Setup;Stand by assistance  Grooming Skilled Clinical Factors: Pt brushed teeth with water seated in recliner  LE Dressing: Maximum assistance  LE Dressing Skilled Clinical Factors: to don socks at bed level; tabbed brief donned in standing    Safety Devices  Type of Devices: Patient at risk for falls; Left in chair;Call light within reach; Chair alarm in place;Gait belt;Nurse notified; Heels elevated for pressure relief  Restraints  Restraints Initially in Place: No     Patient Education  Education Given To: Patient  Education Provided: Plan of Care;Transfer Training  Education Method: Verbal  Barriers to Learning: Cognition  Education Outcome: Verbalized understanding;Continued education needed    Goals  Short Term Goals  Time Frame for Short Term Goals: by discharge  Short Term Goal 1: Pt will perform bed mobility with CGA to decrease caregiver burden- goal met 12/2.  NEW GOAL: Pt will perform bed mobility with supervision- progressing  Short Term Goal 2: Pt will sit EOB 5 mins with no more than CGA- progressing  Short Term Goal 3: Pt will perform grooming activity with Min A and set-up- goal met 12/1. New Goal: Pt will perform grooming ax with set-up and supervision- progressing  Short Term Goal 4: Pt will tolerate sit to stand transfer- goal met 12/1.  NEW GOAL: sit to stand transfer with Mod x2- goal met 12/2 with Min +CGA and CGA x2; NEW GOAL: stance x2 mins with CGA- progressing  Patient Goals   Patient goals : not stated       Therapy Time   Individual Concurrent Group Co-treatment   Time In 1013         Time Out 1042         Minutes 29         Timed Code Treatment Minutes: Yamile 91, S/OT

## 2022-12-08 LAB
ANION GAP SERPL CALCULATED.3IONS-SCNC: 5 MMOL/L (ref 3–16)
BASOPHILS ABSOLUTE: 0 K/UL (ref 0–0.2)
BASOPHILS RELATIVE PERCENT: 0.3 %
BUN BLDV-MCNC: 13 MG/DL (ref 7–20)
CALCIUM SERPL-MCNC: 8.6 MG/DL (ref 8.3–10.6)
CHLORIDE BLD-SCNC: 100 MMOL/L (ref 99–110)
CO2: 32 MMOL/L (ref 21–32)
CREAT SERPL-MCNC: <0.5 MG/DL (ref 0.6–1.2)
EOSINOPHILS ABSOLUTE: 0.3 K/UL (ref 0–0.6)
EOSINOPHILS RELATIVE PERCENT: 4.1 %
GFR SERPL CREATININE-BSD FRML MDRD: >60 ML/MIN/{1.73_M2}
GLUCOSE BLD-MCNC: 184 MG/DL (ref 70–99)
GLUCOSE BLD-MCNC: 184 MG/DL (ref 70–99)
GLUCOSE BLD-MCNC: 197 MG/DL (ref 70–99)
HCT VFR BLD CALC: 30.9 % (ref 36–48)
HEMOGLOBIN: 10.6 G/DL (ref 12–16)
LYMPHOCYTES ABSOLUTE: 0.9 K/UL (ref 1–5.1)
LYMPHOCYTES RELATIVE PERCENT: 13.6 %
MCH RBC QN AUTO: 31.5 PG (ref 26–34)
MCHC RBC AUTO-ENTMCNC: 34.4 G/DL (ref 31–36)
MCV RBC AUTO: 91.5 FL (ref 80–100)
MONOCYTES ABSOLUTE: 0.7 K/UL (ref 0–1.3)
MONOCYTES RELATIVE PERCENT: 9.5 %
NEUTROPHILS ABSOLUTE: 5 K/UL (ref 1.7–7.7)
NEUTROPHILS RELATIVE PERCENT: 72.5 %
PDW BLD-RTO: 14.9 % (ref 12.4–15.4)
PERFORMED ON: ABNORMAL
PERFORMED ON: ABNORMAL
PLATELET # BLD: 163 K/UL (ref 135–450)
PMV BLD AUTO: 8.4 FL (ref 5–10.5)
POTASSIUM SERPL-SCNC: 3.7 MMOL/L (ref 3.5–5.1)
RBC # BLD: 3.38 M/UL (ref 4–5.2)
SODIUM BLD-SCNC: 137 MMOL/L (ref 136–145)
WBC # BLD: 7 K/UL (ref 4–11)

## 2022-12-08 PROCEDURE — 92507 TX SP LANG VOICE COMM INDIV: CPT

## 2022-12-08 PROCEDURE — 97116 GAIT TRAINING THERAPY: CPT

## 2022-12-08 PROCEDURE — 6370000000 HC RX 637 (ALT 250 FOR IP): Performed by: NURSE PRACTITIONER

## 2022-12-08 PROCEDURE — 6370000000 HC RX 637 (ALT 250 FOR IP)

## 2022-12-08 PROCEDURE — 97530 THERAPEUTIC ACTIVITIES: CPT

## 2022-12-08 PROCEDURE — 6360000002 HC RX W HCPCS

## 2022-12-08 PROCEDURE — 1200000000 HC SEMI PRIVATE

## 2022-12-08 PROCEDURE — 97535 SELF CARE MNGMENT TRAINING: CPT

## 2022-12-08 PROCEDURE — 2580000003 HC RX 258

## 2022-12-08 PROCEDURE — 80048 BASIC METABOLIC PNL TOTAL CA: CPT

## 2022-12-08 PROCEDURE — 99024 POSTOP FOLLOW-UP VISIT: CPT | Performed by: NURSE PRACTITIONER

## 2022-12-08 PROCEDURE — 85025 COMPLETE CBC W/AUTO DIFF WBC: CPT

## 2022-12-08 PROCEDURE — 92526 ORAL FUNCTION THERAPY: CPT

## 2022-12-08 RX ORDER — BISACODYL 10 MG
10 SUPPOSITORY, RECTAL RECTAL DAILY PRN
Status: DISCONTINUED | OUTPATIENT
Start: 2022-12-08 | End: 2022-12-22 | Stop reason: HOSPADM

## 2022-12-08 RX ADMIN — POLYVINYL ALCOHOL 2 DROP: 14 SOLUTION/ DROPS OPHTHALMIC at 11:15

## 2022-12-08 RX ADMIN — ACYCLOVIR 400 MG: 200 CAPSULE ORAL at 13:49

## 2022-12-08 RX ADMIN — TETRAHYDROZOLINE HCL 0.05% 1 DROP: 0.05 SOLUTION/ DROPS INTRAOCULAR at 20:01

## 2022-12-08 RX ADMIN — POLYVINYL ALCOHOL 2 DROP: 14 SOLUTION/ DROPS OPHTHALMIC at 04:30

## 2022-12-08 RX ADMIN — HEPARIN SODIUM 5000 UNITS: 5000 INJECTION INTRAVENOUS; SUBCUTANEOUS at 22:05

## 2022-12-08 RX ADMIN — INSULIN GLARGINE 15 UNITS: 100 INJECTION, SOLUTION SUBCUTANEOUS at 20:01

## 2022-12-08 RX ADMIN — SODIUM CHLORIDE, PRESERVATIVE FREE 10 ML: 5 INJECTION INTRAVENOUS at 20:06

## 2022-12-08 RX ADMIN — SODIUM CHLORIDE, PRESERVATIVE FREE 10 ML: 5 INJECTION INTRAVENOUS at 09:00

## 2022-12-08 RX ADMIN — SENNOSIDES AND DOCUSATE SODIUM 1 TABLET: 50; 8.6 TABLET ORAL at 08:59

## 2022-12-08 RX ADMIN — POLYVINYL ALCOHOL 2 DROP: 14 SOLUTION/ DROPS OPHTHALMIC at 18:35

## 2022-12-08 RX ADMIN — POLYETHYLENE GLYCOL 3350 17 G: 17 POWDER, FOR SOLUTION ORAL at 08:59

## 2022-12-08 RX ADMIN — OXYCODONE 5 MG: 5 TABLET ORAL at 23:02

## 2022-12-08 RX ADMIN — CARVEDILOL 12.5 MG: 6.25 TABLET, FILM COATED ORAL at 08:59

## 2022-12-08 RX ADMIN — OXYCODONE 5 MG: 5 TABLET ORAL at 02:10

## 2022-12-08 RX ADMIN — TETRAHYDROZOLINE HCL 0.05% 1 DROP: 0.05 SOLUTION/ DROPS INTRAOCULAR at 13:47

## 2022-12-08 RX ADMIN — POLYVINYL ALCOHOL 2 DROP: 14 SOLUTION/ DROPS OPHTHALMIC at 02:02

## 2022-12-08 RX ADMIN — FUROSEMIDE 40 MG: 10 INJECTION, SOLUTION INTRAMUSCULAR; INTRAVENOUS at 08:59

## 2022-12-08 RX ADMIN — POLYVINYL ALCOHOL 2 DROP: 14 SOLUTION/ DROPS OPHTHALMIC at 05:54

## 2022-12-08 RX ADMIN — POLYVINYL ALCOHOL 2 DROP: 14 SOLUTION/ DROPS OPHTHALMIC at 13:07

## 2022-12-08 RX ADMIN — LOSARTAN POTASSIUM 100 MG: 100 TABLET, FILM COATED ORAL at 09:00

## 2022-12-08 RX ADMIN — LEVETIRACETAM 500 MG: 500 TABLET, FILM COATED ORAL at 19:53

## 2022-12-08 RX ADMIN — POLYVINYL ALCOHOL 2 DROP: 14 SOLUTION/ DROPS OPHTHALMIC at 09:00

## 2022-12-08 RX ADMIN — CARVEDILOL 12.5 MG: 6.25 TABLET, FILM COATED ORAL at 18:37

## 2022-12-08 RX ADMIN — POLYVINYL ALCOHOL 2 DROP: 14 SOLUTION/ DROPS OPHTHALMIC at 23:31

## 2022-12-08 RX ADMIN — ACYCLOVIR 400 MG: 200 CAPSULE ORAL at 09:00

## 2022-12-08 RX ADMIN — LEVETIRACETAM 500 MG: 500 TABLET, FILM COATED ORAL at 08:59

## 2022-12-08 RX ADMIN — POLYVINYL ALCOHOL 2 DROP: 14 SOLUTION/ DROPS OPHTHALMIC at 22:06

## 2022-12-08 RX ADMIN — TETRAHYDROZOLINE HCL 0.05% 1 DROP: 0.05 SOLUTION/ DROPS INTRAOCULAR at 09:01

## 2022-12-08 RX ADMIN — POLYVINYL ALCOHOL 2 DROP: 14 SOLUTION/ DROPS OPHTHALMIC at 00:27

## 2022-12-08 RX ADMIN — ACYCLOVIR 400 MG: 200 CAPSULE ORAL at 19:54

## 2022-12-08 RX ADMIN — POLYVINYL ALCOHOL 2 DROP: 14 SOLUTION/ DROPS OPHTHALMIC at 13:46

## 2022-12-08 RX ADMIN — METHIMAZOLE 10 MG: 5 TABLET ORAL at 09:00

## 2022-12-08 RX ADMIN — LATANOPROST 1 DROP: 50 SOLUTION OPHTHALMIC at 20:00

## 2022-12-08 RX ADMIN — HEPARIN SODIUM 5000 UNITS: 5000 INJECTION INTRAVENOUS; SUBCUTANEOUS at 05:54

## 2022-12-08 RX ADMIN — SENNOSIDES AND DOCUSATE SODIUM 1 TABLET: 50; 8.6 TABLET ORAL at 19:54

## 2022-12-08 RX ADMIN — POLYVINYL ALCOHOL 2 DROP: 14 SOLUTION/ DROPS OPHTHALMIC at 19:51

## 2022-12-08 RX ADMIN — Medication 5 MG: at 19:54

## 2022-12-08 RX ADMIN — HEPARIN SODIUM 5000 UNITS: 5000 INJECTION INTRAVENOUS; SUBCUTANEOUS at 13:49

## 2022-12-08 ASSESSMENT — PAIN SCALES - GENERAL
PAINLEVEL_OUTOF10: 3
PAINLEVEL_OUTOF10: 6

## 2022-12-08 ASSESSMENT — PAIN DESCRIPTION - LOCATION
LOCATION: BACK;NECK
LOCATION: BACK;NECK
LOCATION: BACK

## 2022-12-08 ASSESSMENT — PAIN DESCRIPTION - DESCRIPTORS
DESCRIPTORS: ACHING

## 2022-12-08 NOTE — PROGRESS NOTES
Physical Therapy  Facility/Department: [facility] ICU  Physical Therapy Treatment    Name: Moris Aj  : 1955  MRN: [id number]  Date of Service: 2022    Discharge Recommendations:   Moris Aj scored a 10/24 on the AM-PAC short mobility form. Current research shows that an AM-PAC score of 17 or less is typically not associated with a discharge to the patient's home setting. Based on the patient's AM-PAC score and their current functional mobility deficits, it is recommended that the patient have 5-7 sessions per week of Physical Therapy at d/c to increase the patient's independence. At this time, this patient demonstrates complex nursing, medical, and rehabilitative needs, and would benefit from intensive rehabilitation services upon discharge from the Inpatient setting. This patient demonstrates the ability to participate in and benefit from an intensive therapy program with a coordinated interdisciplinary team approach to foster frequent, structured, and documented communication among disciplines, who will work together to establish, prioritize, and achieve treatment goals. Please see assessment section for further patient specific details. If patient discharges prior to next session this note will serve as a discharge summary. Please see below for the latest assessment towards goals. DME defer to next LOC         Patient Diagnosis(es): Diagnoses of Acoustic neuroma [facility]), Balance problem, Meningioma ([facility] [address] and Dizziness were pertinent to this visit. Past Medical History:  has a past medical history of Arthritis, Asthma, Brain tumor ([facility] [address] Diabetes mellitus ([facility] [address] High cholesterol, Hypertension, Imbalance, Loss of hearing, and Vertigo. Past Surgical History:  has a past surgical history that includes Total knee arthroplasty (Right, 2015); Total knee arthroplasty (Left, 10/14/15); Total hip arthroplasty (Right, 2021);  Neuroma surgery (Right, 2022); mastoidectomy (Right, 11/28/2022); and craniotomy (N/A, 11/29/2022). Assessment   Assessment: Pt normally livs alone and is independent with functional mobility, gait and ADL. Currently needs A x 2 for transfers and safe gait. Recommend continued IP therapies to maximize mobilty, safety and independence  Activity Tolerance  Activity Tolerance: Patient tolerated treatment well;Patient limited by endurance; Patient limited by fatigue     Plan   Physcial Therapy Plan  General Plan: 5-7 times per week  Current Treatment Recommendations: Strengthening, Functional mobility training, Transfer training, Gait training, Endurance training, Safety education & training, Therapeutic activities  Safety Devices  Type of Devices: Patient at risk for falls, Left in chair, Call light within reach, Chair alarm in place, Gait belt, Nurse notified, Callum Friday elevated for pressure relief  Restraints  Restraints Initially in Place: No     Restrictions  Position Activity Restriction  Other position/activity restrictions:  Up with assistance, ambulate the patient, up in the chair, HOB elevated to 30 degrees     Subjective   Subjective  Subjective: Pt in bed upon PT entry, agreeable to working with PT         Social/Functional History  Social/Functional History  Lives With: Alone  Type of Home: House  Home Layout: One level, Able to Live on Main level with bedroom/bathroom, Performs ADL's on one level  Home Access: Stairs to enter with rails  Entrance Stairs - Number of Steps: 2  Bathroom Shower/Tub: Walk-in shower  Bathroom Equipment: Shower chair, Grab bars in shower, Hand-held shower  Home Equipment: Lily Mahajan, Sock aid (walker, unsure what kind)  Has the patient had two or more falls in the past year or any fall with injury in the past year?: No  ADL Assistance: [address] [place] [address] Avenue: [address] [facility] Responsibilities: Yes  Ambulation Assistance: Independent (with walker, unable to determine which kind)  Transfer Assistance: Independent  Active : Yes  Leisure & Hobbies: crafts  Additional Comments: pt is questionable to poor historian; pt reports no one can stay with her upon going home          Objective   Heart Rate: 66  Heart Rate Source: Monitor  BP: 127/63  BP Location: Left leg  BP Method: Automatic  Patient Position: Sitting  MAP (Calculated): 84  Resp: 16  SpO2: 96 %  O2 Device: Nasal cannula      Bed mobility  Supine to Sit: Contact guard assistance (slow and effortful with increased time to complete task. . pt used side rail)  Scooting: Contact guard assistance (scooting to the EOB)  Transfers  Sit to Stand: Minimal Assistance; Moderate Assistance (min x 1 and mod x 1 for sit to stand from eob and bsc)  Stand to Sit: Minimal Assistance; Moderate Assistance (min x 1 and mod x 1 to toilet and chair)  Ambulation  Surface: Level tile  Device: [address]: Minimal assistance;2 Person assistance (min x 2 for gait from eob to bsc and bsc to chair)  Gait Deviations: Slow Bev;Decreased step length;Decreased step height  Distance: 3 ft x 2     Balance  Comments: Min x 2  to mod A x 1 and min x 1 needed for both safety with transfers and gait        AM-PAC Score  AM-PAC Inpatient Mobility Raw Score : 10 (12/08/22 1446)  AM-PAC Inpatient T-Scale Score : 32.29 (12/08/22 1446)  Mobility Inpatient CMS 0-100% Score: 76.75 (12/08/22 1446)  Mobility Inpatient CMS G-Code Modifier : CL (12/08/22 1446)     Goals  Short Term Goals  Time Frame for Short Term Goals: D/C  ongoing 12/8  Short Term Goal 1: supine<->sit mod A x 1-2- MET 12/6 Pt will perform bed mobility with SBA MET 12/7 update to bed mobility with independence  Short Term Goal 2: pt will sit at EOB for 5 min with SBA- MET 12/7  Short Term Goal 3: pt will tolerate sit<->stand assessment MET 12/6 Pt will transfer with CGA to RW  Short Term Goal 4: pt will tolerate bed->chair assessment with RW MET 12/7 Pt will perform stand pivot with RW and SBA  Short Term Goal 5: pt will tolerate gait assessment-MET 12/7 Pt will ambulate x 50' and CGA  Patient Goals   Patient Goals : not stated       Education         Therapy Time   Individual Concurrent Group Co-treatment   Time In 0930         Time Out 1015         Minutes 45            Timed Code Treatment Minutes:  45    Total Treatment Minutes:  Suzy 115, [id number]

## 2022-12-08 NOTE — PROGRESS NOTES
Speech Language Pathology  Facility/Department: [address] ICU  Dysphagia Daily Treatment Note    NAME: Rafaela Najjar  : 1955  MRN: [id number]    Patient Diagnosis(es):   Patient Active Problem List    Diagnosis Date Noted    S/P craniotomy 2022    Acoustic neuroma ([facility] [address] 2022    Cerebellopontine angle meningioma ([facility] [address] 2022    Osteoarthritis of right hip 2021    Morbid obesity with BMI of 50.0-59.9, adult ([address] 2021    Primary osteoarthritis of left knee 2015    Asthma 2015    Gastroesophageal reflux disease 2015    Adiposity 2015    S/P total knee arthroplasty 2015    Hypertension 2014     Allergies: Allergies   Allergen Reactions    Keflex [Cephalexin] Itching and Rash    Codeine Nausea And Vomiting    Tomato Rash     Onset Date: 2022    CXR (2022)-  Similar appearance of patchy airspace disease. Chart reviewed. Medical Diagnosis: Acoustic neuroma ([address] [D33.3]  Balance problem [R26.89]  Meningioma ([address] [D32.9]  Dizziness [R42]  Cerebellopontine angle meningioma ([facility] [address] [D32.0]   Treatment Diagnosis: Dysphagia    BSE Impression (2022)-  Dysphagia Impression : Severe oral stage dysphagia, with suspected pharyngeal component, needs further assessment. Patient awake but lethargic, on 10 L O2 via nc. On oral motor exam, patient with right sided weakness, with reduced lingual coordination and impaired labial seal. Xerostomia. Speech is dysarthric, some dysphonia. Trialed ice chips, thins via tsp/cup/straw, puree. Pt with positive oral acceptance of tsp trials, however immediate anterior loss for all thin liquid trials via tsp/cup, with patient unable to utilize straw. Suspect reduced A-P transit, with significant residue of puree (suctioned). Some laryngeal swallow movement perceived, voice remained dry, no overt signs of aspiration.  However given severity of oral dysfunction in conjunction with dysarthria, lethargy, and increased O2 requirements in setting of recent cerebellopontine angle meningioma surgery, suspect patient is a high aspiration (including silent aspiration) risk. Recommend continue NPO, alternative means nutrition/hydration, frequent oral hygiene, and ice chips / tsp h2o. Will continue to follow. Dysphagia Diagnosis: Severe oral stage dysphagia, Suspected needs further assessment    MBS results (12/02/2022)-  Oral Phase  Moderate dysfunction characterized by right-sided oral weakness with reduced labial seal and coordination, resulting in anterior loss of liquid via tsp and inability to use straw when placed on right or midline. For left-sided straw placement, pt able to create appropriate seal/suction. Prolonged mastication of soft solids, with slowed a-p transit, mild stasis. Pharyngeal Phase  Grossly WFL. Overall timely swallow initiation with appropriate hyolaryngeal mechanics. Single instance trace flash (self clearing) penetration of thins when straw was placed on right side. Otherwise good airway protection throughout with no aspiration. No significant stasis. Upper Esophageal Screen  Indirectly assessed; appeared unremarkable    Pain: none indicated by any means    Current Diet : ADULT DIET; Dysphagia - Minced and Moist  ADULT ORAL NUTRITION SUPPLEMENT; Lunch; Standard 4 oz Oral Supplement  ADULT ORAL NUTRITION SUPPLEMENT; Lunch; Frozen Oral Supplement   Recommended Form of Meds: Via alternative means of nutrition     Treatment:  Pt seen bedside to address the following goals:  Goal 1: Patient will participate in further assessment of swallow function as appropriate. 12/1: Patient seen bedside. Awake, and much more alert this am. Speech much clearer today vs yesterday. Asking for water/food. Improved oral control (able to use straw), with reduced labial loss, and apparent improved oral clearance of puree bolus. Recommend proceed with MBS. Patient agreeable. D/C Goal, met via repeat BSE and MBS. Goal 2: Patient/caregiver will demonstrate understanding of swallowing concerns/recommendations. 11/30: Educated pt to purpose of visit, s/s of aspiration, concern if aspiration occurs, swallow function, safety strategies (upright positioning, oral hygiene rationale), effortful swallow exercise, need for eventual instrumental assessment. Pt indicated some understanding, but suspect needs reinforcement. Cont goal  12/1: Patient educated to swallow function, MBS purpose/results, strategies (left sided placement), and diet recommendations (starting on puree vs soft solids to facilitate oral prep). Pt stated good comprehension. Continue to reinforce. Cont goal  12/2: reviewed results of yesterday's MBS, diet recommendations, left sided bolus placement, bolus size, positioning. Pt indicated comprehension. Cont goal  12/5: pt and family members educated to purpose of visit, reviewed results of MBS and strategies to improve safety of swallow. Educated to recommendation for diet advancement and instruction to notify staff if any s/s of aspiration or difficulty with mastication occurs. Explained will cont advancement as pt tolerates. All stated comprehension  Goal met, cont to ensure consistency  12/6: No family present. Pt educated to strategies (especially use of finger sweep which pt able to demonstrate at time of review as well as recall and demonstrate 5 minutes later). Pt able to demonstrate placement of food pr straw on the left with min to no cues. Pt indicated able to masticate ground sausage from breakfast  \"did ok\" but it may make stomach upset and reported that ground hamburger analyzed with this SLP at lunch was a little \"difficult to swallow\" and \"hard to move back\" but wants to continue to try with present diet. Educated to trial these foods requiring some mastication for practice but try to pick smoother items overall at this time.   D/W nursing re: possible introduction of [employer] and nursing to notify nutrition team re: that. Pt wants to keep strength up and get better. Cont goal  12/7: Pt seated upright in bed agreeable to be seen with part of AM meal. Pt demonstrated slowed, yet functional mastication of minced and moist solids and no overt s/s penetration/aspiration with either consistency. Pt reported intermittent sensation globus, however with \"dryer\" solids (pt pointing to dry minced and moist sausage). SLP instructed pt to order gravy with dry meats or use additional puree to provide moisture to solid consistency. Pt demonstrated understanding and agreement. Continue current diet level at this time. Cont. 12/8:  pt recalled instruction and demonstrated use of placing food on left side and use lingual sweep to check for pocketing. Reinforced use of all strategies and recommendation to cont current diet texture. Pt stated comprehension  Goal met, cont to ensure consistency    Goal 3: Patient will tolerate least restrictive diet without overt signs of aspiration or associated decline in respiratory status. 12/2: Reviewed chart and discussed with RN; pt NPO yesterday d/t respiratory status, however was advanced back to puree this am. With patient awake, alert, pleasant, cooperative, positioned up in bed, on 4.5 L O2 via nc, assessed tolerance thins via straw and puree; pt with good oral containment and straw use for left sided placement (ongoing right sided weakness), positive swallow movement, good oral clearance. Following large sip, patient with delayed cough, however not clearly related to swallow as was productive of sputum. No issues on subsequent trials, with cues for small bites/sips. Cont goal  12/5: RN with no concerns with swallowing, states lungs are clear. Pt agreeable to PO trials including pudding, thin liquids via straw and trials of soft solids. Pt consumed pudding and liquid with no overt signs of aspiration and no pocketing/oral residue noted.   Pt demonstrated prolonged but adequate mastication with soft solids, cleared oral cavity completely. Pt used lingual sweep independently  Recommend upgrade to minced and moist texture with use of lingual sweep and liquid wash to ensure clearance of oral cavity  Cont goal  12/6: RN reported lungs are clear. Pt given oral care with pt having some ground meat from breakfast on the right side between inner cheek and lower gum area which this  SLP cleared with oral swab. Pt analyzed with 1 bite of hamburger which pt was able to chew given extra time and swallow although pt reported somewhat difficult (pt indicated ground sausage was \"ok\" to chew this morning. 1-2 instances of mildly delayed cough with thins and/or sherbert (out of 10 plus trials). Pt educated and able to demonstrate finger sweep as a strategy. Cont goal  12/7: SLP expressed continued use of strategies and pt independently utilized/recalled all with 100% accuracy. Pt observed using throughout meal and expressed increased ease of use during evening meal yesterday, as well as this AM. Cont. 12/8: pt had consumed portion of breakfast prior to SLp entering room, stating she didn't feel well, RN present and aware. Pt agreeable to limited additional PO, including eggs and liquids. Pt independently placed eggs on left side and demonstrated prolonged but adequate mastication. Mild lingual residue noted, however used lingual sweep independently and cleared oral cavity completely. Pt consumed thin liquids via straw placed on left, with no overt signs of aspiration. No respiratory decline per chart review or concerns expressed per Rn. Recommend cont current diet texture.   Cont goal    Patient/Family/Caregiver Education:  See goal 2 above    Compensatory Strategies:  Upright as possible for all oral intake  Eat/Feed slowly  Check for pocketing of food on the Right  Alternate solids and liquids   straw and food placement on LEFT  Finger sweep during and after meals    Plan:  Continue dysphagia treatment with goals per plan of care. Diet Recommendations: Minced and moist / Thin Liquids via straw- assist feed as needed  -meds in puree  -oral care at end of meals  DC recommendation: ongoing treatment indicated  Treatment: 10  D/W nursing: [place]  Needs met prior to leaving room, call button in reach. Keron Workman M.S./[facility]-SLP #[id number]  Pg.  # [phone]  If patient is discharged prior to next treatment, this note will serve as the discharge summary

## 2022-12-08 NOTE — PLAN OF CARE
Problem: Pain  Goal: Verbalizes/displays adequate comfort level or baseline comfort level  12/7/2022 2027 by Peewee Naranjo RN  Outcome: Progressing     Problem: Safety - Adult  Goal: Free from fall injury  12/7/2022 2027 by Peewee Naranjo RN  Outcome: Progressing     Problem: Skin/Tissue Integrity  Goal: Absence of new skin breakdown  Description: 1. Monitor for areas of redness and/or skin breakdown  2. Assess vascular access sites hourly  3. Every 4-6 hours minimum:  Change oxygen saturation probe site  4. Every 4-6 hours:  If on nasal continuous positive airway pressure, respiratory therapy assess nares and determine need for appliance change or resting period. 12/7/2022 2027 by Peewee Naranjo RN  Outcome: Progressing     Problem: ABCDS Injury Assessment  Goal: Absence of physical injury  Outcome: Progressing     Problem: Nutrition Deficit:  Goal: Optimize nutritional status  Outcome: Progressing     Problem: Neurosensory - Adult  Goal: Absence of seizures  Outcome: Progressing  Flowsheets (Taken 12/7/2022 2000)  Absence of seizures: Monitor for seizure activity.   If seizure occurs, document type and location of movements and any associated apnea     Problem: Neurosensory - Adult  Goal: Remains free of injury related to seizures activity  Outcome: Progressing     Problem: Neurosensory - Adult  Goal: Achieves maximal functionality and self care  Outcome: Progressing     Problem: Respiratory - Adult  Goal: Achieves optimal ventilation and oxygenation  Outcome: Progressing  Flowsheets (Taken 12/7/2022 2000)  Achieves optimal ventilation and oxygenation: Assess for changes in respiratory status     Problem: Cardiovascular - Adult  Goal: Maintains optimal cardiac output and hemodynamic stability  Outcome: Progressing  Flowsheets (Taken 12/7/2022 2000)  Maintains optimal cardiac output and hemodynamic stability: Monitor blood pressure and heart rate     Problem: Cardiovascular - Adult  Goal: Absence of cardiac dysrhythmias or at baseline  Outcome: Progressing  Flowsheets (Taken 12/7/2022 2000)  Absence of cardiac dysrhythmias or at baseline: Monitor cardiac rate and rhythm     Problem: Skin/Tissue Integrity - Adult  Goal: Skin integrity remains intact  Outcome: Progressing  Flowsheets (Taken 12/7/2022 2000)  Skin Integrity Remains Intact: Monitor for areas of redness and/or skin breakdown     Problem: Skin/Tissue Integrity - Adult  Goal: Incisions, wounds, or drain sites healing without S/S of infection  Outcome: Progressing     Problem: Infection - Adult  Goal: Absence of infection during hospitalization  Outcome: Progressing  Flowsheets (Taken 12/7/2022 2000)  Absence of infection during hospitalization: Assess and monitor for signs and symptoms of infection     Problem: Metabolic/Fluid and Electrolytes - Adult  Goal: Hemodynamic stability and optimal renal function maintained  Outcome: Progressing  Flowsheets (Taken 12/7/2022 2000)  Hemodynamic stability and optimal renal function maintained: Monitor labs and assess for signs and symptoms of volume excess or deficit     Problem: Hematologic - Adult  Goal: Maintains hematologic stability  Outcome: Progressing  Flowsheets (Taken 12/7/2022 2000)  Maintains hematologic stability: Assess for signs and symptoms of bleeding or hemorrhage     Problem: Musculoskeletal - Adult  Goal: Return mobility to safest level of function  Recent Flowsheet Documentation  Taken 12/7/2022 2000 by Stephanie Pickard RN  Return Mobility to Safest Level of Function: Assess patient stability and activity tolerance for standing, transferring and ambulating with or without assistive devices     Problem: Metabolic/Fluid and Electrolytes - Adult  Goal: Electrolytes maintained within normal limits  Recent Flowsheet Documentation  Taken 12/7/2022 2000 by Stephanie Pickard RN  Electrolytes maintained within normal limits: Monitor labs and assess patient for signs and symptoms of electrolyte imbalances

## 2022-12-08 NOTE — CARE COORDINATION
Case management is following for discharge planning. Ms. Miguelina Heredia has been interested in going to acute rehab since she was admitted. Her first choice is [facility]. Therapy worked with her and have recommended ARU. A PMR consult is noted for today. If accepted, Ms. Miguelina Heredia has Traditional Medicare and will not need a pre-cert to transfer. She must have a negative COVID swab prior to discharge.     PT AM-PAC: 10 / 24 per last evaluation on: 12/8    OT AM-PAC: 15 / 24 per last evaluation on: 12/8    Andressa Nolan RN  [facility]  Case Management Department  [phone]

## 2022-12-08 NOTE — PLAN OF CARE
Problem: Chronic Conditions and Co-morbidities  Goal: Patient's chronic conditions and co-morbidity symptoms are monitored and maintained or improved  Outcome: Progressing  Flowsheets (Taken 12/8/2022 0800)  Care Plan - Patient's Chronic Conditions and Co-Morbidity Symptoms are Monitored and Maintained or Improved: Monitor and assess patient's chronic conditions and comorbid symptoms for stability, deterioration, or improvement     Problem: Discharge Planning  Goal: Discharge to home or other facility with appropriate resources  Outcome: Progressing  Flowsheets (Taken 12/8/2022 0800)  Discharge to home or other facility with appropriate resources: Identify barriers to discharge with patient and caregiver     Problem: Pain  Goal: Verbalizes/displays adequate comfort level or baseline comfort level  Outcome: Progressing     Problem: Safety - Adult  Goal: Free from fall injury  Outcome: Progressing     Problem: Skin/Tissue Integrity  Goal: Absence of new skin breakdown  Description: 1. Monitor for areas of redness and/or skin breakdown  2. Assess vascular access sites hourly  3. Every 4-6 hours minimum:  Change oxygen saturation probe site  4. Every 4-6 hours:  If on nasal continuous positive airway pressure, respiratory therapy assess nares and determine need for appliance change or resting period. Outcome: Progressing     Problem: ABCDS Injury Assessment  Goal: Absence of physical injury  Outcome: Progressing     Problem: Neurosensory - Adult  Goal: Absence of seizures  Recent Flowsheet Documentation  Taken 12/8/2022 0800 by Colt Flores RN  Absence of seizures:   Monitor for seizure activity.   If seizure occurs, document type and location of movements and any associated apnea   If seizure occurs, turn head to side and suction secretions as needed   Administer anticonvulsants as ordered  Goal: Remains free of injury related to seizures activity  Recent Flowsheet Documentation  Taken 12/8/2022 0800 by Aracelis Be RN  Remains free of injury related to seizure activity:   Maintain airway, patient safety  and administer oxygen as ordered   Monitor patient for seizure activity, document and report duration and description of seizure to Licensed Independent Practitioner  Goal: Achieves maximal functionality and self care  Recent Flowsheet Documentation  Taken 12/8/2022 0800 by Aracelis Be RN  Achieves maximal functionality and self care:   Monitor swallowing and airway patency with patient fatigue and changes in neurological status   Encourage and assist patient to increase activity and self care with guidance from physical therapy/occupational therapy     Problem: Cardiovascular - Adult  Goal: Maintains optimal cardiac output and hemodynamic stability  Recent Flowsheet Documentation  Taken 12/8/2022 0800 by Aracelis Be RN  Maintains optimal cardiac output and hemodynamic stability:   Monitor blood pressure and heart rate   Monitor urine output and notify Licensed Independent Practitioner for values outside of normal range  Goal: Absence of cardiac dysrhythmias or at baseline  Recent Flowsheet Documentation  Taken 12/8/2022 0800 by Aracelis Be RN  Absence of cardiac dysrhythmias or at baseline:   Monitor cardiac rate and rhythm   Assess for signs of decreased cardiac output     Problem: Musculoskeletal - Adult  Goal: Return mobility to safest level of function  Recent Flowsheet Documentation  Taken 12/8/2022 0800 by Aracelis Be RN  Return Mobility to Safest Level of Function:   Assess patient stability and activity tolerance for standing, transferring and ambulating with or without assistive devices   Assist with transfers and ambulation using safe patient handling equipment as needed  Goal: Maintain proper alignment of affected body part  Recent Flowsheet Documentation  Taken 12/8/2022 0800 by Aracelis Be RN  Maintain proper alignment of affected body part: Support and protect limb and body alignment per provider's orders     Problem: Gastrointestinal - Adult  Goal: Minimal or absence of nausea and vomiting  Recent Flowsheet Documentation  Taken 12/8/2022 0800 by Diallo Diaz RN  Minimal or absence of nausea and vomiting: Administer IV fluids as ordered to ensure adequate hydration  Goal: Maintains or returns to baseline bowel function  Recent Flowsheet Documentation  Taken 12/8/2022 0800 by Diallo Diaz RN  Maintains or returns to baseline bowel function:   Assess bowel function   Encourage oral fluids to ensure adequate hydration  Goal: Maintains adequate nutritional intake  Recent Flowsheet Documentation  Taken 12/8/2022 0800 by Diallo Diaz RN  Maintains adequate nutritional intake:   Monitor percentage of each meal consumed   Identify factors contributing to decreased intake, treat as appropriate     Problem: Genitourinary - Adult  Goal: Absence of urinary retention  Recent Flowsheet Documentation  Taken 12/8/2022 0800 by Diallo Diaz RN  Absence of urinary retention: Assess patients ability to void and empty bladder     Problem: Infection - Adult  Goal: Absence of infection at discharge  Recent Flowsheet Documentation  Taken 12/8/2022 0800 by Diallo Diaz RN  Absence of infection at discharge:   Assess and monitor for signs and symptoms of infection   Monitor lab/diagnostic results  Goal: Absence of infection during hospitalization  Recent Flowsheet Documentation  Taken 12/8/2022 0800 by Diallo Diaz RN  Absence of infection during hospitalization:   Assess and monitor for signs and symptoms of infection   Monitor all insertion sites i.e., indwelling lines, tubes and drains   Monitor lab/diagnostic results  Goal: Absence of fever/infection during anticipated neutropenic period  Recent Flowsheet Documentation  Taken 12/8/2022 0800 by Diallo Diaz RN  Absence of fever/infection during anticipated neutropenic period: Monitor white blood cell count     Problem: Metabolic/Fluid and Electrolytes - Adult  Goal: Electrolytes maintained within normal limits  Recent Flowsheet Documentation  Taken 12/8/2022 0800 by Meeta Denton RN  Electrolytes maintained within normal limits:   Monitor labs and assess patient for signs and symptoms of electrolyte imbalances   Administer electrolyte replacement as ordered  Goal: Hemodynamic stability and optimal renal function maintained  Recent Flowsheet Documentation  Taken 12/8/2022 0800 by Meeta Denton RN  Hemodynamic stability and optimal renal function maintained:   Monitor intake, output and patient weight   Monitor labs and assess for signs and symptoms of volume excess or deficit  Goal: Glucose maintained within prescribed range  Recent Flowsheet Documentation  Taken 12/8/2022 0800 by Meeta Denton RN  Glucose maintained within prescribed range:   Assess for signs and symptoms of hyperglycemia and hypoglycemia   Monitor blood glucose as ordered     Problem: Hematologic - Adult  Goal: Maintains hematologic stability  Recent Flowsheet Documentation  Taken 12/8/2022 0800 by Meeta Denton RN  Maintains hematologic stability: Assess for signs and symptoms of bleeding or hemorrhage

## 2022-12-08 NOTE — PROGRESS NOTES
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 See above for details. Additional smaller left anterior parafalcine extra-axial lesion, compatible with additional small meningioma. Recent CT Head: (2022)  1. Interval postsurgical changes from resection of right cerebellopontine angle mass with associated right temporal mastoid resection and frontotemporal craniotomy and fat graft placement. 2.  Thin hyperdensity along the fat graft may represent small amount of postsurgical blood products. There is also a small focus of subarachnoid hemorrhage along the right temporal lobe. 3.  Mild pneumocephalus and bilateral subgaleal fluid is also likely postsurgical     Chart reviewed. Pain: denies    Initial Assessment 22  Mild-moderate dysarthria characterized by blended word boundaries and imprecise articulation (~ 70% intelligbile short phrases in quiet environment). Benefits from slow pace and over-articulation strategies. Vocal quality clear and strong.  Patient able to follow simple directions and answer basic questions. No word finding difficulties noted at conversation level. Further assessment limited by patient fatigue. Recommend ongoing speech therapy to further assess and address. Medical diagnosis: Acoustic neuroma [facility]) [D33.3]  Balance problem [R26.89]  Meningioma ([facility] [D32.9]  Dizziness [R42]  Cerebellopontine angle meningioma ([facility] [D32.0]  Treatment diagnosis: dysarthria    Treatment:  Pt seen to address the following goals:  Goal 1: Patient will recall and implement strategies to improve speech clarity to 90% with min cues  12/5:  speech intelligibility is ~90%, however decreased in articulatory precision. pt educated to strategies for improved intelligibility and articulatory precision through verbal instruction and demonstration. Pt used strategies in structured sentence level tasks with min cues. In conversation pt required min-mod cues. Pt stated comprehension  Cont goal  12/6: Pt speech intelligibility was functional but articulatory precision of sounds (especially /s/, /sh/, and /b/) was mildly distorted with reduced awareness but able to correct given min-mod cues during rote tasks and conversation. Pt did self correct speech error in conversation x1 at end of session. Pt educated to exaggerate and open/move articulators well with speaking. Pt utilized even a slower rate following initial review. Cont goal  12/7: Pt able to recall all speech strategies given min cues. Pt demonstrated ~90% intelligibility in basic conversational tasks. Pt with increased use of over-articulation and breaking up 3-4 words at a time for clarity. Cont. 12/8: pt with good recall of all strategies for improved intelligibility of speech. Pt demonstrated use in structured sentence level tasks with min  cues, intelligibility 90%. In conversation, pt required mod cues to using pausing and over articulation. Cont goal    Goal 2: Patient will participate in ongoing assessment of cognitive-communication skills.   12/5: pt oriented, able to state reason for hospitalization. Pt and family report no changes in cognition. Will cont to assess  Cont goal  12/6:  Pt oriented x3. 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 Pt able to recall 2/3 words following a 5 minute delay (3/3 with choices). Pt able to complete all basic math for money and time concepts including leaving a tip with no errors and minimal to no delays. Pt reported feeling tired so session ended earlier. Cont goal  12/7: SLP provided pt with prescription label and asked pt questions related. Pt answered with 100% accuracy and no cues required. Pt expressed manages own finances/medications/ADLs at baseline with independence. Pt mentioned she does feel close to cognitive baseline. Cont. 12/8: pt provided solutions to everyday problems, demonstrating appropriate flexibility of thoughts. Pt completed money/time concept tasks with 90% accuracy. Pt c/o not feeling well, therefore did not proceed with additional tasks  Cont goal    Education:  Pt and family educated to purpose of visit and tasks presented and recommendation for ongoing treatment. All stated comprehension    Plan:  Continue speech/language therapy to address above goals, 3-5 x/week x LOS  DC recommendations: ongoing treatment indicated  D/W nursing: [facility]  Needs met prior to leaving room, call button in reach. Treatment time: 15 \" tx  Rosanna He M.S./[facility]-SLP #[id number]  Pg.  # [phone]  If patient is discharged prior to next treatment, this note will serve as the discharge summary

## 2022-12-08 NOTE — PROGRESS NOTES
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 Subjective:  Patient is doing overall well, reports no pain, denies nausea/vomiting/ Vertigo. Denies headaches. Pt seen and treated by PT/OT - Pt began to ambulate with walker earlier today. Objective:   Pt awake Looks fine without distress or dyspnea    AF, O2 93-94% nasal cannula 2L O2   Sitting up in bed. Facial nerve same HB - 5 no worsening. (Complete eye closure (might be passive) but asymmetry at rest) - no worsening. Mastoid wrap replaced (to red Velcro wrap), completely dry. Incision looks fine no evidence for leaking. Pseudomeningocele - same     Right neck ecchymotic is improving. Abdominal wound - well closed, no sign for hematoma, bleeding or infection. Lower extremities without evidence for DVT in palpation. Assessment:   1 day s/p resection of meningioma. 2 days s/p TL and MCF approach. Pt under SQ Heparin 5000 units 3 times/ day   Refresh artificial tears and eye moister chamber.         Plan:   - pt to continue Heparin   - artificial tears every 2 hours + eye patch  --Continue to see PT/OT ( Scheduled for inpatient REHAB.)   --Will keep an eye on mastoid dressing.   --SCDs and SQH  --Remainder of care per IM and NSGY teams  -Follow up BM

## 2022-12-08 NOTE — PROGRESS NOTES
Update 12/21: Pt transitioned to Xarelto. Spoke with NSY and pt can transfer to ARU tomorrow. Pt does not need a COVID test as she is asymptomatic and had a test during admission (12/10/22) and pt has D/C order. Discussed with CM, Chris Lewis, this date. Plan to admit 12/22. Update 12/20:  Admit to ARU when medically ready. Pt remains on Heparin gtt. Discussed with NP and CM this date. Update 12/19:  Neurosurgery note states \"DC when medically stable\". Pt remains on Heparin gtt. Admit when cleared for DC   Update 12/16: - Not yet medically ready. Will continue to follow. Update 12/15 - Not yet medically ready. Will continue to follow. Concepcion Urias M.A., [id number] [facility]   Clinical Liaison- [facility] [place]   (R): [phone]    Updated 12/14/22 - Pt is not yet medically ready for ARU. Dr. Geeta Hayward is following progress. Updated 12/13/22 - Pt is not yet medically ready for ARU. Dr. Geeta Hayward is following progress. Updated 12/12/22 - Pt is not yet medically ready for ARU. Dr. Geeta Hayward is following progress. 12/11 -  Pt currently being worked up for GI bleed per CM and consults/imaging ordered. Will HOLD on ARU admission until pt is stabilized and ready for DC. Updated 12/10 - Plan to admit to ARU Sunday 12/11 after negative COVID test and DC orders are placed. Notified CM via voicemail. [facility]   After review, this patient is felt to be:       []  Appropriate for Acute Inpatient Rehab    []  Appropriate for Acute Inpatient Rehab Pending Insurance Authorization    []  Not appropriate for Acute Inpatient Rehab    [x]  Referral received and ARU reviewing patient; Evaluation ongoing. Will notify DCP with further updates. Thank you for the referral.    Kate Domínguez RN, BSN.   ARU Clinical Liaison  [facility] [place]  Phone: [phone]  Fax: [phone]

## 2022-12-08 NOTE — PROGRESS NOTES
NEUROSURGERY POST-OP PROGRESS NOTE    Patient Name: Estuardo Duran YOB: 1955   Sex: Female Age: 79 yrs     Medical Record Number: [id number] Acct Number: [de-identified]   Room Number: 8227/6653-99 Hospital Day: Hospital Day: 11     Interval History:  Post-operative Day# 9 s/p Procedure(s) (LRB):  STAGE II: RESECTION OF RIGHT PETROCLIVAL MENINGIOMA (N/A)    Subjective: Pt awake and feeling good. She has her Lenoir dressing on. Incision dry and intact. Objective:    VITAL SIGNS   /66   Pulse 68   Temp 98.6 °F (37 °C)   Resp 23   Ht 5' 5\" (1.651 m)   Wt (!) 348 lb 9.6 oz (158.1 kg)   SpO2 (!) 89%   BMI 58.01 kg/m²    Height Height: 5' 5\" (165.1 cm)   Weight Weight: (!) 348 lb 9.6 oz (158.1 kg)          Allergies Allergies   Allergen Reactions    Keflex [Cephalexin] Itching and Rash    Codeine Nausea And Vomiting    Tomato Rash      NPO Status ADULT DIET; Dysphagia - Minced and Moist  ADULT ORAL NUTRITION SUPPLEMENT; Lunch; Standard 4 oz Oral Supplement  ADULT ORAL NUTRITION SUPPLEMENT; Lunch; Frozen Oral Supplement   Isolation No active isolations     LABS   Basic Metabolic Profile Recent Labs     12/06/22  0500 12/07/22  0450 12/08/22  0530    139 137    101 100   CO2 34* 33* 32   BUN 19 15 13   CREATININE <0.5* <0.5* <0.5*   GLUCOSE 189* 157* 184*   MG 2.00 2.00  --       Complete Blood Count Recent Labs     12/06/22  0500 12/07/22  0450 12/08/22  0530   WBC 8.0 7.8 7.0   RBC 3.45* 3.48* 3.38*      Coagulation Studies No results for input(s): PTT, INR in the last 72 hours.     Invalid input(s): PLATELETS, PROA, PT, PTTA     MEDICATIONS   Inpatient Medications     acyclovir, 400 mg, Oral, TID    insulin lispro, 0-16 Units, SubCUTAneous, TID WC    insulin lispro, 0-4 Units, SubCUTAneous, Nightly    polyvinyl alcohol, 2 drop, Right Eye, Q2H    levETIRAcetam, 500 mg, Oral, BID    methIMAzole, 10 mg, Oral, Daily    insulin glargine, 15 Units, SubCUTAneous, Nightly    melatonin, 5 mg, Oral, Nightly    sodium chloride flush, 5-40 mL, IntraVENous, 2 times per day    furosemide, 40 mg, IntraVENous, Daily    heparin (porcine), 5,000 Units, SubCUTAneous, 3 times per day    polyethylene glycol, 17 g, Oral, Daily    tetrahydrozoline, 1 drop, Both Eyes, TID    carvedilol, 12.5 mg, Oral, BID WC    losartan, 100 mg, Oral, Daily    sodium chloride flush, 5-40 mL, IntraVENous, 2 times per day    sennosides-docusate sodium, 1 tablet, Oral, BID    latanoprost, 1 drop, Both Eyes, Nightly   Infusions    sodium chloride      sodium chloride      dextrose        Antibiotics   Recent Abx Admin                     acyclovir (ZOVIRAX) capsule 400 mg (mg) 400 mg Given 12/08/22 0900     400 mg Given 12/07/22 2046     400 mg Given  1447                     Neurologic Exam:  Mental status: awake and alert and oriented x4    Cranial Nerves:  II: Visual acuity not tested, visual fields full, denies new visual changes / diplopia  III, IV, VI: PERRL, 3 mm bilaterally, EOMI, no nystagmus noted  V: Facial sensation intact bilaterally to touch  VII: Face with R sided weakness, HB3 with full eye closure  VIII: Hearing intact bilaterally to spoken voice  IX: Palate movement equal bilaterally  XI: Shoulder shrug equal bilaterally  XII: Tongue midline       Musculoskeletal:   Gait: Not tested   Tone: normal  Sensory: intact to all extremities  Motor strength:    Right  Left    Right  Left    Deltoid  5 5  Hip Flex  5 5   Biceps  5 5  Knee Extensors  5 5   Triceps  5 5  Knee Flexors  5 5   Wrist Ext  5 5  Ankle Dorsiflex. 5 5   Wrist Flex  5 5  Ankle Plantarflex.   5 5   Handgrip  5 5  Ext Ernie Longus  5 5   Thumb Ext  5 5         Incision: intact, clean and dry      Respiratory:  Unlabored respiratory pattern    Abdomen:   Soft, ND   Last BM12/2    Cardiovascular:  Warm, well perfused    Assessment   Patient is a 78 yo F s/p Procedure(s) (LRB):  STAGE II: RESECTION OF RIGHT PETROCLIVAL MENINGIOMA (N/A) per Dr. Nav Alfonso:  Neurologic exam frequency:q4  Mobility:PT/OT   SLP continue  PICC line  DVT Prophylaxis: SCDs and heparin  Keppra as ordered  Bowel Regimen: senna and glycolax and dulcolax  Pain control:alex   Nicardipine, keep sbp < 160  Incisional Care:starla dressing in place   Acyclovir for 10 days  Post op MRI today  Dispo Planning:can go to ARU when bed available and precert done. Patient was seen with Dr. Unique Castaneda who agrees with above assessment and plan. Electronically signed by: ELE Montoya CNP, 12/8/2022 9:50 AM   Neurosurgery Nurse Practitioner  [phone] I spent 30 minutes in the care of this patient.   Over 50% of that time was in face-to-face counseling regarding post op progression, pain management strategies, and answering patient and family questions

## 2022-12-08 NOTE — PROGRESS NOTES
Occupational Therapy  Facility/Department: [facility] ICU  Daily Treatment Note  NAME: Galileo Tong  : 1955  MRN: [id number]    Date of Service: 2022    Discharge Recommendations: Galileo Tong scored a 15/24 on the AM-PAC ADL Inpatient form. Current research shows that an AM-PAC score of 17 or less is typically not associated with a discharge to the patient's home setting. Based on the patient's AM-PAC score and their current ADL deficits, it is recommended that the patient have 5-7 sessions per week of Occupational Therapy at d/c to increase the patient's independence. At this time, this patient demonstrates complex nursing, medical, and rehabilitative needs, and would benefit from intensive rehabilitation services upon discharge from the Inpatient setting. This patient demonstrates the ability to participate in and benefit from an intensive therapy program with a coordinated interdisciplinary team approach to foster frequent, structured, and documented communication among disciplines, who will work together to establish, prioritize, and achieve treatment goals. Please see assessment section for further patient specific details. If patient discharges prior to next session this note will serve as a discharge summary. Please see below for the latest assessment towards goals. OT Equipment Recommendations  Equipment Needed: No  Other: defer to next level of care    Patient Diagnosis(es): Diagnoses of Acoustic neuroma [facility]), Balance problem, Meningioma ([facility] [address] and Dizziness were pertinent to this visit. Assessment    Assessment: Pt tolerated treatment well with encouragement, and continues to make progress towards goals. Pt stood up to RW with Mod+ Min and pivoted to [facility] . Pt ambulated to the chair with Min x2. Pt performed UB dressing with Min A, toileting and UB bathing with Mod A, and LB dressing with Max A.  Pt is limited by decreased activity tolerance, however, continues to show she can tolerate 3 hours/day of therapy. Recommend continued inpatient OT services at [facility] to promote return to her previous high level of functioning and independence. Will continue to follow on OT POC. Activity Tolerance: Patient tolerated treatment well;Patient limited by fatigue;Patient limited by endurance  Equipment Needed: No  Other: defer to next level of care      Plan   Occupational Therapy Plan  Times Per Week: 5-7  Times Per Day: Once a day  Current Treatment Recommendations: Strengthening;ROM;Cognitive reorientation;Balance training;Pain management;Self-Care / ADL; Functional mobility training; Safety education & training;Home management training; Endurance training;Neuromuscular re-education;Positioning     Restrictions   Up with assist; HOB elevated 30 degrees    Subjective   Subjective  Subjective: Pt met reclined in bed, pleasant and agreeable to therpy with encouragement. Pt endorses mild dizziness and nausea  Pain: Pt reports \"just the usual pain in my back\"  Orientation  Orientation Level: Oriented X4  Cognition  Overall Cognitive Status: Exceptions  Arousal/Alertness: Delayed responses to stimuli  Following Commands:  Follows one step commands consistently  Attention Span: Attends with cues to redirect  Safety Judgement: Decreased awareness of need for safety;Decreased awareness of need for assistance  Problem Solving: Assistance required to identify errors made;Assistance required to generate solutions;Assistance required to correct errors made;Assistance required to implement solutions  Insights: Decreased awareness of deficits  Initiation: Requires cues for some  Sequencing: Requires cues for some        Objective     Bed Mobility Training  Bed Mobility Training: Yes  Supine to Sit: Contact-guard assistance (HOB elevated, slow and effortful' pt reporting dizziness with position changes, \"I'm just going to take it slow\")  Scooting: Stand-by assistance (back in chair)    Balance  Sitting: Intact (SBA at EOB, on commode ~4 mins)  Sitting - Static: Good (unsupported)  Sitting - Dynamic: Fair (occasional)  Standing: With support (BUE support via RW, Min A)    Transfer Training  Transfer Training: Yes  Overall Level of Assistance: Assist X2;Minimum assistance; Moderate assistance  Interventions: Verbal cues  Sit to Stand: Assist X2;Minimum assistance; Moderate assistance (from EOB and commode up to RW)  Stand to Sit: Minimum assistance;Assist X2  Bed to Chair: Adaptive equipment;Minimum assistance;Assist X2;Additional time (RW, Min x2, ~4 feet to chair. slow and effortful)  Toilet Transfer: Moderate assistance;Minimum assistance;Assist X2 (from EOB to [facility], stand pivot with RW)    Gait  Overall Level of Assistance: Minimum assistance;Assist X2;Additional time; Adaptive equipment (with RW and Min x2)  Speed/Bev: Slow;Shuffled  Distance (ft):  (4)  Assistive Device: Walker, rolling;Gait belt     ADL  UE Bathing: Moderate assistance  UE Bathing Skilled Clinical Factors: Pt seated on commode, assist to wipe back and underarms with wash cloth  UE Dressing: Minimal assistance  UE Dressing Skilled Clinical Factors: to exchange gowns seated EOB  LE Dressing: Maximum assistance  LE Dressing Skilled Clinical Factors: to don socks at bed level; tabbed brief in standing  Toileting: Moderate assistance  Toileting Skilled Clinical Factors: pt performed front cas-care, assist with brief management    Safety Devices  Type of Devices: Patient at risk for falls; Left in chair;Call light within reach; Chair alarm in place;Gait belt;Nurse notified; Heels elevated for pressure relief  Restraints  Restraints Initially in Place: No     Patient Education  Education Given To: Patient  Education Provided: Plan of Care;Transfer Training  Education Method: Verbal  Barriers to Learning: Cognition  Education Outcome: Verbalized understanding;Continued education needed    Goals  Short Term Goals  Time Frame for Short Term Goals: by discharge  Short Term Goal 1: Pt will perform bed mobility with CGA to decrease caregiver burden- goal met 12/2. NEW GOAL: Pt will perform bed mobility with supervision- not met  Short Term Goal 2: Pt will sit EOB 5 mins with no more than CGA- goal met 12/8 on BSC. NEW GOAL: sit unsupported 5 mins with Supervision  Short Term Goal 3: Pt will perform grooming activity with Min A and set-up- goal met 12/1. New Goal: Pt will perform grooming ax with set-up and supervision- not met  Short Term Goal 4: Pt will tolerate sit to stand transfer- goal met 12/1.  NEW GOAL: sit to stand transfer with Mod x2- goal met 12/2 with Min +CGA and CGA x2; NEW GOAL: stance x2 mins with CGA- not met  Patient Goals   Patient goals : not stated       Therapy Time   Individual Concurrent Group Co-treatment   Time In 0934         Time Out 1017         Minutes 43         Timed Code Treatment Minutes: [address] [facility], S/OT

## 2022-12-09 ENCOUNTER — APPOINTMENT (OUTPATIENT)
Dept: MRI IMAGING | Age: 67
DRG: 025 | End: 2022-12-09
Attending: NEUROLOGICAL SURGERY
Payer: MEDICARE

## 2022-12-09 LAB
BASOPHILS ABSOLUTE: 0 K/UL (ref 0–0.2)
BASOPHILS RELATIVE PERCENT: 0.2 %
EOSINOPHILS ABSOLUTE: 0.2 K/UL (ref 0–0.6)
EOSINOPHILS RELATIVE PERCENT: 2.6 %
GLUCOSE BLD-MCNC: 197 MG/DL (ref 70–99)
GLUCOSE BLD-MCNC: 197 MG/DL (ref 70–99)
GLUCOSE BLD-MCNC: 211 MG/DL (ref 70–99)
GLUCOSE BLD-MCNC: 232 MG/DL (ref 70–99)
HCT VFR BLD CALC: 31.4 % (ref 36–48)
HEMOGLOBIN: 10.6 G/DL (ref 12–16)
LYMPHOCYTES ABSOLUTE: 0.8 K/UL (ref 1–5.1)
LYMPHOCYTES RELATIVE PERCENT: 8.8 %
MCH RBC QN AUTO: 30.9 PG (ref 26–34)
MCHC RBC AUTO-ENTMCNC: 33.7 G/DL (ref 31–36)
MCV RBC AUTO: 91.7 FL (ref 80–100)
MONOCYTES ABSOLUTE: 0.8 K/UL (ref 0–1.3)
MONOCYTES RELATIVE PERCENT: 8.8 %
NEUTROPHILS ABSOLUTE: 6.8 K/UL (ref 1.7–7.7)
NEUTROPHILS RELATIVE PERCENT: 79.6 %
PDW BLD-RTO: 15.1 % (ref 12.4–15.4)
PERFORMED ON: ABNORMAL
PLATELET # BLD: 163 K/UL (ref 135–450)
PMV BLD AUTO: 8.5 FL (ref 5–10.5)
RBC # BLD: 3.43 M/UL (ref 4–5.2)
WBC # BLD: 8.6 K/UL (ref 4–11)

## 2022-12-09 PROCEDURE — 1200000000 HC SEMI PRIVATE

## 2022-12-09 PROCEDURE — 92526 ORAL FUNCTION THERAPY: CPT

## 2022-12-09 PROCEDURE — 6370000000 HC RX 637 (ALT 250 FOR IP)

## 2022-12-09 PROCEDURE — 6370000000 HC RX 637 (ALT 250 FOR IP): Performed by: NURSE PRACTITIONER

## 2022-12-09 PROCEDURE — 6360000002 HC RX W HCPCS

## 2022-12-09 PROCEDURE — 94761 N-INVAS EAR/PLS OXIMETRY MLT: CPT

## 2022-12-09 PROCEDURE — 85025 COMPLETE CBC W/AUTO DIFF WBC: CPT

## 2022-12-09 PROCEDURE — 2700000000 HC OXYGEN THERAPY PER DAY

## 2022-12-09 PROCEDURE — 70553 MRI BRAIN STEM W/O & W/DYE: CPT

## 2022-12-09 PROCEDURE — 2580000003 HC RX 258

## 2022-12-09 PROCEDURE — 6360000004 HC RX CONTRAST MEDICATION: Performed by: NURSE PRACTITIONER

## 2022-12-09 PROCEDURE — 99024 POSTOP FOLLOW-UP VISIT: CPT | Performed by: NURSE PRACTITIONER

## 2022-12-09 PROCEDURE — 92507 TX SP LANG VOICE COMM INDIV: CPT

## 2022-12-09 PROCEDURE — A9576 INJ PROHANCE MULTIPACK: HCPCS | Performed by: NURSE PRACTITIONER

## 2022-12-09 RX ORDER — CETIRIZINE HYDROCHLORIDE 10 MG/1
10 TABLET ORAL ONCE
Status: COMPLETED | OUTPATIENT
Start: 2022-12-09 | End: 2022-12-09

## 2022-12-09 RX ADMIN — HEPARIN SODIUM 5000 UNITS: 5000 INJECTION INTRAVENOUS; SUBCUTANEOUS at 22:52

## 2022-12-09 RX ADMIN — POLYVINYL ALCOHOL 2 DROP: 14 SOLUTION/ DROPS OPHTHALMIC at 08:23

## 2022-12-09 RX ADMIN — TETRAHYDROZOLINE HCL 0.05% 1 DROP: 0.05 SOLUTION/ DROPS INTRAOCULAR at 16:52

## 2022-12-09 RX ADMIN — ACYCLOVIR 400 MG: 200 CAPSULE ORAL at 13:30

## 2022-12-09 RX ADMIN — ACYCLOVIR 400 MG: 200 CAPSULE ORAL at 08:22

## 2022-12-09 RX ADMIN — LATANOPROST 1 DROP: 50 SOLUTION OPHTHALMIC at 20:57

## 2022-12-09 RX ADMIN — POLYVINYL ALCOHOL 2 DROP: 14 SOLUTION/ DROPS OPHTHALMIC at 17:49

## 2022-12-09 RX ADMIN — CARVEDILOL 12.5 MG: 6.25 TABLET, FILM COATED ORAL at 16:51

## 2022-12-09 RX ADMIN — TETRAHYDROZOLINE HCL 0.05% 1 DROP: 0.05 SOLUTION/ DROPS INTRAOCULAR at 10:47

## 2022-12-09 RX ADMIN — CETIRIZINE HYDROCHLORIDE 10 MG: 10 TABLET, FILM COATED ORAL at 22:53

## 2022-12-09 RX ADMIN — Medication 5 MG: at 20:55

## 2022-12-09 RX ADMIN — METHIMAZOLE 10 MG: 5 TABLET ORAL at 08:22

## 2022-12-09 RX ADMIN — SODIUM CHLORIDE, PRESERVATIVE FREE 10 ML: 5 INJECTION INTRAVENOUS at 20:55

## 2022-12-09 RX ADMIN — OXYCODONE 10 MG: 5 TABLET ORAL at 13:29

## 2022-12-09 RX ADMIN — CARVEDILOL 12.5 MG: 6.25 TABLET, FILM COATED ORAL at 08:21

## 2022-12-09 RX ADMIN — POLYVINYL ALCOHOL 2 DROP: 14 SOLUTION/ DROPS OPHTHALMIC at 12:29

## 2022-12-09 RX ADMIN — TETRAHYDROZOLINE HCL 0.05% 1 DROP: 0.05 SOLUTION/ DROPS INTRAOCULAR at 20:56

## 2022-12-09 RX ADMIN — POLYVINYL ALCOHOL 2 DROP: 14 SOLUTION/ DROPS OPHTHALMIC at 13:32

## 2022-12-09 RX ADMIN — POLYVINYL ALCOHOL 2 DROP: 14 SOLUTION/ DROPS OPHTHALMIC at 22:53

## 2022-12-09 RX ADMIN — SODIUM CHLORIDE, PRESERVATIVE FREE 10 ML: 5 INJECTION INTRAVENOUS at 08:23

## 2022-12-09 RX ADMIN — LEVETIRACETAM 500 MG: 500 TABLET, FILM COATED ORAL at 08:22

## 2022-12-09 RX ADMIN — GADOTERIDOL 20 ML: 279.3 INJECTION, SOLUTION INTRAVENOUS at 02:19

## 2022-12-09 RX ADMIN — HEPARIN SODIUM 5000 UNITS: 5000 INJECTION INTRAVENOUS; SUBCUTANEOUS at 13:29

## 2022-12-09 RX ADMIN — POLYVINYL ALCOHOL 2 DROP: 14 SOLUTION/ DROPS OPHTHALMIC at 04:00

## 2022-12-09 RX ADMIN — POLYVINYL ALCOHOL 2 DROP: 14 SOLUTION/ DROPS OPHTHALMIC at 05:15

## 2022-12-09 RX ADMIN — HEPARIN SODIUM 5000 UNITS: 5000 INJECTION INTRAVENOUS; SUBCUTANEOUS at 05:14

## 2022-12-09 RX ADMIN — INSULIN LISPRO 4 UNITS: 100 INJECTION, SOLUTION INTRAVENOUS; SUBCUTANEOUS at 17:00

## 2022-12-09 RX ADMIN — ACYCLOVIR 400 MG: 200 CAPSULE ORAL at 20:55

## 2022-12-09 RX ADMIN — POLYVINYL ALCOHOL 2 DROP: 14 SOLUTION/ DROPS OPHTHALMIC at 16:52

## 2022-12-09 RX ADMIN — POLYVINYL ALCOHOL 2 DROP: 14 SOLUTION/ DROPS OPHTHALMIC at 10:47

## 2022-12-09 RX ADMIN — INSULIN GLARGINE 15 UNITS: 100 INJECTION, SOLUTION SUBCUTANEOUS at 21:32

## 2022-12-09 RX ADMIN — FUROSEMIDE 40 MG: 10 INJECTION, SOLUTION INTRAMUSCULAR; INTRAVENOUS at 08:21

## 2022-12-09 RX ADMIN — SENNOSIDES AND DOCUSATE SODIUM 1 TABLET: 50; 8.6 TABLET ORAL at 20:55

## 2022-12-09 RX ADMIN — LEVETIRACETAM 500 MG: 500 TABLET, FILM COATED ORAL at 20:55

## 2022-12-09 RX ADMIN — POLYETHYLENE GLYCOL 3350 17 G: 17 POWDER, FOR SOLUTION ORAL at 08:21

## 2022-12-09 RX ADMIN — POLYVINYL ALCOHOL 2 DROP: 14 SOLUTION/ DROPS OPHTHALMIC at 20:15

## 2022-12-09 RX ADMIN — SENNOSIDES AND DOCUSATE SODIUM 1 TABLET: 50; 8.6 TABLET ORAL at 08:22

## 2022-12-09 RX ADMIN — LOSARTAN POTASSIUM 100 MG: 100 TABLET, FILM COATED ORAL at 08:22

## 2022-12-09 RX ADMIN — OXYCODONE 5 MG: 5 TABLET ORAL at 05:15

## 2022-12-09 RX ADMIN — SODIUM CHLORIDE, PRESERVATIVE FREE 10 ML: 5 INJECTION INTRAVENOUS at 20:57

## 2022-12-09 ASSESSMENT — PAIN DESCRIPTION - ORIENTATION: ORIENTATION: MID

## 2022-12-09 ASSESSMENT — PAIN SCALES - GENERAL
PAINLEVEL_OUTOF10: 0
PAINLEVEL_OUTOF10: 9
PAINLEVEL_OUTOF10: 6

## 2022-12-09 ASSESSMENT — PAIN DESCRIPTION - LOCATION
LOCATION: BACK
LOCATION: BACK

## 2022-12-09 ASSESSMENT — PAIN DESCRIPTION - DESCRIPTORS: DESCRIPTORS: ACHING

## 2022-12-09 ASSESSMENT — PAIN DESCRIPTION - PAIN TYPE: TYPE: ACUTE PAIN

## 2022-12-09 NOTE — CONSULTS
Consult Note  Physical Medicine and Rehabilitation    Patient: Joyce Huynh  [id number]  Date: 12/9/2022      Chief Complaint: Meningioma    History of Present Illness/Hospital Course:  Patient is a morbidly obese (Body mass index is 50.82 kg/m².), 79 y.o. female  with c/o dizziness, HA and vertigo in the setting of large right petroclival mass which is consistent with meningioma. . She presented to the hospital for a scheduled two staged petroclival meningioma resection. 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 Prior Level of Function:  Independent for mobility, ADLs, and IADLs    Current Level of Function:  Min assist    Pertinent Social History:  Support: Lives alone  Home set-up: One level house with 2 steps to enter    Past Medical History:   Diagnosis Date    Arthritis     Asthma     mild    Brain tumor ([address]     Diabetes mellitus ([address]     borderline    High cholesterol     Hypertension     Imbalance     DUE TO TUMOR- USING ANKLE BRACE / WALKER PER PREOP H&P    Loss of hearing     bilateral reported    Vertigo        Past Surgical History:   Procedure Laterality Date    CRANIOTOMY N/A 11/29/2022    STAGE II: RESECTION OF RIGHT PETROCLIVAL MENINGIOMA performed by Gordon Oliveira MD at [facility] Right 11/28/2022    RIGHT TRANSMASTOID / ANTERIOR MIDDLE CRANIAL FOSSA , ABDOMINAL FAT GRAFT EXTERNAL AUDITORY CANAL CLOSURE performed by Gretel Cabrera MD at [place] [address] Right 11/28/2022    STAGE 1, RIGHT TRANSLABYRINTHINE / RETROLABYRINTHINE CRANIOTOMY, PETROSECTOMY performed by Katheren Cooks.  Ishan Hess MD at [facility] Right 11/8/2021    RIGHT TOTAL HIP ARTHROPLASTY POSTERIOR APPROACH - [facility] performed by Gogo Del Real MD at [place] [address] Right 07/14/2015    TOTAL KNEE ARTHROPLASTY Left 10/14/15    TKA       Family History   Problem Relation Age of Onset Cancer Mother     Hypotension Mother     Cancer Father     Cancer Sister     Hypotension Sister     Cancer Maternal Grandmother     Cancer Maternal Grandfather     Cancer Paternal Grandmother     Cancer Paternal Grandfather     Hypotension Other     Cancer Other     Diabetes Other     Osteoarthritis Other        Social History     Socioeconomic History    Marital status:      Spouse name: None    Number of children: None    Years of education: None    Highest education level: None   Tobacco Use    Smoking status: Former    Smokeless tobacco: Never   Substance and Sexual Activity    Alcohol use: No    Drug use: Never           REVIEW OF SYSTEMS:   CONSTITUTIONAL: negative for fevers, chills   EYES: positive for diplopia   HEENT: positive for difficulty swallowing. negative for hearing loss, tinnitus, sinus pressure, nasal congestion  RESPIRATORY: Negative for SOB, cough  CARDIOVASCULAR: negative for chest pain, palpitations  GASTROINTESTINAL: negative for nausea, vomiting, diarrhea, abdominal pain  GENITOURINARY: negative for frequency, dysuria  HEMATOLOGIC/LYMPHATIC: negative for easy bruising, bleeding and lymphadenopathy  ENDOCRINE: negative for diabetic symptoms including polyuria, polydipsia  MUSCULOSKELETAL: negative for pain, joint swelling, decreased range of motion  NEUROLOGICAL: positive for difficulty speaking, negative for headaches, unilateral weakness    Physical Examination:  Vitals: Patient Vitals for the past 24 hrs:   BP Temp Temp src Pulse Resp SpO2   12/09/22 0740 (!) 141/71 -- -- 67 16 90 %   12/09/22 0600 -- -- -- 65 21 98 %   12/09/22 0515 (!) 146/68 98 °F (36.7 °C) Oral 65 18 92 %   12/09/22 0400 -- -- -- 66 17 98 %   12/09/22 0254 139/82 -- -- 67 16 92 %   12/09/22 0000 -- -- -- 61 18 97 %   12/08/22 2330 134/64 98 °F (36.7 °C) Oral 62 25 95 %   12/08/22 2200 -- -- -- 66 18 93 %   12/08/22 2000 (!) 151/64 98 °F (36.7 °C) Oral 68 20 95 %   12/08/22 1700 -- 98.4 °F (36.9 °C) Axillary 65 19 --   12/08/22 1600 (!) 142/58 -- -- 69 18 --   12/08/22 1500 -- -- -- 66 16 --   12/08/22 1400 (!) 125/57 98.5 °F (36.9 °C) Axillary 70 16 --   12/08/22 1300 -- -- -- 66 16 --   12/08/22 1200 127/63 -- -- 58 17 96 %   12/08/22 1100 -- -- -- 61 19 95 %   12/08/22 1000 -- -- -- 71 17 --   12/08/22 0913 (!) 206/66 -- -- 61 16 --     Const: Alert. WDWN. No distress  Eyes: Conjunctiva noninjected, no icterus noted; pupils equal, round, and reactive to light. HENT: Atraumatic, normocephalic; Oral mucosa moist  CV: Regular rate and rhythm, no murmur rub or gallop noted  Resp: Lungs clear to auscultation bilaterally, no rales wheezes or ronchi, no retractions. Respirations unlabored. GI: Soft, nontender, nondistended. Normal bowel sounds. Skin: Normal temperature and turgor. No rashes or breakdown noted. Ext: positive for BL LE edema   MSK: No joint tenderness, erythema, warmth noted. AROM intact. Neuro: Alert, oriented, appropriate. Mild R sided facial droop. Sensation intact to light touch. Motor examination reveals normal strength in all four limbs diffusely. Psych: Stable mood, normal judgement, normal affect     Lab Results   Component Value Date    WBC 8.6 12/09/2022    HGB 10.6 (L) 12/09/2022    HCT 31.4 (L) 12/09/2022    MCV 91.7 12/09/2022     12/09/2022     Lab Results   Component Value Date    INR 1.09 11/29/2022    INR 1.02 10/12/2021    INR 0.9 10/07/2015    PROTIME 14.1 11/29/2022    PROTIME 11.5 10/12/2021    PROTIME 12.3 10/07/2015     Lab Results   Component Value Date    CREATININE <0.5 (L) 12/08/2022    BUN 13 12/08/2022     12/08/2022    K 3.7 12/08/2022     12/08/2022    CO2 32 12/08/2022     Lab Results   Component Value Date    ALT 11 12/04/2022    AST 12 (L) 12/04/2022    ALKPHOS 48 12/04/2022    BILITOT 0.5 12/04/2022     On my review, CXR displays. CTA PULMONARY W CONTRAST   Final Result      Significantly limited study due to motion.       1. Significantly limited study due to streak artifact and suboptimal bolus. No evidence of a central embolus. 2. Multifocal airspace consolidation is present, suboptimally evaluated due to motion. This presumably reflects pneumonia. Follow-up chest CT is recommended to document resolution. 3. There is a large mass in the right hepatic lobe measuring 8.2 x 7.4 cm, demonstrating peripheral nodular enhancement. There is significant amount of artifact through this lesion. It is still favored to reflect a hemangioma. Recommend a multiphasic CT    of the abdomen for further assessment. This could be performed on a nonurgent basis, if clinically appropriate. 4. Partially calcified nodule arising from the left adrenal gland, most likely benign and possibly reflecting old adrenal hemorrhage. XR CHEST PORTABLE   Final Result      Similar appearance of patchy airspace disease. FL MODIFIED BARIUM SWALLOW W VIDEO   Final Result      1. Laryngeal penetration, but no evidence of tracheal aspiration. XR CHEST PORTABLE   Final Result      Patchy left upper lobe airspace disease, atelectasis versus pneumonia. CT HEAD WO CONTRAST   Final Result      1. Interval postsurgical changes from resection of right cerebellopontine angle mass with associated right temporal mastoid resection and frontotemporal craniotomy and fat graft placement. 2.  Thin hyperdensity along the fat graft may represent small amount of postsurgical blood products. There is also a small focus of subarachnoid hemorrhage along the right temporal lobe. 3.  Mild pneumocephalus and bilateral subgaleal fluid is also likely postsurgical.         MRI BRAIN W WO CONTRAST    (Results Pending)         Assessment:  1.  Meningioma  -s/p resection with TL and MCF approach  -Keppra 500 mg BID  -Bowel regiment: Senna, glycolax, and dulcolax  -Nicardipine-Roxicodone PRN  -Acycolvir 400 mg TID for 10 days  -Artifical tears q2H and eye patch  -Keep an eye on mastoid dressing  -SCDs and Heparin Subq for DVT prophylaxis   -PT/OT  2. HTN   -Coreg 12.5 mg BID  Losartan 100 mg daily  3. DM   -Lantus 15u qHS  -HDSSI  4. Hyperthyroidism   -Methimazole 10 mg daily    Impairments- Decreased functional mobility, Decreased ADLs    Recommendations:  Admit to ARU when bed available. Patient with new functional deficits and ongoing medical complexity. Demonstrates ability to tolerate 3 hours therapy/day and is a good candidate for acute inpatient rehab when medically appropriate. Thank you for this consult. Please contact me with any questions or concerns. Wilfrid Raymond DO, PGY-2  12/9/2022  9:05 AM       Our rehab unit is full at this time, but we will have a bed open up over the weekend, and we can plan to admit this patient on Sunday to our rehabilitation unit, on 12/11/2022. This patient has been seen, examined, and discussed with the resident. I was part of the key critical services provided to the patient. I agree with the residents documentation. This note may have been altered to reflect my own examination findings, impression, and recommendations. Bambi Posada.  MD Lluvia  PM&R  12/9/2022

## 2022-12-09 NOTE — PROGRESS NOTES
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 Subjective:  Patient is doing overall well, reports no pain, denies nausea/vomiting/ Vertigo. Denies headaches. Pt seen and treated by PT/OT - Pt  ambulates with walker. Objective:   Pt awake Looks fine without distress or dyspnea    AF, O2 93-94% nasal cannula 2L O2   Sitting up in bed. Facial nerve same HB - 5 no worsening. (Complete eye closure (might be passive) but asymmetry at rest) - no worsening. Mastoid wrap replaced (to red Velcro wrap), completely dry. Incision looks fine no evidence for leaking. Pseudomeningocele - same     Right neck ecchymotic is improving. Abdominal wound - well closed, no sign for hematoma, bleeding or infection. Lower extremities without evidence for DVT in palpation. MRI post op done 12/9/2022 scans were reviewed. No report yet. Assessment:   1 day s/p resection of meningioma. 2 days s/p TL and MCF approach. Pt under SQ Heparin 5000 units 3 times/ day   Refresh artificial tears and eye moister chamber.         Plan:   - pt to continue Heparin   - artificial tears every 2 hours + eye patch  --Continue to see PT/OT ( Scheduled for inpatient REHAB)   --Will keep an eye on mastoid dressing.   --SCDs and SQH  --Remainder of care per IM and NSGY teams  -Follow up BM

## 2022-12-09 NOTE — PLAN OF CARE
Problem: Pain  Goal: Verbalizes/displays adequate comfort level or baseline comfort level  12/9/2022 0140 by Kelsea Plummer RN  Outcome: Progressing     Problem: Safety - Adult  Goal: Free from fall injury  12/9/2022 0140 by Kelsea Plummer RN  Outcome: Progressing     Problem: Skin/Tissue Integrity  Goal: Absence of new skin breakdown  Description: 1. Monitor for areas of redness and/or skin breakdown  2. Assess vascular access sites hourly  3. Every 4-6 hours minimum:  Change oxygen saturation probe site  4. Every 4-6 hours:  If on nasal continuous positive airway pressure, respiratory therapy assess nares and determine need for appliance change or resting period.   12/9/2022 0140 by Kelsea Plummer RN  Outcome: Progressing     Problem: Nutrition Deficit:  Goal: Optimize nutritional status  Outcome: Progressing     Problem: Neurosensory - Adult  Goal: Absence of seizures  Outcome: Progressing     Problem: Respiratory - Adult  Goal: Achieves optimal ventilation and oxygenation  Outcome: Progressing     Problem: Cardiovascular - Adult  Goal: Maintains optimal cardiac output and hemodynamic stability  Outcome: Progressing  Flowsheets (Taken 12/8/2022 2000)  Maintains optimal cardiac output and hemodynamic stability: Monitor blood pressure and heart rate     Problem: Cardiovascular - Adult  Goal: Absence of cardiac dysrhythmias or at baseline  Outcome: Progressing     Problem: Skin/Tissue Integrity - Adult  Goal: Skin integrity remains intact  Outcome: Progressing  Flowsheets (Taken 12/8/2022 2000)  Skin Integrity Remains Intact: Monitor for areas of redness and/or skin breakdown     Problem: Skin/Tissue Integrity - Adult  Goal: Incisions, wounds, or drain sites healing without S/S of infection  Outcome: Progressing     Problem: Infection - Adult  Goal: Absence of infection during hospitalization  Outcome: Progressing     Problem: Metabolic/Fluid and Electrolytes - Adult  Goal: Glucose maintained within prescribed range  Outcome: Progressing     Problem: Hematologic - Adult  Goal: Maintains hematologic stability  Outcome: Progressing     Problem: Gastrointestinal - Adult  Goal: Maintains adequate nutritional intake  Recent Flowsheet Documentation  Taken 12/8/2022 2000 by Enid Nolasco RN  Maintains adequate nutritional intake: Monitor percentage of each meal consumed

## 2022-12-09 NOTE — CARE COORDINATION
Case management is following for discharge disposition. Dr. Candace Aragon has accepted. He will admit to [facility] on Sunday. A negative rapid COVID swab is needed.     Electronically signed by BOYD Lyons RN-[facility]  Case Management  [phone]

## 2022-12-09 NOTE — PROGRESS NOTES
Speech Language Pathology  Facility/Department: [facility]  Dysphagia Daily Treatment Note  Late entry for 1240    NAME: Martínez López  : 1955  MRN: [id number]    Patient Diagnosis(es):   Patient Active Problem List    Diagnosis Date Noted    S/P craniotomy 2022    Acoustic neuroma ([address] 2022    Cerebellopontine angle meningioma ([facility] [address] 2022    Osteoarthritis of right hip 2021    Morbid obesity with BMI of 50.0-59.9, adult ([address] 2021    Primary osteoarthritis of left knee 2015    Asthma 2015    Gastroesophageal reflux disease 2015    Adiposity 2015    S/P total knee arthroplasty 2015    Hypertension 2014     Allergies: Allergies   Allergen Reactions    Keflex [Cephalexin] Itching and Rash    Codeine Nausea And Vomiting    Tomato Rash     Onset Date: 2022    CXR (2022)-  Similar appearance of patchy airspace disease. Chart reviewed. Medical Diagnosis: Acoustic neuroma ([address] [D33.3]  Balance problem [R26.89]  Meningioma ([address] [D32.9]  Dizziness [R42]  Cerebellopontine angle meningioma ([address] [D32.0]   Treatment Diagnosis: Dysphagia    BSE Impression (2022)-  Dysphagia Impression : Severe oral stage dysphagia, with suspected pharyngeal component, needs further assessment. Patient awake but lethargic, on 10 L O2 via nc. On oral motor exam, patient with right sided weakness, with reduced lingual coordination and impaired labial seal. Xerostomia. Speech is dysarthric, some dysphonia. Trialed ice chips, thins via tsp/cup/straw, puree. Pt with positive oral acceptance of tsp trials, however immediate anterior loss for all thin liquid trials via tsp/cup, with patient unable to utilize straw. Suspect reduced A-P transit, with significant residue of puree (suctioned). Some laryngeal swallow movement perceived, voice remained dry, no overt signs of aspiration.  However given severity of oral dysfunction in conjunction with dysarthria, lethargy, and increased O2 requirements in setting of recent cerebellopontine angle meningioma surgery, suspect patient is a high aspiration (including silent aspiration) risk. Recommend continue NPO, alternative means nutrition/hydration, frequent oral hygiene, and ice chips / tsp h2o. Will continue to follow. Dysphagia Diagnosis: Severe oral stage dysphagia, Suspected needs further assessment    MBS results (12/02/2022)-  Oral Phase  Moderate dysfunction characterized by right-sided oral weakness with reduced labial seal and coordination, resulting in anterior loss of liquid via tsp and inability to use straw when placed on right or midline. For left-sided straw placement, pt able to create appropriate seal/suction. Prolonged mastication of soft solids, with slowed a-p transit, mild stasis. Pharyngeal Phase  Grossly WFL. Overall timely swallow initiation with appropriate hyolaryngeal mechanics. Single instance trace flash (self clearing) penetration of thins when straw was placed on right side. Otherwise good airway protection throughout with no aspiration. No significant stasis. Upper Esophageal Screen  Indirectly assessed; appeared unremarkable    Pain: none indicated by any means    Current Diet : ADULT DIET; Dysphagia - Minced and Moist  ADULT ORAL NUTRITION SUPPLEMENT; Lunch; Standard 4 oz Oral Supplement  ADULT ORAL NUTRITION SUPPLEMENT; Lunch; Frozen Oral Supplement   Recommended Form of Meds: Via alternative means of nutrition     Treatment:  Pt seen bedside to address the following goals:  Goal 1: Patient will participate in further assessment of swallow function as appropriate. 12/1: Patient seen bedside. Awake, and much more alert this am. Speech much clearer today vs yesterday. Asking for water/food. Improved oral control (able to use straw), with reduced labial loss, and apparent improved oral clearance of puree bolus. Recommend proceed with MBS. Patient agreeable.   D/C Goal, met via repeat BSE and MBS. Goal 2: Patient/caregiver will demonstrate understanding of swallowing concerns/recommendations. 11/30: Educated pt to purpose of visit, s/s of aspiration, concern if aspiration occurs, swallow function, safety strategies (upright positioning, oral hygiene rationale), effortful swallow exercise, need for eventual instrumental assessment. Pt indicated some understanding, but suspect needs reinforcement. Cont goal  12/1: Patient educated to swallow function, MBS purpose/results, strategies (left sided placement), and diet recommendations (starting on puree vs soft solids to facilitate oral prep). Pt stated good comprehension. Continue to reinforce. Cont goal  12/2: reviewed results of yesterday's MBS, diet recommendations, left sided bolus placement, bolus size, positioning. Pt indicated comprehension. Cont goal  12/5: pt and family members educated to purpose of visit, reviewed results of MBS and strategies to improve safety of swallow. Educated to recommendation for diet advancement and instruction to notify staff if any s/s of aspiration or difficulty with mastication occurs. Explained will cont advancement as pt tolerates. All stated comprehension  Goal met, cont to ensure consistency  12/6: No family present. Pt educated to strategies (especially use of finger sweep which pt able to demonstrate at time of review as well as recall and demonstrate 5 minutes later). Pt able to demonstrate placement of food pr straw on the left with min to no cues. Pt indicated able to masticate ground sausage from breakfast  \"did ok\" but it may make stomach upset and reported that ground hamburger analyzed with this SLP at lunch was a little \"difficult to swallow\" and \"hard to move back\" but wants to continue to try with present diet. Educated to trial these foods requiring some mastication for practice but try to pick smoother items overall at this time.   D/W nursing re: possible introduction of [employer] and nursing to notify nutrition team re: that. Pt wants to keep strength up and get better. Cont goal  12/7: Pt seated upright in bed agreeable to be seen with part of AM meal. Pt demonstrated slowed, yet functional mastication of minced and moist solids and no overt s/s penetration/aspiration with either consistency. Pt reported intermittent sensation globus, however with \"dryer\" solids (pt pointing to dry minced and moist sausage). SLP instructed pt to order gravy with dry meats or use additional puree to provide moisture to solid consistency. Pt demonstrated understanding and agreement. Continue current diet level at this time. Cont. 12/8:  pt recalled instruction and demonstrated use of placing food on left side and use lingual sweep to check for pocketing. Reinforced use of all strategies and recommendation to cont current diet texture. Pt stated comprehension  Goal met, cont to ensure consistency  12/9: pt recalled and demonstrated use of strategies for improved safety of swallow. Pt independently demonstrated use of lingual sweep and alternating soft solid and liquid wash. Goal met    Goal 3: Patient will tolerate least restrictive diet without overt signs of aspiration or associated decline in respiratory status. 12/2: Reviewed chart and discussed with RN; pt NPO yesterday d/t respiratory status, however was advanced back to puree this am. With patient awake, alert, pleasant, cooperative, positioned up in bed, on 4.5 L O2 via nc, assessed tolerance thins via straw and puree; pt with good oral containment and straw use for left sided placement (ongoing right sided weakness), positive swallow movement, good oral clearance. Following large sip, patient with delayed cough, however not clearly related to swallow as was productive of sputum. No issues on subsequent trials, with cues for small bites/sips. Cont goal  12/5: RN with no concerns with swallowing, states lungs are clear.    Pt agreeable to PO trials including pudding, thin liquids via straw and trials of soft solids. Pt consumed pudding and liquid with no overt signs of aspiration and no pocketing/oral residue noted. Pt demonstrated prolonged but adequate mastication with soft solids, cleared oral cavity completely. Pt used lingual sweep independently  Recommend upgrade to minced and moist texture with use of lingual sweep and liquid wash to ensure clearance of oral cavity  Cont goal  12/6: RN reported lungs are clear. Pt given oral care with pt having some ground meat from breakfast on the right side between inner cheek and lower gum area which this  SLP cleared with oral swab. Pt analyzed with 1 bite of hamburger which pt was able to chew given extra time and swallow although pt reported somewhat difficult (pt indicated ground sausage was \"ok\" to chew this morning. 1-2 instances of mildly delayed cough with thins and/or sherbert (out of 10 plus trials). Pt educated and able to demonstrate finger sweep as a strategy. Cont goal  12/7: SLP expressed continued use of strategies and pt independently utilized/recalled all with 100% accuracy. Pt observed using throughout meal and expressed increased ease of use during evening meal yesterday, as well as this AM. Cont. 12/8: pt had consumed portion of breakfast prior to SLp entering room, stating she didn't feel well, RN present and aware. Pt agreeable to limited additional PO, including eggs and liquids. Pt independently placed eggs on left side and demonstrated prolonged but adequate mastication. Mild lingual residue noted, however used lingual sweep independently and cleared oral cavity completely. Pt consumed thin liquids via straw placed on left, with no overt signs of aspiration. No respiratory decline per chart review or concerns expressed per Rn. Recommend cont current diet texture. Cont goal  12/9: pt sitting up in chair with lunch tray.   Pt demonstrated prolonged mastication with minced meats, very mild residue in right buccal cavity. Pt used lingual sweep and liquid wash to clear independently. No coughing/throat clear or change in voice occurred with liquids, taken via straw (placed on left). Pt fatigues easily and stated she wasn't feeling well. RN last session reported this occurs after pt has eaten a small amount. Therefore recommend cont current texture. Pt is agreeable to this   Cont goal    Patient/Family/Caregiver Education:  See goal 2 above    Compensatory Strategies:  Upright as possible for all oral intake  Eat/Feed slowly  Check for pocketing of food on the Right  Alternate solids and liquids   straw and food placement on LEFT  Finger sweep during and after meals    Plan:  Continue dysphagia treatment with goals per plan of care. Diet Recommendations: Cont Minced and moist / Thin Liquids via straw- assist feed as needed  -meds in puree  -oral care at end of meals  DC recommendation: ongoing treatment indicated  Treatment: 12  Needs met prior to leaving room, call button in reach. Kriss Eduardo M.S./[facility] #[id number]  Pg.  # [id number]  If patient is discharged prior to next treatment, this note will serve as the discharge summary

## 2022-12-09 NOTE — PLAN OF CARE
Problem: Chronic Conditions and Co-morbidities  Goal: Patient's chronic conditions and co-morbidity symptoms are monitored and maintained or improved  Outcome: Progressing  Flowsheets (Taken 12/9/2022 0800)  Care Plan - Patient's Chronic Conditions and Co-Morbidity Symptoms are Monitored and Maintained or Improved: Monitor and assess patient's chronic conditions and comorbid symptoms for stability, deterioration, or improvement     Problem: Discharge Planning  Goal: Discharge to home or other facility with appropriate resources  Outcome: Progressing  Flowsheets (Taken 12/9/2022 0800)  Discharge to home or other facility with appropriate resources: Identify barriers to discharge with patient and caregiver     Problem: Pain  Goal: Verbalizes/displays adequate comfort level or baseline comfort level  12/9/2022 1015 by Cayla Velásquez RN  Outcome: Progressing  Flowsheets (Taken 12/9/2022 0740)  Verbalizes/displays adequate comfort level or baseline comfort level: Encourage patient to monitor pain and request assistance     Problem: Safety - Adult  Goal: Free from fall injury  12/9/2022 1015 by Cayla Velásquez RN  Outcome: Progressing  Flowsheets (Taken 12/9/2022 0740)  Free From Fall Injury: Instruct family/caregiver on patient safety     Problem: Skin/Tissue Integrity  Goal: Absence of new skin breakdown  Description: 1. Monitor for areas of redness and/or skin breakdown  2. Assess vascular access sites hourly  3. Every 4-6 hours minimum:  Change oxygen saturation probe site  4. Every 4-6 hours:  If on nasal continuous positive airway pressure, respiratory therapy assess nares and determine need for appliance change or resting period.   12/9/2022 1015 by Cayla Velásquez RN  Outcome: Progressing     Problem: ABCDS Injury Assessment  Goal: Absence of physical injury  Outcome: Progressing  Flowsheets (Taken 12/9/2022 0740)  Absence of Physical Injury: Implement safety measures based on patient assessment     Problem: Nutrition Deficit:  Goal: Optimize nutritional status  12/9/2022 1015 by Connie Dunn RN  Outcome: Progressing     Problem: Neurosensory - Adult  Goal: Absence of seizures  12/9/2022 1015 by Connie Dunn RN  Outcome: Progressing  Flowsheets (Taken 12/9/2022 0800)  Absence of seizures: Monitor for seizure activity.   If seizure occurs, document type and location of movements and any associated apnea     Problem: Neurosensory - Adult  Goal: Remains free of injury related to seizures activity  Outcome: Progressing  Flowsheets (Taken 12/9/2022 0800)  Remains free of injury related to seizure activity: Maintain airway, patient safety  and administer oxygen as ordered     Problem: Neurosensory - Adult  Goal: Achieves maximal functionality and self care  Outcome: Progressing  Flowsheets (Taken 12/9/2022 0800)  Achieves maximal functionality and self care: Monitor swallowing and airway patency with patient fatigue and changes in neurological status     Problem: Respiratory - Adult  Goal: Achieves optimal ventilation and oxygenation  12/9/2022 1015 by Connie Dunn RN  Outcome: Progressing  Flowsheets (Taken 12/9/2022 0800)  Achieves optimal ventilation and oxygenation: Assess for changes in respiratory status     Problem: Cardiovascular - Adult  Goal: Maintains optimal cardiac output and hemodynamic stability  12/9/2022 1015 by Connie Dunn RN  Outcome: Progressing  Flowsheets (Taken 12/9/2022 0800)  Maintains optimal cardiac output and hemodynamic stability:   Monitor blood pressure and heart rate   Monitor urine output and notify Licensed Independent Practitioner for values outside of normal range     Problem: Cardiovascular - Adult  Goal: Absence of cardiac dysrhythmias or at baseline  12/9/2022 1015 by Connie Dunn RN  Outcome: Progressing  Flowsheets (Taken 12/9/2022 0800)  Absence of cardiac dysrhythmias or at baseline: Monitor cardiac rate and rhythm     Problem: Skin/Tissue Integrity - Adult  Goal: Skin integrity remains intact  12/9/2022 1015 by Cinthia Best RN  Outcome: Progressing  Flowsheets (Taken 12/9/2022 0800)  Skin Integrity Remains Intact: Monitor for areas of redness and/or skin breakdown     Problem: Skin/Tissue Integrity - Adult  Goal: Incisions, wounds, or drain sites healing without S/S of infection  12/9/2022 1015 by Cinthia Best RN  Outcome: Progressing  Flowsheets (Taken 12/9/2022 0800)  Incisions, Wounds, or Drain Sites Healing Without Sign and Symptoms of Infection:   TWICE DAILY: Assess and document skin integrity   TWICE DAILY: Assess and document dressing/incision, wound bed, drain sites and surrounding tissue     Problem: Skin/Tissue Integrity - Adult  Goal: Oral mucous membranes remain intact  12/9/2022 1015 by Cinthia Best RN  Outcome: Progressing  Flowsheets (Taken 12/9/2022 0800)  Oral Mucous Membranes Remain Intact: Assess oral mucosa and hygiene practices     Problem: Musculoskeletal - Adult  Goal: Return mobility to safest level of function  Outcome: Progressing  Flowsheets (Taken 12/9/2022 0800)  Return Mobility to Safest Level of Function:   Assess patient stability and activity tolerance for standing, transferring and ambulating with or without assistive devices   Assist with transfers and ambulation using safe patient handling equipment as needed     Problem: Musculoskeletal - Adult  Goal: Maintain proper alignment of affected body part  Outcome: Progressing  Flowsheets (Taken 12/9/2022 0800)  Maintain proper alignment of affected body part: Support and protect limb and body alignment per provider's orders     Problem: Musculoskeletal - Adult  Goal: Return ADL status to a safe level of function  Outcome: Progressing     Problem: Gastrointestinal - Adult  Goal: Minimal or absence of nausea and vomiting  Outcome: Progressing  Flowsheets (Taken 12/9/2022 0800)  Minimal or absence of nausea and vomiting: Administer IV fluids as ordered to ensure adequate hydration     Problem: Gastrointestinal - Adult  Goal: Maintains or returns to baseline bowel function  Outcome: Progressing  Flowsheets (Taken 12/9/2022 0800)  Maintains or returns to baseline bowel function: Assess bowel function     Problem: Gastrointestinal - Adult  Goal: Maintains adequate nutritional intake  Outcome: Progressing  Flowsheets (Taken 12/9/2022 0800)  Maintains adequate nutritional intake: Monitor percentage of each meal consumed     Problem: Genitourinary - Adult  Goal: Absence of urinary retention  Outcome: Progressing  Flowsheets (Taken 12/9/2022 0800)  Absence of urinary retention: Assess patients ability to void and empty bladder     Problem: Infection - Adult  Goal: Absence of infection at discharge  Outcome: Progressing  Flowsheets (Taken 12/9/2022 0800)  Absence of infection at discharge:   Assess and monitor for signs and symptoms of infection   Monitor lab/diagnostic results   Monitor all insertion sites i.e., indwelling lines, tubes and drains     Problem: Infection - Adult  Goal: Absence of infection during hospitalization  12/9/2022 1015 by Natalia Bear RN  Outcome: Progressing  Flowsheets (Taken 12/9/2022 0800)  Absence of infection during hospitalization:   Assess and monitor for signs and symptoms of infection   Monitor lab/diagnostic results   Monitor all insertion sites i.e., indwelling lines, tubes and drains     Problem: Infection - Adult  Goal: Absence of fever/infection during anticipated neutropenic period  Outcome: Progressing  Flowsheets (Taken 12/9/2022 0800)  Absence of fever/infection during anticipated neutropenic period: Monitor white blood cell count     Problem: Metabolic/Fluid and Electrolytes - Adult  Goal: Electrolytes maintained within normal limits  Outcome: Progressing  Flowsheets (Taken 12/9/2022 0800)  Electrolytes maintained within normal limits:   Monitor labs and assess patient for signs and symptoms of electrolyte imbalances   Administer electrolyte replacement as ordered     Problem: Metabolic/Fluid and Electrolytes - Adult  Goal: Hemodynamic stability and optimal renal function maintained  Outcome: Progressing  Flowsheets (Taken 12/9/2022 0800)  Hemodynamic stability and optimal renal function maintained: Monitor labs and assess for signs and symptoms of volume excess or deficit     Problem: Metabolic/Fluid and Electrolytes - Adult  Goal: Glucose maintained within prescribed range  12/9/2022 1015 by Harris Fraire RN  Outcome: Progressing  Flowsheets (Taken 12/9/2022 0800)  Glucose maintained within prescribed range:   Monitor blood glucose as ordered   Assess for signs and symptoms of hyperglycemia and hypoglycemia     Problem: Hematologic - Adult  Goal: Maintains hematologic stability  12/9/2022 1015 by Harris Fraire RN  Outcome: Progressing  Flowsheets (Taken 12/9/2022 0800)  Maintains hematologic stability: Assess for signs and symptoms of bleeding or hemorrhage

## 2022-12-09 NOTE — PROGRESS NOTES
MRI  MRI head completed; pt off floor from 1:45-2:45; pt vomited x 1 after IV contrast given; pt denies nausea after returning to room

## 2022-12-09 NOTE — PROGRESS NOTES
NEUROSURGERY POST-OP PROGRESS NOTE    Patient Name: Montse Cross YOB: 1955   Sex: Female Age: 79 yrs     Medical Record Number: [id number] Acct Number: [de-identified]   Room Number: 2475/8209-89 Hospital Day: Hospital Day: 12     Interval History:  Post-operative Day# 10 s/p Procedure(s) (LRB):  STAGE II: RESECTION OF RIGHT PETROCLIVAL MENINGIOMA (N/A)    Subjective: Pt awake and feeling good. She has her Gray dressing on. 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 Objective:    VITAL SIGNS   BP (!) 141/71   Pulse 68   Temp 97.7 °F (36.5 °C) (Oral)   Resp 16   Ht 5' 5\" (1.651 m)   Wt (!) 348 lb 9.6 oz (158.1 kg)   SpO2 93%   BMI 58.01 kg/m²    Height Height: 5' 5\" (165.1 cm)   Weight Weight: (!) 348 lb 9.6 oz (158.1 kg)          Allergies Allergies   Allergen Reactions    Keflex [Cephalexin] Itching and Rash    Codeine Nausea And Vomiting    Tomato Rash      NPO Status ADULT DIET; Dysphagia - Minced and Moist  ADULT ORAL NUTRITION SUPPLEMENT; Lunch; Standard 4 oz Oral Supplement  ADULT ORAL NUTRITION SUPPLEMENT; Lunch; Frozen Oral Supplement   Isolation No active isolations     LABS   Basic Metabolic Profile Recent Labs     12/07/22  0450 12/08/22  0530    137    100   CO2 33* 32   BUN 15 13   CREATININE <0.5* <0.5*   GLUCOSE 157* 184*   MG 2.00  --         Complete Blood Count Recent Labs     12/07/22 0450 12/08/22  0530 12/09/22  0525   WBC 7.8 7.0 8.6   RBC 3.48* 3.38* 3.43*        Coagulation Studies No results for input(s): PTT, INR in the last 72 hours.     Invalid input(s): PLATELETS, PROA, PT, PTTA     MEDICATIONS   Inpatient Medications     acyclovir, 400 mg, Oral, TID    insulin lispro, 0-16 Units, SubCUTAneous, TID WC    insulin lispro, 0-4 Units, SubCUTAneous, Nightly    polyvinyl alcohol, 2 drop, Right Eye, Q2H    levETIRAcetam, 500 mg, Oral, BID    methIMAzole, 10 mg, Oral, Daily    insulin glargine, 15 Units, SubCUTAneous, Nightly    melatonin, 5 mg, Oral, Nightly    sodium chloride flush, 5-40 mL, IntraVENous, 2 times per day    furosemide, 40 mg, IntraVENous, Daily    heparin (porcine), 5,000 Units, SubCUTAneous, 3 times per day    polyethylene glycol, 17 g, Oral, Daily    tetrahydrozoline, 1 drop, Both Eyes, TID    carvedilol, 12.5 mg, Oral, BID WC    losartan, 100 mg, Oral, Daily    sodium chloride flush, 5-40 mL, IntraVENous, 2 times per day    sennosides-docusate sodium, 1 tablet, Oral, BID    latanoprost, 1 drop, Both Eyes, Nightly   Infusions    sodium chloride      sodium chloride      dextrose        Antibiotics   Recent Abx Admin                     acyclovir (ZOVIRAX) capsule 400 mg (mg) 400 mg Given 12/09/22 0822     400 mg Given 12/08/22 1954     400 mg Given  1349                     Neurologic Exam:  Mental status: awake and alert and oriented x4    Cranial Nerves:  II: Visual acuity not tested, visual fields full, denies new visual changes / diplopia  III, IV, VI: PERRL, 3 mm bilaterally, EOMI, no nystagmus noted  V: Facial sensation intact bilaterally to touch  VII: Face with R sided weakness, HB3 with full eye closure  VIII: Hearing intact bilaterally to spoken voice  IX: Palate movement equal bilaterally  XI: Shoulder shrug equal bilaterally  XII: Tongue midline       Musculoskeletal:   Gait: Not tested   Tone: normal  Sensory: intact to all extremities  Motor strength:    Right  Left    Right  Left    Deltoid  5 5  Hip Flex  5 5   Biceps  5 5  Knee Extensors  5 5   Triceps  5 5  Knee Flexors  5 5   Wrist Ext  5 5  Ankle Dorsiflex. 5 5   Wrist Flex  5 5  Ankle Plantarflex.   5 5   Handgrip  5 5  Ext Ernie Longus  5 5   Thumb Ext  5 5         Incision: intact, clean and dry      Respiratory:  Unlabored respiratory pattern    Abdomen:   Soft, ND   Last BM12/2    Cardiovascular:  Warm, well perfused    Assessment   Patient is a 80 yo F s/p Procedure(s) (LRB):  STAGE II: RESECTION OF RIGHT PETROCLIVAL MENINGIOMA (N/A) per Dr. Mueller Gene:  Neurologic exam frequency:q4  Mobility:PT/OT   SLP continue  PICC line  DVT Prophylaxis: SCDs and heparin  Keppra as ordered  Bowel Regimen: senna and glycolax and dulcolax  Pain control:alex   Nicardipine, keep sbp < 160  Incisional Care:starla dressing in place   Acyclovir for 10 days    Dispo Planning:can go to ARU when bed available and precert done. Patient was seen with Dr. Fish Livingston who agrees with above assessment and plan. Electronically signed by: LEE Garcia CNP, 12/9/2022 10:43 AM   Neurosurgery Nurse Practitioner  [phone]    I spent 15 minutes in the care of this patient.   Over 50% of that time was in face-to-face counseling regarding post op progression, pain management strategies, and answering patient and family questions

## 2022-12-09 NOTE — PROGRESS NOTES
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 See above for details. Additional smaller left anterior parafalcine extra-axial lesion, compatible with additional small meningioma. Recent CT Head: (2022)  1. Interval postsurgical changes from resection of right cerebellopontine angle mass with associated right temporal mastoid resection and frontotemporal craniotomy and fat graft placement. 2.  Thin hyperdensity along the fat graft may represent small amount of postsurgical blood products. There is also a small focus of subarachnoid hemorrhage along the right temporal lobe. 3.  Mild pneumocephalus and bilateral subgaleal fluid is also likely postsurgical     Chart reviewed. Pain: denies    Initial Assessment 22  Mild-moderate dysarthria characterized by blended word boundaries and imprecise articulation (~ 70% intelligbile short phrases in quiet environment). Benefits from slow pace and over-articulation strategies. Vocal quality clear and strong.  Patient able to follow simple directions and answer basic questions. No word finding difficulties noted at conversation level. Further assessment limited by patient fatigue. Recommend ongoing speech therapy to further assess and address. Medical diagnosis: Acoustic neuroma [facility]) [D33.3]  Balance problem [R26.89]  Meningioma ([facility] [D32.9]  Dizziness [R42]  Cerebellopontine angle meningioma ([facility] [D32.0]  Treatment diagnosis: dysarthria    Treatment:  Pt seen to address the following goals:  Goal 1: Patient will recall and implement strategies to improve speech clarity to 90% with min cues  12/5:  speech intelligibility is ~90%, however decreased in articulatory precision. pt educated to strategies for improved intelligibility and articulatory precision through verbal instruction and demonstration. Pt used strategies in structured sentence level tasks with min cues. In conversation pt required min-mod cues. Pt stated comprehension  Cont goal  12/6: Pt speech intelligibility was functional but articulatory precision of sounds (especially /s/, /sh/, and /b/) was mildly distorted with reduced awareness but able to correct given min-mod cues during rote tasks and conversation. Pt did self correct speech error in conversation x1 at end of session. Pt educated to exaggerate and open/move articulators well with speaking. Pt utilized even a slower rate following initial review. Cont goal  12/7: Pt able to recall all speech strategies given min cues. Pt demonstrated ~90% intelligibility in basic conversational tasks. Pt with increased use of over-articulation and breaking up 3-4 words at a time for clarity. Cont. 12/8: pt with good recall of all strategies for improved intelligibility of speech. Pt demonstrated use in structured sentence level tasks with min  cues, intelligibility 90%. In conversation, pt required mod cues to using pausing and over articulation. Cont goal  12/9:  pt states, \"I am talking slower and enunciating. \"  Reviewed all strategies, pt stated comprehension. Pt utilized strategies in structured sentences with min cues and in conversation with min-mod cues. Intelligibility is ~90% with use of strategies  Goal met, cont to ensure consistency    Goal 2: Patient will participate in ongoing assessment of cognitive-communication skills. 12/5: pt oriented, able to state reason for hospitalization. Pt and family report no changes in cognition. Will cont to assess  Cont goal  12/6:  Pt oriented x3. 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 Pt able to recall 2/3 words following a 5 minute delay (3/3 with choices). Pt able to complete all basic math for money and time concepts including leaving a tip with no errors and minimal to no delays. Pt reported feeling tired so session ended earlier. Cont goal  12/7: SLP provided pt with prescription label and asked pt questions related. Pt answered with 100% accuracy and no cues required. Pt expressed manages own finances/medications/ADLs at baseline with independence. Pt mentioned she does feel close to cognitive baseline. Cont. 12/8: pt provided solutions to everyday problems, demonstrating appropriate flexibility of thoughts. Pt completed money/time concept tasks with 90% accuracy. Pt c/o not feeling well, therefore did not proceed with additional tasks  Cont goal  12/9: pt with good recall and understanding of information that MD has provided. Pt demonstrating good insight into deficits. Cont goal    Education:  Pt educated to purpose of visit and tasks presented and recommendation for ongoing treatment. Pt stated comprehension    Plan:  Continue speech/language therapy to address above goals, 3-5 x/week x LOS  DC recommendations: ongoing treatment indicated  D/W nursing  Needs met prior to leaving room, call button in reach. Treatment time: 15 \" tx  Kristine Esteban M.S./[facility]-SLP #[id number]  Pg.  # [phone]  If patient is discharged prior to next treatment, this note will serve as the discharge summary

## 2022-12-10 LAB
BASOPHILS ABSOLUTE: 0 K/UL (ref 0–0.2)
BASOPHILS RELATIVE PERCENT: 0.1 %
EOSINOPHILS ABSOLUTE: 0.1 K/UL (ref 0–0.6)
EOSINOPHILS RELATIVE PERCENT: 0.5 %
GLUCOSE BLD-MCNC: 167 MG/DL (ref 70–99)
GLUCOSE BLD-MCNC: 208 MG/DL (ref 70–99)
GLUCOSE BLD-MCNC: 219 MG/DL (ref 70–99)
GLUCOSE BLD-MCNC: 233 MG/DL (ref 70–99)
GLUCOSE BLD-MCNC: 245 MG/DL (ref 70–99)
HCT VFR BLD CALC: 36.7 % (ref 36–48)
HEMOGLOBIN: 12.3 G/DL (ref 12–16)
LYMPHOCYTES ABSOLUTE: 1.2 K/UL (ref 1–5.1)
LYMPHOCYTES RELATIVE PERCENT: 9.7 %
MCH RBC QN AUTO: 30.2 PG (ref 26–34)
MCHC RBC AUTO-ENTMCNC: 33.4 G/DL (ref 31–36)
MCV RBC AUTO: 90.4 FL (ref 80–100)
MONOCYTES ABSOLUTE: 0.6 K/UL (ref 0–1.3)
MONOCYTES RELATIVE PERCENT: 4.3 %
NEUTROPHILS ABSOLUTE: 11 K/UL (ref 1.7–7.7)
NEUTROPHILS RELATIVE PERCENT: 85.4 %
PDW BLD-RTO: 15.4 % (ref 12.4–15.4)
PERFORMED ON: ABNORMAL
PLATELET # BLD: 184 K/UL (ref 135–450)
PMV BLD AUTO: 8.6 FL (ref 5–10.5)
RBC # BLD: 4.07 M/UL (ref 4–5.2)
SARS-COV-2, NAAT: NOT DETECTED
WBC # BLD: 12.9 K/UL (ref 4–11)

## 2022-12-10 PROCEDURE — 87635 SARS-COV-2 COVID-19 AMP PRB: CPT

## 2022-12-10 PROCEDURE — 1200000000 HC SEMI PRIVATE

## 2022-12-10 PROCEDURE — 6370000000 HC RX 637 (ALT 250 FOR IP)

## 2022-12-10 PROCEDURE — 85025 COMPLETE CBC W/AUTO DIFF WBC: CPT

## 2022-12-10 PROCEDURE — 6370000000 HC RX 637 (ALT 250 FOR IP): Performed by: NURSE PRACTITIONER

## 2022-12-10 PROCEDURE — 36415 COLL VENOUS BLD VENIPUNCTURE: CPT

## 2022-12-10 PROCEDURE — 97530 THERAPEUTIC ACTIVITIES: CPT

## 2022-12-10 PROCEDURE — 6360000002 HC RX W HCPCS

## 2022-12-10 PROCEDURE — 2580000003 HC RX 258

## 2022-12-10 PROCEDURE — 36592 COLLECT BLOOD FROM PICC: CPT

## 2022-12-10 RX ORDER — CETIRIZINE HYDROCHLORIDE 10 MG/1
10 TABLET ORAL DAILY
Status: DISCONTINUED | OUTPATIENT
Start: 2022-12-10 | End: 2022-12-22 | Stop reason: HOSPADM

## 2022-12-10 RX ORDER — HYDROCHLOROTHIAZIDE 25 MG/1
25 TABLET ORAL DAILY
Status: DISCONTINUED | OUTPATIENT
Start: 2022-12-10 | End: 2022-12-22 | Stop reason: HOSPADM

## 2022-12-10 RX ADMIN — POLYVINYL ALCOHOL 2 DROP: 14 SOLUTION/ DROPS OPHTHALMIC at 10:40

## 2022-12-10 RX ADMIN — TETRAHYDROZOLINE HCL 0.05% 1 DROP: 0.05 SOLUTION/ DROPS INTRAOCULAR at 14:41

## 2022-12-10 RX ADMIN — TETRAHYDROZOLINE HCL 0.05% 1 DROP: 0.05 SOLUTION/ DROPS INTRAOCULAR at 09:07

## 2022-12-10 RX ADMIN — INSULIN LISPRO 4 UNITS: 100 INJECTION, SOLUTION INTRAVENOUS; SUBCUTANEOUS at 12:19

## 2022-12-10 RX ADMIN — POLYVINYL ALCOHOL 2 DROP: 14 SOLUTION/ DROPS OPHTHALMIC at 21:42

## 2022-12-10 RX ADMIN — HEPARIN SODIUM 5000 UNITS: 5000 INJECTION INTRAVENOUS; SUBCUTANEOUS at 06:18

## 2022-12-10 RX ADMIN — METHIMAZOLE 10 MG: 5 TABLET ORAL at 09:05

## 2022-12-10 RX ADMIN — CARVEDILOL 12.5 MG: 6.25 TABLET, FILM COATED ORAL at 16:57

## 2022-12-10 RX ADMIN — POLYVINYL ALCOHOL 2 DROP: 14 SOLUTION/ DROPS OPHTHALMIC at 00:05

## 2022-12-10 RX ADMIN — CETIRIZINE HYDROCHLORIDE 10 MG: 10 TABLET, FILM COATED ORAL at 09:03

## 2022-12-10 RX ADMIN — OXYCODONE 10 MG: 5 TABLET ORAL at 20:28

## 2022-12-10 RX ADMIN — ACYCLOVIR 400 MG: 200 CAPSULE ORAL at 10:40

## 2022-12-10 RX ADMIN — LEVETIRACETAM 500 MG: 500 TABLET, FILM COATED ORAL at 21:41

## 2022-12-10 RX ADMIN — POLYVINYL ALCOHOL 2 DROP: 14 SOLUTION/ DROPS OPHTHALMIC at 14:40

## 2022-12-10 RX ADMIN — POLYVINYL ALCOHOL 2 DROP: 14 SOLUTION/ DROPS OPHTHALMIC at 16:39

## 2022-12-10 RX ADMIN — SODIUM CHLORIDE, PRESERVATIVE FREE 10 ML: 5 INJECTION INTRAVENOUS at 21:42

## 2022-12-10 RX ADMIN — ACYCLOVIR 400 MG: 200 CAPSULE ORAL at 14:39

## 2022-12-10 RX ADMIN — HEPARIN SODIUM 5000 UNITS: 5000 INJECTION INTRAVENOUS; SUBCUTANEOUS at 14:39

## 2022-12-10 RX ADMIN — POLYVINYL ALCOHOL 2 DROP: 14 SOLUTION/ DROPS OPHTHALMIC at 11:51

## 2022-12-10 RX ADMIN — CARVEDILOL 12.5 MG: 6.25 TABLET, FILM COATED ORAL at 09:04

## 2022-12-10 RX ADMIN — HEPARIN SODIUM 5000 UNITS: 5000 INJECTION INTRAVENOUS; SUBCUTANEOUS at 21:43

## 2022-12-10 RX ADMIN — LEVETIRACETAM 500 MG: 500 TABLET, FILM COATED ORAL at 09:05

## 2022-12-10 RX ADMIN — SENNOSIDES AND DOCUSATE SODIUM 1 TABLET: 50; 8.6 TABLET ORAL at 09:06

## 2022-12-10 RX ADMIN — LOSARTAN POTASSIUM 100 MG: 100 TABLET, FILM COATED ORAL at 09:05

## 2022-12-10 RX ADMIN — POLYVINYL ALCOHOL 2 DROP: 14 SOLUTION/ DROPS OPHTHALMIC at 02:06

## 2022-12-10 RX ADMIN — POLYVINYL ALCOHOL 2 DROP: 14 SOLUTION/ DROPS OPHTHALMIC at 08:53

## 2022-12-10 RX ADMIN — BISACODYL 10 MG: 10 SUPPOSITORY RECTAL at 09:03

## 2022-12-10 RX ADMIN — POLYVINYL ALCOHOL 2 DROP: 14 SOLUTION/ DROPS OPHTHALMIC at 18:41

## 2022-12-10 RX ADMIN — LATANOPROST 1 DROP: 50 SOLUTION OPHTHALMIC at 21:43

## 2022-12-10 RX ADMIN — SENNOSIDES AND DOCUSATE SODIUM 1 TABLET: 50; 8.6 TABLET ORAL at 21:42

## 2022-12-10 RX ADMIN — INSULIN LISPRO 4 UNITS: 100 INJECTION, SOLUTION INTRAVENOUS; SUBCUTANEOUS at 16:52

## 2022-12-10 RX ADMIN — POLYVINYL ALCOHOL 2 DROP: 14 SOLUTION/ DROPS OPHTHALMIC at 20:15

## 2022-12-10 RX ADMIN — Medication 5 MG: at 21:41

## 2022-12-10 RX ADMIN — TETRAHYDROZOLINE HCL 0.05% 1 DROP: 0.05 SOLUTION/ DROPS INTRAOCULAR at 21:42

## 2022-12-10 RX ADMIN — SODIUM CHLORIDE, PRESERVATIVE FREE 10 ML: 5 INJECTION INTRAVENOUS at 09:06

## 2022-12-10 RX ADMIN — POLYVINYL ALCOHOL 2 DROP: 14 SOLUTION/ DROPS OPHTHALMIC at 04:14

## 2022-12-10 RX ADMIN — POLYVINYL ALCOHOL 2 DROP: 14 SOLUTION/ DROPS OPHTHALMIC at 06:16

## 2022-12-10 RX ADMIN — ACYCLOVIR 400 MG: 200 CAPSULE ORAL at 21:41

## 2022-12-10 RX ADMIN — POLYETHYLENE GLYCOL 3350 17 G: 17 POWDER, FOR SOLUTION ORAL at 09:03

## 2022-12-10 RX ADMIN — SODIUM CHLORIDE, PRESERVATIVE FREE 10 ML: 5 INJECTION INTRAVENOUS at 21:50

## 2022-12-10 RX ADMIN — HYDROCHLOROTHIAZIDE 25 MG: 25 TABLET ORAL at 09:03

## 2022-12-10 RX ADMIN — INSULIN GLARGINE 15 UNITS: 100 INJECTION, SOLUTION SUBCUTANEOUS at 21:06

## 2022-12-10 ASSESSMENT — PAIN DESCRIPTION - FREQUENCY: FREQUENCY: INTERMITTENT

## 2022-12-10 ASSESSMENT — PAIN DESCRIPTION - LOCATION
LOCATION: BACK
LOCATION: BACK

## 2022-12-10 ASSESSMENT — PAIN DESCRIPTION - PAIN TYPE: TYPE: ACUTE PAIN

## 2022-12-10 ASSESSMENT — PAIN - FUNCTIONAL ASSESSMENT: PAIN_FUNCTIONAL_ASSESSMENT: ACTIVITIES ARE NOT PREVENTED

## 2022-12-10 ASSESSMENT — PAIN SCALES - GENERAL
PAINLEVEL_OUTOF10: 9
PAINLEVEL_OUTOF10: 0
PAINLEVEL_OUTOF10: 9
PAINLEVEL_OUTOF10: 0
PAINLEVEL_OUTOF10: 7
PAINLEVEL_OUTOF10: 0
PAINLEVEL_OUTOF10: 0

## 2022-12-10 ASSESSMENT — PAIN DESCRIPTION - ONSET: ONSET: GRADUAL

## 2022-12-10 ASSESSMENT — PAIN DESCRIPTION - ORIENTATION: ORIENTATION: MID

## 2022-12-10 ASSESSMENT — PAIN DESCRIPTION - DESCRIPTORS
DESCRIPTORS: ACHING
DESCRIPTORS: ACHING

## 2022-12-10 NOTE — PLAN OF CARE
I was asked to see this patient this evening regarding a new rash on her chest and arms. She has a few scattered areas I hive like redness that she describes as itchy. She reports allergies to keflex, codeine and tomato. She was prescribed a one time dose of zyrtec this evening. Will consider scheduled once a day dosing if needed as well as can consider 1% hydrocortisone cream. Denies SOB, difficulty breathing, lip or tongue swelling. Please call with any changes or worsening of rash including respiratory symptoms.      Tiffani Mathew APRN-CNP  Neuro-critical care

## 2022-12-10 NOTE — PROGRESS NOTES
Pt has a large rash and hives covering from her upper chest area all the way down to her inner thighs and extending down her arms, complaining of being very itchy. Afebrile, VSS, no difficulty breathing. No issues with abdominal incision site. Pt has neither hospitalist or ICU coverage. Attempt made to contact neurosurgery on call who refused to assist with pt issue and told RN to call Samantha Lin. Neurocritical saw pt and ordered one time dose 10 mg Zyrtec, which was given and improvements made to rash. Will continue to monitor.

## 2022-12-10 NOTE — PROGRESS NOTES
Physical Therapy  Facility/Department: [facility] ICU  Physical Therapy Treatment    Name: Montse Cross  : 1955  MRN: [id number]  Date of Service: 12/10/2022    Discharge Recommendations:  Montse Cross scored a 14/24 on the AM-PAC short mobility form. Current research shows that an AM-PAC score of 17 or less is typically not associated with a discharge to the patient's home setting. Based on the patient's AM-PAC score and their current functional mobility deficits, it is recommended that the patient have 5-7 sessions per week of Physical Therapy at d/c to increase the patient's independence. At this time, this patient demonstrates complex nursing, medical, and rehabilitative needs, and would benefit from intensive rehabilitation services upon discharge from the Inpatient setting. This patient demonstrates the ability to participate in and benefit from an intensive therapy program with a coordinated interdisciplinary team approach to foster frequent, structured, and documented communication among disciplines, who will work together to establish, prioritize, and achieve treatment goals. Please see assessment section for further patient specific details. If patient discharges prior to next session this note will serve as a discharge summary. Please see below for the latest assessment towards goals. Patient would benefit from continued therapy after discharge   PT Equipment Recommendations  Other: defer      Patient Diagnosis(es): Diagnoses of Acoustic neuroma [facility]), Balance problem, Meningioma ([facility] and Dizziness were pertinent to this visit. Past Medical History:  has a past medical history of Arthritis, Asthma, Brain tumor ([address] Diabetes mellitus ([facility] High cholesterol, Hypertension, Imbalance, Loss of hearing, and Vertigo. Past Surgical History:  has a past surgical history that includes Total knee arthroplasty (Right, 2015); Total knee arthroplasty (Left, 10/14/15);  Total hip arthroplasty (Right, 11/8/2021); Neuroma surgery (Right, 11/28/2022); mastoidectomy (Right, 11/28/2022); and craniotomy (N/A, 11/29/2022). Assessment   Body Structures, Functions, Activity Limitations Requiring Skilled Therapeutic Intervention: Decreased functional mobility ; Decreased endurance  Assessment: Pt able to perform stand pivot EOB to/from recliner with RW and Leah this session due to increased strength and balance. Pt declining ambulation this date due to reported fatigue after toileting with RN. Safety concerns for d/c home due to pt requiring physical assist for all functional mobility, pt is currently unable to perform stairs necessary to enter/exit home, making her a high fall risk. Pt will continue to benefit from skilled therapy to maximize safety and independence. Treatment Diagnosis: decreased functional mobility  Activity Tolerance  Activity Tolerance: Patient limited by fatigue;Patient limited by endurance     Plan   Physcial Therapy Plan  General Plan: 5-7 times per week  Current Treatment Recommendations: Strengthening, Functional mobility training, Transfer training, Gait training, Endurance training, Safety education & training, Therapeutic activities  Safety Devices  Type of Devices: All fall risk precautions in place, Bed alarm in place, Call light within reach, Gait belt, Left in bed, Nurse notified  Restraints  Restraints Initially in Place: No     Restrictions  Position Activity Restriction  Other position/activity restrictions:  Up with assistance, ambulate the patient, up in the chair, HOB elevated to 30 degrees     Subjective   General  Chart Reviewed: Yes  Patient assessed for rehabilitation services?: Yes  Additional Pertinent Hx: Pt is a 80 yo female that presents to the hospital for a procedure;STAGE 1, RIGHT TRANSLABYRINTHINE / RETROLABYRINTHINE CRANIOTOMY POSSIBLE PETROSECTOMY  RIGHT TRANSMASTOID / ANTERIOR MIDDLE CRANIAL FOSSA , ABDOMINAL FAT GRAFT EXTERNAL AUDITORY CANAL CLOSURE on 11/28. PMH; Loss of hearing bilaterally, arthritis, brain tumor, diabetes. Family / Caregiver Present: No  Referring Practitioner: Lesia Arteaga MD  Diagnosis: Cerebellopontine angle meningioma  Follows Commands: Within Functional Limits  General Comment  Comments: Pt found supine in bed upon PT arrival.  Pt agreeable to therapy session with encouragement. Subjective  Subjective: \"I just got up with the nurse. \"         Social/Functional History  Social/Functional History  Lives With: Alone  Type of Home: House  Home Layout: One level, Able to Live on Main level with bedroom/bathroom, Performs ADL's on one level  Home Access: Stairs to enter with rails  Entrance Stairs - Number of Steps: 2  Bathroom Shower/Tub: Walk-in shower  Bathroom Equipment: Shower chair, Grab bars in shower, Hand-held shower  Home Equipment: [address] [facility], [employer], Sock aid (walker, unsure what kind)  Has the patient had two or more falls in the past year or any fall with injury in the past year?: No  ADL Assistance: [address] [place] [address] Avenue: [address] [place] [address] Responsibilities: Yes  Ambulation Assistance: Independent (with walker, unable to determine which kind)  Transfer Assistance: Independent  Active : Yes  Leisure & Hobbies: crafts  Additional Comments: pt is questionable to poor historian; pt reports no one can stay with her upon going home  Vision/Hearing       Cognition         Objective   Heart Rate: 66  Heart Rate Source: Monitor  BP: (!) 106/49  BP Location: Left upper arm  BP Method: Automatic  Patient Position: Lying left side  MAP (Calculated): 68  Resp: 21  SpO2: 92 %  O2 Device: Nasal cannula                          Bed Mobility Training  Bed Mobility Training: Yes  Supine to Sit: Contact-guard assistance  Sit to Supine:  Moderate assistance (for BLEs)  Balance  Sitting: Intact  Standing: With support ([address])  Transfer Training  Transfer Training: Yes  Sit to Stand: Assist X2;Minimum assistance (from EOB, Min Ax1 from recliner chair)  Bed to Chair: Additional time;Minimum assistance  Gait  Overall Level of Assistance: Minimum assistance (for bed<>chair. Would require 2 person assist for longer distance due to poor endurance and for safety)  Assistive Device: Gait belt;Walker, rolling  Bed mobility  Supine to Sit: Contact guard assistance (HOB elevated, use of bedrail, increased time)  Sit to Supine:  Moderate assistance (HOB slightly elevated, use of bedrail, cues for technique, increased time, assist for BLE placement)  Transfers  Sit to Stand: Dependent/Total (x1 trial MinAx2 from EOB to RW, x1 trial MinAx1 from recliner to RW, cues for hand placement, facilitation for anterior weight shifting)  Stand to Sit: Minimal Assistance (cues for hand placement and to fully align with surface prior to sitting)  Stand Pivot Transfers: Minimal Assistance (EOB to/from recliner with RW, cues for sequencing)  Ambulation  Comments: pt declining ambulation at this time, reporting she is too fatigued after toileting with RN     Balance  Comments: Luis progressing to CGA for sitting balance EOB to prevent posterior lean, to prep for transfer           OutComes Score                                                  AM-PAC Score  AM-PAC Inpatient Mobility Raw Score : 14 (12/10/22 1443)  AM-PAC Inpatient T-Scale Score : 38.1 (12/10/22 1443)  Mobility Inpatient CMS 0-100% Score: 61.29 (12/10/22 1443)  Mobility Inpatient CMS G-Code Modifier : CL (12/10/22 1443)          Tinneti Score       Goals  Short Term Goals  Time Frame for Short Term Goals: D/C  Short Term Goal 1: supine<->sit mod A x 1-2- MET 12/6 Pt will perform bed mobility with SBA MET 12/7 update to bed mobility with independence -ongoing  Short Term Goal 2: pt will sit at EOB for 5 min with SBA- MET 12/7  Short Term Goal 3: pt will tolerate sit<->stand assessment MET 12/6 Pt will transfer with CGA to RW -ongoing  Short Term Goal 4: pt will tolerate bed->chair assessment with RW MET 12/7 Pt will perform stand pivot with RW and SBA -ongoing  Short Term Goal 5: pt will tolerate gait assessment-MET 12/7 Pt will ambulate x 50' and CGA -ongoing  Patient Goals   Patient Goals : not stated       Education  Patient Education  Education Given To: Patient  Education Provided Comments: transfer safety  Education Method: Verbal;Demonstration  Barriers to Learning: None  Education Outcome: Verbalized understanding      Therapy Time   Individual Concurrent Group Co-treatment   Time In 1146         Time Out 1159         Minutes 13             Timed Code Treatment Minutes:  13    Total Treatment Minutes:  Remy Cano 91, PT   This note to serve as discharge summary if patient discharged before next session.

## 2022-12-10 NOTE — PROGRESS NOTES
Hemodynamically stable/ Rash over night treated with Zyrtec with good effect-only issue reported at handoff/ pt continues to wait for bed on ARU / see notes/ flow sheet    1792 sent rapid covid swap to lab via tube system/ pre test for ARU

## 2022-12-10 NOTE — PROGRESS NOTES
Occupational Therapy  Facility/Department: [facility] ICU  Daily Treatment Note  NAME: Verona Blanco  : 1955  MRN: [id number]    Date of Service: 12/10/2022    Discharge Recommendations: Verona Blanco scored a 15/24 on the AM-PAC ADL Inpatient form. Current research shows that an AM-PAC score of 17 or less is typically not associated with a discharge to the patient's home setting. Based on the patient's AM-PAC score and their current ADL deficits, it is recommended that the patient have 5-7 sessions per week of Occupational Therapy at d/c to increase the patient's independence. At this time, this patient demonstrates complex nursing, medical, and rehabilitative needs, and would benefit from intensive rehabilitation services upon discharge from the Inpatient setting. This patient demonstrates the ability to participate in and benefit from an intensive therapy program with a coordinated interdisciplinary team approach to foster frequent, structured, and documented communication among disciplines, who will work together to establish, prioritize, and achieve treatment goals. Please see assessment section for further patient specific details. If patient discharges prior to next session this note will serve as a discharge summary. Please see below for the latest assessment towards goals. Patient Diagnosis(es): Diagnoses of Acoustic neuroma [facility]), Balance problem, Meningioma ([facility] [address] and Dizziness were pertinent to this visit. Assessment        Assessment: Pt tolerated session well but limited by endurance and fatigue. Only wanting to complete transfer this session. Requiring two person assist for sit<>stand but improving in short ambulation bed<>chair. Pt would benefit from cont OT to work on ADLs, funct mob and transfers. Cont with POC. Activity Tolerance: Patient limited by endurance; Patient limited by fatigue      Plan   Occupational Therapy Plan  Times Per Week: 5-7 Restrictions  Position Activity Restriction  Other position/activity restrictions: Up with assistance, ambulate the patient, up in the chair, HOB elevated to 30 degrees        Subjective   Subjective  Subjective: Pt in bed upon arrival. Agreeable to OT/PT treat with encouragement, just got back into bed           Objective    Vitals     Bed Mobility Training  Bed Mobility Training: Yes  Supine to Sit: Contact-guard assistance  Sit to Supine: Moderate assistance (for BLEs)      Balance  Sitting: Intact  Standing: With support ([address])      Transfer Training  Transfer Training: Yes  Sit to Stand: Assist X2;Minimum assistance (from EOB, Min Ax1 from recliner chair)  Bed to Chair: Additional time;Minimum assistance      Gait  Overall Level of Assistance: Minimum assistance (for bed<>chair. Would require 2 person assist for longer distance due to poor endurance and for safety)  Assistive Device: Gait belt;Walker, rolling     ADL  Additional Comments: Declined any ADLs this session              Safety Devices  Type of Devices: Left in bed;Bed alarm in place;Call light within reach;Nurse notified     Patient Education  Education Given To: Patient  Education Provided: Plan of Care;Transfer Training;Role of Therapy; Energy Conservation  Education Method: Verbal  Barriers to Learning: Hearing  Education Outcome: Verbalized understanding    Goals  Short Term Goals  Time Frame for Short Term Goals: by discharge  Short Term Goal 1: Pt will perform bed mobility with CGA to decrease caregiver burden- goal met 12/2. NEW GOAL: Pt will perform bed mobility with supervision  Short Term Goal 2: Pt will sit EOB 5 mins with no more than CGA-  goal met 12/8 on BSC. NEW GOAL: sit unsupported 5 mins with Supervision  Short Term Goal 3: Pt will perform grooming activity with Min A and set-up- goal met 12/1.  New Goal: Pt will perform grooming ax with set-up and supervision  Short Term Goal 4: Pt will tolerate sit to stand transfer- goal met 12/1.  NEW GOAL: sit to stand transfer with Mod x2- goal met 12/2 with Min +CGA and CGA x2; NEW GOAL: stance x2 mins with CGA  Patient Goals   Patient goals : not stated       Therapy Time   Individual Concurrent Group Co-treatment   Time In 1146         Time Out 1159         Minutes 13         Timed Code Treatment Minutes: [address], SUBRAMANIAN/L

## 2022-12-10 NOTE — PLAN OF CARE
Problem: Chronic Conditions and Co-morbidities  Goal: Patient's chronic conditions and co-morbidity symptoms are monitored and maintained or improved  Outcome: Progressing     Problem: Discharge Planning  Goal: Discharge to home or other facility with appropriate resources  Outcome: Progressing     Problem: Neurosensory - Adult  Goal: Absence of seizures  Outcome: Progressing  Flowsheets (Taken 12/9/2022 2000)  Absence of seizures: Monitor for seizure activity.   If seizure occurs, document type and location of movements and any associated apnea  Goal: Remains free of injury related to seizures activity  Outcome: Progressing  Flowsheets (Taken 12/9/2022 2000)  Remains free of injury related to seizure activity: Maintain airway, patient safety  and administer oxygen as ordered  Goal: Achieves maximal functionality and self care  Outcome: Progressing  Flowsheets (Taken 12/9/2022 2000)  Achieves maximal functionality and self care: Monitor swallowing and airway patency with patient fatigue and changes in neurological status

## 2022-12-10 NOTE — PLAN OF CARE
Started zyrtec 10mg PO daily as this treated rash she had last night. Does not need to continue on discharge.     LEE Salter-CNP  Neurology & Neurocritical Care   Neurology Line: [phone]  [facility]: [facility] Critical [facility] NPs

## 2022-12-11 ENCOUNTER — APPOINTMENT (OUTPATIENT)
Dept: CT IMAGING | Age: 67
DRG: 025 | End: 2022-12-11
Attending: NEUROLOGICAL SURGERY
Payer: MEDICARE

## 2022-12-11 ENCOUNTER — ANESTHESIA EVENT (OUTPATIENT)
Dept: ENDOSCOPY | Age: 67
End: 2022-12-11
Payer: MEDICARE

## 2022-12-11 ENCOUNTER — ANESTHESIA (OUTPATIENT)
Dept: ENDOSCOPY | Age: 67
End: 2022-12-11
Payer: MEDICARE

## 2022-12-11 LAB
ABO/RH: NORMAL
ALBUMIN SERPL-MCNC: 2.5 G/DL (ref 3.4–5)
ANION GAP SERPL CALCULATED.3IONS-SCNC: 8 MMOL/L (ref 3–16)
ANTIBODY SCREEN: NORMAL
BASOPHILS ABSOLUTE: 0 K/UL (ref 0–0.2)
BASOPHILS RELATIVE PERCENT: 0.2 %
BUN BLDV-MCNC: 74 MG/DL (ref 7–20)
CALCIUM SERPL-MCNC: 7.8 MG/DL (ref 8.3–10.6)
CHLORIDE BLD-SCNC: 102 MMOL/L (ref 99–110)
CO2: 28 MMOL/L (ref 21–32)
CREAT SERPL-MCNC: 0.7 MG/DL (ref 0.6–1.2)
EOSINOPHILS ABSOLUTE: 0.1 K/UL (ref 0–0.6)
EOSINOPHILS RELATIVE PERCENT: 1.1 %
GFR SERPL CREATININE-BSD FRML MDRD: >60 ML/MIN/{1.73_M2}
GLUCOSE BLD-MCNC: 156 MG/DL (ref 70–99)
GLUCOSE BLD-MCNC: 195 MG/DL (ref 70–99)
GLUCOSE BLD-MCNC: 203 MG/DL (ref 70–99)
GLUCOSE BLD-MCNC: 239 MG/DL (ref 70–99)
GLUCOSE BLD-MCNC: 276 MG/DL (ref 70–99)
GLUCOSE BLD-MCNC: 277 MG/DL (ref 70–99)
HCT VFR BLD CALC: 23.6 % (ref 36–48)
HCT VFR BLD CALC: 26.1 % (ref 36–48)
HCT VFR BLD CALC: 26.2 % (ref 36–48)
HCT VFR BLD CALC: 27.9 % (ref 36–48)
HEMOGLOBIN: 7.8 G/DL (ref 12–16)
HEMOGLOBIN: 8.9 G/DL (ref 12–16)
HEMOGLOBIN: 9 G/DL (ref 12–16)
HEMOGLOBIN: 9.6 G/DL (ref 12–16)
INR BLD: 1.07 (ref 0.87–1.14)
LACTATE DEHYDROGENASE: 194 U/L (ref 100–190)
LYMPHOCYTES ABSOLUTE: 1.9 K/UL (ref 1–5.1)
LYMPHOCYTES RELATIVE PERCENT: 15.6 %
MAGNESIUM: 1.8 MG/DL (ref 1.8–2.4)
MCH RBC QN AUTO: 30.5 PG (ref 26–34)
MCH RBC QN AUTO: 30.6 PG (ref 26–34)
MCHC RBC AUTO-ENTMCNC: 33.1 G/DL (ref 31–36)
MCHC RBC AUTO-ENTMCNC: 33.8 G/DL (ref 31–36)
MCV RBC AUTO: 90.1 FL (ref 80–100)
MCV RBC AUTO: 92.4 FL (ref 80–100)
MONOCYTES ABSOLUTE: 1 K/UL (ref 0–1.3)
MONOCYTES RELATIVE PERCENT: 8 %
NEUTROPHILS ABSOLUTE: 9.4 K/UL (ref 1.7–7.7)
NEUTROPHILS RELATIVE PERCENT: 75.1 %
OCCULT BLOOD DIAGNOSTIC: ABNORMAL
PDW BLD-RTO: 15.1 % (ref 12.4–15.4)
PDW BLD-RTO: 15.5 % (ref 12.4–15.4)
PERFORMED ON: ABNORMAL
PHOSPHORUS: 4.4 MG/DL (ref 2.5–4.9)
PLATELET # BLD: 188 K/UL (ref 135–450)
PLATELET # BLD: 215 K/UL (ref 135–450)
PMV BLD AUTO: 8.5 FL (ref 5–10.5)
PMV BLD AUTO: 8.7 FL (ref 5–10.5)
POTASSIUM SERPL-SCNC: 3.9 MMOL/L (ref 3.5–5.1)
PROTHROMBIN TIME: 13.8 SEC (ref 11.7–14.5)
RBC # BLD: 2.55 M/UL (ref 4–5.2)
RBC # BLD: 2.91 M/UL (ref 4–5.2)
SODIUM BLD-SCNC: 138 MMOL/L (ref 136–145)
WBC # BLD: 10.5 K/UL (ref 4–11)
WBC # BLD: 12.5 K/UL (ref 4–11)

## 2022-12-11 PROCEDURE — 99291 CRITICAL CARE FIRST HOUR: CPT | Performed by: NURSE PRACTITIONER

## 2022-12-11 PROCEDURE — C1052 HEMOSTATIC AGENT, GI, TOPIC: HCPCS | Performed by: STUDENT IN AN ORGANIZED HEALTH CARE EDUCATION/TRAINING PROGRAM

## 2022-12-11 PROCEDURE — 2580000003 HC RX 258: Performed by: INTERNAL MEDICINE

## 2022-12-11 PROCEDURE — 2709999900 HC NON-CHARGEABLE SUPPLY: Performed by: STUDENT IN AN ORGANIZED HEALTH CARE EDUCATION/TRAINING PROGRAM

## 2022-12-11 PROCEDURE — 6370000000 HC RX 637 (ALT 250 FOR IP)

## 2022-12-11 PROCEDURE — 6360000002 HC RX W HCPCS

## 2022-12-11 PROCEDURE — 85018 HEMOGLOBIN: CPT

## 2022-12-11 PROCEDURE — 6360000002 HC RX W HCPCS: Performed by: STUDENT IN AN ORGANIZED HEALTH CARE EDUCATION/TRAINING PROGRAM

## 2022-12-11 PROCEDURE — 36415 COLL VENOUS BLD VENIPUNCTURE: CPT

## 2022-12-11 PROCEDURE — 36430 TRANSFUSION BLD/BLD COMPNT: CPT

## 2022-12-11 PROCEDURE — 2580000003 HC RX 258

## 2022-12-11 PROCEDURE — 85610 PROTHROMBIN TIME: CPT

## 2022-12-11 PROCEDURE — 83615 LACTATE (LD) (LDH) ENZYME: CPT

## 2022-12-11 PROCEDURE — 86901 BLOOD TYPING SEROLOGIC RH(D): CPT

## 2022-12-11 PROCEDURE — 3609013000 HC EGD TRANSORAL CONTROL BLEEDING ANY METHOD: Performed by: STUDENT IN AN ORGANIZED HEALTH CARE EDUCATION/TRAINING PROGRAM

## 2022-12-11 PROCEDURE — XW0G886 INTRODUCTION OF MINERAL-BASED TOPICAL HEMOSTATIC AGENT INTO UPPER GI, VIA NATURAL OR ARTIFICIAL OPENING ENDOSCOPIC, NEW TECHNOLOGY GROUP 6: ICD-10-PCS | Performed by: STUDENT IN AN ORGANIZED HEALTH CARE EDUCATION/TRAINING PROGRAM

## 2022-12-11 PROCEDURE — 6360000004 HC RX CONTRAST MEDICATION: Performed by: NEUROLOGICAL SURGERY

## 2022-12-11 PROCEDURE — 1200000000 HC SEMI PRIVATE

## 2022-12-11 PROCEDURE — 74174 CTA ABD&PLVS W/CONTRAST: CPT

## 2022-12-11 PROCEDURE — 85027 COMPLETE CBC AUTOMATED: CPT

## 2022-12-11 PROCEDURE — 6360000002 HC RX W HCPCS: Performed by: ANESTHESIOLOGY

## 2022-12-11 PROCEDURE — 6360000002 HC RX W HCPCS: Performed by: NURSE PRACTITIONER

## 2022-12-11 PROCEDURE — 0W3P8ZZ CONTROL BLEEDING IN GASTROINTESTINAL TRACT, VIA NATURAL OR ARTIFICIAL OPENING ENDOSCOPIC: ICD-10-PCS | Performed by: STUDENT IN AN ORGANIZED HEALTH CARE EDUCATION/TRAINING PROGRAM

## 2022-12-11 PROCEDURE — 85014 HEMATOCRIT: CPT

## 2022-12-11 PROCEDURE — 74178 CT ABD&PLV WO CNTR FLWD CNTR: CPT

## 2022-12-11 PROCEDURE — 2700000000 HC OXYGEN THERAPY PER DAY

## 2022-12-11 PROCEDURE — 86923 COMPATIBILITY TEST ELECTRIC: CPT

## 2022-12-11 PROCEDURE — C9113 INJ PANTOPRAZOLE SODIUM, VIA: HCPCS | Performed by: NURSE PRACTITIONER

## 2022-12-11 PROCEDURE — 2580000003 HC RX 258: Performed by: ANESTHESIOLOGY

## 2022-12-11 PROCEDURE — 86850 RBC ANTIBODY SCREEN: CPT

## 2022-12-11 PROCEDURE — 2580000003 HC RX 258: Performed by: NURSE PRACTITIONER

## 2022-12-11 PROCEDURE — 3E0G8GC INTRODUCTION OF OTHER THERAPEUTIC SUBSTANCE INTO UPPER GI, VIA NATURAL OR ARTIFICIAL OPENING ENDOSCOPIC: ICD-10-PCS | Performed by: STUDENT IN AN ORGANIZED HEALTH CARE EDUCATION/TRAINING PROGRAM

## 2022-12-11 PROCEDURE — 94761 N-INVAS EAR/PLS OXIMETRY MLT: CPT

## 2022-12-11 PROCEDURE — 6360000004 HC RX CONTRAST MEDICATION: Performed by: NURSE PRACTITIONER

## 2022-12-11 PROCEDURE — 85025 COMPLETE CBC W/AUTO DIFF WBC: CPT

## 2022-12-11 PROCEDURE — 80069 RENAL FUNCTION PANEL: CPT

## 2022-12-11 PROCEDURE — 83735 ASSAY OF MAGNESIUM: CPT

## 2022-12-11 PROCEDURE — 2720000010 HC SURG SUPPLY STERILE: Performed by: STUDENT IN AN ORGANIZED HEALTH CARE EDUCATION/TRAINING PROGRAM

## 2022-12-11 PROCEDURE — 82270 OCCULT BLOOD FECES: CPT

## 2022-12-11 PROCEDURE — 3700000000 HC ANESTHESIA ATTENDED CARE: Performed by: STUDENT IN AN ORGANIZED HEALTH CARE EDUCATION/TRAINING PROGRAM

## 2022-12-11 PROCEDURE — P9016 RBC LEUKOCYTES REDUCED: HCPCS

## 2022-12-11 PROCEDURE — 86900 BLOOD TYPING SEROLOGIC ABO: CPT

## 2022-12-11 PROCEDURE — 3700000001 HC ADD 15 MINUTES (ANESTHESIA): Performed by: STUDENT IN AN ORGANIZED HEALTH CARE EDUCATION/TRAINING PROGRAM

## 2022-12-11 RX ORDER — EPINEPHRINE 1 MG/ML
INJECTION, SOLUTION, CONCENTRATE INTRAVENOUS PRN
Status: DISCONTINUED | OUTPATIENT
Start: 2022-12-11 | End: 2022-12-11 | Stop reason: ALTCHOICE

## 2022-12-11 RX ORDER — SODIUM CHLORIDE 9 MG/ML
INJECTION, SOLUTION INTRAVENOUS CONTINUOUS PRN
Status: DISCONTINUED | OUTPATIENT
Start: 2022-12-11 | End: 2022-12-11 | Stop reason: SDUPTHER

## 2022-12-11 RX ORDER — PROPOFOL 10 MG/ML
INJECTION, EMULSION INTRAVENOUS PRN
Status: DISCONTINUED | OUTPATIENT
Start: 2022-12-11 | End: 2022-12-11 | Stop reason: SDUPTHER

## 2022-12-11 RX ORDER — 0.9 % SODIUM CHLORIDE 0.9 %
500 INTRAVENOUS SOLUTION INTRAVENOUS ONCE
Status: COMPLETED | OUTPATIENT
Start: 2022-12-11 | End: 2022-12-11

## 2022-12-11 RX ORDER — SODIUM CHLORIDE 9 MG/ML
INJECTION, SOLUTION INTRAVENOUS PRN
Status: DISCONTINUED | OUTPATIENT
Start: 2022-12-11 | End: 2022-12-13

## 2022-12-11 RX ORDER — PANTOPRAZOLE SODIUM 40 MG/10ML
40 INJECTION, POWDER, LYOPHILIZED, FOR SOLUTION INTRAVENOUS 2 TIMES DAILY
Status: DISCONTINUED | OUTPATIENT
Start: 2022-12-11 | End: 2022-12-22 | Stop reason: HOSPADM

## 2022-12-11 RX ADMIN — IOPAMIDOL 75 ML: 755 INJECTION, SOLUTION INTRAVENOUS at 10:46

## 2022-12-11 RX ADMIN — TETRAHYDROZOLINE HCL 0.05% 1 DROP: 0.05 SOLUTION/ DROPS INTRAOCULAR at 10:09

## 2022-12-11 RX ADMIN — SODIUM CHLORIDE 500 ML: 900 INJECTION, SOLUTION INTRAVENOUS at 08:00

## 2022-12-11 RX ADMIN — INSULIN LISPRO 8 UNITS: 100 INJECTION, SOLUTION INTRAVENOUS; SUBCUTANEOUS at 12:31

## 2022-12-11 RX ADMIN — PHENYLEPHRINE HYDROCHLORIDE 100 MCG: 10 INJECTION, SOLUTION INTRAMUSCULAR; INTRAVENOUS; SUBCUTANEOUS at 13:57

## 2022-12-11 RX ADMIN — SODIUM CHLORIDE, PRESERVATIVE FREE 10 ML: 5 INJECTION INTRAVENOUS at 10:08

## 2022-12-11 RX ADMIN — POLYVINYL ALCOHOL 2 DROP: 14 SOLUTION/ DROPS OPHTHALMIC at 00:07

## 2022-12-11 RX ADMIN — PROPOFOL 20 MG: 10 INJECTION, EMULSION INTRAVENOUS at 14:04

## 2022-12-11 RX ADMIN — POLYVINYL ALCOHOL 2 DROP: 14 SOLUTION/ DROPS OPHTHALMIC at 10:11

## 2022-12-11 RX ADMIN — LATANOPROST 1 DROP: 50 SOLUTION OPHTHALMIC at 21:16

## 2022-12-11 RX ADMIN — INSULIN LISPRO 4 UNITS: 100 INJECTION, SOLUTION INTRAVENOUS; SUBCUTANEOUS at 10:12

## 2022-12-11 RX ADMIN — SODIUM CHLORIDE: 9 INJECTION, SOLUTION INTRAVENOUS at 13:26

## 2022-12-11 RX ADMIN — IOPAMIDOL 75 ML: 755 INJECTION, SOLUTION INTRAVENOUS at 16:15

## 2022-12-11 RX ADMIN — POLYVINYL ALCOHOL 2 DROP: 14 SOLUTION/ DROPS OPHTHALMIC at 21:31

## 2022-12-11 RX ADMIN — PROPOFOL 20 MG: 10 INJECTION, EMULSION INTRAVENOUS at 13:50

## 2022-12-11 RX ADMIN — POLYVINYL ALCOHOL 2 DROP: 14 SOLUTION/ DROPS OPHTHALMIC at 18:14

## 2022-12-11 RX ADMIN — SODIUM CHLORIDE, PRESERVATIVE FREE 10 ML: 5 INJECTION INTRAVENOUS at 20:19

## 2022-12-11 RX ADMIN — POLYVINYL ALCOHOL 2 DROP: 14 SOLUTION/ DROPS OPHTHALMIC at 17:10

## 2022-12-11 RX ADMIN — POLYVINYL ALCOHOL 2 DROP: 14 SOLUTION/ DROPS OPHTHALMIC at 14:35

## 2022-12-11 RX ADMIN — INSULIN GLARGINE 15 UNITS: 100 INJECTION, SOLUTION SUBCUTANEOUS at 21:10

## 2022-12-11 RX ADMIN — POLYVINYL ALCOHOL 2 DROP: 14 SOLUTION/ DROPS OPHTHALMIC at 02:15

## 2022-12-11 RX ADMIN — SODIUM CHLORIDE 500 ML: 9 INJECTION, SOLUTION INTRAVENOUS at 20:14

## 2022-12-11 RX ADMIN — TETRAHYDROZOLINE HCL 0.05% 1 DROP: 0.05 SOLUTION/ DROPS INTRAOCULAR at 14:35

## 2022-12-11 RX ADMIN — PROPOFOL 50 MG: 10 INJECTION, EMULSION INTRAVENOUS at 13:46

## 2022-12-11 RX ADMIN — POLYVINYL ALCOHOL 2 DROP: 14 SOLUTION/ DROPS OPHTHALMIC at 05:51

## 2022-12-11 RX ADMIN — POLYVINYL ALCOHOL 2 DROP: 14 SOLUTION/ DROPS OPHTHALMIC at 12:47

## 2022-12-11 RX ADMIN — PHENYLEPHRINE HYDROCHLORIDE 100 MCG: 10 INJECTION, SOLUTION INTRAMUSCULAR; INTRAVENOUS; SUBCUTANEOUS at 13:42

## 2022-12-11 RX ADMIN — PANTOPRAZOLE SODIUM 40 MG: 40 INJECTION, POWDER, LYOPHILIZED, FOR SOLUTION INTRAVENOUS at 20:20

## 2022-12-11 RX ADMIN — POLYVINYL ALCOHOL 2 DROP: 14 SOLUTION/ DROPS OPHTHALMIC at 04:16

## 2022-12-11 RX ADMIN — POLYVINYL ALCOHOL 2 DROP: 14 SOLUTION/ DROPS OPHTHALMIC at 20:17

## 2022-12-11 RX ADMIN — SODIUM CHLORIDE 500 MG: 9 INJECTION, SOLUTION INTRAVENOUS at 14:55

## 2022-12-11 RX ADMIN — LEVETIRACETAM 500 MG: 100 INJECTION, SOLUTION INTRAVENOUS at 22:22

## 2022-12-11 RX ADMIN — SODIUM CHLORIDE, PRESERVATIVE FREE 10 ML: 5 INJECTION INTRAVENOUS at 10:07

## 2022-12-11 RX ADMIN — POLYVINYL ALCOHOL 2 DROP: 14 SOLUTION/ DROPS OPHTHALMIC at 10:59

## 2022-12-11 RX ADMIN — PHENYLEPHRINE HYDROCHLORIDE 100 MCG: 10 INJECTION, SOLUTION INTRAMUSCULAR; INTRAVENOUS; SUBCUTANEOUS at 13:30

## 2022-12-11 RX ADMIN — HEPARIN SODIUM 5000 UNITS: 5000 INJECTION INTRAVENOUS; SUBCUTANEOUS at 05:50

## 2022-12-11 RX ADMIN — PROPOFOL 50 MG: 10 INJECTION, EMULSION INTRAVENOUS at 13:41

## 2022-12-11 RX ADMIN — MORPHINE SULFATE 2 MG: 2 INJECTION, SOLUTION INTRAMUSCULAR; INTRAVENOUS at 12:27

## 2022-12-11 RX ADMIN — TETRAHYDROZOLINE HCL 0.05% 1 DROP: 0.05 SOLUTION/ DROPS INTRAOCULAR at 21:16

## 2022-12-11 RX ADMIN — PANTOPRAZOLE SODIUM 40 MG: 40 INJECTION, POWDER, LYOPHILIZED, FOR SOLUTION INTRAVENOUS at 12:34

## 2022-12-11 ASSESSMENT — PAIN DESCRIPTION - LOCATION
LOCATION: HEAD
LOCATION: BACK
LOCATION: BACK

## 2022-12-11 ASSESSMENT — PAIN - FUNCTIONAL ASSESSMENT
PAIN_FUNCTIONAL_ASSESSMENT: ACTIVITIES ARE NOT PREVENTED
PAIN_FUNCTIONAL_ASSESSMENT: ACTIVITIES ARE NOT PREVENTED

## 2022-12-11 ASSESSMENT — PAIN DESCRIPTION - ORIENTATION
ORIENTATION: RIGHT
ORIENTATION: LOWER;POSTERIOR
ORIENTATION: MID

## 2022-12-11 ASSESSMENT — PAIN DESCRIPTION - ONSET: ONSET: ON-GOING

## 2022-12-11 ASSESSMENT — PAIN DESCRIPTION - DESCRIPTORS
DESCRIPTORS: ACHING
DESCRIPTORS: ACHING

## 2022-12-11 ASSESSMENT — PAIN SCALES - GENERAL
PAINLEVEL_OUTOF10: 4
PAINLEVEL_OUTOF10: 4
PAINLEVEL_OUTOF10: 8
PAINLEVEL_OUTOF10: 0

## 2022-12-11 ASSESSMENT — PAIN DESCRIPTION - FREQUENCY: FREQUENCY: INTERMITTENT

## 2022-12-11 ASSESSMENT — PAIN DESCRIPTION - PAIN TYPE: TYPE: ACUTE PAIN

## 2022-12-11 NOTE — PROGRESS NOTES
Pt ambulated to bedside commode by RN and PCA. While on commode pt stated \"I dont feel good\" followed with unresponsive state. HR decreased to 30s and BP was hypotensive in 70s sys. Staff Assist called. Pt was lifted back to bed and 1L NS bolus was given per ICU team.     Pt was noted to have 1L watery BM, dark brown in color with red tinge. Repeat labs collected. Neurocritical care to be notified.      Electronically signed by Rhonda Saleh RN on 12/11/2022 at 11:03 AM

## 2022-12-11 NOTE — PROGRESS NOTES
NEUROSURGERY POST-OP PROGRESS NOTE    Patient Name: Cordella Boas YOB: 1955   Sex: Female Age: 79 yrs     Medical Record Number: [id number] Acct Number: [de-identified]   Room Number: 9989/5146-50 Hospital Day: Hospital Day: 14     Interval History:  Procedure(s) (LRB):  STAGE II: RESECTION OF RIGHT PETROCLIVAL MENINGIOMA (N/A)    Subjective: Neurologically stable, no new complaints today     Drop in hemoglobin 12 -> 7.8 concern for active GI bleed  Stat CT abdomen  GI consult  PRBC 1 unit - will likely need more      Objective:    VITAL SIGNS   BP (!) 131/55   Pulse 66   Temp 98.3 °F (36.8 °C) (Oral)   Resp 20   Ht 5' 5\" (1.651 m)   Wt (!) 347 lb 0.1 oz (157.4 kg)   SpO2 94%   BMI 57.74 kg/m²    Height Height: 5' 5\" (165.1 cm)   Weight Weight: (!) 347 lb 0.1 oz (157.4 kg)          Allergies Allergies   Allergen Reactions    Keflex [Cephalexin] Itching and Rash    Codeine Nausea And Vomiting    Tomato Rash      NPO Status ADULT DIET; Dysphagia - Minced and Moist  ADULT ORAL NUTRITION SUPPLEMENT; Lunch; Standard 4 oz Oral Supplement  ADULT ORAL NUTRITION SUPPLEMENT; Lunch; Frozen Oral Supplement   Isolation No active isolations     LABS   Basic Metabolic Profile Recent Labs     12/11/22  0859         CO2 28   BUN 74*   CREATININE 0.7   GLUCOSE 277*   PHOS 4.4   MG 1.80      Complete Blood Count Recent Labs     12/10/22  0442 12/11/22  0538 12/11/22  0859   WBC 12.9* 12.5* 10.5   RBC 4.07 2.91* 2.55*      Coagulation Studies No results for input(s): PTT, INR in the last 72 hours.     Invalid input(s): PLATELETS, PROA, PT, PTTA     MEDICATIONS   Inpatient Medications     cetirizine, 10 mg, Oral, Daily    hydroCHLOROthiazide, 25 mg, Oral, Daily    acyclovir, 400 mg, Oral, TID    insulin lispro, 0-16 Units, SubCUTAneous, TID WC insulin lispro, 0-4 Units, SubCUTAneous, Nightly    polyvinyl alcohol, 2 drop, Right Eye, Q2H    levETIRAcetam, 500 mg, Oral, BID    methIMAzole, 10 mg, Oral, Daily    insulin glargine, 15 Units, SubCUTAneous, Nightly    melatonin, 5 mg, Oral, Nightly    sodium chloride flush, 5-40 mL, IntraVENous, 2 times per day    heparin (porcine), 5,000 Units, SubCUTAneous, 3 times per day    polyethylene glycol, 17 g, Oral, Daily    tetrahydrozoline, 1 drop, Both Eyes, TID    [Held by provider] carvedilol, 12.5 mg, Oral, BID WC    losartan, 100 mg, Oral, Daily    sodium chloride flush, 5-40 mL, IntraVENous, 2 times per day    sennosides-docusate sodium, 1 tablet, Oral, BID    latanoprost, 1 drop, Both Eyes, Nightly   Infusions    sodium chloride      sodium chloride      sodium chloride      dextrose        Antibiotics   Recent Abx Admin                     acyclovir (ZOVIRAX) capsule 400 mg (mg) 400 mg Given 12/10/22 2141     400 mg Given  1439     400 mg Given  1040                   Imaging:   Abdominal CT  Impression       1.  9.5 cm mass in the right hepatic lobe which does not appear to represent a hemangioma. Differential diagnosis would include primary or secondary malignancy. Liver mass protocol MRI with and without contrast is recommended. 2.  No intra-abdominal hemorrhage. 3.  Mild cholelithiasis. 4.  3.5 cm right ovarian cyst, the CT appearance suggests a simple cyst indicating a benign finding.      Neurologic Exam:  Mental status: awake and alert and oriented x4    Cranial Nerves:  II: Visual acuity not tested, denies new visual changes / diplopia  III, IV, VI: PERRL, 3 mm bilaterally, EOMI,Patient had rightward gaze deviation but was able to follow when prompted   V: Facial sensation intact bilaterally to touch  VII: R sided facial droop  VIII: Hearing intact bilaterally to spoken voice on L, no hearing on R  IX: Palate movement equal bilaterally  XI: Shoulder shrug equal bilaterally  XII: Tongue deviates to R      Musculoskeletal:   Gait: Not tested   Tone: normal  Sensory: intact to all extremities  Motor strength:    Right  Left    Right  Left    Deltoid  5 5  Hip Flex  5 5   Biceps  5 5  Knee Extensors  5 5   Triceps  5 5  Knee Flexors  5 5   Wrist Ext  5 5  Ankle Dorsiflex. 5 5   Wrist Flex  5 5  Ankle Plantarflex. 5 5   Handgrip  5 5  Ext Ernie Longus  5 5   Thumb Ext  5 5         Mastoid dressing: intact, clean and dry      Respiratory:  Unlabored respiratory pattern    Abdomen:   Soft, ND   Last BM 12/11    Cardiovascular:  Warm, well perfused    Assessment   Patient is a 78 yo F s/p Procedure(s) (LRB):  STAGE II: RESECTION OF RIGHT PETROCLIVAL MENINGIOMA (N/A) per Dr. Yahaira Yao:  Neurologic exam frequency:q4  Mobility:PT/OT   SLP continue  PICC line  DVT Prophylaxis: SCDs and heparin (hold)  Keppra as ordered  Bowel Regimen: senna and glycolax (hold)  Pain control:alex   keep sbp < 160  Incisional Care:Mastoid dressing in place check q 4 hr and let NS know if leaking  Acyclovir for 10 days - completed course  Dispo Planning:ICU    Drop in hemoglobin 12 -> 7.8 concern for active GI bleed  Stat CT abdomen - no free fluid, does have incidental liver mass that will need f/u imaging once GI issues resolved. GI consult  Protonix 40mg IV BID  PRBC 1 unit - will likely need more  Check HH Q6H - if more blood products required check INR / fibrinogen    Patient was discussed with Dr. Va Lim who agrees with above assessment and plan. Electronically signed by: LEE Kelly CNP, 12/11/2022 9:39 AM   Neurosurgery Nurse Practitioner  [phone]    Critical Care:  Due to the immediate potential for life-threatening deterioration due to neurological failure, I spent 45 minutes providing critical care.   This time excludes time spent performing procedures but includes time spent on direct patient care, history retrieval, review of the chart, and discussions with patient, family, and consultant(s).

## 2022-12-11 NOTE — PLAN OF CARE
Problem: Chronic Conditions and Co-morbidities  Goal: Patient's chronic conditions and co-morbidity symptoms are monitored and maintained or improved  Outcome: Progressing  Flowsheets (Taken 12/11/2022 0000)  Care Plan - Patient's Chronic Conditions and Co-Morbidity Symptoms are Monitored and Maintained or Improved:   Monitor and assess patient's chronic conditions and comorbid symptoms for stability, deterioration, or improvement   Collaborate with multidisciplinary team to address chronic and comorbid conditions and prevent exacerbation or deterioration   Update acute care plan with appropriate goals if chronic or comorbid symptoms are exacerbated and prevent overall improvement and discharge     Problem: Discharge Planning  Goal: Discharge to home or other facility with appropriate resources  Outcome: Progressing  Flowsheets (Taken 12/11/2022 0000)  Discharge to home or other facility with appropriate resources:   Identify barriers to discharge with patient and caregiver   Arrange for needed discharge resources and transportation as appropriate     Problem: Pain  Goal: Verbalizes/displays adequate comfort level or baseline comfort level  Outcome: Progressing  Flowsheets (Taken 12/10/2022 2000)  Verbalizes/displays adequate comfort level or baseline comfort level:   Encourage patient to monitor pain and request assistance   Assess pain using appropriate pain scale   Administer analgesics based on type and severity of pain and evaluate response     Problem: Neurosensory - Adult  Goal: Absence of seizures  Outcome: Progressing  Flowsheets (Taken 12/11/2022 0000)  Absence of seizures: Monitor for seizure activity.   If seizure occurs, document type and location of movements and any associated apnea  Goal: Remains free of injury related to seizures activity  Outcome: Progressing  Flowsheets (Taken 12/11/2022 0000)  Remains free of injury related to seizure activity: Maintain airway, patient safety  and administer oxygen as ordered  Goal: Achieves maximal functionality and self care  Outcome: Progressing  Flowsheets (Taken 12/11/2022 0000)  Achieves maximal functionality and self care: Monitor swallowing and airway patency with patient fatigue and changes in neurological status

## 2022-12-11 NOTE — ANESTHESIA PRE PROCEDURE
Department of Anesthesiology  Preprocedure Note       Name:  Kay Farris   Age:  79 y.o.  :  1955                                          MRN:  [id number]         Date:  2022      Surgeon: Maribel Drummond):  Drew Patel MD    Procedure: Procedure(s):  EGD ESOPHAGOGASTRODUODENOSCOPY    Medications prior to admission:   Prior to Admission medications    Medication Sig Start Date End Date Taking? Authorizing Provider   medical marijuana Take 1 each by mouth as needed. Historical Provider, MD   losartan (COZAAR) 50 MG tablet Take 50 mg by mouth daily    Historical Provider, MD   methIMAzole (TAPAZOLE) 5 MG tablet Take 10 mg by mouth daily    Historical Provider, MD   albuterol sulfate HFA (PROVENTIL;VENTOLIN;PROAIR) 108 (90 Base) MCG/ACT inhaler Inhale 2 puffs into the lungs every 4 hours as needed 10/18/21   Historical Provider, MD   metFORMIN (GLUCOPHAGE-XR) 500 MG extended release tablet Take 1,000 mg by mouth Daily with supper    Historical Provider, MD   latanoprost (XALATAN) 0.005 % ophthalmic solution Place 1 drop into both eyes nightly    Historical Provider, MD   carvedilol (COREG) 6.25 MG tablet Take 6.25 mg by mouth 2 times daily (with meals)    Historical Provider, MD   pravastatin (PRAVACHOL) 20 MG tablet Take 20 mg by mouth nightly     Historical Provider, MD   hydrochlorothiazide (HYDRODIURIL) 25 MG tablet Take 25 mg by mouth daily    Historical Provider, MD       Current medications:    No current facility-administered medications for this visit. No current outpatient medications on file.      Facility-Administered Medications Ordered in Other Visits   Medication Dose Route Frequency Provider Last Rate Last Admin    0.9 % sodium chloride infusion   IntraVENous PRN Marcel Valdovinos APRN - CNP        pantoprazole (PROTONIX) injection 40 mg  40 mg IntraVENous BID LEE Diaz - CNP   40 mg at 22 1234    levETIRAcetam (KEPPRA) 500 mg in sodium chloride 0.9 % 100 mL IVPB  500 mg IntraVENous Once [facility], APRN - CNP        cetirizine (ZYRTEC) tablet 10 mg  10 mg Oral Daily Alanna Clonts, APRN - CNP   10 mg at 12/10/22 3470    hydroCHLOROthiazide (HYDRODIURIL) tablet 25 mg  25 mg Oral Daily Alanna Clonts, APRN - CNP   25 mg at 12/10/22 8441    bisacodyl (DULCOLAX) suppository 10 mg  10 mg Rectal Daily PRN Tejas Garden, APRN - CNP   10 mg at 12/10/22 1732    acyclovir (ZOVIRAX) capsule 400 mg  400 mg Oral TID [facility], APRN - CNP   400 mg at 12/10/22 2141    insulin lispro (1 Unit Dial) (HUMALOG/ADMELOG) pen 0-16 Units  0-16 Units SubCUTAneous TID WC Medardo Cam MD   8 Units at 12/11/22 1231    insulin lispro (1 Unit Dial) (HUMALOG/ADMELOG) pen 0-4 Units  0-4 Units SubCUTAneous Nightly Medardo Cam MD        polyvinyl alcohol (LIQUIFILM TEARS) 1.4 % ophthalmic solution 2 drop  2 drop Right Eye Q2H Medardo Cam MD   2 drop at 12/11/22 1247    labetalol (NORMODYNE;TRANDATE) injection 5 mg  5 mg IntraVENous Q4H PRN Medardo Cam MD        levETIRAcetam (KEPPRA) tablet 500 mg  500 mg Oral BID Medardo Cam MD   500 mg at 12/10/22 2141    methIMAzole (TAPAZOLE) tablet 10 mg  10 mg Oral Daily Medardo Cam MD   10 mg at 12/10/22 5306    insulin glargine (LANTUS;BASAGLAR) injection pen 15 Units  15 Units SubCUTAneous Nightly Medardo Cam MD   15 Units at 12/10/22 2106    melatonin disintegrating tablet 5 mg  5 mg Oral Nightly Medardo Cam MD   5 mg at 12/10/22 2141    sodium chloride flush 0.9 % injection 5-40 mL  5-40 mL IntraVENous 2 times per day Elise Mcdonnell MD   10 mL at 12/11/22 1008    sodium chloride flush 0.9 % injection 5-40 mL  5-40 mL IntraVENous PRN Medardo Cam MD        0.9 % sodium chloride infusion  25 mL IntraVENous PRN Elise Mcdonnell MD       [facility] by provider] heparin (porcine) injection 5,000 Units  5,000 Units SubCUTAneous 3 times per day Robinson Black MD   5,000 Units at 12/11/22 0550    polyethylene glycol (GLYCOLAX) packet 17 g  17 g Oral Daily Tonny Cam MD   17 g at 12/10/22 9711    tetrahydrozoline 0.05 % ophthalmic solution 1 drop  1 drop Both Eyes TID Tonny Cam MD   1 drop at 12/11/22 1009    [Held by provider] carvedilol (COREG) tablet 12.5 mg  12.5 mg Oral BID WC Tonny Cam MD   12.5 mg at 12/10/22 1657    losartan (COZAAR) tablet 100 mg  100 mg Oral Daily Tonny Cam MD   100 mg at 12/10/22 2292    sodium chloride flush 0.9 % injection 5-40 mL  5-40 mL IntraVENous 2 times per day Robinson Black MD   10 mL at 12/11/22 1007    sodium chloride flush 0.9 % injection 5-40 mL  5-40 mL IntraVENous PRN Tonny Cam MD        0.9 % sodium chloride infusion   IntraVENous PRN Robinson Black MD        acetaminophen (TYLENOL) tablet 650 mg  650 mg Oral Q4H PRN Tonny Cam MD   650 mg at 12/06/22 1802    oxyCODONE (ROXICODONE) immediate release tablet 5 mg  5 mg Oral Q4H PRN Tonny Cam MD   5 mg at 12/09/22 0515    Or    oxyCODONE (ROXICODONE) immediate release tablet 10 mg  10 mg Oral Q4H PRN Tonny Cam MD   10 mg at 12/10/22 2028    morphine (PF) injection 2 mg  2 mg IntraVENous Q3H PRN oTnny Cam MD   2 mg at 12/11/22 1227    ondansetron (ZOFRAN-ODT) disintegrating tablet 4 mg  4 mg Oral Q8H PRN Tonny Cam MD   4 mg at 12/02/22 1412    Or    ondansetron (ZOFRAN) injection 4 mg  4 mg IntraVENous Q6H PRN Tonny Cam MD   4 mg at 12/01/22 2001    hydrALAZINE (APRESOLINE) injection 10 mg  10 mg IntraVENous Q1H PRN Tonny Cam MD   10 mg at 12/04/22 0816    albuterol (PROVENTIL) nebulizer solution 2.5 mg  2.5 mg Nebulization Q4H PRN Tonny Cam MD        latanoprost (XALATAN) 0.005 % ophthalmic solution 1 drop  1 drop Both Eyes Nightly Karen Cam MD   1 drop at 12/10/22 2143    glucose chewable tablet 16 g  4 tablet Oral PRN Karen Cam MD        dextrose bolus 10% 125 mL  125 mL IntraVENous PRN Karen Cam MD        Or    dextrose bolus 10% 250 mL  250 mL IntraVENous PRN Karen Cam MD        glucagon (rDNA) injection 1 mg  1 mg SubCUTAneous PRN Karen Cam MD        dextrose 10 % infusion   IntraVENous Continuous PRN Karen Cam MD        [facility]) injection 6.25 mg  6.25 mg IntraMUSCular Q6H PRN Karen Cam MD   6.25 mg at 11/28/22 2136       Allergies: Allergies   Allergen Reactions    Keflex [Cephalexin] Itching and Rash    Codeine Nausea And Vomiting    Tomato Rash       Problem List:    Patient Active Problem List   Diagnosis Code    Asthma J45.909    Gastroesophageal reflux disease K21.9    Hypertension I10    Adiposity E66.9    S/P total knee arthroplasty Z96.659    Primary osteoarthritis of left knee M17.12    Morbid obesity with BMI of 50.0-59.9, adult (HCC) E66.01, Z68.43    Osteoarthritis of right hip M16.11    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    Acoustic neuroma (HCC) D33.3    S/P craniotomy Z98.890       Past Medical History:        Diagnosis Date    Arthritis     Asthma     mild    Brain tumor ([facility] [address] [address]     Diabetes mellitus (HCC)     borderline    High cholesterol     Hypertension     Imbalance     DUE TO TUMOR- USING ANKLE BRACE / WALKER PER PREOP H&P    Loss of hearing     bilateral reported    Vertigo        Past Surgical History:        Procedure Laterality Date    CRANIOTOMY N/A 11/29/2022    STAGE II: RESECTION OF RIGHT PETROCLIVAL MENINGIOMA performed by Yesika Razo MD at [address] Right 11/28/2022    RIGHT TRANSMASTOID / ANTERIOR MIDDLE CRANIAL FOSSA , ABDOMINAL FAT GRAFT EXTERNAL AUDITORY CANAL CLOSURE performed by Emmanuel Moran MD at [address] [facility] NEUROMA SURGERY Right 11/28/2022    STAGE 1, RIGHT TRANSLABYRINTHINE / RETROLABYRINTHINE CRANIOTOMY, PETROSECTOMY performed by Gigi Martinez. Ray Razo MD at [address] [place] [address] Right 11/8/2021    RIGHT TOTAL HIP ARTHROPLASTY POSTERIOR APPROACH - Basilia Bronson ADVANCED performed by Viji Han MD at [facility] Right 07/14/2015    TOTAL KNEE ARTHROPLASTY Left 10/14/15    TKA       Social History:    Social History     Tobacco Use    Smoking status: Former    Smokeless tobacco: Never   Substance Use Topics    Alcohol use: No                                Counseling given: Not Answered      Vital Signs (Current): There were no vitals filed for this visit.                                            BP Readings from Last 3 Encounters:   12/11/22 138/64   11/09/21 138/77   11/08/21 (!) 109/56       NPO Status:                                                                                 BMI:   Wt Readings from Last 3 Encounters:   12/11/22 (!) 347 lb 0.1 oz (157.4 kg)   12/08/22 (!) 348 lb 8.8 oz (158.1 kg)   11/08/21 285 lb (129.3 kg)     There is no height or weight on file to calculate BMI.    CBC:   Lab Results   Component Value Date/Time    WBC 10.5 12/11/2022 08:59 AM    RBC 2.55 12/11/2022 08:59 AM    HGB 7.8 12/11/2022 08:59 AM    HCT 23.6 12/11/2022 08:59 AM    MCV 92.4 12/11/2022 08:59 AM    RDW 15.5 12/11/2022 08:59 AM     12/11/2022 08:59 AM       CMP:   Lab Results   Component Value Date/Time     12/11/2022 08:59 AM    K 3.9 12/11/2022 08:59 AM    K 3.6 12/04/2022 03:44 AM     12/11/2022 08:59 AM    CO2 28 12/11/2022 08:59 AM    BUN 74 12/11/2022 08:59 AM    CREATININE 0.7 12/11/2022 08:59 AM    GFRAA >60 11/09/2021 04:41 AM    AGRATIO 1.6 10/12/2021 12:08 PM    LABGLOM >60 12/11/2022 08:59 AM    GLUCOSE 277 12/11/2022 08:59 AM    PROT 5.0 12/04/2022 03:44 AM    CALCIUM 7.8 12/11/2022 08:59 AM    BILITOT 0.5 12/04/2022 03:44 AM    ALKPHOS 48 12/04/2022 03:44 AM    AST 12 12/04/2022 03:44 AM    ALT 11 12/04/2022 03:44 AM       POC Tests:   Recent Labs     12/11/22  1227   POCGLU 276*       Coags:   Lab Results   Component Value Date/Time    PROTIME 13.8 12/11/2022 10:05 AM    INR 1.07 12/11/2022 10:05 AM    APTT 24.6 11/29/2022 04:19 AM       HCG (If Applicable): No results found for: PREGTESTUR, PREGSERUM, HCG, HCGQUANT     ABGs:   Lab Results   Component Value Date/Time    PHART 7.503 12/01/2022 09:21 PM    PO2ART 90.2 12/01/2022 09:21 PM    PKY0JQH 43.1 12/01/2022 09:21 PM    EPQ1JVB 34 12/01/2022 09:21 PM    BEART 9.6 12/01/2022 09:21 PM    B7KEJOJJ 98 12/01/2022 09:21 PM        Type & Screen (If Applicable):  Lab Results   Component Value Date    LABABO O 10/07/2015    LABRH Positive 10/07/2015       Drug/Infectious Status (If Applicable):  No results found for: HIV, HEPCAB    COVID-19 Screening (If Applicable):   Lab Results   Component Value Date/Time    COVID19 Not Detected 12/10/2022 04:36 PM    COVID19 Not Detected 11/01/2021 03:02 PM           Anesthesia Evaluation  Patient summary reviewed and Nursing notes reviewed no history of anesthetic complications:   Airway: Mallampati: II  TM distance: >3 FB   Neck ROM: full  Mouth opening: > = 3 FB   Dental:          Pulmonary:   (+) asthma:                            Cardiovascular:  Exercise tolerance: good (>4 METS),   (+) hypertension:,         Rhythm: regular  Rate: normal                    Neuro/Psych:                ROS comment: s/p translabyrinthine and middle cranial fossa 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                 Abdominal:             Vascular: negative vascular ROS. Other Findings:             Anesthesia Plan      MAC     ASA 3 - emergent     (  )  Induction: intravenous.       Anesthetic plan and risks discussed with patient. Plan discussed with CRNA.                     Sarah Montelongo, DO   12/11/2022

## 2022-12-11 NOTE — PROGRESS NOTES
Pt VSS. Pt on 1L NC. Pt has received 2 units PRBCs today. Last H&H: 9/26. 1. Pt BP remains on the lower side and skin still appears pale. Pt states she feels tired and worn down. Pt had one more BM this evening showing Brownish/black and red tinged watery stool. Repeat CTA completed per GI. Will continue to monitor.      Electronically signed by Rhonda Saleh RN on 12/11/2022 at 6:26 PM

## 2022-12-11 NOTE — PROCEDURES
[address] [place] [address]  Endoscopy Note    Patient: Mildred Simms  : 1955  CSN:     Procedure: Esophagogastroduodenoscopy with epi injection, hemostatic clip x 3, hemospray     Date:  2022     Surgeon:  Amina Lao MD         Preoperative Diagnosis:  Upper GI bleed     Postoperative Diagnosis:  Bleeding from giant duodenal ulcer     Anesthesia:  MAC anesthesia     Estimated blood loss:  >10 cc    Specimens Removed: None     Complications: None     Indications: This is a 79y.o. year old female who presents today with EGD to evaluate melena with syncopal episode     Description of Procedure:  Informed consent was obtained from the patient after explanation of indications, benefits and possible risks and complications of the procedure. The patient was then taken to the endoscopy suite, placed in the left lateral decubitus position and the above IV sedation was administrered. The Olympus videoendoscope was placed in the patient's mouth and under direct visualization passed into the esophagus. Visualization of the esophagus demonstrated normal mucosa. The scope was then advanced into the stomach and then into the second portion of the duodenum. The esophageal mucosa appeared normal throughout without evidence of ulcers, masses, or inflammation     There was evidence of old blood in the gastric body and 5 mm gastric polyps Retroflexion with moderate sized hiatal hernia. No bleeding found in the gastric body     Multiple duodenal ulcers were found with one ulcer 3 cm in size in the duodenal bulb with visible vessel. 1 cc epi was injected into 4 quadrants with blanching of the mucosa. Three hemostatic clips were placed over the visible vessel. There was evidence of bleeding with hemostatic clip placement. Hemospray was then deployed given continued bleeding into the area.      The patient tolerated the procedure well and was taken to the post anesthesia care unit in good condition.       Impression:    Normal esophagus  Moderate sized hiatal hernia, gastric body polyp, no evidence of bleeding source in stomach   Multiple duodenal ulcers, the largest of which was 3 cm in the duodenal bulb with visible vessel, s/p injection of epi, hemostatic clip placement x 3 and hemospray for continued bleeding     Recommendations:   Transfusion 1 unit pRBC   Continue IV PPI BID   Recommend CTA for evaluation, area of bleeding marked with clips, unclear whether bleeding has stopped     CHELSEY JORGE MD, MD  [address]

## 2022-12-11 NOTE — PROGRESS NOTES
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 S+O:   She had a large dark bowel movement per nurses with vasovagal symptom. Due to pale face, CBC was ordered and showed Decreased HB. GI consulted for concern of melenic stools with decreasing hemoglobin. STAT CT scan with 9.5 cm mass in the right hepatic lobe found incidentally. No retroperitoneal bleed or active GI bleeding seen on CT abd/pelvis. Pt received PRBC 1 unit. Pt awake Looks pale,without distress or dyspnea    AF, O2 93-94% nasal cannula 2L O2   Sitting up in bed. Facial nerve same HB - 5 no worsening. (Complete eye closure (might be passive) but asymmetry at rest) - no worsening. Mastoid wrap replaced (to red Velcro wrap), completely dry. Incision looks fine no evidence for leaking. Pseudomeningocele - same     Right neck ecchymotic is improving. Abdominal wound - well closed, no sign for hematoma, bleeding or infection. Lower extremities without evidence for DVT in palpation. Assessment:   1 day s/p resection of meningioma. 2 days s/p TL and MCF approach. Pt under SQ Heparin 5000 units 3 times/ day   Refresh artificial tears and eye moister chamber. Now, decreased HB and suspected GI bleeding. Plan:   - Planned EGD for evaluation today   - PPI IV BID   - will follow up in evening rounds.

## 2022-12-11 NOTE — ANESTHESIA POSTPROCEDURE EVALUATION
Department of Anesthesiology  Postprocedure Note    Patient: Kaylin Butler  MRN: [id number]  [place]: 1955  Date of evaluation: 12/11/2022      Procedure Summary     Date: 12/11/22 Room / Location: [facility] [address] [place] [address]    Anesthesia Start: 5230 Anesthesia Stop: 8754    Procedure: EGD CONTROL HEMORRHAGE Diagnosis:       Melena      (melena)    Surgeons: Francisco Gamez MD Responsible Provider:     Anesthesia Type: MAC ASA Status: 3 - Emergent          Anesthesia Type: No value filed.     Michelle Phase I: Michelle Score: 8    Michelle Phase II:        Anesthesia Post Evaluation    Patient location during evaluation: PACU  Patient participation: complete - patient participated  Level of consciousness: awake and alert  Airway patency: patent  Nausea & Vomiting: no nausea and no vomiting  Cardiovascular status: blood pressure returned to baseline  Respiratory status: acceptable  Hydration status: euvolemic

## 2022-12-11 NOTE — PLAN OF CARE
Problem: Chronic Conditions and Co-morbidities  Goal: Patient's chronic conditions and co-morbidity symptoms are monitored and maintained or improved  12/11/2022 1839 by Sharan Rahman RN  Outcome: Progressing  12/11/2022 0523 by Charlotte Aguero RN  Outcome: Progressing  Flowsheets (Taken 12/11/2022 0000)  Care Plan - Patient's Chronic Conditions and Co-Morbidity Symptoms are Monitored and Maintained or Improved:   Monitor and assess patient's chronic conditions and comorbid symptoms for stability, deterioration, or improvement   Collaborate with multidisciplinary team to address chronic and comorbid conditions and prevent exacerbation or deterioration   Update acute care plan with appropriate goals if chronic or comorbid symptoms are exacerbated and prevent overall improvement and discharge     Problem: Discharge Planning  Goal: Discharge to home or other facility with appropriate resources  12/11/2022 1839 by Sharan Rahman RN  Outcome: Progressing  12/11/2022 0523 by Charlotte Aguero RN  Outcome: Progressing  Flowsheets (Taken 12/11/2022 0000)  Discharge to home or other facility with appropriate resources:   Identify barriers to discharge with patient and caregiver   Arrange for needed discharge resources and transportation as appropriate     Problem: Pain  Goal: Verbalizes/displays adequate comfort level or baseline comfort level  12/11/2022 1839 by Sharan Rahman RN  Outcome: Progressing  12/11/2022 0523 by Charlotte Aguero RN  Outcome: Progressing  Flowsheets (Taken 12/10/2022 2000)  Verbalizes/displays adequate comfort level or baseline comfort level:   Encourage patient to monitor pain and request assistance   Assess pain using appropriate pain scale   Administer analgesics based on type and severity of pain and evaluate response     Problem: Safety - Adult  Goal: Free from fall injury  12/11/2022 1839 by Sharan Rahman RN  Outcome: Progressing  12/11/2022 0523 by Charlotte Aguero RN  Outcome: Progressing  Flowsheets  Taken 12/11/2022 0523 by Florin Villalpando RN  Free From Fall Injury: Instruct family/caregiver on patient safety  Taken 12/10/2022 1702 by Lula Man RN  Free From Fall Injury: Instruct family/caregiver on patient safety     Problem: Skin/Tissue Integrity  Goal: Absence of new skin breakdown  Description: 1. Monitor for areas of redness and/or skin breakdown  2. Assess vascular access sites hourly  3. Every 4-6 hours minimum:  Change oxygen saturation probe site  4. Every 4-6 hours:  If on nasal continuous positive airway pressure, respiratory therapy assess nares and determine need for appliance change or resting period. 12/11/2022 1839 by Michael Yan RN  Outcome: Progressing  12/11/2022 0523 by Florin Villalpando RN  Outcome: Progressing     Problem: ABCDS Injury Assessment  Goal: Absence of physical injury  12/11/2022 1839 by Michael Yan RN  Outcome: Progressing  12/11/2022 0523 by Florin Villalpando RN  Outcome: Progressing  Flowsheets  Taken 12/11/2022 0523 by Florin Villalpando RN  Absence of Physical Injury: Implement safety measures based on patient assessment  Taken 12/10/2022 1702 by Lula Man RN  Absence of Physical Injury: Implement safety measures based on patient assessment     Problem: Nutrition Deficit:  Goal: Optimize nutritional status  12/11/2022 1839 by Michael Yan RN  Outcome: Progressing  12/11/2022 0523 by Florin Villalpando RN  Outcome: Progressing     Problem: Neurosensory - Adult  Goal: Absence of seizures  12/11/2022 1839 by Michael Yan RN  Outcome: Progressing  12/11/2022 0523 by Florin Villalpando RN  Outcome: Progressing  Flowsheets (Taken 12/11/2022 0000)  Absence of seizures: Monitor for seizure activity.   If seizure occurs, document type and location of movements and any associated apnea  Goal: Remains free of injury related to seizures activity  12/11/2022 1839 by Michael Yan RN  Outcome: Progressing  12/11/2022 0523 by Florin Villalpando RN  Outcome: Progressing  Flowsheets (Taken 12/11/2022 0000)  Remains free of injury related to seizure activity: Maintain airway, patient safety  and administer oxygen as ordered  Goal: Achieves maximal functionality and self care  12/11/2022 1839 by Bebo Vásquez RN  Outcome: Progressing  12/11/2022 0523 by Edwina Granado RN  Outcome: Progressing  Flowsheets (Taken 12/11/2022 0000)  Achieves maximal functionality and self care: Monitor swallowing and airway patency with patient fatigue and changes in neurological status

## 2022-12-11 NOTE — CONSULTS
GI Consult Note      Admission Date: 11/28/2022  Hospital Day: Hospital Day: 14  Attending: Irlanda Romo. Jennifer Gupta MD  Date of service: 12/11/22    Subjective:     Chief complaint/ Reason for consult:   Melena     HPI: Karlene Bautista is a 79 y.o. obese female with history of meningioma s/p resection, htn with gi consulted for concern of melenic stools with decreasing hemoglobin. She had a large dark bowel movement per nurses with vasovagal symptom. STAT CT scan with 9.5 cm mass in the right hepatic lobe found incidentally. No retroperitoneal bleed or active GI bleeding seen on CT abd/pelvis. She denies any hematemesis. She feels more weak today and per patient's sister at bedside, she appears more pale. She is not on any blood thinners with exception of heparin subq                  Past Medical History:     Past Medical History:   Diagnosis Date    Arthritis     Asthma     mild    Brain tumor ([address]     Diabetes mellitus ([address]     borderline    High cholesterol     Hypertension     Imbalance     DUE TO TUMOR- USING ANKLE BRACE / WALKER PER PREOP H&P    Loss of hearing     bilateral reported    Vertigo        Past Surgical History:    Past Surgical History:   Procedure Laterality Date    CRANIOTOMY N/A 11/29/2022    STAGE II: RESECTION OF RIGHT PETROCLIVAL MENINGIOMA performed by Irlanda Romo. Jennifer Gupta MD at [address] Right 11/28/2022    RIGHT TRANSMASTOID / ANTERIOR MIDDLE CRANIAL FOSSA , ABDOMINAL FAT GRAFT EXTERNAL AUDITORY CANAL CLOSURE performed by Shae Bear MD at [place] [address] Right 11/28/2022    STAGE 1, RIGHT TRANSLABYRINTHINE / RETROLABYRINTHINE CRANIOTOMY, PETROSECTOMY performed by Irlanda Romo.  Jennifer Gupta MD at [address] Right 11/8/2021    RIGHT TOTAL HIP ARTHROPLASTY POSTERIOR APPROACH - Candance Colon ADVANCED [facility] Claud[facility] [facility] Right 07/14/2015    TOTAL KNEE ARTHROPLASTY Left 10/14/15    TKA Social History:     Tobacco use:   reports that she has quit smoking. She has never used smokeless tobacco.  Alcohol use:   reports no history of alcohol use. [facility]   reports no history of drug use.        Family History:       Problem Relation Age of Onset    Cancer Mother     Hypotension Mother     Cancer Father     Cancer Sister     Hypotension Sister     Cancer Maternal Grandmother     Cancer Maternal Grandfather     Cancer Paternal Grandmother     Cancer Paternal Grandfather     Hypotension Other     Cancer Other     Diabetes Other     Osteoarthritis Other            Medications:    pantoprazole  40 mg IntraVENous BID    cetirizine  10 mg Oral Daily    hydroCHLOROthiazide  25 mg Oral Daily    acyclovir  400 mg Oral TID    insulin lispro  0-16 Units SubCUTAneous TID WC    insulin lispro  0-4 Units SubCUTAneous Nightly    polyvinyl alcohol  2 drop Right Eye Q2H    levETIRAcetam  500 mg Oral BID    methIMAzole  10 mg Oral Daily    insulin glargine  15 Units SubCUTAneous Nightly    melatonin  5 mg Oral Nightly    sodium chloride flush  5-40 mL IntraVENous 2 times per day    [Held by provider] heparin (porcine)  5,000 Units SubCUTAneous 3 times per day    polyethylene glycol  17 g Oral Daily    tetrahydrozoline  1 drop Both Eyes TID    [Held by provider] carvedilol  12.5 mg Oral BID WC    losartan  100 mg Oral Daily    sodium chloride flush  5-40 mL IntraVENous 2 times per day    latanoprost  1 drop Both Eyes Nightly            REVIEW OF SYSTEMS:       Denies nausea, vomiting, shortness of breath   Endorses fatigue     Objective:   PHYSICAL EXAM:      Vitals:   Vitals:    12/11/22 1133 12/11/22 1140 12/11/22 1145 12/11/22 1150   BP: (!) 93/51  (!) 98/52 (!) 90/53   Pulse: 76 73 76 76   Resp: 16 15 19 20   Temp: 97.7 °F (36.5 °C)  98.1 °F (36.7 °C)    TempSrc: Oral  Oral    SpO2: 96% 97% 96% 96%   Weight:       Height:           General appearance: alert, cooperative, no distress, appears stated age  Eyes: Anicteric  Head: Normocephalic, without obvious abnormality  Lungs: clear to auscultation bilaterally, Normal Effort  Heart: regular rate and rhythm, normal S1 and S2, no murmurs or rubs  Abdomen: soft, non-tender. Bowel sounds normal. No masses,  no organomegaly. Extremities: atraumatic, no cyanosis or edema  Skin: warm and dry, no jaundice  Neuro: Grossly intact, A&OX3    Intake and output:   I/O last 3 completed shifts: In: 230 [P.O.:220; I.V.:10]  Out: 600 [Urine:600]    Lab Data:      CBC:   Recent Labs     12/10/22  0442 12/11/22  0538 12/11/22  0859   WBC 12.9* 12.5* 10.5   RBC 4.07 2.91* 2.55*   HGB 12.3 8.9* 7.8*   HCT 36.7 26.2* 23.6*    215 188   MCV 90.4 90.1 92.4   MCH 30.2 30.5 30.6   MCHC 33.4 33.8 33.1   RDW 15.4 15.1 15.5*        BMP:  Recent Labs     12/11/22  0859      K 3.9      CO2 28   BUN 74*   CREATININE 0.7   CALCIUM 7.8*   GLUCOSE 277*        Hepatic Function Panel:   No results for input(s): AST, ALT, BILIDIR, BILITOT, ALKPHOS in the last 72 hours. No results for input(s): LIPASE, AMYLASE in the last 72 hours. Recent Labs     12/11/22  1005   PROTIME 13.8   INR 1.07     No results for input(s): PTT in the last 72 hours. No results for input(s): OCCULTBLD in the last 72 hours. Imaging:    CT ABDOMEN PELVIS W WO CONTRAST Additional Contrast? Radiologist Recommendation   Final Result      1.  9.5 cm mass in the right hepatic lobe which does not appear to represent a hemangioma. Differential diagnosis would include primary or secondary malignancy. Liver mass protocol MRI with and without contrast is recommended. 2.  No intra-abdominal hemorrhage. 3.  Mild cholelithiasis. 4.  3.5 cm right ovarian cyst, the CT appearance suggests a simple cyst indicating a benign finding. MRI BRAIN W WO CONTRAST   Final Result      1.   Postsurgical changes from right temporal and transmastoid craniotomy with large amount of fat packing in the defect and extracranial soft tissues. Large subcutaneous fluid collection is present in the scalp surrounding and extending superiorly from    the fat packing. No diffusion restriction to indicate superimposed infection. 2.  Residual meningioma in the right cerebellopontine angle, prepontine cistern, and right Meckel's cave/cavernous sinus. The tumor extends partially encasing the basilar artery   3. Small area of acute to subacute ischemia in the right brachium pontis. Small enhancing lesions superior to the right tentorium is new from the prior study and may represent an area of subacute ischemia. 4.  Small amount of extra-axial hemorrhage along the right cerebellopontine angle. 5.  Stable 12 mm left frontal meningioma. CTA PULMONARY W CONTRAST   Final Result      Significantly limited study due to motion. 1. Significantly limited study due to streak artifact and suboptimal bolus. No evidence of a central embolus. 2. Multifocal airspace consolidation is present, suboptimally evaluated due to motion. This presumably reflects pneumonia. Follow-up chest CT is recommended to document resolution. 3. There is a large mass in the right hepatic lobe measuring 8.2 x 7.4 cm, demonstrating peripheral nodular enhancement. There is significant amount of artifact through this lesion. It is still favored to reflect a hemangioma. Recommend a multiphasic CT    of the abdomen for further assessment. This could be performed on a nonurgent basis, if clinically appropriate. 4. Partially calcified nodule arising from the left adrenal gland, most likely benign and possibly reflecting old adrenal hemorrhage. XR CHEST PORTABLE   Final Result      Similar appearance of patchy airspace disease. FL MODIFIED BARIUM SWALLOW W VIDEO   Final Result      1. Laryngeal penetration, but no evidence of tracheal aspiration.          XR CHEST PORTABLE   Final Result      Patchy left upper lobe airspace disease, atelectasis versus pneumonia. CT HEAD WO CONTRAST   Final Result      1. Interval postsurgical changes from resection of right cerebellopontine angle mass with associated right temporal mastoid resection and frontotemporal craniotomy and fat graft placement. 2.  Thin hyperdensity along the fat graft may represent small amount of postsurgical blood products. There is also a small focus of subarachnoid hemorrhage along the right temporal lobe. 3.  Mild pneumocephalus and bilateral subgaleal fluid is also likely postsurgical.               Known drug Allergies: Allergies   Allergen Reactions    Keflex [Cephalexin] Itching and Rash    Codeine Nausea And Vomiting    Tomato Rash           Assessment:   The patient is a 79 y.o. old female  with following problems:    Principal Problem:    Cerebellopontine angle meningioma ([address]  Active Problems:    Acoustic neuroma (HCC)    S/P craniotomy  Resolved Problems:    * No resolved hospital problems. *    Kevin Rico is a 79 y.o. obese female with history of meningioma s/p resection, htn with gi consulted for concern of melenic stools with decreasing hemoglobin. STAT CT scan without evidence of retropertoneal bleeding but with incidentally liber mass.        Recommendations:   Planned EGD for evaluation today   PPI IV BID   Discussed with nursing staff and with neurosurgical brian SMITH for endoscopic procedure     Elza Vidal   12/11/22

## 2022-12-12 ENCOUNTER — APPOINTMENT (OUTPATIENT)
Dept: INTERVENTIONAL RADIOLOGY/VASCULAR | Age: 67
DRG: 025 | End: 2022-12-12
Attending: NEUROLOGICAL SURGERY
Payer: MEDICARE

## 2022-12-12 ENCOUNTER — APPOINTMENT (OUTPATIENT)
Dept: VASCULAR LAB | Age: 67
DRG: 025 | End: 2022-12-12
Attending: NEUROLOGICAL SURGERY
Payer: MEDICARE

## 2022-12-12 LAB
ALBUMIN SERPL-MCNC: 2.8 G/DL (ref 3.4–5)
ANION GAP SERPL CALCULATED.3IONS-SCNC: 6 MMOL/L (ref 3–16)
BASOPHILS ABSOLUTE: 0 K/UL (ref 0–0.2)
BASOPHILS RELATIVE PERCENT: 0.3 %
BUN BLDV-MCNC: 40 MG/DL (ref 7–20)
CALCIUM SERPL-MCNC: 8.3 MG/DL (ref 8.3–10.6)
CHLORIDE BLD-SCNC: 109 MMOL/L (ref 99–110)
CO2: 29 MMOL/L (ref 21–32)
CREAT SERPL-MCNC: <0.5 MG/DL (ref 0.6–1.2)
EOSINOPHILS ABSOLUTE: 0.2 K/UL (ref 0–0.6)
EOSINOPHILS RELATIVE PERCENT: 2.2 %
GFR SERPL CREATININE-BSD FRML MDRD: >60 ML/MIN/{1.73_M2}
GLUCOSE BLD-MCNC: 150 MG/DL (ref 70–99)
GLUCOSE BLD-MCNC: 174 MG/DL (ref 70–99)
GLUCOSE BLD-MCNC: 182 MG/DL (ref 70–99)
GLUCOSE BLD-MCNC: 188 MG/DL (ref 70–99)
GLUCOSE BLD-MCNC: 236 MG/DL (ref 70–99)
HCT VFR BLD CALC: 24 % (ref 36–48)
HCT VFR BLD CALC: 24.3 % (ref 36–48)
HCT VFR BLD CALC: 25.5 % (ref 36–48)
HEMOGLOBIN: 8.1 G/DL (ref 12–16)
HEMOGLOBIN: 8.5 G/DL (ref 12–16)
HEMOGLOBIN: 8.7 G/DL (ref 12–16)
LYMPHOCYTES ABSOLUTE: 1.2 K/UL (ref 1–5.1)
LYMPHOCYTES RELATIVE PERCENT: 14.5 %
MCH RBC QN AUTO: 30.6 PG (ref 26–34)
MCHC RBC AUTO-ENTMCNC: 33.6 G/DL (ref 31–36)
MCV RBC AUTO: 91.3 FL (ref 80–100)
MONOCYTES ABSOLUTE: 0.6 K/UL (ref 0–1.3)
MONOCYTES RELATIVE PERCENT: 7.6 %
NEUTROPHILS ABSOLUTE: 6.3 K/UL (ref 1.7–7.7)
NEUTROPHILS RELATIVE PERCENT: 75.4 %
PDW BLD-RTO: 15.3 % (ref 12.4–15.4)
PERFORMED ON: ABNORMAL
PHOSPHORUS: 2.1 MG/DL (ref 2.5–4.9)
PLATELET # BLD: 174 K/UL (ref 135–450)
PMV BLD AUTO: 8.7 FL (ref 5–10.5)
POTASSIUM SERPL-SCNC: 3.6 MMOL/L (ref 3.5–5.1)
RBC # BLD: 2.79 M/UL (ref 4–5.2)
SODIUM BLD-SCNC: 144 MMOL/L (ref 136–145)
WBC # BLD: 8.4 K/UL (ref 4–11)

## 2022-12-12 PROCEDURE — C9113 INJ PANTOPRAZOLE SODIUM, VIA: HCPCS | Performed by: NURSE PRACTITIONER

## 2022-12-12 PROCEDURE — 36415 COLL VENOUS BLD VENIPUNCTURE: CPT

## 2022-12-12 PROCEDURE — 37244 VASC EMBOLIZE/OCCLUDE BLEED: CPT

## 2022-12-12 PROCEDURE — 2500000003 HC RX 250 WO HCPCS

## 2022-12-12 PROCEDURE — 2580000003 HC RX 258

## 2022-12-12 PROCEDURE — 93970 EXTREMITY STUDY: CPT

## 2022-12-12 PROCEDURE — C1769 GUIDE WIRE: HCPCS

## 2022-12-12 PROCEDURE — 80069 RENAL FUNCTION PANEL: CPT

## 2022-12-12 PROCEDURE — 36247 INS CATH ABD/L-EXT ART 3RD: CPT

## 2022-12-12 PROCEDURE — 85018 HEMOGLOBIN: CPT

## 2022-12-12 PROCEDURE — B414YZZ FLUOROSCOPY OF SUPERIOR MESENTERIC ARTERY USING OTHER CONTRAST: ICD-10-PCS | Performed by: RADIOLOGY

## 2022-12-12 PROCEDURE — 2720000010 HC SURG SUPPLY STERILE

## 2022-12-12 PROCEDURE — C1887 CATHETER, GUIDING: HCPCS

## 2022-12-12 PROCEDURE — 6360000002 HC RX W HCPCS

## 2022-12-12 PROCEDURE — 75726 ARTERY X-RAYS ABDOMEN: CPT

## 2022-12-12 PROCEDURE — 04L33DZ OCCLUSION OF HEPATIC ARTERY WITH INTRALUMINAL DEVICE, PERCUTANEOUS APPROACH: ICD-10-PCS | Performed by: RADIOLOGY

## 2022-12-12 PROCEDURE — 85014 HEMATOCRIT: CPT

## 2022-12-12 PROCEDURE — B41BYZZ FLUOROSCOPY OF OTHER INTRA-ABDOMINAL ARTERIES USING OTHER CONTRAST: ICD-10-PCS | Performed by: RADIOLOGY

## 2022-12-12 PROCEDURE — 99232 SBSQ HOSP IP/OBS MODERATE 35: CPT | Performed by: PHYSICAL MEDICINE & REHABILITATION

## 2022-12-12 PROCEDURE — 2700000000 HC OXYGEN THERAPY PER DAY

## 2022-12-12 PROCEDURE — 2580000003 HC RX 258: Performed by: NURSE PRACTITIONER

## 2022-12-12 PROCEDURE — 2709999900 HC NON-CHARGEABLE SUPPLY

## 2022-12-12 PROCEDURE — 99232 SBSQ HOSP IP/OBS MODERATE 35: CPT

## 2022-12-12 PROCEDURE — 85025 COMPLETE CBC W/AUTO DIFF WBC: CPT

## 2022-12-12 PROCEDURE — 36592 COLLECT BLOOD FROM PICC: CPT

## 2022-12-12 PROCEDURE — 75774 ARTERY X-RAY EACH VESSEL: CPT

## 2022-12-12 PROCEDURE — 36245 INS CATH ABD/L-EXT ART 1ST: CPT

## 2022-12-12 PROCEDURE — 99024 POSTOP FOLLOW-UP VISIT: CPT | Performed by: NURSE PRACTITIONER

## 2022-12-12 PROCEDURE — C1894 INTRO/SHEATH, NON-LASER: HCPCS

## 2022-12-12 PROCEDURE — 94761 N-INVAS EAR/PLS OXIMETRY MLT: CPT

## 2022-12-12 PROCEDURE — 6360000002 HC RX W HCPCS: Performed by: NURSE PRACTITIONER

## 2022-12-12 PROCEDURE — 6370000000 HC RX 637 (ALT 250 FOR IP)

## 2022-12-12 PROCEDURE — 6360000004 HC RX CONTRAST MEDICATION: Performed by: RADIOLOGY

## 2022-12-12 PROCEDURE — C1889 IMPLANT/INSERT DEVICE, NOC: HCPCS

## 2022-12-12 PROCEDURE — 1200000000 HC SEMI PRIVATE

## 2022-12-12 RX ORDER — PROPOFOL 10 MG/ML
INJECTION, EMULSION INTRAVENOUS
Status: DISCONTINUED
Start: 2022-12-12 | End: 2022-12-13

## 2022-12-12 RX ORDER — SODIUM CHLORIDE 9 MG/ML
INJECTION, SOLUTION INTRAVENOUS CONTINUOUS
Status: DISCONTINUED | OUTPATIENT
Start: 2022-12-12 | End: 2022-12-13

## 2022-12-12 RX ADMIN — MORPHINE SULFATE 2 MG: 2 INJECTION, SOLUTION INTRAMUSCULAR; INTRAVENOUS at 22:47

## 2022-12-12 RX ADMIN — POLYVINYL ALCOHOL 2 DROP: 14 SOLUTION/ DROPS OPHTHALMIC at 04:30

## 2022-12-12 RX ADMIN — SODIUM CHLORIDE: 9 INJECTION, SOLUTION INTRAVENOUS at 11:42

## 2022-12-12 RX ADMIN — POLYVINYL ALCOHOL 2 DROP: 14 SOLUTION/ DROPS OPHTHALMIC at 18:52

## 2022-12-12 RX ADMIN — POLYVINYL ALCOHOL 2 DROP: 14 SOLUTION/ DROPS OPHTHALMIC at 07:56

## 2022-12-12 RX ADMIN — INSULIN LISPRO 4 UNITS: 100 INJECTION, SOLUTION INTRAVENOUS; SUBCUTANEOUS at 16:45

## 2022-12-12 RX ADMIN — SODIUM CHLORIDE: 9 INJECTION, SOLUTION INTRAVENOUS at 23:23

## 2022-12-12 RX ADMIN — LEVETIRACETAM 500 MG: 100 INJECTION, SOLUTION INTRAVENOUS at 09:48

## 2022-12-12 RX ADMIN — INSULIN GLARGINE 15 UNITS: 100 INJECTION, SOLUTION SUBCUTANEOUS at 21:45

## 2022-12-12 RX ADMIN — POLYVINYL ALCOHOL 2 DROP: 14 SOLUTION/ DROPS OPHTHALMIC at 02:00

## 2022-12-12 RX ADMIN — POLYVINYL ALCOHOL 2 DROP: 14 SOLUTION/ DROPS OPHTHALMIC at 09:49

## 2022-12-12 RX ADMIN — PANTOPRAZOLE SODIUM 40 MG: 40 INJECTION, POWDER, LYOPHILIZED, FOR SOLUTION INTRAVENOUS at 21:22

## 2022-12-12 RX ADMIN — POLYVINYL ALCOHOL 2 DROP: 14 SOLUTION/ DROPS OPHTHALMIC at 11:44

## 2022-12-12 RX ADMIN — LATANOPROST 1 DROP: 50 SOLUTION OPHTHALMIC at 21:15

## 2022-12-12 RX ADMIN — TETRAHYDROZOLINE HCL 0.05% 1 DROP: 0.05 SOLUTION/ DROPS INTRAOCULAR at 21:25

## 2022-12-12 RX ADMIN — POLYVINYL ALCOHOL 2 DROP: 14 SOLUTION/ DROPS OPHTHALMIC at 21:40

## 2022-12-12 RX ADMIN — TETRAHYDROZOLINE HCL 0.05% 1 DROP: 0.05 SOLUTION/ DROPS INTRAOCULAR at 07:56

## 2022-12-12 RX ADMIN — SODIUM CHLORIDE, PRESERVATIVE FREE 10 ML: 5 INJECTION INTRAVENOUS at 21:26

## 2022-12-12 RX ADMIN — POLYVINYL ALCOHOL 2 DROP: 14 SOLUTION/ DROPS OPHTHALMIC at 06:17

## 2022-12-12 RX ADMIN — LEVETIRACETAM 500 MG: 100 INJECTION, SOLUTION INTRAVENOUS at 21:44

## 2022-12-12 RX ADMIN — POLYVINYL ALCOHOL 2 DROP: 14 SOLUTION/ DROPS OPHTHALMIC at 20:05

## 2022-12-12 RX ADMIN — POLYVINYL ALCOHOL 2 DROP: 14 SOLUTION/ DROPS OPHTHALMIC at 16:45

## 2022-12-12 RX ADMIN — PANTOPRAZOLE SODIUM 40 MG: 40 INJECTION, POWDER, LYOPHILIZED, FOR SOLUTION INTRAVENOUS at 09:06

## 2022-12-12 RX ADMIN — IOHEXOL 110 ML: 350 INJECTION, SOLUTION INTRAVENOUS at 15:19

## 2022-12-12 ASSESSMENT — PAIN SCALES - GENERAL
PAINLEVEL_OUTOF10: 7
PAINLEVEL_OUTOF10: 0

## 2022-12-12 ASSESSMENT — PAIN DESCRIPTION - LOCATION: LOCATION: HEAD

## 2022-12-12 NOTE — OP NOTE
Operative Report    PATIENT NAME: Cyn Morocho  YOB: 1955  MEDICAL RECORD# [id number]  SURGERY DATE: 11/28/2022  SURGEON:  Jorge Jean Baptiste MD, PhD  DICTATED BY: Jorge Jean Baptiste MD    PRE-OPERATIVE DIAGNOSIS: Right petroclival meningioma    POST-OPERATIVE DIAGNOSIS: Right petroclival meningioma    OPERATIVE PROCEDURES PERFORMED:  1. Stereotactic right combined [id number] and suboccipital craniotomy  2. Anterior petrosectomy and translabrynthine  approach (combined petrosal  approach)  3. Intraoperative neuromonitoring (MEP, SSEP, CN VII, EMELI)  4. Microdissection using the operating microscope  5. Intraoperative neuronavigation using Brainlab    SURGEON: Jorge Jean Baptiste M.D., Ph.D    Angelito Number: Adalberto Camejo M.D. ANESTHESIA: General endotracheal    ESTIMATED BLOOD LOSS:  250 mL     INDICATIONS: Mrs. Kate Gonzáles is a 79 y.o. woman who presented with a imbalance, ataxia, loss of hearing. She was found to have a large petroclival meningioma with significant brainstem mass effect. Given the patients symptoms and the size and the location of the lesion, surgical resection was recommended in a staged approach. The patient understands the risks and benefits of the procedure including but not limited to bleeding, infection, weakness, numbness, loss of hearing, facial weakness, diplopia, vascular injury, cerebral spinal fluid leak, worsened neurologic weakness, need for additional procedure, tracheostomy, gastrostomy, inability to completely remove the mass, anesthesia risks, and death. PROCEDURES IN DETAIL:    Under universal protocol and informed consent, the patient was brought to the operating room. General anesthesia was induced and the patient was intubated. The patient was positioned in a lateral position with the right side up. A medium axillary roll was placed under the left side and all pressure points were appropriately padded.  The right shoulder was pulled down and away using silk tape and the right arm was positioned on pillows in front of the patient. The patient was then placed in [place] 3-pin fixation. Brainlab image guidance was registered and verified. Using Brainlab navigation, a large C-shaped incision extending from the hairline at the superior temporal line to ~3 cm behind the mastoid tip was planned and the corresponding area shaved. The transverse-sigmoid sinuses were marked. An incision in the right abdomen was also marked for fat graft harvest. A Aleman catheter and a radial arterial line were placed. Neuromonitoring leads for SSEPs, MEPs, CN VII, and auditory brainstem responses were placed. Intravenous antibiotics (Ancef), Decadron, Keppra, and    Mannitol were given by anesthesia. A time out was completed and the surgical sites were prepped and draped in the usual sterile fashion. Dr. Javier Olivo began the operation and his portion of the surgery will be detailed in a separate operative note. Briefly, he opened the cranial of the incision, performed a translabyrinthine approach, and exposed the sigmoid sinus and presigmoid dura. With the sigmoid sinus exposure completed, I then proceeded with the craniotomy. Using a 4 mm cutter ball bit on a Dolphin drill, a bur hole was drilled overlying the transverse sinus. A second bur hole was made anteriorly at the root of the zygoma and a third bur hole was made 5 cm superior the zygoma, outlining the middle fossa exposure. Dura was dissected away from the bone using a dural dissector without difficulty. The craniotomy was turned with the B1 footplate and the petrosal bone flap was elevated in one piece. The bone overhanging the middle fossa floor at the inferior aspect of the craniotomy was drilled flush. We then continued the exposure by elevating the dura from middle fossa floor in a posterior to anterior direction.  The posterior edge of the petrous ridge, arcuate eminence, geniculate ganglion, and the GSPN were identified and preserved. Dr. Rafa Soares then proceeded with the anterior petrosectomy which will be detailed in his operative note. At the completion of the combined petrosal approach, we proceeded with closure of the craniotomy. Dr. Rafa Soares covered the middle ear structures and vestibule with a previously harvested abdomina fat and fibrin glue. The bone flap was secured with Synthes Craniofacial miniplates and self-tapping 4mm screws. Bone material harvested during drilling that was packed, mixed with Tisseel, and wrapped in     The wound was again copiously irrigated with Bacitracin solution and the incision was  closed in layers using 2-0 Vicryl sutures in the temporalis and the temporalis muscle fascia, 2-0 Vicryl sutures in the galeal layer, and staples in the skin. The wound was then dressed with antibiotic ointment, Telfa, and Medipore tape. The head was wrapped. All sponge, needle, and instrument counts were correct. There were no significant changes noted in neuromonitoring for the duration of the case. The patient was taken out of [place] 3-pin fixation, extubated, and taken to the intensive care unit in stable condition. I affirm in accordance with CMS regulations that I was present and scrubbed for all critical portions of the case. DRAINS: None    IMPLANTS:  1. Synthes cranial plating system    SPECIMEN: None. COMPLICATIONS: None. DISPOSITION:  PACU in stable condition.     Acacia Doran MD, PhD

## 2022-12-12 NOTE — PROGRESS NOTES
Plans for pt to come to IR today at 2p for embolization of the GDA. RN stated that pt is not able to consent for herself at this time. I tried to call pts son, Jose F Vaughn to get consent for the procedure. No answer, message left with call back number. Will continue to attempt to reach her son prior to procedure. Any changes in status, please [place] [phone].

## 2022-12-12 NOTE — CARE COORDINATION
Case management is following for discharge planning. Current Plan of Care: GIB noted yesterday. S/P EGD. PRBC's. IV PPI. Bilateral pulmonary emboli. No anticoagulation d/t acute GI hemorrhage. Plan CTPA to assess clot burden in the lungs today if creatnine remains stable  ________________________________________________________________________________________  PT AM-PAC: 14 / 24 per last evaluation on: 12/10    OT AM-PAC: 15 / 24 per last evaluation on: 12/10    DME Needs for discharge: deferred  ________________________________________________________________________________________  Discharge Plan: The plan is to go to the [address] [place] [address] Unit. No pre-cert needed    Case Management Notes: Ms. Rhiannon Lundberg is from home alone in her own house. She is supported by her sister and other family members in the community. She is independent with self care, functional mobility, and an active  at baseline    [facility] and her family were provided with choice of provider; she and her family are in agreement with the discharge plan.       Jany Kim, RN  [facility]  Case Management Department  [phone]

## 2022-12-12 NOTE — PROCEDURES
IR Brief Postoperative Note    Kay Farris  YOB: 1955  [id number]    Pre-operative Diagnosis: duodenal ulcer    Post-operative Diagnosis: Same    Procedure: GDA embo    Anesthesia: local    Surgeons/Assistants: brad    Estimated Blood Loss: Minimal    Complications: none    Specimens: were not obtained    See full procedure dictation to follow      Amparo Zelaya MD MD  12/12/2022

## 2022-12-12 NOTE — PROGRESS NOTES
Pt's H&H dropped overnight. Hgb: 9.6 to 8.5 and Hct: 27.9 to 25.5; pressures remain stable. Hospitalist notified of decrease.

## 2022-12-12 NOTE — PROGRESS NOTES
Chart reviewed this morning, and I will see the patient soon. In brief, I believe this patient needs GDA embolization based on the giant duodenal ulcer that we will not likely be able to control endoscopically. The hemospray as a temporizing measure. Furthermore, she may have a PE and may need anticoagulation soon and therefore we need to address hemostasis ASAP with this duodenal ulcer. I spoke with interventional radiology and they will plan to bring her down now.

## 2022-12-12 NOTE — OP NOTE
Operative Report    PATIENT NAME: Martínez López  YOB: 1955  MEDICAL RECORD# [id number]  SURGERY DATE: 11/29/2022  SURGEON:  Shantel Haskins MD, PhD  ASSISTANT:  Marcela Rdz MD, PhD  DICTATED BY: Shantel Haskins MD    CO-SURGEON: Kelli Ling MD      PRE-OPERATIVE DIAGNOSIS: Right petroclival meningioma    POST-OPERATIVE DIAGNOSIS: Right petroclival meningioma    OPERATIVE PROCEDURES PERFORMED:  1. Stereotactic right combined petrosal craniotomy for resection of skull base tumor  2. Intraoperative neuromonitoring (MEP, SSEP, CN V, VII, IX, X, EMELI)  3. Microdissection using the operating microscope  4. Intraoperative neuronavigation using Brain[facility]  5. Right abdominal fat graft    SURGEON: Shantel Haskins M.D., Ph.D    Elenor Ronak: MARK Boyle: Meenu Greer M.D., Ph.D. Dr. Manisha Fernandez served as assistant on this surgery due to the complex nature of the procedure and the lack of a resident or fellow assistant. He provided assistance with tumor resection and protection of critical neurovascular elements. ANESTHESIA: General endotracheal    ESTIMATED BLOOD LOSS:  300 mL     INDICATIONS:  Ms. Merlin Xie is a 51-year-old woman who presented with ataxia and hearing loss. 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 She was taken to the operating room on 8/26/2020 for the first stage of a combined petrosal approach for resection of the mass. She now returns to the operating theatre for the removal of the tumor. The patient understands the risks and benefits of the procedure including but not limited to bleeding, infection, weakness, numbness, loss of hearing, facial weakness, diplopia, vascular injury, cerebral spinal fluid leak, worsened neurologic weakness, need for additional procedure, tracheostomy, gastrostomy, inability to completely remove the mass, anesthesia risks, and death.      PROCEDURES IN DETAIL:    Under universal protocol and informed consent, the patient was brought to the operating room. General anesthesia was induced and the patient was intubated. The patient was positioned in a lateral position with the right side up. A medium axillary roll was placed under the left side and all pressure points were appropriately padded. The right shoulder was pulled down and away using silk tape and the right arm was positioned on pillows in front of the patient. The patient was then placed in [address] fixation with the head turned to the left. Brainlab image guidance was registered and verified. Neuromonitoring leads for SSEPs, MEPs, CN V, CN VII, CN IX, CN X, and auditory brainstem responses were placed. The staples were removed from the patients previous C-shaped incision. An incision in the right abdomen was also marked for fat graft harvest. Intravenous antibiotics (Ancef), Decadron, Keppra, and Mannitol were given by anesthesia. A time out was completed and the surgical sites were prepped and draped in the usual sterile fashion. The cranial incision was reopened with Pentecostalism scissors and the patients Vicryl sutures  removed allowing mobilization of the myocutaneous flap anteroinferiorly. Lonestar hooks were used to retract the scalp flap for the remainder of the case. The bone salcedo was removed and stored in an antibiotic-soaked sponge. The skull flap was then mobilized by removing the associated screws affixing it to the skull. The epidural gelfoam and the previously positioned abdominal fat graft were removed exposing the dura, labyrinth, and middle fossa floor. Dural tackup sutures were placed at the periphery of the craniotomy using 4-0 Nurolon sutures. At this time, the operating microscope was brought back into the field and used for microdissection. A dural incision was made along the inferior temporal lobe parallel to the middle fossa floor with care taken to identify and protect the vein of Joyce.  A second dural incision was made along the presigmoid dura parallel to the sigmoid sinus and extended to the superior petrosal sinus. Next, the superior petrosal sinus was identified, ligated, cauterized, and divided. The tentorium was cut anteromedially along the margin of the tumor until the free margin was reached. Next, the posterior fossa component of the mass was progressively mobilized. There was a clear arachnoid plane  the tumor from the neurovascular structures within the cerebellopontine angle. The involved petrous dura was also mobilized with the tumor to the level of the IAC where it was released from its tether. The tumor was mobilized superiorly towards the cut tentorium. Notably, CN IV was identified to course along the inferior aspect of the tumor. The tumor was slowly mobilized off of CN IV until the point where it pierced the tentorium and entered the portion of the tumor extending towards the cavernous sinus. The tumor was progressively elevated from the lower cranial nerves, CN VII and VIII. The facial nerve was seen at the inferior, ventral surface of the tumor margin. It was stimulated with good response. There was a decline in the facial MEP noted on stage 1 during the translabyrinthine drilling, but the nerve stimulated with reproducible response. The mass was progressively dissected from the nerves and brainstem, simultaneously debulked using the Sonopet aspirator. This was done until the remaining portion of the mass was removed by cutting the remaining tentorial attachment parallel to the petrous ridge. We then further removed tumor from the clival recess and meckels cave. A remnant was left in these locations. At this point, Dr. Javi Sanchez joined the operation and proceeded to remove the remnant of tumor within the [facility]. His portion of the case, including acquiring an abdominal fat graft and performing portions of the surgical closure, will be dictated in a separate operative note. CN V, as well as CN VII and VII, were identified at their cisternal segments and  within [facility] cave and the IAC, respectively. Closure of the craniotomy was then continued with the assistance of Dr. Javi Sanchez. The dura was primarily reapproximated where possible along the middle fossa floor using 4-0 Nurolon sutures. The acquired fat graft overlayed along the middle fossa floor and packed into the mastoid. Adherus dural sealant was sprayed along the suture line, fat, and exposed dura to reduce the risk of CSF leak. This was overlayed with a large piece of compressed gelfoam and again covered with Adherus. The bone flap was secured with Synthes Craniofacial miniplates and self-tapping 4mm screws. The bone salcedo was again placed overlying the region of the mastoid defect with additional bone material placed in the defect along the middle fossa floor. The wound was then again copiously irrigated with antibiotic solution. The incision was then closed in layers using 2-0 Vicryl sutures in the suboccipital muscles, the temporalis muscle, and the temporalis muscle fascia. 3-0 Vicryl sutures were applied in the subcutaneous tissues, and the skin was closed with a 3-0 Prolene suture in a running fashion. The wound was then dressed with antibiotic ointment, Telfa, and Medipore tape. A sterile head dressing was also applied. All sponge, needle, and instrument counts were correct. There were no significant changes noted in neuromonitoring for the duration of the case. The patient was taken out of Dodson 3-pin fixation, extubated, and taken to the intensive care unit in stable condition. I affirm in accordance with CMS regulations that I was present and scrubbed for all critical portions of the  case. DRAINS: None    IMPLANTS:  1. Synthes cranial plating system    SPECIMEN:  1. Right petroclival mass for frozen and permanent pathology    COMPLICATIONS: None. DISPOSITION:  ICU in stable condition.

## 2022-12-12 NOTE — H&P
Patient:  Calvin Palacios   :   1955      Relevant patient history reviewed and discussed. The procedure including risks and benefits was discussed at length with the patient (or designated family member) and all questions were answered. Informed consent to proceed with the procedure was given. Condition : stable    [facility] nurses notes reviewed and agreed. Medications reviewed. Allergies:    Allergies   Allergen Reactions    Keflex [Cephalexin] Itching and Rash    Codeine Nausea And Vomiting    Tomato Rash

## 2022-12-12 NOTE — PROGRESS NOTES
Occupational Therapy/Physical Therapy  Hold    Pt not appropriate for therapy treatment today, 12/12. RN in agreement. Will follow up once pt is medically stable and anticoagulated for PE. Plan is also for IR today for emobolization of GDA.      Gael Andre OTR/LADONNA Kraft, [address] [place] [address]

## 2022-12-12 NOTE — PROGRESS NOTES
Assessment  Giant duodenal ulcer, 3 cm, that was bleeding with visible vessel yesterday at EGD status post epinephrine, Endo Clip and Hemospray. The effect of hemospray is transient  but this will be challenging to control endoscopically. Possible PE based on CTA abd yesterday. Need to clarify if this is present or artifact. Large 9.5 cm right lobe liver lesion, possible meningioma but cannot rule out mass. Reviewing images, this does not seem to touch the duodenum, which makes tumor less likely the cause for duodenal ulcer. Plan:  CTPA or imaging per primary team to clarify PE status. Discussed with primary team.    IR to attempt GDA embolization today. MRI abd with and without contrast, which could be done tomorrow or wed for semi elective workup of rt liver lobe lesion. Subjective: We are following for upper GI bleed related to large duodenal ulcer. 27-year-old female who was admitted for a multistage neurosurgical resection of a meningioma who subsequently developed acute upper GI bleeding. Emergency EGD yesterday with multiple duodenal ulcers including a 3 cm ulcer with visible vessel that was actively bleeding, even after Endo Clip x3. Patient subsequently went for CTA which did not show active bleeding. However, there is a large right liver lesion potentially hemangioma but needing MRI for further clarification. Overnight, the patient did have least 1 dark maroon stool, hemoglobin yesterday was in the 7 range, then up to 9.6 after a unit of blood, but this morning down to 8.5. BUN yesterday was 74, today down to 40 with creatinine less than 0.5. Further complicating matters, CTPA 11 days ago didn't suggest pulmonary emboli, but the CTA yesterday did suggest pulmonary emboli but inadequate phase-contrast based on the protocol to clarify if these are PE. Pt reports mild abd discomfort. No nausea or vomiting. RN at bedside to help with history.       Objective:    Review of Systems:    Constitutional: Negative for fever, chills, and unexpected weight change. HENT: Negative for trouble swallowing. Respiratory: Negative for cough, chest tightness and shortness of breath. Cardiovascular: Negative for chest pain  Gastrointestinal: see HPI  Musculoskeletal: Negative for unusual arthralgias. Skin: Negative for rash. Scheduled Meds:   pantoprazole  40 mg IntraVENous BID    levETIRAcetam  500 mg IntraVENous Q12H    cetirizine  10 mg Oral Daily    [Held by provider] hydroCHLOROthiazide  25 mg Oral Daily    insulin lispro  0-16 Units SubCUTAneous TID WC    insulin lispro  0-4 Units SubCUTAneous Nightly    polyvinyl alcohol  2 drop Right Eye Q2H    methIMAzole  10 mg Oral Daily    insulin glargine  15 Units SubCUTAneous Nightly    melatonin  5 mg Oral Nightly    sodium chloride flush  5-40 mL IntraVENous 2 times per day    [Held by provider] heparin (porcine)  5,000 Units SubCUTAneous 3 times per day    polyethylene glycol  17 g Oral Daily    tetrahydrozoline  1 drop Both Eyes TID    [Held by provider] carvedilol  12.5 mg Oral BID WC    [Held by provider] losartan  100 mg Oral Daily    sodium chloride flush  5-40 mL IntraVENous 2 times per day    latanoprost  1 drop Both Eyes Nightly     Continuous Infusions:   sodium chloride      sodium chloride      sodium chloride      sodium chloride      dextrose         Vitals:  /62   Pulse 65   Temp 98.4 °F (36.9 °C) (Oral)   Resp 14   Ht 5' 5\" (1.651 m)   Wt (!) 351 lb (159.2 kg)   SpO2 99%   BMI 58.41 kg/m²     Exam:  General:  comfortable  Heent: There is no scleral icterus. Cardiovascular: The heart is regular rate and rhythm. Respiratory:  The patient's breathing is non-labored with normal chest wall excursion and normal muscle movement. Abdomen: The abdomen is nondistended, soft, and nontender. There are no masses or organomegaly  Rectal:  deferred   Neurological:  Gross memory appears intact.   Patient is alert and oriented. Labs and Imaging:  I reviewed the labs and imaging results from last 24 hours.      Recent Labs     12/10/22  0442 12/11/22  0538 12/11/22  0859 12/11/22  1506 12/11/22  1953 12/12/22  0325   HGB 12.3 8.9* 7.8* 9.0* 9.6* 8.5*   WBC 12.9* 12.5* 10.5  --   --  8.4   LABALBU  --   --  2.5*  --   --  2.8Dandrei Carl MD  December 12, 2022

## 2022-12-12 NOTE — PROGRESS NOTES
NEUROSURGERY POST-OP PROGRESS NOTE    Patient Name: Verona Blanco YOB: 1955   Sex: Female Age: 79 yrs     Medical Record Number: [id number] Acct Number: [de-identified]   Room Number: 4700/2985-21 Hospital Day: Hospital Day: 15     Interval History:  Procedure(s) (LRB):  EGD CONTROL HEMORRHAGE (N/A)    Subjective: Neurologically stable, no new complaints today     Drop in hemoglobin 12 -> 7.8 concern for active GI bleed  Stat CT abdomen  GI consult  PRBC 1 unit given  Hgb 8.5 this morning  Embolization of the GDA this afternoon per GI    Objective:    VITAL SIGNS   BP (!) 128/59   Pulse 67   Temp 98 °F (36.7 °C) (Oral)   Resp 15   Ht 5' 5\" (1.651 m)   Wt (!) 351 lb (159.2 kg)   SpO2 99%   BMI 58.41 kg/m²    Height Height: 5' 5\" (165.1 cm)   Weight Weight: (!) 351 lb (159.2 kg)          Allergies Allergies   Allergen Reactions    Keflex [Cephalexin] Itching and Rash    Codeine Nausea And Vomiting    Tomato Rash      NPO Status Diet NPO Exceptions are: Sips of Water with Meds   Isolation No active isolations     LABS   Basic Metabolic Profile Recent Labs     12/11/22  0859 12/12/22  0325    144    109   CO2 28 29   BUN 74* 40*   CREATININE 0.7 <0.5*   GLUCOSE 277* 188*   PHOS 4.4 2.1*   MG 1.80  --         Complete Blood Count Recent Labs     12/11/22  0538 12/11/22  0859 12/12/22  0325   WBC 12.5* 10.5 8.4   RBC 2.91* 2.55* 2.79*        Coagulation Studies Recent Labs     12/11/22  1005   INR 1.07        MEDICATIONS   Inpatient Medications     pantoprazole, 40 mg, IntraVENous, BID    levETIRAcetam, 500 mg, IntraVENous, Q12H    cetirizine, 10 mg, Oral, Daily    [Held by provider] hydroCHLOROthiazide, 25 mg, Oral, Daily    insulin lispro, 0-16 Units, SubCUTAneous, TID WC    insulin lispro, 0-4 Units, SubCUTAneous, Nightly polyvinyl alcohol, 2 drop, Right Eye, Q2H    methIMAzole, 10 mg, Oral, Daily    insulin glargine, 15 Units, SubCUTAneous, Nightly    melatonin, 5 mg, Oral, Nightly    sodium chloride flush, 5-40 mL, IntraVENous, 2 times per day    [Held by provider] heparin (porcine), 5,000 Units, SubCUTAneous, 3 times per day    polyethylene glycol, 17 g, Oral, Daily    tetrahydrozoline, 1 drop, Both Eyes, TID    [Held by provider] carvedilol, 12.5 mg, Oral, BID WC    [Held by provider] losartan, 100 mg, Oral, Daily    sodium chloride flush, 5-40 mL, IntraVENous, 2 times per day    latanoprost, 1 drop, Both Eyes, Nightly   Infusions    sodium chloride      sodium chloride      sodium chloride      sodium chloride      dextrose        Antibiotics   Recent Abx Admin        No antibiotic orders with administrations found. Imaging:   Abdominal CT  Impression       1.  9.5 cm mass in the right hepatic lobe which does not appear to represent a hemangioma. Differential diagnosis would include primary or secondary malignancy. Liver mass protocol MRI with and without contrast is recommended. 2.  No intra-abdominal hemorrhage. 3.  Mild cholelithiasis. 4.  3.5 cm right ovarian cyst, the CT appearance suggests a simple cyst indicating a benign finding.      Neurologic Exam:  Mental status: awake and alert and oriented x4    Cranial Nerves:  II: Visual acuity not tested, denies new visual changes / diplopia  III, IV, VI: PERRL, 3 mm bilaterally, EOMI,Patient had rightward gaze deviation but was able to follow when prompted   V: Facial sensation intact bilaterally to touch  VII: R sided facial droop  VIII: Hearing intact bilaterally to spoken voice on L, no hearing on R  IX: Palate movement equal bilaterally  XI: Shoulder shrug equal bilaterally  XII: Tongue deviates to R      Musculoskeletal:   Gait: Not tested   Tone: normal  Sensory: intact to all extremities  Motor strength:    Right  Left    Right  Left    Deltoid  5 5  Hip Flex  5 5   Biceps  5 5  Knee Extensors  5 5   Triceps  5 5  Knee Flexors  5 5   Wrist Ext  5 5  Ankle Dorsiflex. 5 5   Wrist Flex  5 5  Ankle Plantarflex. 5 5   Handgrip  5 5  Ext Ernie Longus  5 5   Thumb Ext  5 5         Mastoid Incision: intact, clean and dry      Respiratory:  Unlabored respiratory pattern    Abdomen:   Soft, ND   Last BM 12/11    Cardiovascular:  Warm, well perfused    Assessment   Patient is a 78 yo F s/p Procedure(s) (LRB):  STAGE II: RESECTION OF RIGHT PETROCLIVAL MENINGIOMA (N/A) per Dr. Wagner Rodriguez    Plan:  Neurologic exam frequency: Q4H  Mobility: PT/OT   SLP continue  PICC line  DVT Prophylaxis: SCDs and heparin (hold)  Keppra as ordered  Bowel Regimen: Senna and glycolax (hold)  Pain control: Katie   Keep sbp < 160  Incisional Care: Mastoid compression headband in place check Q4H and let NS know if leaking  Acyclovir for 10 days - completed course    Dispo Planning: ICU    Drop in hemoglobin 12 -> 7.8 concern for active GI bleed  Stat CT abdomen - no free fluid, does have incidental liver mass that will need f/u imaging once GI issues resolved. GI consult  Protonix 40mg IV BID  PRBC 1 unit  Check HH Q6H - if more blood products required check INR / fibrinogen    Patient was discussed with Dr. Tello Israel who agrees with above assessment and plan. Electronically signed by: LEE Cordero CNP, 12/12/2022 10:31 AM   Neurosurgery Nurse Practitioner  [phone]    Critical Care:  Due to the immediate potential for life-threatening deterioration due to neurological failure, I spent 15 minutes providing critical care. This time excludes time spent performing procedures but includes time spent on direct patient care, history retrieval, review of the chart, and discussions with patient, family, and consultant(s).

## 2022-12-12 NOTE — PROGRESS NOTES
Pt with 1 medium, watery, dark blood tinged stool. PO Keppra not given d/t pt being NPO. Neurology notified.

## 2022-12-12 NOTE — PLAN OF CARE
Problem: Chronic Conditions and Co-morbidities  Goal: Patient's chronic conditions and co-morbidity symptoms are monitored and maintained or improved  12/12/2022 0418 by Amalia Almeida RN  Outcome: Progressing     Problem: Safety - Adult  Goal: Free from fall injury  12/12/2022 0418 by Amalia Almeida RN  Outcome: Progressing     Problem: Nutrition Deficit:  Goal: Optimize nutritional status  12/12/2022 0418 by Amalia Almeida RN  Outcome: Progressing     Problem: Neurosensory - Adult  Goal: Achieves maximal functionality and self care  12/12/2022 0418 by Amalia Almeida RN  Outcome: Progressing

## 2022-12-12 NOTE — PROGRESS NOTES
Comprehensive Nutrition Assessment    RECOMMENDATIONS:  PO Diet: Resume diet when medically feasible, Dysphagia - minced and moist  ONS: Resume Ensure compact when diet advances  Nutrition Education: Education not indicated   Monitor nutrition adequacy, pertinent labs, bowel habits, wt changes, and clinical progress    NUTRITION ASSESSMENT:   Nutritional summary & status: Follow up: Pt currently NPO, in vasc lab for possible PE from duodenal ulcer. Pt previously on Dysphagia - minced and moist diet w/ Ensure compact. Intakes had been variable, between %, past few days intakes had been >50%. Will monitor ability to resume diet, recommend continued ONS until intakes are adequate on a consistent basis. Candidate for ARU, waiting on precert. Will continue to monitor. Admission/PMH: Cerebelloontine angle meningioma, HTN    MALNUTRITION ASSESSMENT  Context of Malnutrition: Acute Illness   Malnutrition Status: At risk for malnutrition (Comment)  Findings of the 6 clinical characteristics of malnutrition (Minimum of 2 out of 6 clinical characteristics is required to make the diagnosis of moderate or severe Protein Calorie Malnutrition based on AND/ASPEN Guidelines):  Energy Intake:  Mild decrease in energy intake (Comment)  Weight Loss:  No significant weight loss     Body Fat Loss:  No significant body fat loss     Muscle Mass Loss:  No significant muscle mass loss    Fluid Accumulation:  No significant fluid accumulation      NUTRITION DIAGNOSIS   Inadequate oral intake related to lack or limited access to food as evidenced by NPO or clear liquid status due to medical condition    Nutrition Monitoring and Evaluation:   Food/Nutrient Intake Outcomes:  Diet Advancement/Tolerance  Physical Signs/Symptoms Outcomes:  Biochemical Data, Nutrition Focused Physical Findings, Chewing or Swallowing, Weight     OBJECTIVE DATA: Significant to nutrition assessment  Nutrition Related Findings: . Phos 2.1.  Active bowel sounds. +BM 12/12. Generalized non-pitting edema. Wounds: None  Nutrition Goals: Initiate PO diet, within 2 days     CURRENT NUTRITION THERAPIES  Diet NPO Exceptions are: Sips of Water with Meds  PO Intake: NPO   PO Supplement Intake:NPO  Additional Sources of Calories/IVF:N/A     ANTHROPOMETRICS  Current Height: 5' 5\" (165.1 cm)  Current Weight: (!) 351 lb (159.2 kg)    Admission weight: 299 lb (135.6 kg) (PER CARDIO NOTE 11/18/22)  Ideal Body Weight (IBW): 125 lbs  (57 kg)    BMI: 58.5    COMPARATIVE STANDARDS  Energy (kcal):  6008-0568 (8-10 kcal/kg CBW)     Protein (g):  57-68 (1.0-1.2 g/kg IBW)       Fluid (mL/day):  1 mL/kcal or per MD    The patient will be monitored per nutrition standards of care. Consult dietitian if additional nutrition interventions are needed prior to RD reassessment.      Monik Eldridge, [address] [place] [address], [address] [place] [address] Drive:  [phone]  Office:  [phone]

## 2022-12-12 NOTE — PROGRESS NOTES
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 Micheal Mistry. We were asked to assist with medical management post operatively. Initially had been doing well but over the weekend had drop in H&H and found to have bloody stools. Seen by GI and found to have a giant Duodenal ulcer during EGD. Started on protonix BID, planning for IR to attempt GDA embolization on 12/12. CTPA also with concern for Pes, but inadequate image/contrast timing so will need dedicated CTPA once able.          PMH Past Medical History:   Diagnosis Date    Arthritis     Asthma     mild    Brain tumor ([address]     Diabetes mellitus ([address]     borderline    High cholesterol     Hypertension     Imbalance     DUE TO TUMOR- USING ANKLE BRACE / WALKER PER PREOP H&P    Loss of hearing     bilateral reported    Vertigo       Allergies Allergies   Allergen Reactions    Keflex [Cephalexin] Itching and Rash    Codeine Nausea And Vomiting    Tomato Rash      Diet Diet NPO Exceptions are: Sips of Water with Meds   Isolation No active isolations     CURRENT SCHEDULED MEDICATIONS   Inpatient Medications     pantoprazole, 40 mg, IntraVENous, BID    levETIRAcetam, 500 mg, IntraVENous, Q12H    cetirizine, 10 mg, Oral, Daily    [Held by provider] hydroCHLOROthiazide, 25 mg, Oral, Daily    insulin lispro, 0-16 Units, SubCUTAneous, TID WC    insulin lispro, 0-4 Units, SubCUTAneous, Nightly    polyvinyl alcohol, 2 drop, Right Eye, Q2H    methIMAzole, 10 mg, Oral, Daily    insulin glargine, 15 Units, SubCUTAneous, Nightly    melatonin, 5 mg, Oral, Nightly    sodium chloride flush, 5-40 mL, IntraVENous, 2 times per day    [Held by provider] heparin (porcine), 5,000 Units, SubCUTAneous, 3 times per day    polyethylene glycol, 17 g, Oral, Daily    tetrahydrozoline, 1 drop, Both Eyes, TID    [Held by provider] carvedilol, 12.5 mg, Oral, BID WC    [Held by provider] losartan, 100 mg, Oral, Daily    sodium chloride flush, 5-40 mL, IntraVENous, 2 times per day    latanoprost, 1 drop, Both Eyes, Nightly   Infusions    sodium chloride      sodium chloride      sodium chloride      sodium chloride      dextrose        Antibiotics   Recent Abx Admin        No antibiotic orders with administrations found. LABS   Metabolic Panel Recent Labs     12/11/22  0859 12/12/22  0325    144   K 3.9 3.6    109   CO2 28 29   BUN 74* 40*   CREATININE 0.7 <0.5*   GLUCOSE 277* 188*   CALCIUM 7.8* 8.3   LABALBU 2.5* 2.8*   PHOS 4.4 2.1*   MG 1.80  --       CBC / Coags Recent Labs     12/11/22  0538 12/11/22  0859 12/11/22  1005 12/11/22  1506 12/11/22  1953 12/12/22  0325   WBC 12.5* 10.5  --   --   --  8.4   RBC 2.91* 2.55*  --   --   --  2.79*   HGB 8.9* 7.8*  --  9.0* 9.6* 8.5*   HCT 26.2* 23.6*  --  26.1* 27.9* 25.5*    188  --   --   --  174   INR  --   --  1.07  --   --   --       Other Recent Labs     12/10/22  1636   COVID19 Not Detected     No results for input(s): PHENYTOIN, KEPPRA, LACOSA, LAMO, VALPROATE, LACTSEPSIS, LACTA in the last 72 hours. DIAGNOSTICS   IMAGES:  Images personally reviewed and agree w/ radiology interpretation. CTA Abdomen Pelvis W WO Contrast (post EGD): Impression       1. Suspected bilateral pulmonary emboli but inadequate contrast phase to assess for the pulmonary embolus.  Incomplete filling of the pulmonary arteries suggests pulmonary emboli inferiorly     2. No sign of any acute or active GI bleeding on CT angiograms studies   3. No sign of any aneurysm in the abdomen with normal widely patent vessels   4. Stable appearing partially calcified left adrenal adenoma. 5. Parapelvic renal cysts, largest on left side   6. 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 4.1 x 3.5 cm right ovarian cyst, benign in appearance   8. Mild cholelithiasis   9.  No free fluid or free air with right abdominal wall subcutaneous inflammation or prior instrumentation       PHYSICAL EXAMINATION     PHYSICAL EXAM:  Vitals:    12/12/22 0500 12/12/22 0600 12/12/22 0700 12/12/22 0800   BP: 114/62  110/73 (!) 128/59   Pulse: 65  69 67   Resp: 14  24 16   Temp:    98 °F (36.7 °C)   TempSrc:    Oral   SpO2:    100%   Weight:  (!) 351 lb (159.2 kg)     Height:             General: Alert, no distress, well-nourished  Neurologic  Mental status:   orientation to person, place, time, situation   Attention intact as able to attend well to the exam     Language fluent in conversation   Comprehension intact; follows simple commands    Cranial nerves:   CN2: Visual fields full w/o extinction on confrontational testing   Fundoscopic Exam: unable to visualize fundi  CN 3,4,6: Pupils equal and reactive to light, extraocular muscles intact  CN5: Facial sensation symmetric   CN7: R sided facial droop  CN8: Hearing symmetric to spoken voice  CN9: Palate elevated symmetrically  CN11: Traps full strength on shoulder shrug  CN12: Tongue deviates to the R    Motor Exam:   R  L    Deltoid 5  5   Biceps 5 5   Triceps 5 5   Wrist extension  5 5   Interossei 5 5      R  L    Hip flexion  5  5   Hip extension  5 5   Knee flexion  5 5   Knee extension  5 5   Ankle dorsiflexion  5 5   Ankle plantar flexion  5 5       Sensory: light touch intact and symmetric in all 4 extremities. Cerebellar/coordination: finger nose finger normal without ataxia  Tone: normal in all 4 extremities  Gait: held for patient safety    OTHER SYSTEMS:  Cardiovascular: Warm, appears well perfused   Respiratory: Easy, non-labored respiratory pattern, no complaints of SOB, on 1 L nasal cannula  Abdominal: Abdomen is without distention  Extremities: Upper and lower extremities with scattered bruising. ASSESSMENT & RECOMMENDATIONS   Assessment:  Patient is a 78 yo F s/p Procedure(s) (LRB):  STAGE II: RESECTION OF RIGHT PETROCLIVAL MENINGIOMA (N/A) per Dr. Infante Spar:  - Q4 hour neuro exams  - Osman Benji as ordered (currently IV d/t NPO status)  - IR per GI for embolization of GDA  - Recheck Hgb later today given 1 point drop overnight  - Protonix BID  - As contrast being received in IR, will plan for CTPA likely tomorrow as it would not  today and patient is hemodynamically stable. CT Abdomen pelvis W and WO Contrast had inadequate contrast phase to assess for pulmonary embolus  - Doppler study in process, if this is positive may need to consider IVC filter placement  - PT/OT as able  - SCDs for DVT chemoprophylaxis, SubQ heparin on hold for now  - Follow up imaging of liver mass per GI    Case discussed with attending Neurologist, LEE Nash - [facility]   Neurology & Neurocritical Care   12/12/2022 8:59 AM      ICU Patients:   [address] [facility] [phone]  [facility]: [facility] Neurocritical Care    Floor / PCU Patients:  Neurology Line: [phone]  [facility]: [facility] Neurology    I spent 25 minutes in the care of this patient. Over 50% of that time was in face-to-face counseling regarding disease process, diagnostic testing, preventative measures, and answering patient and family questions.

## 2022-12-12 NOTE — PROGRESS NOTES
Progress Note  Physical Medicine and Rehabilitation    Patient: Rafaela Najjar  [id number]  Date: 12/12/2022         Chief Complaint: Meningioma     History of Present Illness/Hospital Course:  Patient is a morbidly obese (Body mass index is 50.82 kg/m².), 79 y.o. female  with c/o dizziness, HA and vertigo in the setting of large right petroclival mass which is consistent with meningioma. . She presented to the hospital for a scheduled two staged petroclival meningioma resection. 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 Interval Hx: Pt has giant duodenal ulcer that will require embolization and suspected BL PE that may require anticoagulation. Prior Level of Function:  Independent for mobility, ADLs, and IADLs     Current Level of Function:  Min assist     Pertinent Social History:  Support: Lives alone  Home set-up: One level house with 2 steps to enter    Past Medical History:   Diagnosis Date    Arthritis     Asthma     mild    Brain tumor ([address]     Diabetes mellitus ([address]     borderline    High cholesterol     Hypertension     Imbalance     DUE TO TUMOR- USING ANKLE BRACE / WALKER PER PREOP H&P    Loss of hearing     bilateral reported    Vertigo        Past Surgical History:   Procedure Laterality Date    CRANIOTOMY N/A 11/29/2022    STAGE II: RESECTION OF RIGHT PETROCLIVAL MENINGIOMA performed by Laurence Ramachandran MD at [address] [facility] Right 11/28/2022    RIGHT TRANSMASTOID / ANTERIOR MIDDLE CRANIAL FOSSA , ABDOMINAL FAT GRAFT EXTERNAL AUDITORY CANAL CLOSURE performed by Mili Harvey MD at [place] [address] Right 11/28/2022    STAGE 1, RIGHT TRANSLABYRINTHINE / RETROLABYRINTHINE CRANIOTOMY, PETROSECTOMY performed by Tonia Castaneda.  Kartik Ramachandran MD at [facility] Right 11/8/2021    RIGHT TOTAL HIP ARTHROPLASTY POSTERIOR APPROACH - JOSÉ ANTONIO ADVANCED performed by Ruiz Good MD at [address] KNEE ARTHROPLASTY Right 07/14/2015    TOTAL KNEE ARTHROPLASTY Left 10/14/15    TKA    UPPER GASTROINTESTINAL ENDOSCOPY N/A 12/11/2022    EGD CONTROL HEMORRHAGE performed by Lazara Perez MD at [address] ENDOSCOPY       Family History   Problem Relation Age of Onset    Cancer Mother     Hypotension Mother     Cancer Father     Cancer Sister     Hypotension Sister     Cancer Maternal Grandmother     Cancer Maternal Grandfather     Cancer Paternal Grandmother     Cancer Paternal Grandfather     Hypotension Other     Cancer Other     Diabetes Other     Osteoarthritis Other        Social History     Socioeconomic History    Marital status:      Spouse name: None    Number of children: None    Years of education: None    Highest education level: None   Tobacco Use    Smoking status: Former    Smokeless tobacco: Never   Substance and Sexual Activity    Alcohol use: No    Drug use: Never           REVIEW OF SYSTEMS:   CONSTITUTIONAL: negative for fevers, chills   EYES: positive for diplopia   HEENT: positive for difficulty swallowing. negative for hearing loss, tinnitus, sinus pressure, nasal congestion  RESPIRATORY: Negative for SOB, cough  CARDIOVASCULAR: negative for chest pain, palpitations  GASTROINTESTINAL: positive for abdominal pain.  negative for hematemesis, hematochezia, nausea, vomiting, diarrhea, abdominal pain  GENITOURINARY: negative for frequency, dysuria  HEMATOLOGIC/LYMPHATIC: negative for easy bruising, bleeding and lymphadenopathy  ENDOCRINE: negative for diabetic symptoms including polyuria, polydipsia  MUSCULOSKELETAL: negative for pain, joint swelling, decreased range of motion  NEUROLOGICAL: positive for difficulty speaking, negative for headaches, unilateral weakness    Physical Examination:  Vitals: Patient Vitals for the past 24 hrs:   BP Temp Temp src Pulse Resp SpO2 Weight   12/12/22 0912 -- -- -- 67 15 99 % --   12/12/22 0800 (!) 128/59 98 °F (36.7 °C) Oral 67 16 100 % --   12/12/22 0700 110/73 -- -- 69 24 -- --   12/12/22 0600 -- -- -- -- -- -- (!) 351 lb (159.2 kg)   12/12/22 0500 114/62 -- -- 65 14 -- --   12/12/22 0400 110/61 98.4 °F (36.9 °C) Oral 66 18 99 % --   12/12/22 0000 (!) 95/51 98.2 °F (36.8 °C) Oral 69 15 99 % --   12/11/22 2300 (!) 102/56 -- -- 73 15 -- --   12/11/22 2200 (!) 101/53 -- -- 77 15 -- --   12/11/22 2100 (!) 112/47 -- -- 78 14 99 % --   12/11/22 2000 (!) 94/45 98.3 °F (36.8 °C) Oral 78 17 100 % --   12/11/22 1900 (!) 99/45 -- -- 76 16 97 % --   12/11/22 1800 (!) 98/50 -- -- 74 16 100 % --   12/11/22 1700 (!) 148/78 -- -- 72 12 98 % --   12/11/22 1645 (!) 113/45 97.9 °F (36.6 °C) -- 72 12 94 % --   12/11/22 1640 (!) 125/52 -- -- 72 14 (!) 88 % --   12/11/22 1635 (!) 127/58 -- -- 72 17 94 % --   12/11/22 1630 123/61 97.7 °F (36.5 °C) -- 73 16 95 % --   12/11/22 1622 117/73 97.7 °F (36.5 °C) -- 74 16 94 % --   12/11/22 1327 -- -- -- 75 20 98 % --   12/11/22 1320 -- -- -- 76 16 99 % --   12/11/22 1315 106/63 -- -- 76 17 99 % --   12/11/22 1300 (!) 116/57 -- -- 70 15 100 % --   12/11/22 1245 (!) 101/58 -- -- 70 17 -- --   12/11/22 1230 116/74 -- -- 73 19 97 % --   12/11/22 1215 100/76 -- -- 71 19 98 % --   12/11/22 1205 138/64 -- -- 73 18 -- --   12/11/22 1200 107/62 -- -- 78 18 93 % --   12/11/22 1150 (!) 90/53 -- -- 76 20 96 % --   12/11/22 1145 (!) 98/52 98.1 °F (36.7 °C) Oral 76 19 96 % --   12/11/22 1140 -- -- -- 73 15 97 % --   12/11/22 1133 (!) 93/51 97.7 °F (36.5 °C) Oral 76 16 96 % --   12/11/22 1100 118/68 -- -- 75 19 99 % --   12/11/22 1000 (!) 95/49 -- -- 74 19 -- --     Const: Alert. WDWN. No distress  Eyes: Conjunctiva noninjected, no icterus noted; pupils equal, round, and reactive to light. HENT: Bruising around R eye., normocephalic; Oral mucosa moist  CV: Regular rate and rhythm, no murmur rub or gallop noted  Resp: Lungs clear to auscultation bilaterally, no rales wheezes or ronchi, no retractions. Respirations unlabored. GI: Soft, mild tenderness, nondistended. Normal bowel sounds. Skin: Normal temperature and turgor. No rashes or breakdown noted. Ext: positive for BL LE edema   MSK: No joint tenderness, erythema, warmth noted. AROM intact. Neuro: Alert, oriented, appropriate. Mild R sided facial droop. Sensation intact to light touch. Motor examination reveals normal strength in all four limbs diffusely. Psych: Stable mood, normal judgement, normal affect     Lab Results   Component Value Date    WBC 8.4 12/12/2022    HGB 8.5 (L) 12/12/2022    HCT 25.5 (L) 12/12/2022    MCV 91.3 12/12/2022     12/12/2022     Lab Results   Component Value Date    INR 1.07 12/11/2022    INR 1.09 11/29/2022    INR 1.02 10/12/2021    PROTIME 13.8 12/11/2022    PROTIME 14.1 11/29/2022    PROTIME 11.5 10/12/2021     Lab Results   Component Value Date    CREATININE <0.5 (L) 12/12/2022    BUN 40 (H) 12/12/2022     12/12/2022    K 3.6 12/12/2022     12/12/2022    CO2 29 12/12/2022     Lab Results   Component Value Date    ALT 11 12/04/2022    AST 12 (L) 12/04/2022    ALKPHOS 48 12/04/2022    BILITOT 0.5 12/04/2022     On my review, CXR displays. CTA ABDOMEN PELVIS W WO CONTRAST   Final Result      1. Suspected bilateral pulmonary emboli but inadequate contrast phase to assess for the pulmonary embolus. Incomplete filling of the pulmonary arteries suggests pulmonary emboli inferiorly     2. No sign of any acute or active GI bleeding on CT angiograms studies   3. No sign of any aneurysm in the abdomen with normal widely patent vessels   4. Stable appearing partially calcified left adrenal adenoma. 5. Parapelvic renal cysts, largest on left side   6. 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 4.1 x 3.5 cm right ovarian cyst, benign in appearance   8. Mild cholelithiasis   9.  No free fluid or free air with right abdominal wall subcutaneous inflammation or prior instrumentation         CT ABDOMEN PELVIS W WO CONTRAST Additional Contrast? Radiologist Recommendation   Final Result      1.  9.5 cm mass in the right hepatic lobe which does not appear to represent a hemangioma. Differential diagnosis would include primary or secondary malignancy. Liver mass protocol MRI with and without contrast is recommended. 2.  No intra-abdominal hemorrhage. 3.  Mild cholelithiasis. 4.  3.5 cm right ovarian cyst, the CT appearance suggests a simple cyst indicating a benign finding. MRI BRAIN W WO CONTRAST   Final Result      1. Postsurgical changes from right temporal and transmastoid craniotomy with large amount of fat packing in the defect and extracranial soft tissues. Large subcutaneous fluid collection is present in the scalp surrounding and extending superiorly from    the fat packing. No diffusion restriction to indicate superimposed infection. 2.  Residual meningioma in the right cerebellopontine angle, prepontine cistern, and right Meckel's cave/cavernous sinus. The tumor extends partially encasing the basilar artery   3. Small area of acute to subacute ischemia in the right brachium pontis. Small enhancing lesions superior to the right tentorium is new from the prior study and may represent an area of subacute ischemia. 4.  Small amount of extra-axial hemorrhage along the right cerebellopontine angle. 5.  Stable 12 mm left frontal meningioma. CTA PULMONARY W CONTRAST   Final Result      Significantly limited study due to motion. 1. Significantly limited study due to streak artifact and suboptimal bolus. No evidence of a central embolus. 2. Multifocal airspace consolidation is present, suboptimally evaluated due to motion. This presumably reflects pneumonia. Follow-up chest CT is recommended to document resolution.    3. There is a large mass in the right hepatic lobe measuring 8.2 x 7.4 cm, demonstrating peripheral nodular enhancement. There is significant amount of artifact through this lesion. It is still favored to reflect a hemangioma. Recommend a multiphasic CT    of the abdomen for further assessment. This could be performed on a nonurgent basis, if clinically appropriate. 4. Partially calcified nodule arising from the left adrenal gland, most likely benign and possibly reflecting old adrenal hemorrhage. XR CHEST PORTABLE   Final Result      Similar appearance of patchy airspace disease. FL MODIFIED BARIUM SWALLOW W VIDEO   Final Result      1. Laryngeal penetration, but no evidence of tracheal aspiration. XR CHEST PORTABLE   Final Result      Patchy left upper lobe airspace disease, atelectasis versus pneumonia. CT HEAD WO CONTRAST   Final Result      1. Interval postsurgical changes from resection of right cerebellopontine angle mass with associated right temporal mastoid resection and frontotemporal craniotomy and fat graft placement. 2.  Thin hyperdensity along the fat graft may represent small amount of postsurgical blood products. There is also a small focus of subarachnoid hemorrhage along the right temporal lobe. 3.  Mild pneumocephalus and bilateral subgaleal fluid is also likely postsurgical.         VL Extremity Venous Bilateral    (Results Pending)         Assessment:  Giant Duodenal Ulcer  -Hgb 7.8, previously 12  -Transfuse pRBCs  -Protonix 40 mg BID  -IVF  -Embolization of GDA  -GI following  -IR following  BL PE, suspected  -May need anticoagulation when duodenal ulcer has been tx    3. Meningioma  -s/p resection with TL and MCF approach  -Keppra 500 mg BID  -Bowel regiment: Senna, glycolax, and dulcolax  -Nicardipine-Roxicodone PRN  -Acycolvir 400 mg TID for 10 days  -Artifical tears q2H and eye patch  -Keep an eye on mastoid dressing  -SCDs for DVT prophylaxis   -PT/OT  4. HTN   -Coreg 12.5 mg BID  Losartan 100 mg daily  5. DM   -Lantus 15u qHS  -HDSSI  6.  Hyperthyroidism -Methimazole 10 mg daily  7. Liver Mass  -Incidental finding  -f/u imaging once acute problems have been tx  Impairments- Decreased functional mobility, Decreased ADLs     Recommendations:  Pt needs further medically stabilization due to giant duodenal ulcer and suspected BL PE. Will evaluate when more medically stable and consider ARU. Thank you for this consult. Please contact me with any questions or concerns. Wing Chace DO, PGY-2  12/12/2022  9:23 AM    This patient has been seen, examined, and discussed with the resident. I was part of the key critical services provided to the patient. I agree with the residents documentation. This note may have been altered to reflect my own examination findings, impression, and recommendations.        Cassandra Mederos D.O. M.P.H  PM&R  12/12/2022  9:23 AM

## 2022-12-12 NOTE — CONSULTS
Hospital Medicine  Consult History & Physical        Chief Complaint: Medical management    Date of Service: Pt seen/examined in consultation on 12/11/2022    History Of Present Illness: The patient is a 79 y.o. female with medical history as below who has been in the hospital for multistage neurosurgical resection of petroclival meningioma who we are asked to see/evaluate by Dr. Kvng Fernando for cas-operative mgt. patient has been recovering well postoperatively from her procedure when she most recently developed melanotic stools. Gastroenterology was consulted, patient underwent EGD today and was found to have multiple duodenal ulcers with visible vessel and active bleeding. Underwent injection of epi, hemostatic clip placement and chemo spray. Difficult to concern for possible ongoing GI bleed she underwent CTA. No active GI bleed was detected, however CAT scan picked up likely pulmonary embolism in the lower lung fields. Patient received 2 units of blood transfusion, just finished her second unit now. BP is low normal.  She is a bit sleepy but arousable and denies any active complaints her oxygen saturation is at 95% when she is asleep. She has been requiring intermittent nasal cannula during her hospital stay. Past Medical History:        Diagnosis Date    Arthritis     Asthma     mild    Brain tumor ([address]     Diabetes mellitus ([address]     borderline    High cholesterol     Hypertension     Imbalance     DUE TO TUMOR- USING ANKLE BRACE / WALKER PER PREOP H&P    Loss of hearing     bilateral reported    Vertigo        Past Surgical History:        Procedure Laterality Date    CRANIOTOMY N/A 11/29/2022    STAGE II: RESECTION OF RIGHT PETROCLIVAL MENINGIOMA performed by Patricia Hess MD at [address] [facility] Right 11/28/2022    RIGHT TRANSMASTOID / ANTERIOR MIDDLE CRANIAL FOSSA , ABDOMINAL FAT GRAFT EXTERNAL AUDITORY CANAL CLOSURE performed by Jessika Weiner MD at [facility] SURGERY Right 11/28/2022    STAGE 1, RIGHT TRANSLABYRINTHINE / RETROLABYRINTHINE CRANIOTOMY, PETROSECTOMY performed by Nilo Lea. Ishan Hess MD at [address] [place] [address] Right 11/8/2021    RIGHT TOTAL HIP ARTHROPLASTY POSTERIOR APPROACH - Estephania Jump ADVANCED performed by Irma Koroma MD at [place] [address] Right 07/14/2015    TOTAL KNEE ARTHROPLASTY Left 10/14/15    TKA       Medications Prior to Admission:    Prior to Admission medications    Medication Sig Start Date End Date Taking? Authorizing Provider   medical marijuana Take 1 each by mouth as needed. Yes Historical Provider, MD   losartan (COZAAR) 50 MG tablet Take 50 mg by mouth daily   Yes Historical Provider, MD   albuterol sulfate HFA (PROVENTIL;VENTOLIN;PROAIR) 108 (90 Base) MCG/ACT inhaler Inhale 2 puffs into the lungs every 4 hours as needed 10/18/21  Yes Historical Provider, MD   methIMAzole (TAPAZOLE) 5 MG tablet Take 10 mg by mouth daily    Historical Provider, MD   metFORMIN (GLUCOPHAGE-XR) 500 MG extended release tablet Take 1,000 mg by mouth Daily with supper    Historical Provider, MD   latanoprost (XALATAN) 0.005 % ophthalmic solution Place 1 drop into both eyes nightly    Historical Provider, MD   carvedilol (COREG) 6.25 MG tablet Take 6.25 mg by mouth 2 times daily (with meals)    Historical Provider, MD   pravastatin (PRAVACHOL) 20 MG tablet Take 20 mg by mouth nightly     Historical Provider, MD   hydrochlorothiazide (HYDRODIURIL) 25 MG tablet Take 25 mg by mouth daily    Historical Provider, MD       Allergies:  Keflex [cephalexin], Codeine, and Tomato    Social History:  The patient currently lives at home    TOBACCO:   reports that she has quit smoking. She has never used smokeless tobacco.  ETOH:   reports no history of alcohol use.       Family History:  Reviewed in detail and  Positive as follows:        Problem Relation Age of Onset    Cancer Mother     Hypotension Mother     Cancer Father     Cancer Sister     Hypotension Sister     Cancer Maternal Grandmother     Cancer Maternal Grandfather     Cancer Paternal Grandmother     Cancer Paternal Grandfather     Hypotension Other     Cancer Other     Diabetes Other     Osteoarthritis Other        REVIEW OF SYSTEMS:   Positive and negative as noted in the HPI. All other systems reviewed and negative. PHYSICAL EXAM:  BP (!) 98/50   Pulse 74   Temp 97.9 °F (36.6 °C)   Resp 16   Ht 5' 5\" (1.651 m)   Wt (!) 347 lb 0.1 oz (157.4 kg)   SpO2 100%   BMI 57.74 kg/m²     General appearance: Patient is sleepy but arousable, appears comfortable  HEENT Normal cephalic, atraumatic without obvious deformity. Pupils equal, round, and reactive to light. Extra ocular muscles intact. Conjunctivae/corneas clear. Neck: Supple, No jugular venous distention/bruits. Trachea midline without thyromegaly or adenopathy with full range of motion. Lungs: Diminished without active wheezing  Heart: Regular rate and rhythm with Normal S1/S2 without  murmurs, rubs or gallops, point of maximum impulse non-displaced  Abdomen: Soft, non-tender or non-distended without rigidity or guarding and positive bowel sounds all four quadrants. Extremities: No clubbing, cyanosis, or edema bilaterally. There is no calf tenderness  Skin: Skin color, texture, turgor normal.    Neurologic: Patient is sleepy but arousable, she is following simple commands and grossly nonfocal  Mental status: No agitation       CT abdomen:  1. Suspected bilateral pulmonary emboli but inadequate contrast phase to assess for the pulmonary embolus. Incomplete filling of the pulmonary arteries suggests pulmonary emboli inferiorly     2. No sign of any acute or active GI bleeding on CT angiograms studies   3. No sign of any aneurysm in the abdomen with normal widely patent vessels   4. Stable appearing partially calcified left adrenal adenoma.    5. Parapelvic renal cysts, largest on left side   6. 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 4.1 x 3.5 cm right ovarian cyst, benign in appearance   8. Mild cholelithiasis   9. No free fluid or free air with right abdominal wall subcutaneous inflammation or prior instrumentation   EKG:  I have reviewed the EKG with the following interpretation:    CBC   Recent Labs     12/10/22  0442 12/11/22  0538 12/11/22  0859 12/11/22  1506 12/11/22  1953   WBC 12.9* 12.5* 10.5  --   --    HGB 12.3 8.9* 7.8* 9.0* 9.6*   HCT 36.7 26.2* 23.6* 26.1* 27.9*    215 188  --   --       RENAL  Recent Labs     12/11/22  0859      K 3.9      CO2 28   PHOS 4.4   BUN 74*   CREATININE 0.7     LFT'S  No results for input(s): AST, ALT, ALB, BILIDIR, BILITOT, ALKPHOS in the last 72 hours. COAG  Recent Labs     12/11/22  1005   INR 1.07     CARDIAC ENZYMES  No results for input(s): CKTOTAL, CKMB, CKMBINDEX, TROPONINI in the last 72 hours.     U/A:    Lab Results   Component Value Date/Time    COLORU YELLOW 10/12/2021 01:46 PM    WBCUA 89 10/12/2021 01:46 PM    RBCUA 7 10/12/2021 01:46 PM    BACTERIA 2+ 10/12/2021 01:46 PM    CLARITYU TURBID 10/12/2021 01:46 PM    SPECGRAV 1.023 10/12/2021 01:46 PM    LEUKOCYTESUR LARGE 10/12/2021 01:46 PM    BLOODU Negative 10/12/2021 01:46 PM    GLUCOSEU 100 10/12/2021 01:46 PM       ABG    Lab Results   Component Value Date/Time    HLK6RVQ 34 12/01/2022 09:21 PM    BEART 9.6 12/01/2022 09:21 PM    D9SKUGMI 98 12/01/2022 09:21 PM    PHART 7.503 12/01/2022 09:21 PM    LNT5VSM 43.1 12/01/2022 09:21 PM    PO2ART 90.2 12/01/2022 09:21 PM    LVZ5VKB 35 12/01/2022 09:21 PM           Active Hospital Problems    Diagnosis Date Noted    S/P craniotomy [Z98.890] 12/07/2022     Priority: Medium    Acoustic neuroma ([facility] [D33.3] 12/01/2022     Priority: Medium    Cerebellopontine angle meningioma ([address] [D32.0] 11/28/2022     Priority: Medium ASSESSMENT/PLAN:    Acute GI bleed due to bleeding duodenal ulcers:  Status post EGD with intervention today  CT abdomen has no signs of any ongoing acute bleeding  Already on PPI IV  Will monitor closely    Suspected bilateral pulmonary emboli:  Agree with checking venous Doppler of lower extremities  Given that patient saturating well at this point and she has had acute gastrointestinal hemorrhage agree with holding off on anticoagulation  If creatinine remains stable tomorrow may obtain dedicated CTPA to fully assess clot burden in the lungs    Chronic medical problems:  Diabetes mellitus type 2  Hypertension  Hyperthyroidism      DVT Prophylaxis: Awaiting Dopplers  Diet: Diet NPO Exceptions are: Sips of Water with Meds  Code Status: Full Code  PT/OT Eval Status:     Dispo -inpatient       Citlaly MD Juliane

## 2022-12-12 NOTE — PROGRESS NOTES
Pt's pressures soft with presentation lethargic and pale. MAP <60, BP 99/45, repeated 94/45. H&H post 2nd unit of PRBC:   Latest Reference Range & Units 12/11/22 19:53   Hemoglobin Quant 12.0 - 16.0 g/dL 9.6 (L)   Hematocrit 36.0 - 48.0 % 27.9 (L)   (L): Data is abnormally low      Hospitalist notified. 500 mL NS Bolus ordered. Will continue to monitor.

## 2022-12-13 ENCOUNTER — APPOINTMENT (OUTPATIENT)
Dept: MRI IMAGING | Age: 67
DRG: 025 | End: 2022-12-13
Attending: NEUROLOGICAL SURGERY
Payer: MEDICARE

## 2022-12-13 ENCOUNTER — APPOINTMENT (OUTPATIENT)
Dept: CT IMAGING | Age: 67
DRG: 025 | End: 2022-12-13
Attending: NEUROLOGICAL SURGERY
Payer: MEDICARE

## 2022-12-13 LAB
ANION GAP SERPL CALCULATED.3IONS-SCNC: 9 MMOL/L (ref 3–16)
ANISOCYTOSIS: ABNORMAL
BANDED NEUTROPHILS RELATIVE PERCENT: 1 % (ref 0–7)
BASOPHILS ABSOLUTE: 0.1 K/UL (ref 0–0.2)
BASOPHILS RELATIVE PERCENT: 1 %
BLOOD BANK DISPENSE STATUS: NORMAL
BLOOD BANK PRODUCT CODE: NORMAL
BPU ID: NORMAL
BUN BLDV-MCNC: 20 MG/DL (ref 7–20)
CALCIUM SERPL-MCNC: 8.1 MG/DL (ref 8.3–10.6)
CHLORIDE BLD-SCNC: 111 MMOL/L (ref 99–110)
CO2: 22 MMOL/L (ref 21–32)
CREAT SERPL-MCNC: <0.5 MG/DL (ref 0.6–1.2)
DESCRIPTION BLOOD BANK: NORMAL
EOSINOPHILS ABSOLUTE: 0.1 K/UL (ref 0–0.6)
EOSINOPHILS RELATIVE PERCENT: 2 %
GFR SERPL CREATININE-BSD FRML MDRD: >60 ML/MIN/{1.73_M2}
GLUCOSE BLD-MCNC: 103 MG/DL (ref 70–99)
GLUCOSE BLD-MCNC: 151 MG/DL (ref 70–99)
GLUCOSE BLD-MCNC: 161 MG/DL (ref 70–99)
GLUCOSE BLD-MCNC: 162 MG/DL (ref 70–99)
GLUCOSE BLD-MCNC: 219 MG/DL (ref 70–99)
GLUCOSE BLD-MCNC: 332 MG/DL (ref 70–99)
HCT VFR BLD CALC: 21.4 % (ref 36–48)
HCT VFR BLD CALC: 23.4 % (ref 36–48)
HCT VFR BLD CALC: 23.5 % (ref 36–48)
HCT VFR BLD CALC: 24 % (ref 36–48)
HEMOGLOBIN: 7.3 G/DL (ref 12–16)
HEMOGLOBIN: 7.9 G/DL (ref 12–16)
HEMOGLOBIN: 8.1 G/DL (ref 12–16)
HEMOGLOBIN: 8.1 G/DL (ref 12–16)
LYMPHOCYTES ABSOLUTE: 1 K/UL (ref 1–5.1)
LYMPHOCYTES RELATIVE PERCENT: 16 %
MCH RBC QN AUTO: 31.2 PG (ref 26–34)
MCHC RBC AUTO-ENTMCNC: 34.5 G/DL (ref 31–36)
MCV RBC AUTO: 90.4 FL (ref 80–100)
MICROCYTES: ABNORMAL
MONOCYTES ABSOLUTE: 0.3 K/UL (ref 0–1.3)
MONOCYTES RELATIVE PERCENT: 5 %
MYELOCYTE PERCENT: 1 %
NEUTROPHILS ABSOLUTE: 4.9 K/UL (ref 1.7–7.7)
NEUTROPHILS RELATIVE PERCENT: 74 %
PDW BLD-RTO: 15.6 % (ref 12.4–15.4)
PERFORMED ON: ABNORMAL
PLATELET # BLD: 163 K/UL (ref 135–450)
PMV BLD AUTO: 8.2 FL (ref 5–10.5)
POTASSIUM SERPL-SCNC: 3.4 MMOL/L (ref 3.5–5.1)
RBC # BLD: 2.59 M/UL (ref 4–5.2)
SODIUM BLD-SCNC: 142 MMOL/L (ref 136–145)
WBC # BLD: 6.5 K/UL (ref 4–11)

## 2022-12-13 PROCEDURE — 36415 COLL VENOUS BLD VENIPUNCTURE: CPT

## 2022-12-13 PROCEDURE — 6370000000 HC RX 637 (ALT 250 FOR IP)

## 2022-12-13 PROCEDURE — 99024 POSTOP FOLLOW-UP VISIT: CPT | Performed by: NURSE PRACTITIONER

## 2022-12-13 PROCEDURE — 97530 THERAPEUTIC ACTIVITIES: CPT

## 2022-12-13 PROCEDURE — APPNB30 APP NON BILLABLE TIME 0-30 MINS

## 2022-12-13 PROCEDURE — 97535 SELF CARE MNGMENT TRAINING: CPT

## 2022-12-13 PROCEDURE — 36430 TRANSFUSION BLD/BLD COMPNT: CPT

## 2022-12-13 PROCEDURE — 2580000003 HC RX 258

## 2022-12-13 PROCEDURE — 99232 SBSQ HOSP IP/OBS MODERATE 35: CPT | Performed by: PHYSICAL MEDICINE & REHABILITATION

## 2022-12-13 PROCEDURE — 36592 COLLECT BLOOD FROM PICC: CPT

## 2022-12-13 PROCEDURE — 80048 BASIC METABOLIC PNL TOTAL CA: CPT

## 2022-12-13 PROCEDURE — 85018 HEMOGLOBIN: CPT

## 2022-12-13 PROCEDURE — 6360000004 HC RX CONTRAST MEDICATION

## 2022-12-13 PROCEDURE — 6360000002 HC RX W HCPCS

## 2022-12-13 PROCEDURE — 85025 COMPLETE CBC W/AUTO DIFF WBC: CPT

## 2022-12-13 PROCEDURE — 2580000003 HC RX 258: Performed by: NURSE PRACTITIONER

## 2022-12-13 PROCEDURE — C9113 INJ PANTOPRAZOLE SODIUM, VIA: HCPCS | Performed by: NURSE PRACTITIONER

## 2022-12-13 PROCEDURE — APPNB15 APP NON BILLABLE TIME 0-15 MINS

## 2022-12-13 PROCEDURE — 1200000000 HC SEMI PRIVATE

## 2022-12-13 PROCEDURE — 85014 HEMATOCRIT: CPT

## 2022-12-13 PROCEDURE — 99232 SBSQ HOSP IP/OBS MODERATE 35: CPT

## 2022-12-13 PROCEDURE — 6360000002 HC RX W HCPCS: Performed by: NURSE PRACTITIONER

## 2022-12-13 PROCEDURE — 71260 CT THORAX DX C+: CPT

## 2022-12-13 PROCEDURE — 74183 MRI ABD W/O CNTR FLWD CNTR: CPT

## 2022-12-13 RX ADMIN — POLYVINYL ALCOHOL 2 DROP: 14 SOLUTION/ DROPS OPHTHALMIC at 04:27

## 2022-12-13 RX ADMIN — SODIUM CHLORIDE, PRESERVATIVE FREE 10 ML: 5 INJECTION INTRAVENOUS at 09:51

## 2022-12-13 RX ADMIN — POLYVINYL ALCOHOL 2 DROP: 14 SOLUTION/ DROPS OPHTHALMIC at 09:58

## 2022-12-13 RX ADMIN — PANTOPRAZOLE SODIUM 40 MG: 40 INJECTION, POWDER, LYOPHILIZED, FOR SOLUTION INTRAVENOUS at 09:51

## 2022-12-13 RX ADMIN — INSULIN GLARGINE 15 UNITS: 100 INJECTION, SOLUTION SUBCUTANEOUS at 21:15

## 2022-12-13 RX ADMIN — SODIUM CHLORIDE, PRESERVATIVE FREE 10 ML: 5 INJECTION INTRAVENOUS at 20:17

## 2022-12-13 RX ADMIN — POLYVINYL ALCOHOL 2 DROP: 14 SOLUTION/ DROPS OPHTHALMIC at 18:53

## 2022-12-13 RX ADMIN — PANTOPRAZOLE SODIUM 40 MG: 40 INJECTION, POWDER, LYOPHILIZED, FOR SOLUTION INTRAVENOUS at 20:15

## 2022-12-13 RX ADMIN — POLYVINYL ALCOHOL 2 DROP: 14 SOLUTION/ DROPS OPHTHALMIC at 16:47

## 2022-12-13 RX ADMIN — POLYVINYL ALCOHOL 2 DROP: 14 SOLUTION/ DROPS OPHTHALMIC at 22:16

## 2022-12-13 RX ADMIN — POLYVINYL ALCOHOL 2 DROP: 14 SOLUTION/ DROPS OPHTHALMIC at 14:33

## 2022-12-13 RX ADMIN — POLYVINYL ALCOHOL 2 DROP: 14 SOLUTION/ DROPS OPHTHALMIC at 06:17

## 2022-12-13 RX ADMIN — SODIUM CHLORIDE, PRESERVATIVE FREE 10 ML: 5 INJECTION INTRAVENOUS at 20:14

## 2022-12-13 RX ADMIN — TETRAHYDROZOLINE HCL 0.05% 1 DROP: 0.05 SOLUTION/ DROPS INTRAOCULAR at 14:33

## 2022-12-13 RX ADMIN — SODIUM CHLORIDE, PRESERVATIVE FREE 10 ML: 5 INJECTION INTRAVENOUS at 09:57

## 2022-12-13 RX ADMIN — LEVETIRACETAM 500 MG: 100 INJECTION, SOLUTION INTRAVENOUS at 09:50

## 2022-12-13 RX ADMIN — POLYVINYL ALCOHOL 2 DROP: 14 SOLUTION/ DROPS OPHTHALMIC at 20:14

## 2022-12-13 RX ADMIN — TETRAHYDROZOLINE HCL 0.05% 1 DROP: 0.05 SOLUTION/ DROPS INTRAOCULAR at 20:17

## 2022-12-13 RX ADMIN — Medication 5 MG: at 20:15

## 2022-12-13 RX ADMIN — IOPAMIDOL 75 ML: 755 INJECTION, SOLUTION INTRAVENOUS at 18:29

## 2022-12-13 RX ADMIN — LEVETIRACETAM 500 MG: 100 INJECTION, SOLUTION INTRAVENOUS at 22:19

## 2022-12-13 RX ADMIN — LATANOPROST 1 DROP: 50 SOLUTION OPHTHALMIC at 20:16

## 2022-12-13 RX ADMIN — TETRAHYDROZOLINE HCL 0.05% 1 DROP: 0.05 SOLUTION/ DROPS INTRAOCULAR at 09:46

## 2022-12-13 RX ADMIN — SODIUM CHLORIDE: 9 INJECTION, SOLUTION INTRAVENOUS at 09:48

## 2022-12-13 RX ADMIN — POLYVINYL ALCOHOL 2 DROP: 14 SOLUTION/ DROPS OPHTHALMIC at 00:09

## 2022-12-13 RX ADMIN — POLYVINYL ALCOHOL 2 DROP: 14 SOLUTION/ DROPS OPHTHALMIC at 12:28

## 2022-12-13 RX ADMIN — POLYVINYL ALCOHOL 2 DROP: 14 SOLUTION/ DROPS OPHTHALMIC at 02:13

## 2022-12-13 RX ADMIN — POLYVINYL ALCOHOL 2 DROP: 14 SOLUTION/ DROPS OPHTHALMIC at 08:09

## 2022-12-13 ASSESSMENT — PAIN SCALES - GENERAL
PAINLEVEL_OUTOF10: 0
PAINLEVEL_OUTOF10: 0
PAINLEVEL_OUTOF10: 3

## 2022-12-13 NOTE — PLAN OF CARE
Problem: Chronic Conditions and Co-morbidities  Goal: Patient's chronic conditions and co-morbidity symptoms are monitored and maintained or improved  Outcome: Progressing     Problem: Pain  Goal: Verbalizes/displays adequate comfort level or baseline comfort level  Outcome: Progressing  Flowsheets (Taken 12/12/2022 2000)  Verbalizes/displays adequate comfort level or baseline comfort level:   Encourage patient to monitor pain and request assistance   Assess pain using appropriate pain scale   Administer analgesics based on type and severity of pain and evaluate response     Problem: Safety - Adult  Goal: Free from fall injury  Outcome: Progressing  Flowsheets (Taken 12/12/2022 1957)  Free From Fall Injury: Instruct family/caregiver on patient safety     Problem: Discharge Planning  Goal: Discharge to home or other facility with appropriate resources  Outcome: Progressing

## 2022-12-13 NOTE — PLAN OF CARE
Patient H&H dropped from 8.1/24 @1814 on 12/12 to 7.3/21.4 @0045 on 12/13. No changes to neuro exam.  VSS remain stable, no s/s of respiratory distress. No bloody bowel movement this shift. 1 unit PRBCs ordered.        LEE Bhat - CNP   Neurology & Neurocritical Care   12/13/2022 1:28 AM    ICU Patients:   Neurocritical Care Line: [phone]  [employer]: [facility] Neurocritical Care

## 2022-12-13 NOTE — FLOWSHEET NOTE
12/13/22 0800   Vitals   Temp 98.2 °F (36.8 °C)   Heart Rate 70   Resp 19   /83   MAP (Calculated) 94   Level of Consciousness 0   MEWS Score 1   Cardiac Rhythm Sinus rhythm   Oxygen Therapy   SpO2 96 %     No s/s of distress, VSS. See flowsheet and eMar for additional documentation.

## 2022-12-13 NOTE — PROGRESS NOTES
Occupational Therapy  Facility/Department: [address] [place] [address] ICU  Daily Treatment Note  NAME: Moris Aj  : 1955  MRN: [id number]    Date of Service: 2022    Discharge Recommendations: Moris Aj scored a 15/24 on the AM-PAC ADL Inpatient form. Current research shows that an AM-PAC score of 17 or less is typically not associated with a discharge to the patient's home setting. Based on the patient's AM-PAC score and their current ADL deficits, it is recommended that the patient have 5-7 sessions per week of Occupational Therapy at d/c to increase the patient's independence. At this time, this patient demonstrates complex nursing, medical, and rehabilitative needs, and would benefit from intensive rehabilitation services upon discharge from the Inpatient setting. This patient demonstrates the ability to participate in and benefit from an intensive therapy program with a coordinated interdisciplinary team approach to foster frequent, structured, and documented communication among disciplines, who will work together to establish, prioritize, and achieve treatment goals. Please see assessment section for further patient specific details. If patient discharges prior to next session this note will serve as a discharge summary. Please see below for the latest assessment towards goals. Patient Diagnosis(es): Diagnoses of Acoustic neuroma [facility]), Balance problem, Meningioma ([facility] [address] and Dizziness were pertinent to this visit. Assessment      Assessment: Pt tolerated session well. Steady progress with funct mob, but needing increase assistance for bed mobility and transfers. Continues to require assistance with toileting and other ADLs. Pt would benefit from cont OT to work on ADLs, funct mob and transfers. Cont with POC      Activity Tolerance: Patient tolerated treatment well (Complaint of feeling dizzy upon stance after BM. Subsided with seated rest break, BP- 131/70.  RN aware)      Plan Occupational Therapy Plan  Times Per Week: 5-7     Restrictions  Position Activity Restriction  Other position/activity restrictions: Up with assistance, ambulate the patient, up in the chair, HOB elevated to 30 degrees        Subjective   Subjective  Subjective: Pt in bed upon arrival. Agreeable to OT/PT treat  Pain: No complaint of pain  Orientation  Overall Orientation Status: Within Functional Limits           Objective    Vitals     Bed Mobility Training  Bed Mobility Training: Yes  Supine to Sit: Assist X2;Minimum assistance; Moderate assistance      Balance  Sitting: Intact  Standing: With support (RW- CGA)      Transfer Training  Transfer Training: Yes  Sit to Stand: Assist X2;Minimum assistance  Stand to Sit: Assist X2;Minimum assistance  Toilet Transfer: Minimum assistance;Assist X2 (ambulating, BSC)      Gait  Overall Level of Assistance: Minimum assistance; Additional time  Interventions: Verbal cues; Safety awareness training  Distance (ft):  (Bed>BSC>chair)  Assistive Device: Gait belt;Walker, rolling         ADL  LE Dressing: Minimal assistance (Utilizing figure 4 technique- Pt able to thread LLE sock, but needing A to get into figure 4 technique with RLE)  Toileting: Maximum assistance              Safety Devices  Type of Devices: Call light within reach; Chair alarm in place;Nurse notified; Left in chair     Patient Education  Education Given To: Patient  Education Provided: Plan of Care;Transfer Training;Role of Therapy; Energy Conservation; ADL Adaptive Strategies  Education Method: Verbal  Barriers to Learning: Hearing  Education Outcome: Verbalized understanding;Continued education needed    Goals  Short Term Goals  Time Frame for Short Term Goals: by discharge  Short Term Goal 1: Pt will perform bed mobility with CGA to decrease caregiver burden- goal met 12/2.  NEW GOAL: Pt will perform bed mobility with supervision  Short Term Goal 2: Pt will sit EOB 5 mins with no more than CGA-  goal met 12/8 on BSC. NEW GOAL: sit unsupported 5 mins with Supervision  Short Term Goal 3: Pt will perform grooming activity with Min A and set-up- goal met 12/1. New Goal: Pt will perform grooming ax with set-up and supervision  Short Term Goal 4: Pt will tolerate sit to stand transfer- goal met 12/1.  NEW GOAL: sit to stand transfer with Mod x2- goal met 12/2 with Min +CGA and CGA x2; NEW GOAL: stance x2 mins with CGA  Patient Goals   Patient goals : not stated       Therapy Time   Individual Concurrent Group Co-treatment   Time In 1334         Time Out 1408         Minutes 34         Timed Code Treatment Minutes: [address] [facility], SUBRAMANIAN/L

## 2022-12-13 NOTE — CARE COORDINATION
[facility] physician, Dr. Geeta Hayward , is following case to see how patient is when more medically stable. Patient is possibly transferring to [facility] today bed pending.  Monitoring H&H.

## 2022-12-13 NOTE — PROGRESS NOTES
PT worked with PT/OT, currently up in chair. Chair alarm in place for safety. MRI planned for this evening. Pt tolerating advance to clear liquid diet well. Pt denies any needs at this time.

## 2022-12-13 NOTE — PROGRESS NOTES
Progress Note  Physical Medicine and Rehabilitation    Patient: Valorie Evans  [id number]  Date: 12/13/2022         Chief Complaint: Meningioma     History of Present Illness/Hospital Course:  Patient is a morbidly obese (Body mass index is 50.82 kg/m².), 79 y.o. female  with c/o dizziness, HA and vertigo in the setting of large right petroclival mass which is consistent with meningioma. . She presented to the hospital for a scheduled two staged petroclival meningioma resection. 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 Interval Hx: Pt had IR embolization of GDA yesterday. Transfused 1u pRBCs this morning. Prior Level of Function:  Independent for mobility, ADLs, and IADLs     Current Level of Function:  Min assist     Pertinent Social History:  Support: Lives alone  Home set-up: One level house with 2 steps to enter    Past Medical History:   Diagnosis Date    Arthritis     Asthma     mild    Brain tumor ([address]     Diabetes mellitus ([address]     borderline    High cholesterol     Hypertension     Imbalance     DUE TO TUMOR- USING ANKLE BRACE / WALKER PER PREOP H&P    Loss of hearing     bilateral reported    Vertigo        Past Surgical History:   Procedure Laterality Date    CRANIOTOMY N/A 11/29/2022    STAGE II: RESECTION OF RIGHT PETROCLIVAL MENINGIOMA performed by Nacho Hess MD at [facility] [address]    IR EMBOLIZATION HEMORRHAGE  12/12/2022    IR EMBOLIZATION HEMORRHAGE 12/12/2022 [facility] SPECIAL PROCEDURES    MASTOIDECTOMY Right 11/28/2022    RIGHT TRANSMASTOID / ANTERIOR MIDDLE CRANIAL FOSSA , ABDOMINAL FAT GRAFT EXTERNAL AUDITORY CANAL CLOSURE performed by Ishmael Flannery MD at [place] [address] Right 11/28/2022    STAGE 1, RIGHT TRANSLABYRINTHINE / RETROLABYRINTHINE CRANIOTOMY, PETROSECTOMY performed by Jayant Nascimento.  Ishan Hess MD at [facility] Right 11/8/2021    RIGHT TOTAL HIP ARTHROPLASTY POSTERIOR APPROACH - JOSÉ ANTONIO ADVANCED performed by Geeta Doran MD at [address] Right 07/14/2015    TOTAL KNEE ARTHROPLASTY Left 10/14/15    TKA    UPPER GASTROINTESTINAL ENDOSCOPY N/A 12/11/2022    EGD CONTROL HEMORRHAGE performed by Claudio Gonzalez MD at [address] ENDOSCOPY       Family History   Problem Relation Age of Onset    Cancer Mother     Hypotension Mother     Cancer Father     Cancer Sister     Hypotension Sister     Cancer Maternal Grandmother     Cancer Maternal Grandfather     Cancer Paternal Grandmother     Cancer Paternal Grandfather     Hypotension Other     Cancer Other     Diabetes Other     Osteoarthritis Other        Social History     Socioeconomic History    Marital status:      Spouse name: None    Number of children: None    Years of education: None    Highest education level: None   Tobacco Use    Smoking status: Former    Smokeless tobacco: Never   Substance and Sexual Activity    Alcohol use: No    Drug use: Never           REVIEW OF SYSTEMS:   CONSTITUTIONAL: negative for fevers, chills   EYES: positive for diplopia   HEENT: positive for difficulty swallowing. negative for hearing loss, tinnitus, sinus pressure, nasal congestion  RESPIRATORY: Negative for SOB, cough  CARDIOVASCULAR: negative for chest pain, palpitations  GASTROINTESTINAL: positive for abdominal pain.  negative for hematemesis, hematochezia, nausea, vomiting, diarrhea, abdominal pain  GENITOURINARY: negative for frequency, dysuria  HEMATOLOGIC/LYMPHATIC: negative for easy bruising, bleeding and lymphadenopathy  ENDOCRINE: negative for diabetic symptoms including polyuria, polydipsia  MUSCULOSKELETAL: negative for pain, joint swelling, decreased range of motion  NEUROLOGICAL: positive for difficulty speaking, negative for headaches, unilateral weakness    Physical Examination:  Vitals: Patient Vitals for the past 24 hrs:   BP Temp Temp src Pulse Resp SpO2 Weight   12/13/22 0800 -- 98.2 °F (36.8 °C) -- -- -- -- -- 12/13/22 0700 -- -- -- 70 14 94 % --   12/13/22 0600 (!) 134/57 -- -- 68 16 94 % --   12/13/22 0500 (!) 151/66 -- -- 71 16 96 % --   12/13/22 0430 (!) 134/57 97.9 °F (36.6 °C) -- 68 16 94 % --   12/13/22 0420 (!) 134/57 97.9 °F (36.6 °C) -- 68 16 94 % --   12/13/22 0400 (!) 166/67 97.9 °F (36.6 °C) Oral 71 17 98 % (!) 355 lb (161 kg)   12/13/22 0300 (!) 119/57 -- -- 69 18 94 % --   12/13/22 0215 (!) 129/52 97.6 °F (36.4 °C) -- 70 17 94 % --   12/13/22 0150 (!) 148/57 97.6 °F (36.4 °C) -- 74 15 94 % --   12/13/22 0100 (!) 155/86 -- -- 77 11 91 % --   12/13/22 0000 (!) 168/71 97.5 °F (36.4 °C) Oral 78 18 99 % --   12/12/22 2317 -- -- -- -- 13 -- --   12/12/22 2300 (!) 129/57 -- -- 76 16 96 % --   12/12/22 2200 (!) 170/64 -- -- 80 18 98 % --   12/12/22 2100 (!) 143/69 -- -- 75 14 97 % --   12/12/22 2000 (!) 140/61 97.6 °F (36.4 °C) Oral 78 17 98 % --   12/12/22 1930 (!) 145/62 -- -- 80 16 99 % --   12/12/22 1900 (!) 145/61 -- -- 80 20 97 % --   12/12/22 1800 (!) 137/58 -- -- 86 20 97 % --   12/12/22 1700 105/73 -- -- 82 18 96 % --   12/12/22 1645 (!) 128/55 -- -- 83 19 96 % --   12/12/22 1630 (!) 136/53 -- -- 83 15 96 % --   12/12/22 1615 (!) 139/50 -- -- 82 16 97 % --   12/12/22 1600 125/80 98 °F (36.7 °C) Oral 81 20 95 % --   12/12/22 1300 (!) 134/59 -- -- 72 15 94 % --   12/12/22 1200 119/73 97.8 °F (36.6 °C) Oral 69 18 94 % --   12/12/22 1100 130/61 -- -- 69 18 -- --   12/12/22 1000 (!) 121/55 -- -- 70 16 95 % --   12/12/22 0912 -- -- -- 67 15 99 % --       Const: Alert. WDWN. No distress  Eyes: Conjunctiva noninjected, no icterus noted; pupils equal, round, and reactive to light. HENT: Bruising around R eye., normocephalic; Oral mucosa moist  CV: Regular rate and rhythm, no murmur rub or gallop noted  Resp: Lungs clear to auscultation bilaterally, no rales wheezes or ronchi, no retractions. Respirations unlabored. GI: Soft, mild tenderness, nondistended. Normal bowel sounds.    Skin: Normal temperature and turgor. No rashes or breakdown noted. Ext: positive for BL LE edema   MSK: No joint tenderness, erythema, warmth noted. AROM intact. Neuro: Alert, oriented, appropriate. Mild R sided facial droop. Sensation intact to light touch. Motor examination reveals normal strength in all four limbs diffusely. Psych: Stable mood, normal judgement, normal affect     Lab Results   Component Value Date    WBC 6.5 12/13/2022    HGB 8.1 (L) 12/13/2022    HCT 23.4 (L) 12/13/2022    MCV 90.4 12/13/2022     12/13/2022     Lab Results   Component Value Date    INR 1.07 12/11/2022    INR 1.09 11/29/2022    INR 1.02 10/12/2021    PROTIME 13.8 12/11/2022    PROTIME 14.1 11/29/2022    PROTIME 11.5 10/12/2021     Lab Results   Component Value Date    CREATININE <0.5 (L) 12/12/2022    BUN 40 (H) 12/12/2022     12/12/2022    K 3.6 12/12/2022     12/12/2022    CO2 29 12/12/2022     Lab Results   Component Value Date    ALT 11 12/04/2022    AST 12 (L) 12/04/2022    ALKPHOS 48 12/04/2022    BILITOT 0.5 12/04/2022     On my review, CXR displays. IR EMBOLIZATION HEMORRHAGE   Final Result   Impression: Technically successful superior mesenteric, common hepatic and gastroduodenal angiograms with subsequent coiling of the gastroduodenal artery. VL Extremity Venous Bilateral   Final Result      CTA ABDOMEN PELVIS W WO CONTRAST   Final Result      1. Suspected bilateral pulmonary emboli but inadequate contrast phase to assess for the pulmonary embolus. Incomplete filling of the pulmonary arteries suggests pulmonary emboli inferiorly     2. No sign of any acute or active GI bleeding on CT angiograms studies   3. No sign of any aneurysm in the abdomen with normal widely patent vessels   4. Stable appearing partially calcified left adrenal adenoma.    5. Parapelvic renal cysts, largest on left side   6. 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 4.1 x 3.5 cm right ovarian cyst, benign in appearance   8. Mild cholelithiasis   9. No free fluid or free air with right abdominal wall subcutaneous inflammation or prior instrumentation         CT ABDOMEN PELVIS W WO CONTRAST Additional Contrast? Radiologist Recommendation   Final Result      1.  9.5 cm mass in the right hepatic lobe which does not appear to represent a hemangioma. Differential diagnosis would include primary or secondary malignancy. Liver mass protocol MRI with and without contrast is recommended. 2.  No intra-abdominal hemorrhage. 3.  Mild cholelithiasis. 4.  3.5 cm right ovarian cyst, the CT appearance suggests a simple cyst indicating a benign finding. MRI BRAIN W WO CONTRAST   Final Result      1. Postsurgical changes from right temporal and transmastoid craniotomy with large amount of fat packing in the defect and extracranial soft tissues. Large subcutaneous fluid collection is present in the scalp surrounding and extending superiorly from    the fat packing. No diffusion restriction to indicate superimposed infection. 2.  Residual meningioma in the right cerebellopontine angle, prepontine cistern, and right Meckel's cave/cavernous sinus. The tumor extends partially encasing the basilar artery   3. Small area of acute to subacute ischemia in the right brachium pontis. Small enhancing lesions superior to the right tentorium is new from the prior study and may represent an area of subacute ischemia. 4.  Small amount of extra-axial hemorrhage along the right cerebellopontine angle. 5.  Stable 12 mm left frontal meningioma. CTA PULMONARY W CONTRAST   Final Result      Significantly limited study due to motion. 1. Significantly limited study due to streak artifact and suboptimal bolus. No evidence of a central embolus. 2. Multifocal airspace consolidation is present, suboptimally evaluated due to motion. This presumably reflects pneumonia. Follow-up chest CT is recommended to document resolution. 3. There is a large mass in the right hepatic lobe measuring 8.2 x 7.4 cm, demonstrating peripheral nodular enhancement. There is significant amount of artifact through this lesion. It is still favored to reflect a hemangioma. Recommend a multiphasic CT    of the abdomen for further assessment. This could be performed on a nonurgent basis, if clinically appropriate. 4. Partially calcified nodule arising from the left adrenal gland, most likely benign and possibly reflecting old adrenal hemorrhage. XR CHEST PORTABLE   Final Result      Similar appearance of patchy airspace disease. FL MODIFIED BARIUM SWALLOW W VIDEO   Final Result      1. Laryngeal penetration, but no evidence of tracheal aspiration. XR CHEST PORTABLE   Final Result      Patchy left upper lobe airspace disease, atelectasis versus pneumonia. CT HEAD WO CONTRAST   Final Result      1. Interval postsurgical changes from resection of right cerebellopontine angle mass with associated right temporal mastoid resection and frontotemporal craniotomy and fat graft placement. 2.  Thin hyperdensity along the fat graft may represent small amount of postsurgical blood products. There is also a small focus of subarachnoid hemorrhage along the right temporal lobe. 3.  Mild pneumocephalus and bilateral subgaleal fluid is also likely postsurgical.               Assessment:  Giant Duodenal Ulcer  -Hgb 7.8, previously 12  -Transfuse pRBCs  -Protonix 40 mg BID  -IVF  -Embolization of GDA  -GI following  -IR following  BL PE, suspected  -May need anticoagulation when duodenal ulcer has been tx    3.  Meningioma  -s/p resection with TL and MCF approach  -Keppra 500 mg BID  -Bowel regiment: Senna, glycolax, and dulcolax  -Nicardipine-Roxicodone PRN  -Acycolvir 400 mg TID for 10 days  -Artifical tears q2H and eye patch  -Keep an eye on mastoid dressing  -SCDs for DVT prophylaxis   -PT/OT  4. HTN   -Coreg 12.5 mg BID  Losartan 100 mg daily  5. DM   -Lantus 15u qHS  -HDSSI  6. Hyperthyroidism   -Methimazole 10 mg daily  7. Liver Mass  -Incidental finding  -f/u imaging once acute problems have been tx  Impairments- Decreased functional mobility, Decreased ADLs     Recommendations:  Pt transfused with 1u pRBC today. Will evaluate when more medically stable and consider ARU. Therapy evaluation needed today    Thank you for this consult. Please contact me with any questions or concerns. Mayte Siddiqui DO, PGY-2  12/13/2022  8:54 AM      This patient has been seen, examined, and discussed with the resident. I was part of the key critical services provided to the patient. I agree with the residents documentation. This note may have been altered to reflect my own examination findings, impression, and recommendations.        KAYLEY ArnoldPSusieH  PM&R  12/13/2022  8:54 AM

## 2022-12-13 NOTE — PROGRESS NOTES
Assessment  Giant duodenal ulcer, 3 cm, that was bleeding with visible vessel 12/11 at EGD status post epinephrine, Endo Clip and Hemospray. 12/12 she had routine GDA embolization by IR. No overt bleeding since. Acute blood loss anemia. No overt bleeding in over 48 hrs, but received 1 unit PRBC overnight for hgb 7.3, and today 7.9  Possible PE based on CTA abd yesterday. Need to clarify if this is present or artifact. Large 9.5 cm right lobe liver lesion, possible meningioma but cannot rule out mass. Reviewing images, this does not seem to touch the duodenum, which makes tumor less likely the cause for duodenal ulcer. Plan:  CTPA or imaging per primary team to clarify PE status. Discussed with primary team.    IR to attempt GDA embolization yesterday. MRI abd with and without contrast, which could be done wed for semi elective workup of rt liver lobe lesion. can be done tomorrow. Iv protonix 40mg bid. Start CLD today with clear supplement. If she tolerates, advance to FLD tomorrow. Subjective: We are following for large duodenal ulcer with bleeding status post IR GDA embolization yesterday. No overt bleeding. She denies abdominal pain. Objective:    Review of Systems:    Unable to obtain because of patient factors.     Scheduled Meds:   pantoprazole  40 mg IntraVENous BID    levETIRAcetam  500 mg IntraVENous Q12H    cetirizine  10 mg Oral Daily    [Held by provider] hydroCHLOROthiazide  25 mg Oral Daily    insulin lispro  0-16 Units SubCUTAneous TID WC    insulin lispro  0-4 Units SubCUTAneous Nightly    polyvinyl alcohol  2 drop Right Eye Q2H    methIMAzole  10 mg Oral Daily    insulin glargine  15 Units SubCUTAneous Nightly    melatonin  5 mg Oral Nightly    sodium chloride flush  5-40 mL IntraVENous 2 times per day    [Held by provider] heparin (porcine)  5,000 Units SubCUTAneous 3 times per day    polyethylene glycol  17 g Oral Daily    tetrahydrozoline  1 drop Both Eyes TID    [Held by provider] carvedilol  12.5 mg Oral BID WC    [Held by provider] losartan  100 mg Oral Daily    sodium chloride flush  5-40 mL IntraVENous 2 times per day    latanoprost  1 drop Both Eyes Nightly     Continuous Infusions:   sodium chloride 100 mL/hr at 12/13/22 0948    sodium chloride      sodium chloride      sodium chloride      sodium chloride      dextrose         Vitals:  /73   Pulse 70   Temp 98.2 °F (36.8 °C)   Resp 10   Ht 5' 5\" (1.651 m)   Wt (!) 355 lb (161 kg)   SpO2 100%   BMI 59.08 kg/m²     Exam:  General:  comfortable  Heent: There is no scleral icterus. Cardiovascular: The heart is regular rate and rhythm. Respiratory:  The patient's breathing is non-labored with normal chest wall excursion and normal muscle movement. Abdomen: The abdomen is nondistended, soft, and nontender. Rectal:  deferred   Neurological:  Gross memory appears intact. Patient is alert and oriented. Labs and Imaging:  I reviewed the labs and imaging results from last 24 hours. Recent Labs     12/11/22  0538 12/11/22  0859 12/11/22  1506 12/12/22  0325 12/12/22  1150 12/12/22  1814 12/13/22  0045 12/13/22  0428   HGB 8.9* 7.8*   < > 8.5* 8.7* 8.1* 7.3* 8.1*   WBC 12.5* 10.5  --  8.4  --   --   --  6.5   LABALBU  --  2.5*  --  2.8*  --   --   --   --     < > = values in this interval not displayed.         Julia Scales MD  December 13, 2022

## 2022-12-13 NOTE — PROGRESS NOTES
NEUROCRITICAL CARE PROGRESS NOTE       Patient Name: Moris Aj YOB: 1955   Sex: Female Age: 79 yrs     CC / Reason for Consult: medical management    Interval Hx / Changes over last 24 hours: On PPI BID  Hgb dropped overnight from 8.7 -> 8.1 -> 7.3, received 1 U PRBC  BLE dopplers negative  Plan for possible CTA today  On 1L nasal cannula, VSS, afebrile    ROS: No complaints this morning    HISTORY   Admission HPI:   80 yo F s/p Stage II resection of R petroclival meningioma per Dr. Antionette Crook. We were asked to assist with medical management post operatively. Initially had been doing well but over the weekend had drop in H&H and found to have bloody stools. Seen by GI and found to have a giant Duodenal ulcer during EGD. Started on protonix BID, planning for IR to attempt GDA embolization on 12/12. CTPA also with concern for Pes, but inadequate image/contrast timing so will need dedicated CTPA once able.          PMH Past Medical History:   Diagnosis Date    Arthritis     Asthma     mild    Brain tumor ([address]     Diabetes mellitus ([address]     borderline    High cholesterol     Hypertension     Imbalance     DUE TO TUMOR- USING ANKLE BRACE / WALKER PER PREOP H&P    Loss of hearing     bilateral reported    Vertigo       Allergies Allergies   Allergen Reactions    Keflex [Cephalexin] Itching and Rash    Codeine Nausea And Vomiting    Tomato Rash      Diet Diet NPO Exceptions are: Sips of Water with Meds   Isolation No active isolations     CURRENT SCHEDULED MEDICATIONS   Inpatient Medications     pantoprazole, 40 mg, IntraVENous, BID    levETIRAcetam, 500 mg, IntraVENous, Q12H    cetirizine, 10 mg, Oral, Daily    [Held by provider] hydroCHLOROthiazide, 25 mg, Oral, Daily    insulin lispro, 0-16 Units, SubCUTAneous, TID WC    insulin lispro, 0-4 Units, SubCUTAneous, Nightly    polyvinyl alcohol, 2 drop, Right Eye, Q2H    methIMAzole, 10 mg, Oral, Daily    insulin glargine, 15 Units, SubCUTAneous, Nightly    melatonin, 5 mg, Oral, Nightly    sodium chloride flush, 5-40 mL, IntraVENous, 2 times per day    [Held by provider] heparin (porcine), 5,000 Units, SubCUTAneous, 3 times per day    polyethylene glycol, 17 g, Oral, Daily    tetrahydrozoline, 1 drop, Both Eyes, TID    [Held by provider] carvedilol, 12.5 mg, Oral, BID WC    [Held by provider] losartan, 100 mg, Oral, Daily    sodium chloride flush, 5-40 mL, IntraVENous, 2 times per day    latanoprost, 1 drop, Both Eyes, Nightly   Infusions    sodium chloride 100 mL/hr at 12/13/22 0609    sodium chloride      sodium chloride      sodium chloride      sodium chloride      dextrose        Antibiotics   Recent Abx Admin        No antibiotic orders with administrations found. LABS   Metabolic Panel Recent Labs     12/11/22  0859 12/12/22  0325    144   K 3.9 3.6    109   CO2 28 29   BUN 74* 40*   CREATININE 0.7 <0.5*   GLUCOSE 277* 188*   CALCIUM 7.8* 8.3   LABALBU 2.5* 2.8*   PHOS 4.4 2.1*   MG 1.80  --         CBC / Coags Recent Labs     12/11/22  0859 12/11/22  1005 12/11/22  1506 12/12/22  0325 12/12/22  1150 12/12/22  1814 12/13/22  0045 12/13/22  0428   WBC 10.5  --   --  8.4  --   --   --  6.5   RBC 2.55*  --   --  2.79*  --   --   --  2.59*   HGB 7.8*  --    < > 8.5*   < > 8.1* 7.3* 8.1*   HCT 23.6*  --    < > 25.5*   < > 24.0* 21.4* 23.4*     --   --  174  --   --   --  163   INR  --  1.07  --   --   --   --   --   --     < > = values in this interval not displayed. Other Recent Labs     12/10/22  1636   COVID19 Not Detected       No results for input(s): PHENYTOIN, KEPPRA, LACOSA, LAMO, VALPROATE, LACTSEPSIS, LACTA in the last 72 hours. DIAGNOSTICS   IMAGES:  Images personally reviewed and agree w/ radiology interpretation. BLE Doppler:  Summary        There is no evidence of deep or superficial venous thrombosis of the    bilateral lower extremities in this technically difficult study. Previous Imaging:  CTA Abdomen Pelvis W WO Contrast (post EGD): Impression       1. Suspected bilateral pulmonary emboli but inadequate contrast phase to assess for the pulmonary embolus. Incomplete filling of the pulmonary arteries suggests pulmonary emboli inferiorly     2. No sign of any acute or active GI bleeding on CT angiograms studies   3. No sign of any aneurysm in the abdomen with normal widely patent vessels   4. Stable appearing partially calcified left adrenal adenoma. 5. Parapelvic renal cysts, largest on left side   6. 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 4.1 x 3.5 cm right ovarian cyst, benign in appearance   8. Mild cholelithiasis   9.  No free fluid or free air with right abdominal wall subcutaneous inflammation or prior instrumentation       PHYSICAL EXAMINATION     PHYSICAL EXAM:  Vitals:    12/13/22 0500 12/13/22 0600 12/13/22 0700 12/13/22 0800   BP: (!) 151/66 (!) 134/57     Pulse: 71 68 70    Resp: 16 16 14    Temp:    98.2 °F (36.8 °C)   TempSrc:       SpO2: 96% 94% 94%    Weight:       Height:             General: Alert, no distress, well-nourished  Neurologic  Mental status:   orientation to person, place, time, situation   Attention intact as able to attend well to the exam     Language fluent in conversation   Comprehension intact; follows simple commands    Cranial nerves:   CN2: Visual fields full w/o extinction on confrontational testing   Fundoscopic Exam: unable to visualize fundi  CN 3,4,6: Pupils equal and reactive to light, extraocular muscles intact  CN5: Facial sensation symmetric   CN7: R sided facial droop  CN8: Hearing symmetric to spoken voice  CN9: Palate elevated symmetrically  CN11: Traps full strength on shoulder shrug  CN12: Tongue deviates to the R    Motor Exam:   R  L    Deltoid 5  5   Biceps 5 5   Triceps 5 5   Wrist extension 5 5   Interossei 5 5      R  L    Hip flexion  5  5   Hip extension  5 5   Knee flexion  5 5   Knee extension  5 5   Ankle dorsiflexion  5 5   Ankle plantar flexion  5 5       Sensory: light touch intact and symmetric in all 4 extremities. Cerebellar/coordination: finger nose finger normal without ataxia  Tone: normal in all 4 extremities  Gait: held for patient safety    OTHER SYSTEMS:  Cardiovascular: Warm, appears well perfused   Respiratory: Easy, non-labored respiratory pattern, no complaints of SOB, on 1 L nasal cannula  Abdominal: Abdomen is without distention  Extremities: Upper and lower extremities with scattered bruising. ASSESSMENT & RECOMMENDATIONS   Assessment:  Patient is a 78 yo F s/p Procedure(s) (LRB):  STAGE II: RESECTION OF RIGHT PETROCLIVAL MENINGIOMA (N/A) per Dr. Miguelangel Marie. Plan:  - Q4 hour neuro exams  - Keppra as ordered (currently IV d/t NPO status, once taking PO can transition back to PO)  - s/p IR for embolization of GDA  - Hgb and Hct Q6 hours  - Protonix BID  - GI following, appreciate recs  - Clear liquid diet, per GI recs. Will discontinue IV fluids if patient taking adequate PO  - CTPA today to evaluate for PEs if creatinine stable  - Doppler study negative for DVTs  - PT/OT as able  - SCDs for DVT chemoprophylaxis, SubQ heparin on hold for now, trending hemoglobin, discussed with primary team (Neurosurgery NP) and continue holding for now  - Follow up imaging of liver mass per GI  - If CTPA without PE, patient can transfer out of ICU today to     Case discussed with attending Neurologist, Dr. Tati Coleman, APRN - CNP   Neurology & Neurocritical Care   12/13/2022 8:16 AM      ICU Patients:   [address] [phone]  [facility]: [address]    Floor / PCU Patients:  Neurology Line: [phone]  [facility]: [facility]    I spent 25 minutes in the care of this patient.   Over 50% of that time was in face-to-face counseling regarding disease process, diagnostic testing, preventative measures, and answering patient and family questions.

## 2022-12-13 NOTE — CONSULTS
Hospital Medicine  Consult           History Of Present Illness: The patient is a 79 y.o. female with medical history as below who has been in the hospital for multistage neurosurgical resection of petroclival meningioma who we are asked to see/evaluate by Dr. Sharon Victor for cas-operative mgt. patient has been recovering well postoperatively from her procedure when she most recently developed melanotic stools. Gastroenterology was consulted, patient underwent EGD today and was found to have multiple duodenal ulcers with visible vessel and active bleeding. Underwent injection of epi, hemostatic clip placement and chemo spray. Difficult to concern for possible ongoing GI bleed she underwent CTA. No active GI bleed was detected, however CAT scan picked up likely pulmonary embolism in the lower lung fields. Patient received 2 units of blood transfusion, just finished her second unit now. BP is low normal.  She is a bit sleepy but arousable and denies any active complaints her oxygen saturation is at 95% when she is asleep. She has been requiring intermittent nasal cannula during her hospital stay. Past Medical History:        Diagnosis Date    Arthritis     Asthma     mild    Brain tumor ([address]     Diabetes mellitus ([address]     borderline    High cholesterol     Hypertension     Imbalance     DUE TO TUMOR- USING ANKLE BRACE / WALKER PER PREOP H&P    Loss of hearing     bilateral reported    Vertigo        Past Surgical History:        Procedure Laterality Date    CRANIOTOMY N/A 11/29/2022    STAGE II: RESECTION OF RIGHT PETROCLIVAL MENINGIOMA performed by Matthieu Hess MD at [address]    IR EMBOLIZATION HEMORRHAGE  12/12/2022    IR EMBOLIZATION HEMORRHAGE 12/12/2022 [facility] SPECIAL PROCEDURES    MASTOIDECTOMY Right 11/28/2022    RIGHT TRANSMASTOID / ANTERIOR MIDDLE CRANIAL FOSSA , ABDOMINAL FAT GRAFT EXTERNAL AUDITORY CANAL CLOSURE performed by Bony Crain MD at [address] 11/28/2022    STAGE 1, RIGHT TRANSLABYRINTHINE / RETROLABYRINTHINE CRANIOTOMY, PETROSECTOMY performed by Han Li. Dl Armas MD at [facility] Right 11/8/2021    RIGHT TOTAL HIP ARTHROPLASTY POSTERIOR APPROACH - Arthur Escobedo ADVANCED performed by Myrna Bob MD at [facility] Right 07/14/2015    TOTAL KNEE ARTHROPLASTY Left 10/14/15    TKA    UPPER GASTROINTESTINAL ENDOSCOPY N/A 12/11/2022    EGD CONTROL HEMORRHAGE performed by Sherri Sainz MD at [facility] ENDOSCOPY       Medications Prior to Admission:    Prior to Admission medications    Medication Sig Start Date End Date Taking? Authorizing Provider   medical marijuana Take 1 each by mouth as needed. Yes Historical Provider, MD   losartan (COZAAR) 50 MG tablet Take 50 mg by mouth daily   Yes Historical Provider, MD   albuterol sulfate HFA (PROVENTIL;VENTOLIN;PROAIR) 108 (90 Base) MCG/ACT inhaler Inhale 2 puffs into the lungs every 4 hours as needed 10/18/21  Yes Historical Provider, MD   methIMAzole (TAPAZOLE) 5 MG tablet Take 10 mg by mouth daily    Historical Provider, MD   metFORMIN (GLUCOPHAGE-XR) 500 MG extended release tablet Take 1,000 mg by mouth Daily with supper    Historical Provider, MD   latanoprost (XALATAN) 0.005 % ophthalmic solution Place 1 drop into both eyes nightly    Historical Provider, MD   carvedilol (COREG) 6.25 MG tablet Take 6.25 mg by mouth 2 times daily (with meals)    Historical Provider, MD   pravastatin (PRAVACHOL) 20 MG tablet Take 20 mg by mouth nightly     Historical Provider, MD   hydrochlorothiazide (HYDRODIURIL) 25 MG tablet Take 25 mg by mouth daily    Historical Provider, MD       Allergies:  Keflex [cephalexin], Codeine, and Tomato    Social History:  The patient currently lives at home    TOBACCO:   reports that she has quit smoking. She has never used smokeless tobacco.  ETOH:   reports no history of alcohol use.       Family History:  Reviewed in detail and  Positive as follows:        Problem Relation Age of Onset    Cancer Mother     Hypotension Mother     Cancer Father     Cancer Sister     Hypotension Sister     Cancer Maternal Grandmother     Cancer Maternal Grandfather     Cancer Paternal Grandmother     Cancer Paternal Grandfather     Hypotension Other     Cancer Other     Diabetes Other     Osteoarthritis Other        REVIEW OF SYSTEMS:   Positive and negative as noted in the HPI. All other systems reviewed and negative. PHYSICAL EXAM:  BP (!) 144/61   Pulse 75   Temp 98.2 °F (36.8 °C)   Resp 13   Ht 5' 5\" (1.651 m)   Wt (!) 355 lb (161 kg)   SpO2 92%   BMI 59.08 kg/m²     General appearance: Patient is sleepy but arousable, appears comfortable  HEENT Normal cephalic, atraumatic without obvious deformity. Pupils equal, round, and reactive to light. Extra ocular muscles intact. Conjunctivae/corneas clear. Neck: Supple, No jugular venous distention/bruits. Trachea midline without thyromegaly or adenopathy with full range of motion. Lungs: Diminished without active wheezing  Heart: Regular rate and rhythm with Normal S1/S2 without  murmurs, rubs or gallops, point of maximum impulse non-displaced  Abdomen: Soft, non-tender or non-distended without rigidity or guarding and positive bowel sounds all four quadrants. Extremities: No clubbing, cyanosis, or edema bilaterally. There is no calf tenderness  Skin: Skin color, texture, turgor normal.    Neurologic: Patient is sleepy but arousable, she is following simple commands and grossly nonfocal  Mental status: No agitation       CT abdomen:  1. Suspected bilateral pulmonary emboli but inadequate contrast phase to assess for the pulmonary embolus. Incomplete filling of the pulmonary arteries suggests pulmonary emboli inferiorly     2. No sign of any acute or active GI bleeding on CT angiograms studies   3. No sign of any aneurysm in the abdomen with normal widely patent vessels   4.  Stable appearing partially calcified left adrenal adenoma. 5. Parapelvic renal cysts, largest on left side   6. 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 4.1 x 3.5 cm right ovarian cyst, benign in appearance   8. Mild cholelithiasis   9. No free fluid or free air with right abdominal wall subcutaneous inflammation or prior instrumentation   EKG:  I have reviewed the EKG with the following interpretation:    CBC   Recent Labs     12/11/22  0859 12/11/22  1506 12/12/22  0325 12/12/22  1150 12/13/22  0045 12/13/22  0428 12/13/22  1004   WBC 10.5  --  8.4  --   --  6.5  --    HGB 7.8*   < > 8.5*   < > 7.3* 8.1* 7.9*   HCT 23.6*   < > 25.5*   < > 21.4* 23.4* 23.5*     --  174  --   --  163  --     < > = values in this interval not displayed. RENAL  Recent Labs     12/11/22  0859 12/12/22  0325    144   K 3.9 3.6    109   CO2 28 29   PHOS 4.4 2.1*   BUN 74* 40*   CREATININE 0.7 <0.5*       LFT'S  No results for input(s): AST, ALT, ALB, BILIDIR, BILITOT, ALKPHOS in the last 72 hours. COAG  Recent Labs     12/11/22  1005   INR 1.07       CARDIAC ENZYMES  No results for input(s): CKTOTAL, CKMB, CKMBINDEX, TROPONINI in the last 72 hours.     U/A:    Lab Results   Component Value Date/Time    COLORU YELLOW 10/12/2021 01:46 PM    WBCUA 89 10/12/2021 01:46 PM    RBCUA 7 10/12/2021 01:46 PM    BACTERIA 2+ 10/12/2021 01:46 PM    CLARITYU TURBID 10/12/2021 01:46 PM    SPECGRAV 1.023 10/12/2021 01:46 PM    LEUKOCYTESUR LARGE 10/12/2021 01:46 PM    BLOODU Negative 10/12/2021 01:46 PM    GLUCOSEU 100 10/12/2021 01:46 PM       ABG    Lab Results   Component Value Date/Time    DDC4JLO 34 12/01/2022 09:21 PM    BEART 9.6 12/01/2022 09:21 PM    Z7OZFZZJ 98 12/01/2022 09:21 PM    PHART 7.503 12/01/2022 09:21 PM    FCK5VFN 43.1 12/01/2022 09:21 PM    PO2ART 90.2 12/01/2022 09:21 PM    ECQ9GIN 35 12/01/2022 09:21 PM           Active Hospital Problems    Diagnosis Date Noted    S/P craniotomy [Z98.890] 12/07/2022     Priority: Medium    Acoustic neuroma ([facility] [D33.3] 12/01/2022     Priority: Medium    Cerebellopontine angle meningioma ([facility] [D32.0] 11/28/2022     Priority: Medium       ASSESSMENT/PLAN:    Acute GI bleed due to bleeding duodenal ulcers:  Status post EGD with intervention noted yesterday. CT abdomen has no signs of any ongoing acute bleeding  Already on PPI IV  Will monitor H/H. 8.5 today up from 7.8 yesterday    Suspected bilateral pulmonary emboli:  Agree with checking venous Doppler of lower extremities  Given that patient saturating well at this point and she has had acute gastrointestinal hemorrhage agree with holding off on anticoagulation  Cr stable at 0.7,  would recommend considering dedicated CTPA to fully assess clot burden in the lungs. No DVT noted on vascular study. Chronic medical problems:  Diabetes mellitus type 2  Hypertension  Hyperthyroidism      DVT Prophylaxis: Awaiting Dopplers  Diet: ADULT DIET;  Clear Liquid  ADULT ORAL NUTRITION SUPPLEMENT; Breakfast, Lunch, Dinner; Clear Liquid Oral Supplement  Code Status: Full Code  PT/OT Eval Status:     Dispo -inpatient       Carlos Esquivel MD

## 2022-12-13 NOTE — PROGRESS NOTES
NEUROSURGERY POST-OP PROGRESS NOTE    Patient Name: Cordella Boas YOB: 1955   Sex: Female Age: 79 yrs     Medical Record Number: [id number] Acct Number: [de-identified]   Room Number: 4836/4294-47 Hospital Day: Hospital Day: 16     Interval History:  Procedure(s) (LRB):  STAGE II: RESECTION OF RIGHT PETROCLIVAL MENINGIOMA (N/A)     Subjective: Neurologically stable, no new complaints today     Drop in hemoglobin 12 -> 7.8 concern for active GI bleed  Stat CT abdomen  GI consult  Embolization of the GDA yesterday  Hgb dropped from 8.1 to 7.3 overnight. No changes to neuro exam.  VSS remain stable, no s/s of respiratory distress. No bloody bowel movement this shift. 1 unit PRBCs ordered overnight. Objective:    VITAL SIGNS   /70   Pulse (!) 107   Temp 97.8 °F (36.6 °C) (Oral)   Resp 19   Ht 5' 5\" (1.651 m)   Wt (!) 355 lb (161 kg)   SpO2 96%   BMI 59.08 kg/m²    Height Height: 5' 5\" (165.1 cm)   Weight Weight: (!) 355 lb (161 kg)          Allergies Allergies   Allergen Reactions    Keflex [Cephalexin] Itching and Rash    Codeine Nausea And Vomiting    Tomato Rash      NPO Status ADULT DIET;  Clear Liquid  ADULT ORAL NUTRITION SUPPLEMENT; Breakfast, Lunch, Dinner; Clear Liquid Oral Supplement   Isolation No active isolations     LABS   Basic Metabolic Profile Recent Labs     12/11/22  0859 12/12/22  0325    144    109   CO2 28 29   BUN 74* 40*   CREATININE 0.7 <0.5*   GLUCOSE 277* 188*   PHOS 4.4 2.1*   MG 1.80  --       Complete Blood Count Recent Labs     12/11/22  0859 12/12/22  0325 12/13/22  0428   WBC 10.5 8.4 6.5   RBC 2.55* 2.79* 2.59*      Coagulation Studies Recent Labs     12/11/22  1005   INR 1.07        MEDICATIONS   Inpatient Medications     pantoprazole, 40 mg, IntraVENous, BID    levETIRAcetam, 500 mg, IntraVENous, Q12H    cetirizine, 10 mg, Oral, Daily    [Held by provider] hydroCHLOROthiazide, 25 mg, Oral, Daily    insulin lispro, 0-16 Units, SubCUTAneous, TID WC    insulin lispro, 0-4 Units, SubCUTAneous, Nightly    polyvinyl alcohol, 2 drop, Right Eye, Q2H    methIMAzole, 10 mg, Oral, Daily    insulin glargine, 15 Units, SubCUTAneous, Nightly    melatonin, 5 mg, Oral, Nightly    sodium chloride flush, 5-40 mL, IntraVENous, 2 times per day    [Held by provider] heparin (porcine), 5,000 Units, SubCUTAneous, 3 times per day    polyethylene glycol, 17 g, Oral, Daily    tetrahydrozoline, 1 drop, Both Eyes, TID    [Held by provider] carvedilol, 12.5 mg, Oral, BID WC    [Held by provider] losartan, 100 mg, Oral, Daily    sodium chloride flush, 5-40 mL, IntraVENous, 2 times per day    latanoprost, 1 drop, Both Eyes, Nightly   Infusions    sodium chloride 100 mL/hr at 12/13/22 0948    sodium chloride      sodium chloride      sodium chloride      sodium chloride      dextrose        Antibiotics   Recent Abx Admin        No antibiotic orders with administrations found. Imaging:   Abdominal CT  Impression       1.  9.5 cm mass in the right hepatic lobe which does not appear to represent a hemangioma. Differential diagnosis would include primary or secondary malignancy. Liver mass protocol MRI with and without contrast is recommended. 2.  No intra-abdominal hemorrhage. 3.  Mild cholelithiasis. 4.  3.5 cm right ovarian cyst, the CT appearance suggests a simple cyst indicating a benign finding.      Neurologic Exam:  Mental status: awake and alert and oriented x4    Cranial Nerves:  II: Visual acuity not tested, denies new visual changes / diplopia  III, IV, VI: PERRL, 3 mm bilaterally, EOMI,Patient had rightward gaze deviation but was able to follow when prompted   V: Facial sensation intact bilaterally to touch  VII: R sided facial droop  VIII: Hearing intact bilaterally to spoken voice on L, no hearing on R  IX: Palate movement equal bilaterally  XI: Shoulder shrug equal bilaterally  XII: Tongue deviates to R      Musculoskeletal:   Gait: Not tested   Tone: normal  Sensory: intact to all extremities  Motor strength:    Right  Left    Right  Left    Deltoid  5 5  Hip Flex  5 5   Biceps  5 5  Knee Extensors  5 5   Triceps  5 5  Knee Flexors  5 5   Wrist Ext  5 5  Ankle Dorsiflex. 5 5   Wrist Flex  5 5  Ankle Plantarflex. 5 5   Handgrip  5 5  Ext Ernie Longus  5 5   Thumb Ext  5 5         Mastoid Incision: intact, clean and dry      Respiratory:  Unlabored respiratory pattern    Abdomen:   Soft, ND   Last BM 12/11    Cardiovascular:  Warm, well perfused    Assessment   Patient is a 80 yo F s/p Procedure(s) (LRB):  STAGE II: RESECTION OF RIGHT PETROCLIVAL MENINGIOMA (N/A) per Dr. Joe Joe    Plan:  Neurologic exam frequency: Q4H  Mobility: PT/OT   SLP continue  PICC line  DVT Prophylaxis: SCDs and heparin (hold)  Keppra as ordered  Bowel Regimen: Senna and glycolax (hold)  Pain control: Katie   Keep sbp < 160  Incisional Care: Mastoid compression headband in place check Q4H and let NS know if leaking  GI Bleed: GI Following, Protonix 40 mg BID, Embolization of CHU done, Hgb dropped overnight and 1 PRBC given    Dispo Planning: Inpatient    Patient was discussed with Dr. Asha Cooney who agrees with above assessment and plan. Electronically signed by: LEE Sharp CNP, 12/13/2022 3:04 PM   Neurosurgery Nurse Practitioner  [phone]    I spent 15 minutes in the care of this patient.   Over 50% of that time was in face-to-face counseling regarding disease process, diagnostic testing, preventative measures, and answering patient and family questions

## 2022-12-13 NOTE — PROGRESS NOTES
Pt drop in H&H, Neuro exam remains unchanged, VSS, with no bloody bowel movement. Neurocritical NP nearby and notified. 1 un PRBC ordered to be administered.      Latest Reference Range & Units 12/13/22 00:45   Hemoglobin Quant 12.0 - 16.0 g/dL 7.3 (L)   Hematocrit 36.0 - 48.0 % 21.4 (L)   (L): Data is abnormally low

## 2022-12-14 PROBLEM — I26.94 MULTIPLE SUBSEGMENTAL PULMONARY EMBOLI WITHOUT ACUTE COR PULMONALE (HCC): Status: ACTIVE | Noted: 2022-12-14

## 2022-12-14 LAB
ANION GAP SERPL CALCULATED.3IONS-SCNC: 6 MMOL/L (ref 3–16)
ANTI-XA UNFRAC HEPARIN: 0.65 IU/ML (ref 0.3–0.7)
APTT: 27.5 SEC (ref 23–34.3)
BASOPHILS ABSOLUTE: 0 K/UL (ref 0–0.2)
BASOPHILS RELATIVE PERCENT: 0.6 %
BUN BLDV-MCNC: 10 MG/DL (ref 7–20)
CALCIUM SERPL-MCNC: 8.4 MG/DL (ref 8.3–10.6)
CHLORIDE BLD-SCNC: 109 MMOL/L (ref 99–110)
CO2: 26 MMOL/L (ref 21–32)
CREAT SERPL-MCNC: <0.5 MG/DL (ref 0.6–1.2)
EOSINOPHILS ABSOLUTE: 0.2 K/UL (ref 0–0.6)
EOSINOPHILS RELATIVE PERCENT: 3.3 %
GFR SERPL CREATININE-BSD FRML MDRD: >60 ML/MIN/{1.73_M2}
GLUCOSE BLD-MCNC: 154 MG/DL (ref 70–99)
GLUCOSE BLD-MCNC: 158 MG/DL (ref 70–99)
GLUCOSE BLD-MCNC: 245 MG/DL (ref 70–99)
GLUCOSE BLD-MCNC: 248 MG/DL (ref 70–99)
HCT VFR BLD CALC: 22.4 % (ref 36–48)
HCT VFR BLD CALC: 23.6 % (ref 36–48)
HCT VFR BLD CALC: 24.2 % (ref 36–48)
HCT VFR BLD CALC: 24.7 % (ref 36–48)
HEMOGLOBIN: 7.5 G/DL (ref 12–16)
HEMOGLOBIN: 7.9 G/DL (ref 12–16)
HEMOGLOBIN: 8.1 G/DL (ref 12–16)
HEMOGLOBIN: 8.2 G/DL (ref 12–16)
INR BLD: 1.15 (ref 0.87–1.14)
LYMPHOCYTES ABSOLUTE: 1.2 K/UL (ref 1–5.1)
LYMPHOCYTES RELATIVE PERCENT: 21.9 %
MCH RBC QN AUTO: 30.3 PG (ref 26–34)
MCHC RBC AUTO-ENTMCNC: 33.4 G/DL (ref 31–36)
MCV RBC AUTO: 90.8 FL (ref 80–100)
MONOCYTES ABSOLUTE: 0.4 K/UL (ref 0–1.3)
MONOCYTES RELATIVE PERCENT: 7.9 %
NEUTROPHILS ABSOLUTE: 3.5 K/UL (ref 1.7–7.7)
NEUTROPHILS RELATIVE PERCENT: 66.3 %
PDW BLD-RTO: 15.7 % (ref 12.4–15.4)
PERFORMED ON: ABNORMAL
PLATELET # BLD: 163 K/UL (ref 135–450)
PMV BLD AUTO: 8.1 FL (ref 5–10.5)
POTASSIUM SERPL-SCNC: 3.1 MMOL/L (ref 3.5–5.1)
PROTHROMBIN TIME: 14.6 SEC (ref 11.7–14.5)
RBC # BLD: 2.47 M/UL (ref 4–5.2)
SODIUM BLD-SCNC: 141 MMOL/L (ref 136–145)
WBC # BLD: 5.3 K/UL (ref 4–11)

## 2022-12-14 PROCEDURE — 92526 ORAL FUNCTION THERAPY: CPT

## 2022-12-14 PROCEDURE — 85014 HEMATOCRIT: CPT

## 2022-12-14 PROCEDURE — 6360000002 HC RX W HCPCS

## 2022-12-14 PROCEDURE — 92507 TX SP LANG VOICE COMM INDIV: CPT

## 2022-12-14 PROCEDURE — 99024 POSTOP FOLLOW-UP VISIT: CPT | Performed by: NURSE PRACTITIONER

## 2022-12-14 PROCEDURE — 97530 THERAPEUTIC ACTIVITIES: CPT

## 2022-12-14 PROCEDURE — 6360000002 HC RX W HCPCS: Performed by: NURSE PRACTITIONER

## 2022-12-14 PROCEDURE — 6370000000 HC RX 637 (ALT 250 FOR IP)

## 2022-12-14 PROCEDURE — 85025 COMPLETE CBC W/AUTO DIFF WBC: CPT

## 2022-12-14 PROCEDURE — 85610 PROTHROMBIN TIME: CPT

## 2022-12-14 PROCEDURE — 99232 SBSQ HOSP IP/OBS MODERATE 35: CPT | Performed by: PHYSICAL MEDICINE & REHABILITATION

## 2022-12-14 PROCEDURE — C9113 INJ PANTOPRAZOLE SODIUM, VIA: HCPCS | Performed by: NURSE PRACTITIONER

## 2022-12-14 PROCEDURE — 6370000000 HC RX 637 (ALT 250 FOR IP): Performed by: NURSE PRACTITIONER

## 2022-12-14 PROCEDURE — 94761 N-INVAS EAR/PLS OXIMETRY MLT: CPT

## 2022-12-14 PROCEDURE — 85730 THROMBOPLASTIN TIME PARTIAL: CPT

## 2022-12-14 PROCEDURE — A9581 GADOXETATE DISODIUM INJ: HCPCS | Performed by: INTERNAL MEDICINE

## 2022-12-14 PROCEDURE — 6360000004 HC RX CONTRAST MEDICATION: Performed by: INTERNAL MEDICINE

## 2022-12-14 PROCEDURE — 99233 SBSQ HOSP IP/OBS HIGH 50: CPT | Performed by: INTERNAL MEDICINE

## 2022-12-14 PROCEDURE — 1200000000 HC SEMI PRIVATE

## 2022-12-14 PROCEDURE — 2580000003 HC RX 258

## 2022-12-14 PROCEDURE — 36415 COLL VENOUS BLD VENIPUNCTURE: CPT

## 2022-12-14 PROCEDURE — 97535 SELF CARE MNGMENT TRAINING: CPT

## 2022-12-14 PROCEDURE — 97116 GAIT TRAINING THERAPY: CPT

## 2022-12-14 PROCEDURE — 80048 BASIC METABOLIC PNL TOTAL CA: CPT

## 2022-12-14 PROCEDURE — 85520 HEPARIN ASSAY: CPT

## 2022-12-14 PROCEDURE — 85018 HEMOGLOBIN: CPT

## 2022-12-14 RX ORDER — HEPARIN SODIUM 1000 [USP'U]/ML
5000 INJECTION, SOLUTION INTRAVENOUS; SUBCUTANEOUS PRN
Status: DISCONTINUED | OUTPATIENT
Start: 2022-12-14 | End: 2022-12-16

## 2022-12-14 RX ORDER — HEPARIN SODIUM 1000 [USP'U]/ML
10000 INJECTION, SOLUTION INTRAVENOUS; SUBCUTANEOUS ONCE
Status: COMPLETED | OUTPATIENT
Start: 2022-12-14 | End: 2022-12-14

## 2022-12-14 RX ORDER — HEPARIN SODIUM 1000 [USP'U]/ML
10000 INJECTION, SOLUTION INTRAVENOUS; SUBCUTANEOUS PRN
Status: DISCONTINUED | OUTPATIENT
Start: 2022-12-14 | End: 2022-12-16

## 2022-12-14 RX ORDER — HEPARIN SODIUM 10000 [USP'U]/100ML
5-30 INJECTION, SOLUTION INTRAVENOUS CONTINUOUS
Status: DISCONTINUED | OUTPATIENT
Start: 2022-12-14 | End: 2022-12-16

## 2022-12-14 RX ADMIN — TETRAHYDROZOLINE HCL 0.05% 1 DROP: 0.05 SOLUTION/ DROPS INTRAOCULAR at 20:25

## 2022-12-14 RX ADMIN — INSULIN LISPRO 4 UNITS: 100 INJECTION, SOLUTION INTRAVENOUS; SUBCUTANEOUS at 16:20

## 2022-12-14 RX ADMIN — SODIUM CHLORIDE, PRESERVATIVE FREE 10 ML: 5 INJECTION INTRAVENOUS at 08:57

## 2022-12-14 RX ADMIN — PANTOPRAZOLE SODIUM 40 MG: 40 INJECTION, POWDER, LYOPHILIZED, FOR SOLUTION INTRAVENOUS at 08:57

## 2022-12-14 RX ADMIN — POLYVINYL ALCOHOL 2 DROP: 14 SOLUTION/ DROPS OPHTHALMIC at 05:53

## 2022-12-14 RX ADMIN — PANTOPRAZOLE SODIUM 40 MG: 40 INJECTION, POWDER, LYOPHILIZED, FOR SOLUTION INTRAVENOUS at 20:25

## 2022-12-14 RX ADMIN — MORPHINE SULFATE 2 MG: 2 INJECTION, SOLUTION INTRAMUSCULAR; INTRAVENOUS at 00:42

## 2022-12-14 RX ADMIN — GADOXETATE DISODIUM 10 ML: 181.43 INJECTION, SOLUTION INTRAVENOUS at 00:07

## 2022-12-14 RX ADMIN — SODIUM CHLORIDE, PRESERVATIVE FREE 10 ML: 5 INJECTION INTRAVENOUS at 08:59

## 2022-12-14 RX ADMIN — POLYVINYL ALCOHOL 2 DROP: 14 SOLUTION/ DROPS OPHTHALMIC at 22:00

## 2022-12-14 RX ADMIN — SODIUM CHLORIDE, PRESERVATIVE FREE 10 ML: 5 INJECTION INTRAVENOUS at 21:07

## 2022-12-14 RX ADMIN — Medication 5 MG: at 20:25

## 2022-12-14 RX ADMIN — LATANOPROST 1 DROP: 50 SOLUTION OPHTHALMIC at 20:25

## 2022-12-14 RX ADMIN — TETRAHYDROZOLINE HCL 0.05% 1 DROP: 0.05 SOLUTION/ DROPS INTRAOCULAR at 08:59

## 2022-12-14 RX ADMIN — METHIMAZOLE 10 MG: 5 TABLET ORAL at 08:57

## 2022-12-14 RX ADMIN — POLYVINYL ALCOHOL 2 DROP: 14 SOLUTION/ DROPS OPHTHALMIC at 09:44

## 2022-12-14 RX ADMIN — POLYVINYL ALCOHOL 2 DROP: 14 SOLUTION/ DROPS OPHTHALMIC at 20:25

## 2022-12-14 RX ADMIN — POLYVINYL ALCOHOL 2 DROP: 14 SOLUTION/ DROPS OPHTHALMIC at 16:18

## 2022-12-14 RX ADMIN — INSULIN GLARGINE 15 UNITS: 100 INJECTION, SOLUTION SUBCUTANEOUS at 20:26

## 2022-12-14 RX ADMIN — HEPARIN SODIUM 13 UNITS/KG/HR: 10000 INJECTION, SOLUTION INTRAVENOUS at 14:20

## 2022-12-14 RX ADMIN — POLYVINYL ALCOHOL 2 DROP: 14 SOLUTION/ DROPS OPHTHALMIC at 14:06

## 2022-12-14 RX ADMIN — POLYVINYL ALCOHOL 2 DROP: 14 SOLUTION/ DROPS OPHTHALMIC at 17:49

## 2022-12-14 RX ADMIN — HEPARIN SODIUM 10000 UNITS: 1000 INJECTION INTRAVENOUS; SUBCUTANEOUS at 14:15

## 2022-12-14 RX ADMIN — LEVETIRACETAM 500 MG: 100 INJECTION, SOLUTION INTRAVENOUS at 09:42

## 2022-12-14 RX ADMIN — CETIRIZINE HYDROCHLORIDE 10 MG: 10 TABLET, FILM COATED ORAL at 08:57

## 2022-12-14 RX ADMIN — POLYVINYL ALCOHOL 2 DROP: 14 SOLUTION/ DROPS OPHTHALMIC at 00:38

## 2022-12-14 RX ADMIN — INSULIN LISPRO 4 UNITS: 100 INJECTION, SOLUTION INTRAVENOUS; SUBCUTANEOUS at 12:38

## 2022-12-14 RX ADMIN — LEVETIRACETAM 500 MG: 100 INJECTION, SOLUTION INTRAVENOUS at 23:49

## 2022-12-14 RX ADMIN — POLYVINYL ALCOHOL 2 DROP: 14 SOLUTION/ DROPS OPHTHALMIC at 12:44

## 2022-12-14 RX ADMIN — POLYVINYL ALCOHOL 2 DROP: 14 SOLUTION/ DROPS OPHTHALMIC at 04:00

## 2022-12-14 RX ADMIN — POLYVINYL ALCOHOL 2 DROP: 14 SOLUTION/ DROPS OPHTHALMIC at 08:58

## 2022-12-14 RX ADMIN — TETRAHYDROZOLINE HCL 0.05% 1 DROP: 0.05 SOLUTION/ DROPS INTRAOCULAR at 14:06

## 2022-12-14 RX ADMIN — POLYVINYL ALCOHOL 2 DROP: 14 SOLUTION/ DROPS OPHTHALMIC at 02:41

## 2022-12-14 ASSESSMENT — PAIN SCALES - GENERAL
PAINLEVEL_OUTOF10: 0
PAINLEVEL_OUTOF10: 8

## 2022-12-14 NOTE — PROGRESS NOTES
Speech Language Pathology  Facility/Department: [address] ICU  Dysphagia Daily Treatment Note    NAME: Kevin Rico  : 1955  MRN: [id number]    Patient Diagnosis(es):   Patient Active Problem List    Diagnosis Date Noted    S/P craniotomy 2022    Acoustic neuroma ([facility] 2022    Cerebellopontine angle meningioma ([facility] 2022    Osteoarthritis of right hip 2021    Morbid obesity with BMI of 50.0-59.9, adult ([address] 2021    Primary osteoarthritis of left knee 2015    Asthma 2015    Gastroesophageal reflux disease 2015    Adiposity 2015    S/P total knee arthroplasty 2015    Hypertension 2014     Allergies: Allergies   Allergen Reactions    Keflex [Cephalexin] Itching and Rash    Codeine Nausea And Vomiting    Tomato Rash     Onset Date: 2022    CXR (2022)-  Similar appearance of patchy airspace disease. Chart reviewed. Medical Diagnosis: Acoustic neuroma ([address] [D33.3]  Balance problem [R26.89]  Meningioma ([address] [D32.9]  Dizziness [R42]  Cerebellopontine angle meningioma ([facility] [D32.0]   Treatment Diagnosis: Dysphagia    BSE Impression (2022)-  Dysphagia Impression : Severe oral stage dysphagia, with suspected pharyngeal component, needs further assessment. Patient awake but lethargic, on 10 L O2 via nc. On oral motor exam, patient with right sided weakness, with reduced lingual coordination and impaired labial seal. Xerostomia. Speech is dysarthric, some dysphonia. Trialed ice chips, thins via tsp/cup/straw, puree. Pt with positive oral acceptance of tsp trials, however immediate anterior loss for all thin liquid trials via tsp/cup, with patient unable to utilize straw. Suspect reduced A-P transit, with significant residue of puree (suctioned). Some laryngeal swallow movement perceived, voice remained dry, no overt signs of aspiration.  However given severity of oral dysfunction in conjunction with dysarthria, lethargy, and increased O2 requirements in setting of recent cerebellopontine angle meningioma surgery, suspect patient is a high aspiration (including silent aspiration) risk. Recommend continue NPO, alternative means nutrition/hydration, frequent oral hygiene, and ice chips / tsp h2o. Will continue to follow. Dysphagia Diagnosis: Severe oral stage dysphagia, Suspected needs further assessment    MBS results (12/02/2022)-  Oral Phase  Moderate dysfunction characterized by right-sided oral weakness with reduced labial seal and coordination, resulting in anterior loss of liquid via tsp and inability to use straw when placed on right or midline. For left-sided straw placement, pt able to create appropriate seal/suction. Prolonged mastication of soft solids, with slowed a-p transit, mild stasis. Pharyngeal Phase  Grossly WFL. Overall timely swallow initiation with appropriate hyolaryngeal mechanics. Single instance trace flash (self clearing) penetration of thins when straw was placed on right side. Otherwise good airway protection throughout with no aspiration. No significant stasis. Upper Esophageal Screen  Indirectly assessed; appeared unremarkable    Pain: none indicated by any means    Current Diet : ADULT DIET; Clear Liquid  ADULT ORAL NUTRITION SUPPLEMENT; Breakfast, Lunch, Dinner; Clear Liquid Oral Supplement   Recommended Form of Meds: Via alternative means of nutrition     Treatment:  Pt seen bedside to address the following goals:  Goal 1: Patient will participate in further assessment of swallow function as appropriate. 12/1: Patient seen bedside. Awake, and much more alert this am. Speech much clearer today vs yesterday. Asking for water/food. Improved oral control (able to use straw), with reduced labial loss, and apparent improved oral clearance of puree bolus. Recommend proceed with MBS. Patient agreeable. D/C Goal, met via repeat BSE and MBS.      Goal 2: Patient/caregiver will demonstrate understanding of swallowing concerns/recommendations. 11/30: Educated pt to purpose of visit, s/s of aspiration, concern if aspiration occurs, swallow function, safety strategies (upright positioning, oral hygiene rationale), effortful swallow exercise, need for eventual instrumental assessment. Pt indicated some understanding, but suspect needs reinforcement. Cont goal  12/1: Patient educated to swallow function, MBS purpose/results, strategies (left sided placement), and diet recommendations (starting on puree vs soft solids to facilitate oral prep). Pt stated good comprehension. Continue to reinforce. Cont goal  12/2: reviewed results of yesterday's MBS, diet recommendations, left sided bolus placement, bolus size, positioning. Pt indicated comprehension. Cont goal  12/5: pt and family members educated to purpose of visit, reviewed results of MBS and strategies to improve safety of swallow. Educated to recommendation for diet advancement and instruction to notify staff if any s/s of aspiration or difficulty with mastication occurs. Explained will cont advancement as pt tolerates. All stated comprehension  Goal met, cont to ensure consistency  12/6: No family present. Pt educated to strategies (especially use of finger sweep which pt able to demonstrate at time of review as well as recall and demonstrate 5 minutes later). Pt able to demonstrate placement of food pr straw on the left with min to no cues. Pt indicated able to masticate ground sausage from breakfast  \"did ok\" but it may make stomach upset and reported that ground hamburger analyzed with this SLP at lunch was a little \"difficult to swallow\" and \"hard to move back\" but wants to continue to try with present diet. Educated to trial these foods requiring some mastication for practice but try to pick smoother items overall at this time. D/W nursing re: possible introduction of [employer] and nursing to notify nutrition team re: that.   Pt wants to keep strength up and get better. Cont goal  12/7: Pt seated upright in bed agreeable to be seen with part of AM meal. Pt demonstrated slowed, yet functional mastication of minced and moist solids and no overt s/s penetration/aspiration with either consistency. Pt reported intermittent sensation globus, however with \"dryer\" solids (pt pointing to dry minced and moist sausage). SLP instructed pt to order gravy with dry meats or use additional puree to provide moisture to solid consistency. Pt demonstrated understanding and agreement. Continue current diet level at this time. Cont. 12/8:  pt recalled instruction and demonstrated use of placing food on left side and use lingual sweep to check for pocketing. Reinforced use of all strategies and recommendation to cont current diet texture. Pt stated comprehension  Goal met, cont to ensure consistency  12/9: pt recalled and demonstrated use of strategies for improved safety of swallow. Pt independently demonstrated use of lingual sweep and alternating soft solid and liquid wash. Goal met  12/14: pt demonstrated adequate recall and use of swallow strategies with trials of soft and bite sized solids. MI with all trials. Goal met. Cont for carry over. Goal 3: Patient will tolerate least restrictive diet without overt signs of aspiration or associated decline in respiratory status. 12/2: Reviewed chart and discussed with RN; pt NPO yesterday d/t respiratory status, however was advanced back to puree this am. With patient awake, alert, pleasant, cooperative, positioned up in bed, on 4.5 L O2 via nc, assessed tolerance thins via straw and puree; pt with good oral containment and straw use for left sided placement (ongoing right sided weakness), positive swallow movement, good oral clearance. Following large sip, patient with delayed cough, however not clearly related to swallow as was productive of sputum. No issues on subsequent trials, with cues for small bites/sips.   Cont goal  12/5: RN up in chair with lunch tray. Pt demonstrated prolonged mastication with minced meats, very mild residue in right buccal cavity. Pt used lingual sweep and liquid wash to clear independently. No coughing/throat clear or change in voice occurred with liquids, taken via straw (placed on left). Pt fatigues easily and stated she wasn't feeling well. RN last session reported this occurs after pt has eaten a small amount. Therefore recommend cont current texture. Pt is agreeable to this   Cont goal  12/14: Pt trialed x10 bites soft and bite sized solids. Pt with appropriate use of left sided placement and complete mastication. Trace residue remaining after the swallow. Cleared well with liquid wash. No overt s/s penetration/aspiration. SLP recommend upgrade to Soft and Bite Sized Solids and cont Thin Liquids. Pt in agreement and demonstrated understanding. Cont. Patient/Family/Caregiver Education:  See goal 2 above    Compensatory Strategies:  Upright as possible for all oral intake  Eat/Feed slowly  Check for pocketing of food on the Right  Alternate solids and liquids   straw and food placement on LEFT  Finger sweep during and after meals    Plan:  Continue dysphagia treatment with goals per plan of care. Diet Recommendations: Upgrade to Soft and Bite Sized Solids / Thin Liquids via straw- s/u and/or feed assist as needed  -meds in puree  -oral care at end of meals  DC recommendation: ongoing treatment indicated  Treatment: 15  Discussed with RN: Ishan Lambert  Needs met prior to leaving room, call button in reach.       Electronically signed by:  Eunice Gomez M.A., [address] [place] [address]  Speech-Language Pathologist  Pg #: [phone]    If patient is discharged prior to next treatment, this note will serve as the discharge summary

## 2022-12-14 NOTE — CONSULTS
Hospital Medicine  Consult           History Of Present Illness: The patient is a 79 y.o. female with medical history as below who has been in the hospital for multistage neurosurgical resection of petroclival meningioma who we are asked to see/evaluate by Dr. Nay Michaud for cas-operative mgt. patient has been recovering well postoperatively from her procedure when she most recently developed melanotic stools. Gastroenterology was consulted, patient underwent EGD today and was found to have multiple duodenal ulcers with visible vessel and active bleeding. Underwent injection of epi, hemostatic clip placement and chemo spray. Difficult to concern for possible ongoing GI bleed she underwent CTA. No active GI bleed was detected, however CAT scan picked up likely pulmonary embolism in the lower lung fields. Patient received 2 units of blood transfusion, just finished her second unit now. BP is low normal.  She is a bit sleepy but arousable and denies any active complaints her oxygen saturation is at 95% when she is asleep. She has been requiring intermittent nasal cannula during her hospital stay. Past Medical History:        Diagnosis Date    Arthritis     Asthma     mild    Brain tumor ([address]     Diabetes mellitus ([address]     borderline    High cholesterol     Hypertension     Imbalance     DUE TO TUMOR- USING ANKLE BRACE / WALKER PER PREOP H&P    Loss of hearing     bilateral reported    Vertigo        Past Surgical History:        Procedure Laterality Date    CRANIOTOMY N/A 11/29/2022    STAGE II: RESECTION OF RIGHT PETROCLIVAL MENINGIOMA performed by Debra Harada A. [place], MD at [address]    IR EMBOLIZATION HEMORRHAGE  12/12/2022    IR EMBOLIZATION HEMORRHAGE 12/12/2022 [facility] SPECIAL PROCEDURES    MASTOIDECTOMY Right 11/28/2022    RIGHT TRANSMASTOID / ANTERIOR MIDDLE CRANIAL FOSSA , ABDOMINAL FAT GRAFT EXTERNAL AUDITORY CANAL CLOSURE performed by Emily Pagan MD at [address] 11/28/2022    STAGE 1, RIGHT TRANSLABYRINTHINE / RETROLABYRINTHINE CRANIOTOMY, PETROSECTOMY performed by Danii Klein. Ishan Hess MD at [facility] Right 11/8/2021    RIGHT TOTAL HIP ARTHROPLASTY POSTERIOR APPROACH - Amaury De Santiago ADVANCED performed by Kush Anders MD at [address] [place] [address] Right 07/14/2015    TOTAL KNEE ARTHROPLASTY Left 10/14/15    TKA    UPPER GASTROINTESTINAL ENDOSCOPY N/A 12/11/2022    EGD CONTROL HEMORRHAGE performed by Dk Kwan MD at [facility] ENDOSCOPY       Medications Prior to Admission:    Prior to Admission medications    Medication Sig Start Date End Date Taking? Authorizing Provider   medical marijuana Take 1 each by mouth as needed. Yes Historical Provider, MD   losartan (COZAAR) 50 MG tablet Take 50 mg by mouth daily   Yes Historical Provider, MD   albuterol sulfate HFA (PROVENTIL;VENTOLIN;PROAIR) 108 (90 Base) MCG/ACT inhaler Inhale 2 puffs into the lungs every 4 hours as needed 10/18/21  Yes Historical Provider, MD   methIMAzole (TAPAZOLE) 5 MG tablet Take 10 mg by mouth daily    Historical Provider, MD   metFORMIN (GLUCOPHAGE-XR) 500 MG extended release tablet Take 1,000 mg by mouth Daily with supper    Historical Provider, MD   latanoprost (XALATAN) 0.005 % ophthalmic solution Place 1 drop into both eyes nightly    Historical Provider, MD   carvedilol (COREG) 6.25 MG tablet Take 6.25 mg by mouth 2 times daily (with meals)    Historical Provider, MD   pravastatin (PRAVACHOL) 20 MG tablet Take 20 mg by mouth nightly     Historical Provider, MD   hydrochlorothiazide (HYDRODIURIL) 25 MG tablet Take 25 mg by mouth daily    Historical Provider, MD       Allergies:  Keflex [cephalexin], Codeine, and Tomato    Social History:  The patient currently lives at home    TOBACCO:   reports that she has quit smoking. She has never used smokeless tobacco.  ETOH:   reports no history of alcohol use.       Family History:  Reviewed in detail and  Positive as follows:        Problem Relation Age of Onset    Cancer Mother     Hypotension Mother     Cancer Father     Cancer Sister     Hypotension Sister     Cancer Maternal Grandmother     Cancer Maternal Grandfather     Cancer Paternal Grandmother     Cancer Paternal Grandfather     Hypotension Other     Cancer Other     Diabetes Other     Osteoarthritis Other        REVIEW OF SYSTEMS:   Positive and negative as noted in the HPI. All other systems reviewed and negative. PHYSICAL EXAM:  /61   Pulse 84   Temp 98 °F (36.7 °C) (Oral)   Resp 16   Ht 5' 5\" (1.651 m)   Wt (!) 355 lb 1.6 oz (161.1 kg)   SpO2 92%   BMI 59.09 kg/m²     General appearance: Patient is sleepy but arousable, appears comfortable  HEENT Normal cephalic, atraumatic without obvious deformity. Pupils equal, round, and reactive to light. Extra ocular muscles intact. Conjunctivae/corneas clear. Neck: Supple, No jugular venous distention/bruits. Trachea midline without thyromegaly or adenopathy with full range of motion. Lungs: Diminished without active wheezing  Heart: Regular rate and rhythm with Normal S1/S2 without  murmurs, rubs or gallops, point of maximum impulse non-displaced  Abdomen: Soft, non-tender or non-distended without rigidity or guarding and positive bowel sounds all four quadrants. Extremities: No clubbing, cyanosis, or edema bilaterally. There is no calf tenderness  Skin: Skin color, texture, turgor normal.    Neurologic: Patient is sleepy but arousable, she is following simple commands and grossly nonfocal  Mental status: No agitation     CTPA:         Large burden of pulmonary emboli without obvious heart strain of the exam is severely limited and difficult to evaluate for other pathology         CT abdomen:  1. Suspected bilateral pulmonary emboli but inadequate contrast phase to assess for the pulmonary embolus. Incomplete filling of the pulmonary arteries suggests pulmonary emboli inferiorly     2.  No sign of any acute or active GI bleeding on CT angiograms studies   3. No sign of any aneurysm in the abdomen with normal widely patent vessels   4. Stable appearing partially calcified left adrenal adenoma. 5. Parapelvic renal cysts, largest on left side   6. 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 4.1 x 3.5 cm right ovarian cyst, benign in appearance   8. Mild cholelithiasis   9. No free fluid or free air with right abdominal wall subcutaneous inflammation or prior instrumentation   EKG:  I have reviewed the EKG with the following interpretation:    CBC   Recent Labs     12/12/22  0325 12/12/22  1150 12/13/22  0428 12/13/22  1004 12/13/22  1653 12/14/22  0415 12/14/22  1023   WBC 8.4  --  6.5  --   --  5.3  --    HGB 8.5*   < > 8.1*   < > 8.1* 7.5* 8.1*   HCT 25.5*   < > 23.4*   < > 24.0* 22.4* 24.2*     --  163  --   --  163  --     < > = values in this interval not displayed. RENAL  Recent Labs     12/12/22  0325 12/13/22  1452    142   K 3.6 3.4*    111*   CO2 29 22   PHOS 2.1*  --    BUN 40* 20   CREATININE <0.5* <0.5*       LFT'S  No results for input(s): AST, ALT, ALB, BILIDIR, BILITOT, ALKPHOS in the last 72 hours. COAG  No results for input(s): INR in the last 72 hours. CARDIAC ENZYMES  No results for input(s): CKTOTAL, CKMB, CKMBINDEX, TROPONINI in the last 72 hours.     U/A:    Lab Results   Component Value Date/Time    COLORU YELLOW 10/12/2021 01:46 PM    WBCUA 89 10/12/2021 01:46 PM    RBCUA 7 10/12/2021 01:46 PM    BACTERIA 2+ 10/12/2021 01:46 PM    CLARITYU TURBID 10/12/2021 01:46 PM    SPECGRAV 1.023 10/12/2021 01:46 PM    LEUKOCYTESUR LARGE 10/12/2021 01:46 PM    BLOODU Negative 10/12/2021 01:46 PM    GLUCOSEU 100 10/12/2021 01:46 PM       ABG    Lab Results   Component Value Date/Time    EMK2MOP 34 12/01/2022 09:21 PM    BEART 9.6 12/01/2022 09:21 PM I0MGYQQA 98 12/01/2022 09:21 PM    PHART 7.503 12/01/2022 09:21 PM    OUR6OCE 43.1 12/01/2022 09:21 PM    PO2ART 90.2 12/01/2022 09:21 PM    HNJ9OJV 35 12/01/2022 09:21 PM           Active Hospital Problems    Diagnosis Date Noted    S/P craniotomy [Z98.890] 12/07/2022     Priority: Medium    Acoustic neuroma ([facility] [D33.3] 12/01/2022     Priority: Medium    Cerebellopontine angle meningioma ([facility] [D32.0] 11/28/2022     Priority: Medium       ASSESSMENT/PLAN:    Acute GI bleed due to bleeding duodenal ulcers:  Status post EGD with intervention noted yesterday. CT abdomen has no signs of any ongoing acute bleeding  Already on PPI IV  Will monitor H/H. 8.5 today up from 7.8 yesterday     bilateral pulmonary emboli:  Dopplers neg for DVT. Pulmonary has been consulted, may need IVC filter if unable to safely anticoagulate. Pt currently hemodynamiclly stable. Diet: ADULT DIET;  Clear Liquid  ADULT ORAL NUTRITION SUPPLEMENT; Breakfast, Lunch, Dinner; Clear Liquid Oral Supplement  Code Status: Full Code  PT/OT Eval Status:     Dispo -inpatient, dispo per primary service       Toy Workman MD

## 2022-12-14 NOTE — PLAN OF CARE
Problem: Chronic Conditions and Co-morbidities  Goal: Patient's chronic conditions and co-morbidity symptoms are monitored and maintained or improved  12/14/2022 0921 by Ale Saldana RN  Outcome: Progressing  12/14/2022 0541 by Reyes Fernández RN  Outcome: Progressing  Flowsheets (Taken 12/13/2022 2000)  Care Plan - Patient's Chronic Conditions and Co-Morbidity Symptoms are Monitored and Maintained or Improved:   Monitor and assess patient's chronic conditions and comorbid symptoms for stability, deterioration, or improvement   Collaborate with multidisciplinary team to address chronic and comorbid conditions and prevent exacerbation or deterioration   Update acute care plan with appropriate goals if chronic or comorbid symptoms are exacerbated and prevent overall improvement and discharge     Problem: Discharge Planning  Goal: Discharge to home or other facility with appropriate resources  12/14/2022 0921 by Ale Saldana RN  Outcome: Progressing  12/14/2022 0541 by Reyes Fernández RN  Outcome: Progressing  Flowsheets (Taken 12/13/2022 2000)  Discharge to home or other facility with appropriate resources: Identify barriers to discharge with patient and caregiver     Problem: Pain  Goal: Verbalizes/displays adequate comfort level or baseline comfort level  12/14/2022 0921 by Ale Saldana RN  Outcome: Progressing  12/14/2022 0541 by Reyes Fernández RN  Outcome: Progressing  Flowsheets (Taken 12/13/2022 2000)  Verbalizes/displays adequate comfort level or baseline comfort level:   Encourage patient to monitor pain and request assistance   Assess pain using appropriate pain scale   Administer analgesics based on type and severity of pain and evaluate response     Problem: Safety - Adult  Goal: Free from fall injury  12/14/2022 0921 by Ale Saldana RN  Outcome: Progressing  Flowsheets (Taken 12/14/2022 0640 by Reyes Fernández RN)  Free From Fall Injury: Instruct family/caregiver on patient safety  12/14/2022 0541 by Andrew Reis RN  Outcome: Progressing     Problem: Skin/Tissue Integrity  Goal: Absence of new skin breakdown  Description: 1. Monitor for areas of redness and/or skin breakdown  2. Assess vascular access sites hourly  3. Every 4-6 hours minimum:  Change oxygen saturation probe site  4. Every 4-6 hours:  If on nasal continuous positive airway pressure, respiratory therapy assess nares and determine need for appliance change or resting period. 12/14/2022 0921 by Miguel Pablo RN  Outcome: Progressing  12/14/2022 0541 by Andrew Reis RN  Outcome: Progressing     Problem: ABCDS Injury Assessment  Goal: Absence of physical injury  12/14/2022 0921 by Miguel Pablo RN  Outcome: Progressing  Flowsheets (Taken 12/14/2022 0640 by Andrew Reis RN)  Absence of Physical Injury: Implement safety measures based on patient assessment  12/14/2022 0541 by Andrew Reis RN  Outcome: Progressing     Problem: Nutrition Deficit:  Goal: Optimize nutritional status  12/14/2022 0921 by Miguel Pablo RN  Outcome: Progressing  12/14/2022 0541 by Andrew Reis RN  Outcome: Progressing     Problem: Neurosensory - Adult  Goal: Absence of seizures  12/14/2022 0921 by Miguel Pablo RN  Outcome: Progressing  12/14/2022 0541 by Andrew Reis RN  Outcome: Progressing  Flowsheets (Taken 12/13/2022 2000)  Absence of seizures:   Monitor for seizure activity.   If seizure occurs, document type and location of movements and any associated apnea   If seizure occurs, turn head to side and suction secretions as needed   Administer anticonvulsants as ordered  Goal: Remains free of injury related to seizures activity  12/14/2022 0921 by Miguel Pablo RN  Outcome: Progressing  12/14/2022 0541 by Andrew Reis RN  Outcome: Progressing  Flowsheets (Taken 12/13/2022 2000)  Remains free of injury related to seizure activity:   Maintain airway, patient safety  and administer oxygen as ordered   If seizure occurs, turn patient to side and suction secretions as needed   Monitor patient for seizure activity, document and report duration and description of seizure to Licensed Independent Practitioner  Goal: Achieves maximal functionality and self care  12/14/2022 0921 by James Rowell RN  Outcome: Progressing  12/14/2022 0541 by Anny Pacheco RN  Outcome: Progressing  Flowsheets (Taken 12/13/2022 2000)  Achieves maximal functionality and self care:   Monitor swallowing and airway patency with patient fatigue and changes in neurological status   Encourage and assist patient to increase activity and self care with guidance from physical therapy/occupational therapy   Encourage visually impaired, hearing impaired and aphasic patients to use assistive/communication devices     Problem: Respiratory - Adult  Goal: Achieves optimal ventilation and oxygenation  12/14/2022 0921 by James Rowell RN  Outcome: Progressing  12/14/2022 0541 by Anny Pacheco RN  Outcome: Progressing  Flowsheets (Taken 12/13/2022 2000)  Achieves optimal ventilation and oxygenation:   Assess for changes in respiratory status   Assess for changes in mentation and behavior   Position to facilitate oxygenation and minimize respiratory effort   Oxygen supplementation based on oxygen saturation or arterial blood gases     Problem: Cardiovascular - Adult  Goal: Maintains optimal cardiac output and hemodynamic stability  12/14/2022 0921 by James Rowell RN  Outcome: Progressing  12/14/2022 0541 by Anny Pacheco RN  Outcome: Progressing  Flowsheets (Taken 12/13/2022 2000)  Maintains optimal cardiac output and hemodynamic stability:   Monitor blood pressure and heart rate   Monitor urine output and notify Licensed Independent Practitioner for values outside of normal range   Assess for signs of decreased cardiac output  Goal: Absence of cardiac dysrhythmias or at baseline  12/14/2022 0921 by James Rowell practices   Implement preventative oral hygiene regimen   Implement oral medicated treatments as ordered     Problem: Musculoskeletal - Adult  Goal: Return mobility to safest level of function  12/14/2022 0921 by Jaime Nolan RN  Outcome: Progressing  12/14/2022 0541 by Db Voss RN  Outcome: Progressing  Flowsheets (Taken 12/13/2022 2000)  Return Mobility to Safest Level of Function: Assess patient stability and activity tolerance for standing, transferring and ambulating with or without assistive devices  Goal: Maintain proper alignment of affected body part  12/14/2022 0921 by Jaime Nolan RN  Outcome: Progressing  12/14/2022 0541 by Db Voss RN  Outcome: Progressing  Flowsheets (Taken 12/13/2022 2000)  Maintain proper alignment of affected body part: Support and protect limb and body alignment per provider's orders  Goal: Return ADL status to a safe level of function  12/14/2022 0921 by Jaime Nolan RN  Outcome: Progressing  12/14/2022 0541 by Db Voss RN  Outcome: Progressing  Flowsheets (Taken 12/13/2022 2000)  Return ADL Status to a Safe Level of Function:   Administer medication as ordered   Assess activities of daily living deficits and provide assistive devices as needed     Problem: Gastrointestinal - Adult  Goal: Minimal or absence of nausea and vomiting  12/14/2022 0921 by Jaime Nolan RN  Outcome: Progressing  12/14/2022 0541 by Db Voss RN  Outcome: Progressing  Flowsheets (Taken 12/13/2022 2000)  Minimal or absence of nausea and vomiting:   Administer IV fluids as ordered to ensure adequate hydration   Maintain NPO status until nausea and vomiting are resolved   Nasogastric tube to low intermittent suction as ordered  Goal: Maintains or returns to baseline bowel function  12/14/2022 0921 by Jaime Nolan RN  Outcome: Progressing  12/14/2022 0541 by Db Voss RN  Outcome: Progressing  Flowsheets (Taken 12/13/2022 2000)  Maintains or returns to baseline bowel function:   Assess bowel function   Encourage oral fluids to ensure adequate hydration   Administer IV fluids as ordered to ensure adequate hydration  Goal: Maintains adequate nutritional intake  12/14/2022 0921 by Katerine Kirk RN  Outcome: Progressing  12/14/2022 0541 by Tatiana Gaytan RN  Outcome: Progressing  Flowsheets (Taken 12/13/2022 2000)  Maintains adequate nutritional intake:   Monitor percentage of each meal consumed   Identify factors contributing to decreased intake, treat as appropriate   Assist with meals as needed     Problem: Genitourinary - Adult  Goal: Absence of urinary retention  12/14/2022 0921 by Katerine Kikr RN  Outcome: Progressing  12/14/2022 0541 by Tatiana Gaytan RN  Outcome: Progressing  Flowsheets (Taken 12/13/2022 2000)  Absence of urinary retention:   Assess patients ability to void and empty bladder   Place urinary catheter per Licensed Independent Practitioner order if needed   Monitor intake/output and perform bladder scan as needed     Problem: Infection - Adult  Goal: Absence of infection at discharge  12/14/2022 0921 by Katerine Kirk RN  Outcome: Progressing  12/14/2022 0541 by Tatiana Gaytan RN  Outcome: Progressing  Flowsheets (Taken 12/13/2022 2000)  Absence of infection at discharge:   Assess and monitor for signs and symptoms of infection   Monitor lab/diagnostic results   Monitor all insertion sites i.e., indwelling lines, tubes and drains  Goal: Absence of infection during hospitalization  12/14/2022 0921 by Katerine Kirk RN  Outcome: Progressing  12/14/2022 0541 by Tatiana Gaytan RN  Outcome: Progressing  Flowsheets (Taken 12/13/2022 2000)  Absence of infection during hospitalization:   Assess and monitor for signs and symptoms of infection   Monitor lab/diagnostic results   Monitor all insertion sites i.e., indwelling lines, tubes and drains  Goal: Absence of fever/infection during anticipated neutropenic period  12/14/2022 5700 by Linda Zapata RN  Outcome: Progressing  12/14/2022 0541 by Terra Basurto RN  Outcome: Progressing  Flowsheets (Taken 12/13/2022 2000)  Absence of fever/infection during anticipated neutropenic period:   Monitor white blood cell count   Administer growth factors as ordered   Implement neutropenic guidelines     Problem: Metabolic/Fluid and Electrolytes - Adult  Goal: Electrolytes maintained within normal limits  12/14/2022 0921 by Linda Zapata RN  Outcome: Progressing  12/14/2022 0541 by Terra Basurto RN  Outcome: Progressing  Flowsheets (Taken 12/13/2022 2000)  Electrolytes maintained within normal limits:   Monitor labs and assess patient for signs and symptoms of electrolyte imbalances   Administer electrolyte replacement as ordered   Monitor response to electrolyte replacements, including repeat lab results as appropriate  Goal: Hemodynamic stability and optimal renal function maintained  12/14/2022 0921 by Linda Zapata RN  Outcome: Progressing  12/14/2022 0541 by Terra Basurto RN  Outcome: Progressing  Flowsheets (Taken 12/13/2022 2000)  Hemodynamic stability and optimal renal function maintained:   Monitor labs and assess for signs and symptoms of volume excess or deficit   Monitor intake, output and patient weight   Monitor urine specific gravity, serum osmolarity and serum sodium as indicated or ordered  Goal: Glucose maintained within prescribed range  12/14/2022 0921 by Linda Zapata RN  Outcome: Progressing  12/14/2022 0541 by Terra Basurto RN  Outcome: Progressing  Flowsheets (Taken 12/13/2022 2000)  Glucose maintained within prescribed range:   Monitor blood glucose as ordered   Assess for signs and symptoms of hyperglycemia and hypoglycemia   Administer ordered medications to maintain glucose within target range     Problem: Hematologic - Adult  Goal: Maintains hematologic stability  12/14/2022 0921 by Linda Zapata RN  Outcome: Progressing  12/14/2022 0541 by Mani Murguia, RN  Outcome: Progressing  Flowsheets (Taken 12/13/2022 2000)  Maintains hematologic stability:   Assess for signs and symptoms of bleeding or hemorrhage   Monitor labs for bleeding or clotting disorders   Administer blood products/factors as ordered

## 2022-12-14 NOTE — PROGRESS NOTES
Speech Language Pathology  Facility/Department: [address] [place] [address] N ICU  Daily Speech/Cognitive Treatment Note    NAME: Martínez López  : 1955  MRN: [id number]    Patient Diagnosis(es):   Patient Active Problem List    Diagnosis Date Noted    S/P craniotomy 2022    Acoustic neuroma ([facility] 2022    Cerebellopontine angle meningioma ([facility] 2022    Osteoarthritis of right hip 2021    Morbid obesity with BMI of 50.0-59.9, adult ([facility] 2021    Primary osteoarthritis of left knee 2015    Asthma 2015    Gastroesophageal reflux disease 2015    Adiposity 2015    S/P total knee arthroplasty 2015    Hypertension 2014     Allergies: Allergies   Allergen Reactions    Keflex [Cephalexin] Itching and Rash    Codeine Nausea And Vomiting    Tomato Rash       Prior MRI Brain: (2022)  Large right cerebellopontine angle mass as detailed, most likely representing a meningioma. See above for details. Additional smaller left anterior parafalcine extra-axial lesion, compatible with additional small meningioma. Recent CT Head: (2022)  1. Interval postsurgical changes from resection of right cerebellopontine angle mass with associated right temporal mastoid resection and frontotemporal craniotomy and fat graft placement. 2.  Thin hyperdensity along the fat graft may represent small amount of postsurgical blood products. There is also a small focus of subarachnoid hemorrhage along the right temporal lobe. 3.  Mild pneumocephalus and bilateral subgaleal fluid is also likely postsurgical     Chart reviewed. Pain: denies    Initial Assessment 22  Mild-moderate dysarthria characterized by blended word boundaries and imprecise articulation (~ 70% intelligbile short phrases in quiet environment). Benefits from slow pace and over-articulation strategies. Vocal quality clear and strong.  Patient able to follow simple directions and answer basic questions. No word finding difficulties noted at conversation level. Further assessment limited by patient fatigue. Recommend ongoing speech therapy to further assess and address. Medical diagnosis: Acoustic neuroma [facility]) [D33.3]  Balance problem [R26.89]  Meningioma ([facility] [D32.9]  Dizziness [R42]  Cerebellopontine angle meningioma ([facility] [D32.0]  Treatment diagnosis: dysarthria    Treatment:  Pt seen to address the following goals:  Goal 1: Patient will recall and implement strategies to improve speech clarity to 90% with min cues  12/5:  speech intelligibility is ~90%, however decreased in articulatory precision. pt educated to strategies for improved intelligibility and articulatory precision through verbal instruction and demonstration. Pt used strategies in structured sentence level tasks with min cues. In conversation pt required min-mod cues. Pt stated comprehension  Cont goal  12/6: Pt speech intelligibility was functional but articulatory precision of sounds (especially /s/, /sh/, and /b/) was mildly distorted with reduced awareness but able to correct given min-mod cues during rote tasks and conversation. Pt did self correct speech error in conversation x1 at end of session. Pt educated to exaggerate and open/move articulators well with speaking. Pt utilized even a slower rate following initial review. Cont goal  12/7: Pt able to recall all speech strategies given min cues. Pt demonstrated ~90% intelligibility in basic conversational tasks. Pt with increased use of over-articulation and breaking up 3-4 words at a time for clarity. Cont. 12/8: pt with good recall of all strategies for improved intelligibility of speech. Pt demonstrated use in structured sentence level tasks with min  cues, intelligibility 90%. In conversation, pt required mod cues to using pausing and over articulation. Cont goal  12/9:  pt states, \"I am talking slower and enunciating. \"  Reviewed all strategies, pt stated comprehension. Pt utilized strategies in structured sentences with min cues and in conversation with min-mod cues. Intelligibility is ~90% with use of strategies  Goal met, cont to ensure consistency  12/14: Improved carry over of speech strategies in conversation. ~95% intelligible. X1 repetition required for listener comprehension. Cont. Goal 2: Patient will participate in ongoing assessment of cognitive-communication skills. 12/5: pt oriented, able to state reason for hospitalization. Pt and family report no changes in cognition. Will cont to assess  Cont goal  12/6:  Pt oriented x3. 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 Pt able to recall 2/3 words following a 5 minute delay (3/3 with choices). Pt able to complete all basic math for money and time concepts including leaving a tip with no errors and minimal to no delays. Pt reported feeling tired so session ended earlier. Cont goal  12/7: SLP provided pt with prescription label and asked pt questions related. Pt answered with 100% accuracy and no cues required. Pt expressed manages own finances/medications/ADLs at baseline with independence. Pt mentioned she does feel close to cognitive baseline. Cont. 12/8: pt provided solutions to everyday problems, demonstrating appropriate flexibility of thoughts. Pt completed money/time concept tasks with 90% accuracy. Pt c/o not feeling well, therefore did not proceed with additional tasks  Cont goal  12/9: pt with good recall and understanding of information that MD has provided. Pt demonstrating good insight into deficits. Cont goal  12/14: pt recalled ARU doctor coming to visit in regards to moving down to rehab unit, when medically appropriate. Adequate details provided and SLP cross checked with DO notes. Cont. Education:  Pt educated to purpose of visit and tasks presented and recommendation for ongoing treatment.  Pt stated comprehension. Plan:  Continue speech/language therapy to address above goals, 3-5 x/week x LOS  DC recommendations: ongoing treatment indicated  D/W nursing: Trina  Needs met prior to leaving room, call button in reach.   Treatment time: 10      Electronically signed by:  Nayeli Bolton M.A., [address] [place] [address]  Speech-Language Pathologist  Pg #: [phone]    If patient is discharged prior to next treatment, this note will serve as the discharge summary

## 2022-12-14 NOTE — PROGRESS NOTES
Physical Therapy  Facility/Department: [facility]  Physical Therapy Treatment    Name: Kevin Rico  : 1955  MRN: [id number]  Date of Service: 2022    Discharge Recommendations: Kevin Rico scored a 14/24 on the AM-PAC short mobility form. Current research shows that an AM-PAC score of 17 or less is typically not associated with a discharge to the patient's home setting. Based on the patient's AM-PAC score and their current functional mobility deficits, it is recommended that the patient have 5-7 sessions per week of Physical Therapy at d/c to increase the patient's independence. At this time, this patient demonstrates complex nursing, medical, and rehabilitative needs, and would benefit from intensive rehabilitation services upon discharge from the Inpatient setting. This patient demonstrates the ability to participate in and benefit from an intensive therapy program with a coordinated interdisciplinary team approach to foster frequent, structured, and documented communication among disciplines, who will work together to establish, prioritize, and achieve treatment goals. Please see assessment section for further patient specific details. If patient discharges prior to next session this note will serve as a discharge summary. Please see below for the latest assessment towards goals. Patient would benefit from continued therapy after discharge   PT Equipment Recommendations  Other: defer      Patient Diagnosis(es): Diagnoses of Acoustic neuroma [facility]), Balance problem, Meningioma ([facility] [address] and Dizziness were pertinent to this visit. Past Medical History:  has a past medical history of Arthritis, Asthma, Brain tumor ([facility] [address] Diabetes mellitus ([facility] [address] High cholesterol, Hypertension, Imbalance, Loss of hearing, and Vertigo. Past Surgical History:  has a past surgical history that includes Total knee arthroplasty (Right, 2015); Total knee arthroplasty (Left, 10/14/15);  Total hip arthroplasty (Right, 11/8/2021); Neuroma surgery (Right, 11/28/2022); mastoidectomy (Right, 11/28/2022); craniotomy (N/A, 11/29/2022); Upper gastrointestinal endoscopy (N/A, 12/11/2022); and IR EMBOLIZATION HEMORRHAGE (12/12/2022). Assessment   Body Structures, Functions, Activity Limitations Requiring Skilled Therapeutic Intervention: Decreased functional mobility ; Decreased endurance  Assessment: Pt requires assist x 1 with transfers and amb, assist x 2 to complete bed mobility. Mobility limited by generalized weakness and fatigue, min sx of dizziness. (+) blood BM noted. RN aware and notified MD.  Pt is below her functional baseline. Fatigued at end of session. Rec continued IP PT. Treatment Diagnosis: decreased functional mobility  Therapy Prognosis: Good  Requires PT Follow-Up: Yes     Plan   Physcial Therapy Plan  General Plan: 5-7 times per week  Current Treatment Recommendations: Strengthening, Functional mobility training, Transfer training, Gait training, Endurance training, Safety education & training, Therapeutic activities  Safety Devices  Type of Devices: Bed alarm in place, Left in bed, Call light within reach, Nurse notified, Gait belt  Restraints  Restraints Initially in Place: No     Restrictions  Position Activity Restriction  Other position/activity restrictions: Up with assistance, ambulate the patient, up in the chair, HOB elevated to 30 degrees     Subjective   General  Chart Reviewed: Yes  Additional Pertinent Hx: Pt is a 78 yo female that presents to the hospital for a procedure;STAGE 1, RIGHT TRANSLABYRINTHINE / RETROLABYRINTHINE CRANIOTOMY POSSIBLE PETROSECTOMY  RIGHT TRANSMASTOID / ANTERIOR MIDDLE CRANIAL FOSSA , ABDOMINAL FAT GRAFT EXTERNAL AUDITORY CANAL CLOSURE on 11/28. EGD 12/11:  multiple duodenal ulcers, clip placement x 3. Abd CT 12/11:  suspected Bilat PE.  LE dopplers 12/11: neg. Pt s/p  GDA embo on 12/12.    CTPA: (+) PE; PMH; Loss of hearing bilaterally, arthritis, brain tumor, diabetes. Family / Caregiver Present: No  General Comment  Comments: RN states medical team aware of PE. Pt to start on heparin this PM.  Per RN, medical team wants pt OOB 3x/day. RN states pt ok for therapy this PM to assist pt getting back to bed. Subjective  Subjective: Pt found sitting in chair, ready to get back to bed. RN in room. Pt agrees to PT. No specific c/o pain.          Social/Functional History  Social/Functional History  Lives With: Alone  Type of Home: House  Home Layout: One level, Able to Live on Main level with bedroom/bathroom, Performs ADL's on one level  Home Access: Stairs to enter with rails  Entrance Stairs - Number of Steps: 2  Bathroom Shower/Tub: Walk-in shower  Bathroom Equipment: Shower chair, Grab bars in shower, Hand-held shower  Home Equipment: Calvin Nuñez, [address], Sock aid (walker, unsure what kind)  Has the patient had two or more falls in the past year or any fall with injury in the past year?: No  ADL Assistance: [address] [place] [address] Avenue: Independent  Homemaking Responsibilities: Yes  Ambulation Assistance: Independent (with walker, unable to determine which kind)  Transfer Assistance: Independent  Active : Yes  Leisure & Hobbies: crafts  Additional Comments: pt is questionable to poor historian; pt reports no one can stay with her upon going home  Vision/Hearing       Cognition   Orientation  Overall Orientation Status: Within Functional Limits  Orientation Level: Oriented X4     Objective                             Bed mobility  Rolling to Left: Contact guard assistance  Rolling to Right: Contact guard assistance  Sit to Supine: Dependent/Total (mod + min A)  Transfers  Sit to Stand: Minimal Assistance;2 Person Assistance (from chair, BSC x 2 trials)  Stand to Sit: Minimal Assistance  Ambulation  Device: Rolling Walker  Assistance: Minimal assistance  Quality of Gait: decreased step length/height, min cues for walker management, efforful  Distance: 3 ft chair to BSC, 2 ft BSC to bed  Comments: Pt reports dizziness when seated at [facility]. BP: 141/71. Sx resolved once pt returned to supine. RN notified. Balance  Sitting - Static: Fair (SBA)  Standing - Static: Fair (CGA with RW)  Standing - Dynamic: Fair (min A x 1 with RW)           OutComes Score                                                  AM-PAC Score  AM-PAC Inpatient Mobility Raw Score : 14 (12/14/22 1508)  AM-PAC Inpatient T-Scale Score : 38.1 (12/14/22 1508)  Mobility Inpatient CMS 0-100% Score: 61.29 (12/14/22 1508)  Mobility Inpatient CMS G-Code Modifier : CL (12/14/22 1508)          Tinneti Score       Goals  Short Term Goals  Time Frame for Short Term Goals: D/C  Short Term Goal 1: supine<->sit mod A x 1-2- MET 12/6 Pt will perform bed mobility with SBA MET 12/7 update to bed mobility with independence -ongoing  Short Term Goal 2: pt will sit at EOB for 5 min with SBA- MET 12/7  Short Term Goal 3: pt will tolerate sit<->stand assessment MET 12/6 Pt will transfer with CGA to RW -ongoing  Short Term Goal 4: pt will tolerate bed->chair assessment with RW MET 12/7 Pt will perform stand pivot with RW and SBA -ongoing  Short Term Goal 5: pt will tolerate gait assessment-MET 12/7 Pt will ambulate x 50' and CGA -ongoing  Patient Goals   Patient Goals : not stated       Education  Patient Education  Education Given To: Patient  Education Provided: Role of Therapy;Plan of Care  Education Provided Comments: transfer safety  Education Method: Verbal;Demonstration  Barriers to Learning: None  Education Outcome: Verbalized understanding      Therapy Time   Individual Concurrent Group Co-treatment   Time In  1422         Time Out  1500         Minutes  38               Timed Code Treatment Minutes:  38    Total Treatment Minutes:  38    If patient is discharged prior to next treatment, this note will serve as the discharge summary.   Morgan Rm, PT, DPT  [id number]

## 2022-12-14 NOTE — PROGRESS NOTES
Assessment  Giant duodenal ulcer, 3 cm, that was bleeding with visible vessel 12/11 at EGD status post epinephrine, Endo Clip and Hemospray. 12/12 she had routine GDA embolization by IR. No overt bleeding since. Acute blood loss anemia. No overt bleeding in over 72 hrs,   Extensive pulmonary embolus noted on CTPA. Large 9.5 cm right lobe liver lesion, probably meningioma based on biphasic MR abdomen yesterday. Plan:  I spoke with interventional radiology in the intensivist about our therapeutic dilemma here with anticoagulation. In brief, my opinion is that it is reasonable to start heparin drip now considering she seems to have progressive pulmonary emboli and is at substantial risk in the absence of anticoagulation. Could continue this for perhaps 5 days and then consider switching to either Lovenox or po NOAC such as low-dose Eliquis. If she develops bleeding during this time that requires us to interupt anticoagulation, could consider placing IVC filter  If she has repeat gi bleeding, it would be reasonable to repeat EGD since the bleeding foci may be different than the visible vessel she bled from previously since she had the GDA embolization. Clear liquid diet today. Consider advancing to full liquid diet tomorrow. Subjective: We are following for giant duodenal ulcer with recent bleeding status post embolization. She continues to have no overt bleeding. Hemoglobin today is about 8.1. Objective:    Review of Systems:    Constitutional: Negative for fever, chills, and unexpected weight change. HENT: Negative for trouble swallowing. Respiratory: Negative for cough, chest tightness and shortness of breath. Cardiovascular: Negative for chest pain  Gastrointestinal: see HPI  Musculoskeletal: Negative for unusual arthralgias. Skin: Negative for rash.      Scheduled Meds:   pantoprazole  40 mg IntraVENous BID    levETIRAcetam  500 mg IntraVENous Q12H    cetirizine  10 mg Oral Daily    [Held by provider] hydroCHLOROthiazide  25 mg Oral Daily    insulin lispro  0-16 Units SubCUTAneous TID     insulin lispro  0-4 Units SubCUTAneous Nightly    polyvinyl alcohol  2 drop Right Eye Q2H    methIMAzole  10 mg Oral Daily    insulin glargine  15 Units SubCUTAneous Nightly    melatonin  5 mg Oral Nightly    sodium chloride flush  5-40 mL IntraVENous 2 times per day    polyethylene glycol  17 g Oral Daily    tetrahydrozoline  1 drop Both Eyes TID    [Held by provider] carvedilol  12.5 mg Oral BID WC    [Held by provider] losartan  100 mg Oral Daily    sodium chloride flush  5-40 mL IntraVENous 2 times per day    latanoprost  1 drop Both Eyes Nightly     Continuous Infusions:   heparin (PORCINE) Infusion 13 Units/kg/hr (12/14/22 1420)    sodium chloride      sodium chloride      dextrose         Vitals:  BP (!) 150/102   Pulse 72   Temp 97 °F (36.1 °C) (Oral)   Resp 18   Ht 5' 5\" (1.651 m)   Wt (!) 355 lb 1.6 oz (161.1 kg)   SpO2 98%   BMI 59.09 kg/m²     Exam:  General:  comfortable  Heent: There is no scleral icterus. Cardiovascular: The heart is regular rate and rhythm. Respiratory:  The patient's breathing is non-labored with normal chest wall excursion and normal muscle movement. Abdomen: The abdomen is nondistended, soft, and nontender. Rectal:  deferred   Neurological:  Gross memory appears intact. Patient is alert and oriented. Labs and Imaging:  I reviewed the labs and imaging results from last 24 hours. Recent Labs     12/12/22  0325 12/12/22  1150 12/13/22  0428 12/13/22  1004 12/13/22  1653 12/14/22  0415 12/14/22  1023   HGB 8.5*   < > 8.1* 7.9* 8.1* 7.5* 8.1*   WBC 8.4  --  6.5  --   --  5.3  --    LABALBU 2.8*  --   --   --   --   --   --     < > = values in this interval not displayed.         Rosas Cosme MD  December 14, 2022

## 2022-12-14 NOTE — PLAN OF CARE
CTPA showed large PE with no obvious heart strain. Patient nuero exam unchanged. VSS. No signs of respiratory distress, and patient is able to maintain O2 sat on room air. Patient discussed with GI, who would prefer to avoid anticoagulation for at least 7 days if possible, but understands should the need arise.  Consult placed for pulmonology to see in AM.    LEE Rogers - CNP   Neurology & Neurocritical Care   12/13/2022 11:16 PM    ICU Patients:   Neurocritical Care Line: [phone]  [facility]: [facility] Neurocritical Care

## 2022-12-14 NOTE — PROGRESS NOTES
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 Ishan Hess. We were asked to assist with medical management post operatively. Initially had been doing well but over the weekend had drop in H&H and found to have bloody stools. Seen by GI and found to have a giant Duodenal ulcer during EGD. Started on protonix BID, planning for IR to attempt GDA embolization on 12/12. CTPA also with concern for Pes, but inadequate image/contrast timing so will need dedicated CTPA once able. PMH Past Medical History:   Diagnosis Date    Arthritis     Asthma     mild    Brain tumor ([address]     Diabetes mellitus ([address]     borderline    High cholesterol     Hypertension     Imbalance     DUE TO TUMOR- USING ANKLE BRACE / WALKER PER PREOP H&P    Loss of hearing     bilateral reported    Vertigo       Allergies Allergies   Allergen Reactions    Keflex [Cephalexin] Itching and Rash    Codeine Nausea And Vomiting    Tomato Rash      Diet ADULT DIET;  Clear Liquid  ADULT ORAL NUTRITION SUPPLEMENT; Breakfast, Lunch, Dinner; Clear Liquid Oral Supplement   Isolation No active isolations     CURRENT SCHEDULED MEDICATIONS   Inpatient Medications     pantoprazole, 40 mg, IntraVENous, BID    levETIRAcetam, 500 mg, IntraVENous, Q12H    cetirizine, 10 mg, Oral, Daily    [Held by provider] hydroCHLOROthiazide, 25 mg, Oral, Daily    insulin lispro, 0-16 Units, SubCUTAneous, TID WC    insulin lispro, 0-4 Units, SubCUTAneous, Nightly    polyvinyl alcohol, 2 drop, Right Eye, Q2H    methIMAzole, 10 mg, Oral, Daily    insulin glargine, 15 Units, SubCUTAneous, Nightly    melatonin, 5 mg, Oral, Nightly    sodium chloride flush, 5-40 mL, IntraVENous, 2 times per day    [Held by provider] heparin (porcine), 5,000 Units, SubCUTAneous, 3 times per day    polyethylene glycol, 17 g, Oral, Daily    tetrahydrozoline, 1 drop, Both Eyes, TID    [Held by provider] carvedilol, 12.5 mg, Oral, BID WC    [Held by provider] losartan, 100 mg, Oral, Daily    sodium chloride flush, 5-40 mL, IntraVENous, 2 times per day    latanoprost, 1 drop, Both Eyes, Nightly   Infusions    sodium chloride      sodium chloride      dextrose        Antibiotics   Recent Abx Admin        No antibiotic orders with administrations found. LABS   Metabolic Panel Recent Labs     12/12/22  0325 12/13/22  1452    142   K 3.6 3.4*    111*   CO2 29 22   BUN 40* 20   CREATININE <0.5* <0.5*   GLUCOSE 188* 332*   CALCIUM 8.3 8.1*   LABALBU 2.8*  --    PHOS 2.1*  --         CBC / Coags Recent Labs     12/12/22  0325 12/12/22  1150 12/13/22  0428 12/13/22  1004 12/13/22  1653 12/14/22  0415   WBC 8.4  --  6.5  --   --  5.3   RBC 2.79*  --  2.59*  --   --  2.47*   HGB 8.5*   < > 8.1* 7.9* 8.1* 7.5*   HCT 25.5*   < > 23.4* 23.5* 24.0* 22.4*     --  163  --   --  163    < > = values in this interval not displayed. Other No results for input(s): LABA1C, LDLCALC, TRIG, TSH, [id number], FOLATE, LABSALI, COVID19 in the last 72 hours. No results for input(s): PHENYTOIN, KEPPRA, LACOSA, LAMO, VALPROATE, LACTSEPSIS, LACTA in the last 72 hours. DIAGNOSTICS   IMAGES:  Images personally reviewed and agree w/ radiology interpretation.   CTPA:  Impression       Large burden of pulmonary emboli without obvious heart strain of the exam is severely limited and difficult to evaluate for other pathology       Findings called to patient's floor nurse on 12/13/2022 at 7:09 PM     Previous Imaging:  BLE Doppler:  Summary        There is no evidence of deep or superficial venous thrombosis of the    bilateral lower extremities in this technically difficult study. CTA Abdomen Pelvis W WO Contrast (post EGD): Impression       1. Suspected bilateral pulmonary emboli but inadequate contrast phase to assess for the pulmonary embolus. Incomplete filling of the pulmonary arteries suggests pulmonary emboli inferiorly     2. No sign of any acute or active GI bleeding on CT angiograms studies   3. No sign of any aneurysm in the abdomen with normal widely patent vessels   4. Stable appearing partially calcified left adrenal adenoma. 5. Parapelvic renal cysts, largest on left side   6. 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 4.1 x 3.5 cm right ovarian cyst, benign in appearance   8. Mild cholelithiasis   9.  No free fluid or free air with right abdominal wall subcutaneous inflammation or prior instrumentation       PHYSICAL EXAMINATION     PHYSICAL EXAM:  Vitals:    12/14/22 0700 12/14/22 0800 12/14/22 0900 12/14/22 1000   BP: (!) 169/69 (!) 144/64 (!) 169/121 135/61   Pulse: 69 71 82 84   Resp: 14 19 15 16   Temp:  98 °F (36.7 °C)     TempSrc:  Oral     SpO2:  98%  92%   Weight:       Height:             General: Alert, no distress, well-nourished  Neurologic  Mental status:   orientation to person, place, time, situation   Attention intact as able to attend well to the exam     Language fluent in conversation   Comprehension intact; follows simple commands    Cranial nerves:   CN2: Visual fields full w/o extinction on confrontational testing   Fundoscopic Exam: unable to visualize fundi  CN 3,4,6: Pupils equal and reactive to light, extraocular muscles intact  CN5: Facial sensation symmetric   CN7: R sided facial droop  CN8: Hearing symmetric to spoken voice  CN9: Palate elevated symmetrically  CN11: Traps full strength on shoulder shrug  CN12: Tongue deviates to the R    Motor Exam:   R  L    Deltoid 5  5   Biceps 5 5   Triceps 5 5   Wrist extension  5 5   Interossei 5 5      R  L    Hip flexion  5  5   Hip extension  5 5   Knee flexion  5 5   Knee extension  5 5   Ankle dorsiflexion  5 5   Ankle plantar flexion  5 5       Sensory: light touch intact and symmetric in all 4 extremities. Cerebellar/coordination: finger nose finger normal without ataxia  Tone: normal in all 4 extremities  Gait: held for patient safety    OTHER SYSTEMS:  Cardiovascular: Warm, appears well perfused   Respiratory: Easy, non-labored respiratory pattern, no complaints of SOB, on 1 L nasal cannula  Abdominal: Abdomen is without distention  Extremities: Upper and lower extremities with scattered bruising. ASSESSMENT & RECOMMENDATIONS   Assessment:  Patient is a 78 yo F s/p Procedure(s) (LRB):  STAGE II: RESECTION OF RIGHT PETROCLIVAL MENINGIOMA (N/A) per Dr. Dl Armas. Now s/p embolization of GDA after found to have bleeding duodenal ulcer. Found to have pulmonary emboli on CTPA and heprain gtt started 12/14. Plan:  - Q4 hour neuro exams  - Keppra as ordered (currently IV d/t NPO status, once taking PO can transition back to PO)  - CTPA demonstrated PEs, results discussed with patient and potential plan for anticoagulation  - Heparin gtt per pulmonology recs for PE  - s/p IR for embolization of GDA  - Hgb and Hct Q6 hours  - Protonix BID  - GI following, appreciate recs  - Clear liquid diet, per GI recs.  Discontinued IV fluids as patient taking adequate PO  - Doppler study negative for DVTs  - PT/OT as able  - SCDs for DVT chemoprophylaxis  - Follow up imaging of liver mass per GI  - Hospitalist now following for medical management, will sign off, please notify Neurocritical care if any questions arise    Case discussed with attending Neurologist,  Hal Gottron, APRN - CNP   Neurology & Neurocritical Care   12/14/2022 10:19 AM      ICU Patients:   Neurocritical Care Line: [phone]  [facility]: [facility] Neurocritical Care    Floor / PCU Patients:  Neurology Line: [phone]  [facility]: [facility] Neurology    I spent 25 minutes in the care of this patient. Over 50% of that time was in face-to-face counseling regarding disease process, diagnostic testing, preventative measures, and answering patient and family questions.

## 2022-12-14 NOTE — PLAN OF CARE
Problem: Chronic Conditions and Co-morbidities  Goal: Patient's chronic conditions and co-morbidity symptoms are monitored and maintained or improved  Outcome: Progressing  Flowsheets (Taken 12/13/2022 2000)  Care Plan - Patient's Chronic Conditions and Co-Morbidity Symptoms are Monitored and Maintained or Improved:   Monitor and assess patient's chronic conditions and comorbid symptoms for stability, deterioration, or improvement   Collaborate with multidisciplinary team to address chronic and comorbid conditions and prevent exacerbation or deterioration   Update acute care plan with appropriate goals if chronic or comorbid symptoms are exacerbated and prevent overall improvement and discharge     Problem: Discharge Planning  Goal: Discharge to home or other facility with appropriate resources  Outcome: Progressing  Flowsheets (Taken 12/13/2022 2000)  Discharge to home or other facility with appropriate resources: Identify barriers to discharge with patient and caregiver     Problem: Pain  Goal: Verbalizes/displays adequate comfort level or baseline comfort level  Outcome: Progressing  Flowsheets (Taken 12/13/2022 2000)  Verbalizes/displays adequate comfort level or baseline comfort level:   Encourage patient to monitor pain and request assistance   Assess pain using appropriate pain scale   Administer analgesics based on type and severity of pain and evaluate response     Problem: Safety - Adult  Goal: Free from fall injury  Outcome: Progressing     Problem: Skin/Tissue Integrity  Goal: Absence of new skin breakdown  Description: 1. Monitor for areas of redness and/or skin breakdown  2. Assess vascular access sites hourly  3. Every 4-6 hours minimum:  Change oxygen saturation probe site  4. Every 4-6 hours:  If on nasal continuous positive airway pressure, respiratory therapy assess nares and determine need for appliance change or resting period.   Outcome: Progressing     Problem: ABCDS Injury Assessment  Goal: Absence of physical injury  Outcome: Progressing     Problem: Nutrition Deficit:  Goal: Optimize nutritional status  Outcome: Progressing     Problem: Cardiovascular - Adult  Goal: Maintains optimal cardiac output and hemodynamic stability  Outcome: Progressing  Flowsheets (Taken 12/13/2022 2000)  Maintains optimal cardiac output and hemodynamic stability:   Monitor blood pressure and heart rate   Monitor urine output and notify Licensed Independent Practitioner for values outside of normal range   Assess for signs of decreased cardiac output  Goal: Absence of cardiac dysrhythmias or at baseline  Outcome: Progressing  Flowsheets (Taken 12/13/2022 2000)  Absence of cardiac dysrhythmias or at baseline:   Monitor cardiac rate and rhythm   Assess for signs of decreased cardiac output   Administer antiarrhythmia medication and electrolyte replacement as ordered     Problem: Skin/Tissue Integrity - Adult  Goal: Skin integrity remains intact  Outcome: Progressing  Flowsheets (Taken 12/13/2022 2000)  Skin Integrity Remains Intact: Monitor for areas of redness and/or skin breakdown  Goal: Incisions, wounds, or drain sites healing without S/S of infection  Outcome: Progressing  Flowsheets (Taken 12/13/2022 2000)  Incisions, Wounds, or Drain Sites Healing Without Sign and Symptoms of Infection: ADMISSION and DAILY: Assess and document risk factors for pressure ulcer development  Goal: Oral mucous membranes remain intact  Outcome: Progressing  Flowsheets (Taken 12/13/2022 2000)  Oral Mucous Membranes Remain Intact:   Assess oral mucosa and hygiene practices   Implement preventative oral hygiene regimen   Implement oral medicated treatments as ordered     Problem: Hematologic - Adult  Goal: Maintains hematologic stability  Outcome: Progressing  Flowsheets (Taken 12/13/2022 2000)  Maintains hematologic stability:   Assess for signs and symptoms of bleeding or hemorrhage   Monitor labs for bleeding or clotting disorders   Administer blood products/factors as ordered     Problem: Metabolic/Fluid and Electrolytes - Adult  Goal: Electrolytes maintained within normal limits  Outcome: Progressing  Flowsheets (Taken 12/13/2022 2000)  Electrolytes maintained within normal limits:   Monitor labs and assess patient for signs and symptoms of electrolyte imbalances   Administer electrolyte replacement as ordered   Monitor response to electrolyte replacements, including repeat lab results as appropriate  Goal: Hemodynamic stability and optimal renal function maintained  Outcome: Progressing  Flowsheets (Taken 12/13/2022 2000)  Hemodynamic stability and optimal renal function maintained:   Monitor labs and assess for signs and symptoms of volume excess or deficit   Monitor intake, output and patient weight   Monitor urine specific gravity, serum osmolarity and serum sodium as indicated or ordered  Goal: Glucose maintained within prescribed range  Outcome: Progressing  Flowsheets (Taken 12/13/2022 2000)  Glucose maintained within prescribed range:   Monitor blood glucose as ordered   Assess for signs and symptoms of hyperglycemia and hypoglycemia   Administer ordered medications to maintain glucose within target range     Problem: Infection - Adult  Goal: Absence of infection at discharge  Outcome: Progressing  Flowsheets (Taken 12/13/2022 2000)  Absence of infection at discharge:   Assess and monitor for signs and symptoms of infection   Monitor lab/diagnostic results   Monitor all insertion sites i.e., indwelling lines, tubes and drains  Goal: Absence of infection during hospitalization  Outcome: Progressing  Flowsheets (Taken 12/13/2022 2000)  Absence of infection during hospitalization:   Assess and monitor for signs and symptoms of infection   Monitor lab/diagnostic results   Monitor all insertion sites i.e., indwelling lines, tubes and drains  Goal: Absence of fever/infection during anticipated neutropenic period  Outcome: Progressing  Flowsheets (Taken 12/13/2022 2000)  Absence of fever/infection during anticipated neutropenic period:   Monitor white blood cell count   Administer growth factors as ordered   Implement neutropenic guidelines     Problem: Genitourinary - Adult  Goal: Absence of urinary retention  Outcome: Progressing  Flowsheets (Taken 12/13/2022 2000)  Absence of urinary retention:   Assess patients ability to void and empty bladder   Place urinary catheter per Licensed Independent Practitioner order if needed   Monitor intake/output and perform bladder scan as needed

## 2022-12-14 NOTE — PROGRESS NOTES
Pt up to commode with therapy. Had small dark red stool. Spoke with Dr Mallika Arce, he wants heparin drip started and monitored closely for any new bleeding. Notified Jack Rico in pharmacy as well as Marley figueroa RN.

## 2022-12-14 NOTE — PROGRESS NOTES
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 No PRBC ordered  CTPA yesterday showed large PE with no obvious heart strain. Patient nuero exam unchanged. VSS. No signs of respiratory distress, and patient is able to maintain O2 sat on room air. Patient discussed with GI, who would prefer to avoid anticoagulation for at least 7 days if possible, but understands should the need arise. Consult placed for pulmonology to see in AM.    Objective:    VITAL SIGNS   /61   Pulse 84   Temp 98 °F (36.7 °C) (Oral)   Resp 16   Ht 5' 5\" (1.651 m)   Wt (!) 355 lb 1.6 oz (161.1 kg)   SpO2 92%   BMI 59.09 kg/m²    Height Height: 5' 5\" (165.1 cm)   Weight Weight: (!) 355 lb 1.6 oz (161.1 kg)          Allergies Allergies   Allergen Reactions    Keflex [Cephalexin] Itching and Rash    Codeine Nausea And Vomiting    Tomato Rash      NPO Status ADULT DIET;  Clear Liquid  ADULT ORAL NUTRITION SUPPLEMENT; Breakfast, Lunch, Dinner; Clear Liquid Oral Supplement   Isolation No active isolations     LABS   Basic Metabolic Profile Recent Labs     12/12/22 0325 12/13/22  1452    142    111*   CO2 29 22   BUN 40* 20   CREATININE <0.5* <0.5*   GLUCOSE 188* 332*   PHOS 2.1*  --       Complete Blood Count Recent Labs     12/12/22 0325 12/13/22  0428 12/14/22  0415   WBC 8.4 6.5 5.3   RBC 2.79* 2.59* 2.47*      Coagulation Studies No results for input(s): PTT, INR in the last 72 hours. Invalid input(s): PLATELETS, PROA, PT, PTTA       MEDICATIONS   Inpatient Medications     pantoprazole, 40 mg, IntraVENous, BID    levETIRAcetam, 500 mg, IntraVENous, Q12H    cetirizine, 10 mg, Oral, Daily    [Held by provider] hydroCHLOROthiazide, 25 mg, Oral, Daily    insulin lispro, 0-16 Units, SubCUTAneous, TID WC    insulin lispro, 0-4 Units, SubCUTAneous, Nightly    polyvinyl alcohol, 2 drop, Right Eye, Q2H    methIMAzole, 10 mg, Oral, Daily    insulin glargine, 15 Units, SubCUTAneous, Nightly    melatonin, 5 mg, Oral, Nightly    sodium chloride flush, 5-40 mL, IntraVENous, 2 times per day    [Held by provider] heparin (porcine), 5,000 Units, SubCUTAneous, 3 times per day    polyethylene glycol, 17 g, Oral, Daily    tetrahydrozoline, 1 drop, Both Eyes, TID    [Held by provider] carvedilol, 12.5 mg, Oral, BID WC    [Held by provider] losartan, 100 mg, Oral, Daily    sodium chloride flush, 5-40 mL, IntraVENous, 2 times per day    latanoprost, 1 drop, Both Eyes, Nightly   Infusions    sodium chloride      sodium chloride      dextrose        Antibiotics   Recent Abx Admin        No antibiotic orders with administrations found. Imaging:   Abdominal CT  Impression       1.  9.5 cm mass in the right hepatic lobe which does not appear to represent a hemangioma. Differential diagnosis would include primary or secondary malignancy. Liver mass protocol MRI with and without contrast is recommended. 2.  No intra-abdominal hemorrhage. 3.  Mild cholelithiasis. 4.  3.5 cm right ovarian cyst, the CT appearance suggests a simple cyst indicating a benign finding.      Neurologic Exam:  Mental status: awake and alert and oriented x4    Cranial Nerves:  II: Visual acuity not tested, denies new visual changes / diplopia  III, IV, VI: PERRL, 3 mm bilaterally, EOMI,Patient had rightward gaze deviation but was able to follow when prompted   V: Facial sensation intact bilaterally to touch  VII: R sided facial droop  VIII: Hearing intact bilaterally to spoken voice on L, no hearing on R  IX: Palate movement equal bilaterally  XI: Shoulder shrug equal bilaterally  XII: Tongue deviates to R      Musculoskeletal:   Gait: Not tested   Tone: normal  Sensory: intact to all extremities  Motor strength:    Right  Left    Right  Left    Deltoid  5 5  Hip Flex  5 5   Biceps  5 5  Knee Extensors  5 5   Triceps  5 5  Knee Flexors  5 5   Wrist Ext  5 5  Ankle Dorsiflex. 5 5   Wrist Flex  5 5  Ankle Plantarflex. 5 5   Handgrip  5 5  Ext Ernie Longus  5 5   Thumb Ext  5 5         Mastoid Incision: intact, clean and dry      Respiratory:  Unlabored respiratory pattern    Abdomen:   Soft, ND   Last BM 12/11    Cardiovascular:  Warm, well perfused    Assessment   Patient is a 78 yo F s/p Procedure(s) (LRB):  STAGE II: RESECTION OF RIGHT PETROCLIVAL MENINGIOMA (N/A) per Dr. Viola Crouch    Plan:  Neurologic exam frequency: Q4H  Mobility: PT/OT   SLP continue  PICC line  DVT Prophylaxis: SCDs and heparin (hold)  Keppra as ordered  Bowel Regimen: Senna and glycolax (hold)  Pain control: Katie   Keep sbp < 160  Incisional Care: Mastoid compression headband in place check Q4H and let NS know if leaking  GI Bleed: GI Following, Protonix 40 mg BID, Embolization of CHU done, Hgb dropped overnight 8.1>7.5  PE: CTPA shows large PE with no obvious heart strain. Consult placed for pulmonology to see in AM.    Dispo Planning: Inpatient    Patient was discussed with Dr. Jeet Andrews who agrees with above assessment and plan. Electronically signed by: LEE Mederos CNP, 12/14/2022 10:59 AM   Neurosurgery Nurse Practitioner  [phone]    I spent 15 minutes in the care of this patient.   Over 50% of that time was in face-to-face counseling regarding disease process, diagnostic testing, preventative measures, and answering patient and family questions

## 2022-12-14 NOTE — PROGRESS NOTES
Progress Note  Physical Medicine and Rehabilitation    Patient: Calvin Palacios  [id number]  Date: 12/14/2022         Chief Complaint: Meningioma     History of Present Illness/Hospital Course:  Patient is a morbidly obese (Body mass index is 50.82 kg/m².), 79 y.o. female  with c/o dizziness, HA and vertigo in the setting of large right petroclival mass which is consistent with meningioma. . She presented to the hospital for a scheduled two staged petroclival meningioma resection. 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 Interval Hx: Pt found to have large burden PE on CTPA. Current ongoing discussion of AC between Neurology, Pulm and GI. Reevaluated by therapy yesterday and still recommending ARU. Prior Level of Function:  Independent for mobility, ADLs, and IADLs     Current Level of Function:  Min assist     Pertinent Social History:  Support: Lives alone  Home set-up: One level house with 2 steps to enter    Past Medical History:   Diagnosis Date    Arthritis     Asthma     mild    Brain tumor ([address]     Diabetes mellitus ([address]     borderline    High cholesterol     Hypertension     Imbalance     DUE TO TUMOR- USING ANKLE BRACE / WALKER PER PREOP H&P    Loss of hearing     bilateral reported    Vertigo        Past Surgical History:   Procedure Laterality Date    CRANIOTOMY N/A 11/29/2022    STAGE II: RESECTION OF RIGHT PETROCLIVAL MENINGIOMA performed by Leida Hess MD at [address]    IR EMBOLIZATION HEMORRHAGE  12/12/2022    IR EMBOLIZATION HEMORRHAGE 12/12/2022 [facility] PROCEDURES    MASTOIDECTOMY Right 11/28/2022    RIGHT TRANSMASTOID / ANTERIOR MIDDLE CRANIAL FOSSA , ABDOMINAL FAT GRAFT EXTERNAL AUDITORY CANAL CLOSURE performed by Maddy Hadley MD at [place] [address] Right 11/28/2022    STAGE 1, RIGHT TRANSLABYRINTHINE / RETROLABYRINTHINE CRANIOTOMY, PETROSECTOMY performed by Zay Quinones.  [place], MD at [address] TOTAL HIP ARTHROPLASTY Right 11/8/2021    RIGHT TOTAL HIP ARTHROPLASTY POSTERIOR APPROACH - JOSÉ ANTONIO ADVANCED performed by Irma Koroma MD at [address] [place] [address] Right 07/14/2015    TOTAL KNEE ARTHROPLASTY Left 10/14/15    TKA    UPPER GASTROINTESTINAL ENDOSCOPY N/A 12/11/2022    EGD CONTROL HEMORRHAGE performed by Bettye Lundborg, MD at [facility] ENDOSCOPY       Family History   Problem Relation Age of Onset    Cancer Mother     Hypotension Mother     Cancer Father     Cancer Sister     Hypotension Sister     Cancer Maternal Grandmother     Cancer Maternal Grandfather     Cancer Paternal Grandmother     Cancer Paternal Grandfather     Hypotension Other     Cancer Other     Diabetes Other     Osteoarthritis Other        Social History     Socioeconomic History    Marital status:      Spouse name: None    Number of children: None    Years of education: None    Highest education level: None   Tobacco Use    Smoking status: Former    Smokeless tobacco: Never   Substance and Sexual Activity    Alcohol use: No    Drug use: Never           REVIEW OF SYSTEMS:   CONSTITUTIONAL: negative for fevers, chills   EYES: positive for diplopia   HEENT: positive for difficulty swallowing. negative for hearing loss, tinnitus, sinus pressure, nasal congestion  RESPIRATORY: Negative for SOB, cough  CARDIOVASCULAR: negative for chest pain, palpitations  GASTROINTESTINAL: positive for abdominal pain.  negative for hematemesis, hematochezia, nausea, vomiting, diarrhea, abdominal pain  GENITOURINARY: negative for frequency, dysuria  HEMATOLOGIC/LYMPHATIC: negative for easy bruising, bleeding and lymphadenopathy  ENDOCRINE: negative for diabetic symptoms including polyuria, polydipsia  MUSCULOSKELETAL: negative for pain, joint swelling, decreased range of motion  NEUROLOGICAL: positive for difficulty speaking, negative for headaches, unilateral weakness    Physical Examination:  Vitals: Patient Vitals for the past 24 hrs:   BP droop. Sensation intact to light touch. Motor examination reveals normal strength in all four limbs diffusely. Psych: Stable mood, normal judgement, normal affect     Lab Results   Component Value Date    WBC 5.3 12/14/2022    HGB 7.5 (L) 12/14/2022    HCT 22.4 (L) 12/14/2022    MCV 90.8 12/14/2022     12/14/2022     Lab Results   Component Value Date    INR 1.07 12/11/2022    INR 1.09 11/29/2022    INR 1.02 10/12/2021    PROTIME 13.8 12/11/2022    PROTIME 14.1 11/29/2022    PROTIME 11.5 10/12/2021     Lab Results   Component Value Date    CREATININE <0.5 (L) 12/13/2022    BUN 20 12/13/2022     12/13/2022    K 3.4 (L) 12/13/2022     (H) 12/13/2022    CO2 22 12/13/2022     Lab Results   Component Value Date    ALT 11 12/04/2022    AST 12 (L) 12/04/2022    ALKPHOS 48 12/04/2022    BILITOT 0.5 12/04/2022     On my review, CXR displays. MRI ABDOMEN W WO CONTRAST MRCP   Final Result      Limited study due to multiple factors, as outlined above. Lesion of the right hepatic lobe is most consistent with a hemangioma. Follow-up multiphase CT is recommended in 6 months. CT CHEST PULMONARY EMBOLISM W CONTRAST   Final Result      Large burden of pulmonary emboli without obvious heart strain of the exam is severely limited and difficult to evaluate for other pathology      Findings called to patient's floor nurse on 12/13/2022 at 7:09 PM      IR EMBOLIZATION HEMORRHAGE   Final Result   Impression: Technically successful superior mesenteric, common hepatic and gastroduodenal angiograms with subsequent coiling of the gastroduodenal artery. VL Extremity Venous Bilateral   Final Result      CTA ABDOMEN PELVIS W WO CONTRAST   Final Result      1. Suspected bilateral pulmonary emboli but inadequate contrast phase to assess for the pulmonary embolus. Incomplete filling of the pulmonary arteries suggests pulmonary emboli inferiorly     2.  No sign of any acute or active GI bleeding on CT angiograms studies   3. No sign of any aneurysm in the abdomen with normal widely patent vessels   4. Stable appearing partially calcified left adrenal adenoma. 5. Parapelvic renal cysts, largest on left side   6. 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 4.1 x 3.5 cm right ovarian cyst, benign in appearance   8. Mild cholelithiasis   9. No free fluid or free air with right abdominal wall subcutaneous inflammation or prior instrumentation         CT ABDOMEN PELVIS W WO CONTRAST Additional Contrast? Radiologist Recommendation   Final Result      1.  9.5 cm mass in the right hepatic lobe which does not appear to represent a hemangioma. Differential diagnosis would include primary or secondary malignancy. Liver mass protocol MRI with and without contrast is recommended. 2.  No intra-abdominal hemorrhage. 3.  Mild cholelithiasis. 4.  3.5 cm right ovarian cyst, the CT appearance suggests a simple cyst indicating a benign finding. MRI BRAIN W WO CONTRAST   Final Result      1. Postsurgical changes from right temporal and transmastoid craniotomy with large amount of fat packing in the defect and extracranial soft tissues. Large subcutaneous fluid collection is present in the scalp surrounding and extending superiorly from    the fat packing. No diffusion restriction to indicate superimposed infection. 2.  Residual meningioma in the right cerebellopontine angle, prepontine cistern, and right Meckel's cave/cavernous sinus. The tumor extends partially encasing the basilar artery   3. Small area of acute to subacute ischemia in the right brachium pontis. Small enhancing lesions superior to the right tentorium is new from the prior study and may represent an area of subacute ischemia. 4.  Small amount of extra-axial hemorrhage along the right cerebellopontine angle.    5.  Stable 12 mm left frontal meningioma. CTA PULMONARY W CONTRAST   Final Result      Significantly limited study due to motion. 1. Significantly limited study due to streak artifact and suboptimal bolus. No evidence of a central embolus. 2. Multifocal airspace consolidation is present, suboptimally evaluated due to motion. This presumably reflects pneumonia. Follow-up chest CT is recommended to document resolution. 3. There is a large mass in the right hepatic lobe measuring 8.2 x 7.4 cm, demonstrating peripheral nodular enhancement. There is significant amount of artifact through this lesion. It is still favored to reflect a hemangioma. Recommend a multiphasic CT    of the abdomen for further assessment. This could be performed on a nonurgent basis, if clinically appropriate. 4. Partially calcified nodule arising from the left adrenal gland, most likely benign and possibly reflecting old adrenal hemorrhage. XR CHEST PORTABLE   Final Result      Similar appearance of patchy airspace disease. FL MODIFIED BARIUM SWALLOW W VIDEO   Final Result      1. Laryngeal penetration, but no evidence of tracheal aspiration. XR CHEST PORTABLE   Final Result      Patchy left upper lobe airspace disease, atelectasis versus pneumonia. CT HEAD WO CONTRAST   Final Result      1. Interval postsurgical changes from resection of right cerebellopontine angle mass with associated right temporal mastoid resection and frontotemporal craniotomy and fat graft placement. 2.  Thin hyperdensity along the fat graft may represent small amount of postsurgical blood products. There is also a small focus of subarachnoid hemorrhage along the right temporal lobe.    3.  Mild pneumocephalus and bilateral subgaleal fluid is also likely postsurgical.               Assessment:  Giant Duodenal Ulcer  -Hgb 7.8, previously 12  -Transfuse pRBCs  -Protonix 40 mg BID  -IVF  -Embolization of GDA  -GI following  -IR following  BL PE, suspected  -May need anticoagulation when duodenal ulcer has been tx    3. Meningioma  -s/p resection with TL and MCF approach  -Keppra 500 mg BID  -Bowel regiment: Senna, glycolax, and dulcolax  -Nicardipine-Roxicodone PRN  -Acycolvir 400 mg TID for 10 days  -Artifical tears q2H and eye patch  -Keep an eye on mastoid dressing  -SCDs for DVT prophylaxis   -PT/OT  4. HTN   -Coreg 12.5 mg BID  Losartan 100 mg daily  5. DM   -Lantus 15u qHS  -HDSSI  6. Hyperthyroidism   -Methimazole 10 mg daily  7. Liver Mass  -Incidental finding  -f/u imaging once acute problems have been tx  Impairments- Decreased functional mobility, Decreased ADLs     Recommendations:    Current ongoing medical issues including discussion regarding for [facility] for large PE burden. Will evaluate when more medically stable. Will admit to ARU when medically ready        Thank you for this consult. Please contact me with any questions or concerns. Kun Lopez MD PGY3    This patient has been seen, examined, and discussed with the resident. I was part of the key critical services provided to the patient. I agree with the residents documentation. This note may have been altered to reflect my own examination findings, impression, and recommendations.          Jey El D.O. M.P.H  PM&R  12/14/2022  10:00 AM

## 2022-12-14 NOTE — PROGRESS NOTES
Floyd Santiago ordered heparin bolus and drip. Confirmed that this is to be started since she had a recent GI bleed. Dr Annabelle Quinonez also in the room at this time explaining risks/benefits of heparin to patient.

## 2022-12-14 NOTE — PROGRESS NOTES
CC:  pulmonary emboli    Mrs. Stevenson Anna is seen again because of finding now of pulmonary emboli. This was first detected on abdominal CT scan with contrast, with clot visualized in lower lung field pulmonary arteries. A CTPA on the following day clearly showed multiple pulmonary emboli. She does not have complaints of chest discomfort or shortness of breath. She has not required supplemental oxygen. Blood pressure has been in normal range. She has been able to tolerate minimal levels of exercise, out of bed with assistance to pivot into chair. After 1 hour, she is getting tired of sitting up in a chair. Afebrile. Pulse 76, regular. /65. S1, S2 mildly decreased. No murmur appreciated. O2 saturation 97%, room air. Normal respiratory pattern. Breath sounds mildly reduced, but otherwise normal.  Abdomen morbidly obese. No peripheral edema. CTPA on 12/13/2022 is reviewed. There are multiple pulmonary emboli. Venous Doppler studies on 12/2/2022 are of suboptimal quality, unable to detect DVT in either lower extremity. A&P: Acute PE, developing in a fairly immobilized patient, despite heparin prophylaxis. She has passed the 2-week ean for craniotomy for brain tumor, recovering well from that. She had a large gastric ulcer, and required embolization of the gastroduodenal artery on 12/2/2022 to control bleeding. There has been no further evidence of GI bleeding. She is tolerating clear liquid diet. I reviewed the situation with Dr. Isiah Aguero, gastroenterology. Although she is clinically tolerating pulmonary emboli quite well, she is clearly at risk for developing further DVT/PE. Further recurrence of pulmonary emboli may not be tolerated so well. Intervention is required. There is risk of bleeding with anticoagulation, but with heparin, this could be promptly reversed in the event of recurrent GI bleeding.   She should be out of the window of time for increased risk of bleeding in about another 5 days, and could be then transition to an oral agent. The alternative would be placement of an IVC filter. This requires yet another invasive procedure, with its particular set of risks. She would require anticoagulation at some point, regardless. We are in agreement that the risk;benefit ratio favors heparin anticoagulation now, and transition to an oral agent next week.

## 2022-12-14 NOTE — PROGRESS NOTES
Occupational Therapy  Facility/Department: [facility] ICU  Daily Treatment Note  NAME: Scott Davis  : 1955  MRN: [id number]    Date of Service: 2022    Discharge Recommendations: Scott Davis scored a 15/24 on the AM-PAC ADL Inpatient form. Current research shows that an AM-PAC score of 17 or less is typically not associated with a discharge to the patient's home setting. Based on the patient's AM-PAC score and their current ADL deficits, it is recommended that the patient have 5-7 sessions per week of Occupational Therapy at d/c to increase the patient's independence. At this time, this patient demonstrates complex nursing, medical, and rehabilitative needs, and would benefit from intensive rehabilitation services upon discharge from the Inpatient setting. This patient demonstrates the ability to participate in and benefit from an intensive therapy program with a coordinated interdisciplinary team approach to foster frequent, structured, and documented communication among disciplines, who will work together to establish, prioritize, and achieve treatment goals. Please see assessment section for further patient specific details. If patient discharges prior to next session this note will serve as a discharge summary. Please see below for the latest assessment towards goals. OT Equipment Recommendations  Equipment Needed: No  Other: defer to next level of care    Patient Diagnosis(es): Diagnoses of Acoustic neuroma [facility]), Balance problem, Meningioma ([facility] [address] and Dizziness were pertinent to this visit. Assessment    Assessment: Pt tolerated treatment well but continues to be limited by decreased ax endurance. Pt required Min x2 to stand from recliner and BSC, and ambulated short distance with Min x1 and RW. Pt required Max A with toileting this date for front and rear cas care. Pt performed sit to supine with Mod+ Min A for trunk control and BLE assist due to fatigue.  Pt with brief episode of dizziness when seated on bedside commode that resolved with rest. Recommend continued intensive inpatient OT at IN to maximize return of functional and independence as pt was previously living alone and highly independent. Will cont to follow on OT POC. Activity Tolerance: Patient tolerated treatment well;Patient limited by fatigue;Patient limited by endurance (Pt endorsing dizziness on commode, reports it resolved with time)  Equipment Needed: No  Other: defer to next level of care      Plan   Occupational Therapy Plan  Times Per Week: 5-7  Times Per Day: Once a day  Current Treatment Recommendations: Strengthening;ROM;Cognitive reorientation;Balance training;Pain management;Self-Care / ADL; Functional mobility training; Safety education & training;Home management training; Endurance training;Neuromuscular re-education;Positioning     Restrictions  Position Activity Restriction  Other position/activity restrictions: Up with assistance, ambulate the patient, up in the chair, HOB elevated to 30 degrees    Subjective   Subjective  Subjective: Pt reclined in chair upon OT/PT arrival, agreeable to therapy and requesting to get back to bed. Pain: \"normal back pain\"  Orientation  Overall Orientation Status: Within Functional Limits  Orientation Level: Oriented X4  Cognition  Overall Cognitive Status: Exceptions  Following Commands:  Follows one step commands consistently  Attention Span: Attends with cues to redirect  Safety Judgement: Decreased awareness of need for safety;Decreased awareness of need for assistance  Problem Solving: Assistance required to generate solutions;Assistance required to correct errors made;Assistance required to implement solutions  Insights: Decreased awareness of deficits  Initiation: Requires cues for some  Sequencing: Requires cues for some        Objective    Vitals  BP: 141/71 seated on commode    Bed Mobility Training  Bed Mobility Training: Yes  Rolling: Contact-guard assistance (to place pad under pt)  Sit to Supine: Moderate assistance;Minimum assistance;Assist X2 (Mod + Min BLE and trunk assist)    Balance  Sitting: Intact (SBA at EOB, on commode)  Sitting - Static: Good (unsupported)  Sitting - Dynamic: Fair (occasional)  Standing: With support (Min x1 and BUE support via RW)    Transfer Training  Transfer Training: Yes  Interventions: Verbal cues  Sit to Stand: Minimum assistance;Assist X2 (from recliner, multiple attempts required to achieve full stance; cues for and placement)  Stand to Sit: Assist X2;Minimum assistance  Toilet Transfer: Minimum assistance;Assist X2 (ambulating to [facility])    Gait  Overall Level of Assistance: Minimum assistance; Additional time; Adaptive equipment;Assist X1 (with RW, short ambulation from recliner>BSC>EOB)  Interventions: Verbal cues; Safety awareness training  Speed/Bev: Slow;Shuffled  Assistive Device: Gait belt;Walker, rolling    ADL  Toileting: Maximum assistance  Toileting Skilled Clinical Factors: Pt sat on BSC for extended time, reporting fatigue and dizziness; bloody stool- RN aware and notified MD. Pt performed rear cas-care with assist required for thoroughness and brief management    Safety Devices  Type of Devices: Bed alarm in place; Left in bed;Call light within reach;Nurse notified;Gait belt (RN present in room)  Restraints  Restraints Initially in Place: No     Patient Education  Education Given To: Patient  Education Provided: Transfer Training;Energy Conservation;Plan of Care  Education Method: Verbal  Barriers to Learning: Hearing  Education Outcome: Verbalized understanding;Continued education needed    Goals  Short Term Goals  Time Frame for Short Term Goals: by discharge  Short Term Goal 1: Pt will perform bed mobility with CGA to decrease caregiver burden- goal met 12/2.  NEW GOAL: Pt will perform bed mobility with supervision- not met  Short Term Goal 2: Pt will sit EOB 5 mins with no more than CGA-  goal met 12/8 on BSC. NEW GOAL: sit unsupported 5 mins with Supervision- not met  Short Term Goal 3: Pt will perform grooming activity with Min A and set-up- goal met 12/1. New Goal: Pt will perform grooming ax with set-up and supervision- not met  Short Term Goal 4: Pt will tolerate sit to stand transfer- goal met 12/1. NEW GOAL: sit to stand transfer with Mod x2- goal met 12/2 with Min +CGA and CGA x2; NEW GOAL: stance x2 mins with CGA- not met  Patient Goals   Patient goals : not stated       Therapy Time   Individual Concurrent Group Co-treatment   Time In 1422         Time Out 1500         Minutes 38         Timed Code Treatment Minutes: [address], S/OT  Therapist was present, directed the patient's care, made skilled judgement, and was responsible for assessment and treatment of the patient.    Chelsea Nino, OTR/L

## 2022-12-15 ENCOUNTER — ANESTHESIA EVENT (OUTPATIENT)
Dept: ENDOSCOPY | Age: 67
End: 2022-12-15
Payer: MEDICARE

## 2022-12-15 LAB
ANION GAP SERPL CALCULATED.3IONS-SCNC: 8 MMOL/L (ref 3–16)
ANTI-XA UNFRAC HEPARIN: 0.52 IU/ML (ref 0.3–0.7)
ANTI-XA UNFRAC HEPARIN: 0.7 IU/ML (ref 0.3–0.7)
ANTI-XA UNFRAC HEPARIN: 0.81 IU/ML (ref 0.3–0.7)
BASOPHILS ABSOLUTE: 0 K/UL (ref 0–0.2)
BASOPHILS RELATIVE PERCENT: 0.5 %
BLOOD BANK DISPENSE STATUS: NORMAL
BLOOD BANK PRODUCT CODE: NORMAL
BPU ID: NORMAL
BUN BLDV-MCNC: 8 MG/DL (ref 7–20)
CALCIUM SERPL-MCNC: 8.2 MG/DL (ref 8.3–10.6)
CHLORIDE BLD-SCNC: 106 MMOL/L (ref 99–110)
CO2: 25 MMOL/L (ref 21–32)
CREAT SERPL-MCNC: <0.5 MG/DL (ref 0.6–1.2)
DESCRIPTION BLOOD BANK: NORMAL
EOSINOPHILS ABSOLUTE: 0.3 K/UL (ref 0–0.6)
EOSINOPHILS RELATIVE PERCENT: 6 %
GFR SERPL CREATININE-BSD FRML MDRD: >60 ML/MIN/{1.73_M2}
GLUCOSE BLD-MCNC: 135 MG/DL (ref 70–99)
GLUCOSE BLD-MCNC: 149 MG/DL (ref 70–99)
GLUCOSE BLD-MCNC: 160 MG/DL (ref 70–99)
GLUCOSE BLD-MCNC: 160 MG/DL (ref 70–99)
GLUCOSE BLD-MCNC: 161 MG/DL (ref 70–99)
HCT VFR BLD CALC: 22.9 % (ref 36–48)
HCT VFR BLD CALC: 24.3 % (ref 36–48)
HEMOGLOBIN: 7.9 G/DL (ref 12–16)
HEMOGLOBIN: 8.2 G/DL (ref 12–16)
LYMPHOCYTES ABSOLUTE: 1.1 K/UL (ref 1–5.1)
LYMPHOCYTES RELATIVE PERCENT: 23.7 %
MCH RBC QN AUTO: 31 PG (ref 26–34)
MCHC RBC AUTO-ENTMCNC: 33.5 G/DL (ref 31–36)
MCV RBC AUTO: 92.4 FL (ref 80–100)
MONOCYTES ABSOLUTE: 0.4 K/UL (ref 0–1.3)
MONOCYTES RELATIVE PERCENT: 7.7 %
NEUTROPHILS ABSOLUTE: 2.9 K/UL (ref 1.7–7.7)
NEUTROPHILS RELATIVE PERCENT: 62.1 %
PDW BLD-RTO: 16.3 % (ref 12.4–15.4)
PERFORMED ON: ABNORMAL
PLATELET # BLD: 171 K/UL (ref 135–450)
PMV BLD AUTO: 8.1 FL (ref 5–10.5)
POTASSIUM SERPL-SCNC: 3.2 MMOL/L (ref 3.5–5.1)
RBC # BLD: 2.63 M/UL (ref 4–5.2)
SODIUM BLD-SCNC: 139 MMOL/L (ref 136–145)
WBC # BLD: 4.6 K/UL (ref 4–11)

## 2022-12-15 PROCEDURE — C9113 INJ PANTOPRAZOLE SODIUM, VIA: HCPCS | Performed by: NURSE PRACTITIONER

## 2022-12-15 PROCEDURE — 2580000003 HC RX 258

## 2022-12-15 PROCEDURE — 6370000000 HC RX 637 (ALT 250 FOR IP): Performed by: INTERNAL MEDICINE

## 2022-12-15 PROCEDURE — 99232 SBSQ HOSP IP/OBS MODERATE 35: CPT | Performed by: PHYSICAL MEDICINE & REHABILITATION

## 2022-12-15 PROCEDURE — 6370000000 HC RX 637 (ALT 250 FOR IP): Performed by: NURSE PRACTITIONER

## 2022-12-15 PROCEDURE — 92507 TX SP LANG VOICE COMM INDIV: CPT

## 2022-12-15 PROCEDURE — 6360000002 HC RX W HCPCS: Performed by: NURSE PRACTITIONER

## 2022-12-15 PROCEDURE — 99232 SBSQ HOSP IP/OBS MODERATE 35: CPT | Performed by: INTERNAL MEDICINE

## 2022-12-15 PROCEDURE — 97535 SELF CARE MNGMENT TRAINING: CPT

## 2022-12-15 PROCEDURE — 85025 COMPLETE CBC W/AUTO DIFF WBC: CPT

## 2022-12-15 PROCEDURE — 97530 THERAPEUTIC ACTIVITIES: CPT

## 2022-12-15 PROCEDURE — 97116 GAIT TRAINING THERAPY: CPT

## 2022-12-15 PROCEDURE — 6370000000 HC RX 637 (ALT 250 FOR IP)

## 2022-12-15 PROCEDURE — 85520 HEPARIN ASSAY: CPT

## 2022-12-15 PROCEDURE — 1200000000 HC SEMI PRIVATE

## 2022-12-15 PROCEDURE — 92526 ORAL FUNCTION THERAPY: CPT

## 2022-12-15 PROCEDURE — 85014 HEMATOCRIT: CPT

## 2022-12-15 PROCEDURE — 6360000002 HC RX W HCPCS

## 2022-12-15 PROCEDURE — 80048 BASIC METABOLIC PNL TOTAL CA: CPT

## 2022-12-15 PROCEDURE — 85018 HEMOGLOBIN: CPT

## 2022-12-15 PROCEDURE — 36415 COLL VENOUS BLD VENIPUNCTURE: CPT

## 2022-12-15 RX ORDER — POTASSIUM CHLORIDE 20 MEQ/1
20 TABLET, EXTENDED RELEASE ORAL 2 TIMES DAILY WITH MEALS
Status: DISCONTINUED | OUTPATIENT
Start: 2022-12-15 | End: 2022-12-22 | Stop reason: HOSPADM

## 2022-12-15 RX ADMIN — POLYVINYL ALCOHOL 2 DROP: 14 SOLUTION/ DROPS OPHTHALMIC at 17:51

## 2022-12-15 RX ADMIN — PANTOPRAZOLE SODIUM 40 MG: 40 INJECTION, POWDER, LYOPHILIZED, FOR SOLUTION INTRAVENOUS at 20:26

## 2022-12-15 RX ADMIN — HEPARIN SODIUM 13 UNITS/KG/HR: 10000 INJECTION, SOLUTION INTRAVENOUS at 03:28

## 2022-12-15 RX ADMIN — POLYVINYL ALCOHOL 2 DROP: 14 SOLUTION/ DROPS OPHTHALMIC at 16:02

## 2022-12-15 RX ADMIN — POLYVINYL ALCOHOL 2 DROP: 14 SOLUTION/ DROPS OPHTHALMIC at 14:19

## 2022-12-15 RX ADMIN — POLYVINYL ALCOHOL 2 DROP: 14 SOLUTION/ DROPS OPHTHALMIC at 02:00

## 2022-12-15 RX ADMIN — POLYVINYL ALCOHOL 2 DROP: 14 SOLUTION/ DROPS OPHTHALMIC at 11:58

## 2022-12-15 RX ADMIN — INSULIN GLARGINE 15 UNITS: 100 INJECTION, SOLUTION SUBCUTANEOUS at 20:28

## 2022-12-15 RX ADMIN — POTASSIUM BICARBONATE 40 MEQ: 782 TABLET, EFFERVESCENT ORAL at 02:44

## 2022-12-15 RX ADMIN — HEPARIN SODIUM 11 UNITS/KG/HR: 10000 INJECTION, SOLUTION INTRAVENOUS at 17:50

## 2022-12-15 RX ADMIN — Medication 5 MG: at 20:26

## 2022-12-15 RX ADMIN — POLYVINYL ALCOHOL 2 DROP: 14 SOLUTION/ DROPS OPHTHALMIC at 06:06

## 2022-12-15 RX ADMIN — PANTOPRAZOLE SODIUM 40 MG: 40 INJECTION, POWDER, LYOPHILIZED, FOR SOLUTION INTRAVENOUS at 08:38

## 2022-12-15 RX ADMIN — METHIMAZOLE 10 MG: 5 TABLET ORAL at 08:38

## 2022-12-15 RX ADMIN — LEVETIRACETAM 500 MG: 100 INJECTION, SOLUTION INTRAVENOUS at 22:29

## 2022-12-15 RX ADMIN — CETIRIZINE HYDROCHLORIDE 10 MG: 10 TABLET, FILM COATED ORAL at 08:38

## 2022-12-15 RX ADMIN — POLYVINYL ALCOHOL 2 DROP: 14 SOLUTION/ DROPS OPHTHALMIC at 09:41

## 2022-12-15 RX ADMIN — POLYVINYL ALCOHOL 2 DROP: 14 SOLUTION/ DROPS OPHTHALMIC at 20:27

## 2022-12-15 RX ADMIN — SODIUM CHLORIDE, PRESERVATIVE FREE 10 ML: 5 INJECTION INTRAVENOUS at 20:35

## 2022-12-15 RX ADMIN — SODIUM CHLORIDE, PRESERVATIVE FREE 10 ML: 5 INJECTION INTRAVENOUS at 08:38

## 2022-12-15 RX ADMIN — TETRAHYDROZOLINE HCL 0.05% 1 DROP: 0.05 SOLUTION/ DROPS INTRAOCULAR at 20:26

## 2022-12-15 RX ADMIN — POLYVINYL ALCOHOL 2 DROP: 14 SOLUTION/ DROPS OPHTHALMIC at 22:30

## 2022-12-15 RX ADMIN — TETRAHYDROZOLINE HCL 0.05% 1 DROP: 0.05 SOLUTION/ DROPS INTRAOCULAR at 08:38

## 2022-12-15 RX ADMIN — POLYVINYL ALCOHOL 2 DROP: 14 SOLUTION/ DROPS OPHTHALMIC at 00:29

## 2022-12-15 RX ADMIN — LEVETIRACETAM 500 MG: 100 INJECTION, SOLUTION INTRAVENOUS at 09:42

## 2022-12-15 RX ADMIN — TETRAHYDROZOLINE HCL 0.05% 1 DROP: 0.05 SOLUTION/ DROPS INTRAOCULAR at 14:19

## 2022-12-15 RX ADMIN — LATANOPROST 1 DROP: 50 SOLUTION OPHTHALMIC at 20:28

## 2022-12-15 RX ADMIN — POLYVINYL ALCOHOL 2 DROP: 14 SOLUTION/ DROPS OPHTHALMIC at 08:38

## 2022-12-15 RX ADMIN — POLYVINYL ALCOHOL 2 DROP: 14 SOLUTION/ DROPS OPHTHALMIC at 04:00

## 2022-12-15 RX ADMIN — POTASSIUM CHLORIDE 20 MEQ: 20 TABLET, EXTENDED RELEASE ORAL at 17:49

## 2022-12-15 ASSESSMENT — PAIN SCALES - GENERAL
PAINLEVEL_OUTOF10: 0

## 2022-12-15 NOTE — PROGRESS NOTES
CC: Pulmonary embolism    Feeling better today. She was able to walk to the doorway, and sat up longer in her chair. Afebrile  /65. NSR. O2 saturation 98%, room air  Heart sounds normal.  Breath sounds normal  Nurses report melanotic stool on 2 occasions. Hemoglobin over the past 24 hours has been fairly stable, between 7.9 g and 8.2 g    A&P: Evidence of slight GI bleeding, although not significant enough to drop hemoglobin counts. As discussed with Dr. Floyd Li, this is not sufficient to discontinue heparin therapy. He plans EGD to look for site of bleeding and consider appropriate treatment.

## 2022-12-15 NOTE — PROGRESS NOTES
Assessment  Giant duodenal ulcer, 3 cm, that was bleeding with visible vessel 12/11 at EGD status post epinephrine, Endo Clip and humeral spray. 12/12 she had GDA embolization by IR and did well until today, she had medium size dark bowel movement after starting heparin yesterday. Acute blood loss anemia related to ulcer above. Extensive bilateral pulmonary emboli noted on CTPA, prompting need for anticoagulation. Plan: We will plan to continue heparin drip for now and watch for further bleeding. If the bleeding seems to be accelerating, we will stop the heparin. If there is minimal or no further bleeding, we will proceed with EGD tomorrow ON anticoagulation so that I can see where the bleeding might be happening to risk stratify. If need be, we would bring her back on Saturday to treat that area. Will make her n.p.o. now except for sips and pills. I'm on call overnight. Ok to send me page through [employer]. Subjective: We are following for acute blood loss anemia basis of large duodenal ulcer. she was started yesterday on heparin drip. Last night, he had a small amount of black stool and this morning, had a medium sized black bowel movement. She continues to deny abdominal pain. She did have breakfast this morning. Hemoglobin today is 8.2, similar to yesterday but did have a unit of blood a couple days ago, for a grand total of 3 I believe. Objective:    Review of Systems:    Constitutional: Negative for fever, chills, and unexpected weight change. HENT: Negative for trouble swallowing. Respiratory: Negative for cough, chest tightness and shortness of breath. Cardiovascular: Negative for chest pain  Gastrointestinal: see HPI  Musculoskeletal: Negative for unusual arthralgias. Skin: Negative for rash.      Scheduled Meds:   pantoprazole  40 mg IntraVENous BID    levETIRAcetam  500 mg IntraVENous Q12H    cetirizine  10 mg Oral Daily    [Held by provider] hydroCHLOROthiazide  25 mg Oral Daily    insulin lispro  0-16 Units SubCUTAneous TID WC    insulin lispro  0-4 Units SubCUTAneous Nightly    polyvinyl alcohol  2 drop Right Eye Q2H    methIMAzole  10 mg Oral Daily    insulin glargine  15 Units SubCUTAneous Nightly    melatonin  5 mg Oral Nightly    sodium chloride flush  5-40 mL IntraVENous 2 times per day    polyethylene glycol  17 g Oral Daily    tetrahydrozoline  1 drop Both Eyes TID    [Held by provider] carvedilol  12.5 mg Oral BID WC    [Held by provider] losartan  100 mg Oral Daily    sodium chloride flush  5-40 mL IntraVENous 2 times per day    latanoprost  1 drop Both Eyes Nightly     Continuous Infusions:   heparin (PORCINE) Infusion 11 Units/kg/hr (12/15/22 0711)    sodium chloride      sodium chloride      dextrose         Vitals:  /74   Pulse 74   Temp 98 °F (36.7 °C) (Oral)   Resp 16   Ht 5' 5\" (1.651 m)   Wt (!) 355 lb 1.6 oz (161.1 kg)   SpO2 98%   BMI 59.09 kg/m²     Exam:  General:  comfortable  Heent: There is no scleral icterus. Cardiovascular: The heart is regular rate and rhythm. Respiratory:  The patient's breathing is non-labored with normal chest wall excursion and normal muscle movement. Abdomen: The abdomen is nondistended, soft, and nontender. Rectal:  deferred   Neurological:  Gross memory appears intact. Patient is alert and oriented. Labs and Imaging:  I reviewed the labs and imaging results from last 24 hours. Recent Labs     12/13/22  0428 12/13/22  1004 12/14/22  0415 12/14/22  1023 12/14/22  1420 12/14/22  2231 12/15/22  0612   HGB 8.1*   < > 7.5* 8.1* 7.9* 8.2* 8.2*   WBC 6.5  --  5.3  --   --   --  4.6    < > = values in this interval not displayed.         Janet Elizabeth MD  December 15, 2022

## 2022-12-15 NOTE — CARE COORDINATION
Case management is following for discharge planning. The chart was reviewed. PT AM-PAC: 14 / 24 per last evaluation on: 12/15    OT AM-PAC: 15 / 24 per last evaluation on: 12/15    DME Needs for discharge: deferred    Case Management Notes: The plan is to go to [facility] when medically ready. No pre-cert needed. The pt has traditional Medicare. Fiorella Maldonado and her family were provided with choice of provider; she and her family are in agreement with the discharge plan.       Mouna Horne RN  [facility]  Case Management Department  [phone]

## 2022-12-15 NOTE — PLAN OF CARE
Problem: Chronic Conditions and Co-morbidities  Goal: Patient's chronic conditions and co-morbidity symptoms are monitored and maintained or improved  12/15/2022 0850 by Warren Karimi RN  Outcome: Progressing  12/15/2022 0744 by Charlie Jimenez RN  Outcome: Progressing     Problem: Discharge Planning  Goal: Discharge to home or other facility with appropriate resources  12/15/2022 0850 by Warren Karimi RN  Outcome: Progressing  12/15/2022 0744 by Charlie Jimenez RN  Outcome: Progressing     Problem: Pain  Goal: Verbalizes/displays adequate comfort level or baseline comfort level  12/15/2022 0850 by Warren Karimi RN  Outcome: Progressing  12/15/2022 0744 by Charlie Jimenez RN  Outcome: Progressing  Flowsheets (Taken 12/13/2022 2000 by Rigo Ramos RN)  Verbalizes/displays adequate comfort level or baseline comfort level:   Encourage patient to monitor pain and request assistance   Assess pain using appropriate pain scale   Administer analgesics based on type and severity of pain and evaluate response     Problem: Safety - Adult  Goal: Free from fall injury  12/15/2022 0850 by Warren Karimi RN  Outcome: Progressing  12/15/2022 0744 by Charlie Jimenez RN  Outcome: Progressing     Problem: Skin/Tissue Integrity  Goal: Absence of new skin breakdown  Description: 1. Monitor for areas of redness and/or skin breakdown  2. Assess vascular access sites hourly  3. Every 4-6 hours minimum:  Change oxygen saturation probe site  4. Every 4-6 hours:  If on nasal continuous positive airway pressure, respiratory therapy assess nares and determine need for appliance change or resting period.   12/15/2022 0850 by Warren Karimi RN  Outcome: Progressing  12/15/2022 0744 by Charlie Jimenez RN  Outcome: Progressing     Problem: ABCDS Injury Assessment  Goal: Absence of physical injury  12/15/2022 0850 by Warren Karimi RN  Outcome: Progressing  12/15/2022 0744 by Veronica aMrcial ILDA HAGER  Outcome: Progressing     Problem: Nutrition Deficit:  Goal: Optimize nutritional status  12/15/2022 0850 by Keyanna Rodriguez RN  Outcome: Progressing  12/15/2022 0744 by Angelica Izquierdo RN  Outcome: Progressing  Flowsheets (Taken 12/12/2022 5667 by Lars Garvin RD)  Nutrient intake appropriate for improving, restoring, or maintaining nutritional needs:   Assess nutritional status and recommend course of action   Monitor oral intake, labs, and treatment plans     Problem: Neurosensory - Adult  Goal: Absence of seizures  12/15/2022 0850 by Keyanna Rodriguez RN  Outcome: Progressing  12/15/2022 0744 by Angelica Izquierdo RN  Outcome: Progressing  Goal: Remains free of injury related to seizures activity  12/15/2022 0850 by Keyanna Rodriguez RN  Outcome: Progressing  12/15/2022 0744 by Angelica Izquierdo RN  Outcome: Progressing  Goal: Achieves maximal functionality and self care  12/15/2022 0850 by Keyanna Rodriguez RN  Outcome: Progressing  12/15/2022 0744 by Angelica Izquierdo RN  Outcome: Progressing     Problem: Respiratory - Adult  Goal: Achieves optimal ventilation and oxygenation  12/15/2022 0850 by Keyanna Rodriguez RN  Outcome: Progressing  12/15/2022 0744 by Angelica Izquierdo RN  Outcome: Progressing     Problem: Cardiovascular - Adult  Goal: Maintains optimal cardiac output and hemodynamic stability  12/15/2022 0850 by Keyanna Rodriguez RN  Outcome: Progressing  12/15/2022 0744 by Angelica Izquierdo RN  Outcome: Progressing  Goal: Absence of cardiac dysrhythmias or at baseline  12/15/2022 0850 by Keyanna Rodriguez RN  Outcome: Progressing  12/15/2022 0744 by Angelica Izquierdo RN  Outcome: Progressing     Problem: Skin/Tissue Integrity - Adult  Goal: Skin integrity remains intact  12/15/2022 0850 by Keyanna Rodriguez RN  Outcome: Progressing  12/15/2022 0744 by Angelica Izquierdo RN  Outcome: Progressing  Goal: Incisions, wounds, or drain sites healing without S/S of infection  12/15/2022 0850 by Juanito Smith RN  Outcome: Progressing  12/15/2022 0744 by Maggy Olguin RN  Outcome: Progressing  Goal: Oral mucous membranes remain intact  12/15/2022 0850 by Juanito Smith RN  Outcome: Progressing  12/15/2022 0744 by Maggy Olguin RN  Outcome: Progressing     Problem: Musculoskeletal - Adult  Goal: Return mobility to safest level of function  12/15/2022 0850 by Juaniot Smith RN  Outcome: Progressing  12/15/2022 0744 by Maggy Olguin RN  Outcome: Progressing  Goal: Maintain proper alignment of affected body part  12/15/2022 0850 by Juanito Smith RN  Outcome: Progressing  12/15/2022 0744 by Maggy Olguin RN  Outcome: Progressing  Goal: Return ADL status to a safe level of function  12/15/2022 0850 by Juanito Smtih RN  Outcome: Progressing  12/15/2022 0744 by Maggy Olguin RN  Outcome: Progressing     Problem: Gastrointestinal - Adult  Goal: Minimal or absence of nausea and vomiting  12/15/2022 0850 by Juanito Smith RN  Outcome: Progressing  12/15/2022 0744 by Maggy Olguin RN  Outcome: Progressing  Goal: Maintains or returns to baseline bowel function  12/15/2022 0850 by Juanito Smith RN  Outcome: Progressing  12/15/2022 0744 by Maggy Olguin RN  Outcome: Progressing  Flowsheets (Taken 12/13/2022 2000 by Leonardo Kovacs RN)  Maintains or returns to baseline bowel function:   Assess bowel function   Encourage oral fluids to ensure adequate hydration   Administer IV fluids as ordered to ensure adequate hydration  Goal: Maintains adequate nutritional intake  12/15/2022 0850 by Juanito Smith RN  Outcome: Progressing  12/15/2022 0744 by Maggy Olguin RN  Outcome: Progressing     Problem: Genitourinary - Adult  Goal: Absence of urinary retention  12/15/2022 0850 by Juanito Smith RN  Outcome: Progressing  12/15/2022 0744 by Maggy Olguin RN  Outcome: Progressing     Problem: Infection -

## 2022-12-15 NOTE — PROGRESS NOTES
Occupational Therapy  Facility/Department: [facility] ICU  Daily Treatment Note  NAME: Calvin Palacios  : 1955  MRN: [id number]    Date of Service: 12/15/2022    Discharge Recommendations: Calvin Palacios scored a 15/24 on the AM-PAC ADL Inpatient form. Current research shows that an AM-PAC score of 17 or less is typically not associated with a discharge to the patient's home setting. Based on the patient's AM-PAC score and their current ADL deficits, it is recommended that the patient have 5-7 sessions per week of Occupational Therapy at d/c to increase the patient's independence. At this time, this patient demonstrates complex nursing, medical, and rehabilitative needs, and would benefit from intensive rehabilitation services upon discharge from the Inpatient setting. This patient demonstrates the ability to participate in and benefit from an intensive therapy program with a coordinated interdisciplinary team approach to foster frequent, structured, and documented communication among disciplines, who will work together to establish, prioritize, and achieve treatment goals. Please see assessment section for further patient specific details. If patient discharges prior to next session this note will serve as a discharge summary. Please see below for the latest assessment towards goals. OT Equipment Recommendations  Other: defer to next level of care      Patient Diagnosis(es): Diagnoses of Acoustic neuroma [facility]), Balance problem, Meningioma ([facility] [address] and Dizziness were pertinent to this visit. Assessment    Assessment: Pt showed great improvement this date with overal activity endurance and fx mobility. Pt performed bed mobility with SBA, transfers with min A x1, and fx mobility (~20 feet) from chair to door with CGA-min A with RW. Per RN, pt was up for breakfast, then took a nap prior to working with therapy. Believe this schedule benefited pt and increased her overal energy.  Pt had dark stool during BM, MD an RN in room and aware. Per medical team, plan is for possible EGD tomorrow. Pt would continue to benefit from ongoing intensive inpatient therapy services at discharge and ongoing medical oversight once pt is medically ready for discharge. Will continue to follow on acute OT POC. Activity Tolerance: Patient tolerated treatment well  Other: defer to next level of care      Plan   Occupational Therapy Plan  Times Per Week: 5-7  Times Per Day: Once a day  Current Treatment Recommendations: Strengthening;ROM;Cognitive reorientation;Balance training;Pain management;Self-Care / ADL; Functional mobility training; Safety education & training;Home management training; Endurance training;Neuromuscular re-education;Positioning     Restrictions   Up with assistance    Subjective   Subjective  Subjective: Pt lying supine in bed upon OT arrival and agreeable to OT treatment session. Pt stating, \"I want to do this and I want to get out of here\". Pt is good spirits throughout session. Pain: \"normal back pain\"  Orientation  Overall Orientation Status: Within Functional Limits  Orientation Level: Oriented X4  Cognition  Arousal/Alertness: Appropriate responses to stimuli  Following Commands:  Follows one step commands consistently  Attention Span: Appears intact  Safety Judgement: Good awareness of safety precautions  Insights: Decreased awareness of deficits  Initiation: Requires cues for some  Sequencing: Requires cues for some        Objective         Bed Mobility Training  Bed Mobility Training: Yes  Rolling: Contact-guard assistance  Supine to Sit: Stand-by assistance (HOB elevated; use of rail; pt can only get out of bed on her Left side (even at baseline))  Scooting: Stand-by assistance  Balance  Sitting: Intact  Sitting - Static: Good (unsupported)  Sitting - Dynamic: Good (unsupported)  Standing: With support (use fo 2WW~ CGA-min A)  Transfer Training  Transfer Training: Yes  Overall Level of Assistance: Minimum assistance  Interventions: Verbal cues  Sit to Stand: Minimum assistance;Assist X1  Stand to Sit: Minimum assistance;Assist X1  Bed to Chair: Minimum assistance;Assist X1  Toilet Transfer: Minimum assistance;Assist X1 (to bedside commode near bed)  Gait  Overall Level of Assistance: Minimum assistance;Contact-guard assistance;Assist X1  Interventions: Verbal cues; Safety awareness training  Speed/Bev: Slow;Shuffled  Distance (ft):  (Pt ambulated from bedside commode to recliner; then from recliner to doorway; chair follow was implemented and pt sat in chair after ~20 ft walk and was repositioned in room 2/2 fatigue)  Assistive Device: Gait belt;Walker, rolling     ADL  Feeding: Setup; Beverage management  Feeding Skilled Clinical Factors: however, during session pt was made NPO  Grooming: Minimal assistance  Grooming Skilled Clinical Factors: min A this date for grooming 2/2 pt with inability to feel or notice gunk around R eye  LE Dressing: Maximum assistance  LE Dressing Skilled Clinical Factors: max A to don/doff brief  Toileting: Maximum assistance  Toileting Skilled Clinical Factors: Pt attempted rear cas care but was unsuccessful; pt also attempted front cas care with 50% success; pt was provided maximal A for thoroughness following bM        Safety Devices  Type of Devices: Call light within reach;Nurse notified;Gait belt; Chair alarm in place; Left in chair  Restraints  Restraints Initially in Place: No     Patient Education  Education Given To: Patient  Education Provided: Transfer Training;Energy Conservation;Plan of Care  Education Method: Verbal  Barriers to Learning: Hearing (R ear deafness)  Education Outcome: Verbalized understanding;Continued education needed    Goals  Short Term Goals  Time Frame for Short Term Goals: by discharge  Short Term Goal 1: Pt will perform bed mobility with supervision- not met  Short Term Goal 2: Pt will sit unsupported 5 mins with Supervision- not met  Short Term Goal 3: Pt will perform grooming ax with set-up and supervision- not met  Short Term Goal 4: Pt will tolerate stance x2 mins with CGA- goal met 12/15; New goal: pt will perform fx mobility to/from bathroom with CGA  Patient Goals   Patient goals : not stated       Therapy Time   Individual Concurrent Group Co-treatment   Time In 1123         Time Out 1203         Minutes 40         Timed Code Treatment Minutes: [address], OT

## 2022-12-15 NOTE — PROGRESS NOTES
Speech Language Pathology  Facility/Department: [facility] ICU  Daily Speech/Cognitive Treatment Note    NAME: Korey Lawson  : 1955  MRN: [id number]    Patient Diagnosis(es):   Patient Active Problem List    Diagnosis Date Noted    Multiple subsegmental pulmonary emboli without [facility] 2022    S/P craniotomy 2022    Acoustic neuroma ([facility] [address] 2022    Cerebellopontine angle meningioma ([facility] 2022    Osteoarthritis of right hip 2021    Morbid obesity with BMI of 50.0-59.9, adult ([facility] 2021    Primary osteoarthritis of left knee 2015    Asthma 2015    Gastroesophageal reflux disease 2015    Adiposity 2015    S/P total knee arthroplasty 2015    Hypertension 2014     Allergies: Allergies   Allergen Reactions    Keflex [Cephalexin] Itching and Rash    Codeine Nausea And Vomiting    Tomato Rash       Prior MRI Brain: (2022)  Large right cerebellopontine angle mass as detailed, most likely representing a meningioma. See above for details. Additional smaller left anterior parafalcine extra-axial lesion, compatible with additional small meningioma. Recent CT Head: (2022)  1. Interval postsurgical changes from resection of right cerebellopontine angle mass with associated right temporal mastoid resection and frontotemporal craniotomy and fat graft placement. 2.  Thin hyperdensity along the fat graft may represent small amount of postsurgical blood products. There is also a small focus of subarachnoid hemorrhage along the right temporal lobe. 3.  Mild pneumocephalus and bilateral subgaleal fluid is also likely postsurgical     Chart reviewed. Pain: denies    Initial Assessment 22  Mild-moderate dysarthria characterized by blended word boundaries and imprecise articulation (~ 70% intelligbile short phrases in quiet environment). Benefits from slow pace and over-articulation strategies.  Vocal quality clear and strong. Patient able to follow simple directions and answer basic questions. No word finding difficulties noted at conversation level. Further assessment limited by patient fatigue. Recommend ongoing speech therapy to further assess and address. Medical diagnosis: Acoustic neuroma [facility]) [D33.3]  Balance problem [R26.89]  Meningioma ([facility] [address] [D32.9]  Dizziness [R42]  Cerebellopontine angle meningioma ([facility] [address] [D32.0]  Treatment diagnosis: dysarthria    Treatment:  Pt seen to address the following goals:  Goal 1: Patient will recall and implement strategies to improve speech clarity to 90% with min cues  12/5:  speech intelligibility is ~90%, however decreased in articulatory precision. pt educated to strategies for improved intelligibility and articulatory precision through verbal instruction and demonstration. Pt used strategies in structured sentence level tasks with min cues. In conversation pt required min-mod cues. Pt stated comprehension  Cont goal  12/6: Pt speech intelligibility was functional but articulatory precision of sounds (especially /s/, /sh/, and /b/) was mildly distorted with reduced awareness but able to correct given min-mod cues during rote tasks and conversation. Pt did self correct speech error in conversation x1 at end of session. Pt educated to exaggerate and open/move articulators well with speaking. Pt utilized even a slower rate following initial review. Cont goal  12/7: Pt able to recall all speech strategies given min cues. Pt demonstrated ~90% intelligibility in basic conversational tasks. Pt with increased use of over-articulation and breaking up 3-4 words at a time for clarity. Cont. 12/8: pt with good recall of all strategies for improved intelligibility of speech. Pt demonstrated use in structured sentence level tasks with min  cues, intelligibility 90%. In conversation, pt required mod cues to using pausing and over articulation.   Cont goal  12/9:  pt states, \"I am talking slower and enunciating. \"  Reviewed all strategies, pt stated comprehension. Pt utilized strategies in structured sentences with min cues and in conversation with min-mod cues. Intelligibility is ~90% with use of strategies  Goal met, cont to ensure consistency  12/14: Improved carry over of speech strategies in conversation. ~95% intelligible. X1 repetition required for listener comprehension. Cont. 12/15: Pt with increased fatigue expressed this AM, however able to express x2/3 speech strategies to use for improved clarity. 95% intelligible across all contexts. Goal met. Cont for carry over. Goal 2: Patient will participate in ongoing assessment of cognitive-communication skills. 12/5: pt oriented, able to state reason for hospitalization. Pt and family report no changes in cognition. Will cont to assess  Cont goal  12/6:  Pt oriented x3. 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 Pt able to recall 2/3 words following a 5 minute delay (3/3 with choices). Pt able to complete all basic math for money and time concepts including leaving a tip with no errors and minimal to no delays. Pt reported feeling tired so session ended earlier. Cont goal  12/7: SLP provided pt with prescription label and asked pt questions related. Pt answered with 100% accuracy and no cues required. Pt expressed manages own finances/medications/ADLs at baseline with independence. Pt mentioned she does feel close to cognitive baseline. Cont. 12/8: pt provided solutions to everyday problems, demonstrating appropriate flexibility of thoughts. Pt completed money/time concept tasks with 90% accuracy. Pt c/o not feeling well, therefore did not proceed with additional tasks  Cont goal  12/9: pt with good recall and understanding of information that MD has provided. Pt demonstrating good insight into deficits.    Cont goal  12/14: pt recalled ARU doctor coming to visit in regards to moving down to rehab unit, when medically appropriate. Adequate details provided and SLP cross checked with DO notes. Cont. 12/15: Pt answered medication management questions related to dosage etc with 100% accuracy. Slightly increased wait time required. Cont. Education:  Pt educated to purpose of visit and tasks presented and recommendation for ongoing treatment. Pt stated comprehension. Plan:  Continue speech/language therapy to address above goals, 3-5 x/week x LOS  DC recommendations: ongoing treatment indicated  D/W nursing: Trina  Needs met prior to leaving room, call button in reach.   Treatment time: 10      Electronically signed by:  Vanesa Dickinson M.A., [address] [place] [address]  Speech-Language Pathologist  Pg #: [phone]    If patient is discharged prior to next treatment, this note will serve as the discharge summary

## 2022-12-15 NOTE — PROGRESS NOTES
Progress Note  Physical Medicine and Rehabilitation    Patient: Bindu Mcfarland  [id number]  Date: 12/15/2022         Chief Complaint: Meningioma     History of Present Illness/Hospital Course:  Patient is a morbidly obese (Body mass index is 50.82 kg/m².), 79 y.o. female  with c/o dizziness, HA and vertigo in the setting of large right petroclival mass which is consistent with meningioma. . She presented to the hospital for a scheduled two staged petroclival meningioma resection. 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 Interval Hx: Doing better today. More alert and talkative. Seen in her bed this morning. Wanting to come to the ARU ASAP. Prior Level of Function:  Independent for mobility, ADLs, and IADLs     Current Level of Function:  Min assist     Pertinent Social History:  Support: Lives alone  Home set-up: One level house with 2 steps to enter    Past Medical History:   Diagnosis Date    Arthritis     Asthma     mild    Brain tumor ([address]     Diabetes mellitus ([address]     borderline    High cholesterol     Hypertension     Imbalance     DUE TO TUMOR- USING ANKLE BRACE / WALKER PER PREOP H&P    Loss of hearing     bilateral reported    Vertigo        Past Surgical History:   Procedure Laterality Date    CRANIOTOMY N/A 11/29/2022    STAGE II: RESECTION OF RIGHT PETROCLIVAL MENINGIOMA performed by Tu Hess MD at [facility] [address]    IR EMBOLIZATION HEMORRHAGE  12/12/2022    IR EMBOLIZATION HEMORRHAGE 12/12/2022 [facility] SPECIAL PROCEDURES    MASTOIDECTOMY Right 11/28/2022    RIGHT TRANSMASTOID / ANTERIOR MIDDLE CRANIAL FOSSA , ABDOMINAL FAT GRAFT EXTERNAL AUDITORY CANAL CLOSURE performed by Janis Lai MD at [place] [address] Right 11/28/2022    STAGE 1, RIGHT TRANSLABYRINTHINE / RETROLABYRINTHINE CRANIOTOMY, PETROSECTOMY performed by Haile Salinas.  Ishan Hess MD at [facility] Right 11/8/2021    RIGHT TOTAL HIP ARTHROPLASTY POSTERIOR APPROACH - JOSÉ ANTONIO ADVANCED performed by Lennox Ennis MD at [address] [facility], [facility] Right 07/14/2015    TOTAL KNEE ARTHROPLASTY Left 10/14/15    TKA    UPPER GASTROINTESTINAL ENDOSCOPY N/A 12/11/2022    EGD CONTROL HEMORRHAGE performed by Otto Mccabe MD at [address] ENDOSCOPY       Family History   Problem Relation Age of Onset    Cancer Mother     Hypotension Mother     Cancer Father     Cancer Sister     Hypotension Sister     Cancer Maternal Grandmother     Cancer Maternal Grandfather     Cancer Paternal Grandmother     Cancer Paternal Grandfather     Hypotension Other     Cancer Other     Diabetes Other     Osteoarthritis Other        Social History     Socioeconomic History    Marital status:      Spouse name: None    Number of children: None    Years of education: None    Highest education level: None   Tobacco Use    Smoking status: Former    Smokeless tobacco: Never   Substance and Sexual Activity    Alcohol use: No    Drug use: Never           REVIEW OF SYSTEMS:   CONSTITUTIONAL: negative for fevers, chills   EYES: positive for diplopia   HEENT: positive for difficulty swallowing. negative for hearing loss, tinnitus, sinus pressure, nasal congestion  RESPIRATORY: Negative for SOB, cough  CARDIOVASCULAR: negative for chest pain, palpitations  GASTROINTESTINAL: positive for abdominal pain.  negative for hematemesis, hematochezia, nausea, vomiting, diarrhea, abdominal pain  GENITOURINARY: negative for frequency, dysuria  HEMATOLOGIC/LYMPHATIC: negative for easy bruising, bleeding and lymphadenopathy  ENDOCRINE: negative for diabetic symptoms including polyuria, polydipsia  MUSCULOSKELETAL: negative for pain, joint swelling, decreased range of motion  NEUROLOGICAL: positive for difficulty speaking, negative for headaches, unilateral weakness    Physical Examination:  Vitals: Patient Vitals for the past 24 hrs:   BP Temp Temp src Pulse Resp SpO2   12/15/22 0800 (!) 148/68 98 °F (36.7 °C) Oral 71 16 98 %   12/15/22 0730 -- -- -- 67 -- --   12/15/22 0700 -- -- -- 65 14 --   12/15/22 0630 -- -- -- 70 17 --   12/15/22 0600 (!) 149/82 -- -- 69 18 --   12/15/22 0530 -- -- -- 70 17 --   12/15/22 0500 -- -- -- 67 19 --   12/15/22 0430 -- -- -- 71 18 --   12/15/22 0400 130/65 97.9 °F (36.6 °C) Temporal 67 17 --   12/15/22 0330 -- -- -- 69 17 --   12/15/22 0300 -- -- -- 70 13 --   12/15/22 0230 -- -- -- 68 16 --   12/15/22 0100 -- -- -- 69 17 --   12/15/22 0045 -- -- -- 69 16 --   12/15/22 0030 -- -- -- 69 17 --   12/15/22 0015 -- -- -- 71 18 --   12/15/22 0000 (!) 140/65 97.8 °F (36.6 °C) Temporal 69 16 --   12/14/22 2345 -- -- -- 73 14 --   12/14/22 2245 -- -- -- 84 11 94 %   12/14/22 2230 -- -- -- 70 -- 93 %   12/14/22 2215 -- -- -- 73 18 92 %   12/14/22 2200 -- -- -- 73 18 93 %   12/14/22 2145 -- -- -- 73 -- 94 %   12/14/22 2130 -- -- -- 72 -- 92 %   12/14/22 2115 -- -- -- 70 17 93 %   12/14/22 2100 (!) 134/58 -- -- 73 15 93 %   12/14/22 2045 -- -- -- 71 15 94 %   12/14/22 2030 -- -- -- 83 17 98 %   12/14/22 2000 139/63 98 °F (36.7 °C) Temporal 71 18 --   12/14/22 1945 -- -- -- 73 19 94 %   12/14/22 1930 -- -- -- 71 17 95 %   12/14/22 1915 -- -- -- 71 10 94 %   12/14/22 1900 126/62 -- -- -- -- --   12/14/22 1800 (!) 122/58 -- -- 71 17 --   12/14/22 1700 (!) 130/55 -- -- 70 17 --   12/14/22 1629 -- -- -- 73 14 97 %   12/14/22 1600 (!) 113/55 97.2 °F (36.2 °C) Oral 74 17 92 %   12/14/22 1500 (!) 151/65 -- -- 75 17 --       Const: Alert. WDWN. No distress, obese  Eyes: Conjunctiva noninjected, no icterus noted; pupils equal, round, and reactive to light. HENT: Bruising around R eye., normocephalic; Oral mucosa moist  CV: Regular rate and rhythm, no murmur rub or gallop noted  Resp: Lungs clear to auscultation bilaterally, no rales wheezes or ronchi, no retractions. Respirations unlabored. GI: Soft, mild tenderness, nondistended. Normal bowel sounds. Skin: Normal temperature and turgor. No rashes or breakdown noted. Ext: positive for BL LE edema   MSK: No joint tenderness, erythema, warmth noted. AROM intact. Neuro: Alert, oriented, appropriate. Mild R sided facial droop. Sensation intact to light touch. Motor examination reveals normal strength in all four limbs diffusely. Psych: Stable mood, normal judgement, normal affect     Lab Results   Component Value Date    WBC 4.6 12/15/2022    HGB 8.2 (L) 12/15/2022    HCT 24.3 (L) 12/15/2022    MCV 92.4 12/15/2022     12/15/2022     Lab Results   Component Value Date    INR 1.15 (H) 12/14/2022    INR 1.07 12/11/2022    INR 1.09 11/29/2022    PROTIME 14.6 (H) 12/14/2022    PROTIME 13.8 12/11/2022    PROTIME 14.1 11/29/2022     Lab Results   Component Value Date    CREATININE <0.5 (L) 12/15/2022    BUN 8 12/15/2022     12/15/2022    K 3.2 (L) 12/15/2022     12/15/2022    CO2 25 12/15/2022     Lab Results   Component Value Date    ALT 11 12/04/2022    AST 12 (L) 12/04/2022    ALKPHOS 48 12/04/2022    BILITOT 0.5 12/04/2022     On my review, CXR displays. MRI ABDOMEN W WO CONTRAST MRCP   Final Result      Limited study due to multiple factors, as outlined above. Lesion of the right hepatic lobe is most consistent with a hemangioma. Follow-up multiphase CT is recommended in 6 months. CT CHEST PULMONARY EMBOLISM W CONTRAST   Final Result      Large burden of pulmonary emboli without obvious heart strain of the exam is severely limited and difficult to evaluate for other pathology      Findings called to patient's floor nurse on 12/13/2022 at 7:09 PM      IR EMBOLIZATION HEMORRHAGE   Final Result   Impression: Technically successful superior mesenteric, common hepatic and gastroduodenal angiograms with subsequent coiling of the gastroduodenal artery. VL Extremity Venous Bilateral   Final Result      CTA ABDOMEN PELVIS W WO CONTRAST   Final Result      1.  Suspected bilateral pulmonary emboli but inadequate contrast phase to assess for the pulmonary embolus. Incomplete filling of the pulmonary arteries suggests pulmonary emboli inferiorly     2. No sign of any acute or active GI bleeding on CT angiograms studies   3. No sign of any aneurysm in the abdomen with normal widely patent vessels   4. Stable appearing partially calcified left adrenal adenoma. 5. Parapelvic renal cysts, largest on left side   6. 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 4.1 x 3.5 cm right ovarian cyst, benign in appearance   8. Mild cholelithiasis   9. No free fluid or free air with right abdominal wall subcutaneous inflammation or prior instrumentation         CT ABDOMEN PELVIS W WO CONTRAST Additional Contrast? Radiologist Recommendation   Final Result      1.  9.5 cm mass in the right hepatic lobe which does not appear to represent a hemangioma. Differential diagnosis would include primary or secondary malignancy. Liver mass protocol MRI with and without contrast is recommended. 2.  No intra-abdominal hemorrhage. 3.  Mild cholelithiasis. 4.  3.5 cm right ovarian cyst, the CT appearance suggests a simple cyst indicating a benign finding. MRI BRAIN W WO CONTRAST   Final Result      1. Postsurgical changes from right temporal and transmastoid craniotomy with large amount of fat packing in the defect and extracranial soft tissues. Large subcutaneous fluid collection is present in the scalp surrounding and extending superiorly from    the fat packing. No diffusion restriction to indicate superimposed infection. 2.  Residual meningioma in the right cerebellopontine angle, prepontine cistern, and right Meckel's cave/cavernous sinus. The tumor extends partially encasing the basilar artery   3. Small area of acute to subacute ischemia in the right brachium pontis.  Small enhancing lesions superior to the right postsurgical.               Assessment:  Giant Duodenal Ulcer  -Hgb 7.8, previously 12  -Transfuse pRBCs  -Protonix 40 mg BID  -IVF  -Embolization of GDA  -GI following  -IR following  BL PE, suspected  -May need anticoagulation when duodenal ulcer has been tx    3. Meningioma  -s/p resection with TL and MCF approach  -Keppra 500 mg BID  -Bowel regiment: Senna, glycolax, and dulcolax  -Nicardipine-Roxicodone PRN  -Acycolvir 400 mg TID for 10 days  -Artifical tears q2H and eye patch  -Keep an eye on mastoid dressing  -SCDs for DVT prophylaxis   -PT/OT  4. HTN   -Coreg 12.5 mg BID  Losartan 100 mg daily  5. DM   -Lantus 15u qHS  -HDSSI  6. Hyperthyroidism   -Methimazole 10 mg daily  7. Liver Mass  -Incidental finding  -f/u imaging once acute problems have been tx  Impairments- Decreased functional mobility, Decreased ADLs     Recommendations:    Possible ARU next week per medical team. Will continue to follow.      Chayito Stone D.O. M.P.H  PM&R  12/15/2022  12:43 PM

## 2022-12-15 NOTE — PROGRESS NOTES
Speech Language Pathology  Facility/Department: [address] ICU  Dysphagia Daily Treatment Note    NAME: Sofia Bean  : 1955  MRN: [id number]    Patient Diagnosis(es):   Patient Active Problem List    Diagnosis Date Noted    Multiple subsegmental pulmonary emboli without acute cor pulmonale ([facility] 2022    S/P craniotomy 2022    Acoustic neuroma ([facility] 2022    Cerebellopontine angle meningioma ([facility] 2022    Osteoarthritis of right hip 2021    Morbid obesity with BMI of 50.0-59.9, adult ([facility] 2021    Primary osteoarthritis of left knee 2015    Asthma 2015    Gastroesophageal reflux disease 2015    Adiposity 2015    S/P total knee arthroplasty 2015    Hypertension 2014     Allergies: Allergies   Allergen Reactions    Keflex [Cephalexin] Itching and Rash    Codeine Nausea And Vomiting    Tomato Rash     Onset Date: 2022    CXR (2022)-  Similar appearance of patchy airspace disease. Chart reviewed. Medical Diagnosis: Acoustic neuroma ([facility] [D33.3]  Balance problem [R26.89]  Meningioma ([facility] [D32.9]  Dizziness [R42]  Cerebellopontine angle meningioma ([facility] [D32.0]   Treatment Diagnosis: Dysphagia    BSE Impression (2022)-  Dysphagia Impression : Severe oral stage dysphagia, with suspected pharyngeal component, needs further assessment. Patient awake but lethargic, on 10 L O2 via nc. On oral motor exam, patient with right sided weakness, with reduced lingual coordination and impaired labial seal. Xerostomia. Speech is dysarthric, some dysphonia. Trialed ice chips, thins via tsp/cup/straw, puree. Pt with positive oral acceptance of tsp trials, however immediate anterior loss for all thin liquid trials via tsp/cup, with patient unable to utilize straw. Suspect reduced A-P transit, with significant residue of puree (suctioned). Some laryngeal swallow movement perceived, voice remained dry, no overt signs of aspiration. However given severity of oral dysfunction in conjunction with dysarthria, lethargy, and increased O2 requirements in setting of recent cerebellopontine angle meningioma surgery, suspect patient is a high aspiration (including silent aspiration) risk. Recommend continue NPO, alternative means nutrition/hydration, frequent oral hygiene, and ice chips / tsp h2o. Will continue to follow. Dysphagia Diagnosis: Severe oral stage dysphagia, Suspected needs further assessment    MBS results (12/02/2022)-  Oral Phase  Moderate dysfunction characterized by right-sided oral weakness with reduced labial seal and coordination, resulting in anterior loss of liquid via tsp and inability to use straw when placed on right or midline. For left-sided straw placement, pt able to create appropriate seal/suction. Prolonged mastication of soft solids, with slowed a-p transit, mild stasis. Pharyngeal Phase  Grossly WFL. Overall timely swallow initiation with appropriate hyolaryngeal mechanics. Single instance trace flash (self clearing) penetration of thins when straw was placed on right side. Otherwise good airway protection throughout with no aspiration. No significant stasis. Upper Esophageal Screen  Indirectly assessed; appeared unremarkable    Pain: none indicated by any means    Current Diet : ADULT ORAL NUTRITION SUPPLEMENT; Breakfast, Lunch, Dinner; Clear Liquid Oral Supplement  ADULT DIET; Full Liquid   Recommended Form of Meds: Via alternative means of nutrition     Treatment:  Pt seen bedside to address the following goals:  Goal 1: Patient will participate in further assessment of swallow function as appropriate. 12/1: Patient seen bedside. Awake, and much more alert this am. Speech much clearer today vs yesterday. Asking for water/food. Improved oral control (able to use straw), with reduced labial loss, and apparent improved oral clearance of puree bolus. Recommend proceed with MBS. Patient agreeable.   D/C Goal, met via repeat BSE and MBS. Goal 2: Patient/caregiver will demonstrate understanding of swallowing concerns/recommendations. 11/30: Educated pt to purpose of visit, s/s of aspiration, concern if aspiration occurs, swallow function, safety strategies (upright positioning, oral hygiene rationale), effortful swallow exercise, need for eventual instrumental assessment. Pt indicated some understanding, but suspect needs reinforcement. Cont goal  12/1: Patient educated to swallow function, MBS purpose/results, strategies (left sided placement), and diet recommendations (starting on puree vs soft solids to facilitate oral prep). Pt stated good comprehension. Continue to reinforce. Cont goal  12/2: reviewed results of yesterday's MBS, diet recommendations, left sided bolus placement, bolus size, positioning. Pt indicated comprehension. Cont goal  12/5: pt and family members educated to purpose of visit, reviewed results of MBS and strategies to improve safety of swallow. Educated to recommendation for diet advancement and instruction to notify staff if any s/s of aspiration or difficulty with mastication occurs. Explained will cont advancement as pt tolerates. All stated comprehension  Goal met, cont to ensure consistency  12/6: No family present. Pt educated to strategies (especially use of finger sweep which pt able to demonstrate at time of review as well as recall and demonstrate 5 minutes later). Pt able to demonstrate placement of food pr straw on the left with min to no cues. Pt indicated able to masticate ground sausage from breakfast  \"did ok\" but it may make stomach upset and reported that ground hamburger analyzed with this SLP at lunch was a little \"difficult to swallow\" and \"hard to move back\" but wants to continue to try with present diet. Educated to trial these foods requiring some mastication for practice but try to pick smoother items overall at this time.   D/W nursing re: possible introduction of [employer] and nursing to notify nutrition team re: that. Pt wants to keep strength up and get better. Cont goal  12/7: Pt seated upright in bed agreeable to be seen with part of AM meal. Pt demonstrated slowed, yet functional mastication of minced and moist solids and no overt s/s penetration/aspiration with either consistency. Pt reported intermittent sensation globus, however with \"dryer\" solids (pt pointing to dry minced and moist sausage). SLP instructed pt to order gravy with dry meats or use additional puree to provide moisture to solid consistency. Pt demonstrated understanding and agreement. Continue current diet level at this time. Cont. 12/8:  pt recalled instruction and demonstrated use of placing food on left side and use lingual sweep to check for pocketing. Reinforced use of all strategies and recommendation to cont current diet texture. Pt stated comprehension  Goal met, cont to ensure consistency  12/9: pt recalled and demonstrated use of strategies for improved safety of swallow. Pt independently demonstrated use of lingual sweep and alternating soft solid and liquid wash. Goal met  12/14: pt demonstrated adequate recall and use of swallow strategies with trials of soft and bite sized solids. MI with all trials. Goal met. Cont for carry over. 12/15: independently expressed all swallow strategies with no cues. Demonstrated appropriate use during AM meal. Cont. Goal 3: Patient will tolerate least restrictive diet without overt signs of aspiration or associated decline in respiratory status.   12/2: Reviewed chart and discussed with RN; pt NPO yesterday d/t respiratory status, however was advanced back to puree this am. With patient awake, alert, pleasant, cooperative, positioned up in bed, on 4.5 L O2 via nc, assessed tolerance thins via straw and puree; pt with good oral containment and straw use for left sided placement (ongoing right sided weakness), positive swallow movement, good oral clearance. Following large sip, patient with delayed cough, however not clearly related to swallow as was productive of sputum. No issues on subsequent trials, with cues for small bites/sips. Cont goal  12/5: RN with no concerns with swallowing, states lungs are clear. Pt agreeable to PO trials including pudding, thin liquids via straw and trials of soft solids. Pt consumed pudding and liquid with no overt signs of aspiration and no pocketing/oral residue noted. Pt demonstrated prolonged but adequate mastication with soft solids, cleared oral cavity completely. Pt used lingual sweep independently  Recommend upgrade to minced and moist texture with use of lingual sweep and liquid wash to ensure clearance of oral cavity  Cont goal  12/6: RN reported lungs are clear. Pt given oral care with pt having some ground meat from breakfast on the right side between inner cheek and lower gum area which this  SLP cleared with oral swab. Pt analyzed with 1 bite of hamburger which pt was able to chew given extra time and swallow although pt reported somewhat difficult (pt indicated ground sausage was \"ok\" to chew this morning. 1-2 instances of mildly delayed cough with thins and/or sherbert (out of 10 plus trials). Pt educated and able to demonstrate finger sweep as a strategy. Cont goal  12/7: SLP expressed continued use of strategies and pt independently utilized/recalled all with 100% accuracy. Pt observed using throughout meal and expressed increased ease of use during evening meal yesterday, as well as this AM. Cont. 12/8: pt had consumed portion of breakfast prior to SLp entering room, stating she didn't feel well, RN present and aware. Pt agreeable to limited additional PO, including eggs and liquids. Pt independently placed eggs on left side and demonstrated prolonged but adequate mastication. Mild lingual residue noted, however used lingual sweep independently and cleared oral cavity completely.  Pt consumed thin liquids via straw placed on left, with no overt signs of aspiration. No respiratory decline per chart review or concerns expressed per Rn. Recommend cont current diet texture. Cont goal  12/9: pt sitting up in chair with lunch tray. Pt demonstrated prolonged mastication with minced meats, very mild residue in right buccal cavity. Pt used lingual sweep and liquid wash to clear independently. No coughing/throat clear or change in voice occurred with liquids, taken via straw (placed on left). Pt fatigues easily and stated she wasn't feeling well. RN last session reported this occurs after pt has eaten a small amount. Therefore recommend cont current texture. Pt is agreeable to this   Cont goal  12/14: Pt trialed x10 bites soft and bite sized solids. Pt with appropriate use of left sided placement and complete mastication. Trace residue remaining after the swallow. Cleared well with liquid wash. No overt s/s penetration/aspiration. SLP recommend upgrade to Soft and Bite Sized Solids and cont Thin Liquids. Pt in agreement and demonstrated understanding. Cont. 12/15: Pt noted to be placed on Full Liquid diet, due to GI bleed and per GI MD request. Pt observed tolerating AM meal items with no overt s/s penetration/aspiration and adequate oral clearance. X1 occurrences of slight anterior loss apple sauce on R with min cue to identify and clear. Cont. Patient/Family/Caregiver Education:  See goal 2 above    Compensatory Strategies:  Upright as possible for all oral intake  Eat/Feed slowly  Check for pocketing of food on the Right  Alternate solids and liquids   straw and food placement on LEFT  Finger sweep during and after meals    Plan:  Continue dysphagia treatment with goals per plan of care. Diet Recommendations: Pt currently on Full Liquids (Per GI);  Upgrade to Soft and Bite Sized Solids when medically appropriate / Thin Liquids via straw- s/u and/or feed assist as needed  -meds in puree  -oral care at end of meals  DC recommendation: ongoing treatment indicated  Treatment: 12  Discussed with RN: Kofi Collier  Needs met prior to leaving room, call button in reach.       Electronically signed by:  Kp Tinajero M.A., [address] [place] [address]  Speech-Language Pathologist  Pg #: [phone]    If patient is discharged prior to next treatment, this note will serve as the discharge summary

## 2022-12-15 NOTE — PROGRESS NOTES
NEUROSURGERY POST-OP PROGRESS NOTE    Patient Name: Marilou De Leon YOB: 1955   Sex: Female Age: 79 yrs     Medical Record Number: [id number] Acct Number: [de-identified]   Room Number: 9999/1037-08 Hospital Day: Hospital Day: 25     Interval History:  Procedure(s) (LRB):  STAGE II: RESECTION OF RIGHT PETROCLIVAL MENINGIOMA (N/A)     Subjective: Neurologically stable, no new complaints today     Drop in hemoglobin 12 -> 7.8 concern for active GI bleed  Stat CT abdomen  GI consult  Embolization of the GDA on 12/12/2022  Hgb dropped from 8.1 to 7.5 overnight. No PRBC ordered  CTPA yesterday showed large PE with no obvious heart strain. Patient nuero exam unchanged. VSS. No signs of respiratory distress, and patient is able to maintain O2 sat on room air. Pulmonology and GI discussed the PE situation and they are in agreement that the risk;benefit ratio favors heparin anticoagulation now, and transition to an oral agent next week. Objective:    VITAL SIGNS   /74   Pulse 74   Temp 98 °F (36.7 °C) (Oral)   Resp 16   Ht 5' 5\" (1.651 m)   Wt (!) 355 lb 1.6 oz (161.1 kg)   SpO2 98%   BMI 59.09 kg/m²    Height Height: 5' 5\" (165.1 cm)   Weight Weight: (!) 355 lb 1.6 oz (161.1 kg)          Allergies Allergies   Allergen Reactions    Keflex [Cephalexin] Itching and Rash    Codeine Nausea And Vomiting    Tomato Rash      NPO Status ADULT ORAL NUTRITION SUPPLEMENT; Breakfast, Lunch, Dinner; Clear Liquid Oral Supplement  ADULT DIET;  Full Liquid   Isolation No active isolations     LABS   Basic Metabolic Profile Recent Labs     12/13/22  1452 12/14/22  1750 12/15/22  0612    141 139   * 109 106   CO2 22 26 25   BUN 20 10 8   CREATININE <0.5* <0.5* <0.5*   GLUCOSE 332* 158* 149*      Complete Blood Count Recent Labs 12/13/22  0428 12/14/22  0415 12/15/22  0612   WBC 6.5 5.3 4.6   RBC 2.59* 2.47* 2.63*      Coagulation Studies Recent Labs     12/14/22  1420   INR 1.15*          MEDICATIONS   Inpatient Medications     pantoprazole, 40 mg, IntraVENous, BID    levETIRAcetam, 500 mg, IntraVENous, Q12H    cetirizine, 10 mg, Oral, Daily    [Held by provider] hydroCHLOROthiazide, 25 mg, Oral, Daily    insulin lispro, 0-16 Units, SubCUTAneous, TID WC    insulin lispro, 0-4 Units, SubCUTAneous, Nightly    polyvinyl alcohol, 2 drop, Right Eye, Q2H    methIMAzole, 10 mg, Oral, Daily    insulin glargine, 15 Units, SubCUTAneous, Nightly    melatonin, 5 mg, Oral, Nightly    sodium chloride flush, 5-40 mL, IntraVENous, 2 times per day    polyethylene glycol, 17 g, Oral, Daily    tetrahydrozoline, 1 drop, Both Eyes, TID    [Held by provider] carvedilol, 12.5 mg, Oral, BID WC    [Held by provider] losartan, 100 mg, Oral, Daily    sodium chloride flush, 5-40 mL, IntraVENous, 2 times per day    latanoprost, 1 drop, Both Eyes, Nightly   Infusions    heparin (PORCINE) Infusion 11 Units/kg/hr (12/15/22 0711)    sodium chloride      sodium chloride      dextrose        Antibiotics   Recent Abx Admin        No antibiotic orders with administrations found. Imaging:   Abdominal CT  Impression       1.  9.5 cm mass in the right hepatic lobe which does not appear to represent a hemangioma. Differential diagnosis would include primary or secondary malignancy. Liver mass protocol MRI with and without contrast is recommended. 2.  No intra-abdominal hemorrhage. 3.  Mild cholelithiasis. 4.  3.5 cm right ovarian cyst, the CT appearance suggests a simple cyst indicating a benign finding.      Neurologic Exam:  Mental status: awake and alert and oriented x4    Cranial Nerves:  II: Visual acuity not tested, denies new visual changes / diplopia  III, IV, VI: PERRL, 3 mm bilaterally, EOMI,Patient had rightward gaze deviation but was able to follow when prompted   V: Facial sensation intact bilaterally to touch  VII: R sided facial droop  VIII: Hearing intact bilaterally to spoken voice on L, no hearing on R  IX: Palate movement equal bilaterally  XI: Shoulder shrug equal bilaterally  XII: Tongue deviates to R      Musculoskeletal:   Gait: Not tested   Tone: normal  Sensory: intact to all extremities  Motor strength:    Right  Left    Right  Left    Deltoid  5 5  Hip Flex  5 5   Biceps  5 5  Knee Extensors  5 5   Triceps  5 5  Knee Flexors  5 5   Wrist Ext  5 5  Ankle Dorsiflex. 5 5   Wrist Flex  5 5  Ankle Plantarflex. 5 5   Handgrip  5 5  Ext Ernie Longus  5 5   Thumb Ext  5 5         Mastoid Incision: intact, clean and dry      Respiratory:  Unlabored respiratory pattern    Abdomen:   Soft, ND   Last BM 12/11    Cardiovascular:  Warm, well perfused    Assessment   Patient is a 80 yo F s/p Procedure(s) (LRB):  STAGE II: RESECTION OF RIGHT PETROCLIVAL MENINGIOMA (N/A) per Dr. Wagner Rodriguez    Plan:  Neurologic exam frequency: Q4H  Mobility: PT/OT   SLP continue  PICC line  DVT Prophylaxis: SCDs and heparin (hold)  Keppra as ordered  Bowel Regimen: Senna and glycolax (hold)  Pain control: Katie   Keep sbp < 160  Incisional Care: Mastoid compression headband in place check Q4H and let NS know if leaking  GI Bleed: GI Following, Protonix 40 mg BID, Embolization of CHU done, Hgb dropped overnight 8.1>7.5  PE: CTPA shows large PE with no obvious heart strain. Pulmonology and GI discussed the PE situation and they are in agreement that the risk;benefit ratio favors heparin anticoagulation now, and transition to an oral agent next week. Dispo Planning: Inpatient    Patient was discussed with Dr. Tello Israel who agrees with above assessment and plan. Electronically signed by: LEE Cordero CNP, 12/15/2022 1:40 PM   Neurosurgery Nurse Practitioner  [phone]    I spent 15 minutes in the care of this patient.   Over 50% of that time was in face-to-face counseling regarding disease process, diagnostic testing, preventative measures, and answering patient and family questions

## 2022-12-15 NOTE — CONSULTS
Hospital Medicine  Consult           History Of Present Illness: The patient is a 79 y.o. female with medical history as below who has been in the hospital for multistage neurosurgical resection of petroclival meningioma who we are asked to see/evaluate by Dr. Buffy Khoury for cas-operative mgt. patient has been recovering well postoperatively from her procedure when she most recently developed melanotic stools. Gastroenterology was consulted, patient underwent EGD today and was found to have multiple duodenal ulcers with visible vessel and active bleeding. Underwent injection of epi, hemostatic clip placement and chemo spray. Difficult to concern for possible ongoing GI bleed she underwent CTA. No active GI bleed was detected, however CAT scan picked up likely pulmonary embolism in the lower lung fields. Patient received 2 units of blood transfusion, just finished her second unit now. BP is low normal.  She is a bit sleepy but arousable and denies any active complaints her oxygen saturation is at 95% when she is asleep. She has been requiring intermittent nasal cannula during her hospital stay. Past Medical History:        Diagnosis Date    Arthritis     Asthma     mild    Brain tumor ([address]     Diabetes mellitus ([address]     borderline    High cholesterol     Hypertension     Imbalance     DUE TO TUMOR- USING ANKLE BRACE / WALKER PER PREOP H&P    Loss of hearing     bilateral reported    Vertigo        Past Surgical History:        Procedure Laterality Date    CRANIOTOMY N/A 11/29/2022    STAGE II: RESECTION OF RIGHT PETROCLIVAL MENINGIOMA performed by Gonzalez Hess MD at [address]    IR EMBOLIZATION HEMORRHAGE  12/12/2022    IR EMBOLIZATION HEMORRHAGE 12/12/2022 [facility] SPECIAL PROCEDURES    MASTOIDECTOMY Right 11/28/2022    RIGHT TRANSMASTOID / ANTERIOR MIDDLE CRANIAL FOSSA , ABDOMINAL FAT GRAFT EXTERNAL AUDITORY CANAL CLOSURE performed by Chinyere Herrera MD at [address] 11/28/2022    STAGE 1, RIGHT TRANSLABYRINTHINE / RETROLABYRINTHINE CRANIOTOMY, PETROSECTOMY performed by Alberto Davis. Nelli Wilkins MD at [address] [place] [address] Right 11/8/2021    RIGHT TOTAL HIP ARTHROPLASTY POSTERIOR APPROACH - Marcelina Tolliver ADVANCED performed by Marquis Conley MD at [facility] Right 07/14/2015    TOTAL KNEE ARTHROPLASTY Left 10/14/15    TKA    UPPER GASTROINTESTINAL ENDOSCOPY N/A 12/11/2022    EGD CONTROL HEMORRHAGE performed by Aaron Tuttle MD at [facility] ENDOSCOPY       Medications Prior to Admission:    Prior to Admission medications    Medication Sig Start Date End Date Taking? Authorizing Provider   medical marijuana Take 1 each by mouth as needed. Yes Historical Provider, MD   losartan (COZAAR) 50 MG tablet Take 50 mg by mouth daily   Yes Historical Provider, MD   albuterol sulfate HFA (PROVENTIL;VENTOLIN;PROAIR) 108 (90 Base) MCG/ACT inhaler Inhale 2 puffs into the lungs every 4 hours as needed 10/18/21  Yes Historical Provider, MD   methIMAzole (TAPAZOLE) 5 MG tablet Take 10 mg by mouth daily    Historical Provider, MD   metFORMIN (GLUCOPHAGE-XR) 500 MG extended release tablet Take 1,000 mg by mouth Daily with supper    Historical Provider, MD   latanoprost (XALATAN) 0.005 % ophthalmic solution Place 1 drop into both eyes nightly    Historical Provider, MD   carvedilol (COREG) 6.25 MG tablet Take 6.25 mg by mouth 2 times daily (with meals)    Historical Provider, MD   pravastatin (PRAVACHOL) 20 MG tablet Take 20 mg by mouth nightly     Historical Provider, MD   hydrochlorothiazide (HYDRODIURIL) 25 MG tablet Take 25 mg by mouth daily    Historical Provider, MD       Allergies:  Keflex [cephalexin], Codeine, and Tomato    Social History:  The patient currently lives at home    TOBACCO:   reports that she has quit smoking. She has never used smokeless tobacco.  ETOH:   reports no history of alcohol use.       Family History:  Reviewed in detail and  Positive as follows:        Problem Relation Age of Onset    Cancer Mother     Hypotension Mother     Cancer Father     Cancer Sister     Hypotension Sister     Cancer Maternal Grandmother     Cancer Maternal Grandfather     Cancer Paternal Grandmother     Cancer Paternal Grandfather     Hypotension Other     Cancer Other     Diabetes Other     Osteoarthritis Other        REVIEW OF SYSTEMS:   Positive and negative as noted in the HPI. All other systems reviewed and negative. PHYSICAL EXAM:  /65   Pulse 70   Temp 98 °F (36.7 °C) (Oral)   Resp 16   Ht 5' 5\" (1.651 m)   Wt (!) 355 lb 1.6 oz (161.1 kg)   SpO2 98%   BMI 59.09 kg/m²     General appearance: Patient is sleepy but arousable, appears comfortable  HEENT Normal cephalic, atraumatic without obvious deformity. Pupils equal, round, and reactive to light. Extra ocular muscles intact. Conjunctivae/corneas clear. Neck: Supple, No jugular venous distention/bruits. Trachea midline without thyromegaly or adenopathy with full range of motion. Lungs: Diminished without active wheezing  Heart: Regular rate and rhythm with Normal S1/S2 without  murmurs, rubs or gallops, point of maximum impulse non-displaced  Abdomen: Soft, non-tender or non-distended without rigidity or guarding and positive bowel sounds all four quadrants. Extremities: No clubbing, cyanosis, or edema bilaterally. There is no calf tenderness  Skin: Skin color, texture, turgor normal.    Neurologic: Patient is sleepy but arousable, she is following simple commands and grossly nonfocal  Mental status: No agitation     CTPA:         Large burden of pulmonary emboli without obvious heart strain of the exam is severely limited and difficult to evaluate for other pathology         CT abdomen:  1. Suspected bilateral pulmonary emboli but inadequate contrast phase to assess for the pulmonary embolus. Incomplete filling of the pulmonary arteries suggests pulmonary emboli inferiorly     2.  No sign of any acute or active GI bleeding on CT angiograms studies   3. No sign of any aneurysm in the abdomen with normal widely patent vessels   4. Stable appearing partially calcified left adrenal adenoma. 5. Parapelvic renal cysts, largest on left side   6. 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 4.1 x 3.5 cm right ovarian cyst, benign in appearance   8. Mild cholelithiasis   9. No free fluid or free air with right abdominal wall subcutaneous inflammation or prior instrumentation   EKG:  I have reviewed the EKG with the following interpretation:    CBC   Recent Labs     12/13/22  0428 12/13/22  1004 12/14/22  0415 12/14/22  1023 12/14/22  1420 12/14/22  2231 12/15/22  0612   WBC 6.5  --  5.3  --   --   --  4.6   HGB 8.1*   < > 7.5*   < > 7.9* 8.2* 8.2*   HCT 23.4*   < > 22.4*   < > 23.6* 24.7* 24.3*     --  163  --   --   --  171    < > = values in this interval not displayed. RENAL  Recent Labs     12/13/22  1452 12/14/22  1750 12/15/22  0612    141 139   K 3.4* 3.1* 3.2*   * 109 106   CO2 22 26 25   BUN 20 10 8   CREATININE <0.5* <0.5* <0.5*       LFT'S  No results for input(s): AST, ALT, ALB, BILIDIR, BILITOT, ALKPHOS in the last 72 hours. COAG  Recent Labs     12/14/22  1420   INR 1.15*       CARDIAC ENZYMES  No results for input(s): CKTOTAL, CKMB, CKMBINDEX, TROPONINI in the last 72 hours.     U/A:    Lab Results   Component Value Date/Time    COLORU YELLOW 10/12/2021 01:46 PM    WBCUA 89 10/12/2021 01:46 PM    RBCUA 7 10/12/2021 01:46 PM    BACTERIA 2+ 10/12/2021 01:46 PM    CLARITYU TURBID 10/12/2021 01:46 PM    SPECGRAV 1.023 10/12/2021 01:46 PM    LEUKOCYTESUR LARGE 10/12/2021 01:46 PM    BLOODU Negative 10/12/2021 01:46 PM    GLUCOSEU 100 10/12/2021 01:46 PM       ABG    Lab Results   Component Value Date/Time    SHP1WKA 34 12/01/2022 09:21 PM    BEART 9.6 12/01/2022 09:21 PM    E6TJZARF 98 12/01/2022 09:21 PM    PHART 7.503 12/01/2022 09:21 PM    BXX3MFW 43.1 12/01/2022 09:21 PM    PO2ART 90.2 12/01/2022 09:21 PM    NEJ0LMY 35 12/01/2022 09:21 PM           Active Hospital Problems    Diagnosis Date Noted    Multiple subsegmental pulmonary emboli without acute cor pulmonale ([address] [I26.94] 12/14/2022     Priority: Medium    S/P craniotomy [Z98.890] 12/07/2022     Priority: Medium    Acoustic neuroma ([address] [D33.3] 12/01/2022     Priority: Medium    Cerebellopontine angle meningioma ([address] [D32.0] 11/28/2022     Priority: Medium       ASSESSMENT/PLAN:    Acute GI bleed due to bleeding duodenal ulcers:  Status post EGD with intervention noted yesterday. CT abdomen has no signs of any ongoing acute bleeding  Already on PPI IV  Will monitor H/H, stavle at 8.2 today. bilateral pulmonary emboli:  Dopplers neg for DVT. Pulmonary has been consulted,  Pulmonology and GI discussed the PE situation and they are in agreement that the risk;benefit ratio favors heparin anticoagulation now, and transition to an oral agent next week. DM: cont lantus and SSI for now, once stable and plan for ARU would restart home meds. HTN: stable, cont home meds, monitor. Hypokalemia: supplement ordered, repeat BMP in am.             Diet: ADULT ORAL NUTRITION SUPPLEMENT; Breakfast, Lunch, Dinner; Clear Liquid Oral Supplement  ADULT DIET;  Full Liquid  Code Status: Full Code  PT/OT Eval Status:     Dispo -inpatient, dispo per primary service       Carlos Esquivel MD

## 2022-12-15 NOTE — PROGRESS NOTES
Physical Therapy  Facility/Department: [facility] ICU  Physical Therapy Treatment Note    Name: Claudetta Dose  : 1955  MRN: [id number]  Date of Service: 12/15/2022    Discharge Recommendations:  Claudetta Dose scored a 16/24 on the AM-PAC short mobility form. Current research shows that an AM-PAC score of 17 or less is typically not associated with a discharge to the patient's home setting. Based on the patient's AM-PAC score and their current functional mobility deficits, it is recommended that the patient have 5-7 sessions per week of Physical Therapy at d/c to increase the patient's independence. At this time, this patient demonstrates complex nursing, medical, and rehabilitative needs, and would benefit from intensive rehabilitation services upon discharge from the Inpatient setting. This patient demonstrates the ability to participate in and benefit from an intensive therapy program with a coordinated interdisciplinary team approach to foster frequent, structured, and documented communication among disciplines, who will work together to establish, prioritize, and achieve treatment goals. Please see assessment section for further patient specific details. If patient discharges prior to next session this note will serve as a discharge summary. Please see below for the latest assessment towards goals. Patient would benefit from continued therapy after discharge   PT Equipment Recommendations  Other: defer      Patient Diagnosis(es): Diagnoses of Acoustic neuroma [facility]), Balance problem, Meningioma ([facility] and Dizziness were pertinent to this visit. Past Medical History:  has a past medical history of Arthritis, Asthma, Brain tumor ([address] Diabetes mellitus ([facility] High cholesterol, Hypertension, Imbalance, Loss of hearing, and Vertigo. Past Surgical History:  has a past surgical history that includes Total knee arthroplasty (Right, 2015); Total knee arthroplasty (Left, 10/14/15);  Total hip arthroplasty (Right, 11/8/2021); Neuroma surgery (Right, 11/28/2022); mastoidectomy (Right, 11/28/2022); craniotomy (N/A, 11/29/2022); Upper gastrointestinal endoscopy (N/A, 12/11/2022); and IR EMBOLIZATION HEMORRHAGE (12/12/2022). Assessment   Body Structures, Functions, Activity Limitations Requiring Skilled Therapeutic Intervention: Decreased functional mobility ; Decreased endurance  Assessment: Decreased assist for bed mobility. Increased gt endurance this date. Needing SBA for bed mobility, min assist for transfers and gt with walker. Slowly progressing but fatigues with activity. Pt continues to be below her baseline function. Rec cont skilled IP PT to maximize mobility and independence. Treatment Diagnosis: decreased functional mobility        Plan   Physcial Therapy Plan  General Plan: 5-7 times per week  Current Treatment Recommendations: Strengthening, Functional mobility training, Transfer training, Gait training, Endurance training, Safety education & training, Therapeutic activities  Safety Devices  Type of Devices: Call light within reach, Nurse notified, Gait belt, Chair alarm in place, Left in chair  Restraints  Restraints Initially in Place: No     Restrictions  Position Activity Restriction  Other position/activity restrictions: Up with assistance, ambulate the patient, up in the chair, HOB elevated to 30 degrees     Subjective   General  Chart Reviewed: Yes  Additional Pertinent Hx: Pt is a 78 yo female that presents to the hospital for a procedure;STAGE 1, RIGHT TRANSLABYRINTHINE / RETROLABYRINTHINE CRANIOTOMY POSSIBLE PETROSECTOMY  RIGHT TRANSMASTOID / ANTERIOR MIDDLE CRANIAL FOSSA , ABDOMINAL FAT GRAFT EXTERNAL AUDITORY CANAL CLOSURE on 11/28. EGD 12/11:  multiple duodenal ulcers, clip placement x 3. Abd CT 12/11:  suspected Bilat PE.  LE dopplers 12/11: neg. Pt s/p  GDA embo on 12/12.    CTPA: (+) PE; PMH; Loss of hearing bilaterally, arthritis, brain tumor, diabetes. Subjective  Subjective: Pt found supine. Agreeable to PT. Reports fatigue after ambulation         Social/Functional History  Social/Functional History  Lives With: Alone  Type of Home: House  Home Layout: One level, Able to Live on Main level with bedroom/bathroom, Performs ADL's on one level  Home Access: Stairs to enter with rails  Entrance Stairs - Number of Steps: 2  Bathroom Shower/Tub: Walk-in shower  Bathroom Equipment: Shower chair, Grab bars in shower, Hand-held shower  Home Equipment: Zack Drought, Sock aid (walker, unsure what kind)  Has the patient had two or more falls in the past year or any fall with injury in the past year?: No  ADL Assistance: [facility]: Independent  Homemaking Responsibilities: Yes  Ambulation Assistance: Independent (with walker, unable to determine which kind)  Transfer Assistance: Independent  Active : Yes  Leisure & Hobbies: crafts  Additional Comments: pt is questionable to poor historian; pt reports no one can stay with her upon going home  Vision/Hearing  Vision  Vision Exceptions: Wears glasses at all times  Hearing  Hearing: Within functional limits    Cognition   Orientation  Overall Orientation Status: Within Functional Limits  Orientation Level: Oriented X4     Objective                                Bed mobility  Supine to Sit: Stand by assistance (HOB elevated with rail)  Transfers  Sit to Stand: Minimal Assistance (from bed, bsc and chair.   Cues for hand placement)  Stand to Sit: Minimal Assistance  Bed to Chair: Minimal assistance (stand pivot with walker)  Ambulation  Device: Rolling Walker  Other Apparatus:  (chair follow)  Assistance: Minimal assistance  Quality of Gait: decreased step length/height, decreased darlene  Distance: 3', 5', 20'        Exercise Treatment: Performed LAQ x 10 reps BLE independently - cues for slow controlled movement        OutComes Score AM-PAC Score  AM-PAC Inpatient Mobility Raw Score : 16 (12/15/22 1205)  AM-PAC Inpatient T-Scale Score : 40.78 (12/15/22 1205)  Mobility Inpatient CMS 0-100% Score: 54.16 (12/15/22 1205)  Mobility Inpatient CMS G-Code Modifier : CK (12/15/22 1205)          Tinneti Score       Goals  Short Term Goals  Time Frame for Short Term Goals: D/C  Short Term Goal 1: supine<->sit mod A x 1-2- MET 12/6 Pt will perform bed mobility with SBA MET 12/7 update to bed mobility with independence -ongoing  Short Term Goal 2: pt will sit at EOB for 5 min with SBA- MET 12/7  Short Term Goal 3: pt will tolerate sit<->stand assessment MET 12/6 Pt will transfer with CGA to RW -ongoing  Short Term Goal 4: pt will tolerate bed->chair assessment with RW MET 12/7 Pt will perform stand pivot with RW and SBA -ongoing  Short Term Goal 5: pt will tolerate gait assessment-MET 12/7 Pt will ambulate x 50' and CGA -ongoing  Patient Goals   Patient Goals : not stated       Education  Patient Education  Education Given To: Patient  Education Provided: Role of Therapy;Plan of Care  Education Provided Comments: transfer safety  Education Method: Verbal;Demonstration  Barriers to Learning: None  Education Outcome: Verbalized understanding      Therapy Time   Individual Concurrent Group Co-treatment   Time In 1123         Time Out 1202         Minutes 39             Timed Code Treatment Minutes:  39    Total Treatment Minutes:  [facility],

## 2022-12-15 NOTE — PLAN OF CARE
Problem: Chronic Conditions and Co-morbidities  Goal: Patient's chronic conditions and co-morbidity symptoms are monitored and maintained or improved  Outcome: Progressing     Problem: Discharge Planning  Goal: Discharge to home or other facility with appropriate resources  Outcome: Progressing     Problem: Pain  Goal: Verbalizes/displays adequate comfort level or baseline comfort level  Outcome: Progressing  Flowsheets (Taken 12/13/2022 2000 by Rajendra Lance RN)  Verbalizes/displays adequate comfort level or baseline comfort level:   Encourage patient to monitor pain and request assistance   Assess pain using appropriate pain scale   Administer analgesics based on type and severity of pain and evaluate response     Problem: Nutrition Deficit:  Goal: Optimize nutritional status  Outcome: Progressing  Flowsheets (Taken 12/12/2022 0942 by Monik Eldridge RD)  Nutrient intake appropriate for improving, restoring, or maintaining nutritional needs:   Assess nutritional status and recommend course of action   Monitor oral intake, labs, and treatment plans     Problem: Neurosensory - Adult  Goal: Absence of seizures  Outcome: Progressing  Goal: Remains free of injury related to seizures activity  Outcome: Progressing  Goal: Achieves maximal functionality and self care  Outcome: Progressing     Problem: Cardiovascular - Adult  Goal: Maintains optimal cardiac output and hemodynamic stability  Outcome: Progressing  Goal: Absence of cardiac dysrhythmias or at baseline  Outcome: Progressing     Problem: Gastrointestinal - Adult  Goal: Minimal or absence of nausea and vomiting  Outcome: Progressing  Goal: Maintains or returns to baseline bowel function  Outcome: Progressing  Flowsheets (Taken 12/13/2022 2000 by Rajendra Lance RN)  Maintains or returns to baseline bowel function:   Assess bowel function   Encourage oral fluids to ensure adequate hydration   Administer IV fluids as ordered to ensure adequate hydration  Goal: Maintains adequate nutritional intake  Outcome: Progressing

## 2022-12-16 ENCOUNTER — ANESTHESIA (OUTPATIENT)
Dept: ENDOSCOPY | Age: 67
End: 2022-12-16
Payer: MEDICARE

## 2022-12-16 LAB
ALBUMIN SERPL-MCNC: 3 G/DL (ref 3.4–5)
ANION GAP SERPL CALCULATED.3IONS-SCNC: 6 MMOL/L (ref 3–16)
ANTI-XA UNFRAC HEPARIN: 0.13 IU/ML (ref 0.3–0.7)
BASOPHILS ABSOLUTE: 0 K/UL (ref 0–0.2)
BASOPHILS RELATIVE PERCENT: 0.3 %
BUN BLDV-MCNC: 5 MG/DL (ref 7–20)
CALCIUM SERPL-MCNC: 8.4 MG/DL (ref 8.3–10.6)
CHLORIDE BLD-SCNC: 109 MMOL/L (ref 99–110)
CO2: 28 MMOL/L (ref 21–32)
CREAT SERPL-MCNC: <0.5 MG/DL (ref 0.6–1.2)
EOSINOPHILS ABSOLUTE: 0.3 K/UL (ref 0–0.6)
EOSINOPHILS RELATIVE PERCENT: 6.5 %
GFR SERPL CREATININE-BSD FRML MDRD: >60 ML/MIN/{1.73_M2}
GLUCOSE BLD-MCNC: 102 MG/DL (ref 70–99)
GLUCOSE BLD-MCNC: 106 MG/DL (ref 70–99)
GLUCOSE BLD-MCNC: 114 MG/DL (ref 70–99)
GLUCOSE BLD-MCNC: 127 MG/DL (ref 70–99)
GLUCOSE BLD-MCNC: 155 MG/DL (ref 70–99)
HCT VFR BLD CALC: 22.8 % (ref 36–48)
HCT VFR BLD CALC: 24 % (ref 36–48)
HEMOGLOBIN: 7.7 G/DL (ref 12–16)
HEMOGLOBIN: 7.8 G/DL (ref 12–16)
LYMPHOCYTES ABSOLUTE: 1 K/UL (ref 1–5.1)
LYMPHOCYTES RELATIVE PERCENT: 23.4 %
MAGNESIUM: 1.6 MG/DL (ref 1.8–2.4)
MCH RBC QN AUTO: 30.9 PG (ref 26–34)
MCHC RBC AUTO-ENTMCNC: 33.6 G/DL (ref 31–36)
MCV RBC AUTO: 92.1 FL (ref 80–100)
MONOCYTES ABSOLUTE: 0.3 K/UL (ref 0–1.3)
MONOCYTES RELATIVE PERCENT: 7.6 %
NEUTROPHILS ABSOLUTE: 2.8 K/UL (ref 1.7–7.7)
NEUTROPHILS RELATIVE PERCENT: 62.2 %
PDW BLD-RTO: 16.1 % (ref 12.4–15.4)
PERFORMED ON: ABNORMAL
PHOSPHORUS: 3.1 MG/DL (ref 2.5–4.9)
PLATELET # BLD: 175 K/UL (ref 135–450)
PMV BLD AUTO: 8.2 FL (ref 5–10.5)
POTASSIUM SERPL-SCNC: 3 MMOL/L (ref 3.5–5.1)
RBC # BLD: 2.48 M/UL (ref 4–5.2)
SODIUM BLD-SCNC: 143 MMOL/L (ref 136–145)
WBC # BLD: 4.4 K/UL (ref 4–11)

## 2022-12-16 PROCEDURE — APPNB30 APP NON BILLABLE TIME 0-30 MINS: Performed by: NURSE PRACTITIONER

## 2022-12-16 PROCEDURE — 6370000000 HC RX 637 (ALT 250 FOR IP): Performed by: NURSE PRACTITIONER

## 2022-12-16 PROCEDURE — 6370000000 HC RX 637 (ALT 250 FOR IP)

## 2022-12-16 PROCEDURE — 36415 COLL VENOUS BLD VENIPUNCTURE: CPT

## 2022-12-16 PROCEDURE — 6360000002 HC RX W HCPCS: Performed by: NURSE PRACTITIONER

## 2022-12-16 PROCEDURE — 83735 ASSAY OF MAGNESIUM: CPT

## 2022-12-16 PROCEDURE — 2580000003 HC RX 258

## 2022-12-16 PROCEDURE — 3700000001 HC ADD 15 MINUTES (ANESTHESIA): Performed by: INTERNAL MEDICINE

## 2022-12-16 PROCEDURE — 85025 COMPLETE CBC W/AUTO DIFF WBC: CPT

## 2022-12-16 PROCEDURE — C9113 INJ PANTOPRAZOLE SODIUM, VIA: HCPCS | Performed by: NURSE PRACTITIONER

## 2022-12-16 PROCEDURE — 6360000002 HC RX W HCPCS

## 2022-12-16 PROCEDURE — 85520 HEPARIN ASSAY: CPT

## 2022-12-16 PROCEDURE — 99024 POSTOP FOLLOW-UP VISIT: CPT | Performed by: NURSE PRACTITIONER

## 2022-12-16 PROCEDURE — 2580000003 HC RX 258: Performed by: NURSE ANESTHETIST, CERTIFIED REGISTERED

## 2022-12-16 PROCEDURE — 80069 RENAL FUNCTION PANEL: CPT

## 2022-12-16 PROCEDURE — 85014 HEMATOCRIT: CPT

## 2022-12-16 PROCEDURE — 85018 HEMOGLOBIN: CPT

## 2022-12-16 PROCEDURE — 99231 SBSQ HOSP IP/OBS SF/LOW 25: CPT | Performed by: PHYSICAL MEDICINE & REHABILITATION

## 2022-12-16 PROCEDURE — 6360000002 HC RX W HCPCS: Performed by: INTERNAL MEDICINE

## 2022-12-16 PROCEDURE — 2500000003 HC RX 250 WO HCPCS: Performed by: NURSE ANESTHETIST, CERTIFIED REGISTERED

## 2022-12-16 PROCEDURE — 6360000002 HC RX W HCPCS: Performed by: NURSE ANESTHETIST, CERTIFIED REGISTERED

## 2022-12-16 PROCEDURE — 0DJ08ZZ INSPECTION OF UPPER INTESTINAL TRACT, VIA NATURAL OR ARTIFICIAL OPENING ENDOSCOPIC: ICD-10-PCS | Performed by: INTERNAL MEDICINE

## 2022-12-16 PROCEDURE — 3700000000 HC ANESTHESIA ATTENDED CARE: Performed by: INTERNAL MEDICINE

## 2022-12-16 PROCEDURE — 3609017100 HC EGD: Performed by: INTERNAL MEDICINE

## 2022-12-16 PROCEDURE — 2709999900 HC NON-CHARGEABLE SUPPLY: Performed by: INTERNAL MEDICINE

## 2022-12-16 PROCEDURE — 6370000000 HC RX 637 (ALT 250 FOR IP): Performed by: INTERNAL MEDICINE

## 2022-12-16 PROCEDURE — 1200000000 HC SEMI PRIVATE

## 2022-12-16 RX ORDER — POLYETHYLENE GLYCOL 3350 17 G/17G
17 POWDER, FOR SOLUTION ORAL DAILY PRN
Status: DISCONTINUED | OUTPATIENT
Start: 2022-12-16 | End: 2022-12-22 | Stop reason: HOSPADM

## 2022-12-16 RX ORDER — MAGNESIUM SULFATE IN WATER 40 MG/ML
2000 INJECTION, SOLUTION INTRAVENOUS ONCE
Status: COMPLETED | OUTPATIENT
Start: 2022-12-16 | End: 2022-12-16

## 2022-12-16 RX ORDER — SODIUM CHLORIDE 0.9 % (FLUSH) 0.9 %
5-40 SYRINGE (ML) INJECTION PRN
Status: CANCELLED | OUTPATIENT
Start: 2022-12-16

## 2022-12-16 RX ORDER — PROPOFOL 10 MG/ML
INJECTION, EMULSION INTRAVENOUS PRN
Status: DISCONTINUED | OUTPATIENT
Start: 2022-12-16 | End: 2022-12-16 | Stop reason: SDUPTHER

## 2022-12-16 RX ORDER — PROPOFOL 10 MG/ML
INJECTION, EMULSION INTRAVENOUS CONTINUOUS PRN
Status: DISCONTINUED | OUTPATIENT
Start: 2022-12-16 | End: 2022-12-16 | Stop reason: SDUPTHER

## 2022-12-16 RX ORDER — POTASSIUM CHLORIDE 29.8 MG/ML
20 INJECTION INTRAVENOUS
Status: COMPLETED | OUTPATIENT
Start: 2022-12-16 | End: 2022-12-16

## 2022-12-16 RX ORDER — HEPARIN SODIUM 1000 [USP'U]/ML
5000 INJECTION, SOLUTION INTRAVENOUS; SUBCUTANEOUS PRN
Status: DISCONTINUED | OUTPATIENT
Start: 2022-12-17 | End: 2022-12-17

## 2022-12-16 RX ORDER — SODIUM CHLORIDE 9 MG/ML
INJECTION, SOLUTION INTRAVENOUS CONTINUOUS PRN
Status: DISCONTINUED | OUTPATIENT
Start: 2022-12-16 | End: 2022-12-16 | Stop reason: SDUPTHER

## 2022-12-16 RX ORDER — HEPARIN SODIUM 1000 [USP'U]/ML
10000 INJECTION, SOLUTION INTRAVENOUS; SUBCUTANEOUS PRN
Status: DISCONTINUED | OUTPATIENT
Start: 2022-12-17 | End: 2022-12-17

## 2022-12-16 RX ORDER — HEPARIN SODIUM 1000 [USP'U]/ML
10000 INJECTION, SOLUTION INTRAVENOUS; SUBCUTANEOUS ONCE
Status: DISCONTINUED | OUTPATIENT
Start: 2022-12-17 | End: 2022-12-17

## 2022-12-16 RX ORDER — GLYCOPYRROLATE 0.2 MG/ML
INJECTION INTRAMUSCULAR; INTRAVENOUS PRN
Status: DISCONTINUED | OUTPATIENT
Start: 2022-12-16 | End: 2022-12-16 | Stop reason: SDUPTHER

## 2022-12-16 RX ORDER — HEPARIN SODIUM 10000 [USP'U]/100ML
5-30 INJECTION, SOLUTION INTRAVENOUS CONTINUOUS
Status: DISCONTINUED | OUTPATIENT
Start: 2022-12-17 | End: 2022-12-21

## 2022-12-16 RX ORDER — LIDOCAINE HYDROCHLORIDE 20 MG/ML
INJECTION, SOLUTION EPIDURAL; INFILTRATION; INTRACAUDAL; PERINEURAL PRN
Status: DISCONTINUED | OUTPATIENT
Start: 2022-12-16 | End: 2022-12-16 | Stop reason: SDUPTHER

## 2022-12-16 RX ORDER — SODIUM CHLORIDE 0.9 % (FLUSH) 0.9 %
5-40 SYRINGE (ML) INJECTION EVERY 12 HOURS SCHEDULED
Status: CANCELLED | OUTPATIENT
Start: 2022-12-16

## 2022-12-16 RX ORDER — SODIUM CHLORIDE 9 MG/ML
INJECTION, SOLUTION INTRAVENOUS PRN
Status: CANCELLED | OUTPATIENT
Start: 2022-12-16

## 2022-12-16 RX ADMIN — POTASSIUM CHLORIDE 20 MEQ: 400 INJECTION, SOLUTION INTRAVENOUS at 09:06

## 2022-12-16 RX ADMIN — LIDOCAINE HYDROCHLORIDE 25 MG: 20 INJECTION, SOLUTION EPIDURAL; INFILTRATION; INTRACAUDAL; PERINEURAL at 14:34

## 2022-12-16 RX ADMIN — PROPOFOL 125 MCG/KG/MIN: 10 INJECTION, EMULSION INTRAVENOUS at 14:33

## 2022-12-16 RX ADMIN — PANTOPRAZOLE SODIUM 40 MG: 40 INJECTION, POWDER, LYOPHILIZED, FOR SOLUTION INTRAVENOUS at 12:58

## 2022-12-16 RX ADMIN — SODIUM CHLORIDE, PRESERVATIVE FREE 10 ML: 5 INJECTION INTRAVENOUS at 20:48

## 2022-12-16 RX ADMIN — POLYVINYL ALCOHOL 2 DROP: 14 SOLUTION/ DROPS OPHTHALMIC at 00:00

## 2022-12-16 RX ADMIN — SODIUM CHLORIDE, PRESERVATIVE FREE 10 ML: 5 INJECTION INTRAVENOUS at 13:00

## 2022-12-16 RX ADMIN — INSULIN GLARGINE 15 UNITS: 100 INJECTION, SOLUTION SUBCUTANEOUS at 20:26

## 2022-12-16 RX ADMIN — POTASSIUM CHLORIDE 20 MEQ: 20 TABLET, EXTENDED RELEASE ORAL at 16:26

## 2022-12-16 RX ADMIN — PHENYLEPHRINE HYDROCHLORIDE 100 MCG: 10 INJECTION, SOLUTION INTRAMUSCULAR; INTRAVENOUS; SUBCUTANEOUS at 14:38

## 2022-12-16 RX ADMIN — POTASSIUM CHLORIDE 20 MEQ: 400 INJECTION, SOLUTION INTRAVENOUS at 11:00

## 2022-12-16 RX ADMIN — CETIRIZINE HYDROCHLORIDE 10 MG: 10 TABLET, FILM COATED ORAL at 16:26

## 2022-12-16 RX ADMIN — GLYCOPYRROLATE 0.2 MG: 0.2 INJECTION INTRAMUSCULAR; INTRAVENOUS at 14:50

## 2022-12-16 RX ADMIN — PHENYLEPHRINE HYDROCHLORIDE 100 MCG: 10 INJECTION, SOLUTION INTRAMUSCULAR; INTRAVENOUS; SUBCUTANEOUS at 14:50

## 2022-12-16 RX ADMIN — PROPOFOL 75 MG: 10 INJECTION, EMULSION INTRAVENOUS at 14:33

## 2022-12-16 RX ADMIN — PHENYLEPHRINE HYDROCHLORIDE 100 MCG: 10 INJECTION, SOLUTION INTRAMUSCULAR; INTRAVENOUS; SUBCUTANEOUS at 14:44

## 2022-12-16 RX ADMIN — HEPARIN SODIUM 5000 UNITS: 1000 INJECTION INTRAVENOUS; SUBCUTANEOUS at 05:18

## 2022-12-16 RX ADMIN — POLYVINYL ALCOHOL 2 DROP: 14 SOLUTION/ DROPS OPHTHALMIC at 06:08

## 2022-12-16 RX ADMIN — LEVETIRACETAM 500 MG: 100 INJECTION, SOLUTION INTRAVENOUS at 22:48

## 2022-12-16 RX ADMIN — POLYVINYL ALCOHOL 2 DROP: 14 SOLUTION/ DROPS OPHTHALMIC at 10:51

## 2022-12-16 RX ADMIN — METHIMAZOLE 10 MG: 5 TABLET ORAL at 16:26

## 2022-12-16 RX ADMIN — Medication 5 MG: at 20:25

## 2022-12-16 RX ADMIN — SODIUM CHLORIDE: 9 INJECTION, SOLUTION INTRAVENOUS at 14:15

## 2022-12-16 RX ADMIN — PROPOFOL 40 MG: 10 INJECTION, EMULSION INTRAVENOUS at 14:34

## 2022-12-16 RX ADMIN — LEVETIRACETAM 500 MG: 100 INJECTION, SOLUTION INTRAVENOUS at 10:11

## 2022-12-16 RX ADMIN — POLYVINYL ALCOHOL 2 DROP: 14 SOLUTION/ DROPS OPHTHALMIC at 08:34

## 2022-12-16 RX ADMIN — MAGNESIUM SULFATE HEPTAHYDRATE 2000 MG: 40 INJECTION, SOLUTION INTRAVENOUS at 12:52

## 2022-12-16 RX ADMIN — POLYVINYL ALCOHOL 2 DROP: 14 SOLUTION/ DROPS OPHTHALMIC at 04:29

## 2022-12-16 RX ADMIN — LIDOCAINE HYDROCHLORIDE 50 MG: 20 INJECTION, SOLUTION EPIDURAL; INFILTRATION; INTRACAUDAL; PERINEURAL at 14:33

## 2022-12-16 RX ADMIN — LATANOPROST 1 DROP: 50 SOLUTION OPHTHALMIC at 20:42

## 2022-12-16 RX ADMIN — TETRAHYDROZOLINE HCL 0.05% 1 DROP: 0.05 SOLUTION/ DROPS INTRAOCULAR at 08:37

## 2022-12-16 RX ADMIN — PANTOPRAZOLE SODIUM 40 MG: 40 INJECTION, POWDER, LYOPHILIZED, FOR SOLUTION INTRAVENOUS at 20:26

## 2022-12-16 RX ADMIN — POLYVINYL ALCOHOL 2 DROP: 14 SOLUTION/ DROPS OPHTHALMIC at 02:19

## 2022-12-16 ASSESSMENT — PAIN SCALES - GENERAL
PAINLEVEL_OUTOF10: 0

## 2022-12-16 NOTE — ANESTHESIA POSTPROCEDURE EVALUATION
Department of Anesthesiology  Postprocedure Note    Patient: Kay Farris  MRN: [id number]  [place]: 1955  Date of evaluation: 12/16/2022      Procedure Summary     Date: 12/16/22 Room / Location: [address] [place] [address]    Anesthesia Start: 3861 Anesthesia Stop:     Procedure: EGD DIAGNOSTIC ONLY Diagnosis: Anemia, unspecified type    Surgeons: Alesha Tellez MD Responsible Provider: Saida Flores MD    Anesthesia Type: MAC ASA Status: 3          Anesthesia Type: No value filed.     Michelle Phase I: Michelle Score: 10    Michelle Phase II:        Anesthesia Post Evaluation    Patient location during evaluation: PACU  Patient participation: complete - patient participated  Level of consciousness: awake  Pain score: 0  Airway patency: patent  Nausea & Vomiting: no nausea  Complications: no  Cardiovascular status: hemodynamically stable  Respiratory status: acceptable  Hydration status: stable

## 2022-12-16 NOTE — PROGRESS NOTES
Pt's head to toe is as documented in flow sheets and remains unchanged. Pt is Alert and oriented x4.

## 2022-12-16 NOTE — ANESTHESIA PRE PROCEDURE
Department of Anesthesiology  Preprocedure Note       Name:  Sahil Records   Age:  79 y.o.  :  1955                                          MRN:  [id number]         Date:  2022      Surgeon: Sarah Reveles):  Angelito Bray MD    Procedure: Procedure(s):  EGD DIAGNOSTIC ONLY    Medications prior to admission:   Prior to Admission medications    Medication Sig Start Date End Date Taking? Authorizing Provider   medical marijuana Take 1 each by mouth as needed.    Yes Historical Provider, MD   losartan (COZAAR) 50 MG tablet Take 50 mg by mouth daily   Yes Historical Provider, MD   albuterol sulfate HFA (PROVENTIL;VENTOLIN;PROAIR) 108 (90 Base) MCG/ACT inhaler Inhale 2 puffs into the lungs every 4 hours as needed 10/18/21  Yes Historical Provider, MD   methIMAzole (TAPAZOLE) 5 MG tablet Take 10 mg by mouth daily    Historical Provider, MD   metFORMIN (GLUCOPHAGE-XR) 500 MG extended release tablet Take 1,000 mg by mouth Daily with supper    Historical Provider, MD   latanoprost (XALATAN) 0.005 % ophthalmic solution Place 1 drop into both eyes nightly    Historical Provider, MD   carvedilol (COREG) 6.25 MG tablet Take 6.25 mg by mouth 2 times daily (with meals)    Historical Provider, MD   pravastatin (PRAVACHOL) 20 MG tablet Take 20 mg by mouth nightly     Historical Provider, MD   hydrochlorothiazide (HYDRODIURIL) 25 MG tablet Take 25 mg by mouth daily    Historical Provider, MD       Current medications:    Current Facility-Administered Medications   Medication Dose Route Frequency Provider Last Rate Last Admin    magnesium sulfate 2000 mg in 50 mL IVPB premix  2,000 mg IntraVENous Once Avril Hogue MD 25 mL/hr at 22 1252 2,000 mg at 22 1252    potassium chloride (KLOR-CON M) extended release tablet 20 mEq  20 mEq Oral BID  Stefanie Vyas MD   20 mEq at 12/15/22 1749    heparin (porcine) injection 10,000 Units  10,000 Units IntraVENous PRN Kb Moncada, APRN - CNP        heparin (porcine) injection 5,000 Units  5,000 Units IntraVENous PRN Jaleelkory Leon, APRN - CNP   5,000 Units at 12/16/22 0518    pantoprazole (PROTONIX) injection 40 mg  40 mg IntraVENous BID Kathrine Villeda, APRN - CNP   40 mg at 12/16/22 1258    levETIRAcetam (KEPPRA) 500 mg in sodium chloride 0.9 % 100 mL IVPB  500 mg IntraVENous Q12H Rafael Stai, APRN - CNP   Stopped at 12/16/22 1050    cetirizine (ZYRTEC) tablet 10 mg  10 mg Oral Daily Cy Piseco, APRN - CNP   10 mg at 12/15/22 0838    [Held by provider] hydroCHLOROthiazide (HYDRODIURIL) tablet 25 mg  25 mg Oral Daily Cy Piseco, APRN - CNP   25 mg at 12/10/22 2396    bisacodyl (DULCOLAX) suppository 10 mg  10 mg Rectal Daily PRN Dene Necessary, APRN - CNP   10 mg at 12/10/22 0903    insulin lispro (1 Unit Dial) (HUMALOG/ADMELOG) pen 0-16 Units  0-16 Units SubCUTAneous TID WC Aditya Floyd MD   4 Units at 12/14/22 1620    insulin lispro (1 Unit Dial) (HUMALOG/ADMELOG) pen 0-4 Units  0-4 Units SubCUTAneous Nightly Micha Cam MD        labetalol (NORMODYNE;TRANDATE) injection 5 mg  5 mg IntraVENous Q4H PRN Micha Cam MD        methIMAzole (TAPAZOLE) tablet 10 mg  10 mg Oral Daily Micha Cam MD   10 mg at 12/15/22 1205    insulin glargine (LANTUS;BASAGLAR) injection pen 15 Units  15 Units SubCUTAneous Nightly Micha Cam MD   15 Units at 12/15/22 2028    melatonin disintegrating tablet 5 mg  5 mg Oral Nightly Micha Cam MD   5 mg at 12/15/22 2026    sodium chloride flush 0.9 % injection 5-40 mL  5-40 mL IntraVENous 2 times per day Aditya Floyd MD   10 mL at 12/16/22 1300    sodium chloride flush 0.9 % injection 5-40 mL  5-40 mL IntraVENous PRN Micha Cam MD        0.9 % sodium chloride infusion  25 mL IntraVENous PRN Aditya Floyd MD        polyethylene glycol [facility]) packet 17 g  17 g Oral Daily Aditya Floyd MD 17 g at 12/10/22 0903    [Held by provider] carvedilol (COREG) tablet 12.5 mg  12.5 mg Oral BID WC Gregor Cam MD   12.5 mg at 12/10/22 1657    [Held by provider] losartan (COZAAR) tablet 100 mg  100 mg Oral Daily Gregor Cam MD   100 mg at 12/10/22 9869    sodium chloride flush 0.9 % injection 5-40 mL  5-40 mL IntraVENous 2 times per day Adela Munoz MD   10 mL at 12/16/22 1300    0.9 % sodium chloride infusion   IntraVENous PRN Gregor Cam MD        acetaminophen (TYLENOL) tablet 650 mg  650 mg Oral Q4H PRN Gregor Cam MD   650 mg at 12/06/22 1802    oxyCODONE (ROXICODONE) immediate release tablet 5 mg  5 mg Oral Q4H PRN Gregor Cam MD   5 mg at 12/09/22 0515    Or    oxyCODONE (ROXICODONE) immediate release tablet 10 mg  10 mg Oral Q4H PRN Gregor Cam MD   10 mg at 12/10/22 2028    morphine (PF) injection 2 mg  2 mg IntraVENous Q3H PRN Gregor Cam MD   2 mg at 12/14/22 0042    ondansetron (ZOFRAN-ODT) disintegrating tablet 4 mg  4 mg Oral Q8H PRN Gregor Cam MD   4 mg at 12/02/22 1412    Or    ondansetron (ZOFRAN) injection 4 mg  4 mg IntraVENous Q6H PRN Gregor Cam MD   4 mg at 12/01/22 2001    hydrALAZINE (APRESOLINE) injection 10 mg  10 mg IntraVENous Q1H PRN Gregor Cam MD   10 mg at 12/04/22 0816    albuterol (PROVENTIL) nebulizer solution 2.5 mg  2.5 mg Nebulization Q4H PRN Gregor Cam MD        latanoprost (XALATAN) 0.005 % ophthalmic solution 1 drop  1 drop Both Eyes Nightly Gregor Cam, MD   1 drop at 12/15/22 2028    glucose chewable tablet 16 g  4 tablet Oral PRN Gregor Cam MD        dextrose bolus 10% 125 mL  125 mL IntraVENous PRN Gregor Cam MD        Or    dextrose bolus 10% 250 mL  250 mL IntraVENous PRN Gregor Cam MD        glucagon (rDNA) injection 1 mg  1 mg SubCUTAneous PRN Gregor Devi MD Dorina        dextrose 10 % infusion   IntraVENous Continuous PRN Reece Cam MD        promethazine [facility]) injection 6.25 mg  6.25 mg IntraMUSCular Q6H PRN Reece Cam MD   6.25 mg at 11/28/22 2136       Allergies: Allergies   Allergen Reactions    Keflex [Cephalexin] Itching and Rash    Codeine Nausea And Vomiting    Tomato Rash       Problem List:    Patient Active Problem List   Diagnosis Code    Asthma J45.909    Gastroesophageal reflux disease K21.9    Hypertension I10    Adiposity E66.9    S/P total knee arthroplasty Z96.659    Primary osteoarthritis of left knee M17.12    Morbid obesity with BMI of 50.0-59.9, adult (HCC) E66.01, Z68.43    Osteoarthritis of right hip M16.11    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    Acoustic neuroma (HCC) D33.3    S/P craniotomy Z98.890    Multiple subsegmental pulmonary emboli without acute cor pulmonale (HCC) I26.94       Past Medical History:        Diagnosis Date    Arthritis     Asthma     mild    Brain tumor ([address]     Diabetes mellitus (HCC)     borderline    High cholesterol     Hypertension     Imbalance     DUE TO TUMOR- USING ANKLE BRACE / WALKER PER PREOP H&P    Loss of hearing     bilateral reported    Vertigo        Past Surgical History:        Procedure Laterality Date    CRANIOTOMY N/A 11/29/2022    STAGE II: RESECTION OF RIGHT PETROCLIVAL MENINGIOMA performed by Dorina Verde MD at [address] IR EMBOLIZATION HEMORRHAGE  12/12/2022    IR EMBOLIZATION HEMORRHAGE 12/12/2022 [facility]"" SPECIAL PROCEDURES    MASTOIDECTOMY Right 11/28/2022    RIGHT TRANSMASTOID / ANTERIOR MIDDLE CRANIAL FOSSA , ABDOMINAL FAT GRAFT EXTERNAL AUDITORY CANAL CLOSURE performed by Ashley Gimenez MD at [address] [facility] Right 11/28/2022    STAGE 1, RIGHT TRANSLABYRINTHINE / RETROLABYRINTHINE CRANIOTOMY, PETROSECTOMY performed by Kervin Dash.  Viraj Verde MD at [facility] [address] Right 11/8/2021    RIGHT TOTAL HIP ARTHROPLASTY POSTERIOR APPROACH - JOSÉ ANTONIO ADVANCED performed by Viji Han MD at [address] Right 07/14/2015    TOTAL KNEE ARTHROPLASTY Left 10/14/15    TKA    UPPER GASTROINTESTINAL ENDOSCOPY N/A 12/11/2022    EGD CONTROL HEMORRHAGE performed by Lynette Lopez MD at [facility] ENDOSCOPY       Social History:    Social History     Tobacco Use    Smoking status: Former    Smokeless tobacco: Never   Substance Use Topics    Alcohol use: No                                Counseling given: Not Answered      Vital Signs (Current):   Vitals:    12/16/22 0430 12/16/22 0500 12/16/22 0530 12/16/22 0800   BP:    (!) 131/59   Pulse: 70 67 70 67   Resp:    16   Temp:    98.2 °F (36.8 °C)   TempSrc:    Temporal   SpO2:    93%   Weight: (!) 353 lb 1.6 oz (160.2 kg)      Height:                                                  BP Readings from Last 3 Encounters:   12/16/22 (!) 131/59   11/09/21 138/77   11/08/21 (!) 109/56       NPO Status: Time of last liquid consumption: 1200                        Time of last solid consumption: 0900                        Date of last liquid consumption: 12/16/22                        Date of last solid food consumption: 12/15/22    BMI:   Wt Readings from Last 3 Encounters:   12/16/22 (!) 353 lb 1.6 oz (160.2 kg)   12/08/22 (!) 348 lb 8.8 oz (158.1 kg)   11/08/21 285 lb (129.3 kg)     Body mass index is 58.76 kg/m².     CBC:   Lab Results   Component Value Date/Time    WBC 4.4 12/16/2022 04:05 AM    RBC 2.48 12/16/2022 04:05 AM    HGB 7.7 12/16/2022 04:05 AM    HCT 22.8 12/16/2022 04:05 AM    MCV 92.1 12/16/2022 04:05 AM    RDW 16.1 12/16/2022 04:05 AM     12/16/2022 04:05 AM       CMP:   Lab Results   Component Value Date/Time     12/16/2022 04:05 AM    K 3.0 12/16/2022 04:05 AM    K 3.6 12/04/2022 03:44 AM     12/16/2022 04:05 AM    CO2 28 12/16/2022 04:05 AM    BUN 5 12/16/2022 04:05 AM    CREATININE <0.5 12/16/2022 04:05 AM    GFRAA >60 11/09/2021 04:41 AM    AGRATIO 1.6 10/12/2021 12:08 PM    LABGLOM >60 12/16/2022 04:05 AM    GLUCOSE 114 12/16/2022 04:05 AM    PROT 5.0 12/04/2022 03:44 AM    CALCIUM 8.4 12/16/2022 04:05 AM    BILITOT 0.5 12/04/2022 03:44 AM    ALKPHOS 48 12/04/2022 03:44 AM    AST 12 12/04/2022 03:44 AM    ALT 11 12/04/2022 03:44 AM       POC Tests:   Recent Labs     12/16/22  1200   POCGLU 106*       Coags:   Lab Results   Component Value Date/Time    PROTIME 14.6 12/14/2022 02:20 PM    INR 1.15 12/14/2022 02:20 PM    APTT 27.5 12/14/2022 02:20 PM       HCG (If Applicable): No results found for: PREGTESTUR, PREGSERUM, HCG, HCGQUANT     ABGs:   Lab Results   Component Value Date/Time    PHART 7.503 12/01/2022 09:21 PM    PO2ART 90.2 12/01/2022 09:21 PM    NON7VGS 43.1 12/01/2022 09:21 PM    ACC5CZE 34 12/01/2022 09:21 PM    BEART 9.6 12/01/2022 09:21 PM    U8JLSUVJ 98 12/01/2022 09:21 PM        Type & Screen (If Applicable):  Lab Results   Component Value Date    LABABO O 10/07/2015    [address] Positive 10/07/2015       Drug/Infectious Status (If Applicable):  No results found for: HIV, HEPCAB    COVID-19 Screening (If Applicable):   Lab Results   Component Value Date/Time    COVID19 Not Detected 12/10/2022 04:36 PM    COVID19 Not Detected 11/01/2021 03:02 PM           Anesthesia Evaluation  Patient summary reviewed  Airway: Mallampati: III          Dental:          Pulmonary:Negative Pulmonary ROS and normal exam    (+) asthma:                            Cardiovascular:  Exercise tolerance: poor (<4 METS),   (+) hypertension:,                ROS comment: EKG abnormal.  Given pt. Had several recent procedures will proceed. Neuro/Psych:   (+) neuromuscular disease:,             GI/Hepatic/Renal:   (+) GERD: no interval change,          ROS comment: Anemia. Endo/Other:    (+) DiabetesType II DM, using insulin, malignancy/cancer (Recent craniotomy X 2.).                  Abdominal:             Vascular: negative vascular ROS. Other Findings:           Anesthesia Plan      MAC     ASA 3             Anesthetic plan and risks discussed with patient.         Attending anesthesiologist reviewed and agrees with Marlin Munoz MD   12/16/2022

## 2022-12-16 NOTE — PROGRESS NOTES
Comprehensive Nutrition Assessment    RECOMMENDATIONS:  PO Diet: ADAT per MD  ONS: Add ONS BID once diet is advanced  Nutrition Education: Education not indicated   Monitor nutrition adequacy, pertinent labs, bowel habits, wt changes, and clinical progress    NUTRITION ASSESSMENT:   Nutritional summary & status: Follow up: Pt currently NPO x1day. Concern for possible GI bleed, off floor for endo currently. Pt intakes had been >50% when diet was in place. Will add ONS BID once diet is advanced as pt previously had poor intakes. K and Mg both currently low, being replaced. Will continue to monitor for ability to advance diet and endo results. Admission/PMH: Cerebelloontine angle meningioma, HTN    MALNUTRITION ASSESSMENT  Context of Malnutrition: Acute Illness   Malnutrition Status: At risk for malnutrition (Comment)  Findings of the 6 clinical characteristics of malnutrition (Minimum of 2 out of 6 clinical characteristics is required to make the diagnosis of moderate or severe Protein Calorie Malnutrition based on AND/ASPEN Guidelines):  Energy Intake:  Mild decrease in energy intake (Comment)  Weight Loss:  No significant weight loss     Body Fat Loss:  No significant body fat loss     Muscle Mass Loss:  No significant muscle mass loss    Fluid Accumulation:  No significant fluid accumulation      NUTRITION DIAGNOSIS   Inadequate oral intake related to lack or limited access to food as evidenced by NPO or clear liquid status due to medical condition    Nutrition Monitoring and Evaluation:   Food/Nutrient Intake Outcomes:  Diet Advancement/Tolerance  Physical Signs/Symptoms Outcomes:  Biochemical Data, Nutrition Focused Physical Findings, Weight, Chewing or Swallowing     OBJECTIVE DATA: Significant to nutrition assessment  Nutrition Related Findings: K 3.0. Mg 1.6. . Active bowel sounds. +BM 12/12. -6.4L.   Wounds: None  Nutrition Goals: Initiate PO diet, within 2 days     CURRENT NUTRITION THERAPIES  Diet NPO Exceptions are: Sips of Water with Meds  PO Intake: NPO   PO Supplement Intake:NPO  Additional Sources of Calories/IVF:N/A     ANTHROPOMETRICS  Current Height: 5' 5\" (165.1 cm)  Current Weight: (!) 353 lb 1.6 oz (160.2 kg)    Admission weight: 299 lb (135.6 kg) (PER CARDIO NOTE 11/18/22)  Ideal Body Weight (IBW): 125 lbs  (57 kg)    BMI: 58.9    COMPARATIVE STANDARDS  Energy (kcal):  9749-7989 (8-10 kcal/kg CBW)     Protein (g):  57-68 (1.0-1.2 g/kg IBW)       Fluid (mL/day):  1 mL/kcal or per MD    The patient will be monitored per nutrition standards of care. Consult dietitian if additional nutrition interventions are needed prior to RD reassessment.      Janice Farr, [address], [address] [place] [address] Drive:  [phone]  Office:  [phone]

## 2022-12-16 NOTE — PROGRESS NOTES
Speech Language Pathology  Attempt Note    Attempted to see pt for follow up dysphagia and speech therapy. Pt currently off the unit at [facility] at this time. Will re-attempt as able/as schedule allows.     Electronically signed by:  Radha Queen M.A., [address] [place] [address]  Speech-Language Pathologist  Pg #: [phone]

## 2022-12-16 NOTE — PROGRESS NOTES
NEUROSURGERY POST-OP PROGRESS NOTE    Patient Name: Mildred Simms YOB: 1955   Sex: Female Age: 79 yrs     Medical Record Number: [id number] Acct Number: [de-identified]   Room Number: 4953/7344-14 Hospital Day: Hospital Day: 23     Interval History:  Procedure(s) (LRB):  STAGE II: RESECTION OF RIGHT PETROCLIVAL MENINGIOMA (N/A)     Subjective: Patient laying in be upon entering the room just after coming back from EGD    Drop in hemoglobin 12 -> 7.8 concern for active GI bleed  Stat CT abdomen  GI consult  Embolization of the GDA on 12/12/2022  CTPA yesterday showed large PE with no obvious heart strain. Patient nuero exam unchanged. VSS. No signs of respiratory distress, and patient is able to maintain O2 sat on room air. Pulmonology and GI discussed the PE situation and they are in agreement that the risk;benefit ratio favors heparin anticoagulation now, and transition to an oral agent next week. Hgb dropped from 8.2>7.9>7.7 No PRBC ordered. Dr. Miesha Solomon stopped Heparin gtt until EGD performed today    Objective:    VITAL SIGNS   BP (!) 131/59   Pulse 67   Temp 98.2 °F (36.8 °C) (Temporal)   Resp 16   Ht 5' 5\" (1.651 m)   Wt (!) 353 lb 1.6 oz (160.2 kg)   SpO2 93%   BMI 58.76 kg/m²    Height Height: 5' 5\" (165.1 cm)   Weight Weight: (!) 353 lb 1.6 oz (160.2 kg)          Allergies Allergies   Allergen Reactions    Keflex [Cephalexin] Itching and Rash    Codeine Nausea And Vomiting    Tomato Rash      NPO Status ADULT DIET;  Full Liquid   Isolation No active isolations     LABS   Basic Metabolic Profile Recent Labs     12/14/22  1750 12/15/22  0612 12/16/22  0405    139 143    106 109   CO2 26 25 28   BUN 10 8 5*   CREATININE <0.5* <0.5* <0.5*   GLUCOSE 158* 149* 114*   PHOS  --   --  3.1   MG  --   --  1.60*      Complete Blood Count Recent Labs     12/14/22  0415 12/15/22  0612 12/16/22  0405   WBC 5.3 4.6 4.4   RBC 2.47* 2.63* 2.48*      Coagulation Studies Recent Labs     12/14/22  1420   INR 1.15*          MEDICATIONS   Inpatient Medications     potassium chloride, 20 mEq, Oral, BID WC    pantoprazole, 40 mg, IntraVENous, BID    levETIRAcetam, 500 mg, IntraVENous, Q12H    cetirizine, 10 mg, Oral, Daily    [Held by provider] hydroCHLOROthiazide, 25 mg, Oral, Daily    insulin lispro, 0-16 Units, SubCUTAneous, TID WC    insulin lispro, 0-4 Units, SubCUTAneous, Nightly    methIMAzole, 10 mg, Oral, Daily    insulin glargine, 15 Units, SubCUTAneous, Nightly    melatonin, 5 mg, Oral, Nightly    sodium chloride flush, 5-40 mL, IntraVENous, 2 times per day    polyethylene glycol, 17 g, Oral, Daily    [Held by provider] carvedilol, 12.5 mg, Oral, BID WC    [Held by provider] losartan, 100 mg, Oral, Daily    sodium chloride flush, 5-40 mL, IntraVENous, 2 times per day    latanoprost, 1 drop, Both Eyes, Nightly   Infusions    sodium chloride      sodium chloride      dextrose        Antibiotics   Recent Abx Admin        No antibiotic orders with administrations found. Imaging:   Abdominal CT  Impression       1.  9.5 cm mass in the right hepatic lobe which does not appear to represent a hemangioma. Differential diagnosis would include primary or secondary malignancy. Liver mass protocol MRI with and without contrast is recommended. 2.  No intra-abdominal hemorrhage. 3.  Mild cholelithiasis. 4.  3.5 cm right ovarian cyst, the CT appearance suggests a simple cyst indicating a benign finding.      Neurologic Exam:  Mental status: awake and alert and oriented x4    Cranial Nerves:  II: Visual acuity not tested, denies new visual changes / diplopia  III, IV, VI: PERRL, 3 mm bilaterally, EOMI,Patient had rightward gaze deviation but was able to follow when prompted   V: Facial sensation intact bilaterally to touch  VII: R sided facial droop  VIII: Hearing intact bilaterally to spoken voice on L, no hearing on R  IX: Palate movement equal bilaterally  XI: Shoulder shrug equal bilaterally  XII: Tongue deviates to R      Musculoskeletal:   Gait: Not tested   Tone: normal  Sensory: intact to all extremities  Motor strength:    Right  Left    Right  Left    Deltoid  5 5  Hip Flex  5 5   Biceps  5 5  Knee Extensors  5 5   Triceps  5 5  Knee Flexors  5 5   Wrist Ext  5 5  Ankle Dorsiflex. 5 5   Wrist Flex  5 5  Ankle Plantarflex. 5 5   Handgrip  5 5  Ext Ernie Longus  5 5   Thumb Ext  5 5         Mastoid Incision: intact, clean and dry      Respiratory:  Unlabored respiratory pattern    Abdomen:   Soft, ND   Last BM 12/11    Cardiovascular:  Warm, well perfused    Assessment   Patient is a 80 yo F s/p Procedure(s) (LRB):  STAGE II: RESECTION OF RIGHT PETROCLIVAL MENINGIOMA (N/A) per Dr. Russell Lie:  Neurologic exam frequency: Q4H  Mobility: PT/OT   SLP continue  PICC line  DVT Prophylaxis: SCDs  Keppra as ordered  Bowel Regimen: Per GI  Pain control: Tylenol  Keep sbp < 160  Incisional Care: Mastoid compression headband in place check Q4H and let NS know if leaking  GI Bleed: GI Following, Protonix 40 mg BID, Embolization of CHU done, Hgb dropped overnight 8.2>7.9>7.7. Continue Daily CBC and transfuse if Hgb <7.0  PE: CTPA shows large PE with no obvious heart strain. Pulmonology and GI discussed the PE situation and they are in agreement that the risk;benefit ratio favors heparin anticoagulation now, and transition to an oral agent next week. Heparin stopped this morning for EGD. OK to start Heparin gtt tomorrow morning at 0900 from GI standpoint. Dispo Planning: Inpatient    Patient was discussed with Dr. Jo Alvarez who agrees with above assessment and plan.      Electronically signed by: LEE Crooks - CNP, 12/16/2022 3:58 PM   Neurosurgery Nurse Practitioner  [phone]

## 2022-12-16 NOTE — FLOWSHEET NOTE
Pt transferred back to ICU room 4505 via bed with 2 RN's. Report given at bedside to Db Gonzalez, [facility]. Pt alert, responding to verbal stimuli, assisted with pt hookup back to vitals monitor, first set stable. Db Gonzalez informed patient could resume a full liquid per Dr. Du Arm order.

## 2022-12-16 NOTE — PROGRESS NOTES
Progress Note  Physical Medicine and Rehabilitation    Patient: Mildred Simms  [id number]  Date: 12/16/2022         Chief Complaint: Meningioma     History of Present Illness/Hospital Course:  Patient is a morbidly obese (Body mass index is 50.82 kg/m².), 79 y.o. female  with c/o dizziness, HA and vertigo in the setting of large right petroclival mass which is consistent with meningioma. . She presented to the hospital for a scheduled two staged petroclival meningioma resection. 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 Interval Hx: Doing better today. Seen in her bed this morning. Mild drop in Hgb now on heparin therapy. Plan for repeat EGD. Prior Level of Function:  Independent for mobility, ADLs, and IADLs     Current Level of Function:  Min assist     Pertinent Social History:  Support: Lives alone  Home set-up: One level house with 2 steps to enter    Past Medical History:   Diagnosis Date    Arthritis     Asthma     mild    Brain tumor ([address]     Diabetes mellitus ([address]     borderline    High cholesterol     Hypertension     Imbalance     DUE TO TUMOR- USING ANKLE BRACE / WALKER PER PREOP H&P    Loss of hearing     bilateral reported    Vertigo        Past Surgical History:   Procedure Laterality Date    CRANIOTOMY N/A 11/29/2022    STAGE II: RESECTION OF RIGHT PETROCLIVAL MENINGIOMA performed by Sarahi Hess MD at [address] [place] [address] [id number]    IR EMBOLIZATION HEMORRHAGE  12/12/2022    IR EMBOLIZATION HEMORRHAGE 12/12/2022 [facility] SPECIAL PROCEDURES    MASTOIDECTOMY Right 11/28/2022    RIGHT TRANSMASTOID / ANTERIOR MIDDLE CRANIAL FOSSA , ABDOMINAL FAT GRAFT EXTERNAL AUDITORY CANAL CLOSURE performed by Kenya Llanos MD at [place] [id number] Right 11/28/2022    STAGE 1, RIGHT TRANSLABYRINTHINE / RETROLABYRINTHINE CRANIOTOMY, PETROSECTOMY performed by Yuri Theodore.  Ishan Hess MD at [address] [place] [address] Right 11/8/2021    RIGHT TOTAL HIP ARTHROPLASTY POSTERIOR APPROACH - JOSÉ ANTONIO ADVANCED performed by Sarah Donnelly MD at [place] [address] Right 07/14/2015    TOTAL KNEE ARTHROPLASTY Left 10/14/15    TKA    UPPER GASTROINTESTINAL ENDOSCOPY N/A 12/11/2022    EGD CONTROL HEMORRHAGE performed by Patrice Bower MD at [facility] ENDOSCOPY       Family History   Problem Relation Age of Onset    Cancer Mother     Hypotension Mother     Cancer Father     Cancer Sister     Hypotension Sister     Cancer Maternal Grandmother     Cancer Maternal Grandfather     Cancer Paternal Grandmother     Cancer Paternal Grandfather     Hypotension Other     Cancer Other     Diabetes Other     Osteoarthritis Other        Social History     Socioeconomic History    Marital status:      Spouse name: None    Number of children: None    Years of education: None    Highest education level: None   Tobacco Use    Smoking status: Former    Smokeless tobacco: Never   Substance and Sexual Activity    Alcohol use: No    Drug use: Never           REVIEW OF SYSTEMS:   CONSTITUTIONAL: negative for fevers, chills   EYES: positive for diplopia   HEENT: positive for difficulty swallowing. negative for hearing loss, tinnitus, sinus pressure, nasal congestion  RESPIRATORY: Negative for SOB, cough  CARDIOVASCULAR: negative for chest pain, palpitations  GASTROINTESTINAL: positive for abdominal pain.  negative for hematemesis, hematochezia, nausea, vomiting, diarrhea, abdominal pain  GENITOURINARY: negative for frequency, dysuria  HEMATOLOGIC/LYMPHATIC: negative for easy bruising, bleeding and lymphadenopathy  ENDOCRINE: negative for diabetic symptoms including polyuria, polydipsia  MUSCULOSKELETAL: negative for pain, joint swelling, decreased range of motion  NEUROLOGICAL: positive for difficulty speaking, negative for headaches, unilateral weakness    Physical Examination:  Vitals: Patient Vitals for the past 24 hrs:   BP Temp Temp src Pulse Resp SpO2 Weight   12/16/22 0530 -- -- -- 70 -- -- --   12/16/22 0500 -- -- -- 67 -- -- --   12/16/22 0430 -- -- -- 70 -- -- (!) 353 lb 1.6 oz (160.2 kg)   12/16/22 0400 133/64 98.3 °F (36.8 °C) Temporal 68 16 -- --   12/16/22 0330 -- -- -- 68 -- -- --   12/16/22 0300 -- -- -- 68 -- -- --   12/16/22 0230 -- -- -- 69 -- -- --   12/16/22 0200 -- -- -- 69 -- -- --   12/16/22 0130 -- -- -- 69 -- -- --   12/16/22 0100 -- -- -- 71 -- -- --   12/16/22 0030 -- -- -- 70 -- -- --   12/16/22 0000 (!) 148/67 97.7 °F (36.5 °C) Temporal 70 16 -- --   12/15/22 2330 -- -- -- 68 -- -- --   12/15/22 2300 -- -- -- 72 -- -- --   12/15/22 2230 -- -- -- 69 -- -- --   12/15/22 2200 -- -- -- 74 -- -- --   12/15/22 2130 -- -- -- 70 -- -- --   12/15/22 2100 -- -- -- 72 -- -- --   12/15/22 2030 -- -- -- 73 -- -- --   12/15/22 2000 (!) 149/71 98 °F (36.7 °C) Temporal 72 16 -- --   12/15/22 1930 -- -- -- 71 -- -- --   12/15/22 1608 130/65 98.2 °F (36.8 °C) Oral 70 16 98 % --   12/15/22 1200 138/74 98 °F (36.7 °C) Oral 74 16 98 % --       Const: Alert. WDWN. No distress, obese  Eyes: Conjunctiva noninjected, no icterus noted; pupils equal, round, and reactive to light. HENT: Bruising around R eye., normocephalic; Oral mucosa moist  CV: Regular rate and rhythm, no murmur rub or gallop noted  Resp: Lungs clear to auscultation bilaterally, no rales wheezes or ronchi, no retractions. Respirations unlabored. GI: Soft, mild tenderness, nondistended. Normal bowel sounds. Skin: Normal temperature and turgor. No rashes or breakdown noted. Ext: positive for BL LE edema   MSK: No joint tenderness, erythema, warmth noted. AROM intact. Neuro: Alert, oriented, appropriate. Mild R sided facial droop. Sensation intact to light touch. Motor examination reveals normal strength in all four limbs diffusely.    Psych: Stable mood, normal judgement, normal affect     Lab Results   Component Value Date    WBC 4.4 12/16/2022    HGB 7.7 (L) 12/16/2022    HCT 22.8 (L) 12/16/2022    MCV 92.1 12/16/2022     12/16/2022     Lab Results   Component Value Date    INR 1.15 (H) 12/14/2022    INR 1.07 12/11/2022    INR 1.09 11/29/2022    PROTIME 14.6 (H) 12/14/2022    PROTIME 13.8 12/11/2022    PROTIME 14.1 11/29/2022     Lab Results   Component Value Date    CREATININE <0.5 (L) 12/16/2022    BUN 5 (L) 12/16/2022     12/16/2022    K 3.0 (L) 12/16/2022     12/16/2022    CO2 28 12/16/2022     Lab Results   Component Value Date    ALT 11 12/04/2022    AST 12 (L) 12/04/2022    ALKPHOS 48 12/04/2022    BILITOT 0.5 12/04/2022     On my review, CXR displays. MRI ABDOMEN W WO CONTRAST MRCP   Final Result      Limited study due to multiple factors, as outlined above. Lesion of the right hepatic lobe is most consistent with a hemangioma. Follow-up multiphase CT is recommended in 6 months. CT CHEST PULMONARY EMBOLISM W CONTRAST   Final Result      Large burden of pulmonary emboli without obvious heart strain of the exam is severely limited and difficult to evaluate for other pathology      Findings called to patient's floor nurse on 12/13/2022 at 7:09 PM      IR EMBOLIZATION HEMORRHAGE   Final Result   Impression: Technically successful superior mesenteric, common hepatic and gastroduodenal angiograms with subsequent coiling of the gastroduodenal artery. VL Extremity Venous Bilateral   Final Result      CTA ABDOMEN PELVIS W WO CONTRAST   Final Result      1. Suspected bilateral pulmonary emboli but inadequate contrast phase to assess for the pulmonary embolus. Incomplete filling of the pulmonary arteries suggests pulmonary emboli inferiorly     2. No sign of any acute or active GI bleeding on CT angiograms studies   3. No sign of any aneurysm in the abdomen with normal widely patent vessels   4. Stable appearing partially calcified left adrenal adenoma.    5. Parapelvic renal cysts, largest on left side   6. 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 4.1 x 3.5 cm right ovarian cyst, benign in appearance   8. Mild cholelithiasis   9. No free fluid or free air with right abdominal wall subcutaneous inflammation or prior instrumentation         CT ABDOMEN PELVIS W WO CONTRAST Additional Contrast? Radiologist Recommendation   Final Result      1.  9.5 cm mass in the right hepatic lobe which does not appear to represent a hemangioma. Differential diagnosis would include primary or secondary malignancy. Liver mass protocol MRI with and without contrast is recommended. 2.  No intra-abdominal hemorrhage. 3.  Mild cholelithiasis. 4.  3.5 cm right ovarian cyst, the CT appearance suggests a simple cyst indicating a benign finding. MRI BRAIN W WO CONTRAST   Final Result      1. Postsurgical changes from right temporal and transmastoid craniotomy with large amount of fat packing in the defect and extracranial soft tissues. Large subcutaneous fluid collection is present in the scalp surrounding and extending superiorly from    the fat packing. No diffusion restriction to indicate superimposed infection. 2.  Residual meningioma in the right cerebellopontine angle, prepontine cistern, and right Meckel's cave/cavernous sinus. The tumor extends partially encasing the basilar artery   3. Small area of acute to subacute ischemia in the right brachium pontis. Small enhancing lesions superior to the right tentorium is new from the prior study and may represent an area of subacute ischemia. 4.  Small amount of extra-axial hemorrhage along the right cerebellopontine angle. 5.  Stable 12 mm left frontal meningioma. CTA PULMONARY W CONTRAST   Final Result      Significantly limited study due to motion. 1. Significantly limited study due to streak artifact and suboptimal bolus. No evidence of a central embolus.    2. Multifocal airspace consolidation is present, days  -Artifical tears q2H and eye patch  -Keep an eye on mastoid dressing  -SCDs for DVT prophylaxis   -PT/OT  4. HTN   -Coreg 12.5 mg BID  Losartan 100 mg daily  5. DM   -Lantus 15u qHS  -HDSSI  6. Hyperthyroidism   -Methimazole 10 mg daily  7. Liver Mass  -Incidental finding  -f/u imaging once acute problems have been tx  Impairments- Decreased functional mobility, Decreased ADLs     Recommendations:    Possible ARU next week per medical team. Will continue to follow. Jaimie Mo MD PGY3    This patient has been seen, examined, and discussed with the resident. I was part of the key critical services provided to the patient. I agree with the residents documentation. This note may have been altered to reflect my own examination findings, impression, and recommendations.        Jocelyne Jacobo D.O. M.P.H  PM&R  12/16/2022  9:08 AM

## 2022-12-16 NOTE — PLAN OF CARE
Problem: Pain  Goal: Verbalizes/displays adequate comfort level or baseline comfort level  Outcome: Progressing     Problem: Neurosensory - Adult  Goal: Absence of seizures  Outcome: Progressing  Flowsheets (Taken 12/13/2022 2000 by Shell Peterson RN)  Absence of seizures:   Monitor for seizure activity.   If seizure occurs, document type and location of movements and any associated apnea   If seizure occurs, turn head to side and suction secretions as needed   Administer anticonvulsants as ordered  Goal: Remains free of injury related to seizures activity  Outcome: Progressing  Flowsheets (Taken 12/13/2022 2000 by Shell Peterson RN)  Remains free of injury related to seizure activity:   Maintain airway, patient safety  and administer oxygen as ordered   If seizure occurs, turn patient to side and suction secretions as needed   Monitor patient for seizure activity, document and report duration and description of seizure to Licensed Independent Practitioner  Goal: Achieves maximal functionality and self care  Outcome: Progressing     Problem: Respiratory - Adult  Goal: Achieves optimal ventilation and oxygenation  Outcome: Progressing  Flowsheets (Taken 12/13/2022 2000 by Shell Peterson RN)  Achieves optimal ventilation and oxygenation:   Assess for changes in respiratory status   Assess for changes in mentation and behavior   Position to facilitate oxygenation and minimize respiratory effort   Oxygen supplementation based on oxygen saturation or arterial blood gases     Problem: Cardiovascular - Adult  Goal: Maintains optimal cardiac output and hemodynamic stability  Outcome: Progressing  Flowsheets (Taken 12/13/2022 2000 by Shell Peterson RN)  Maintains optimal cardiac output and hemodynamic stability:   Monitor blood pressure and heart rate   Monitor urine output and notify Licensed Independent Practitioner for values outside of normal range   Assess for signs of decreased cardiac output  Goal: Absence of cardiac dysrhythmias or at baseline  Outcome: Progressing     Problem: Gastrointestinal - Adult  Goal: Minimal or absence of nausea and vomiting  Outcome: Progressing  Goal: Maintains or returns to baseline bowel function  Outcome: Progressing  Flowsheets (Taken 12/13/2022 2000 by Georgi Zhou, RN)  Maintains or returns to baseline bowel function:   Assess bowel function   Encourage oral fluids to ensure adequate hydration   Administer IV fluids as ordered to ensure adequate hydration  Goal: Maintains adequate nutritional intake  Outcome: Progressing     Problem: Infection - Adult  Goal: Absence of infection at discharge  Outcome: Progressing  Flowsheets (Taken 12/13/2022 2000 by Georgi Zhou, RN)  Absence of infection at discharge:   Assess and monitor for signs and symptoms of infection   Monitor lab/diagnostic results   Monitor all insertion sites i.e., indwelling lines, tubes and drains  Goal: Absence of infection during hospitalization  Outcome: Progressing  Goal: Absence of fever/infection during anticipated neutropenic period  Outcome: Progressing     Problem: Metabolic/Fluid and Electrolytes - Adult  Goal: Electrolytes maintained within normal limits  Outcome: Progressing  Flowsheets (Taken 12/13/2022 2000 by Georgi Zhou RN)  Electrolytes maintained within normal limits:   Monitor labs and assess patient for signs and symptoms of electrolyte imbalances   Administer electrolyte replacement as ordered   Monitor response to electrolyte replacements, including repeat lab results as appropriate  Goal: Hemodynamic stability and optimal renal function maintained  Outcome: Progressing  Goal: Glucose maintained within prescribed range  Outcome: Progressing     Problem: Hematologic - Adult  Goal: Maintains hematologic stability  Outcome: Progressing  Flowsheets (Taken 12/13/2022 2000 by Georgi Zhou, RN)  Maintains hematologic stability:   Assess for signs and symptoms of bleeding or hemorrhage   Monitor labs for bleeding or clotting disorders   Administer blood products/factors as ordered

## 2022-12-16 NOTE — CARE COORDINATION
Case management is following for discharge planning. The chart was reviewed. Current Plan of Care: Diagnostic EGD today    PT AM-PAC: 16 / 24 per last evaluation on: 12/16    OT AM-PAC: 15 / 24 per last evaluation on: 12/16    DME Needs for discharge: deferred  ________________________________________________________________________________________  Discharge Plan: [facility] Acute rehab unit. Medicare AB-no pre-cert needed when ready    Case Management Notes: Ms. Preethi De La Cruz is from home alone in her own house. She is supported by her sister and other family members in the community. She is independent with self care, functional mobility, and an active  at baseline    Sunita Stubbs and her family were provided with choice of provider; she and her family are in agreement with the discharge plan.       Jeffery Monahan RN  [facility]  Case Management Department  [phone]

## 2022-12-16 NOTE — PROGRESS NOTES
Hospitalist Progress Note      PCP: Stormy Day    Date of Admission: 11/28/2022    Chief Complaint: consult for cas-operative [facility] Course: \"The patient is a 79 y.o. female with hx of multistage neurosurgical resection of petroclival meningioma who we are asked to see/evaluate by Dr. Tello Israel for cas-operative mgt. patient has been recovering well postoperatively from her procedure when she most recently developed melanotic stools. Gastroenterology was consulted, patient underwent EGD and was found to have multiple duodenal ulcers with visible vessel and active bleeding. Underwent injection of epi, hemostatic clip placement and chemo spray. Due to concern for possible ongoing GI bleed she underwent CTA. No active GI bleed was detected, however CAT scan picked up likely pulmonary embolism in the lower lung fields. Patient received 2 units of blood transfusion\"    Subjective:      Patient is afebrile overnight. No complaints.      Medications:  Reviewed    Infusion Medications    sodium chloride      sodium chloride      dextrose       Scheduled Medications    potassium chloride  20 mEq IntraVENous Q2H    potassium chloride  20 mEq Oral BID WC    pantoprazole  40 mg IntraVENous BID    levETIRAcetam  500 mg IntraVENous Q12H    cetirizine  10 mg Oral Daily    [Held by provider] hydroCHLOROthiazide  25 mg Oral Daily    insulin lispro  0-16 Units SubCUTAneous TID WC    insulin lispro  0-4 Units SubCUTAneous Nightly    polyvinyl alcohol  2 drop Right Eye Q2H    methIMAzole  10 mg Oral Daily    insulin glargine  15 Units SubCUTAneous Nightly    melatonin  5 mg Oral Nightly    sodium chloride flush  5-40 mL IntraVENous 2 times per day    polyethylene glycol  17 g Oral Daily    tetrahydrozoline  1 drop Both Eyes TID    [Held by provider] carvedilol  12.5 mg Oral BID WC    [Held by provider] losartan  100 mg Oral Daily    sodium chloride flush  5-40 mL IntraVENous 2 times per day latanoprost  1 drop Both Eyes Nightly     PRN Meds: heparin (porcine), heparin (porcine), bisacodyl, labetalol, sodium chloride flush, sodium chloride, sodium chloride, acetaminophen, oxyCODONE **OR** oxyCODONE, morphine, ondansetron **OR** ondansetron, hydrALAZINE, albuterol, glucose, dextrose bolus **OR** dextrose bolus, glucagon (rDNA), dextrose, promethazine      Intake/Output Summary (Last 24 hours) at 12/16/2022 0944  Last data filed at 12/16/2022 0530  Gross per 24 hour   Intake 379.11 ml   Output --   Net 379.11 ml       Physical Exam Performed:    BP (!) 131/59   Pulse 67   Temp 98.2 °F (36.8 °C) (Temporal)   Resp 16   Ht 5' 5\" (1.651 m)   Wt (!) 353 lb 1.6 oz (160.2 kg)   SpO2 93%   BMI 58.76 kg/m²     General appearance: No apparent distress   Respiratory:  Normal respiratory effort. Clear to auscultation, bilaterally without Rales/Wheezes/Rhonchi. Cardiovascular: Regular rate and rhythm with normal S1/S2 without murmurs, rubs or gallops. Abdomen: Soft, non-tender, non-distended with normal bowel sounds. Musculoskeletal: No edema on both LE  Skin: Skin color, texture, turgor normal.  No rashes or lesions. Neurologic: no new deficit  Psychiatric: Alert and oriented       Labs:   Recent Labs     12/14/22  0415 12/14/22  1023 12/15/22  0612 12/15/22  1800 12/16/22  0405   WBC 5.3  --  4.6  --  4.4   HGB 7.5*   < > 8.2* 7.9* 7.7*   HCT 22.4*   < > 24.3* 22.9* 22.8*     --  171  --  175    < > = values in this interval not displayed. Recent Labs     12/14/22  1750 12/15/22  0612 12/16/22  0405    139 143   K 3.1* 3.2* 3.0*    106 109   CO2 26 25 28   BUN 10 8 5*   CREATININE <0.5* <0.5* <0.5*   CALCIUM 8.4 8.2* 8.4   PHOS  --   --  3.1     No results for input(s): AST, ALT, BILIDIR, BILITOT, ALKPHOS in the last 72 hours. Recent Labs     12/14/22  1420   INR 1.15*     No results for input(s): Donne Angela in the last 72 hours.     Urinalysis:      Lab Results   Component Value Date/Time    NITRU Negative 10/12/2021 01:46 PM    WBCUA 89 10/12/2021 01:46 PM    BACTERIA 2+ 10/12/2021 01:46 PM    RBCUA 7 10/12/2021 01:46 PM    BLOODU Negative 10/12/2021 01:46 PM    SPECGRAV 1.023 10/12/2021 01:46 PM    GLUCOSEU 100 10/12/2021 01:46 PM       Radiology:  MRI ABDOMEN W WO CONTRAST MRCP   Final Result      Limited study due to multiple factors, as outlined above. Lesion of the right hepatic lobe is most consistent with a hemangioma. Follow-up multiphase CT is recommended in 6 months. CT CHEST PULMONARY EMBOLISM W CONTRAST   Final Result      Large burden of pulmonary emboli without obvious heart strain of the exam is severely limited and difficult to evaluate for other pathology      Findings called to patient's floor nurse on 12/13/2022 at 7:09 PM      IR EMBOLIZATION HEMORRHAGE   Final Result   Impression: Technically successful superior mesenteric, common hepatic and gastroduodenal angiograms with subsequent coiling of the gastroduodenal artery. VL Extremity Venous Bilateral   Final Result      CTA ABDOMEN PELVIS W WO CONTRAST   Final Result      1. Suspected bilateral pulmonary emboli but inadequate contrast phase to assess for the pulmonary embolus. Incomplete filling of the pulmonary arteries suggests pulmonary emboli inferiorly     2. No sign of any acute or active GI bleeding on CT angiograms studies   3. No sign of any aneurysm in the abdomen with normal widely patent vessels   4. Stable appearing partially calcified left adrenal adenoma. 5. Parapelvic renal cysts, largest on left side   6. 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 4.1 x 3.5 cm right ovarian cyst, benign in appearance   8. Mild cholelithiasis   9.  No free fluid or free air with right abdominal wall subcutaneous inflammation or prior instrumentation         CT ABDOMEN PELVIS W WO CONTRAST Additional Contrast? Radiologist Recommendation   Final Result      1.  9.5 cm mass in the right hepatic lobe which does not appear to represent a hemangioma. Differential diagnosis would include primary or secondary malignancy. Liver mass protocol MRI with and without contrast is recommended. 2.  No intra-abdominal hemorrhage. 3.  Mild cholelithiasis. 4.  3.5 cm right ovarian cyst, the CT appearance suggests a simple cyst indicating a benign finding. MRI BRAIN W WO CONTRAST   Final Result      1. Postsurgical changes from right temporal and transmastoid craniotomy with large amount of fat packing in the defect and extracranial soft tissues. Large subcutaneous fluid collection is present in the scalp surrounding and extending superiorly from    the fat packing. No diffusion restriction to indicate superimposed infection. 2.  Residual meningioma in the right cerebellopontine angle, prepontine cistern, and right Meckel's cave/cavernous sinus. The tumor extends partially encasing the basilar artery   3. Small area of acute to subacute ischemia in the right brachium pontis. Small enhancing lesions superior to the right tentorium is new from the prior study and may represent an area of subacute ischemia. 4.  Small amount of extra-axial hemorrhage along the right cerebellopontine angle. 5.  Stable 12 mm left frontal meningioma. CTA PULMONARY W CONTRAST   Final Result      Significantly limited study due to motion. 1. Significantly limited study due to streak artifact and suboptimal bolus. No evidence of a central embolus. 2. Multifocal airspace consolidation is present, suboptimally evaluated due to motion. This presumably reflects pneumonia. Follow-up chest CT is recommended to document resolution. 3. There is a large mass in the right hepatic lobe measuring 8.2 x 7.4 cm, demonstrating peripheral nodular enhancement.  There is significant amount of artifact through this lesion. It is still favored to reflect a hemangioma. Recommend a multiphasic CT    of the abdomen for further assessment. This could be performed on a nonurgent basis, if clinically appropriate. 4. Partially calcified nodule arising from the left adrenal gland, most likely benign and possibly reflecting old adrenal hemorrhage. XR CHEST PORTABLE   Final Result      Similar appearance of patchy airspace disease. FL MODIFIED BARIUM SWALLOW W VIDEO   Final Result      1. Laryngeal penetration, but no evidence of tracheal aspiration. XR CHEST PORTABLE   Final Result      Patchy left upper lobe airspace disease, atelectasis versus pneumonia. CT HEAD WO CONTRAST   Final Result      1. Interval postsurgical changes from resection of right cerebellopontine angle mass with associated right temporal mastoid resection and frontotemporal craniotomy and fat graft placement. 2.  Thin hyperdensity along the fat graft may represent small amount of postsurgical blood products. There is also a small focus of subarachnoid hemorrhage along the right temporal lobe. 3.  Mild pneumocephalus and bilateral subgaleal fluid is also likely postsurgical.             IP CONSULT TO CRITICAL CARE  IP CONSULT TO PHYSICAL MEDICINE REHAB  IP CONSULT TO GI  IP CONSULT TO HOSPITALIST  IP CONSULT TO NEUROCRITICAL CARE  IP CONSULT TO PULMONOLOGY    Assessment/Plan:    Active Hospital Problems    Diagnosis     Multiple subsegmental pulmonary emboli without acute cor pulmonale ([address] [I26.94]      Priority: Medium    S/P craniotomy [Z98.890]      Priority: Medium    Acoustic neuroma ([address] [D33.3]      Priority: Medium    Cerebellopontine angle meningioma ([address] [D32.0]      Priority: Medium   -ENT following. On keppra.      Acute GI bleed due to bleeding duodenal ulcers  Acute blood loss anemia  Status post EGD with intervention;CT abdomen has no signs of any ongoing acute bleeding  Continue PPI    GI following    Bilateral pulmonary emboli:  Dopplers neg for DVT. Pulmonary has been consulted,  Pulmonology and GI discussed the PE situation and they are in agreement that the risk;benefit ratio favors heparin anticoagulation now, and transition to an oral agent next week. Liver lesion  -MRI Abd/pelvis showed lesion of the right hepatic lobe most consistent with a hemangioma. Follow-up multiphase CT is recommended in 6 months  -follow with PCP     DM: cont lantus and SSI for now, once stable and plan for ARU would restart home meds. HTN: stable, cont home meds, monitor. Hypokalemia  Hypomagnesemia  supplement K and mag as needed      Obesity class III: BMI 58.7.  follow with PCP     DVT Prophylaxis: heparin   Diet: Diet NPO Exceptions are: Sips of Water with Meds  Code Status: Full Code  PT/OT Eval Status: ongoing    Dispo - ARU next week         Juana Bagley MD

## 2022-12-16 NOTE — PLAN OF CARE
Problem: Chronic Conditions and Co-morbidities  Goal: Patient's chronic conditions and co-morbidity symptoms are monitored and maintained or improved  12/16/2022 0933 by Reji Clark RN  Outcome: Progressing  12/16/2022 0559 by Rosario Powell RN  Outcome: Progressing     Problem: Discharge Planning  Goal: Discharge to home or other facility with appropriate resources  12/16/2022 0933 by Reji Clark RN  Outcome: Progressing  12/16/2022 0559 by Rosario Powell RN  Outcome: Progressing     Problem: Pain  Goal: Verbalizes/displays adequate comfort level or baseline comfort level  12/16/2022 0933 by Reji Clark RN  Outcome: Progressing  12/16/2022 0559 by Rosario Powell RN  Outcome: Progressing     Problem: Safety - Adult  Goal: Free from fall injury  12/16/2022 0933 by Reji Clark RN  Outcome: Progressing  12/16/2022 0559 by Rosario Powell RN  Outcome: Progressing     Problem: Skin/Tissue Integrity  Goal: Absence of new skin breakdown  Description: 1. Monitor for areas of redness and/or skin breakdown  2. Assess vascular access sites hourly  3. Every 4-6 hours minimum:  Change oxygen saturation probe site  4. Every 4-6 hours:  If on nasal continuous positive airway pressure, respiratory therapy assess nares and determine need for appliance change or resting period.   12/16/2022 0933 by Reji Clark RN  Outcome: Progressing  12/16/2022 0559 by Rosario Powell RN  Outcome: Progressing     Problem: ABCDS Injury Assessment  Goal: Absence of physical injury  12/16/2022 0933 by Reji Clark RN  Outcome: Progressing  12/16/2022 0559 by Rosario Powell RN  Outcome: Progressing     Problem: Nutrition Deficit:  Goal: Optimize nutritional status  12/16/2022 0933 by Rjei Clark RN  Outcome: Progressing  12/16/2022 0559 by Rosario Powell RN  Outcome: Progressing     Problem: Neurosensory - Adult  Goal: Absence of seizures  12/16/2022 0933 by Reji Clark RN  Outcome: Progressing  12/16/2022 0559 by Andrew Escamilla RN  Outcome: Progressing  Flowsheets (Taken 12/13/2022 2000 by Camila Lyons, RN)  Absence of seizures:   Monitor for seizure activity.   If seizure occurs, document type and location of movements and any associated apnea   If seizure occurs, turn head to side and suction secretions as needed   Administer anticonvulsants as ordered  Goal: Remains free of injury related to seizures activity  12/16/2022 0933 by Nicolas Leyva RN  Outcome: Progressing  12/16/2022 0559 by Andrew Escamilla RN  Outcome: Progressing  Flowsheets (Taken 12/13/2022 2000 by Camila Lyons RN)  Remains free of injury related to seizure activity:   Maintain airway, patient safety  and administer oxygen as ordered   If seizure occurs, turn patient to side and suction secretions as needed   Monitor patient for seizure activity, document and report duration and description of seizure to Licensed Independent Practitioner  Goal: Achieves maximal functionality and self care  12/16/2022 0933 by Nicolas Leyva RN  Outcome: Progressing  12/16/2022 0559 by Andrew Escamilla RN  Outcome: Progressing     Problem: Respiratory - Adult  Goal: Achieves optimal ventilation and oxygenation  12/16/2022 0933 by Nicolas Leyva RN  Outcome: Progressing  12/16/2022 0559 by Andrew Escamilla RN  Outcome: Progressing  Flowsheets (Taken 12/13/2022 2000 by Cmaila Lyons RN)  Achieves optimal ventilation and oxygenation:   Assess for changes in respiratory status   Assess for changes in mentation and behavior   Position to facilitate oxygenation and minimize respiratory effort   Oxygen supplementation based on oxygen saturation or arterial blood gases     Problem: Cardiovascular - Adult  Goal: Maintains optimal cardiac output and hemodynamic stability  12/16/2022 0933 by Nicolas Leyva RN  Outcome: Progressing  12/16/2022 0559 by Andrew Escamilla RN  Outcome: Progressing  Flowsheets (Taken 12/13/2022 2000 by Shell Peterson RN)  Maintains optimal cardiac output and hemodynamic stability:   Monitor blood pressure and heart rate   Monitor urine output and notify Licensed Independent Practitioner for values outside of normal range   Assess for signs of decreased cardiac output  Goal: Absence of cardiac dysrhythmias or at baseline  12/16/2022 0933 by Mariposa Delgadillo RN  Outcome: Progressing  12/16/2022 0559 by Lucrecia Robles RN  Outcome: Progressing     Problem: Skin/Tissue Integrity - Adult  Goal: Skin integrity remains intact  12/16/2022 0933 by Mariposa Delgadillo RN  Outcome: Progressing  12/16/2022 0559 by Lucrecia Robles RN  Outcome: Progressing  Goal: Incisions, wounds, or drain sites healing without S/S of infection  12/16/2022 0933 by Mariposa Delgadillo RN  Outcome: Progressing  12/16/2022 0559 by Lucrecia Robles RN  Outcome: Progressing  Goal: Oral mucous membranes remain intact  12/16/2022 0933 by Mariposa Delgadillo RN  Outcome: Progressing  12/16/2022 0559 by Lucrecia Robles RN  Outcome: Progressing     Problem: Musculoskeletal - Adult  Goal: Return mobility to safest level of function  12/16/2022 0933 by Mariposa Delgadillo RN  Outcome: Progressing  12/16/2022 0559 by Lucrecia Robles RN  Outcome: Progressing  Goal: Maintain proper alignment of affected body part  12/16/2022 0933 by Mariposa Delgadillo RN  Outcome: Progressing  12/16/2022 0559 by Lucrecia Robles RN  Outcome: Progressing  Goal: Return ADL status to a safe level of function  12/16/2022 0933 by Mariposa Delgadillo RN  Outcome: Progressing  12/16/2022 0559 by Lucrecia Robles RN  Outcome: Progressing     Problem: Gastrointestinal - Adult  Goal: Minimal or absence of nausea and vomiting  12/16/2022 0933 by Mariposa Delgadillo RN  Outcome: Progressing  12/16/2022 0559 by Lucrecia Robles RN  Outcome: Progressing  Goal: Maintains or returns to baseline bowel function  12/16/2022 2225 by Rona Collier RN  Outcome: Progressing  12/16/2022 0559 by Lazarus Sleeper, RN  Outcome: Progressing  Flowsheets (Taken 12/13/2022 2000 by Savannah Perkins RN)  Maintains or returns to baseline bowel function:   Assess bowel function   Encourage oral fluids to ensure adequate hydration   Administer IV fluids as ordered to ensure adequate hydration  Goal: Maintains adequate nutritional intake  12/16/2022 0933 by Rona Collier RN  Outcome: Progressing  12/16/2022 0559 by Lazarus Sleeper, RN  Outcome: Progressing     Problem: Genitourinary - Adult  Goal: Absence of urinary retention  12/16/2022 0933 by Rona Collier RN  Outcome: Progressing  12/16/2022 0559 by Lazarus Sleeper, RN  Outcome: Progressing     Problem: Infection - Adult  Goal: Absence of infection at discharge  12/16/2022 0933 by Rona Collier RN  Outcome: Progressing  12/16/2022 0559 by Lazarus Sleeper, RN  Outcome: Progressing  Flowsheets (Taken 12/13/2022 2000 by Savannah Perkins RN)  Absence of infection at discharge:   Assess and monitor for signs and symptoms of infection   Monitor lab/diagnostic results   Monitor all insertion sites i.e., indwelling lines, tubes and drains  Goal: Absence of infection during hospitalization  12/16/2022 0933 by Rona Collier RN  Outcome: Progressing  12/16/2022 0559 by Lazarus Sleeper, RN  Outcome: Progressing  Goal: Absence of fever/infection during anticipated neutropenic period  12/16/2022 0933 by Rona Collier RN  Outcome: Progressing  12/16/2022 0559 by Lazarus Sleeper, RN  Outcome: Progressing     Problem: Metabolic/Fluid and Electrolytes - Adult  Goal: Electrolytes maintained within normal limits  12/16/2022 0933 by Rona Collier RN  Outcome: Progressing  12/16/2022 0559 by Lazarus Sleeper, RN  Outcome: Progressing  Flowsheets (Taken 12/13/2022 2000 by Savannah Perkins RN)  Electrolytes maintained within normal limits:   Monitor labs and assess patient for signs and symptoms of electrolyte imbalances   Administer electrolyte replacement as ordered   Monitor response to electrolyte replacements, including repeat lab results as appropriate  Goal: Hemodynamic stability and optimal renal function maintained  12/16/2022 0933 by Cherrie Wadsworth RN  Outcome: Progressing  12/16/2022 0559 by Gabriel Hernandez RN  Outcome: Progressing  Goal: Glucose maintained within prescribed range  12/16/2022 0933 by Cherrie Wadsworth RN  Outcome: Progressing  12/16/2022 0559 by Gabriel Hernandez RN  Outcome: Progressing     Problem: Hematologic - Adult  Goal: Maintains hematologic stability  12/16/2022 0933 by Cherrie Wadsworth RN  Outcome: Progressing  12/16/2022 0559 by Gabriel Hernandez RN  Outcome: Progressing  Flowsheets (Taken 12/13/2022 2000 by Michelle Timmons RN)  Maintains hematologic stability:   Assess for signs and symptoms of bleeding or hemorrhage   Monitor labs for bleeding or clotting disorders   Administer blood products/factors as ordered

## 2022-12-16 NOTE — PROCEDURES
EGD PROCEDURE NOTE         Esophagogastroduodenoscopy Procedure Note     Patient: Andres Bryant MRN: [id number]   YOB: 1955 Age: 79 y.o. Sex: female   Unit: [facility] ENDOSCOPY Room/Bed: Endo Pool/NONE Location: [address] [place] [address]    Admitting Physician: Huan Haq Primary Care Physician: Ag EGAN      DATE OF PROCEDURE: 12/16/2022  PROCEDURE: Esophagogastroduodenoscopy  INDICATION: This is a 79y.o. year old female who presents today for repeat EGD for acute blood loss anemia and melena. 70-year-old female who had brisk upper GI bleed related to a 3 similar duodenal ulcer with visible vessel, treated with Endo Clip followed by GDA embolization on the 13th. Based on increased pulmonary embolus clot burden noted on CT scan, she was started on heparin drip about 36 hours ago, and then yesterday about 24 hours ago, she developed a dark stool. She has not had any further bowel movement since then but did have a drop in hemoglobin from 8.1 yesterday down to 7.7 today and her heparin was around 9 AM.  ENDOSCOPIST: Rosas Cosme MD    POSTOPERATIVE DIAGNOSIS:    -2-1/2 cm duodenal bulb ulcer with pigmented spot. Endo Clip fastened to the left of this. The margins of the ulcer are friable. No active bleeding. 2 other smaller clean-based ulcers in the duodenal bulb.  -Small inflammatory looking polyp in the gastric body, without stigmata of recent bleeding. In summary, she has certainly had improvement since her last EGD 5 days ago. Previously she had a visible vessel and now she just has a pigmented spot. There is no active bleeding at this time. She may have had some oozing from the friable margins of the ulceration perhaps from contact of the Endo Clip that is sometimes rubbing on that area. There was no indication for endoscopic treatment today. PLAN:    -Restart full liquid diet. -IV Protonix twice a day.   -Primary team might consider restarting heparin tomorrow at 9 AM  -She is certainly at risk for ongoing bleeding, but in time this ulcer should heal.    INFORMED CONSENT:  Informed consent for esophagogastoduodenoscopy was obtained. The benefits and risks including adverse medicine reaction have been explained. The patient's questions were answered and the patient agreed to proceed. ASA:  ASA 3 - Patient with moderate systemic disease with functional limitations    SEDATION: MAC     The patient's vital signs, cardiac status, pulmonary status, abdominal status and mental status were stable for the procedure. The patient's vitals signs and respiratory function as monitored by oxygen saturation were stable throughout    Procedure Details: The Olympus videoendoscope was inserted into the mouth and carefully passed into the esophagus, through the stomach and to the distal duodenum. Antegrade and retrograde examination of the upper gi tract was carefully performed. Findings: The esophagus is normal.  There is a small hiatal hernia. In the stomach, there is a small inflammatory looking polyp, about 4 mm, without any high-grade stigmata for recent bleeding. There is no blood in the stomach. In the duodenum, there is a 2 and half centimeter clean-based ulcer with small pigmented spot. There is no visible vessel as documented previously. There is an Endo Clip on the left side of the ulcer margin. The margins of the ulcer are friable. There were 2 other small ulcers the bulb. There is no bleeding in the duodenum and there were no targets for endoscopic treatment today. The second portion of the duodenum was unremarkable.     Gastric or Duodenal ulcer present: Yes    Estimated Blood Loss: None      Signed By: Celeste Gonzalez MD

## 2022-12-16 NOTE — PROCEDURES
EGD PROCEDURE NOTE         Esophagogastroduodenoscopy Procedure Note     Patient: Jeanie Guzmán MRN: [id number]   YOB: 1955 Age: 79 y.o. Sex: female   Unit: [facility] ENDOSCOPY Room/Bed: Endo Pool/NONE Location: [address] [place] [address]    Admitting Physician: Bekah Rocha Primary Care Physician: Aleena EGAN      DATE OF PROCEDURE: 12/16/2022  PROCEDURE: Esophagogastroduodenoscopy  INDICATION: This is a 79y.o. year old female who presents today with black stool yesterday after starting heparin drip about 36 hours ago. The heparin drip was actually stopped this morning at 9 AM, which is about 5 hours ago, because her hemoglobin came back at 7.7 today. Hemoglobin was 8.2 yesterday  ENDOSCOPIST: Laura Clark MD    POSTOPERATIVE DIAGNOSIS:    -Esophagus unremarkable. -Stomach with small inflammatory looking 4 mm polyp, without evidence of recent hemorrhage.  -2-1/2 cm duodenal bulb ulcer with Endo Clip on the left margin, and a pigmented flat spot in the middle. No visible vessel. Friable mucosa around the edges of the ulcer. 2 other smaller ulcers nearby with friable mucosa at the margins. In summary, I suspect relatively minor bleeding exacerbated by heparin drip. The ulcer certainly appears better than it was described on 12/2. The Endo Clip fastened to the side of the ulcer might be rubbing against friable mucosa. There was no indication for endoscopic treatment at this time. PLAN:    -IV Protonix twice a day. -Full liquid diet.  -Would hold heparin until tomorrow morning and then restart with no bolus. Attempted to call bekah Hameed but now answer on his cell. .. INFORMED CONSENT:  Informed consent for esophagogastoduodenoscopy was obtained. The benefits and risks including adverse medicine reaction have been explained. The patient's questions were answered and the patient agreed to proceed.     ASA:  ASA 3 - Patient with moderate systemic disease with functional limitations    SEDATION: MAC     The patient's vital signs, cardiac status, pulmonary status, abdominal status and mental status were stable for the procedure. The patient's vitals signs and respiratory function as monitored by oxygen saturation were stable throughout    Procedure Details: The Olympus videoendoscope was inserted into the mouth and carefully passed into the esophagus, through the stomach and to the distal duodenum. Antegrade and retrograde examination of the upper gi tract was carefully performed. Findings: The esophagus is normal.  There is a small hiatal hernia. In the stomach, there is a small inflammatory looking polyp along the greater curvature of the stomach, which was not biopsied or removed. It did not have any stigmata to suggest recent bleeding. In the duodenum, there was a large 2 and half centimeter ulcer with clean base and a single pigmented spot, improved from the visible vessel she had previously. There was a single Endo Clip just to the left of this fastened at the margin of the ulcer. The margins of the ulcer were granular and friable. There were 2 other smaller ulcers with small clean-based centers and friable margins, both also benign-appearing. The second portion of the duodenum was unremarkable.       Gastric or Duodenal ulcer present: Yes    Estimated Blood Loss: None      Signed By: Sydney Jaime MD

## 2022-12-16 NOTE — PROGRESS NOTES
Physical Therapy/Occupational Therapy    Attempted to see pt for PT/OT. Pt leaving floor for endo. Will cont per POC as schedule allows.     [facility], [place] [address]  Regi Fiore OTR/LADONNA

## 2022-12-16 NOTE — PROGRESS NOTES
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 No PRBC ordered  CTPA 14/12/2022 showed large PE with no obvious heart strain. Patient nuero exam unchanged. VSS. No signs of respiratory distress, and patient is able to maintain O2 sat on room air. Pulmonology and GI discussed the PE situation and they are in agreement that the risk;benefit ratio favors heparin anticoagulation now, and transition to an oral agent next week.     Objective:  Facial nerve - HB 5 the same   Post auricular incision - well closed, no leaking   Abdomen- incision well closed     VITAL SIGNS   /64   Pulse 70   Temp 98.3 °F (36.8 °C) (Temporal)   Resp 16   Ht 5' 5\" (1.651 m)   Wt (!) 353 lb 1.6 oz (160.2 kg)   SpO2 98%   BMI 58.76 kg/m²    Height Height: 5' 5\" (165.1 cm)   Weight Weight: (!) 353 lb 1.6 oz (160.2 kg)          Allergies Allergies   Allergen Reactions    Keflex [Cephalexin] Itching and Rash    Codeine Nausea And Vomiting    Tomato Rash      NPO Status Diet NPO Exceptions are: Sips of Water with Meds   Isolation No active isolations     LABS   Basic Metabolic Profile Recent Labs     12/14/22  1750 12/15/22  0612 12/16/22  0405    139 143    106 109   CO2 26 25 28   BUN 10 8 5*   CREATININE <0.5* <0.5* <0.5*   GLUCOSE 158* 149* 114*   PHOS  --   --  3.1        Complete Blood Count Recent Labs     12/14/22  0415 12/15/22  0612 12/16/22  0405   WBC 5.3 4.6 4.4   RBC 2.47* 2.63* 2.48*        Coagulation Studies Recent Labs     12/14/22  1420   INR 1.15*                                 Imaging:   Abdominal CT  Impression       1.  9.5 cm mass in the right hepatic lobe which does not appear to represent a hemangioma. Differential diagnosis would include primary or secondary malignancy. Liver mass protocol MRI with and without contrast is recommended. 2.  No intra-abdominal hemorrhage. 3.  Mild cholelithiasis. 4.  3.5 cm right ovarian cyst, the CT appearance suggests a simple cyst indicating a benign finding. Assessment   Patient is a 80 yo F   1 day s/p resection of meningioma. 2 days s/p TL and MCF approach. Hospital stay complicated by Upper GI bleeding and PE treated with EGD (clipping) and embolization. Now Pt under IV Heparin    Currently pt seen stable, last HB 7.7. Still small amount of melena. Plan:  Neurologic exam frequency: Q4H  Mobility: PT/OT   SLP continue  PICC line  DVT Prophylaxis: SCDs and heparin (hold)  Keppra as ordered  Bowel Regimen: Senna and glycolax (hold)  Pain control: Katie   Keep sbp < 160  Incisional Care: Mastoid compression headband in place check Q4H and let NS know if leaking  GI Bleed: GI Following, Protonix 40 mg BID, Embolization of CHU done,   PE: CTPA shows large PE with no obvious heart strain. Pulmonology and GI discussed the PE situation and they are in agreement that the risk;benefit ratio favors heparin anticoagulation now, and transition to an oral agent next week.

## 2022-12-17 LAB
ANION GAP SERPL CALCULATED.3IONS-SCNC: 5 MMOL/L (ref 3–16)
ANTI-XA UNFRAC HEPARIN: 0.1 IU/ML (ref 0.3–0.7)
ANTI-XA UNFRAC HEPARIN: <0.1 IU/ML (ref 0.3–0.7)
BASOPHILS ABSOLUTE: 0 K/UL (ref 0–0.2)
BASOPHILS RELATIVE PERCENT: 0.6 %
BUN BLDV-MCNC: 6 MG/DL (ref 7–20)
CALCIUM SERPL-MCNC: 8.5 MG/DL (ref 8.3–10.6)
CHLORIDE BLD-SCNC: 108 MMOL/L (ref 99–110)
CO2: 27 MMOL/L (ref 21–32)
CREAT SERPL-MCNC: <0.5 MG/DL (ref 0.6–1.2)
EOSINOPHILS ABSOLUTE: 0.3 K/UL (ref 0–0.6)
EOSINOPHILS RELATIVE PERCENT: 8 %
GFR SERPL CREATININE-BSD FRML MDRD: >60 ML/MIN/{1.73_M2}
GLUCOSE BLD-MCNC: 103 MG/DL (ref 70–99)
GLUCOSE BLD-MCNC: 134 MG/DL (ref 70–99)
GLUCOSE BLD-MCNC: 144 MG/DL (ref 70–99)
GLUCOSE BLD-MCNC: 155 MG/DL (ref 70–99)
GLUCOSE BLD-MCNC: 88 MG/DL (ref 70–99)
HCT VFR BLD CALC: 23.4 % (ref 36–48)
HCT VFR BLD CALC: 24.7 % (ref 36–48)
HEMOGLOBIN: 7.9 G/DL (ref 12–16)
HEMOGLOBIN: 8.3 G/DL (ref 12–16)
LYMPHOCYTES ABSOLUTE: 0.7 K/UL (ref 1–5.1)
LYMPHOCYTES RELATIVE PERCENT: 18.8 %
MCH RBC QN AUTO: 31.2 PG (ref 26–34)
MCHC RBC AUTO-ENTMCNC: 33.7 G/DL (ref 31–36)
MCV RBC AUTO: 92.6 FL (ref 80–100)
MONOCYTES ABSOLUTE: 0.3 K/UL (ref 0–1.3)
MONOCYTES RELATIVE PERCENT: 8.4 %
NEUTROPHILS ABSOLUTE: 2.5 K/UL (ref 1.7–7.7)
NEUTROPHILS RELATIVE PERCENT: 64.2 %
PDW BLD-RTO: 17 % (ref 12.4–15.4)
PERFORMED ON: ABNORMAL
PERFORMED ON: NORMAL
PLATELET # BLD: 178 K/UL (ref 135–450)
PMV BLD AUTO: 8.2 FL (ref 5–10.5)
POTASSIUM SERPL-SCNC: 3.3 MMOL/L (ref 3.5–5.1)
RBC # BLD: 2.53 M/UL (ref 4–5.2)
SODIUM BLD-SCNC: 140 MMOL/L (ref 136–145)
WBC # BLD: 3.9 K/UL (ref 4–11)

## 2022-12-17 PROCEDURE — 6370000000 HC RX 637 (ALT 250 FOR IP): Performed by: INTERNAL MEDICINE

## 2022-12-17 PROCEDURE — 2580000003 HC RX 258

## 2022-12-17 PROCEDURE — 80048 BASIC METABOLIC PNL TOTAL CA: CPT

## 2022-12-17 PROCEDURE — 6360000002 HC RX W HCPCS: Performed by: NURSE PRACTITIONER

## 2022-12-17 PROCEDURE — C9113 INJ PANTOPRAZOLE SODIUM, VIA: HCPCS | Performed by: NURSE PRACTITIONER

## 2022-12-17 PROCEDURE — 85018 HEMOGLOBIN: CPT

## 2022-12-17 PROCEDURE — 6360000002 HC RX W HCPCS

## 2022-12-17 PROCEDURE — 6370000000 HC RX 637 (ALT 250 FOR IP)

## 2022-12-17 PROCEDURE — 36415 COLL VENOUS BLD VENIPUNCTURE: CPT

## 2022-12-17 PROCEDURE — 6360000002 HC RX W HCPCS: Performed by: NEUROLOGICAL SURGERY

## 2022-12-17 PROCEDURE — 1200000000 HC SEMI PRIVATE

## 2022-12-17 PROCEDURE — 85025 COMPLETE CBC W/AUTO DIFF WBC: CPT

## 2022-12-17 PROCEDURE — 85014 HEMATOCRIT: CPT

## 2022-12-17 PROCEDURE — 6370000000 HC RX 637 (ALT 250 FOR IP): Performed by: NURSE PRACTITIONER

## 2022-12-17 PROCEDURE — 85520 HEPARIN ASSAY: CPT

## 2022-12-17 RX ADMIN — POTASSIUM CHLORIDE 20 MEQ: 20 TABLET, EXTENDED RELEASE ORAL at 17:50

## 2022-12-17 RX ADMIN — METHIMAZOLE 10 MG: 5 TABLET ORAL at 08:22

## 2022-12-17 RX ADMIN — PANTOPRAZOLE SODIUM 40 MG: 40 INJECTION, POWDER, LYOPHILIZED, FOR SOLUTION INTRAVENOUS at 08:22

## 2022-12-17 RX ADMIN — SODIUM CHLORIDE, PRESERVATIVE FREE 10 ML: 5 INJECTION INTRAVENOUS at 08:23

## 2022-12-17 RX ADMIN — POTASSIUM CHLORIDE 20 MEQ: 20 TABLET, EXTENDED RELEASE ORAL at 08:22

## 2022-12-17 RX ADMIN — LATANOPROST 1 DROP: 50 SOLUTION OPHTHALMIC at 21:31

## 2022-12-17 RX ADMIN — LEVETIRACETAM 500 MG: 100 INJECTION, SOLUTION INTRAVENOUS at 10:24

## 2022-12-17 RX ADMIN — SODIUM CHLORIDE, PRESERVATIVE FREE 10 ML: 5 INJECTION INTRAVENOUS at 21:30

## 2022-12-17 RX ADMIN — PANTOPRAZOLE SODIUM 40 MG: 40 INJECTION, POWDER, LYOPHILIZED, FOR SOLUTION INTRAVENOUS at 21:30

## 2022-12-17 RX ADMIN — Medication 5 MG: at 21:30

## 2022-12-17 RX ADMIN — INSULIN GLARGINE 15 UNITS: 100 INJECTION, SOLUTION SUBCUTANEOUS at 21:32

## 2022-12-17 RX ADMIN — LEVETIRACETAM 500 MG: 100 INJECTION, SOLUTION INTRAVENOUS at 21:31

## 2022-12-17 RX ADMIN — CETIRIZINE HYDROCHLORIDE 10 MG: 10 TABLET, FILM COATED ORAL at 08:22

## 2022-12-17 RX ADMIN — HEPARIN SODIUM 6 UNITS/KG/HR: 10000 INJECTION, SOLUTION INTRAVENOUS at 08:24

## 2022-12-17 ASSESSMENT — PAIN SCALES - GENERAL
PAINLEVEL_OUTOF10: 0

## 2022-12-17 NOTE — PROGRESS NOTES
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 Patient nuero exam unchanged. VSS. No signs of respiratory distress, and patient is able to maintain O2 sat on room air. Pulmonology and GI discussed the PE situation and they are in agreement that the risk;benefit ratio favors heparin anticoagulation, and transition to an oral agent next week. Will resume Heparin IV today 9am     Objective:  Facial nerve - HB 5 the same   Post auricular incision - well closed, no leaking   Abdomen- incision well closed     VITAL SIGNS   /67   Pulse 75   Temp 98.9 °F (37.2 °C) (Temporal)   Resp 20   Ht 5' 5\" (1.651 m)   Wt (!) 353 lb 1.6 oz (160.2 kg)   SpO2 92%   BMI 58.76 kg/m²    Height Height: 5' 5\" (165.1 cm)   Weight Weight: (!) 353 lb 1.6 oz (160.2 kg)          Allergies Allergies   Allergen Reactions    Keflex [Cephalexin] Itching and Rash    Codeine Nausea And Vomiting    Tomato Rash      NPO Status ADULT DIET;  Full Liquid   Isolation No active isolations     LABS   Basic Metabolic Profile Recent Labs     12/15/22  0612 12/16/22  0405 12/17/22  0438    143 140    109 108   CO2 25 28 27   BUN 8 5* 6*   CREATININE <0.5* <0.5* <0.5*   GLUCOSE 149* 114* 103*   PHOS  --  3.1  --    MG  --  1.60*  --         Complete Blood Count Recent Labs     12/15/22  0612 12/16/22  0405 12/17/22  0438   WBC 4.6 4.4 3.9*   RBC 2.63* 2.48* 2.53*        Coagulation Studies Recent Labs     12/14/22  1420   INR 1.15*                                 Imaging:   Abdominal CT  Impression       1.  9.5 cm mass in the right hepatic lobe which does not appear to represent a hemangioma. Differential diagnosis would include primary or secondary malignancy. Liver mass protocol MRI with and without contrast is recommended. 2.  No intra-abdominal hemorrhage. 3.  Mild cholelithiasis. 4.  3.5 cm right ovarian cyst, the CT appearance suggests a simple cyst indicating a benign finding. Assessment   Patient is a 78 yo F   1 day s/p resection of meningioma. 2 days s/p TL and MCF approach. Hospital stay complicated by Upper GI bleeding and PE treated with EGD (clipping) and embolization. Now Pt under IV Heparin    Currently pt seen stable, last HB 7.9. Still small amount of melena. Plan:  Neurologic exam frequency: Q4H  Mobility: PT/OT   SLP continue  PICC line  DVT Prophylaxis: SCDs and heparin (hold)  Keppra as ordered  Bowel Regimen: Senna and glycolax (hold)  Pain control: Katie   Keep sbp < 160  Incisional Care: Mastoid compression headband in place check Q4H and let NS know if leaking  GI Bleed: GI Following, Protonix 40 mg BID, Embolization of CHU done,   PE: CTPA shows large PE with no obvious heart strain. Pulmonology and GI discussed the PE situation and they are in agreement that the risk;benefit ratio favors heparin anticoagulation, and transition to an oral agent next week.   Will resume Heparin today 9 AM

## 2022-12-17 NOTE — PROGRESS NOTES
Neurosurgery Progress Note    Patient seen and examined on 12/17/22. No acute events overnight. Reports no new symptoms or complaints at this time. Neurologically stable on exam with HB 5 right facial function. Hb remains stable at 7.9.        A/P: 80 yo woman Pod 19 s/p combined petrosal C/R for petroclival meningioma    -Neuro stable  -HOB elevated  -Frequent neuro checks  -Pain control with PO meds  -Plan to resume heparin gtt for PEs without boluses  -Monitor Hb on anticoagulation  -Eye care  -Compressive mastoid dressing in place  -Will continue to follow      Richard Palumbo MD, PhD  [address], [address] [place], [place], [address] [phone] (c), [phone] (o)

## 2022-12-17 NOTE — PLAN OF CARE
Problem: Chronic Conditions and Co-morbidities  Goal: Patient's chronic conditions and co-morbidity symptoms are monitored and maintained or improved  12/17/2022 1327 by Karrie Blackman RN  Outcome: Progressing  12/17/2022 0606 by Giancarlo Ramírez RN  Outcome: Progressing  Flowsheets (Taken 12/13/2022 2000 by Marbin Cruz, RN)  Care Plan - Patient's Chronic Conditions and Co-Morbidity Symptoms are Monitored and Maintained or Improved:   Monitor and assess patient's chronic conditions and comorbid symptoms for stability, deterioration, or improvement   Collaborate with multidisciplinary team to address chronic and comorbid conditions and prevent exacerbation or deterioration   Update acute care plan with appropriate goals if chronic or comorbid symptoms are exacerbated and prevent overall improvement and discharge     Problem: Discharge Planning  Goal: Discharge to home or other facility with appropriate resources  12/17/2022 1327 by Karrie Blackman RN  Outcome: Progressing  12/17/2022 0606 by Giancarlo Ramírez RN  Outcome: Progressing     Problem: Pain  Goal: Verbalizes/displays adequate comfort level or baseline comfort level  12/17/2022 1327 by Karrie Blackman RN  Outcome: Progressing  12/17/2022 0606 by Giancarlo Ramírez RN  Outcome: Progressing  Flowsheets (Taken 12/13/2022 2000 by Marbin Cruz, RN)  Verbalizes/displays adequate comfort level or baseline comfort level:   Encourage patient to monitor pain and request assistance   Assess pain using appropriate pain scale   Administer analgesics based on type and severity of pain and evaluate response     Problem: Safety - Adult  Goal: Free from fall injury  12/17/2022 1327 by Karrie Blackman RN  Outcome: Progressing  12/17/2022 0606 by Giancarlo Ramírez RN  Outcome: Progressing     Problem: Skin/Tissue Integrity  Goal: Absence of new skin breakdown  Description: 1. Monitor for areas of redness and/or skin breakdown  2.   Assess vascular access sites hourly  3. Every 4-6 hours minimum:  Change oxygen saturation probe site  4. Every 4-6 hours:  If on nasal continuous positive airway pressure, respiratory therapy assess nares and determine need for appliance change or resting period. 12/17/2022 1327 by Darrell Morrissey RN  Outcome: Progressing  12/17/2022 0606 by Mamadou Hagan RN  Outcome: Progressing     Problem: ABCDS Injury Assessment  Goal: Absence of physical injury  12/17/2022 1327 by Darrell Morrissey RN  Outcome: Progressing  12/17/2022 0606 by Mamadou Hagan RN  Outcome: Progressing     Problem: Nutrition Deficit:  Goal: Optimize nutritional status  12/17/2022 1327 by Darrell Morrissey RN  Outcome: Progressing  12/17/2022 0606 by Mamadou Hagan RN  Outcome: Progressing  Flowsheets (Taken 12/16/2022 1408 by Galileo Crandall RD)  Nutrient intake appropriate for improving, restoring, or maintaining nutritional needs:   Monitor oral intake, labs, and treatment plans   Assess nutritional status and recommend course of action     Problem: Neurosensory - Adult  Goal: Absence of seizures  12/17/2022 1327 by Darrell Morrissey RN  Outcome: Progressing  12/17/2022 0606 by Mamadou Hagan RN  Outcome: Progressing  Flowsheets (Taken 12/13/2022 2000 by Shawnee Echevarria RN)  Absence of seizures:   Monitor for seizure activity.   If seizure occurs, document type and location of movements and any associated apnea   If seizure occurs, turn head to side and suction secretions as needed   Administer anticonvulsants as ordered  Goal: Remains free of injury related to seizures activity  12/17/2022 1327 by Darrell Morrissey RN  Outcome: Progressing  12/17/2022 0606 by Mamadou Hagan RN  Outcome: Progressing  Flowsheets (Taken 12/13/2022 2000 by Shawnee Echevarria RN)  Remains free of injury related to seizure activity:   Maintain airway, patient safety  and administer oxygen as ordered   If seizure occurs, turn patient to side and suction secretions as needed   Monitor patient for seizure activity, document and report duration and description of seizure to Licensed Independent Practitioner  Goal: Achieves maximal functionality and self care  12/17/2022 1327 by Jacqueline Powell RN  Outcome: Progressing  12/17/2022 0606 by Driss Song RN  Outcome: Progressing     Problem: Respiratory - Adult  Goal: Achieves optimal ventilation and oxygenation  12/17/2022 1327 by Jacqueline Powell RN  Outcome: Progressing  12/17/2022 0606 by Driss Song RN  Outcome: Progressing  Flowsheets (Taken 12/13/2022 2000 by Selma Strong RN)  Achieves optimal ventilation and oxygenation:   Assess for changes in respiratory status   Assess for changes in mentation and behavior   Position to facilitate oxygenation and minimize respiratory effort   Oxygen supplementation based on oxygen saturation or arterial blood gases     Problem: Cardiovascular - Adult  Goal: Maintains optimal cardiac output and hemodynamic stability  12/17/2022 1327 by Jacqueline Powell RN  Outcome: Progressing  12/17/2022 0606 by Driss Song RN  Outcome: Progressing  Flowsheets (Taken 12/13/2022 2000 by Selma Strong RN)  Maintains optimal cardiac output and hemodynamic stability:   Monitor blood pressure and heart rate   Monitor urine output and notify Licensed Independent Practitioner for values outside of normal range   Assess for signs of decreased cardiac output  Goal: Absence of cardiac dysrhythmias or at baseline  12/17/2022 1327 by Jacquleine Powell RN  Outcome: Progressing  12/17/2022 0606 by Driss Song RN  Outcome: Progressing  Flowsheets (Taken 12/13/2022 2000 by Selma Strong RN)  Absence of cardiac dysrhythmias or at baseline:   Monitor cardiac rate and rhythm   Assess for signs of decreased cardiac output   Administer antiarrhythmia medication and electrolyte replacement as ordered     Problem: Skin/Tissue Integrity - Adult  Goal: Skin integrity remains intact  12/17/2022 1327 by Catalina Scott RN  Outcome: Progressing  12/17/2022 0606 by Amrit Zhang RN  Outcome: Progressing  Flowsheets (Taken 12/14/2022 0640 by Terra Basurto RN)  Skin Integrity Remains Intact: Monitor for areas of redness and/or skin breakdown  Goal: Incisions, wounds, or drain sites healing without S/S of infection  12/17/2022 1327 by Catalina Scott RN  Outcome: Progressing  12/17/2022 0606 by Amrit Zhang RN  Outcome: Progressing  Goal: Oral mucous membranes remain intact  12/17/2022 1327 by Catalina Scott RN  Outcome: Progressing  12/17/2022 0606 by Amrit Zhang RN  Outcome: Progressing     Problem: Musculoskeletal - Adult  Goal: Return mobility to safest level of function  12/17/2022 1327 by Catalina Scott RN  Outcome: Progressing  12/17/2022 0606 by Amrit Zhang RN  Outcome: Progressing  Flowsheets (Taken 12/13/2022 2000 by Terra Basurto RN)  Return Mobility to Safest Level of Function: Assess patient stability and activity tolerance for standing, transferring and ambulating with or without assistive devices  Goal: Maintain proper alignment of affected body part  12/17/2022 1327 by Catalina Scott RN  Outcome: Progressing  12/17/2022 0606 by Amrit Zhang RN  Outcome: Progressing  Goal: Return ADL status to a safe level of function  12/17/2022 1327 by Catalina Scott RN  Outcome: Progressing  12/17/2022 0606 by Amrit Zhang RN  Outcome: Progressing     Problem: Gastrointestinal - Adult  Goal: Minimal or absence of nausea and vomiting  12/17/2022 1327 by Catalina Scott RN  Outcome: Progressing  12/17/2022 0606 by Amrit Zhang RN  Outcome: Progressing  Goal: Maintains or returns to baseline bowel function  12/17/2022 1327 by Catalina Scott RN  Outcome: Progressing  12/17/2022 0606 by Amrit Zhang RN  Outcome: Progressing  Goal: Maintains adequate nutritional intake  12/17/2022 1327 by Daphnie Carlson Mal Conklin RN  Outcome: Progressing  12/17/2022 0606 by Aleksandra Miramontes RN  Outcome: Progressing     Problem: Genitourinary - Adult  Goal: Absence of urinary retention  12/17/2022 1327 by Almaz Liriano RN  Outcome: Progressing  12/17/2022 0606 by Aleksandra Miramontes RN  Outcome: Progressing     Problem: Infection - Adult  Goal: Absence of infection at discharge  12/17/2022 1327 by Almaz Liriano RN  Outcome: Progressing  12/17/2022 0606 by Aleksandra Miramontes RN  Outcome: Progressing  Goal: Absence of infection during hospitalization  12/17/2022 1327 by Almaz Liriano RN  Outcome: Progressing  12/17/2022 0606 by Aleskandra Miramontes RN  Outcome: Progressing  Goal: Absence of fever/infection during anticipated neutropenic period  12/17/2022 1327 by Almaz Liriano RN  Outcome: Progressing  12/17/2022 0606 by Aleksandra Miramontes RN  Outcome: Progressing     Problem: Metabolic/Fluid and Electrolytes - Adult  Goal: Electrolytes maintained within normal limits  12/17/2022 1327 by Almaz Liriano RN  Outcome: Progressing  12/17/2022 0606 by Aleksandra Miramontes RN  Outcome: Progressing  Flowsheets (Taken 12/13/2022 2000 by Andrew Reis RN)  Electrolytes maintained within normal limits:   Monitor labs and assess patient for signs and symptoms of electrolyte imbalances   Administer electrolyte replacement as ordered   Monitor response to electrolyte replacements, including repeat lab results as appropriate  Goal: Hemodynamic stability and optimal renal function maintained  12/17/2022 1327 by Almaz Liriano RN  Outcome: Progressing  12/17/2022 0606 by Aleksandra Miramontes RN  Outcome: Progressing  Goal: Glucose maintained within prescribed range  12/17/2022 1327 by Almaz Liriaon RN  Outcome: Progressing  12/17/2022 0606 by Aleksandra Miramontes RN  Outcome: Progressing     Problem: Hematologic - Adult  Goal: Maintains hematologic stability  12/17/2022 1327 by Almaz Liriano RN  Outcome: Progressing  12/17/2022 0606 by Jose Manuel Deluca RN  Outcome: Progressing

## 2022-12-17 NOTE — PLAN OF CARE
Problem: Chronic Conditions and Co-morbidities  Goal: Patient's chronic conditions and co-morbidity symptoms are monitored and maintained or improved  Outcome: Progressing  Flowsheets (Taken 12/13/2022 2000 by Michelle Timmons RN)  Care Plan - Patient's Chronic Conditions and Co-Morbidity Symptoms are Monitored and Maintained or Improved:   Monitor and assess patient's chronic conditions and comorbid symptoms for stability, deterioration, or improvement   Collaborate with multidisciplinary team to address chronic and comorbid conditions and prevent exacerbation or deterioration   Update acute care plan with appropriate goals if chronic or comorbid symptoms are exacerbated and prevent overall improvement and discharge     Problem: Pain  Goal: Verbalizes/displays adequate comfort level or baseline comfort level  Outcome: Progressing  Flowsheets (Taken 12/13/2022 2000 by Michelle Timmons RN)  Verbalizes/displays adequate comfort level or baseline comfort level:   Encourage patient to monitor pain and request assistance   Assess pain using appropriate pain scale   Administer analgesics based on type and severity of pain and evaluate response     Problem: Nutrition Deficit:  Goal: Optimize nutritional status  Outcome: Progressing  Flowsheets (Taken 12/16/2022 1408 by Tila Andrade RD)  Nutrient intake appropriate for improving, restoring, or maintaining nutritional needs:   Monitor oral intake, labs, and treatment plans   Assess nutritional status and recommend course of action     Problem: Neurosensory - Adult  Goal: Absence of seizures  Outcome: Progressing  Flowsheets (Taken 12/13/2022 2000 by Michelle Timmons RN)  Absence of seizures:   Monitor for seizure activity.   If seizure occurs, document type and location of movements and any associated apnea   If seizure occurs, turn head to side and suction secretions as needed   Administer anticonvulsants as ordered  Goal: Remains free of injury related to seizures activity  Outcome: Progressing  Flowsheets (Taken 12/13/2022 2000 by Rajendra Lance RN)  Remains free of injury related to seizure activity:   Maintain airway, patient safety  and administer oxygen as ordered   If seizure occurs, turn patient to side and suction secretions as needed   Monitor patient for seizure activity, document and report duration and description of seizure to Licensed Independent Practitioner  Goal: Achieves maximal functionality and self care  Outcome: Progressing     Problem: Neurosensory - Adult  Goal: Remains free of injury related to seizures activity  Outcome: Progressing  Flowsheets (Taken 12/13/2022 2000 by Rajendra Lance RN)  Remains free of injury related to seizure activity:   Maintain airway, patient safety  and administer oxygen as ordered   If seizure occurs, turn patient to side and suction secretions as needed   Monitor patient for seizure activity, document and report duration and description of seizure to Licensed Independent Practitioner     Problem: Respiratory - Adult  Goal: Achieves optimal ventilation and oxygenation  Outcome: Progressing  Flowsheets (Taken 12/13/2022 2000 by Rajendra Lance RN)  Achieves optimal ventilation and oxygenation:   Assess for changes in respiratory status   Assess for changes in mentation and behavior   Position to facilitate oxygenation and minimize respiratory effort   Oxygen supplementation based on oxygen saturation or arterial blood gases     Problem: Musculoskeletal - Adult  Goal: Return mobility to safest level of function  Outcome: Progressing  Flowsheets (Taken 12/13/2022 2000 by Rajendra Lance RN)  Return Mobility to Safest Level of Function: Assess patient stability and activity tolerance for standing, transferring and ambulating with or without assistive devices     Problem: Metabolic/Fluid and Electrolytes - Adult  Goal: Electrolytes maintained within normal limits  Outcome: Progressing  Flowsheets (Taken 12/13/2022 2000 by Betito Christopher RN)  Electrolytes maintained within normal limits:   Monitor labs and assess patient for signs and symptoms of electrolyte imbalances   Administer electrolyte replacement as ordered   Monitor response to electrolyte replacements, including repeat lab results as appropriate  Goal: Hemodynamic stability and optimal renal function maintained  Outcome: Progressing  Goal: Glucose maintained within prescribed range  Outcome: Progressing

## 2022-12-17 NOTE — PROGRESS NOTES
Hospitalist Progress Note      PCP: Mirian Stokes    Date of Admission: 11/28/2022    Chief Complaint: consult for cas-operative [facility] Course: \"The patient is a 79 y.o. female with hx of multistage neurosurgical resection of petroclival meningioma who we are asked to see/evaluate by Dr. Buffy Khoury for cas-operative mgt. patient has been recovering well postoperatively from her procedure when she most recently developed melanotic stools. Gastroenterology was consulted, patient underwent EGD and was found to have multiple duodenal ulcers with visible vessel and active bleeding. Underwent injection of epi, hemostatic clip placement and chemo spray. Due to concern for possible ongoing GI bleed she underwent CTA. No active GI bleed was detected, however CAT scan picked up likely pulmonary embolism in the lower lung fields. Patient received 2 units of blood transfusion\"    Subjective:      Patient is afebrile overnight. No complaints. She was sitting in a chair. Heparin gtt was restarted.      Medications:  Reviewed    Infusion Medications    heparin (PORCINE) Infusion 6 Units/kg/hr (12/17/22 0824)    sodium chloride      sodium chloride      dextrose       Scheduled Medications    potassium chloride  20 mEq Oral BID WC    pantoprazole  40 mg IntraVENous BID    levETIRAcetam  500 mg IntraVENous Q12H    cetirizine  10 mg Oral Daily    [Held by provider] hydroCHLOROthiazide  25 mg Oral Daily    insulin lispro  0-16 Units SubCUTAneous TID WC    insulin lispro  0-4 Units SubCUTAneous Nightly    methIMAzole  10 mg Oral Daily    insulin glargine  15 Units SubCUTAneous Nightly    melatonin  5 mg Oral Nightly    sodium chloride flush  5-40 mL IntraVENous 2 times per day    [Held by provider] carvedilol  12.5 mg Oral BID WC    [Held by provider] losartan  100 mg Oral Daily    sodium chloride flush  5-40 mL IntraVENous 2 times per day    latanoprost  1 drop Both Eyes Nightly     PRN Meds: polyethylene glycol, bisacodyl, labetalol, sodium chloride flush, sodium chloride, sodium chloride, acetaminophen, ondansetron **OR** ondansetron, hydrALAZINE, albuterol, glucose, dextrose bolus **OR** dextrose bolus, glucagon (rDNA), dextrose, promethazine      Intake/Output Summary (Last 24 hours) at 12/17/2022 1350  Last data filed at 12/16/2022 2000  Gross per 24 hour   Intake 880 ml   Output 800 ml   Net 80 ml       Physical Exam Performed:    BP (!) 149/74   Pulse 75   Temp 97.6 °F (36.4 °C) (Oral)   Resp 18   Ht 5' 5\" (1.651 m)   Wt (!) 353 lb 1.6 oz (160.2 kg)   SpO2 92%   BMI 58.76 kg/m²     General appearance: No apparent distress. Dressing on the right side of the face  Respiratory:  Normal respiratory effort. Clear to auscultation, bilaterally without Rales/Wheezes/Rhonchi. Cardiovascular: Regular rate and rhythm with normal S1/S2 without murmurs, rubs or gallops. Abdomen: Soft, non-tender, non-distended with normal bowel sounds. Musculoskeletal: No edema on both LE   Neurologic: no new deficit  Psychiatric: Alert and oriented       Labs:   Recent Labs     12/15/22  0612 12/15/22  1800 12/16/22  0405 12/16/22  1553 12/17/22  0438   WBC 4.6  --  4.4  --  3.9*   HGB 8.2*   < > 7.7* 7.8* 7.9*   HCT 24.3*   < > 22.8* 24.0* 23.4*     --  175  --  178    < > = values in this interval not displayed. Recent Labs     12/15/22  0612 12/16/22  0405 12/17/22  0438    143 140   K 3.2* 3.0* 3.3*    109 108   CO2 25 28 27   BUN 8 5* 6*   CREATININE <0.5* <0.5* <0.5*   CALCIUM 8.2* 8.4 8.5   PHOS  --  3.1  --      No results for input(s): AST, ALT, BILIDIR, BILITOT, ALKPHOS in the last 72 hours. Recent Labs     12/14/22  1420   INR 1.15*     No results for input(s): Anselmo Chaney in the last 72 hours.     Urinalysis:      Lab Results   Component Value Date/Time    NITRU Negative 10/12/2021 01:46 PM    WBCUA 89 10/12/2021 01:46 PM    BACTERIA 2+ 10/12/2021 01:46 PM    RBCUA 7 10/12/2021 01:46 PM    BLOODU Negative 10/12/2021 01:46 PM    SPECGRAV 1.023 10/12/2021 01:46 PM    GLUCOSEU 100 10/12/2021 01:46 PM       Radiology:  MRI ABDOMEN W WO CONTRAST MRCP   Final Result      Limited study due to multiple factors, as outlined above. Lesion of the right hepatic lobe is most consistent with a hemangioma. Follow-up multiphase CT is recommended in 6 months. CT CHEST PULMONARY EMBOLISM W CONTRAST   Final Result      Large burden of pulmonary emboli without obvious heart strain of the exam is severely limited and difficult to evaluate for other pathology      Findings called to patient's floor nurse on 12/13/2022 at 7:09 PM      IR EMBOLIZATION HEMORRHAGE   Final Result   Impression: Technically successful superior mesenteric, common hepatic and gastroduodenal angiograms with subsequent coiling of the gastroduodenal artery. VL Extremity Venous Bilateral   Final Result      CTA ABDOMEN PELVIS W WO CONTRAST   Final Result      1. Suspected bilateral pulmonary emboli but inadequate contrast phase to assess for the pulmonary embolus. Incomplete filling of the pulmonary arteries suggests pulmonary emboli inferiorly     2. No sign of any acute or active GI bleeding on CT angiograms studies   3. No sign of any aneurysm in the abdomen with normal widely patent vessels   4. Stable appearing partially calcified left adrenal adenoma. 5. Parapelvic renal cysts, largest on left side   6. 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 4.1 x 3.5 cm right ovarian cyst, benign in appearance   8. Mild cholelithiasis   9.  No free fluid or free air with right abdominal wall subcutaneous inflammation or prior instrumentation         CT ABDOMEN PELVIS W WO CONTRAST Additional Contrast? Radiologist Recommendation   Final Result      1.  9.5 cm mass in the right hepatic lobe which does not appear to represent a hemangioma. Differential diagnosis would include primary or secondary malignancy. Liver mass protocol MRI with and without contrast is recommended. 2.  No intra-abdominal hemorrhage. 3.  Mild cholelithiasis. 4.  3.5 cm right ovarian cyst, the CT appearance suggests a simple cyst indicating a benign finding. MRI BRAIN W WO CONTRAST   Final Result      1. Postsurgical changes from right temporal and transmastoid craniotomy with large amount of fat packing in the defect and extracranial soft tissues. Large subcutaneous fluid collection is present in the scalp surrounding and extending superiorly from    the fat packing. No diffusion restriction to indicate superimposed infection. 2.  Residual meningioma in the right cerebellopontine angle, prepontine cistern, and right Meckel's cave/cavernous sinus. The tumor extends partially encasing the basilar artery   3. Small area of acute to subacute ischemia in the right brachium pontis. Small enhancing lesions superior to the right tentorium is new from the prior study and may represent an area of subacute ischemia. 4.  Small amount of extra-axial hemorrhage along the right cerebellopontine angle. 5.  Stable 12 mm left frontal meningioma. CTA PULMONARY W CONTRAST   Final Result      Significantly limited study due to motion. 1. Significantly limited study due to streak artifact and suboptimal bolus. No evidence of a central embolus. 2. Multifocal airspace consolidation is present, suboptimally evaluated due to motion. This presumably reflects pneumonia. Follow-up chest CT is recommended to document resolution. 3. There is a large mass in the right hepatic lobe measuring 8.2 x 7.4 cm, demonstrating peripheral nodular enhancement. There is significant amount of artifact through this lesion. It is still favored to reflect a hemangioma. Recommend a multiphasic CT    of the abdomen for further assessment.  This could be performed on a nonurgent basis, if clinically appropriate. 4. Partially calcified nodule arising from the left adrenal gland, most likely benign and possibly reflecting old adrenal hemorrhage. XR CHEST PORTABLE   Final Result      Similar appearance of patchy airspace disease. FL MODIFIED BARIUM SWALLOW W VIDEO   Final Result      1. Laryngeal penetration, but no evidence of tracheal aspiration. XR CHEST PORTABLE   Final Result      Patchy left upper lobe airspace disease, atelectasis versus pneumonia. CT HEAD WO CONTRAST   Final Result      1. Interval postsurgical changes from resection of right cerebellopontine angle mass with associated right temporal mastoid resection and frontotemporal craniotomy and fat graft placement. 2.  Thin hyperdensity along the fat graft may represent small amount of postsurgical blood products. There is also a small focus of subarachnoid hemorrhage along the right temporal lobe. 3.  Mild pneumocephalus and bilateral subgaleal fluid is also likely postsurgical.             IP CONSULT TO CRITICAL CARE  IP CONSULT TO PHYSICAL MEDICINE REHAB  IP CONSULT TO GI  IP CONSULT TO HOSPITALIST  IP CONSULT TO NEUROCRITICAL CARE  IP CONSULT TO PULMONOLOGY    Assessment/Plan:    Active Hospital Problems    Diagnosis     Multiple subsegmental pulmonary emboli without acute cor pulmonale ([address] [I26.94]      Priority: Medium    S/P craniotomy [Z98.890]      Priority: Medium    Acoustic neuroma ([address] [D33.3]      Priority: Medium    Cerebellopontine angle meningioma ([address] [D32.0]      Priority: Medium   -ENT following. On keppra. Acute GI bleed due to bleeding duodenal ulcers  Acute blood loss anemia  Status post EGD with intervention;CT abdomen has no signs of any ongoing acute bleeding. S/p EGD yesterday. Continue PPI bid  GI following    Bilateral pulmonary emboli:  Dopplers neg for DVT.  Pulmonary has been consulted,  Pulmonology and GI discussed the PE situation and they are in agreement that the risk;benefit ratio favors heparin anticoagulation now, and transition to an oral agent next week. Liver lesion  -MRI Abd/pelvis showed lesion of the right hepatic lobe most consistent with a hemangioma. Follow-up multiphase CT is recommended in 6 months  -follow with PCP     DM: cont lantus and SSI for now, once stable and plan for ARU would restart home meds. HTN: stable, cont home meds, monitor. Hypokalemia  Hypomagnesemia  supplement K and mag as needed      Obesity class III: BMI 58.7. follow with PCP     DVT Prophylaxis: heparin   Diet: ADULT DIET;  Full Liquid  Code Status: Full Code  PT/OT Eval Status: ongoing    Dispo - ARU next week         Eduin De Los Santos MD

## 2022-12-17 NOTE — PROGRESS NOTES
4 Eyes Admission Assessment     I agree as the admission nurse that 2 RN's have performed a thorough Head to Toe Skin Assessment on the patient. ALL assessment sites listed below have been assessed on admission. Areas assessed by both nurses:   [x]   Head, Face, and Ears   [x]   Shoulders, Back, and Chest  [x]   Arms, Elbows, and Hands   [x]   Coccyx, Sacrum, and Ischium  [x]   Legs, Feet, and Heels        Does the Patient have Skin Breakdown?   No   Scattered bruising        Jose Prevention initiated:  Yes   Wound Care Orders initiated:  NA      [facility] nurse consulted for Pressure Injury (Stage 3,4, Unstageable, DTI, NWPT, and Complex wounds) or Jose score 18 or lower:  NA      Nurse 1 eSignature: Electronically signed by Casimiro Merrill RN on 12/17/22 at 1:40 PM EST    **SHARE this note so that the co-signing nurse is able to place an eSignature**    Nurse 2 eSignature: {Esignature:[id number]}

## 2022-12-17 NOTE — PROGRESS NOTES
Pt remained stable overnight. Aox4, palpable pulses, and responds appropriately. Pt's H/H labs remain with in pt's normal range.

## 2022-12-17 NOTE — PLAN OF CARE
Problem: Pain  Goal: Verbalizes/displays adequate comfort level or baseline comfort level  12/17/2022 1737 by Abdoulaye Cruz RN  Outcome: Progressing   Patient denies pain at this time. Will continue to assess and monitor. Problem: Safety - Adult  Goal: Free from fall injury  12/17/2022 1737 by Abdoulaye Cruz RN  Outcome: Progressing   All fall precautions in place. Bed locked and in lowest position with alarm on. Over-bed table and personal belongings within reach. Patient instructed to use call light for assistance. Non-skids socks on. Will continue to monitor.

## 2022-12-17 NOTE — PROGRESS NOTES
Patient admitted to 95 20 92 from ICU. VSS on room air, A/Ox4, existing facial droop and mild dysarthria noted. Oriented to room, call light, and fall precautions. Dressing to upper R head is CD&I. PICC line in place to RUE with good blood return noted. Purewick in place. Patient on specialty bariatric bed. Heparin gtt running to L PIV, handoff completed in [facility] with transfering ICU RN. 4 Eyes Skin Assessment completed with RN x2. Will continue to follow throughout shift.

## 2022-12-17 NOTE — PROGRESS NOTES
Assessment  Acute blood loss anemia from large duodenal ulcer with visible vessel status post failed endoscopic clip placement followed by hemospray 12/11, then GDA embolization 12/12. Repeat EGD 12/16 with 2 and half centimeter ulcer with pigmented spot, certainly improved from previous. No other active bleeding, though suspect she is bleeding intermittently from friable tissue. Bilateral pulmonary emboli so far without right heart strain noted 12/13. Extensive discussion with neurosurgery, pulmonary and GI with the agreement to proceed with heparin temporarily and bridge to oral anticoagulation next week. Plan:  IV Protonix twice a day. Full liquid diet  Consider changing to oral anticoagulation, perhaps eliquis since luminal bleeding risk may be lower, starting next Wednesday 12/21. Dr Bautista Mccoy will be here tomorrow and Dr Dawna Funk will start inpt GI coverage on Monday. Subjective: We are following for GI bleeding. Briefly, 71-year-old female admitted with symptomatic meningioma subsequently with surgical management. Operative course complicated by giant bleeding duodenal ulcer treated with GDA embolization followed by discovery of new bilateral pulmonary emboli, for which heparin was started then 36 hours later, or yesterday at 9 AM, heparin was stopped because of drop in hemoglobin from mid eights down to 7.7, though without overt bleeding. EGD yesterday with findings noted above. Specifically, she now has a pigmented spot in the large duodenal ulcer whereas previously she had a visible vessel. Hemoglobin today is 7.9, with BUN of 6. Today, she denies abdominal pain. No bowel movements overnight. Objective:    Review of Systems:    Constitutional: Negative for fever, chills, and unexpected weight change. HENT: Negative for trouble swallowing. Respiratory: Negative for cough, chest tightness and shortness of breath.     Cardiovascular: Negative for chest pain  Gastrointestinal: see HPI  Musculoskeletal: Negative for unusual arthralgias. Skin: Negative for rash. Scheduled Meds:   potassium chloride  20 mEq Oral BID WC    pantoprazole  40 mg IntraVENous BID    levETIRAcetam  500 mg IntraVENous Q12H    cetirizine  10 mg Oral Daily    [Held by provider] hydroCHLOROthiazide  25 mg Oral Daily    insulin lispro  0-16 Units SubCUTAneous TID WC    insulin lispro  0-4 Units SubCUTAneous Nightly    methIMAzole  10 mg Oral Daily    insulin glargine  15 Units SubCUTAneous Nightly    melatonin  5 mg Oral Nightly    sodium chloride flush  5-40 mL IntraVENous 2 times per day    [Held by provider] carvedilol  12.5 mg Oral BID WC    [Held by provider] losartan  100 mg Oral Daily    sodium chloride flush  5-40 mL IntraVENous 2 times per day    latanoprost  1 drop Both Eyes Nightly     Continuous Infusions:   heparin (PORCINE) Infusion      sodium chloride      sodium chloride      dextrose         Vitals:  /67   Pulse 75   Temp 98.9 °F (37.2 °C) (Temporal)   Resp 20   Ht 5' 5\" (1.651 m)   Wt (!) 353 lb 1.6 oz (160.2 kg)   SpO2 92%   BMI 58.76 kg/m²     Exam:  General:  comfortable  Heent: There is no scleral icterus. Respiratory:  The patient's breathing is non-labored with normal chest wall excursion and normal muscle movement. Abdomen: The abdomen is nondistended, soft, and nontender. Rectal:  deferred   Neurological:  Gross memory appears intact. Patient is alert and oriented. Labs and Imaging:  I reviewed the labs and imaging results from last 24 hours.      Recent Labs     12/15/22  0612 12/15/22  1800 12/16/22  0405 12/16/22  1553 12/17/22  0438   HGB 8.2* 7.9* 7.7* 7.8* 7.9*   WBC 4.6  --  4.4  --  3.9*   LABALBU  --   --  3.0*  --   --         Celeste Gonzalez MD  December 17, 2022

## 2022-12-18 LAB
ANION GAP SERPL CALCULATED.3IONS-SCNC: 5 MMOL/L (ref 3–16)
ANTI-XA UNFRAC HEPARIN: 0.44 IU/ML (ref 0.3–0.7)
ANTI-XA UNFRAC HEPARIN: 0.44 IU/ML (ref 0.3–0.7)
BASOPHILS ABSOLUTE: 0 K/UL (ref 0–0.2)
BASOPHILS RELATIVE PERCENT: 0.8 %
BUN BLDV-MCNC: 5 MG/DL (ref 7–20)
CALCIUM SERPL-MCNC: 8.5 MG/DL (ref 8.3–10.6)
CHLORIDE BLD-SCNC: 110 MMOL/L (ref 99–110)
CO2: 28 MMOL/L (ref 21–32)
CREAT SERPL-MCNC: <0.5 MG/DL (ref 0.6–1.2)
EOSINOPHILS ABSOLUTE: 0.3 K/UL (ref 0–0.6)
EOSINOPHILS RELATIVE PERCENT: 9.2 %
GFR SERPL CREATININE-BSD FRML MDRD: >60 ML/MIN/{1.73_M2}
GLUCOSE BLD-MCNC: 117 MG/DL (ref 70–99)
GLUCOSE BLD-MCNC: 119 MG/DL (ref 70–99)
GLUCOSE BLD-MCNC: 124 MG/DL (ref 70–99)
GLUCOSE BLD-MCNC: 187 MG/DL (ref 70–99)
GLUCOSE BLD-MCNC: 196 MG/DL (ref 70–99)
HCT VFR BLD CALC: 24.1 % (ref 36–48)
HEMOGLOBIN: 8.1 G/DL (ref 12–16)
LYMPHOCYTES ABSOLUTE: 0.9 K/UL (ref 1–5.1)
LYMPHOCYTES RELATIVE PERCENT: 23.7 %
MCH RBC QN AUTO: 31.1 PG (ref 26–34)
MCHC RBC AUTO-ENTMCNC: 33.6 G/DL (ref 31–36)
MCV RBC AUTO: 92.5 FL (ref 80–100)
MONOCYTES ABSOLUTE: 0.3 K/UL (ref 0–1.3)
MONOCYTES RELATIVE PERCENT: 8.9 %
NEUTROPHILS ABSOLUTE: 2.1 K/UL (ref 1.7–7.7)
NEUTROPHILS RELATIVE PERCENT: 57.4 %
PDW BLD-RTO: 17.5 % (ref 12.4–15.4)
PERFORMED ON: ABNORMAL
PLATELET # BLD: 184 K/UL (ref 135–450)
PMV BLD AUTO: 8.1 FL (ref 5–10.5)
POTASSIUM SERPL-SCNC: 3.7 MMOL/L (ref 3.5–5.1)
RBC # BLD: 2.6 M/UL (ref 4–5.2)
SODIUM BLD-SCNC: 143 MMOL/L (ref 136–145)
WBC # BLD: 3.6 K/UL (ref 4–11)

## 2022-12-18 PROCEDURE — 97535 SELF CARE MNGMENT TRAINING: CPT

## 2022-12-18 PROCEDURE — 6360000002 HC RX W HCPCS: Performed by: NEUROLOGICAL SURGERY

## 2022-12-18 PROCEDURE — 2580000003 HC RX 258

## 2022-12-18 PROCEDURE — 6370000000 HC RX 637 (ALT 250 FOR IP): Performed by: INTERNAL MEDICINE

## 2022-12-18 PROCEDURE — 6370000000 HC RX 637 (ALT 250 FOR IP)

## 2022-12-18 PROCEDURE — C9113 INJ PANTOPRAZOLE SODIUM, VIA: HCPCS | Performed by: NURSE PRACTITIONER

## 2022-12-18 PROCEDURE — 6360000002 HC RX W HCPCS: Performed by: NURSE PRACTITIONER

## 2022-12-18 PROCEDURE — 97530 THERAPEUTIC ACTIVITIES: CPT

## 2022-12-18 PROCEDURE — 85520 HEPARIN ASSAY: CPT

## 2022-12-18 PROCEDURE — 97116 GAIT TRAINING THERAPY: CPT

## 2022-12-18 PROCEDURE — 6370000000 HC RX 637 (ALT 250 FOR IP): Performed by: NURSE PRACTITIONER

## 2022-12-18 PROCEDURE — 80048 BASIC METABOLIC PNL TOTAL CA: CPT

## 2022-12-18 PROCEDURE — 1200000000 HC SEMI PRIVATE

## 2022-12-18 PROCEDURE — 85025 COMPLETE CBC W/AUTO DIFF WBC: CPT

## 2022-12-18 PROCEDURE — 6360000002 HC RX W HCPCS

## 2022-12-18 RX ADMIN — INSULIN GLARGINE 15 UNITS: 100 INJECTION, SOLUTION SUBCUTANEOUS at 20:57

## 2022-12-18 RX ADMIN — SODIUM CHLORIDE, PRESERVATIVE FREE 10 ML: 5 INJECTION INTRAVENOUS at 23:35

## 2022-12-18 RX ADMIN — HEPARIN SODIUM 12 UNITS/KG/HR: 10000 INJECTION, SOLUTION INTRAVENOUS at 01:43

## 2022-12-18 RX ADMIN — HEPARIN SODIUM 12 UNITS/KG/HR: 10000 INJECTION, SOLUTION INTRAVENOUS at 15:26

## 2022-12-18 RX ADMIN — LEVETIRACETAM 500 MG: 100 INJECTION, SOLUTION INTRAVENOUS at 09:38

## 2022-12-18 RX ADMIN — Medication 5 MG: at 20:27

## 2022-12-18 RX ADMIN — POTASSIUM CHLORIDE 20 MEQ: 20 TABLET, EXTENDED RELEASE ORAL at 09:30

## 2022-12-18 RX ADMIN — PANTOPRAZOLE SODIUM 40 MG: 40 INJECTION, POWDER, LYOPHILIZED, FOR SOLUTION INTRAVENOUS at 20:27

## 2022-12-18 RX ADMIN — METHIMAZOLE 10 MG: 5 TABLET ORAL at 09:30

## 2022-12-18 RX ADMIN — PANTOPRAZOLE SODIUM 40 MG: 40 INJECTION, POWDER, LYOPHILIZED, FOR SOLUTION INTRAVENOUS at 09:30

## 2022-12-18 RX ADMIN — POTASSIUM CHLORIDE 20 MEQ: 20 TABLET, EXTENDED RELEASE ORAL at 17:21

## 2022-12-18 RX ADMIN — SODIUM CHLORIDE, PRESERVATIVE FREE 10 ML: 5 INJECTION INTRAVENOUS at 09:32

## 2022-12-18 RX ADMIN — LEVETIRACETAM 500 MG: 100 INJECTION, SOLUTION INTRAVENOUS at 20:31

## 2022-12-18 RX ADMIN — CETIRIZINE HYDROCHLORIDE 10 MG: 10 TABLET, FILM COATED ORAL at 09:30

## 2022-12-18 RX ADMIN — SODIUM CHLORIDE, PRESERVATIVE FREE 10 ML: 5 INJECTION INTRAVENOUS at 09:30

## 2022-12-18 RX ADMIN — SODIUM CHLORIDE 25 ML/HR: 9 INJECTION, SOLUTION INTRAVENOUS at 09:37

## 2022-12-18 RX ADMIN — LATANOPROST 1 DROP: 50 SOLUTION OPHTHALMIC at 20:57

## 2022-12-18 NOTE — PROGRESS NOTES
Patient is alert and oriented x4. Vital signs are stable. Patient denies any pain. Neuro assessment is unchanged. Patient continues to be on heparin gtt. Freeman Colon Veria [place] is in place and has adequate urine output. Bed is in the lowest position. Bed alarm is activated. Call light is within reach. Will continue to monitor and reassess.

## 2022-12-18 NOTE — PROGRESS NOTES
Progress Note    Patient Bijan Nichole  MRN: [id number]  YOB: 1955 Age: 79 y.o. Sex: female  Room: [address] [place] [address]       Admitting Physician: Beatriz Hess MD   Date of Admission: 11/28/2022  5:33 AM   Primary Care Physician: Mallika DE LA TORRE [facility]     Subjective:  Bijan Nichole was seen and examined. With history of acute blood loss anemia from large duodenal ulcer with visible vessel, failed endoscopic treatment, followed by EGD embolization 12/12. Repeat EGD done 12/16 with large duodenal ulcer with pigmented spot. No evidence of any further GI bleeding since then. Hemoglobin  stable at 8.1      ROS:  Constitutional: Denies fever, no change in appetite  Respiratory: Denies cough or shortness of breath  Cardiovascular: Denies chest pain or edema    Objective:  Vital Signs:   Vitals:    12/18/22 1112   BP: (!) 143/71   Pulse: 79   Resp:    Temp:    SpO2:          Physical Exam:  Constitutional: Alert and oriented x 4. No acute distress. Respiratory: Respirations nonlabored, no crepitus  GI: Abdomen nondistended, soft, and nontender. Neurological: No focal deficits noted. No asterixis.     Intake/Output:    Intake/Output Summary (Last 24 hours) at 12/18/2022 1258  Last data filed at 12/18/2022 1102  Gross per 24 hour   Intake 730 ml   Output 2100 ml   Net -1370 ml        Current Medications:  Current Facility-Administered Medications   Medication Dose Route Frequency Provider Last Rate Last Admin    heparin 25,000 units in dextrose 5% 250 mL (premix) infusion  5-30 Units/kg/hr IntraVENous Continuous Bee Fenton MD 19.2 mL/hr at 12/18/22 0143 12 Units/kg/hr at 12/18/22 0143    polyethylene glycol (GLYCOLAX) packet 17 g  17 g Oral Daily PRN Joy CaLEE Salazar - CNP        potassium chloride (KLOR-CON M) extended release tablet 20 mEq  20 mEq Oral BID  Zafar Green MD   20 mEq at 12/18/22 0930    pantoprazole (PROTONIX) injection 40 mg  40 mg IntraVENous BID Courtney E Kaur Inez - CNP   40 mg at 12/18/22 0930    levETIRAcetam (KEPPRA) 500 mg in sodium chloride 0.9 % 100 mL IVPB  500 mg IntraVENous Q12H Jacinto ColeLEE - CNP   Stopped at 12/18/22 0681    cetirizine (ZYRTEC) tablet 10 mg  10 mg Oral Daily Rosa SifuentesLEE - CNP   10 mg at 12/18/22 0930    [Held by provider] hydroCHLOROthiazide (HYDRODIURIL) tablet 25 mg  25 mg Oral Daily Rosa SifuentesLEE - CNP   25 mg at 12/10/22 0557    bisacodyl (DULCOLAX) suppository 10 mg  10 mg Rectal Daily PRN LEE Montano CNP   10 mg at 12/10/22 1305    insulin lispro (1 Unit Dial) (HUMALOG/ADMELOG) pen 0-16 Units  0-16 Units SubCUTAneous TID WC Marissa Cam MD   4 Units at 12/14/22 1620    insulin lispro (1 Unit Dial) (HUMALOG/ADMELOG) pen 0-4 Units  0-4 Units SubCUTAneous Nightly Marissa Cam MD        labetalol (NORMODYNE;TRANDATE) injection 5 mg  5 mg IntraVENous Q4H PRN Medhat Enriquez MD        methIMAzole (TAPAZOLE) tablet 10 mg  10 mg Oral Daily Marissa Cam MD   10 mg at 12/18/22 0930    insulin glargine (LANTUS;BASAGLAR) injection pen 15 Units  15 Units SubCUTAneous Nightly Marissa Cam MD   15 Units at 12/17/22 2132    melatonin disintegrating tablet 5 mg  5 mg Oral Nightly Marissa Cam MD   5 mg at 12/17/22 2130    sodium chloride flush 0.9 % injection 5-40 mL  5-40 mL IntraVENous 2 times per day Medhat Enriquez MD   10 mL at 12/18/22 0930    sodium chloride flush 0.9 % injection 5-40 mL  5-40 mL IntraVENous PRN Medhat Enriquez MD        0.9 % sodium chloride infusion  25 mL IntraVENous PRN Medhat Enriquez MD        [facility]] carvedilol (COREG) tablet 12.5 mg  12.5 mg Oral BID  Marissa Cam MD   12.5 mg at 12/10/22 1657    [Held by provider] losartan (COZAAR) tablet 100 mg  100 mg Oral Daily Marissa Cam MD   100 mg at 12/10/22 0905    sodium chloride flush 0.9 % injection 5-40 mL  5-40 mL IntraVENous 2 times per day Balaji Pérez MD   10 mL at 12/18/22 0932    0.9 % sodium chloride infusion   IntraVENous PRN Lisa Cam MD 25 mL/hr at 12/18/22 0937 25 mL/hr at 12/18/22 0937    acetaminophen (TYLENOL) tablet 650 mg  650 mg Oral Q4H PRN Yuriy Petite, APRN - CNP   650 mg at 12/06/22 1802    ondansetron (ZOFRAN-ODT) disintegrating tablet 4 mg  4 mg Oral Q8H PRN Lisa Cam MD   4 mg at 12/02/22 1412    Or    ondansetron [facility]) injection 4 mg  4 mg IntraVENous Q6H PRN Lisa Cam MD   4 mg at 12/01/22 2001    hydrALAZINE (APRESOLINE) injection 10 mg  10 mg IntraVENous Q1H PRN Lisa Cam MD   10 mg at 12/04/22 0816    albuterol (PROVENTIL) nebulizer solution 2.5 mg  2.5 mg Nebulization Q4H PRN Lisa Cam MD        latanoprost (XALATAN) 0.005 % ophthalmic solution 1 drop  1 drop Both Eyes Nightly Lisa Cam MD   1 drop at 12/17/22 2131    glucose chewable tablet 16 g  4 tablet Oral PRN Lisa Cam MD        dextrose bolus 10% 125 mL  125 mL IntraVENous PRN Balaji Pérez MD        Or    dextrose bolus 10% 250 mL  250 mL IntraVENous PRN Lisa Cam MD        glucagon (rDNA) injection 1 mg  1 mg SubCUTAneous PRN Lisa Cam MD        dextrose 10 % infusion   IntraVENous Continuous PRN Lisa Cam MD        [facility]) injection 6.25 mg  6.25 mg IntraMUSCular Q6H PRN Lisa Cam MD   6.25 mg at 11/28/22 2136         Recent Imaging:   EGD  No dictation        Labs:   Recent Labs     12/16/22  0405 12/16/22  1553 12/17/22  0438 12/17/22  1940 12/18/22  0600   HGB 7.7* 7.8* 7.9* 8.3* 8.1*   WBC 4.4  --  3.9*  --  3.6*   LABALBU 3.0*  --   --   --   --           Plan:  Continue PPI twice daily  Follow-up hemoglobin/hematocrit closely  Consider changing to oral anticoagulation, hopefully starting Wednesday 12/21.    Continue with symptomatic and supportive care          Estiven Fine MD  [address]    [phone].  Also available via [employer]

## 2022-12-18 NOTE — PLAN OF CARE
Problem: Chronic Conditions and Co-morbidities  Goal: Patient's chronic conditions and co-morbidity symptoms are monitored and maintained or improved  12/18/2022 1031 by Micah Lynn RN  Outcome: Progressing  12/18/2022 0020 by Saba Cornejo RN  Outcome: Progressing     Problem: Discharge Planning  Goal: Discharge to home or other facility with appropriate resources  Outcome: Progressing     Problem: Pain  Goal: Verbalizes/displays adequate comfort level or baseline comfort level  12/18/2022 1031 by Micah Lynn RN  Outcome: Progressing  12/18/2022 0020 by Saba Cornejo RN  Outcome: Progressing     Problem: Safety - Adult  Goal: Free from fall injury  12/18/2022 1031 by Micah Lynn RN  Outcome: Progressing  12/18/2022 0020 by Saba Cornejo RN  Outcome: Progressing     Problem: Skin/Tissue Integrity  Goal: Absence of new skin breakdown  Description: 1. Monitor for areas of redness and/or skin breakdown  2. Assess vascular access sites hourly  3. Every 4-6 hours minimum:  Change oxygen saturation probe site  4. Every 4-6 hours:  If on nasal continuous positive airway pressure, respiratory therapy assess nares and determine need for appliance change or resting period.   12/18/2022 1031 by Micah Lynn RN  Outcome: Progressing  12/18/2022 0020 by Saba Cornejo RN  Outcome: Progressing     Problem: ABCDS Injury Assessment  Goal: Absence of physical injury  12/18/2022 1031 by Micah Lynn RN  Outcome: Progressing  12/18/2022 0020 by Saba Cronejo RN  Outcome: Progressing     Problem: Nutrition Deficit:  Goal: Optimize nutritional status  12/18/2022 1031 by Micah Lynn RN  Outcome: Progressing  12/18/2022 0020 by Saba Cornejo RN  Outcome: Progressing     Problem: Neurosensory - Adult  Goal: Absence of seizures  12/18/2022 1031 by Micah Lynn RN  Outcome: Progressing  12/18/2022 0020 by Saba Cornejo RN  Outcome: Progressing  Goal: Remains free of injury related to seizures activity  Outcome: Progressing  Goal: Achieves maximal functionality and self care  Outcome: Progressing     Problem: Respiratory - Adult  Goal: Achieves optimal ventilation and oxygenation  Outcome: Progressing     Problem: Cardiovascular - Adult  Goal: Maintains optimal cardiac output and hemodynamic stability  Outcome: Progressing  Goal: Absence of cardiac dysrhythmias or at baseline  Outcome: Progressing     Problem: Skin/Tissue Integrity - Adult  Goal: Skin integrity remains intact  12/18/2022 1031 by Elliot Kumar RN  Outcome: Progressing  12/18/2022 0020 by Howie Segovia RN  Outcome: Progressing  Goal: Incisions, wounds, or drain sites healing without S/S of infection  Outcome: Progressing  Goal: Oral mucous membranes remain intact  Outcome: Progressing     Problem: Musculoskeletal - Adult  Goal: Return mobility to safest level of function  Outcome: Progressing  Goal: Return ADL status to a safe level of function  Outcome: Progressing     Problem: Gastrointestinal - Adult  Goal: Minimal or absence of nausea and vomiting  12/18/2022 1031 by Elliot Kumar RN  Outcome: Progressing  12/18/2022 0020 by Howie Segovia RN  Outcome: Progressing  Goal: Maintains or returns to baseline bowel function  Outcome: Progressing  Goal: Maintains adequate nutritional intake  Outcome: Progressing     Problem: Genitourinary - Adult  Goal: Absence of urinary retention  Outcome: Progressing     Problem: Infection - Adult  Goal: Absence of infection at discharge  Outcome: Progressing  Goal: Absence of infection during hospitalization  Outcome: Progressing  Goal: Absence of fever/infection during anticipated neutropenic period  Outcome: Progressing     Problem: Metabolic/Fluid and Electrolytes - Adult  Goal: Electrolytes maintained within normal limits  Outcome: Progressing  Goal: Hemodynamic stability and optimal renal function maintained  Outcome: Progressing  Goal: Glucose maintained within prescribed range  Outcome: Progressing     Problem: Hematologic - Adult  Goal: Maintains hematologic stability  Outcome: Progressing

## 2022-12-18 NOTE — PROGRESS NOTES
Hospitalist Consult Progress Note      PCP: Brinton Galeazzi    Date of Admission: 11/28/2022    Chief Complaint: consult for cas-operative [facility] Course: \"The patient is a 79 y.o. female with hx of multistage neurosurgical resection of petroclival meningioma who we are asked to see/evaluate by Dr. Krishan Soler for cas-operative mgt. patient has been recovering well postoperatively from her procedure when she most recently developed melanotic stools. Gastroenterology was consulted, patient underwent EGD and was found to have multiple duodenal ulcers with visible vessel and active bleeding. Underwent injection of epi, hemostatic clip placement and chemo spray. Due to concern for possible ongoing GI bleed she underwent CTA. No active GI bleed was detected, however CAT scan picked up likely pulmonary embolism in the lower lung fields. Patient received 2 units of blood transfusion\"    Subjective:      Patient is afebrile overnight. No complaints. No signs of bleeding.     Medications:  Reviewed    Infusion Medications    heparin (PORCINE) Infusion 12 Units/kg/hr (12/18/22 0143)    sodium chloride      sodium chloride      dextrose       Scheduled Medications    potassium chloride  20 mEq Oral BID WC    pantoprazole  40 mg IntraVENous BID    levETIRAcetam  500 mg IntraVENous Q12H    cetirizine  10 mg Oral Daily    [Held by provider] hydroCHLOROthiazide  25 mg Oral Daily    insulin lispro  0-16 Units SubCUTAneous TID WC    insulin lispro  0-4 Units SubCUTAneous Nightly    methIMAzole  10 mg Oral Daily    insulin glargine  15 Units SubCUTAneous Nightly    melatonin  5 mg Oral Nightly    sodium chloride flush  5-40 mL IntraVENous 2 times per day    [Held by provider] carvedilol  12.5 mg Oral BID WC    [Held by provider] losartan  100 mg Oral Daily    sodium chloride flush  5-40 mL IntraVENous 2 times per day    latanoprost  1 drop Both Eyes Nightly     PRN Meds: polyethylene glycol, bisacodyl, labetalol, sodium chloride flush, sodium chloride, sodium chloride, acetaminophen, ondansetron **OR** ondansetron, hydrALAZINE, albuterol, glucose, dextrose bolus **OR** dextrose bolus, glucagon (rDNA), dextrose, promethazine      Intake/Output Summary (Last 24 hours) at 12/18/2022 0935  Last data filed at 12/18/2022 0932  Gross per 24 hour   Intake 490 ml   Output 2100 ml   Net -1610 ml       Physical Exam Performed:    BP (!) 151/77   Pulse 67   Temp 98.1 °F (36.7 °C) (Oral)   Resp 18   Ht 5' 5\" (1.651 m)   Wt (!) 353 lb 1.6 oz (160.2 kg)   SpO2 92%   BMI 58.76 kg/m²     General appearance: No apparent distress. Dressing on the right side of the face  Respiratory:  Normal respiratory effort. Clear to auscultation, bilaterally without Rales/Wheezes/Rhonchi. Cardiovascular: Regular rate and rhythm with normal S1/S2 without murmurs, rubs or gallops. Abdomen: Soft, non-tender, non-distended with normal bowel sounds. Musculoskeletal: No edema on both LE   Neurologic: no new deficit  Psychiatric: Alert and oriented       Labs:   Recent Labs     12/16/22  0405 12/16/22  1553 12/17/22  0438 12/17/22  1940 12/18/22  0600   WBC 4.4  --  3.9*  --  3.6*   HGB 7.7*   < > 7.9* 8.3* 8.1*   HCT 22.8*   < > 23.4* 24.7* 24.1*     --  178  --  184    < > = values in this interval not displayed. Recent Labs     12/16/22  0405 12/17/22  0438 12/18/22  0600    140 143   K 3.0* 3.3* 3.7    108 110   CO2 28 27 28   BUN 5* 6* 5*   CREATININE <0.5* <0.5* <0.5*   CALCIUM 8.4 8.5 8.5   PHOS 3.1  --   --      No results for input(s): AST, ALT, BILIDIR, BILITOT, ALKPHOS in the last 72 hours. No results for input(s): INR in the last 72 hours. No results for input(s): Rayfield Whitman in the last 72 hours.     Urinalysis:      Lab Results   Component Value Date/Time    NITRU Negative 10/12/2021 01:46 PM    WBCUA 89 10/12/2021 01:46 PM    BACTERIA 2+ 10/12/2021 01:46 PM    RBCUA 7 10/12/2021 01:46 PM BLOODU Negative 10/12/2021 01:46 PM    SPECGRAV 1.023 10/12/2021 01:46 PM    GLUCOSEU 100 10/12/2021 01:46 PM       Radiology:  MRI ABDOMEN W WO CONTRAST MRCP   Final Result      Limited study due to multiple factors, as outlined above. Lesion of the right hepatic lobe is most consistent with a hemangioma. Follow-up multiphase CT is recommended in 6 months. CT CHEST PULMONARY EMBOLISM W CONTRAST   Final Result      Large burden of pulmonary emboli without obvious heart strain of the exam is severely limited and difficult to evaluate for other pathology      Findings called to patient's floor nurse on 12/13/2022 at 7:09 PM      IR EMBOLIZATION HEMORRHAGE   Final Result   Impression: Technically successful superior mesenteric, common hepatic and gastroduodenal angiograms with subsequent coiling of the gastroduodenal artery. VL Extremity Venous Bilateral   Final Result      CTA ABDOMEN PELVIS W WO CONTRAST   Final Result      1. Suspected bilateral pulmonary emboli but inadequate contrast phase to assess for the pulmonary embolus. Incomplete filling of the pulmonary arteries suggests pulmonary emboli inferiorly     2. No sign of any acute or active GI bleeding on CT angiograms studies   3. No sign of any aneurysm in the abdomen with normal widely patent vessels   4. Stable appearing partially calcified left adrenal adenoma. 5. Parapelvic renal cysts, largest on left side   6. 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 4.1 x 3.5 cm right ovarian cyst, benign in appearance   8. Mild cholelithiasis   9.  No free fluid or free air with right abdominal wall subcutaneous inflammation or prior instrumentation         CT ABDOMEN PELVIS W WO CONTRAST Additional Contrast? Radiologist Recommendation   Final Result      1.  9.5 cm mass in the right hepatic lobe which does not appear to represent a hemangioma. Differential diagnosis would include primary or secondary malignancy. Liver mass protocol MRI with and without contrast is recommended. 2.  No intra-abdominal hemorrhage. 3.  Mild cholelithiasis. 4.  3.5 cm right ovarian cyst, the CT appearance suggests a simple cyst indicating a benign finding. MRI BRAIN W WO CONTRAST   Final Result      1. Postsurgical changes from right temporal and transmastoid craniotomy with large amount of fat packing in the defect and extracranial soft tissues. Large subcutaneous fluid collection is present in the scalp surrounding and extending superiorly from    the fat packing. No diffusion restriction to indicate superimposed infection. 2.  Residual meningioma in the right cerebellopontine angle, prepontine cistern, and right Meckel's cave/cavernous sinus. The tumor extends partially encasing the basilar artery   3. Small area of acute to subacute ischemia in the right brachium pontis. Small enhancing lesions superior to the right tentorium is new from the prior study and may represent an area of subacute ischemia. 4.  Small amount of extra-axial hemorrhage along the right cerebellopontine angle. 5.  Stable 12 mm left frontal meningioma. CTA PULMONARY W CONTRAST   Final Result      Significantly limited study due to motion. 1. Significantly limited study due to streak artifact and suboptimal bolus. No evidence of a central embolus. 2. Multifocal airspace consolidation is present, suboptimally evaluated due to motion. This presumably reflects pneumonia. Follow-up chest CT is recommended to document resolution. 3. There is a large mass in the right hepatic lobe measuring 8.2 x 7.4 cm, demonstrating peripheral nodular enhancement. There is significant amount of artifact through this lesion. It is still favored to reflect a hemangioma. Recommend a multiphasic CT    of the abdomen for further assessment.  This could be performed on a nonurgent basis, if clinically appropriate. 4. Partially calcified nodule arising from the left adrenal gland, most likely benign and possibly reflecting old adrenal hemorrhage. XR CHEST PORTABLE   Final Result      Similar appearance of patchy airspace disease. FL MODIFIED BARIUM SWALLOW W VIDEO   Final Result      1. Laryngeal penetration, but no evidence of tracheal aspiration. XR CHEST PORTABLE   Final Result      Patchy left upper lobe airspace disease, atelectasis versus pneumonia. CT HEAD WO CONTRAST   Final Result      1. Interval postsurgical changes from resection of right cerebellopontine angle mass with associated right temporal mastoid resection and frontotemporal craniotomy and fat graft placement. 2.  Thin hyperdensity along the fat graft may represent small amount of postsurgical blood products. There is also a small focus of subarachnoid hemorrhage along the right temporal lobe. 3.  Mild pneumocephalus and bilateral subgaleal fluid is also likely postsurgical.             IP CONSULT TO CRITICAL CARE  IP CONSULT TO PHYSICAL MEDICINE REHAB  IP CONSULT TO GI  IP CONSULT TO HOSPITALIST  IP CONSULT TO NEUROCRITICAL CARE  IP CONSULT TO PULMONOLOGY    Assessment/Plan:    Active Hospital Problems    Diagnosis     Multiple subsegmental pulmonary emboli without acute cor pulmonale ([address] [I26.94]      Priority: Medium    S/P craniotomy [Z98.890]      Priority: Medium    Acoustic neuroma ([address] [D33.3]      Priority: Medium    Cerebellopontine angle meningioma ([address] [D32.0]      Priority: Medium   -ENT following. On keppra. Acute GI bleed due to bleeding duodenal ulcers  Acute blood loss anemia  Status post EGD with intervention;CT abdomen has no signs of any ongoing acute bleeding. S/p EGD 12/16/22. Continue PPI bid  GI following    Bilateral pulmonary emboli:  Dopplers neg for DVT.  Pulmonary has been consulted,  Pulmonology and GI discussed the PE situation and they are in agreement that the risk;benefit ratio favors heparin anticoagulation now, and transition to an oral agent next week. Liver lesion  -MRI Abd/pelvis showed lesion of the right hepatic lobe most consistent with a hemangioma. Follow-up multiphase CT is recommended in 6 months  -follow with PCP     DM: cont lantus and SSI for now, once stable and plan for ARU would restart home meds. HTN: stable, cont home meds, monitor. Hypokalemia  Hypomagnesemia  supplement K and mag as needed      Obesity class III: BMI 58.7. follow with PCP     DVT Prophylaxis: heparin   Diet: ADULT DIET;  Full Liquid  Code Status: Full Code  PT/OT Eval Status: ongoing    Dispo - ARU next week per primary          Santiago Emery MD

## 2022-12-18 NOTE — PROGRESS NOTES
Occupational Therapy  Facility/Department: [facility]  Occupational Therapy Daily Treatment Note    Date: 22  Patient Name: Scott Davis       Room: 6462/1260-35  MRN: [id number]  Account: [de-identified]   : 1955  (79 y.o.) Gender: female         Scott Davis scored a 16/24 on the [address] form. Current research shows that an AM-PAC score of 17 or less is typically not associated with a discharge to the patient's home setting. Based on the patient's AM-PAC score and their current ADL deficits, it is recommended that the patient have 5-7 sessions per week of Occupational Therapy at d/c to increase the patient's independence. At this time, this patient demonstrates complex nursing, medical, and rehabilitative needs, and would benefit from intensive rehabilitation services upon discharge from the Inpatient setting. This patient demonstrates the ability to participate in and benefit from an intensive therapy program with a coordinated interdisciplinary team approach to foster frequent, structured, and documented communication among disciplines, who will work together to establish, prioritize, and achieve treatment goals. Please see assessment section for further patient specific details. If patient discharges prior to next session this note will serve as a discharge summary. Please see below for the latest assessment towards goals. Assessment    Assessment  Assessment: Pt tolerated session well initially today and completed functional mobility to/from the bathroom w/ RW and CGA w/ one LOB requiring min A to correct. Pt however became very fatigued when exiting the bathroom. Pt was able to ambulate back to the bed however pt reported feeling very weak and c/o dizziness. Pt's BP was 167/93 and HR and SpO2 WFL.  Pt limited by c/o blurred vision, balance deficits, and decreased activity tolerance and would benefit from ARU to maximize functional independence as pt lives alone and has limited support. Pt continues to benefit from OT while inpt. Cont OT per POC          Past Medical History:  has a past medical history of Arthritis, Asthma, Brain tumor ([address] Diabetes mellitus ([address] High cholesterol, Hypertension, Imbalance, Loss of hearing, and Vertigo. Past Surgical History:   has a past surgical history that includes Total knee arthroplasty (Right, 07/14/2015); Total knee arthroplasty (Left, 10/14/15); Total hip arthroplasty (Right, 11/8/2021); Neuroma surgery (Right, 11/28/2022); mastoidectomy (Right, 11/28/2022); craniotomy (N/A, 11/29/2022); Upper gastrointestinal endoscopy (N/A, 12/11/2022); and IR EMBOLIZATION HEMORRHAGE (12/12/2022). Restrictions  Restrictions/Precautions: Fall Risk  Other position/activity restrictions: Up with assistance, progressive ambulation, up in the chair for meals, HOB elevated to 30 degrees; up as tolerated    Subjective  Subjective: Pt was seated in recliner chair upon arrival. Pt reported she is a little tired but pt was pleasant and agreeable to OT. Restrictions/Precautions: Fall Risk             Objective       Cognition  Arousal/Alertness: Appropriate responses to stimuli  Following Commands: Follows one step commands consistently  Attention Span: Appears intact  Memory: Decreased recall of recent events  Safety Judgement: Good awareness of safety precautions  Problem Solving: Assistance required to generate solutions;Assistance required to correct errors made;Assistance required to implement solutions  Insights: Decreased awareness of deficits  Initiation: Requires cues for some  Sequencing: Requires cues for some  Cognition Comment: pt more lethargic as session progressed and pt reported she started to feel very weak. Orientation  Overall Orientation Status: Within Functional Limits         ADL    Toileting  Assistance Level: Contact guard assist  Skilled Clinical Factors: Pt was continent of urine seated on toilet.  CGA for pants management up and down  Toilet Transfers  Technique:  (ambulating)  Equipment: Raised toilet seat with arms  Assistance Level: Minimal assistance  Skilled Clinical Factors: CGA for safe approach to RTS over toilet and min A to complete sit to stand fom RTS to RW. Pt reported feeling vey weak while exiting the bathroom and she requested to get back to bed            Functional Mobility  Device: Rolling walker  Activity: To/From bathroom  Assistance Level: Minimal assistance  Skilled Clinical Factors: Pt w/ one lateral LOB during ambulation to the bathroom requiring assistance to correct. Pt w/ short shuffled steps and VCs needed for erect posture. IV pole in tow    Sit to Supine  Assistance Level: Moderate assistance  Skilled Clinical Factors: Mod A needed to lift BLEs into bed  Sit to Stand  Assistance Level: Contact guard assist;Minimal assistance  Skilled Clinical Factors: CGA-Min A from recliner chair and RTS  Stand to Sit  Assistance Level: Contact guard assist  Skilled Clinical Factors: VCs needed to reach back for the bed         Activity Tolerance: Patient tolerated treatment well;Patient limited by fatigue  OT Equipment Recommendations  Other: defer to next level of care  Safety Devices  Safety Devices in place: Yes  Type of devices: Call light within reach; Bed alarm in place; Left in bed;Nurse notified    Patient Education  Education  Education Given To: Family; Patient  Education Provided: Role of Therapy;Home Exercise Program;Plan of Care;Transfer Training;ADL Function; Safety;DME/Home Modifications; Equipment  Education Provided Comments: education about ARU, activity promotion  Education Method: Demonstration;Verbal  Barriers to Learning: None  Education Outcome: Verbalized understanding;Continued education needed    Plan  Occupational Therapy Plan  Times Per Week: 5-7  Times Per Day:  Once a day  Current Treatment Recommendations: Strengthening;ROM;Cognitive reorientation;Balance training;Pain management;Self-Care / ADL; Functional mobility training; Safety education & training;Home management training; Endurance training;Neuromuscular re-education;Positioning    Goals  Patient Goals   Patient goals : not stated  Short Term Goals  Time Frame for Short Term Goals: by discharge  Short Term Goal 1: Pt will perform bed mobility with CGA to decrease caregiver burden- goal met 12/2. NEW GOAL: Pt will perform bed mobility with supervision  Short Term Goal 2: Pt will sit EOB 5 mins with no more than CGA-  goal met 12/8 on BSC. NEW GOAL: sit unsupported 5 mins with Supervision  Short Term Goal 3: Pt will perform grooming activity with Min A and set-up- goal met 12/1. New Goal: Pt will perform grooming ax with set-up and supervision  Short Term Goal 4: Pt will tolerate sit to stand transfer- goal met 12/1.  NEW GOAL: sit to stand transfer with Mod x2- goal met 12/2 with Min +CGA and CGA x2; NEW GOAL: stance x2 mins with CGA- goal met 12/15; New goal: pt will perform fx mobility to/from bathroom with CGA    AM-PAC Score        AM-[facility] Inpatient Daily Activity Raw Score: 16 (12/18/22 1418)  AM-PAC Inpatient ADL T-Scale Score : 35.96 (12/18/22 1418)  ADL Inpatient CMS 0-100% Score: 53.32 (12/18/22 1418)  ADL Inpatient CMS G-Code Modifier : CK (12/18/22 1418)      Therapy Time   Individual Concurrent Group Co-treatment   Time In 1321         Time Out 1400         Minutes 39         Timed Code Treatment Minutes: [address] [facility], OT

## 2022-12-18 NOTE — PLAN OF CARE
Problem: Chronic Conditions and Co-morbidities  Goal: Patient's chronic conditions and co-morbidity symptoms are monitored and maintained or improved  12/18/2022 0020 by Broderick Engle RN  Outcome: Progressing    Problem: Pain  Goal: Verbalizes/displays adequate comfort level or baseline comfort level  12/18/2022 0020 by Broderick Engle RN  Outcome: Progressing    Problem: Safety - Adult  Goal: Free from fall injury  12/18/2022 0020 by Brodeirck Engle RN  Outcome: Progressing    Problem: Skin/Tissue Integrity  Goal: Absence of new skin breakdown  Description: 1. Monitor for areas of redness and/or skin breakdown  2. Assess vascular access sites hourly  3. Every 4-6 hours minimum:  Change oxygen saturation probe site  4. Every 4-6 hours:  If on nasal continuous positive airway pressure, respiratory therapy assess nares and determine need for appliance change or resting period.   12/18/2022 0020 by Broderick Engle RN  Outcome: Progressing    Problem: ABCDS Injury Assessment  Goal: Absence of physical injury  12/18/2022 0020 by Broderick Engle RN  Outcome: Progressing    Problem: Nutrition Deficit:  Goal: Optimize nutritional status  12/18/2022 0020 by Broderick Engle RN  Outcome: Progressing    Problem: Neurosensory - Adult  Goal: Absence of seizures  12/18/2022 0020 by Broderick Engle RN  Outcome: Progressing    Problem: Skin/Tissue Integrity - Adult  Goal: Skin integrity remains intact  12/18/2022 0020 by Broderick Engle RN  Outcome: Progressing    Problem: Gastrointestinal - Adult  Goal: Minimal or absence of nausea and vomiting  12/18/2022 0020 by Broderick Engle RN  Outcome: Progressing

## 2022-12-18 NOTE — PROGRESS NOTES
Bp better already no need for any prn bp medications at this time, I will continue to follow throughout the shift.   Natividad Rain RN

## 2022-12-18 NOTE — PROGRESS NOTES
Neurosurgery Progress Note    Patient seen and examined on 12/18/22. No acute events overnight. Transferred to floor last night. Remains on heparin gtt with anti-Xa 0.44. Hb remains stable at 8.1 (8.3). Reports no new symptoms or complaints at this time. Neurologically stable on exam with HB 5 right facial function. Large pseudomeningocele.      A/P: 80 yo woman POD 20 s/p combined petrosal C/R for petroclival meningioma    -Neuro stable  -HOB elevated  -Frequent neuro checks  -Pain control with PO meds  -Continue heparin gtt for PEs   -Monitor Hb on anticoagulation  -Eye care  -Compressive mastoid dressing in place  -Will continue to follow      Divina Hahn MD, PhD  [address], [address] [place], [place], [address] [phone] (c), [phone] (o)

## 2022-12-18 NOTE — PROGRESS NOTES
Anit xa remains therapeutic no change done at this time on the pt's heparin drip.   Phuc Gonzalez RN

## 2022-12-18 NOTE — PROGRESS NOTES
Progress Note       79 y.o female   S/p STAGE II: RESECTION OF RIGHT PETROCLIVAL MENINGIOMA. POD 20     S: Pt is doing well today   Denies pain or dyspnea   Complains about mild tiredness     O:   Facial nerve - RT HB 5 stable ( no RT eye irritation). Postauricular incision well closed without leaking   Abdominal incision - well closed  Head wrap replaced   Pseudomeningocele - stable   HB- 8.3-8.1       A:   S/p STAGE II: RESECTION OF RIGHT PETROCLIVAL MENINGIOMA. POD 20   Hospital stay was complicated by PE and Upper GI bleeding. Currently, HB is stable around 8.        P:   - BM - follow up to rule out melena/ bleeding   - Heparine IV   - Compressive mastoid dressing in place  -Monitor Hb on anticoagulation  -Eye care  -Compressive mastoid dressing in place  -Will continue to follow

## 2022-12-18 NOTE — PROGRESS NOTES
Anti xa lab sent to lab at this time, I will continue to follow with the results.   Yesica Crabtree RN

## 2022-12-18 NOTE — PROGRESS NOTES
Physical Therapy  Facility/Department: [facility]  Physical Therapy Treatment    Name: Moris Aj  : 1955  MRN: [id number]  Date of Service: 2022    Discharge Recommendations:  Moris Aj scored a 16/24 on the AM-PAC short mobility form. Current research shows that an AM-PAC score of 17 or less is typically not associated with a discharge to the patient's home setting. Based on the patient's AM-PAC score and their current functional mobility deficits, it is recommended that the patient have 5-7 sessions per week of Physical Therapy at d/c to increase the patient's independence. At this time, this patient demonstrates complex nursing, medical, and rehabilitative needs, and would benefit from intensive rehabilitation services upon discharge from the Inpatient setting. This patient demonstrates the ability to participate in and benefit from an intensive therapy program with a coordinated interdisciplinary team approach to foster frequent, structured, and documented communication among disciplines, who will work together to establish, prioritize, and achieve treatment goals. Please see assessment section for further patient specific details. If patient discharges prior to next session this note will serve as a discharge summary. Please see below for the latest assessment towards goals. Patient would benefit from continued therapy after discharge   PT Equipment Recommendations  Other: plan to continue to assess and likely defer recommendations to the next level of care      Patient Diagnosis(es): Diagnoses of Acoustic neuroma [facility]), Balance problem, Meningioma ([facility] and Dizziness were pertinent to this visit. Past Medical History:  has a past medical history of Arthritis, Asthma, Brain tumor ([facility] Diabetes mellitus ([facility] High cholesterol, Hypertension, Imbalance, Loss of hearing, and Vertigo.   Past Surgical History:  has a past surgical history that includes Total knee arthroplasty (Right, 07/14/2015); Total knee arthroplasty (Left, 10/14/15); Total hip arthroplasty (Right, 11/8/2021); Neuroma surgery (Right, 11/28/2022); mastoidectomy (Right, 11/28/2022); craniotomy (N/A, 11/29/2022); Upper gastrointestinal endoscopy (N/A, 12/11/2022); and IR EMBOLIZATION HEMORRHAGE (12/12/2022). Assessment   Body Structures, Functions, Activity Limitations Requiring Skilled Therapeutic Intervention: Decreased functional mobility ; Decreased endurance;Decreased balance;Decreased strength  Assessment: Patient demonstrates overall improving mobility, using RW and CGA to min A for standing mobility. Patient limited by fatigue and low endurance during mobility. Patient continues to present well below her baseline level of mobility and will continue to benefit from additional skilled PT intervention to facilitate safe mobility and to optimize (I) to promote return to prior level of function. Treatment Diagnosis: impaired functional mobility  Therapy Prognosis: Good  Barriers to Learning: right eye blurred vision  Requires PT Follow-Up: Yes  Activity Tolerance  Activity Tolerance: Patient tolerated treatment well     Plan   Physcial Therapy Plan  General Plan: 5-7 times per week  Current Treatment Recommendations: Strengthening, Functional mobility training, Transfer training, Gait training, Endurance training, Safety education & training, Therapeutic activities, Balance training, Neuromuscular re-education, Patient/Caregiver education & training, Equipment evaluation, education, & procurement, Home exercise program  Safety Devices  Type of Devices: Call light within reach, Chair alarm in place, Gait belt, Left in chair, Nurse notified  Restraints  Restraints Initially in Place: No     Restrictions  Restrictions/Precautions  Restrictions/Precautions: Fall Risk (high fall risk)  Position Activity Restriction  Other position/activity restrictions:  Up with assistance, progressive ambulation, up in the chair for meals, HOB elevated to 30 degrees; up as tolerated     Subjective   General  Chart Reviewed: Yes  Additional Pertinent Hx: Pt is a 80 yo female that presents to the hospital for a procedure;STAGE 1, RIGHT TRANSLABYRINTHINE / RETROLABYRINTHINE CRANIOTOMY POSSIBLE PETROSECTOMY  RIGHT TRANSMASTOID / ANTERIOR MIDDLE CRANIAL FOSSA , ABDOMINAL FAT GRAFT EXTERNAL AUDITORY CANAL CLOSURE on 11/28. EGD 12/11:  multiple duodenal ulcers, clip placement x 3. Abd CT 12/11:  suspected Bilat PE.  LE dopplers 12/11: neg. Pt s/p  GDA embo on 12/12. CTPA: (+) PE; PMH; Loss of hearing bilaterally, arthritis, brain tumor, diabetes. Family / Caregiver Present: No  Follows Commands: Within Functional Limits  General Comment  Comments: Patient supine in bed upon arrival - agreeable to PT. Requesting OOB. RN approval obtained. Subjective  Subjective: Patient denies pain, reports right eye \"blurry\" - notable right side head edema present. Cognition   Orientation  Overall Orientation Status: Within Functional Limits  Orientation Level: Oriented X4  Cognition  Arousal/Alertness: Appropriate responses to stimuli  Following Commands:  Follows one step commands consistently  Attention Span: Appears intact  Memory: Decreased recall of recent events  Safety Judgement: Good awareness of safety precautions  Sequencing: Requires cues for some     Objective      Bed mobility  Supine to Sit: Minimal assistance (with elevated head of bed, use of rail, increased time to perform, to right)  Scooting: Contact guard assistance (seated at edge of bed)  Transfers  Sit to Stand: Minimal Assistance (to RW)  Stand to Sit: Contact guard assistance (from RW)  Stand Pivot Transfers: Contact guard assistance (with RW; CGA toilet transfer with BSC over toilet and RW)  Ambulation  Surface: Level tile  Device: Rolling Walker  Assistance: Contact guard assistance  Quality of Gait: wide base of support, decreased (B) step length, foot clearance, and heel strike; slow darlene; no loss of balance; fatigues with standing mobility  Distance: 11ft, 25ft  Comments: seated rest in between trials     Balance  Posture: Fair (forward flexed, rounded shoulders)  Sitting - Static: Good  Sitting - Dynamic: Good;-  Standing - Static: Fair  Standing - Dynamic: Fair;-  Comments: CGA to min A with RW for standing balance during mobility         AM-PAC Score  AM-PAC Inpatient Mobility Raw Score : 16 (12/18/22 1154)  AM-PAC Inpatient T-Scale Score : 40.78 (12/18/22 1154)  Mobility Inpatient CMS 0-100% Score: 54.16 (12/18/22 1154)  Mobility Inpatient CMS G-Code Modifier : CK (12/18/22 1154)       Goals  Short Term Goals  Time Frame for Short Term Goals: D/C  Short Term Goal 1: supine<->sit mod A x 1-2- MET 12/6 Pt will perform bed mobility with SBA MET 12/7 update to bed mobility with independence -ongoing  Short Term Goal 2: pt will sit at EOB for 5 min with SBA- MET 12/7  Short Term Goal 3: pt will tolerate sit<->stand assessment MET 12/6 Pt will transfer with CGA to RW -ongoing  Short Term Goal 4: pt will tolerate bed->chair assessment with RW MET 12/7 Pt will perform stand pivot with RW and SBA -ongoing  Short Term Goal 5: pt will tolerate gait assessment-MET 12/7 Pt will ambulate x 50' and CGA -ongoing  Patient Goals   Patient Goals : \"to get better, go to rehab\"       Education  Patient Education  Education Given To: Patient  Education Provided: Role of Therapy;Plan of Care  Education Provided Comments: use of call light, PT recommendations  Education Method: Demonstration;Verbal  Barriers to Learning: None  Education Outcome: Verbalized understanding;Demonstrated understanding      Therapy Time   Individual Concurrent Group Co-treatment   Time In 1058         Time Out 1140         Minutes 42                 Timed Code Treatment Minutes: 42 minutes    Total Treatment Minutes: 42 Minutes    If patient discharges prior to next treatment, this note will serve as discharge summary.     Rebecca Noriega PT, DPT #[id number]

## 2022-12-19 LAB
ANTI-XA UNFRAC HEPARIN: 0.7 IU/ML (ref 0.3–0.7)
ANTI-XA UNFRAC HEPARIN: 0.95 IU/ML (ref 0.3–0.7)
ANTI-XA UNFRAC HEPARIN: <0.1 IU/ML (ref 0.3–0.7)
BASOPHILS ABSOLUTE: 0 K/UL (ref 0–0.2)
BASOPHILS RELATIVE PERCENT: 1 %
EOSINOPHILS ABSOLUTE: 0.3 K/UL (ref 0–0.6)
EOSINOPHILS RELATIVE PERCENT: 9 %
GLUCOSE BLD-MCNC: 123 MG/DL (ref 70–99)
GLUCOSE BLD-MCNC: 131 MG/DL (ref 70–99)
GLUCOSE BLD-MCNC: 141 MG/DL (ref 70–99)
GLUCOSE BLD-MCNC: 143 MG/DL (ref 70–99)
HCT VFR BLD CALC: 24.3 % (ref 36–48)
HEMOGLOBIN: 8.2 G/DL (ref 12–16)
HYPOCHROMIA: ABNORMAL
LYMPHOCYTES ABSOLUTE: 0.8 K/UL (ref 1–5.1)
LYMPHOCYTES RELATIVE PERCENT: 23 %
MACROCYTES: ABNORMAL
MCH RBC QN AUTO: 31.2 PG (ref 26–34)
MCHC RBC AUTO-ENTMCNC: 33.9 G/DL (ref 31–36)
MCV RBC AUTO: 91.9 FL (ref 80–100)
MICROCYTES: ABNORMAL
MONOCYTES ABSOLUTE: 0.1 K/UL (ref 0–1.3)
MONOCYTES RELATIVE PERCENT: 4 %
MYELOCYTE PERCENT: 1 %
NEUTROPHILS ABSOLUTE: 2.2 K/UL (ref 1.7–7.7)
NEUTROPHILS RELATIVE PERCENT: 62 %
PDW BLD-RTO: 17.4 % (ref 12.4–15.4)
PERFORMED ON: ABNORMAL
PLATELET # BLD: 196 K/UL (ref 135–450)
PMV BLD AUTO: 8.2 FL (ref 5–10.5)
RBC # BLD: 2.64 M/UL (ref 4–5.2)
WBC # BLD: 3.5 K/UL (ref 4–11)

## 2022-12-19 PROCEDURE — 2060000000 HC ICU INTERMEDIATE R&B

## 2022-12-19 PROCEDURE — 2580000003 HC RX 258

## 2022-12-19 PROCEDURE — 6360000002 HC RX W HCPCS: Performed by: NURSE PRACTITIONER

## 2022-12-19 PROCEDURE — 6360000002 HC RX W HCPCS

## 2022-12-19 PROCEDURE — 92507 TX SP LANG VOICE COMM INDIV: CPT

## 2022-12-19 PROCEDURE — 85025 COMPLETE CBC W/AUTO DIFF WBC: CPT

## 2022-12-19 PROCEDURE — 6370000000 HC RX 637 (ALT 250 FOR IP): Performed by: INTERNAL MEDICINE

## 2022-12-19 PROCEDURE — 97116 GAIT TRAINING THERAPY: CPT

## 2022-12-19 PROCEDURE — 92526 ORAL FUNCTION THERAPY: CPT

## 2022-12-19 PROCEDURE — 99024 POSTOP FOLLOW-UP VISIT: CPT | Performed by: NURSE PRACTITIONER

## 2022-12-19 PROCEDURE — 99232 SBSQ HOSP IP/OBS MODERATE 35: CPT | Performed by: PHYSICAL MEDICINE & REHABILITATION

## 2022-12-19 PROCEDURE — C9113 INJ PANTOPRAZOLE SODIUM, VIA: HCPCS | Performed by: NURSE PRACTITIONER

## 2022-12-19 PROCEDURE — 6370000000 HC RX 637 (ALT 250 FOR IP)

## 2022-12-19 PROCEDURE — 6360000002 HC RX W HCPCS: Performed by: NEUROLOGICAL SURGERY

## 2022-12-19 PROCEDURE — 85520 HEPARIN ASSAY: CPT

## 2022-12-19 PROCEDURE — 6370000000 HC RX 637 (ALT 250 FOR IP): Performed by: NURSE PRACTITIONER

## 2022-12-19 PROCEDURE — 97530 THERAPEUTIC ACTIVITIES: CPT

## 2022-12-19 PROCEDURE — APPNB30 APP NON BILLABLE TIME 0-30 MINS: Performed by: NURSE PRACTITIONER

## 2022-12-19 RX ADMIN — POTASSIUM CHLORIDE 20 MEQ: 20 TABLET, EXTENDED RELEASE ORAL at 17:45

## 2022-12-19 RX ADMIN — Medication 5 MG: at 21:12

## 2022-12-19 RX ADMIN — SODIUM CHLORIDE, PRESERVATIVE FREE 10 ML: 5 INJECTION INTRAVENOUS at 21:12

## 2022-12-19 RX ADMIN — SODIUM CHLORIDE, PRESERVATIVE FREE 10 ML: 5 INJECTION INTRAVENOUS at 21:13

## 2022-12-19 RX ADMIN — POTASSIUM CHLORIDE 20 MEQ: 20 TABLET, EXTENDED RELEASE ORAL at 09:33

## 2022-12-19 RX ADMIN — LATANOPROST 1 DROP: 50 SOLUTION OPHTHALMIC at 21:12

## 2022-12-19 RX ADMIN — LEVETIRACETAM 500 MG: 100 INJECTION, SOLUTION INTRAVENOUS at 21:23

## 2022-12-19 RX ADMIN — PANTOPRAZOLE SODIUM 40 MG: 40 INJECTION, POWDER, LYOPHILIZED, FOR SOLUTION INTRAVENOUS at 09:33

## 2022-12-19 RX ADMIN — HEPARIN SODIUM 14 UNITS/KG/HR: 10000 INJECTION, SOLUTION INTRAVENOUS at 18:04

## 2022-12-19 RX ADMIN — HEPARIN SODIUM 12 UNITS/KG/HR: 10000 INJECTION, SOLUTION INTRAVENOUS at 04:36

## 2022-12-19 RX ADMIN — INSULIN GLARGINE 15 UNITS: 100 INJECTION, SOLUTION SUBCUTANEOUS at 21:25

## 2022-12-19 RX ADMIN — METHIMAZOLE 10 MG: 5 TABLET ORAL at 09:32

## 2022-12-19 RX ADMIN — LEVETIRACETAM 500 MG: 100 INJECTION, SOLUTION INTRAVENOUS at 09:39

## 2022-12-19 RX ADMIN — PANTOPRAZOLE SODIUM 40 MG: 40 INJECTION, POWDER, LYOPHILIZED, FOR SOLUTION INTRAVENOUS at 21:12

## 2022-12-19 RX ADMIN — SODIUM CHLORIDE, PRESERVATIVE FREE 10 ML: 5 INJECTION INTRAVENOUS at 09:33

## 2022-12-19 RX ADMIN — CETIRIZINE HYDROCHLORIDE 10 MG: 10 TABLET, FILM COATED ORAL at 09:33

## 2022-12-19 ASSESSMENT — PAIN SCALES - GENERAL: PAINLEVEL_OUTOF10: 0

## 2022-12-19 NOTE — PLAN OF CARE
Problem: Chronic Conditions and Co-morbidities  Goal: Patient's chronic conditions and co-morbidity symptoms are monitored and maintained or improved  12/18/2022 2329 by Neville Khanna RN  Outcome: Progressing    Problem: Pain  Goal: Verbalizes/displays adequate comfort level or baseline comfort level  12/18/2022 2329 by Neville Khanna RN  Outcome: Progressing    Problem: Safety - Adult  Goal: Free from fall injury  12/18/2022 2329 by Neville Khanna RN  Outcome: Progressing    Problem: Skin/Tissue Integrity  Goal: Absence of new skin breakdown  Description: 1. Monitor for areas of redness and/or skin breakdown  2. Assess vascular access sites hourly  3. Every 4-6 hours minimum:  Change oxygen saturation probe site  4. Every 4-6 hours:  If on nasal continuous positive airway pressure, respiratory therapy assess nares and determine need for appliance change or resting period.   12/18/2022 2329 by Neville Khanna RN  Outcome: Progressing    Problem: ABCDS Injury Assessment  Goal: Absence of physical injury  12/18/2022 2329 by Neville Khanna RN  Outcome: Progressing    Problem: Nutrition Deficit:  Goal: Optimize nutritional status  12/18/2022 2329 by Neville Khanna RN  Outcome: Progressing    Problem: Neurosensory - Adult  Goal: Absence of seizures  12/18/2022 2329 by Neville Khanna RN  Outcome: Progressing

## 2022-12-19 NOTE — PROGRESS NOTES
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 Patient nuero exam unchanged. VSS. No signs of respiratory distress, and patient is able to maintain O2 sat on room air. Pulmonology and GI discussed the PE situation and they are in agreement that the risk;benefit ratio favors heparin anticoagulation now, and transition to an oral agent next week. Hgb remains >8.0. No PRBC ordered. Neurologically stable on exam with HB 5 right facial function. Large pseudomeningocele. Objective:    VITAL SIGNS   /84   Pulse 69   Temp 98.2 °F (36.8 °C) (Oral)   Resp 18   Ht 5' 5\" (1.651 m)   Wt (!) 353 lb 1.6 oz (160.2 kg)   SpO2 92%   BMI 58.76 kg/m²    Height Height: 5' 5\" (165.1 cm)   Weight Weight: (!) 353 lb 1.6 oz (160.2 kg)          Allergies Allergies   Allergen Reactions    Keflex [Cephalexin] Itching and Rash    Codeine Nausea And Vomiting    Tomato Rash      NPO Status ADULT DIET;  Full Liquid   Isolation No active isolations     LABS   Basic Metabolic Profile Recent Labs     12/17/22 0438 12/18/22  0600    143    110   CO2 27 28   BUN 6* 5*   CREATININE <0.5* <0.5*   GLUCOSE 103* 117*      Complete Blood Count Recent Labs     12/17/22  0438 12/18/22  0600 12/19/22  0610 WBC 3.9* 3.6* 3.5*   RBC 2.53* 2.60* 2.64*      Coagulation Studies No results for input(s): PTT, INR in the last 72 hours. Invalid input(s): PLATELETS, PROA, PT, PTTA         MEDICATIONS   Inpatient Medications     potassium chloride, 20 mEq, Oral, BID WC    pantoprazole, 40 mg, IntraVENous, BID    levETIRAcetam, 500 mg, IntraVENous, Q12H    cetirizine, 10 mg, Oral, Daily    [Held by provider] hydroCHLOROthiazide, 25 mg, Oral, Daily    insulin lispro, 0-16 Units, SubCUTAneous, TID WC    insulin lispro, 0-4 Units, SubCUTAneous, Nightly    methIMAzole, 10 mg, Oral, Daily    insulin glargine, 15 Units, SubCUTAneous, Nightly    melatonin, 5 mg, Oral, Nightly    sodium chloride flush, 5-40 mL, IntraVENous, 2 times per day    [Held by provider] carvedilol, 12.5 mg, Oral, BID WC    [Held by provider] losartan, 100 mg, Oral, Daily    sodium chloride flush, 5-40 mL, IntraVENous, 2 times per day    latanoprost, 1 drop, Both Eyes, Nightly   Infusions    heparin (PORCINE) Infusion 10 Units/kg/hr (12/19/22 0461)    sodium chloride      sodium chloride 25 mL/hr (12/18/22 0902)    dextrose        Antibiotics   Recent Abx Admin        No antibiotic orders with administrations found. Imaging:   Abdominal CT  Impression       1.  9.5 cm mass in the right hepatic lobe which does not appear to represent a hemangioma. Differential diagnosis would include primary or secondary malignancy. Liver mass protocol MRI with and without contrast is recommended. 2.  No intra-abdominal hemorrhage. 3.  Mild cholelithiasis. 4.  3.5 cm right ovarian cyst, the CT appearance suggests a simple cyst indicating a benign finding.      Neurologic Exam:  Mental status: awake and alert and oriented x4    Cranial Nerves:  II: Visual acuity not tested, denies new visual changes / diplopia  III, IV, VI: PERRL, 3 mm bilaterally, EOMI,Patient had rightward gaze deviation but was able to follow when prompted   V: Facial sensation intact bilaterally to touch  VII: R sided facial droop  VIII: Hearing intact bilaterally to spoken voice on L, no hearing on R  IX: Palate movement equal bilaterally  XI: Shoulder shrug equal bilaterally  XII: Tongue deviates to R      Musculoskeletal:   Gait: Not tested   Tone: normal  Sensory: intact to all extremities  Motor strength:    Right  Left    Right  Left    Deltoid  5 5  Hip Flex  5 5   Biceps  5 5  Knee Extensors  5 5   Triceps  5 5  Knee Flexors  5 5   Wrist Ext  5 5  Ankle Dorsiflex. 5 5   Wrist Flex  5 5  Ankle Plantarflex. 5 5   Handgrip  5 5  Ext Ernie Longus  5 5   Thumb Ext  5 5         Mastoid Incision: intact, clean and dry      Respiratory:  Unlabored respiratory pattern    Abdomen:   Soft, ND   Last BM 12/11    Cardiovascular:  Warm, well perfused    Assessment   Patient is a 80 yo F s/p Procedure(s) (LRB):  STAGE II: RESECTION OF RIGHT PETROCLIVAL MENINGIOMA (N/A) per Dr. Antionette Crook    Plan:  Neurologic exam frequency: Q4H  Mobility: PT/OT   SLP continue  PICC line  DVT Prophylaxis: SCDs  Keppra as ordered  Bowel Regimen: Per GI  Pain control: Tylenol  Keep sbp < 160  Incisional Care: Mastoid compression headband in place check Q4H and let NS know if leaking  GI Bleed: GI Following, Protonix 40 mg BID, Embolization of CHU done, Hgb remains stable >8.0 Continue Daily CBC and transfuse if Hgb <7.0  PE: CTPA shows large PE with no obvious heart strain. Pulmonology and GI discussed the PE situation and they are in agreement that the risk;benefit ratio favors heparin anticoagulation now, and transition to an oral agent this week. Remains on heparin gtt with anti-Xa 0.70    Dispo Planning: Inpatient until medically cleared for discharge    Patient was discussed with Dr. Tia Morales who agrees with above assessment and plan.      Electronically signed by: LEE Mcgee CNP, 12/19/2022 12:09 PM   Neurosurgery Nurse Practitioner  [phone]

## 2022-12-19 NOTE — PROGRESS NOTES
Patient is alert and oriented x4. Vital signs are stable. Patient denies any pain. Neuro assessment is unchanged. Patient continues to be on heparin gtt. Tanda Marco Angel Simmer is in place and has adequate urine output. Bed is in the lowest position. Bed alarm is activated. Call light is within reach. Will continue to monitor and reassess.

## 2022-12-19 NOTE — PLAN OF CARE
Problem: Chronic Conditions and Co-morbidities  Goal: Patient's chronic conditions and co-morbidity symptoms are monitored and maintained or improved  12/19/2022 0818 by Leonila Radford RN  Outcome: Progressing     Problem: Discharge Planning  Goal: Discharge to home or other facility with appropriate resources  Outcome: Progressing     Problem: Pain  Goal: Verbalizes/displays adequate comfort level or baseline comfort level  12/19/2022 0818 by Leonila Radford RN  Outcome: Progressing     Problem: Safety - Adult  Goal: Free from fall injury  12/19/2022 0818 by Leonila Radford RN  Outcome: Progressing     Problem: Skin/Tissue Integrity  Goal: Absence of new skin breakdown  Description: 1. Monitor for areas of redness and/or skin breakdown  2. Assess vascular access sites hourly  3. Every 4-6 hours minimum:  Change oxygen saturation probe site  4. Every 4-6 hours:  If on nasal continuous positive airway pressure, respiratory therapy assess nares and determine need for appliance change or resting period.   12/19/2022 0818 by Leonila Radford RN  Outcome: Progressing     Problem: ABCDS Injury Assessment  Goal: Absence of physical injury  12/19/2022 0818 by Leonila Radford RN  Outcome: Progressing     Problem: Nutrition Deficit:  Goal: Optimize nutritional status  12/19/2022 0818 by Leonila Radford RN  Outcome: Progressing     Problem: Neurosensory - Adult  Goal: Achieves maximal functionality and self care  Outcome: Progressing     Problem: Cardiovascular - Adult  Goal: Maintains optimal cardiac output and hemodynamic stability  Outcome: Progressing     Problem: Cardiovascular - Adult  Goal: Absence of cardiac dysrhythmias or at baseline  Outcome: Progressing

## 2022-12-19 NOTE — CARE COORDINATION
Case Management Assessment           Daily Note                 Date/ Time of Note: 12/19/2022 2:30 PM         Note completed by: Raphael Rubalcava, RN    Patient Name: Caroline Taylor  YOB: 1955    Diagnosis:Acoustic neuroma [facility]) [D33.3]  Balance problem [R26.89]  Meningioma ([facility] [address] [D32.9]  Dizziness [R42]  Cerebellopontine angle meningioma ([facility] [address] [D32.0]  Patient Admission Status: Inpatient    Date of Admission:11/28/2022  5:33 AM Length of Stay: 21 GLOS: GMLOS: 6.6    Current Plan of Care: hep gtt  ________________________________________________________________________________________  PT AM-PAC: 16 / 24 per last evaluation on: 12/18    OT AM-PAC: 16 / 24 per last evaluation on: 12/18    DME Needs for discharge: defer  ________________________________________________________________________________________  Discharge Plan: Inpatient Rehab: [facility]    Tentative discharge date: tbd     Current barriers to discharge: medical clearance      ________________________________________________________________________________________  Case Management Notes:Patient plans to discharge to [address] [place] [address] when medically stable. No pre-cert needed. John Paul Lott and her family were provided with choice of provider; she and her family are in agreement with the discharge plan.     Care Transition Patient: Noelle Lim Nurse, RN  [facility]  Case Management Department  Ph: [phone]  Fax: [phone]

## 2022-12-19 NOTE — PROGRESS NOTES
Hospitalist Consult Progress Note      PCP: Masha Peck    Date of Admission: 11/28/2022    Chief Complaint: consult for cas-operative [facility] Course: \"The patient is a 79 y.o. female with hx of multistage neurosurgical resection of petroclival meningioma who we are asked to see/evaluate by Dr. Mikael Gill for cas-operative mgt. patient has been recovering well postoperatively from her procedure when she most recently developed melanotic stools. Gastroenterology was consulted, patient underwent EGD and was found to have multiple duodenal ulcers with visible vessel and active bleeding. Underwent injection of epi, hemostatic clip placement and chemo spray. Due to concern for possible ongoing GI bleed she underwent CTA. No active GI bleed was detected, however CAT scan picked up likely pulmonary embolism in the lower lung fields. Patient received 2 units of blood transfusion\"    Subjective:      Patient is afebrile overnight. She reports feeling well. No signs of bleeding reported.     Medications:  Reviewed    Infusion Medications    heparin (PORCINE) Infusion 10 Units/kg/hr (12/19/22 7425)    sodium chloride      sodium chloride 25 mL/hr (12/18/22 8599)    dextrose       Scheduled Medications    potassium chloride  20 mEq Oral BID WC    pantoprazole  40 mg IntraVENous BID    levETIRAcetam  500 mg IntraVENous Q12H    cetirizine  10 mg Oral Daily    [Held by provider] hydroCHLOROthiazide  25 mg Oral Daily    insulin lispro  0-16 Units SubCUTAneous TID WC    insulin lispro  0-4 Units SubCUTAneous Nightly    methIMAzole  10 mg Oral Daily    insulin glargine  15 Units SubCUTAneous Nightly    melatonin  5 mg Oral Nightly    sodium chloride flush  5-40 mL IntraVENous 2 times per day    [Held by provider] carvedilol  12.5 mg Oral BID WC    [Held by provider] losartan  100 mg Oral Daily    sodium chloride flush  5-40 mL IntraVENous 2 times per day    latanoprost  1 drop Both Eyes Nightly PRN Meds: polyethylene glycol, bisacodyl, labetalol, sodium chloride flush, sodium chloride, sodium chloride, acetaminophen, ondansetron **OR** ondansetron, hydrALAZINE, albuterol, glucose, dextrose bolus **OR** dextrose bolus, glucagon (rDNA), dextrose, promethazine      Intake/Output Summary (Last 24 hours) at 12/19/2022 1155  Last data filed at 12/19/2022 1009  Gross per 24 hour   Intake 790 ml   Output 900 ml   Net -110 ml       Physical Exam Performed:    /84   Pulse 69   Temp 98.2 °F (36.8 °C) (Oral)   Resp 18   Ht 5' 5\" (1.651 m)   Wt (!) 353 lb 1.6 oz (160.2 kg)   SpO2 92%   BMI 58.76 kg/m²     General appearance: No apparent distress. Dressing on the right side of the face  Respiratory:  Normal respiratory effort. Clear to auscultation, bilaterally without Rales/Wheezes/Rhonchi. Cardiovascular: Regular rate and rhythm with normal S1/S2 without murmurs, rubs or gallops. Abdomen: Soft, non-tender, non-distended with normal bowel sounds. Musculoskeletal: No edema on both LE   Neurologic: no new deficit  Psychiatric: Alert and oriented       Labs:   Recent Labs     12/17/22  0438 12/17/22  1940 12/18/22  0600 12/19/22  0610   WBC 3.9*  --  3.6* 3.5*   HGB 7.9* 8.3* 8.1* 8.2*   HCT 23.4* 24.7* 24.1* 24.3*     --  184 196     Recent Labs     12/17/22  0438 12/18/22  0600    143   K 3.3* 3.7    110   CO2 27 28   BUN 6* 5*   CREATININE <0.5* <0.5*   CALCIUM 8.5 8.5     No results for input(s): AST, ALT, BILIDIR, BILITOT, ALKPHOS in the last 72 hours. No results for input(s): INR in the last 72 hours. No results for input(s): Cleatrice Alexander in the last 72 hours.     Urinalysis:      Lab Results   Component Value Date/Time    NITRU Negative 10/12/2021 01:46 PM    WBCUA 89 10/12/2021 01:46 PM    BACTERIA 2+ 10/12/2021 01:46 PM    RBCUA 7 10/12/2021 01:46 PM    BLOODU Negative 10/12/2021 01:46 PM    SPECGRAV 1.023 10/12/2021 01:46 PM    GLUCOSEU 100 10/12/2021 01:46 PM Radiology:  MRI ABDOMEN W WO CONTRAST MRCP   Final Result      Limited study due to multiple factors, as outlined above. Lesion of the right hepatic lobe is most consistent with a hemangioma. Follow-up multiphase CT is recommended in 6 months. CT CHEST PULMONARY EMBOLISM W CONTRAST   Final Result      Large burden of pulmonary emboli without obvious heart strain of the exam is severely limited and difficult to evaluate for other pathology      Findings called to patient's floor nurse on 12/13/2022 at 7:09 PM      IR EMBOLIZATION HEMORRHAGE   Final Result   Impression: Technically successful superior mesenteric, common hepatic and gastroduodenal angiograms with subsequent coiling of the gastroduodenal artery. VL Extremity Venous Bilateral   Final Result      CTA ABDOMEN PELVIS W WO CONTRAST   Final Result      1. Suspected bilateral pulmonary emboli but inadequate contrast phase to assess for the pulmonary embolus. Incomplete filling of the pulmonary arteries suggests pulmonary emboli inferiorly     2. No sign of any acute or active GI bleeding on CT angiograms studies   3. No sign of any aneurysm in the abdomen with normal widely patent vessels   4. Stable appearing partially calcified left adrenal adenoma. 5. Parapelvic renal cysts, largest on left side   6. 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 4.1 x 3.5 cm right ovarian cyst, benign in appearance   8. Mild cholelithiasis   9. No free fluid or free air with right abdominal wall subcutaneous inflammation or prior instrumentation         CT ABDOMEN PELVIS W WO CONTRAST Additional Contrast? Radiologist Recommendation   Final Result      1.  9.5 cm mass in the right hepatic lobe which does not appear to represent a hemangioma. Differential diagnosis would include primary or secondary malignancy.  Liver mass protocol MRI with and without contrast is recommended. 2.  No intra-abdominal hemorrhage. 3.  Mild cholelithiasis. 4.  3.5 cm right ovarian cyst, the CT appearance suggests a simple cyst indicating a benign finding. MRI BRAIN W WO CONTRAST   Final Result      1. Postsurgical changes from right temporal and transmastoid craniotomy with large amount of fat packing in the defect and extracranial soft tissues. Large subcutaneous fluid collection is present in the scalp surrounding and extending superiorly from    the fat packing. No diffusion restriction to indicate superimposed infection. 2.  Residual meningioma in the right cerebellopontine angle, prepontine cistern, and right Meckel's cave/cavernous sinus. The tumor extends partially encasing the basilar artery   3. Small area of acute to subacute ischemia in the right brachium pontis. Small enhancing lesions superior to the right tentorium is new from the prior study and may represent an area of subacute ischemia. 4.  Small amount of extra-axial hemorrhage along the right cerebellopontine angle. 5.  Stable 12 mm left frontal meningioma. CTA PULMONARY W CONTRAST   Final Result      Significantly limited study due to motion. 1. Significantly limited study due to streak artifact and suboptimal bolus. No evidence of a central embolus. 2. Multifocal airspace consolidation is present, suboptimally evaluated due to motion. This presumably reflects pneumonia. Follow-up chest CT is recommended to document resolution. 3. There is a large mass in the right hepatic lobe measuring 8.2 x 7.4 cm, demonstrating peripheral nodular enhancement. There is significant amount of artifact through this lesion. It is still favored to reflect a hemangioma. Recommend a multiphasic CT    of the abdomen for further assessment. This could be performed on a nonurgent basis, if clinically appropriate.    4. Partially calcified nodule arising from the left adrenal gland, most likely benign and possibly reflecting old adrenal hemorrhage. XR CHEST PORTABLE   Final Result      Similar appearance of patchy airspace disease. FL MODIFIED BARIUM SWALLOW W VIDEO   Final Result      1. Laryngeal penetration, but no evidence of tracheal aspiration. XR CHEST PORTABLE   Final Result      Patchy left upper lobe airspace disease, atelectasis versus pneumonia. CT HEAD WO CONTRAST   Final Result      1. Interval postsurgical changes from resection of right cerebellopontine angle mass with associated right temporal mastoid resection and frontotemporal craniotomy and fat graft placement. 2.  Thin hyperdensity along the fat graft may represent small amount of postsurgical blood products. There is also a small focus of subarachnoid hemorrhage along the right temporal lobe. 3.  Mild pneumocephalus and bilateral subgaleal fluid is also likely postsurgical.             IP CONSULT TO CRITICAL CARE  IP CONSULT TO PHYSICAL MEDICINE REHAB  IP CONSULT TO GI  IP CONSULT TO HOSPITALIST  IP CONSULT TO NEUROCRITICAL CARE  IP CONSULT TO PULMONOLOGY    Assessment/Plan:    Active Hospital Problems    Diagnosis     Multiple subsegmental pulmonary emboli without acute cor pulmonale ([address] [I26.94]      Priority: Medium    S/P craniotomy [Z98.890]      Priority: Medium    Acoustic neuroma ([address] [D33.3]      Priority: Medium    Cerebellopontine angle meningioma ([address] [D32.0]      Priority: Medium   -ENT following. On keppra. Acute GI bleed due to bleeding duodenal ulcers  Acute blood loss anemia  Status post EGD with intervention;CT abdomen has no signs of any ongoing acute bleeding. S/p EGD 12/16/22. Continue PPI bid. No signs of bleeding  GI following    Bilateral pulmonary emboli:  Dopplers neg for DVT.  Pulmonary has been consulted,  Pulmonology and GI discussed the PE situation and they are in agreement that the risk;benefit ratio favors heparin anticoagulation now, and transition to an oral agent on discharge    Liver lesion  -MRI Abd/pelvis showed lesion of the right hepatic lobe most consistent with a hemangioma. Follow-up multiphase CT is recommended in 6 months  -follow with PCP     DM: cont lantus and SSI for now, once stable and plan for ARU would restart home meds. HTN: stable, cont home meds, monitor. Hypokalemia  Hypomagnesemia  supplement K and mag as needed      Obesity class III: BMI 58.7. follow with PCP     DVT Prophylaxis: heparin   Diet: ADULT DIET;  Full Liquid  Code Status: Full Code  PT/OT Eval Status: ongoing    Dispo - per primary          Adelina Sharpe MD

## 2022-12-19 NOTE — PROGRESS NOTES
Progress Note       79 y.o female   S/p STAGE II: RESECTION OF RIGHT PETROCLIVAL MENINGIOMA. POD 21    S: Pt is doing very well today   Denies pain or dyspnea   Mild tiredness has been improved      O:   Facial nerve - RT HB 5 stable ( no RT eye irritation). Postauricular incision well closed without leaking   Abdominal incision - well closed  Head wrap replaced   Pseudomeningocele - stable   HB- 8.2 stable   GI assessment - stable, will begin oral anticoagulation on Wednesday 12/21/2022   Seen by SLP - focusing on swallowing, speech- improved, no issues with understanding her speech      A:   S/p STAGE II: RESECTION OF RIGHT PETROCLIVAL MENINGIOMA. POD 21   Hospital stay was complicated by PE and Upper GI bleeding. Currently, HB is stable around 8.2.        P:   - BM - follow to rule out melena/ bleeding   - Heparine IV - will begin Oral anticoagulation on next Wednesday 12/21/2022   - Compressive mastoid dressing in place  -Monitor Hb while being on anticoagulation   -Eye care  -Compressive mastoid dressing in place  -Will continue to follow

## 2022-12-19 NOTE — PROGRESS NOTES
Progress Note  Physical Medicine and Rehabilitation    Patient: Galileo Tong  [id number]  Date: 12/19/2022       Chief Complaint: Meningioma     Interval history:  Patient was seen at bed side this morning. She denied SOB, chest pain, abdominal pain. She said that she has been ambulating from her bed to the chair and the bathroom. History of Present Illness/Hospital Course:  Patient is a morbidly obese (Body mass index is 50.82 kg/m².), 79 y.o. female  with c/o dizziness, HA and vertigo in the setting of large right petroclival mass which is consistent with meningioma. . She presented to the hospital for a scheduled two staged petroclival meningioma resection. 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 Prior Level of Function:  Independent for mobility, ADLs, and IADLs     Current Level of Function:  Min assist     Pertinent Social History:  Support: Lives alone  Home set-up: One level house with 2 steps to enter    Past Medical History:   Diagnosis Date    Arthritis     Asthma     mild    Brain tumor ([address]     Diabetes mellitus ([address]     borderline    High cholesterol     Hypertension     Imbalance     DUE TO TUMOR- USING ANKLE BRACE / WALKER PER PREOP H&P    Loss of hearing     bilateral reported    Vertigo        Past Surgical History:   Procedure Laterality Date    CRANIOTOMY N/A 11/29/2022    STAGE II: RESECTION OF RIGHT PETROCLIVAL MENINGIOMA performed by Maya Hess MD at [address]    IR EMBOLIZATION HEMORRHAGE  12/12/2022    IR EMBOLIZATION HEMORRHAGE 12/12/2022 [facility] SPECIAL PROCEDURES    MASTOIDECTOMY Right 11/28/2022    RIGHT TRANSMASTOID / ANTERIOR MIDDLE CRANIAL FOSSA , ABDOMINAL FAT GRAFT EXTERNAL AUDITORY CANAL CLOSURE performed by Vanessa Aleman MD at [place] [address] Right 11/28/2022    STAGE 1, RIGHT TRANSLABYRINTHINE / RETROLABYRINTHINE CRANIOTOMY, PETROSECTOMY performed by Fuentes Hess MD at [address] TOTAL HIP ARTHROPLASTY Right 11/8/2021    RIGHT TOTAL HIP ARTHROPLASTY POSTERIOR APPROACH - JOSÉ ANTONIO ADVANCED performed by Geeta Doran MD at [facility] Right 07/14/2015    TOTAL KNEE ARTHROPLASTY Left 10/14/15    TKA    UPPER GASTROINTESTINAL ENDOSCOPY N/A 12/11/2022    EGD CONTROL HEMORRHAGE performed by Claudio Gonzalez MD at [facility] N/A 12/16/2022    EGD DIAGNOSTIC ONLY performed by Micheal Chavez MD at [facility] [address] ENDOSCOPY       Family History   Problem Relation Age of Onset    Cancer Mother     Hypotension Mother     Cancer Father     Cancer Sister     Hypotension Sister     Cancer Maternal Grandmother     Cancer Maternal Grandfather     Cancer Paternal Grandmother     Cancer Paternal Grandfather     Hypotension Other     Cancer Other     Diabetes Other     Osteoarthritis Other        Social History     Socioeconomic History    Marital status:      Spouse name: None    Number of children: None    Years of education: None    Highest education level: None   Tobacco Use    Smoking status: Former    Smokeless tobacco: Never   Substance and Sexual Activity    Alcohol use: No    Drug use: Never           REVIEW OF SYSTEMS:   CONSTITUTIONAL: negative for fevers, chills   EYES: positive for diplopia   HEENT: positive for difficulty swallowing. negative for hearing loss, tinnitus, sinus pressure, nasal congestion  RESPIRATORY: Negative for SOB, cough  CARDIOVASCULAR: negative for chest pain, palpitations  GASTROINTESTINAL: positive for abdominal pain.  negative for hematemesis, hematochezia, nausea, vomiting, diarrhea, abdominal pain  GENITOURINARY: negative for frequency, dysuria  HEMATOLOGIC/LYMPHATIC: negative for easy bruising, bleeding and lymphadenopathy  ENDOCRINE: negative for diabetic symptoms including polyuria, polydipsia  MUSCULOSKELETAL: negative for pain, joint swelling, decreased range of motion  NEUROLOGICAL: positive for difficulty speaking, negative for headaches, unilateral weakness    Physical Examination:  Vitals: Patient Vitals for the past 24 hrs:   BP Temp Temp src Pulse Resp SpO2   12/19/22 1048 137/84 -- -- 69 18 92 %   12/19/22 0603 131/78 98.2 °F (36.8 °C) Oral 69 18 93 %   12/19/22 0200 (!) 151/76 98.2 °F (36.8 °C) Oral 68 18 91 %   12/18/22 2300 (!) 140/72 98 °F (36.7 °C) Oral 72 18 --   12/18/22 1900 137/63 98.2 °F (36.8 °C) Oral 74 16 92 %   12/18/22 1532 (!) 141/67 98.6 °F (37 °C) Oral 78 14 91 %       Const: Alert. WDWN. No distress, obese  Eyes: Conjunctiva noninjected, no icterus noted; pupils equal, round, and reactive to light. HENT: Bruising around R eye., normocephalic; Oral mucosa moist  CV: Regular rate and rhythm, no murmur rub or gallop noted  Resp: Lungs clear to auscultation bilaterally, no rales wheezes or ronchi, no retractions. Respirations unlabored. GI: Soft, mild tenderness, nondistended. Normal bowel sounds. Skin: Normal temperature and turgor. No rashes or breakdown noted. Ext: positive for BL LE edema   MSK: No joint tenderness, erythema, warmth noted. AROM intact. Neuro: Alert, oriented, appropriate. Mild R sided facial droop. Sensation intact to light touch. Motor examination reveals normal strength in all four limbs diffusely.    Psych: Stable mood, normal judgement, normal affect     Lab Results   Component Value Date    WBC 3.5 (L) 12/19/2022    HGB 8.2 (L) 12/19/2022    HCT 24.3 (L) 12/19/2022    MCV 91.9 12/19/2022     12/19/2022     Lab Results   Component Value Date    INR 1.15 (H) 12/14/2022    INR 1.07 12/11/2022    INR 1.09 11/29/2022    PROTIME 14.6 (H) 12/14/2022    PROTIME 13.8 12/11/2022    PROTIME 14.1 11/29/2022     Lab Results   Component Value Date    CREATININE <0.5 (L) 12/18/2022    BUN 5 (L) 12/18/2022     12/18/2022    K 3.7 12/18/2022     12/18/2022    CO2 28 12/18/2022     Lab Results   Component Value Date    ALT 11 12/04/2022    AST 12 (L) 12/04/2022 ALKPHOS 48 12/04/2022    BILITOT 0.5 12/04/2022     On my review, CXR displays. MRI ABDOMEN W WO CONTRAST MRCP   Final Result      Limited study due to multiple factors, as outlined above. Lesion of the right hepatic lobe is most consistent with a hemangioma. Follow-up multiphase CT is recommended in 6 months. CT CHEST PULMONARY EMBOLISM W CONTRAST   Final Result      Large burden of pulmonary emboli without obvious heart strain of the exam is severely limited and difficult to evaluate for other pathology      Findings called to patient's floor nurse on 12/13/2022 at 7:09 PM      IR EMBOLIZATION HEMORRHAGE   Final Result   Impression: Technically successful superior mesenteric, common hepatic and gastroduodenal angiograms with subsequent coiling of the gastroduodenal artery. VL Extremity Venous Bilateral   Final Result      CTA ABDOMEN PELVIS W WO CONTRAST   Final Result      1. Suspected bilateral pulmonary emboli but inadequate contrast phase to assess for the pulmonary embolus. Incomplete filling of the pulmonary arteries suggests pulmonary emboli inferiorly     2. No sign of any acute or active GI bleeding on CT angiograms studies   3. No sign of any aneurysm in the abdomen with normal widely patent vessels   4. Stable appearing partially calcified left adrenal adenoma. 5. Parapelvic renal cysts, largest on left side   6. 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 4.1 x 3.5 cm right ovarian cyst, benign in appearance   8. Mild cholelithiasis   9. No free fluid or free air with right abdominal wall subcutaneous inflammation or prior instrumentation         CT ABDOMEN PELVIS W WO CONTRAST Additional Contrast? Radiologist Recommendation   Final Result      1.  9.5 cm mass in the right hepatic lobe which does not appear to represent a hemangioma.  Differential diagnosis would include primary or secondary malignancy. Liver mass protocol MRI with and without contrast is recommended. 2.  No intra-abdominal hemorrhage. 3.  Mild cholelithiasis. 4.  3.5 cm right ovarian cyst, the CT appearance suggests a simple cyst indicating a benign finding. MRI BRAIN W WO CONTRAST   Final Result      1. Postsurgical changes from right temporal and transmastoid craniotomy with large amount of fat packing in the defect and extracranial soft tissues. Large subcutaneous fluid collection is present in the scalp surrounding and extending superiorly from    the fat packing. No diffusion restriction to indicate superimposed infection. 2.  Residual meningioma in the right cerebellopontine angle, prepontine cistern, and right Meckel's cave/cavernous sinus. The tumor extends partially encasing the basilar artery   3. Small area of acute to subacute ischemia in the right brachium pontis. Small enhancing lesions superior to the right tentorium is new from the prior study and may represent an area of subacute ischemia. 4.  Small amount of extra-axial hemorrhage along the right cerebellopontine angle. 5.  Stable 12 mm left frontal meningioma. CTA PULMONARY W CONTRAST   Final Result      Significantly limited study due to motion. 1. Significantly limited study due to streak artifact and suboptimal bolus. No evidence of a central embolus. 2. Multifocal airspace consolidation is present, suboptimally evaluated due to motion. This presumably reflects pneumonia. Follow-up chest CT is recommended to document resolution. 3. There is a large mass in the right hepatic lobe measuring 8.2 x 7.4 cm, demonstrating peripheral nodular enhancement. There is significant amount of artifact through this lesion. It is still favored to reflect a hemangioma. Recommend a multiphasic CT    of the abdomen for further assessment.  This could be performed on a nonurgent basis, if clinically appropriate. 4. Partially calcified nodule arising from the left adrenal gland, most likely benign and possibly reflecting old adrenal hemorrhage. XR CHEST PORTABLE   Final Result      Similar appearance of patchy airspace disease. FL MODIFIED BARIUM SWALLOW W VIDEO   Final Result      1. Laryngeal penetration, but no evidence of tracheal aspiration. XR CHEST PORTABLE   Final Result      Patchy left upper lobe airspace disease, atelectasis versus pneumonia. CT HEAD WO CONTRAST   Final Result      1. Interval postsurgical changes from resection of right cerebellopontine angle mass with associated right temporal mastoid resection and frontotemporal craniotomy and fat graft placement. 2.  Thin hyperdensity along the fat graft may represent small amount of postsurgical blood products. There is also a small focus of subarachnoid hemorrhage along the right temporal lobe. 3.  Mild pneumocephalus and bilateral subgaleal fluid is also likely postsurgical.               Assessment:  Giant Duodenal Ulcer  -Hgb 7.8, previously 12  -Transfuse pRBCs  -Protonix 40 mg BID  -IVF  -Embolization of GDA  -GI following  -IR following  BL PE, suspected  -May need anticoagulation when duodenal ulcer has been tx  - plan for EGD on AC  3. Meningioma  -s/p resection with TL and MCF approach  -Keppra 500 mg BID  -Bowel regiment: Senna, glycolax, and dulcolax  -Nicardipine-Roxicodone PRN  -Acycolvir 400 mg TID for 10 days  -Artifical tears q2H and eye patch  -Keep an eye on mastoid dressing  -SCDs for DVT prophylaxis   -PT/OT  4. HTN   -Coreg 12.5 mg BID  Losartan 100 mg daily  5. DM   -Lantus 15u qHS  -HDSSI  6. Hyperthyroidism   -Methimazole 10 mg daily  7. Liver Mass  -Incidental finding  -f/u imaging once acute problems have been tx  Impairments- Decreased functional mobility, Decreased ADLs     Recommendations:    ARU when medically stable. Patient is still on a Heparin gtt.        This patient has been staffed and discussed with Dr. Nilam Ortiz. Rose Mary Ayala MD, PGY-3      This patient has been seen, examined, and discussed with the resident. I was part of the key critical services provided to the patient. I agree with the residents documentation. This note may have been altered to reflect my own examination findings, impression, and recommendations.      KAYLEY McraeP.H  PM&R  12/19/2022  12:17 PM

## 2022-12-19 NOTE — PROGRESS NOTES
Pt is A&O, VSS, denies any pain. Heparin drip con. Incision site CDI. Tolerating liquid diet, voiding adequately via pure wick. Up x1 with walker and GB. Incontinent care provided as needed. All fall precaution is in place. Call light is within the reach.

## 2022-12-19 NOTE — PROGRESS NOTES
Progress Note    Patient Claudetta Dose  MRN: [id number]  YOB: 1955 Age: 79 y.o. Sex: female  Room: [address] [place] [address]       Admitting Physician: Alberto Davis. Ishan Hess MD   Date of Admission: 11/28/2022  5:33 AM   Primary Care Physician: Marina Tracy [facility]     Subjective:  No GI Bleeding. No abdom pain. Precilla Rast to advance diet          Objective:  Vital Signs:   Vitals:    12/19/22 1553   BP: (!) 147/93   Pulse: 70   Resp: 18   Temp: 97.9 °F (36.6 °C)   SpO2: 90%         Physical Exam:  Constitutional: Alert and oriented x 4. No acute distress. Respiratory: Respirations nonlabored, no crepitus  GI: Abdomen nondistended, soft, and nontender. Neurological: No focal deficits noted. No asterixis.     Intake/Output:    Intake/Output Summary (Last 24 hours) at 12/19/2022 1601  Last data filed at 12/19/2022 1009  Gross per 24 hour   Intake 790 ml   Output 900 ml   Net -110 ml        Current Medications:  Current Facility-Administered Medications   Medication Dose Route Frequency Provider Last Rate Last Admin    heparin 25,000 units in dextrose 5% 250 mL (premix) infusion  5-30 Units/kg/hr IntraVENous Continuous Coco Ramos MD 22.4 mL/hr at 12/19/22 1540 14 Units/kg/hr at 12/19/22 1540    polyethylene glycol (GLYCOLAX) packet 17 g  17 g Oral Daily PRN LEE Perry CNP        potassium chloride (KLOR-CON M) extended release tablet 20 mEq  20 mEq Oral BID  Claudell Dubois, MD   20 mEq at 12/19/22 0933    pantoprazole (PROTONIX) injection 40 mg  40 mg IntraVENous BID LEE Flores CNP   40 mg at 12/19/22 0933    levETIRAcetam (KEPPRA) 500 mg in sodium chloride 0.9 % 100 mL IVPB  500 mg IntraVENous Q12H LEE Beach CNP   Stopped at 12/19/22 1010    cetirizine (ZYRTEC) tablet 10 mg  10 mg Oral Daily LEE Khan CNP   10 mg at 12/19/22 0933    [Held by provider] hydroCHLOROthiazide (HYDRODIURIL) tablet 25 mg  25 mg Oral Daily LEE Khan - CNP 25 mg at 12/10/22 8001    bisacodyl (DULCOLAX) suppository 10 mg  10 mg Rectal Daily PRN LEE Wan CNP   10 mg at 12/10/22 1901    insulin lispro (1 Unit Dial) (HUMALOG/ADMELOG) pen 0-16 Units  0-16 Units SubCUTAneous TID EVAN Kent MD   4 Units at 12/14/22 1620    insulin lispro (1 Unit Dial) (HUMALOG/ADMELOG) pen 0-4 Units  0-4 Units SubCUTAneous Nightly Cristopher Cam MD        labetalol (NORMODYNE;TRANDATE) injection 5 mg  5 mg IntraVENous Q4H PRN Casey Kent MD        methIMAzole (TAPAZOLE) tablet 10 mg  10 mg Oral Daily Cristopher Cam MD   10 mg at 12/19/22 0932    insulin glargine (LANTUS;BASAGLAR) injection pen 15 Units  15 Units SubCUTAneous Nightly Cristopher Cam MD   15 Units at 12/18/22 2057    melatonin disintegrating tablet 5 mg  5 mg Oral Nightly Cristopher Cam MD   5 mg at 12/18/22 2027    sodium chloride flush 0.9 % injection 5-40 mL  5-40 mL IntraVENous 2 times per day Casey Kent MD   10 mL at 12/18/22 2335    sodium chloride flush 0.9 % injection 5-40 mL  5-40 mL IntraVENous PRN Cristopher Cam MD        0.9 % sodium chloride infusion  25 mL IntraVENous PRN Casey Kent MD        [facility]] carvedilol (COREG) tablet 12.5 mg  12.5 mg Oral BID  Cristopher Cam MD   12.5 mg at 12/10/22 1657    [Held by provider] losartan (COZAAR) tablet 100 mg  100 mg Oral Daily Cristopher Cam MD   100 mg at 12/10/22 8033    sodium chloride flush 0.9 % injection 5-40 mL  5-40 mL IntraVENous 2 times per day Casey Kent MD   10 mL at 12/19/22 0933    0.9 % sodium chloride infusion   IntraVENous PRN Cristopher Cam MD 25 mL/hr at 12/18/22 0937 25 mL/hr at 12/18/22 0937    acetaminophen (TYLENOL) tablet 650 mg  650 mg Oral Q4H PRN LEE Carl - CNP   650 mg at 12/06/22 1802    ondansetron (ZOFRAN-ODT) disintegrating tablet 4 mg  4 mg Oral Q8H PRN Minh Jasso CANDACE Cam MD   4 mg at 12/02/22 1412    Or    ondansetron (ZOFRAN) injection 4 mg  4 mg IntraVENous Q6H PRN Aguilar Cam MD   4 mg at 12/01/22 2001    hydrALAZINE (APRESOLINE) injection 10 mg  10 mg IntraVENous Q1H PRN Aguilar Cam MD   10 mg at 12/04/22 0816    albuterol (PROVENTIL) nebulizer solution 2.5 mg  2.5 mg Nebulization Q4H PRN Aguilar Cam MD        latanoprost (XALATAN) 0.005 % ophthalmic solution 1 drop  1 drop Both Eyes Nightly Aguilar Cam MD   1 drop at 12/18/22 2057    glucose chewable tablet 16 g  4 tablet Oral PRN Aguilar Cam MD        dextrose bolus 10% 125 mL  125 mL IntraVENous PRN Mallika Jacques MD        Or    dextrose bolus 10% 250 mL  250 mL IntraVENous PRN Aguilar Cam MD        glucagon (rDNA) injection 1 mg  1 mg SubCUTAneous PRN Aguilar Cam MD        dextrose 10 % infusion   IntraVENous Continuous PRN Aguilar Cam MD        promethazine (PHENERGAN) injection 6.25 mg  6.25 mg IntraMUSCular Q6H PRN Aguilar Cam MD   6.25 mg at 11/28/22 2136         Recent Imaging:   EGD  No dictation        Labs:   Recent Labs     12/17/22  0438 12/17/22  1940 12/18/22  0600 12/19/22  0610   HGB 7.9* 8.3* 8.1* 8.2*   WBC 3.9*  --  3.6* 3.5*        Assessment:   Upper GI bleed secondary to peptic ulcer disease-large duodenal ulcer with visible vessel, failed endoscopic treatment, followed by EGD embolization 12/12. Repeat EGD done 12/16 with large duodenal ulcer with pigmented spot. No further bleeding and hemoglobin is stabilized      Plan:  Continue PPI twice daily  Follow-up hemoglobin/hematocrit closely  Consider changing to oral anticoagulation, hopefully starting Wednesday 12/21. Continue with symptomatic and supportive care        [address] [place]  [address] and [facility]      [phone].  Also available via [employer]

## 2022-12-19 NOTE — PROGRESS NOTES
Speech Language Pathology  Facility/Department: [address] ICU  Dysphagia Daily Treatment Note    NAME: Alise Raymond  : 1955  MRN: [id number]    Patient Diagnosis(es):   Patient Active Problem List    Diagnosis Date Noted    Multiple subsegmental pulmonary emboli without acute cor pulmonale ([address] 2022    S/P craniotomy 2022    Acoustic neuroma ([address] 2022    Cerebellopontine angle meningioma ([facility] 2022    Osteoarthritis of right hip 2021    Morbid obesity with BMI of 50.0-59.9, adult ([address] 2021    Primary osteoarthritis of left knee 2015    Asthma 2015    Gastroesophageal reflux disease 2015    Adiposity 2015    S/P total knee arthroplasty 2015    Hypertension 2014     Allergies: Allergies   Allergen Reactions    Keflex [Cephalexin] Itching and Rash    Codeine Nausea And Vomiting    Tomato Rash     Onset Date: 2022    CXR (2022)-  Similar appearance of patchy airspace disease. Chart reviewed. Medical Diagnosis: Acoustic neuroma ([address] [D33.3]  Balance problem [R26.89]  Meningioma ([address] [D32.9]  Dizziness [R42]  Cerebellopontine angle meningioma ([address] [D32.0]   Treatment Diagnosis: Dysphagia    BSE Impression (2022)-  Dysphagia Impression : Severe oral stage dysphagia, with suspected pharyngeal component, needs further assessment. Patient awake but lethargic, on 10 L O2 via nc. On oral motor exam, patient with right sided weakness, with reduced lingual coordination and impaired labial seal. Xerostomia. Speech is dysarthric, some dysphonia. Trialed ice chips, thins via tsp/cup/straw, puree. Pt with positive oral acceptance of tsp trials, however immediate anterior loss for all thin liquid trials via tsp/cup, with patient unable to utilize straw. Suspect reduced A-P transit, with significant residue of puree (suctioned). Some laryngeal swallow movement perceived, voice remained dry, no overt signs of aspiration. However given severity of oral dysfunction in conjunction with dysarthria, lethargy, and increased O2 requirements in setting of recent cerebellopontine angle meningioma surgery, suspect patient is a high aspiration (including silent aspiration) risk. Recommend continue NPO, alternative means nutrition/hydration, frequent oral hygiene, and ice chips / tsp h2o. Will continue to follow. Dysphagia Diagnosis: Severe oral stage dysphagia, Suspected needs further assessment    MBS results (12/02/2022)-  Oral Phase  Moderate dysfunction characterized by right-sided oral weakness with reduced labial seal and coordination, resulting in anterior loss of liquid via tsp and inability to use straw when placed on right or midline. For left-sided straw placement, pt able to create appropriate seal/suction. Prolonged mastication of soft solids, with slowed a-p transit, mild stasis. Pharyngeal Phase  Grossly WFL. Overall timely swallow initiation with appropriate hyolaryngeal mechanics. Single instance trace flash (self clearing) penetration of thins when straw was placed on right side. Otherwise good airway protection throughout with no aspiration. No significant stasis. Upper Esophageal Screen  Indirectly assessed; appeared unremarkable    Pain: none indicated by any means    Current Diet : ADULT DIET; Full Liquid   Recommended Form of Meds: Via alternative means of nutrition     Treatment:  Pt seen bedside to address the following goals:  Goal 1: Patient will participate in further assessment of swallow function as appropriate. 12/1: Patient seen bedside. Awake, and much more alert this am. Speech much clearer today vs yesterday. Asking for water/food. Improved oral control (able to use straw), with reduced labial loss, and apparent improved oral clearance of puree bolus. Recommend proceed with MBS. Patient agreeable. D/C Goal, met via repeat BSE and MBS.      Goal 2: Patient/caregiver will demonstrate understanding of swallowing concerns/recommendations. 11/30: Educated pt to purpose of visit, s/s of aspiration, concern if aspiration occurs, swallow function, safety strategies (upright positioning, oral hygiene rationale), effortful swallow exercise, need for eventual instrumental assessment. Pt indicated some understanding, but suspect needs reinforcement. Cont goal  12/1: Patient educated to swallow function, MBS purpose/results, strategies (left sided placement), and diet recommendations (starting on puree vs soft solids to facilitate oral prep). Pt stated good comprehension. Continue to reinforce. Cont goal  12/2: reviewed results of yesterday's MBS, diet recommendations, left sided bolus placement, bolus size, positioning. Pt indicated comprehension. Cont goal  12/5: pt and family members educated to purpose of visit, reviewed results of MBS and strategies to improve safety of swallow. Educated to recommendation for diet advancement and instruction to notify staff if any s/s of aspiration or difficulty with mastication occurs. Explained will cont advancement as pt tolerates. All stated comprehension  Goal met, cont to ensure consistency  12/6: No family present. Pt educated to strategies (especially use of finger sweep which pt able to demonstrate at time of review as well as recall and demonstrate 5 minutes later). Pt able to demonstrate placement of food pr straw on the left with min to no cues. Pt indicated able to masticate ground sausage from breakfast  \"did ok\" but it may make stomach upset and reported that ground hamburger analyzed with this SLP at lunch was a little \"difficult to swallow\" and \"hard to move back\" but wants to continue to try with present diet. Educated to trial these foods requiring some mastication for practice but try to pick smoother items overall at this time. D/W nursing re: possible introduction of [employer] and nursing to notify nutrition team re: that.   Pt wants to keep strength up and tolerance thins via straw and puree; pt with good oral containment and straw use for left sided placement (ongoing right sided weakness), positive swallow movement, good oral clearance. Following large sip, patient with delayed cough, however not clearly related to swallow as was productive of sputum. No issues on subsequent trials, with cues for small bites/sips. Cont goal  12/5: RN with no concerns with swallowing, states lungs are clear. Pt agreeable to PO trials including pudding, thin liquids via straw and trials of soft solids. Pt consumed pudding and liquid with no overt signs of aspiration and no pocketing/oral residue noted. Pt demonstrated prolonged but adequate mastication with soft solids, cleared oral cavity completely. Pt used lingual sweep independently  Recommend upgrade to minced and moist texture with use of lingual sweep and liquid wash to ensure clearance of oral cavity  Cont goal  12/6: RN reported lungs are clear. Pt given oral care with pt having some ground meat from breakfast on the right side between inner cheek and lower gum area which this  SLP cleared with oral swab. Pt analyzed with 1 bite of hamburger which pt was able to chew given extra time and swallow although pt reported somewhat difficult (pt indicated ground sausage was \"ok\" to chew this morning. 1-2 instances of mildly delayed cough with thins and/or sherbert (out of 10 plus trials). Pt educated and able to demonstrate finger sweep as a strategy. Cont goal  12/7: SLP expressed continued use of strategies and pt independently utilized/recalled all with 100% accuracy. Pt observed using throughout meal and expressed increased ease of use during evening meal yesterday, as well as this AM. Cont. 12/8: pt had consumed portion of breakfast prior to SLp entering room, stating she didn't feel well, RN present and aware. Pt agreeable to limited additional PO, including eggs and liquids.  Pt independently placed eggs on left side and demonstrated prolonged but adequate mastication. Mild lingual residue noted, however used lingual sweep independently and cleared oral cavity completely. Pt consumed thin liquids via straw placed on left, with no overt signs of aspiration. No respiratory decline per chart review or concerns expressed per Rn. Recommend cont current diet texture. Cont goal  12/9: pt sitting up in chair with lunch tray. Pt demonstrated prolonged mastication with minced meats, very mild residue in right buccal cavity. Pt used lingual sweep and liquid wash to clear independently. No coughing/throat clear or change in voice occurred with liquids, taken via straw (placed on left). Pt fatigues easily and stated she wasn't feeling well. RN last session reported this occurs after pt has eaten a small amount. Therefore recommend cont current texture. Pt is agreeable to this   Cont goal  12/14: Pt trialed x10 bites soft and bite sized solids. Pt with appropriate use of left sided placement and complete mastication. Trace residue remaining after the swallow. Cleared well with liquid wash. No overt s/s penetration/aspiration. SLP recommend upgrade to Soft and Bite Sized Solids and cont Thin Liquids. Pt in agreement and demonstrated understanding. Cont. 12/15: Pt noted to be placed on Full Liquid diet, due to GI bleed and per GI MD request. Pt observed tolerating AM meal items with no overt s/s penetration/aspiration and adequate oral clearance. X1 occurrences of slight anterior loss apple sauce on R with min cue to identify and clear. Cont. 12/19: pt remains on full liquids per Gi recommendations. Pt consumed thin liquids with no overt signs of aspiration, voice remains clear. Pt demonstrated adequate clearance of oral cavity with bites of  jello. No respiratory decline per chart review.    Recommend resume Soft and bite sized diet when GI agrees  Cont goal    Patient/Family/Caregiver Education:  See goal 2 above    Compensatory Strategies:  Upright as possible for all oral intake  Eat/Feed slowly  Check for pocketing of food on the Right- use lingual sweep  Alternate solids and liquids   straw and food placement on LEFT    Plan:  Continue dysphagia treatment with goals per plan of care. Diet Recommendations: Pt currently on Full Liquids (Per GI); Upgrade to Soft and Bite Sized Solids when medically appropriate / Thin Liquids via straw- s/u and/or feed assist as needed  -meds in puree  -oral care at end of meals  DC recommendation: ongoing treatment indicated  Treatment: 12  Discussed with RN [place] and ENT fellow  Needs met prior to leaving room, call button in reach. Avtar Alonso M.S./[facility]-SLP #[id number]  Pg.  # [phone]  If patient is discharged prior to next treatment, this note will serve as the discharge summary

## 2022-12-19 NOTE — PROGRESS NOTES
Speech Language Pathology  Facility/Department: [facility]  Daily Speech/Cognitive Treatment Note    NAME: Sofia Bean  : 1955  MRN: [id number]    Patient Diagnosis(es):   Patient Active Problem List    Diagnosis Date Noted    Multiple subsegmental pulmonary emboli without acute cor pulmonale ([facility] 2022    S/P craniotomy 2022    Acoustic neuroma ([facility] 2022    Cerebellopontine angle meningioma ([facility] 2022    Osteoarthritis of right hip 2021    Morbid obesity with BMI of 50.0-59.9, adult ([facility] 2021    Primary osteoarthritis of left knee 2015    Asthma 2015    Gastroesophageal reflux disease 2015    Adiposity 2015    S/P total knee arthroplasty 2015    Hypertension 2014     Allergies: Allergies   Allergen Reactions    Keflex [Cephalexin] Itching and Rash    Codeine Nausea And Vomiting    Tomato Rash       Prior MRI Brain: (2022)  Large right cerebellopontine angle mass as detailed, most likely representing a meningioma. See above for details. Additional smaller left anterior parafalcine extra-axial lesion, compatible with additional small meningioma. Recent CT Head: (2022)  1. Interval postsurgical changes from resection of right cerebellopontine angle mass with associated right temporal mastoid resection and frontotemporal craniotomy and fat graft placement. 2.  Thin hyperdensity along the fat graft may represent small amount of postsurgical blood products. There is also a small focus of subarachnoid hemorrhage along the right temporal lobe. 3.  Mild pneumocephalus and bilateral subgaleal fluid is also likely postsurgical     Chart reviewed. Pain: denies    Initial Assessment 22  Mild-moderate dysarthria characterized by blended word boundaries and imprecise articulation (~ 70% intelligbile short phrases in quiet environment). Benefits from slow pace and over-articulation strategies.  Vocal quality clear and strong. Patient able to follow simple directions and answer basic questions. No word finding difficulties noted at conversation level. Further assessment limited by patient fatigue. Recommend ongoing speech therapy to further assess and address. Medical diagnosis: Acoustic neuroma [facility]) [D33.3]  Balance problem [R26.89]  Meningioma ([facility] [D32.9]  Dizziness [R42]  Cerebellopontine angle meningioma ([facility] [D32.0]  Treatment diagnosis: dysarthria    Treatment:  Pt seen to address the following goals:  Goal 1: Patient will recall and implement strategies to improve speech clarity to 90% with min cues  12/5:  speech intelligibility is ~90%, however decreased in articulatory precision. pt educated to strategies for improved intelligibility and articulatory precision through verbal instruction and demonstration. Pt used strategies in structured sentence level tasks with min cues. In conversation pt required min-mod cues. Pt stated comprehension  Cont goal  12/6: Pt speech intelligibility was functional but articulatory precision of sounds (especially /s/, /sh/, and /b/) was mildly distorted with reduced awareness but able to correct given min-mod cues during rote tasks and conversation. Pt did self correct speech error in conversation x1 at end of session. Pt educated to exaggerate and open/move articulators well with speaking. Pt utilized even a slower rate following initial review. Cont goal  12/7: Pt able to recall all speech strategies given min cues. Pt demonstrated ~90% intelligibility in basic conversational tasks. Pt with increased use of over-articulation and breaking up 3-4 words at a time for clarity. Cont. 12/8: pt with good recall of all strategies for improved intelligibility of speech. Pt demonstrated use in structured sentence level tasks with min  cues, intelligibility 90%. In conversation, pt required mod cues to using pausing and over articulation.   Cont goal  12/9:  pt states, \"I am talking slower and enunciating. \"  Reviewed all strategies, pt stated comprehension. Pt utilized strategies in structured sentences with min cues and in conversation with min-mod cues. Intelligibility is ~90% with use of strategies  Goal met, cont to ensure consistency  12/14: Improved carry over of speech strategies in conversation. ~95% intelligible. X1 repetition required for listener comprehension. Cont. 12/15: Pt with increased fatigue expressed this AM, however able to express x2/3 speech strategies to use for improved clarity. 95% intelligible across all contexts. Goal met. Cont for carry over. 12/19: pt reports she thinks her speech has improved. Intelligibility of speech is 95% during structured tasks and conversation. Pt stated and used strategies for articulatory precision, with min cues in conversation. Goal met, cont to ensure consistency    Goal 2: Patient will participate in ongoing assessment of cognitive-communication skills. 12/5: pt oriented, able to state reason for hospitalization. Pt and family report no changes in cognition. Will cont to assess  Cont goal  12/6:  Pt oriented x3. 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 Pt able to recall 2/3 words following a 5 minute delay (3/3 with choices). Pt able to complete all basic math for money and time concepts including leaving a tip with no errors and minimal to no delays. Pt reported feeling tired so session ended earlier. Cont goal  12/7: SLP provided pt with prescription label and asked pt questions related. Pt answered with 100% accuracy and no cues required. Pt expressed manages own finances/medications/ADLs at baseline with independence. Pt mentioned she does feel close to cognitive baseline. Cont. 12/8: pt provided solutions to everyday problems, demonstrating appropriate flexibility of thoughts. Pt completed money/time concept tasks with 90% accuracy.   Pt c/o not feeling well, therefore did not proceed with additional tasks  Cont goal  12/9: pt with good recall and understanding of information that MD has provided. Pt demonstrating good insight into deficits. Cont goal  12/14: pt recalled ARU doctor coming to visit in regards to moving down to rehab unit, when medically appropriate. Adequate details provided and SLP cross checked with DO notes. Cont. 12/15: Pt answered medication management questions related to dosage etc with 100% accuracy. Slightly increased wait time required. Cont. 12/19: pt recalled information regarding medical status. Pt recalled 2/5 words with delay. Pt demonstrated reduced attention to cognitive tasks presented, focused on why she received items that came on breakfast tray. Cont goal    Education:  Pt educated to purpose of visit and tasks presented and recommendation for ongoing treatment. Pt stated comprehension. Plan:  Continue speech/language therapy to address above goals, 3-5 x/week x LOS  DC recommendations: ongoing treatment indicated  D/W nursing   Needs met prior to leaving room, call button in reach. Treatment time: [address], M.S./[facility]-SLP #[id number]  Pg.  # [phone]  If patient is discharged prior to next treatment, this note will serve as the discharge summary

## 2022-12-19 NOTE — PROGRESS NOTES
Physical Therapy  Facility/Department: [facility] 5T ORTHO/NEURO  Daily Treatment Note  NAME: Marilou De Leon  : 1955  MRN: [id number]    Date of Service: 2022    Discharge Recommendations:  Marilou De Leon scored a 17/24 on the AM-PAC short mobility form. Current research shows that an AM-PAC score of 17 or less is typically not associated with a discharge to the patient's home setting. Based on the patient's AM-PAC score and their current functional mobility deficits, it is recommended that the patient have 5-7 sessions per week of Physical Therapy at d/c to increase the patient's independence. At this time, this patient demonstrates complex nursing, medical, and rehabilitative needs, and would benefit from intensive rehabilitation services upon discharge from the Inpatient setting. This patient demonstrates the ability to participate in and benefit from an intensive therapy program with a coordinated interdisciplinary team approach to foster frequent, structured, and documented communication among disciplines, who will work together to establish, prioritize, and achieve treatment goals. Please see assessment section for further patient specific details. If patient discharges prior to next session this note will serve as a discharge summary. Please see below for the latest assessment towards goals. Patient would benefit from continued therapy after discharge   PT Equipment Recommendations  Other: plan to continue to assess and likely defer recommendations to the next level of care    Patient Diagnosis(es): Diagnoses of Acoustic neuroma [facility]), Balance problem, Meningioma ([facility] and Dizziness were pertinent to this visit. Assessment   Assessment: Pt demo's slight increase in endurance throough increased total distance ambulated.  Pt reports \"I don't know if I will be able to stand up again\" mutliple time throughout session however is able to stnad up multiple more times for c ontinued activity with CGA and encouragement. Pt would benefit from continued skilled PT in order to progress towards PLOF and independence. Activity Tolerance: Patient tolerated treatment well;Patient limited by fatigue  Other: plan to continue to assess and likely defer recommendations to the next level of care     Plan    Physcial Therapy Plan  General Plan: 5-7 times per week  Current Treatment Recommendations: Strengthening; Functional mobility training;Transfer training;Gait training; Endurance training; Safety education & training; Therapeutic activities;Balance training;Neuromuscular re-education;Patient/Caregiver education & training;Equipment evaluation, education, & procurement;Home exercise program     Restrictions  Restrictions/Precautions  Restrictions/Precautions: Fall Risk  Position Activity Restriction  Other position/activity restrictions: Up with assistance, progressive ambulation, up in the chair for meals, HOB elevated to 30 degrees; up as tolerated     Subjective    Subjective  Subjective: Pt seated in reclienr and agreeable to PT despite fatigue. \"I don't know if I can make it very far\" \" I'm ready to get back in bed\"  Pain: denies  Orientation  Overall Orientation Status: Within Functional Limits  Cognition  Arousal/Alertness: Appropriate responses to stimuli  Following Commands:  Follows one step commands consistently  Attention Span: Appears intact  Memory: Decreased recall of recent events  Safety Judgement: Good awareness of safety precautions  Problem Solving: Assistance required to generate solutions;Assistance required to correct errors made;Assistance required to implement solutions  Insights: Decreased awareness of deficits  Initiation: Requires cues for some  Sequencing: Requires cues for some     Objective   Vitals     Bed Mobility Training  Bed Mobility Training: Yes  Sit to Supine: Minimum assistance (HOB flat, use of HR; Min A for L LE, pt reports she needed help to get LEs in bed prior to surgery)  Balance  Sitting: Intact (supv seated on commode to urinate and at EOB)  Standing: With support (CGA for brief management withtou UE support)  Transfer Training  Interventions: Verbal cues (VC for hand placement)  Sit to Stand: Contact-guard assistance (at recliner/EOB/commode with RW)  Stand to Sit: Contact-guard assistance  Toilet Transfer: Contact-guard assistance  Gait Training  Gait Training: Yes  Gait  Overall Level of Assistance: Contact-guard assistance  Interventions: Verbal cues (VC for upright posture, encouragement provided to increase amublation distance)  Speed/Bev: Slow;Shuffled  Step Length: Left shortened;Right shortened  Distance (ft): 25 Feet (25', 10' x 2)  Assistive Device: Gait belt;Walker, rolling           Safety Devices  Type of Devices: Call light within reach;Gait belt;Nurse notified; Bed alarm in place; Left in bed       Goals  Short Term Goals  Time Frame for Short Term Goals: D/C  Short Term Goal 1: supine<->sit mod A x 1-2- MET 12/6 Pt will perform bed mobility with SBA MET 12/7 update to bed mobility with independence -ongoing  Short Term Goal 2: pt will sit at EOB for 5 min with SBA- MET 12/7  Short Term Goal 3: pt will tolerate sit<->stand assessment MET 12/6 Pt will transfer with CGA to RW -ongoing  Short Term Goal 4: pt will tolerate bed->chair assessment with RW MET 12/7 Pt will perform stand pivot with RW and SBA -ongoing  Short Term Goal 5: pt will tolerate gait assessment-MET 12/7 Pt will ambulate x 50' and CGA -ongoing  Patient Goals   Patient Goals : \"to get better, go to rehab\"    Education  Patient Education  Education Given To: Patient  Education Provided: Role of Therapy;Plan of Care;Transfer Training  Education Provided Comments: use of call light, benefits of increased mobility with pt encouraged to get up and ambualte to BR whenever possible instead of using purewick  Education Method: Demonstration;Verbal  Barriers to Learning: None  Education Outcome: Verbalized understanding;Demonstrated understanding    Therapy Time   Individual Concurrent Group Co-treatment   Time In 1325         Time Out 1355         Minutes 30         Timed Code Treatment Minutes: Lesvia Verma [address], [facility]

## 2022-12-20 LAB
ANTI-XA UNFRAC HEPARIN: 0.39 IU/ML (ref 0.3–0.7)
ANTI-XA UNFRAC HEPARIN: 0.75 IU/ML (ref 0.3–0.7)
BASOPHILS ABSOLUTE: 0 K/UL (ref 0–0.2)
BASOPHILS RELATIVE PERCENT: 0.8 %
EOSINOPHILS ABSOLUTE: 0.4 K/UL (ref 0–0.6)
EOSINOPHILS RELATIVE PERCENT: 11.5 %
GLUCOSE BLD-MCNC: 134 MG/DL (ref 70–99)
GLUCOSE BLD-MCNC: 136 MG/DL (ref 70–99)
GLUCOSE BLD-MCNC: 155 MG/DL (ref 70–99)
GLUCOSE BLD-MCNC: 187 MG/DL (ref 70–99)
HCT VFR BLD CALC: 25.8 % (ref 36–48)
HEMOGLOBIN: 8.6 G/DL (ref 12–16)
LYMPHOCYTES ABSOLUTE: 0.8 K/UL (ref 1–5.1)
LYMPHOCYTES RELATIVE PERCENT: 25.8 %
MCH RBC QN AUTO: 30.8 PG (ref 26–34)
MCHC RBC AUTO-ENTMCNC: 33.4 G/DL (ref 31–36)
MCV RBC AUTO: 92.4 FL (ref 80–100)
MONOCYTES ABSOLUTE: 0.3 K/UL (ref 0–1.3)
MONOCYTES RELATIVE PERCENT: 10.5 %
NEUTROPHILS ABSOLUTE: 1.7 K/UL (ref 1.7–7.7)
NEUTROPHILS RELATIVE PERCENT: 51.4 %
PDW BLD-RTO: 17 % (ref 12.4–15.4)
PERFORMED ON: ABNORMAL
PLATELET # BLD: 199 K/UL (ref 135–450)
PMV BLD AUTO: 8.1 FL (ref 5–10.5)
RBC # BLD: 2.8 M/UL (ref 4–5.2)
WBC # BLD: 3.3 K/UL (ref 4–11)

## 2022-12-20 PROCEDURE — 85025 COMPLETE CBC W/AUTO DIFF WBC: CPT

## 2022-12-20 PROCEDURE — 85520 HEPARIN ASSAY: CPT

## 2022-12-20 PROCEDURE — 6360000002 HC RX W HCPCS

## 2022-12-20 PROCEDURE — 2580000003 HC RX 258

## 2022-12-20 PROCEDURE — 6360000002 HC RX W HCPCS: Performed by: NURSE PRACTITIONER

## 2022-12-20 PROCEDURE — 6370000000 HC RX 637 (ALT 250 FOR IP)

## 2022-12-20 PROCEDURE — APPNB30 APP NON BILLABLE TIME 0-30 MINS: Performed by: NURSE PRACTITIONER

## 2022-12-20 PROCEDURE — C9113 INJ PANTOPRAZOLE SODIUM, VIA: HCPCS | Performed by: NURSE PRACTITIONER

## 2022-12-20 PROCEDURE — 99024 POSTOP FOLLOW-UP VISIT: CPT | Performed by: NURSE PRACTITIONER

## 2022-12-20 PROCEDURE — 6360000002 HC RX W HCPCS: Performed by: NEUROLOGICAL SURGERY

## 2022-12-20 PROCEDURE — 6370000000 HC RX 637 (ALT 250 FOR IP): Performed by: INTERNAL MEDICINE

## 2022-12-20 PROCEDURE — 6370000000 HC RX 637 (ALT 250 FOR IP): Performed by: NURSE PRACTITIONER

## 2022-12-20 PROCEDURE — 2060000000 HC ICU INTERMEDIATE R&B

## 2022-12-20 PROCEDURE — 92526 ORAL FUNCTION THERAPY: CPT

## 2022-12-20 PROCEDURE — 92507 TX SP LANG VOICE COMM INDIV: CPT

## 2022-12-20 RX ADMIN — POTASSIUM CHLORIDE 20 MEQ: 20 TABLET, EXTENDED RELEASE ORAL at 17:50

## 2022-12-20 RX ADMIN — SODIUM CHLORIDE, PRESERVATIVE FREE 10 ML: 5 INJECTION INTRAVENOUS at 20:42

## 2022-12-20 RX ADMIN — INSULIN GLARGINE 15 UNITS: 100 INJECTION, SOLUTION SUBCUTANEOUS at 20:44

## 2022-12-20 RX ADMIN — SODIUM CHLORIDE, PRESERVATIVE FREE 10 ML: 5 INJECTION INTRAVENOUS at 09:54

## 2022-12-20 RX ADMIN — PANTOPRAZOLE SODIUM 40 MG: 40 INJECTION, POWDER, LYOPHILIZED, FOR SOLUTION INTRAVENOUS at 20:42

## 2022-12-20 RX ADMIN — PANTOPRAZOLE SODIUM 40 MG: 40 INJECTION, POWDER, LYOPHILIZED, FOR SOLUTION INTRAVENOUS at 09:51

## 2022-12-20 RX ADMIN — Medication 5 MG: at 20:42

## 2022-12-20 RX ADMIN — LEVETIRACETAM 500 MG: 100 INJECTION, SOLUTION INTRAVENOUS at 10:03

## 2022-12-20 RX ADMIN — SODIUM CHLORIDE, PRESERVATIVE FREE 10 ML: 5 INJECTION INTRAVENOUS at 09:51

## 2022-12-20 RX ADMIN — POTASSIUM CHLORIDE 20 MEQ: 20 TABLET, EXTENDED RELEASE ORAL at 09:51

## 2022-12-20 RX ADMIN — HEPARIN SODIUM 11.98 UNITS/KG/HR: 10000 INJECTION, SOLUTION INTRAVENOUS at 07:28

## 2022-12-20 RX ADMIN — LEVETIRACETAM 500 MG: 100 INJECTION, SOLUTION INTRAVENOUS at 21:58

## 2022-12-20 RX ADMIN — LATANOPROST 1 DROP: 50 SOLUTION OPHTHALMIC at 20:42

## 2022-12-20 RX ADMIN — HEPARIN SODIUM 11.98 UNITS/KG/HR: 10000 INJECTION, SOLUTION INTRAVENOUS at 22:53

## 2022-12-20 RX ADMIN — METHIMAZOLE 10 MG: 5 TABLET ORAL at 09:51

## 2022-12-20 RX ADMIN — CETIRIZINE HYDROCHLORIDE 10 MG: 10 TABLET, FILM COATED ORAL at 09:50

## 2022-12-20 NOTE — PROGRESS NOTES
[address] [place] [address]  GI Progress Note          Korey Lawson is a 79 y.o. female patient. 1. Acoustic neuroma ([facility] [address]    2. Balance problem    3. Meningioma ([facility] [address]    4. Dizziness        Admit Date: 11/28/2022    Subjective:       Patient is tolerating regular diet. Denies any abdominal pain. No GI bleeding. Hb is stable at 8.6 from 8.2 yesterday.        ROS:  Cardiovascular ROS: no chest pain or dyspnea on exertion  Gastrointestinal ROS: no abdominal pain, change in bowel habits, or black or bloody stools  Respiratory ROS: no cough, shortness of breath, or wheezing    Scheduled Meds:   potassium chloride  20 mEq Oral BID WC    pantoprazole  40 mg IntraVENous BID    levETIRAcetam  500 mg IntraVENous Q12H    cetirizine  10 mg Oral Daily    [Held by provider] hydroCHLOROthiazide  25 mg Oral Daily    insulin lispro  0-16 Units SubCUTAneous TID WC    insulin lispro  0-4 Units SubCUTAneous Nightly    methIMAzole  10 mg Oral Daily    insulin glargine  15 Units SubCUTAneous Nightly    melatonin  5 mg Oral Nightly    sodium chloride flush  5-40 mL IntraVENous 2 times per day    [Held by provider] carvedilol  12.5 mg Oral BID WC    [Held by provider] losartan  100 mg Oral Daily    sodium chloride flush  5-40 mL IntraVENous 2 times per day    latanoprost  1 drop Both Eyes Nightly       Continuous Infusions:   heparin (PORCINE) Infusion 11.985 Units/kg/hr (12/20/22 0728)    sodium chloride      sodium chloride 25 mL/hr (12/18/22 0937)    dextrose         PRN Meds:  polyethylene glycol, bisacodyl, labetalol, sodium chloride flush, sodium chloride, sodium chloride, acetaminophen, ondansetron **OR** ondansetron, hydrALAZINE, albuterol, glucose, dextrose bolus **OR** dextrose bolus, glucagon (rDNA), dextrose, promethazine      Objective:       Patient Vitals for the past 24 hrs:   BP Temp Temp src Pulse Resp SpO2 Weight   12/20/22 0636 129/78 97.9 °F (36.6 °C) Oral 72 16 95 % --   12/20/22 0500 -- -- -- -- -- -- (!) 343 lb 4.8 oz (155.7 kg)   22 0252 127/75 97.8 °F (36.6 °C) Oral 66 16 93 % --   22 2251 136/80 97.7 °F (36.5 °C) Oral 75 16 92 % --   22 1801 (!) 147/86 98.4 °F (36.9 °C) Oral 79 17 92 % --   22 1553 (!) 147/93 97.9 °F (36.6 °C) Oral 70 18 90 % --   22 1048 137/84 -- -- 69 18 92 % --       Exam:  VITALS:  /78   Pulse 72   Temp 97.9 °F (36.6 °C) (Oral)   Resp 16   Ht 5' 5\" (1.651 m)   Wt (!) 343 lb 4.8 oz (155.7 kg)   SpO2 95%   BMI 57.13 kg/m²   TEMPERATURE:  Current - Temp: 97.9 °F (36.6 °C); Max - Temp  Av.9 °F (36.6 °C)  Min: 97.7 °F (36.5 °C)  Max: 98.4 °F (36.9 °C)    NAD  General appearance: alert, appears stated age, cooperative and no distress  Head: Normocephalic, without obvious abnormality, atraumatic  Neck: supple, symmetrical, trachea midline and thyroid not enlarged, symmetric, no tenderness/mass/nodules  CVS:  RRR, Nl s1s2  Lungs CTA Bilaterally, normal effort  Abdomen: soft, non-tender; bowel sounds normal; no masses,  no organomegaly  AAOx3, No asterixis or encephalopathy  Extremities: No edema. Recent Labs     22  0600 22  0610 22  0635   WBC 3.6* 3.5* 3.3*   HGB 8.1* 8.2* 8.6*   HCT 24.1* 24.3* 25.8*   MCV 92.5 91.9 92.4    196 199     Recent Labs     22  0600      K 3.7      CO2 28   BUN 5*   CREATININE <0.5*     No results for input(s): AST, ALT, ALB, BILIDIR, BILITOT, ALKPHOS in the last 72 hours. No results for input(s): LIPASE, AMYLASE in the last 72 hours. No results for input(s): PROT, INR in the last 72 hours. No results for input(s): PTT in the last 72 hours. No results for input(s): OCCULTBLD in the last 72 hours.     Radiology review: none     Assessment:       Principal Problem:    Cerebellopontine angle meningioma (HCC)  Active Problems:    Acoustic neuroma (HCC)    S/P craniotomy    Multiple subsegmental pulmonary emboli without acute cor pulmonale (HCC)  Resolved Problems:    * No resolved hospital problems. *      Upper GI bleed 2/2 PUD   -Large bleeding duodenal ulcer  -s/p failed endoscopic clip placement followed by GDA embolization 12/12 and repeat EGD 12/16 with large duodenal ulcer with pigmented spot.       Recommendations:       -Monitor H&H closely   -Continue IV PPI twice daily  -Consider changing to oral anticoagulation 12/21     Patient discussed with attending physician, Dr. Fidel Merrill MD  12/20/2022  7:35 AM

## 2022-12-20 NOTE — PROGRESS NOTES
Pt is A&O, VSS, room air, denies any pain. Tolerating soft and bite size diet and fluids. Heparin drip con. Incision site CDI. All fall precaution is in place. Call light is within the reach.

## 2022-12-20 NOTE — CARE COORDINATION
CM following: ALEXEI spoke with [facility] who reports that the medical team is working on a plan to discontinue to the hep gtt and if this occurs [facility] would have a bed available for pt as early as tomorrow. CM will continue to follow for discharge planning.   Electronically signed by BONNIE Zarate on 12/20/2022 at 4:30 PM  [phone]

## 2022-12-20 NOTE — PROGRESS NOTES
Hospitalist Consult Progress Note      PCP: Migdalia Jordan    Date of Admission: 11/28/2022    Chief Complaint: consult for cas-operative [facility] Course: \"The patient is a 79 y.o. female with hx of multistage neurosurgical resection of petroclival meningioma who we are asked to see/evaluate by Dr. Frankie Fox for cas-operative mgt. patient has been recovering well postoperatively from her procedure when she most recently developed melanotic stools. Gastroenterology was consulted, patient underwent EGD and was found to have multiple duodenal ulcers with visible vessel and active bleeding. Underwent injection of epi, hemostatic clip placement and chemo spray. Due to concern for possible ongoing GI bleed she underwent CTA. No active GI bleed was detected, however CAT scan picked up likely pulmonary embolism in the lower lung fields. Patient received 2 units of blood transfusion\"    Subjective:      Was seen and examined at bedside   Denies CP or SOB  Feeling good   Patient is afebrile overnight. She reports feeling well. No signs of bleeding reported.     Medications:  Reviewed    Infusion Medications    heparin (PORCINE) Infusion 11.985 Units/kg/hr (12/20/22 2311)    sodium chloride      sodium chloride 25 mL/hr (12/18/22 3112)    dextrose       Scheduled Medications    potassium chloride  20 mEq Oral BID WC    pantoprazole  40 mg IntraVENous BID    levETIRAcetam  500 mg IntraVENous Q12H    cetirizine  10 mg Oral Daily    [Held by provider] hydroCHLOROthiazide  25 mg Oral Daily    insulin lispro  0-16 Units SubCUTAneous TID WC    insulin lispro  0-4 Units SubCUTAneous Nightly    methIMAzole  10 mg Oral Daily    insulin glargine  15 Units SubCUTAneous Nightly    melatonin  5 mg Oral Nightly    sodium chloride flush  5-40 mL IntraVENous 2 times per day    [Held by provider] carvedilol  12.5 mg Oral BID WC    [Held by provider] losartan  100 mg Oral Daily    sodium chloride flush  5-40 mL IntraVENous 2 times per day    latanoprost  1 drop Both Eyes Nightly     PRN Meds: polyethylene glycol, bisacodyl, labetalol, sodium chloride flush, sodium chloride, sodium chloride, acetaminophen, ondansetron **OR** ondansetron, hydrALAZINE, albuterol, glucose, dextrose bolus **OR** dextrose bolus, glucagon (rDNA), dextrose, promethazine      Intake/Output Summary (Last 24 hours) at 12/20/2022 1047  Last data filed at 12/20/2022 0600  Gross per 24 hour   Intake 910 ml   Output 1800 ml   Net -890 ml         Physical Exam Performed:    /68   Pulse 75   Temp 98 °F (36.7 °C) (Oral)   Resp 16   Ht 5' 5\" (1.651 m)   Wt (!) 343 lb 4.8 oz (155.7 kg)   SpO2 90%   BMI 57.13 kg/m²     General appearance: No apparent distress. Dressing on the right side of the face  Respiratory:  Normal respiratory effort. Clear to auscultation, bilaterally without Rales/Wheezes/Rhonchi. Cardiovascular: Regular rate and rhythm with normal S1/S2 without murmurs, rubs or gallops. Abdomen: Soft, non-tender, non-distended with normal bowel sounds. Musculoskeletal: No edema on both LE   Neurologic: no new deficit  Psychiatric: Alert and oriented       Labs:   Recent Labs     12/18/22  0600 12/19/22  0610 12/20/22  0635   WBC 3.6* 3.5* 3.3*   HGB 8.1* 8.2* 8.6*   HCT 24.1* 24.3* 25.8*    196 199       Recent Labs     12/18/22  0600      K 3.7      CO2 28   BUN 5*   CREATININE <0.5*   CALCIUM 8.5       No results for input(s): AST, ALT, BILIDIR, BILITOT, ALKPHOS in the last 72 hours. No results for input(s): INR in the last 72 hours. No results for input(s): Volodymyr Gubler in the last 72 hours.     Urinalysis:      Lab Results   Component Value Date/Time    NITRU Negative 10/12/2021 01:46 PM    WBCUA 89 10/12/2021 01:46 PM    BACTERIA 2+ 10/12/2021 01:46 PM    RBCUA 7 10/12/2021 01:46 PM    BLOODU Negative 10/12/2021 01:46 PM    SPECGRAV 1.023 10/12/2021 01:46 PM    GLUCOSEU 100 10/12/2021 01:46 PM Radiology:  MRI ABDOMEN W WO CONTRAST MRCP   Final Result      Limited study due to multiple factors, as outlined above. Lesion of the right hepatic lobe is most consistent with a hemangioma. Follow-up multiphase CT is recommended in 6 months. CT CHEST PULMONARY EMBOLISM W CONTRAST   Final Result      Large burden of pulmonary emboli without obvious heart strain of the exam is severely limited and difficult to evaluate for other pathology      Findings called to patient's floor nurse on 12/13/2022 at 7:09 PM      IR EMBOLIZATION HEMORRHAGE   Final Result   Impression: Technically successful superior mesenteric, common hepatic and gastroduodenal angiograms with subsequent coiling of the gastroduodenal artery. VL Extremity Venous Bilateral   Final Result      CTA ABDOMEN PELVIS W WO CONTRAST   Final Result      1. Suspected bilateral pulmonary emboli but inadequate contrast phase to assess for the pulmonary embolus. Incomplete filling of the pulmonary arteries suggests pulmonary emboli inferiorly     2. No sign of any acute or active GI bleeding on CT angiograms studies   3. No sign of any aneurysm in the abdomen with normal widely patent vessels   4. Stable appearing partially calcified left adrenal adenoma. 5. Parapelvic renal cysts, largest on left side   6. 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 4.1 x 3.5 cm right ovarian cyst, benign in appearance   8. Mild cholelithiasis   9. No free fluid or free air with right abdominal wall subcutaneous inflammation or prior instrumentation         CT ABDOMEN PELVIS W WO CONTRAST Additional Contrast? Radiologist Recommendation   Final Result      1.  9.5 cm mass in the right hepatic lobe which does not appear to represent a hemangioma. Differential diagnosis would include primary or secondary malignancy.  Liver mass protocol MRI with and without contrast is recommended. 2.  No intra-abdominal hemorrhage. 3.  Mild cholelithiasis. 4.  3.5 cm right ovarian cyst, the CT appearance suggests a simple cyst indicating a benign finding. MRI BRAIN W WO CONTRAST   Final Result      1. Postsurgical changes from right temporal and transmastoid craniotomy with large amount of fat packing in the defect and extracranial soft tissues. Large subcutaneous fluid collection is present in the scalp surrounding and extending superiorly from    the fat packing. No diffusion restriction to indicate superimposed infection. 2.  Residual meningioma in the right cerebellopontine angle, prepontine cistern, and right Meckel's cave/cavernous sinus. The tumor extends partially encasing the basilar artery   3. Small area of acute to subacute ischemia in the right brachium pontis. Small enhancing lesions superior to the right tentorium is new from the prior study and may represent an area of subacute ischemia. 4.  Small amount of extra-axial hemorrhage along the right cerebellopontine angle. 5.  Stable 12 mm left frontal meningioma. CTA PULMONARY W CONTRAST   Final Result      Significantly limited study due to motion. 1. Significantly limited study due to streak artifact and suboptimal bolus. No evidence of a central embolus. 2. Multifocal airspace consolidation is present, suboptimally evaluated due to motion. This presumably reflects pneumonia. Follow-up chest CT is recommended to document resolution. 3. There is a large mass in the right hepatic lobe measuring 8.2 x 7.4 cm, demonstrating peripheral nodular enhancement. There is significant amount of artifact through this lesion. It is still favored to reflect a hemangioma. Recommend a multiphasic CT    of the abdomen for further assessment. This could be performed on a nonurgent basis, if clinically appropriate.    4. Partially calcified nodule arising from the left adrenal gland, most likely benign and possibly reflecting old adrenal hemorrhage. XR CHEST PORTABLE   Final Result      Similar appearance of patchy airspace disease. FL MODIFIED BARIUM SWALLOW W VIDEO   Final Result      1. Laryngeal penetration, but no evidence of tracheal aspiration. XR CHEST PORTABLE   Final Result      Patchy left upper lobe airspace disease, atelectasis versus pneumonia. CT HEAD WO CONTRAST   Final Result      1. Interval postsurgical changes from resection of right cerebellopontine angle mass with associated right temporal mastoid resection and frontotemporal craniotomy and fat graft placement. 2.  Thin hyperdensity along the fat graft may represent small amount of postsurgical blood products. There is also a small focus of subarachnoid hemorrhage along the right temporal lobe. 3.  Mild pneumocephalus and bilateral subgaleal fluid is also likely postsurgical.             IP CONSULT TO CRITICAL CARE  IP CONSULT TO PHYSICAL MEDICINE REHAB  IP CONSULT TO GI  IP CONSULT TO HOSPITALIST  IP CONSULT TO NEUROCRITICAL CARE  IP CONSULT TO PULMONOLOGY    Assessment/Plan:    Active Hospital Problems    Diagnosis     Multiple subsegmental pulmonary emboli without acute cor pulmonale ([address] [I26.94]      Priority: Medium    S/P craniotomy [Z98.890]      Priority: Medium    Acoustic neuroma ([address] [D33.3]      Priority: Medium    Cerebellopontine angle meningioma ([address] [D32.0]      Priority: Medium   -ENT following. On keppra. S/p STAGE II: RESECTION OF RIGHT PETROCLIVAL MENINGIOMA. POD 22    Acute GI bleed due to bleeding duodenal ulcers  Acute blood loss anemia  Status post EGD with intervention;CT abdomen has no signs of any ongoing acute bleeding. S/p EGD 12/16/22. Continue PPI bid. No signs of bleeding  GI following    Bilateral pulmonary emboli:  Dopplers neg for DVT.  Pulmonary has been consulted,  Pulmonology and GI discussed the PE situation and they are in agreement that the risk;benefit ratio favors heparin anticoagulation now, and transition to an oral agent on discharge    Liver lesion  -MRI Abd/pelvis showed lesion of the right hepatic lobe most consistent with a hemangioma. Follow-up multiphase CT is recommended in 6 months  -follow with PCP     DM: cont lantus and SSI for now, once stable and plan for ARU would restart home meds. HTN: stable, cont home meds, monitor. Hypokalemia  Hypomagnesemia  supplement K and mag as needed      Obesity class III: BMI 58.7. follow with PCP     DVT Prophylaxis: heparin   Diet: ADULT DIET;  Regular  Code Status: Full Code  PT/OT Eval Status: ongoing    Dispo - per primary          Muna Edwards MD

## 2022-12-20 NOTE — PROGRESS NOTES
Speech Language Pathology  Facility/Department: [facility]  Dysphagia Daily Treatment Note    NAME: Jackelin Eckert  : 1955  MRN: [id number]    Patient Diagnosis(es):   Patient Active Problem List    Diagnosis Date Noted    Multiple subsegmental pulmonary emboli without acute cor pulmonale ([address] 2022    S/P craniotomy 2022    Acoustic neuroma ([address] 2022    Cerebellopontine angle meningioma ([address] 2022    Osteoarthritis of right hip 2021    Morbid obesity with BMI of 50.0-59.9, adult ([address] 2021    Primary osteoarthritis of left knee 2015    Asthma 2015    Gastroesophageal reflux disease 2015    Adiposity 2015    S/P total knee arthroplasty 2015    Hypertension 2014     Allergies: Allergies   Allergen Reactions    Keflex [Cephalexin] Itching and Rash    Codeine Nausea And Vomiting    Tomato Rash     Onset Date: 2022    CXR (2022)-  Similar appearance of patchy airspace disease. Chart reviewed. Medical Diagnosis: Acoustic neuroma ([address] [D33.3]  Balance problem [R26.89]  Meningioma ([address] [D32.9]  Dizziness [R42]  Cerebellopontine angle meningioma ([address] [D32.0]   Treatment Diagnosis: Dysphagia    BSE Impression (2022)-  Dysphagia Impression : Severe oral stage dysphagia, with suspected pharyngeal component, needs further assessment. Patient awake but lethargic, on 10 L O2 via nc. On oral motor exam, patient with right sided weakness, with reduced lingual coordination and impaired labial seal. Xerostomia. Speech is dysarthric, some dysphonia. Trialed ice chips, thins via tsp/cup/straw, puree. Pt with positive oral acceptance of tsp trials, however immediate anterior loss for all thin liquid trials via tsp/cup, with patient unable to utilize straw. Suspect reduced A-P transit, with significant residue of puree (suctioned). Some laryngeal swallow movement perceived, voice remained dry, no overt signs of aspiration. However given severity of oral dysfunction in conjunction with dysarthria, lethargy, and increased O2 requirements in setting of recent cerebellopontine angle meningioma surgery, suspect patient is a high aspiration (including silent aspiration) risk. Recommend continue NPO, alternative means nutrition/hydration, frequent oral hygiene, and ice chips / tsp h2o. Will continue to follow. Dysphagia Diagnosis: Severe oral stage dysphagia, Suspected needs further assessment    MBS results (12/02/2022)-  Oral Phase  Moderate dysfunction characterized by right-sided oral weakness with reduced labial seal and coordination, resulting in anterior loss of liquid via tsp and inability to use straw when placed on right or midline. For left-sided straw placement, pt able to create appropriate seal/suction. Prolonged mastication of soft solids, with slowed a-p transit, mild stasis. Pharyngeal Phase  Grossly WFL. Overall timely swallow initiation with appropriate hyolaryngeal mechanics. Single instance trace flash (self clearing) penetration of thins when straw was placed on right side. Otherwise good airway protection throughout with no aspiration. No significant stasis. Upper Esophageal Screen  Indirectly assessed; appeared unremarkable    Pain: none indicated by any means    Current Diet : ADULT DIET; Dysphagia - Soft and Bite Sized   Recommended Form of Meds: PO     Treatment:  Pt seen bedside to address the following goals:  Goal 1: Patient will participate in further assessment of swallow function as appropriate. 12/1: Patient seen bedside. Awake, and much more alert this am. Speech much clearer today vs yesterday. Asking for water/food. Improved oral control (able to use straw), with reduced labial loss, and apparent improved oral clearance of puree bolus. Recommend proceed with MBS. Patient agreeable. D/C Goal, met via repeat BSE and MBS.      Goal 2: Patient/caregiver will demonstrate understanding of swallowing concerns/recommendations. 11/30: Educated pt to purpose of visit, s/s of aspiration, concern if aspiration occurs, swallow function, safety strategies (upright positioning, oral hygiene rationale), effortful swallow exercise, need for eventual instrumental assessment. Pt indicated some understanding, but suspect needs reinforcement. Cont goal  12/1: Patient educated to swallow function, MBS purpose/results, strategies (left sided placement), and diet recommendations (starting on puree vs soft solids to facilitate oral prep). Pt stated good comprehension. Continue to reinforce. Cont goal  12/2: reviewed results of yesterday's MBS, diet recommendations, left sided bolus placement, bolus size, positioning. Pt indicated comprehension. Cont goal  12/5: pt and family members educated to purpose of visit, reviewed results of MBS and strategies to improve safety of swallow. Educated to recommendation for diet advancement and instruction to notify staff if any s/s of aspiration or difficulty with mastication occurs. Explained will cont advancement as pt tolerates. All stated comprehension  Goal met, cont to ensure consistency  12/6: No family present. Pt educated to strategies (especially use of finger sweep which pt able to demonstrate at time of review as well as recall and demonstrate 5 minutes later). Pt able to demonstrate placement of food pr straw on the left with min to no cues. Pt indicated able to masticate ground sausage from breakfast  \"did ok\" but it may make stomach upset and reported that ground hamburger analyzed with this SLP at lunch was a little \"difficult to swallow\" and \"hard to move back\" but wants to continue to try with present diet. Educated to trial these foods requiring some mastication for practice but try to pick smoother items overall at this time. D/W nursing re: possible introduction of [employer] and nursing to notify nutrition team re: that.   Pt wants to keep strength up and get better. Cont goal  12/7: Pt seated upright in bed agreeable to be seen with part of AM meal. Pt demonstrated slowed, yet functional mastication of minced and moist solids and no overt s/s penetration/aspiration with either consistency. Pt reported intermittent sensation globus, however with \"dryer\" solids (pt pointing to dry minced and moist sausage). SLP instructed pt to order gravy with dry meats or use additional puree to provide moisture to solid consistency. Pt demonstrated understanding and agreement. Continue current diet level at this time. Cont. 12/8:  pt recalled instruction and demonstrated use of placing food on left side and use lingual sweep to check for pocketing. Reinforced use of all strategies and recommendation to cont current diet texture. Pt stated comprehension  Goal met, cont to ensure consistency  12/9: pt recalled and demonstrated use of strategies for improved safety of swallow. Pt independently demonstrated use of lingual sweep and alternating soft solid and liquid wash. Goal met  12/14: pt demonstrated adequate recall and use of swallow strategies with trials of soft and bite sized solids. MI with all trials. Goal met. Cont for carry over. 12/15: independently expressed all swallow strategies with no cues. Demonstrated appropriate use during AM meal. Cont. 12/19: pt recalled strategies for improved safety of swallow. Pt stated and demonstrated use of lingual sweep. Pt stated she tried to drink from cup but cont to exhibit anterior loss. She states therefore she is cont to use a straw. Goal met, cont as appropriate  12/20: Pt independently expressed all swallow strategies with independence. Pt expressed not having to use as frequently, due to being on full liquid diet per GI, however expressed has a soft and bite sized diet this AM with no difficulty. Cont.      Goal 3: Patient will tolerate least restrictive diet without overt signs of aspiration or associated decline in respiratory status. 12/2: Reviewed chart and discussed with RN; pt NPO yesterday d/t respiratory status, however was advanced back to puree this am. With patient awake, alert, pleasant, cooperative, positioned up in bed, on 4.5 L O2 via nc, assessed tolerance thins via straw and puree; pt with good oral containment and straw use for left sided placement (ongoing right sided weakness), positive swallow movement, good oral clearance. Following large sip, patient with delayed cough, however not clearly related to swallow as was productive of sputum. No issues on subsequent trials, with cues for small bites/sips. Cont goal  12/5: RN with no concerns with swallowing, states lungs are clear. Pt agreeable to PO trials including pudding, thin liquids via straw and trials of soft solids. Pt consumed pudding and liquid with no overt signs of aspiration and no pocketing/oral residue noted. Pt demonstrated prolonged but adequate mastication with soft solids, cleared oral cavity completely. Pt used lingual sweep independently  Recommend upgrade to minced and moist texture with use of lingual sweep and liquid wash to ensure clearance of oral cavity  Cont goal  12/6: RN reported lungs are clear. Pt given oral care with pt having some ground meat from breakfast on the right side between inner cheek and lower gum area which this  SLP cleared with oral swab. Pt analyzed with 1 bite of hamburger which pt was able to chew given extra time and swallow although pt reported somewhat difficult (pt indicated ground sausage was \"ok\" to chew this morning. 1-2 instances of mildly delayed cough with thins and/or sherbert (out of 10 plus trials). Pt educated and able to demonstrate finger sweep as a strategy. Cont goal  12/7: SLP expressed continued use of strategies and pt independently utilized/recalled all with 100% accuracy.  Pt observed using throughout meal and expressed increased ease of use during evening meal yesterday, as well as this AM. Cont. 12/8: pt had consumed portion of breakfast prior to SLp entering room, stating she didn't feel well, RN present and aware. Pt agreeable to limited additional PO, including eggs and liquids. Pt independently placed eggs on left side and demonstrated prolonged but adequate mastication. Mild lingual residue noted, however used lingual sweep independently and cleared oral cavity completely. Pt consumed thin liquids via straw placed on left, with no overt signs of aspiration. No respiratory decline per chart review or concerns expressed per Rn. Recommend cont current diet texture. Cont goal  12/9: pt sitting up in chair with lunch tray. Pt demonstrated prolonged mastication with minced meats, very mild residue in right buccal cavity. Pt used lingual sweep and liquid wash to clear independently. No coughing/throat clear or change in voice occurred with liquids, taken via straw (placed on left). Pt fatigues easily and stated she wasn't feeling well. RN last session reported this occurs after pt has eaten a small amount. Therefore recommend cont current texture. Pt is agreeable to this   Cont goal  12/14: Pt trialed x10 bites soft and bite sized solids. Pt with appropriate use of left sided placement and complete mastication. Trace residue remaining after the swallow. Cleared well with liquid wash. No overt s/s penetration/aspiration. SLP recommend upgrade to Soft and Bite Sized Solids and cont Thin Liquids. Pt in agreement and demonstrated understanding. Cont. 12/15: Pt noted to be placed on Full Liquid diet, due to GI bleed and per GI MD request. Pt observed tolerating AM meal items with no overt s/s penetration/aspiration and adequate oral clearance. X1 occurrences of slight anterior loss apple sauce on R with min cue to identify and clear. Cont. 12/19: pt remains on full liquids per Gi recommendations. Pt consumed thin liquids with no overt signs of aspiration, voice remains clear. Pt demonstrated adequate clearance of oral cavity with bites of  jello. No respiratory decline per chart review. Recommend resume Soft and bite sized diet when GI agrees  Cont goal  12/20: Pt seated upright and as noted above, tolerated soft solids with AM meal, and expressed MD cleared. Pt provided with entire pack of regular solid crackers. Pt with adequate mastication and R sided placement. When placed on the L, x1 occurrence of small piece of cracker spilling posteriorly at corner of labial seal. Pt benefited from min cues to realize and clear by self. Complete oral clearance with liquid wash and lingual sweep. Rec continue current diet level of soft and bite sized solids. Cont. Patient/Family/Caregiver Education:  See goal 2 above    Compensatory Strategies:  Upright as possible for all oral intake  Eat/Feed slowly  Check for pocketing of food on the Right- use lingual sweep  Alternate solids and liquids   straw and food placement on LEFT    Plan:  Continue dysphagia treatment with goals per plan of care. Diet Recommendations: Soft and Bite Sized Solids when medically appropriate / Thin Liquids via straw- s/u and/or feed assist as needed  -meds in puree  -oral care at end of meals  DC recommendation: ongoing treatment indicated  Treatment: 15  Discussed with ILDA Jalloh   Needs met prior to leaving room, call button in reach.     Electronically signed by:  Sujey Iqbal M.A., [address] [place] [address]  Speech-Language Pathologist  Pg #: [phone]    If patient is discharged prior to next treatment, this note will serve as the discharge summary

## 2022-12-20 NOTE — PROGRESS NOTES
Progress Note  Physical Medicine and Rehabilitation    Patient: Moris Aj  [id number]  Date: 12/20/2022       Chief Complaint: Meningioma     Interval history:  Patient was seen at bed side this morning. She denies any specific complaints this AM. She denied SOB, chest pain, abdominal pain. History of Present Illness/Hospital Course:  Patient is a morbidly obese (Body mass index is 50.82 kg/m².), 79 y.o. female  with c/o dizziness, HA and vertigo in the setting of large right petroclival mass which is consistent with meningioma. . She presented to the hospital for a scheduled two staged petroclival meningioma resection. 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 Prior Level of Function:  Independent for mobility, ADLs, and IADLs     Current Level of Function:  Min assist     Pertinent Social History:  Support: Lives alone  Home set-up: One level house with 2 steps to enter    Past Medical History:   Diagnosis Date    Arthritis     Asthma     mild    Brain tumor ([address]     Diabetes mellitus ([address]     borderline    High cholesterol     Hypertension     Imbalance     DUE TO TUMOR- USING ANKLE BRACE / WALKER PER PREOP H&P    Loss of hearing     bilateral reported    Vertigo        Past Surgical History:   Procedure Laterality Date    CRANIOTOMY N/A 11/29/2022    STAGE II: RESECTION OF RIGHT PETROCLIVAL MENINGIOMA performed by Mallory Crook MD at [facility] [address]    IR EMBOLIZATION HEMORRHAGE  12/12/2022    IR EMBOLIZATION HEMORRHAGE 12/12/2022 [facility] PROCEDURES    MASTOIDECTOMY Right 11/28/2022    RIGHT TRANSMASTOID / ANTERIOR MIDDLE CRANIAL FOSSA , ABDOMINAL FAT GRAFT EXTERNAL AUDITORY CANAL CLOSURE performed by Ross Ortega MD at [place] [id number] Right 11/28/2022    STAGE 1, RIGHT TRANSLABYRINTHINE / RETROLABYRINTHINE CRANIOTOMY, PETROSECTOMY performed by Flex Pyle.  Antionette Crook MD at [facility] 11/8/2021    RIGHT TOTAL HIP ARTHROPLASTY POSTERIOR APPROACH - JOSÉ ANTONIO ADVANCED performed by Irene Bailey MD at [facility] Right 07/14/2015    TOTAL KNEE ARTHROPLASTY Left 10/14/15    TKA    UPPER GASTROINTESTINAL ENDOSCOPY N/A 12/11/2022    EGD CONTROL HEMORRHAGE performed by Drew Patel MD at [place] [id number] N/A 12/16/2022    EGD DIAGNOSTIC ONLY performed by Alesha Tellez MD at [facility] ENDOSCOPY       Family History   Problem Relation Age of Onset    Cancer Mother     Hypotension Mother     Cancer Father     Cancer Sister     Hypotension Sister     Cancer Maternal Grandmother     Cancer Maternal Grandfather     Cancer Paternal Grandmother     Cancer Paternal Grandfather     Hypotension Other     Cancer Other     Diabetes Other     Osteoarthritis Other        Social History     Socioeconomic History    Marital status:      Spouse name: None    Number of children: None    Years of education: None    Highest education level: None   Tobacco Use    Smoking status: Former    Smokeless tobacco: Never   Substance and Sexual Activity    Alcohol use: No    Drug use: Never           REVIEW OF SYSTEMS:   CONSTITUTIONAL: negative for fevers, chills   EYES: positive for diplopia   HEENT: positive for difficulty swallowing. negative for hearing loss, tinnitus, sinus pressure, nasal congestion  RESPIRATORY: Negative for SOB, cough  CARDIOVASCULAR: negative for chest pain, palpitations  GASTROINTESTINAL: positive for abdominal pain.  negative for hematemesis, hematochezia, nausea, vomiting, diarrhea, abdominal pain  GENITOURINARY: negative for frequency, dysuria  HEMATOLOGIC/LYMPHATIC: negative for easy bruising, bleeding and lymphadenopathy  ENDOCRINE: negative for diabetic symptoms including polyuria, polydipsia  MUSCULOSKELETAL: negative for pain, joint swelling, decreased range of motion  NEUROLOGICAL: positive for difficulty speaking, negative for headaches, unilateral weakness    Physical Examination:  Vitals: Patient Vitals for the past 24 hrs:   BP Temp Temp src Pulse Resp SpO2 Weight   12/20/22 0636 129/78 97.9 °F (36.6 °C) Oral 72 16 95 % --   12/20/22 0500 -- -- -- -- -- -- (!) 343 lb 4.8 oz (155.7 kg)   12/20/22 0252 127/75 97.8 °F (36.6 °C) Oral 66 16 93 % --   12/19/22 2251 136/80 97.7 °F (36.5 °C) Oral 75 16 92 % --   12/19/22 1801 (!) 147/86 98.4 °F (36.9 °C) Oral 79 17 92 % --   12/19/22 1553 (!) 147/93 97.9 °F (36.6 °C) Oral 70 18 90 % --   12/19/22 1048 137/84 -- -- 69 18 92 % --     Const: Alert. WDWN. No distress, obese  Eyes: Conjunctiva noninjected, no icterus noted; pupils equal, round, and reactive to light. HENT: Bruising around R eye., normocephalic; Oral mucosa moist  CV: Regular rate and rhythm, no murmur rub or gallop noted  Resp: Lungs clear to auscultation bilaterally, no rales wheezes or ronchi, no retractions. Respirations unlabored. GI: Soft, mild tenderness, nondistended. Normal bowel sounds. Skin: Normal temperature and turgor. No rashes or breakdown noted. Ext: positive for BL LE edema   MSK: No joint tenderness, erythema, warmth noted. AROM intact. Neuro: Alert, oriented, appropriate. Mild R sided facial droop. Sensation intact to light touch. Motor examination reveals normal strength in all four limbs diffusely.    Psych: Stable mood, normal judgement, normal affect     Lab Results   Component Value Date    WBC 3.3 (L) 12/20/2022    HGB 8.6 (L) 12/20/2022    HCT 25.8 (L) 12/20/2022    MCV 92.4 12/20/2022     12/20/2022     Lab Results   Component Value Date    INR 1.15 (H) 12/14/2022    INR 1.07 12/11/2022    INR 1.09 11/29/2022    PROTIME 14.6 (H) 12/14/2022    PROTIME 13.8 12/11/2022    PROTIME 14.1 11/29/2022     Lab Results   Component Value Date    CREATININE <0.5 (L) 12/18/2022    BUN 5 (L) 12/18/2022     12/18/2022    K 3.7 12/18/2022     12/18/2022    CO2 28 12/18/2022     Lab Results   Component not appear to represent a hemangioma. Differential diagnosis would include primary or secondary malignancy. Liver mass protocol MRI with and without contrast is recommended. 2.  No intra-abdominal hemorrhage. 3.  Mild cholelithiasis. 4.  3.5 cm right ovarian cyst, the CT appearance suggests a simple cyst indicating a benign finding. MRI BRAIN W WO CONTRAST   Final Result      1. Postsurgical changes from right temporal and transmastoid craniotomy with large amount of fat packing in the defect and extracranial soft tissues. Large subcutaneous fluid collection is present in the scalp surrounding and extending superiorly from    the fat packing. No diffusion restriction to indicate superimposed infection. 2.  Residual meningioma in the right cerebellopontine angle, prepontine cistern, and right Meckel's cave/cavernous sinus. The tumor extends partially encasing the basilar artery   3. Small area of acute to subacute ischemia in the right brachium pontis. Small enhancing lesions superior to the right tentorium is new from the prior study and may represent an area of subacute ischemia. 4.  Small amount of extra-axial hemorrhage along the right cerebellopontine angle. 5.  Stable 12 mm left frontal meningioma. CTA PULMONARY W CONTRAST   Final Result      Significantly limited study due to motion. 1. Significantly limited study due to streak artifact and suboptimal bolus. No evidence of a central embolus. 2. Multifocal airspace consolidation is present, suboptimally evaluated due to motion. This presumably reflects pneumonia. Follow-up chest CT is recommended to document resolution. 3. There is a large mass in the right hepatic lobe measuring 8.2 x 7.4 cm, demonstrating peripheral nodular enhancement. There is significant amount of artifact through this lesion. It is still favored to reflect a hemangioma. Recommend a multiphasic CT    of the abdomen for further assessment.  This could be performed on a nonurgent basis, if clinically appropriate. 4. Partially calcified nodule arising from the left adrenal gland, most likely benign and possibly reflecting old adrenal hemorrhage. XR CHEST PORTABLE   Final Result      Similar appearance of patchy airspace disease. FL MODIFIED BARIUM SWALLOW W VIDEO   Final Result      1. Laryngeal penetration, but no evidence of tracheal aspiration. XR CHEST PORTABLE   Final Result      Patchy left upper lobe airspace disease, atelectasis versus pneumonia. CT HEAD WO CONTRAST   Final Result      1. Interval postsurgical changes from resection of right cerebellopontine angle mass with associated right temporal mastoid resection and frontotemporal craniotomy and fat graft placement. 2.  Thin hyperdensity along the fat graft may represent small amount of postsurgical blood products. There is also a small focus of subarachnoid hemorrhage along the right temporal lobe. 3.  Mild pneumocephalus and bilateral subgaleal fluid is also likely postsurgical.               Assessment:  Giant Duodenal Ulcer  -Hgb 7.8, previously 12  -Transfuse pRBCs  -Protonix 40 mg BID  -IVF  -Embolization of GDA  -GI following  -IR following    BL PE, suspected  -May need anticoagulation when duodenal ulcer has been tx  - plan for EGD on AC    Meningioma  -s/p resection with TL and MCF approach  -Keppra 500 mg BID  -Bowel regiment: Senna, glycolax, and dulcolax  -Nicardipine-Roxicodone PRN  -Acycolvir 400 mg TID for 10 days  -Artifical tears q2H and eye patch  -Keep an eye on mastoid dressing  -SCDs for DVT prophylaxis   -PT/OT    HTN   -Coreg 12.5 mg BID  Losartan 100 mg daily    DM   -Lantus 15u qHS  -HDSSI    Hyperthyroidism   -Methimazole 10 mg daily    Liver Mass  -Incidental finding  -f/u imaging once acute problems have been tx    Impairments- Decreased functional mobility, Decreased ADLs     Recommendations:    ARU when medically stable.  Patient is still on a Heparin gtt. This patient has been staffed and discussed with Dr. Allen Avilez. Nita Croft MD, PGY-3      This patient has been seen, examined, and discussed with the resident. I was part of the key critical services provided to the patient. I agree with the residents documentation. This note may have been altered to reflect my own examination findings, impression, and recommendations.        KAYLEY SalterP.H  PM&R  12/20/2022  3:44 PM

## 2022-12-20 NOTE — PROGRESS NOTES
Progress Note       79 y.o female   S/p STAGE II: RESECTION OF RIGHT PETROCLIVAL MENINGIOMA. POD 22    S: Pt is doing very well today      O:   Facial nerve - RT HB 5 stable ( no RT eye irritation). Postauricular incision well closed without leaking   Abdominal incision - well closed  Head wrap replaced  today   Pseudomeningocele - stable   HB- 8.6 stable   GI assessment - stable, will begin oral anticoagulation on Wednesday 12/21/2022       A:   S/p STAGE II: RESECTION OF RIGHT PETROCLIVAL MENINGIOMA. POD 22  Hospital stay was complicated by PE and Upper GI bleeding. Currently, HB is stable around 8.6.   Pt keeps improving       P:   - BM - follow to rule out melena/ bleeding   - Heparine IV - will begin Oral anticoagulation on next Wednesday 12/21/2022   - Compressive mastoid dressing in place  -Monitor Hb while being on anticoagulation   -Eye care  -Compressive mastoid dressing in place  -Will continue to follow

## 2022-12-20 NOTE — PROGRESS NOTES
NEUROSURGERY POST-OP PROGRESS NOTE    Patient Name: Calvin Palacios YOB: 1955   Sex: Female Age: 79 yrs     Medical Record Number: [id number] Acct Number: [de-identified]   Room Number: 1206/0376-49 Hospital Day: Hospital Day: 21     Interval History:  Procedure(s) (LRB): POD 22 s/p  STAGE II: RESECTION OF RIGHT PETROCLIVAL MENINGIOMA (N/A) with Dr. Ishan Hess     Subjective: Patient sitting up in chair upon entering the room eating her lunch    Drop in hemoglobin 12 -> 7.8 concern for active GI bleed  Stat CT abdomen  GI consult  Embolization of the GDA on 12/12/2022  CTPA yesterday showed large PE with no obvious heart strain. Patient nuero exam unchanged. VSS. No signs of respiratory distress, and patient is able to maintain O2 sat on room air. Pulmonology and GI discussed the PE situation and they are in agreement that the risk;benefit ratio favors heparin anticoagulation now, and transition to an oral agent next week. Hgb remains >8.0. No PRBC ordered. Neurologically stable on exam with HB 5 right facial function. Large pseudomeningocele. Objective:    VITAL SIGNS   /68   Pulse 75   Temp 98 °F (36.7 °C) (Oral)   Resp 16   Ht 5' 5\" (1.651 m)   Wt (!) 343 lb 4.8 oz (155.7 kg)   SpO2 90%   BMI 57.13 kg/m²    Height Height: 5' 5\" (165.1 cm)   Weight Weight: (!) 343 lb 4.8 oz (155.7 kg)          Allergies Allergies   Allergen Reactions    Keflex [Cephalexin] Itching and Rash    Codeine Nausea And Vomiting    Tomato Rash      NPO Status ADULT DIET;  Dysphagia - Soft and Bite Sized   Isolation No active isolations     LABS   Basic Metabolic Profile Recent Labs     12/18/22  0600         CO2 28   BUN 5*   CREATININE <0.5*   GLUCOSE 117*        Complete Blood Count Recent Labs     12/18/22  0600 12/19/22  0610 12/20/22  0635   WBC 3.6* 3.5* 3.3*   RBC 2.60* 2.64* 2.80*        Coagulation Studies No results for input(s): PTT, INR in the last 72 hours. Invalid input(s): PLATELETS, PROA, PT, PTTA         MEDICATIONS   Inpatient Medications     potassium chloride, 20 mEq, Oral, BID WC    pantoprazole, 40 mg, IntraVENous, BID    levETIRAcetam, 500 mg, IntraVENous, Q12H    cetirizine, 10 mg, Oral, Daily    [Held by provider] hydroCHLOROthiazide, 25 mg, Oral, Daily    insulin lispro, 0-16 Units, SubCUTAneous, TID WC    insulin lispro, 0-4 Units, SubCUTAneous, Nightly    methIMAzole, 10 mg, Oral, Daily    insulin glargine, 15 Units, SubCUTAneous, Nightly    melatonin, 5 mg, Oral, Nightly    sodium chloride flush, 5-40 mL, IntraVENous, 2 times per day    [Held by provider] carvedilol, 12.5 mg, Oral, BID WC    [Held by provider] losartan, 100 mg, Oral, Daily    sodium chloride flush, 5-40 mL, IntraVENous, 2 times per day    latanoprost, 1 drop, Both Eyes, Nightly   Infusions    heparin (PORCINE) Infusion 11.985 Units/kg/hr (12/20/22 0728)    sodium chloride      sodium chloride 25 mL/hr (12/18/22 0937)    dextrose        Antibiotics   Recent Abx Admin        No antibiotic orders with administrations found. Imaging:   Abdominal CT  Impression       1.  9.5 cm mass in the right hepatic lobe which does not appear to represent a hemangioma. Differential diagnosis would include primary or secondary malignancy. Liver mass protocol MRI with and without contrast is recommended. 2.  No intra-abdominal hemorrhage. 3.  Mild cholelithiasis. 4.  3.5 cm right ovarian cyst, the CT appearance suggests a simple cyst indicating a benign finding.      Neurologic Exam:  Mental status: awake and alert and oriented x4    Cranial Nerves:  II: Visual acuity not tested, denies new visual changes / diplopia  III, IV, VI: PERRL, 3 mm bilaterally, EOMI,Patient had rightward gaze deviation but was able to follow when prompted   V: Facial sensation intact bilaterally to touch  VII: R sided facial droop  VIII: Hearing intact bilaterally to spoken voice on L, no hearing on R  IX: Palate movement equal bilaterally  XI: Shoulder shrug equal bilaterally  XII: Tongue deviates to R    Musculoskeletal:   Gait: Not tested   Tone: normal  Sensory: intact to all extremities  Motor strength:    Right  Left    Right  Left    Deltoid  5 5  Hip Flex  5 5   Biceps  5 5  Knee Extensors  5 5   Triceps  5 5  Knee Flexors  5 5   Wrist Ext  5 5  Ankle Dorsiflex. 5 5   Wrist Flex  5 5  Ankle Plantarflex. 5 5   Handgrip  5 5  Ext Ernie Longus  5 5   Thumb Ext  5 5         Mastoid Incision: sutures c/d/I     Respiratory:  Unlabored respiratory pattern    Abdomen:   Soft, ND   Last BM 12/19    Cardiovascular:  Warm, well perfused    Assessment:   Patient is a 78 yo F s/p Procedure(s) (LRB):  STAGE II: RESECTION OF RIGHT PETROCLIVAL MENINGIOMA (N/A) per Dr. Ahn [place]    Plan:  Neurologic exam frequency: Q4H  Mobility: PT/OT   SLP continue  PICC line  DVT Prophylaxis: SCDs, on heparin gtt   Keppra BID   Bowel Regimen: Per GI  Pain control: Tylenol  Keep SBP < 160  Incisional Care: Mastoid compression headband in place check Q4H and let NS know if leaking  GI Bleed: GI Following, Protonix 40 mg BID, Embolization of CHU done, Hgb remains stable @ 8.6, Continue Daily CBC   PE: CTPA shows large PE with no obvious heart strain. Pulmonology and GI discussed the PE situation and they are in agreement that the risk;benefit ratio favors heparin anticoagulation now, and transition to an oral agent this week. Remains on heparin gtt with anti-Xa 0.7  -management of oral agent per medicine team (ok to transition when ok with GI)      Dispo: ok to dc to ARU once heparin drip transitioned to oral agent, likely tomorrow if ok with GI     Patient was discussed with Dr. Jey Beard who agrees with above assessment and plan. Electronically signed by:  LEE Lucio NP, 12/20/2022 1:50 PM   Neurosurgery Nurse Practitioner  [phone]

## 2022-12-20 NOTE — PLAN OF CARE
Problem: Chronic Conditions and Co-morbidities  Goal: Patient's chronic conditions and co-morbidity symptoms are monitored and maintained or improved  Outcome: Progressing     Problem: Discharge Planning  Goal: Discharge to home or other facility with appropriate resources  Outcome: Progressing     Problem: Pain  Goal: Verbalizes/displays adequate comfort level or baseline comfort level  12/20/2022 0810 by Cassandra Polo RN  Outcome: Progressing     Problem: Safety - Adult  Goal: Free from fall injury  12/20/2022 0810 by Cassandra Polo RN  Outcome: Progressing     Problem: Skin/Tissue Integrity  Goal: Absence of new skin breakdown  Description: 1. Monitor for areas of redness and/or skin breakdown  2. Assess vascular access sites hourly  3. Every 4-6 hours minimum:  Change oxygen saturation probe site  4. Every 4-6 hours:  If on nasal continuous positive airway pressure, respiratory therapy assess nares and determine need for appliance change or resting period.   12/20/2022 0810 by Cassandra Polo RN  Outcome: Progressing

## 2022-12-20 NOTE — PLAN OF CARE
Problem: Pain  Goal: Verbalizes/displays adequate comfort level or baseline comfort level  Outcome: Progressing   Denies pain overnight. PRN medications in place. Problem: Safety - Adult  Goal: Free from fall injury  Outcome: Progressing  Calls out appropriately. Bed locked in lowest position. Bed alarm on. Call light/belongings within reach. Problem: Skin/Tissue Integrity  Goal: Absence of new skin breakdown  Outcome: Progressing  Specialty mattress in place. Turns well on own. No signs of new skin breakdown.

## 2022-12-20 NOTE — PROGRESS NOTES
Speech Language Pathology  Facility/Department: [facility]  Daily Speech/Cognitive Treatment Note    NAME: Marilou De Leon  : 1955  MRN: [id number]    Patient Diagnosis(es):   Patient Active Problem List    Diagnosis Date Noted    Multiple subsegmental pulmonary emboli without acute cor pulmonale ([facility] 2022    S/P craniotomy 2022    [facility] 2022    Cerebellopontine angle meningioma ([facility] 2022    Osteoarthritis of right hip 2021    Morbid obesity with BMI of 50.0-59.9, adult ([facility] 2021    Primary osteoarthritis of left knee 2015    Asthma 2015    Gastroesophageal reflux disease 2015    Adiposity 2015    S/P total knee arthroplasty 2015    Hypertension 2014     Allergies: Allergies   Allergen Reactions    Keflex [Cephalexin] Itching and Rash    Codeine Nausea And Vomiting    Tomato Rash       Prior MRI Brain: (2022)  Large right cerebellopontine angle mass as detailed, most likely representing a meningioma. See above for details. Additional smaller left anterior parafalcine extra-axial lesion, compatible with additional small meningioma. Recent CT Head: (2022)  1. Interval postsurgical changes from resection of right cerebellopontine angle mass with associated right temporal mastoid resection and frontotemporal craniotomy and fat graft placement. 2.  Thin hyperdensity along the fat graft may represent small amount of postsurgical blood products. There is also a small focus of subarachnoid hemorrhage along the right temporal lobe. 3.  Mild pneumocephalus and bilateral subgaleal fluid is also likely postsurgical     Chart reviewed. Pain: denies    Initial Assessment 22  Mild-moderate dysarthria characterized by blended word boundaries and imprecise articulation (~ 70% intelligbile short phrases in quiet environment). Benefits from slow pace and over-articulation strategies.  Vocal quality clear and strong. Patient able to follow simple directions and answer basic questions. No word finding difficulties noted at conversation level. Further assessment limited by patient fatigue. Recommend ongoing speech therapy to further assess and address. Medical diagnosis: Acoustic neuroma [facility]) [D33.3]  Balance problem [R26.89]  Meningioma ([facility] [D32.9]  Dizziness [R42]  Cerebellopontine angle meningioma ([facility] [D32.0]  Treatment diagnosis: dysarthria    Treatment:  Pt seen to address the following goals:  Goal 1: Patient will recall and implement strategies to improve speech clarity to 90% with min cues  12/5:  speech intelligibility is ~90%, however decreased in articulatory precision. pt educated to strategies for improved intelligibility and articulatory precision through verbal instruction and demonstration. Pt used strategies in structured sentence level tasks with min cues. In conversation pt required min-mod cues. Pt stated comprehension  Cont goal  12/6: Pt speech intelligibility was functional but articulatory precision of sounds (especially /s/, /sh/, and /b/) was mildly distorted with reduced awareness but able to correct given min-mod cues during rote tasks and conversation. Pt did self correct speech error in conversation x1 at end of session. Pt educated to exaggerate and open/move articulators well with speaking. Pt utilized even a slower rate following initial review. Cont goal  12/7: Pt able to recall all speech strategies given min cues. Pt demonstrated ~90% intelligibility in basic conversational tasks. Pt with increased use of over-articulation and breaking up 3-4 words at a time for clarity. Cont. 12/8: pt with good recall of all strategies for improved intelligibility of speech. Pt demonstrated use in structured sentence level tasks with min  cues, intelligibility 90%. In conversation, pt required mod cues to using pausing and over articulation.   Cont goal  12/9:  pt states, \"I am talking slower and enunciating. \"  Reviewed all strategies, pt stated comprehension. Pt utilized strategies in structured sentences with min cues and in conversation with min-mod cues. Intelligibility is ~90% with use of strategies  Goal met, cont to ensure consistency  12/14: Improved carry over of speech strategies in conversation. ~95% intelligible. X1 repetition required for listener comprehension. Cont. 12/15: Pt with increased fatigue expressed this AM, however able to express x2/3 speech strategies to use for improved clarity. 95% intelligible across all contexts. Goal met. Cont for carry over. 12/19: pt reports she thinks her speech has improved. Intelligibility of speech is 95% during structured tasks and conversation. Pt stated and used strategies for articulatory precision, with min cues in conversation. Goal met, cont to ensure consistency  12/20: Pt recalled all speech strategies for 100% intelligibility across entire session. Pt demonstrated appropriate over-articulation and increased volume to assist. Goal met. Continue x1. Goal 2: Patient will participate in ongoing assessment of cognitive-communication skills. 12/5: pt oriented, able to state reason for hospitalization. Pt and family report no changes in cognition. Will cont to assess  Cont goal  12/6:  Pt oriented x3. 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 Pt able to recall 2/3 words following a 5 minute delay (3/3 with choices). Pt able to complete all basic math for money and time concepts including leaving a tip with no errors and minimal to no delays. Pt reported feeling tired so session ended earlier. Cont goal  12/7: SLP provided pt with prescription label and asked pt questions related. Pt answered with 100% accuracy and no cues required. Pt expressed manages own finances/medications/ADLs at baseline with independence.  Pt mentioned she does feel close to cognitive baseline. Cont. 12/8: pt provided solutions to everyday problems, demonstrating appropriate flexibility of thoughts. Pt completed money/time concept tasks with 90% accuracy. Pt c/o not feeling well, therefore did not proceed with additional tasks  Cont goal  12/9: pt with good recall and understanding of information that MD has provided. Pt demonstrating good insight into deficits. Cont goal  12/14: pt recalled ARU doctor coming to visit in regards to moving down to rehab unit, when medically appropriate. Adequate details provided and SLP cross checked with DO notes. Cont. 12/15: Pt answered medication management questions related to dosage etc with 100% accuracy. Slightly increased wait time required. Cont. 12/19: pt recalled information regarding medical status. Pt recalled 2/5 words with delay. Pt demonstrated reduced attention to cognitive tasks presented, focused on why she received items that came on breakfast tray. Cont goal  12/20: Pt recalled areas being targeted on in SLP sessions, ARU consult and being moved to a different room. Pt demonstrated appropriate thought organization when ordering afternoon and evening meals with dietary staff present. Cont. Education:  Pt educated to purpose of visit and tasks presented and recommendation for ongoing treatment. Pt stated comprehension. Plan:  Continue speech/language therapy to address above goals, 3-5 x/week x LOS  DC recommendations: ongoing treatment indicated  D/W nursing: [facility] and [facility]  Needs met prior to leaving room, call button in reach.   Treatment time: 15    Electronically signed by:  Eligio Nuñez M.A., [address] [place] [address]  Speech-Language Pathologist  Pg #: [phone]    If patient is discharged prior to next treatment, this note will serve as the discharge summary

## 2022-12-21 LAB
ANTI-XA UNFRAC HEPARIN: 0.5 IU/ML (ref 0.3–0.7)
ANTI-XA UNFRAC HEPARIN: 0.65 IU/ML (ref 0.3–0.7)
BASOPHILS ABSOLUTE: 0 K/UL (ref 0–0.2)
BASOPHILS RELATIVE PERCENT: 0.5 %
EOSINOPHILS ABSOLUTE: 0.4 K/UL (ref 0–0.6)
EOSINOPHILS RELATIVE PERCENT: 11.9 %
GLUCOSE BLD-MCNC: 123 MG/DL (ref 70–99)
GLUCOSE BLD-MCNC: 134 MG/DL (ref 70–99)
GLUCOSE BLD-MCNC: 134 MG/DL (ref 70–99)
GLUCOSE BLD-MCNC: 161 MG/DL (ref 70–99)
HCT VFR BLD CALC: 24.9 % (ref 36–48)
HEMOGLOBIN: 8.4 G/DL (ref 12–16)
LYMPHOCYTES ABSOLUTE: 1.1 K/UL (ref 1–5.1)
LYMPHOCYTES RELATIVE PERCENT: 28.7 %
MCH RBC QN AUTO: 31.2 PG (ref 26–34)
MCHC RBC AUTO-ENTMCNC: 33.7 G/DL (ref 31–36)
MCV RBC AUTO: 92.6 FL (ref 80–100)
MONOCYTES ABSOLUTE: 0.4 K/UL (ref 0–1.3)
MONOCYTES RELATIVE PERCENT: 10.9 %
NEUTROPHILS ABSOLUTE: 1.8 K/UL (ref 1.7–7.7)
NEUTROPHILS RELATIVE PERCENT: 48 %
PDW BLD-RTO: 18.2 % (ref 12.4–15.4)
PERFORMED ON: ABNORMAL
PLATELET # BLD: 197 K/UL (ref 135–450)
PMV BLD AUTO: 8 FL (ref 5–10.5)
RBC # BLD: 2.69 M/UL (ref 4–5.2)
WBC # BLD: 3.7 K/UL (ref 4–11)

## 2022-12-21 PROCEDURE — 6370000000 HC RX 637 (ALT 250 FOR IP)

## 2022-12-21 PROCEDURE — 36592 COLLECT BLOOD FROM PICC: CPT

## 2022-12-21 PROCEDURE — 92507 TX SP LANG VOICE COMM INDIV: CPT

## 2022-12-21 PROCEDURE — 99024 POSTOP FOLLOW-UP VISIT: CPT | Performed by: NURSE PRACTITIONER

## 2022-12-21 PROCEDURE — 2580000003 HC RX 258

## 2022-12-21 PROCEDURE — 97535 SELF CARE MNGMENT TRAINING: CPT

## 2022-12-21 PROCEDURE — C9113 INJ PANTOPRAZOLE SODIUM, VIA: HCPCS | Performed by: NURSE PRACTITIONER

## 2022-12-21 PROCEDURE — 2060000000 HC ICU INTERMEDIATE R&B

## 2022-12-21 PROCEDURE — 6370000000 HC RX 637 (ALT 250 FOR IP): Performed by: INTERNAL MEDICINE

## 2022-12-21 PROCEDURE — APPNB30 APP NON BILLABLE TIME 0-30 MINS: Performed by: NURSE PRACTITIONER

## 2022-12-21 PROCEDURE — 6360000002 HC RX W HCPCS

## 2022-12-21 PROCEDURE — 85025 COMPLETE CBC W/AUTO DIFF WBC: CPT

## 2022-12-21 PROCEDURE — 97530 THERAPEUTIC ACTIVITIES: CPT

## 2022-12-21 PROCEDURE — 6370000000 HC RX 637 (ALT 250 FOR IP): Performed by: NURSE PRACTITIONER

## 2022-12-21 PROCEDURE — 97116 GAIT TRAINING THERAPY: CPT

## 2022-12-21 PROCEDURE — 85520 HEPARIN ASSAY: CPT

## 2022-12-21 PROCEDURE — 6360000002 HC RX W HCPCS: Performed by: NURSE PRACTITIONER

## 2022-12-21 RX ADMIN — PANTOPRAZOLE SODIUM 40 MG: 40 INJECTION, POWDER, LYOPHILIZED, FOR SOLUTION INTRAVENOUS at 09:53

## 2022-12-21 RX ADMIN — SODIUM CHLORIDE, PRESERVATIVE FREE 10 ML: 5 INJECTION INTRAVENOUS at 09:57

## 2022-12-21 RX ADMIN — POTASSIUM CHLORIDE 20 MEQ: 20 TABLET, EXTENDED RELEASE ORAL at 09:59

## 2022-12-21 RX ADMIN — INSULIN GLARGINE 15 UNITS: 100 INJECTION, SOLUTION SUBCUTANEOUS at 21:03

## 2022-12-21 RX ADMIN — PANTOPRAZOLE SODIUM 40 MG: 40 INJECTION, POWDER, LYOPHILIZED, FOR SOLUTION INTRAVENOUS at 20:58

## 2022-12-21 RX ADMIN — METHIMAZOLE 10 MG: 5 TABLET ORAL at 09:57

## 2022-12-21 RX ADMIN — CETIRIZINE HYDROCHLORIDE 10 MG: 10 TABLET, FILM COATED ORAL at 09:57

## 2022-12-21 RX ADMIN — SODIUM CHLORIDE, PRESERVATIVE FREE 10 ML: 5 INJECTION INTRAVENOUS at 20:58

## 2022-12-21 RX ADMIN — RIVAROXABAN 15 MG: 15 TABLET, FILM COATED ORAL at 20:58

## 2022-12-21 RX ADMIN — LEVETIRACETAM 500 MG: 100 INJECTION, SOLUTION INTRAVENOUS at 09:57

## 2022-12-21 RX ADMIN — SODIUM CHLORIDE, PRESERVATIVE FREE 10 ML: 5 INJECTION INTRAVENOUS at 20:59

## 2022-12-21 RX ADMIN — LATANOPROST 1 DROP: 50 SOLUTION OPHTHALMIC at 21:06

## 2022-12-21 RX ADMIN — SODIUM CHLORIDE, PRESERVATIVE FREE 10 ML: 5 INJECTION INTRAVENOUS at 09:58

## 2022-12-21 RX ADMIN — Medication 5 MG: at 20:58

## 2022-12-21 RX ADMIN — RIVAROXABAN 15 MG: 15 TABLET, FILM COATED ORAL at 12:53

## 2022-12-21 RX ADMIN — POTASSIUM CHLORIDE 20 MEQ: 20 TABLET, EXTENDED RELEASE ORAL at 17:39

## 2022-12-21 RX ADMIN — LEVETIRACETAM 500 MG: 100 INJECTION, SOLUTION INTRAVENOUS at 22:31

## 2022-12-21 NOTE — CARE COORDINATION
CM following: CM spoke with Fanta Rios at [address] [place] [address] who notes the pt has been taken off of the heparin drip and transitioned to xarelto. [facility] ARU can accept pt tomorrow after being on xarelto for 24 hours with no challenges. [facility] ARU will plan to admit pt tomorrow.   Electronically signed by BONNIE Elaine on 12/21/2022 at 2:00 PM  [phone]

## 2022-12-21 NOTE — PROGRESS NOTES
Speech Language Pathology  Facility/Department: [facility]  Daily Speech/Cognitive Treatment Note    NAME: Jose Padron  : 1955  MRN: [id number]    Patient Diagnosis(es):   Patient Active Problem List    Diagnosis Date Noted    Multiple subsegmental pulmonary emboli without acute cor pulmonale ([facility] 2022    S/P craniotomy 2022    Acoustic neuroma ([facility] 2022    Cerebellopontine angle meningioma ([facility] 2022    Osteoarthritis of right hip 2021    Morbid obesity with BMI of 50.0-59.9, adult ([facility] 2021    Primary osteoarthritis of left knee 2015    Asthma 2015    Gastroesophageal reflux disease 2015    Adiposity 2015    S/P total knee arthroplasty 2015    Hypertension 2014     Allergies: Allergies   Allergen Reactions    Keflex [Cephalexin] Itching and Rash    Codeine Nausea And Vomiting    Tomato Rash       Prior MRI Brain: (2022)  Large right cerebellopontine angle mass as detailed, most likely representing a meningioma. See above for details. Additional smaller left anterior parafalcine extra-axial lesion, compatible with additional small meningioma. Recent CT Head: (2022)  1. Interval postsurgical changes from resection of right cerebellopontine angle mass with associated right temporal mastoid resection and frontotemporal craniotomy and fat graft placement. 2.  Thin hyperdensity along the fat graft may represent small amount of postsurgical blood products. There is also a small focus of subarachnoid hemorrhage along the right temporal lobe. 3.  Mild pneumocephalus and bilateral subgaleal fluid is also likely postsurgical     Chart reviewed. Pain: denies    Initial Assessment 22  Mild-moderate dysarthria characterized by blended word boundaries and imprecise articulation (~ 70% intelligbile short phrases in quiet environment).  Benefits from slow pace and over-articulation strategies. Vocal quality clear and strong. Patient able to follow simple directions and answer basic questions. No word finding difficulties noted at conversation level. Further assessment limited by patient fatigue. Recommend ongoing speech therapy to further assess and address. Medical diagnosis: Acoustic neuroma [facility]) [D33.3]  Balance problem [R26.89]  Meningioma ([facility] [D32.9]  Dizziness [R42]  Cerebellopontine angle meningioma ([facility] [D32.0]  Treatment diagnosis: dysarthria    Treatment:  Pt seen to address the following goals:  Goal 1: Patient will recall and implement strategies to improve speech clarity to 90% with min cues  12/5:  speech intelligibility is ~90%, however decreased in articulatory precision. pt educated to strategies for improved intelligibility and articulatory precision through verbal instruction and demonstration. Pt used strategies in structured sentence level tasks with min cues. In conversation pt required min-mod cues. Pt stated comprehension  Cont goal  12/6: Pt speech intelligibility was functional but articulatory precision of sounds (especially /s/, /sh/, and /b/) was mildly distorted with reduced awareness but able to correct given min-mod cues during rote tasks and conversation. Pt did self correct speech error in conversation x1 at end of session. Pt educated to exaggerate and open/move articulators well with speaking. Pt utilized even a slower rate following initial review. Cont goal  12/7: Pt able to recall all speech strategies given min cues. Pt demonstrated ~90% intelligibility in basic conversational tasks. Pt with increased use of over-articulation and breaking up 3-4 words at a time for clarity. Cont. 12/8: pt with good recall of all strategies for improved intelligibility of speech. Pt demonstrated use in structured sentence level tasks with min  cues, intelligibility 90%. In conversation, pt required mod cues to using pausing and over articulation.   Cont goal  12/9:  pt states, \"I am talking slower and enunciating. \"  Reviewed all strategies, pt stated comprehension. Pt utilized strategies in structured sentences with min cues and in conversation with min-mod cues. Intelligibility is ~90% with use of strategies  Goal met, cont to ensure consistency  12/14: Improved carry over of speech strategies in conversation. ~95% intelligible. X1 repetition required for listener comprehension. Cont. 12/15: Pt with increased fatigue expressed this AM, however able to express x2/3 speech strategies to use for improved clarity. 95% intelligible across all contexts. Goal met. Cont for carry over. 12/19: pt reports she thinks her speech has improved. Intelligibility of speech is 95% during structured tasks and conversation. Pt stated and used strategies for articulatory precision, with min cues in conversation. Goal met, cont to ensure consistency  12/20: Pt recalled all speech strategies for 100% intelligibility across entire session. Pt demonstrated appropriate over-articulation and increased volume to assist. Goal met. Continue x1.   12/21: Patient reports she was able to hold an audio phone call this am with a friend (who didn't know she was in the hospital), with the friend able to understand everything she said. Patient continues to demonstrate good self-monitoring and application of speech clarity strategies. 100% clarity at the conversational level in quiet environment, with 95% intelligibility for challenging phonetic context (tongue twisters) when presented with moderate background noise. Demonstrated excellent volume modulation. Goal met. Goal 2: Patient will participate in ongoing assessment of cognitive-communication skills. 12/5: pt oriented, able to state reason for hospitalization. Pt and family report no changes in cognition. Will cont to assess  Cont goal  12/6:  Pt oriented x3.  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 Pt able to recall 2/3 words following a 5 minute delay (3/3 with choices). Pt able to complete all basic math for money and time concepts including leaving a tip with no errors and minimal to no delays. Pt reported feeling tired so session ended earlier. Cont goal  12/7: SLP provided pt with prescription label and asked pt questions related. Pt answered with 100% accuracy and no cues required. Pt expressed manages own finances/medications/ADLs at baseline with independence. Pt mentioned she does feel close to cognitive baseline. Cont. 12/8: pt provided solutions to everyday problems, demonstrating appropriate flexibility of thoughts. Pt completed money/time concept tasks with 90% accuracy. Pt c/o not feeling well, therefore did not proceed with additional tasks  Cont goal  12/9: pt with good recall and understanding of information that MD has provided. Pt demonstrating good insight into deficits. Cont goal  12/14: pt recalled ARU doctor coming to visit in regards to moving down to rehab unit, when medically appropriate. Adequate details provided and SLP cross checked with DO notes. Cont. 12/15: Pt answered medication management questions related to dosage etc with 100% accuracy. Slightly increased wait time required. Cont. 12/19: pt recalled information regarding medical status. Pt recalled 2/5 words with delay. Pt demonstrated reduced attention to cognitive tasks presented, focused on why she received items that came on breakfast tray. Cont goal  12/20: Pt recalled areas being targeted on in SLP sessions, ARU consult and being moved to a different room. Pt demonstrated appropriate thought organization when ordering afternoon and evening meals with dietary staff present. Cont. 12/21: Pt declined cognitive therapy this session, wished to focus on speech/articulation this therapy session.  However, recommend further assessment of cognition; patient expressed some stress related to concerns re: going back to work as an . Cont goal    12/21 dysphagia therapy attempt note:  Patient just finished breakfast, endorsed fullness, and declined PO trials. Endorsed tolerating meal well. Will re-attempt dysphagia therapy follow-up at later time as appropriate. Education:  Provided ongoing education to patient re: purpose of visit, speech strategies, recommendations. Pt stated good comprehension. Plan:  Continue speech/language therapy to address above goals, 3-5 x/week x LOS  DC recommendations: ongoing treatment indicated  D/W nursing: Federico Huertas  Needs met prior to leaving room, call button in reach. Treatment time: 13    Electronically signed by:  Rahel Sultana, [address] [place] [address], .[id number]  Pg.  # [id number]    If patient is discharged prior to next treatment, this note will serve as the discharge summary

## 2022-12-21 NOTE — PROGRESS NOTES
[address] [place] [address]  GI Progress Note          Scott Davis is a 79 y.o. female patient. 1. Acoustic neuroma ([facility]    2. Balance problem    3. Meningioma ([facility]    4. Dizziness        Admit Date: 11/28/2022    Subjective:       Patient doing well today. Denies nausea, abdominal pain or vomiting. Tolerating diet. Has no concerns at this time. Hb this am 8.4 from 8.6 yesterday.       ROS:  Cardiovascular ROS: no chest pain or dyspnea on exertion  Gastrointestinal ROS: no abdominal pain, change in bowel habits, or black or bloody stools  Respiratory ROS: no cough, shortness of breath, or wheezing    Scheduled Meds:   potassium chloride  20 mEq Oral BID WC    pantoprazole  40 mg IntraVENous BID    levETIRAcetam  500 mg IntraVENous Q12H    cetirizine  10 mg Oral Daily    [Held by provider] hydroCHLOROthiazide  25 mg Oral Daily    insulin lispro  0-16 Units SubCUTAneous TID WC    insulin lispro  0-4 Units SubCUTAneous Nightly    methIMAzole  10 mg Oral Daily    insulin glargine  15 Units SubCUTAneous Nightly    melatonin  5 mg Oral Nightly    sodium chloride flush  5-40 mL IntraVENous 2 times per day    [Held by provider] carvedilol  12.5 mg Oral BID WC    [Held by provider] losartan  100 mg Oral Daily    sodium chloride flush  5-40 mL IntraVENous 2 times per day    latanoprost  1 drop Both Eyes Nightly       Continuous Infusions:   heparin (PORCINE) Infusion 12 Units/kg/hr (12/21/22 1026)    sodium chloride      sodium chloride 25 mL/hr (12/18/22 0937)    dextrose         PRN Meds:  polyethylene glycol, bisacodyl, labetalol, sodium chloride flush, sodium chloride, sodium chloride, acetaminophen, ondansetron **OR** ondansetron, hydrALAZINE, albuterol, glucose, dextrose bolus **OR** dextrose bolus, glucagon (rDNA), dextrose, promethazine      Objective:       Patient Vitals for the past 24 hrs:   BP Temp Temp src Pulse Resp SpO2   12/21/22 1000 128/67 -- -- 64 -- --   12/21/22 0348 129/68 98 °F (36.7 °C) Oral 63 16 95 %   22 2318 (!) 151/98 98.1 °F (36.7 °C) Oral 70 16 92 %   22 1845 113/70 97.1 °F (36.2 °C) Temporal 76 17 93 %   22 1454 120/81 98 °F (36.7 °C) Oral 79 16 94 %       Exam:  VITALS:  /67   Pulse 64   Temp 98 °F (36.7 °C) (Oral)   Resp 16   Ht 5' 5\" (1.651 m)   Wt (!) 343 lb 4.8 oz (155.7 kg)   SpO2 95%   BMI 57.13 kg/m²   TEMPERATURE:  Current - Temp: 98 °F (36.7 °C); Max - Temp  Av.8 °F (36.6 °C)  Min: 97.1 °F (36.2 °C)  Max: 98.1 °F (36.7 °C)    NAD  General appearance: alert, appears stated age, cooperative and no distress  Head: Normocephalic, without obvious abnormality, atraumatic  Neck: supple, symmetrical, trachea midline and thyroid not enlarged, symmetric, no tenderness/mass/nodules  CVS:  RRR, Nl s1s2  Lungs CTA Bilaterally, normal effort  Abdomen: soft, non-tender; bowel sounds normal; no masses,  no organomegaly  AAOx3, No asterixis or encephalopathy  Extremities: No edema. Recent Labs     22  0610 22  0635 22  0247   WBC 3.5* 3.3* 3.7*   HGB 8.2* 8.6* 8.4*   HCT 24.3* 25.8* 24.9*   MCV 91.9 92.4 92.6    199 197     No results for input(s): NA, K, CL, CO2, PHOS, BUN, CREATININE, CA in the last 72 hours. No results for input(s): AST, ALT, ALB, BILIDIR, BILITOT, ALKPHOS in the last 72 hours. No results for input(s): LIPASE, AMYLASE in the last 72 hours. No results for input(s): PROT, INR in the last 72 hours. No results for input(s): PTT in the last 72 hours. No results for input(s): OCCULTBLD in the last 72 hours. Radiology review: none     Assessment:       Principal Problem:    Cerebellopontine angle meningioma (HCC)  Active Problems:    Acoustic neuroma (HCC)    S/P craniotomy    Multiple subsegmental pulmonary emboli without acute cor pulmonale (HCC)  Resolved Problems:    * No resolved hospital problems.  *      Upper GI bleed 2/2 PUD   -Large bleeding duodenal ulcer  -s/p failed endoscopic clip placement

## 2022-12-21 NOTE — PROGRESS NOTES
Progress Note  Physical Medicine and Rehabilitation    Patient: Montse Cross  [id number]  Date: 12/21/2022    Chief Complaint: Meningioma     Interval history:  Patient was seen at bed side this morning. She denies any specific complaints this AM. Still remains on heparin  gtt. She denied SOB, chest pain, abdominal pain. History of Present Illness/Hospital Course:  Patient is a morbidly obese (Body mass index is 50.82 kg/m².), 79 y.o. female  with c/o dizziness, HA and vertigo in the setting of large right petroclival mass which is consistent with meningioma. . She presented to the hospital for a scheduled two staged petroclival meningioma resection. 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 Prior Level of Function:  Independent for mobility, ADLs, and IADLs     Current Level of Function:  Min assist     Pertinent Social History:  Support: Lives alone  Home set-up: One level house with 2 steps to enter    Past Medical History:   Diagnosis Date    Arthritis     Asthma     mild    Brain tumor ([address]     Diabetes mellitus ([address]     borderline    High cholesterol     Hypertension     Imbalance     DUE TO TUMOR- USING ANKLE BRACE / WALKER PER PREOP H&P    Loss of hearing     bilateral reported    Vertigo        Past Surgical History:   Procedure Laterality Date    CRANIOTOMY N/A 11/29/2022    STAGE II: RESECTION OF RIGHT PETROCLIVAL MENINGIOMA performed by Dara Hess MD at [facility] [address]    IR EMBOLIZATION HEMORRHAGE  12/12/2022    IR EMBOLIZATION HEMORRHAGE 12/12/2022 [facility] PROCEDURES    MASTOIDECTOMY Right 11/28/2022    RIGHT TRANSMASTOID / ANTERIOR MIDDLE CRANIAL FOSSA , ABDOMINAL FAT GRAFT EXTERNAL AUDITORY CANAL CLOSURE performed by Kanwal Haq MD at [place] [address] Right 11/28/2022    STAGE 1, RIGHT TRANSLABYRINTHINE / RETROLABYRINTHINE CRANIOTOMY, PETROSECTOMY performed by Ricardo Martinez.  Ishan Hess MD at [facility] ARTHROPLASTY Right 11/8/2021    RIGHT TOTAL HIP ARTHROPLASTY POSTERIOR APPROACH - JOSÉ ANTONIO ADVANCED performed by Myrna Bob MD at [address] Right 07/14/2015    TOTAL KNEE ARTHROPLASTY Left 10/14/15    TKA    UPPER GASTROINTESTINAL ENDOSCOPY N/A 12/11/2022    EGD CONTROL HEMORRHAGE performed by Sherri Sainz MD at [facility] N/A 12/16/2022    EGD DIAGNOSTIC ONLY performed by Iggy Alvarado MD at [facility] ENDOSCOPY       Family History   Problem Relation Age of Onset    Cancer Mother     Hypotension Mother     Cancer Father     Cancer Sister     Hypotension Sister     Cancer Maternal Grandmother     Cancer Maternal Grandfather     Cancer Paternal Grandmother     Cancer Paternal Grandfather     Hypotension Other     Cancer Other     Diabetes Other     Osteoarthritis Other        Social History     Socioeconomic History    Marital status:      Spouse name: None    Number of children: None    Years of education: None    Highest education level: None   Tobacco Use    Smoking status: Former    Smokeless tobacco: Never   Substance and Sexual Activity    Alcohol use: No    Drug use: Never           REVIEW OF SYSTEMS:   CONSTITUTIONAL: negative for fevers, chills   EYES: positive for diplopia   HEENT: positive for difficulty swallowing. negative for hearing loss, tinnitus, sinus pressure, nasal congestion  RESPIRATORY: Negative for SOB, cough  CARDIOVASCULAR: negative for chest pain, palpitations  GASTROINTESTINAL: positive for abdominal pain.  negative for hematemesis, hematochezia, nausea, vomiting, diarrhea, abdominal pain  GENITOURINARY: negative for frequency, dysuria  HEMATOLOGIC/LYMPHATIC: negative for easy bruising, bleeding and lymphadenopathy  ENDOCRINE: negative for diabetic symptoms including polyuria, polydipsia  MUSCULOSKELETAL: negative for pain, joint swelling, decreased range of motion  NEUROLOGICAL: positive for difficulty speaking, negative for headaches, unilateral weakness    Physical Examination:  Vitals: Patient Vitals for the past 24 hrs:   BP Temp Temp src Pulse Resp SpO2   12/21/22 1000 128/67 -- -- 64 -- --   12/21/22 0348 129/68 98 °F (36.7 °C) Oral 63 16 95 %   12/20/22 2318 (!) 151/98 98.1 °F (36.7 °C) Oral 70 16 92 %   12/20/22 1845 113/70 97.1 °F (36.2 °C) Temporal 76 17 93 %   12/20/22 1454 120/81 98 °F (36.7 °C) Oral 79 16 94 %   12/20/22 1037 122/68 98 °F (36.7 °C) Oral 75 16 90 %     Const: Alert. WDWN. No distress, obese  Eyes: Conjunctiva noninjected, no icterus noted; pupils equal, round, and reactive to light. HENT: Bruising around R eye., normocephalic; Oral mucosa moist  CV: Regular rate and rhythm, no murmur rub or gallop noted  Resp: Lungs clear to auscultation bilaterally, no rales wheezes or ronchi, no retractions. Respirations unlabored. GI: Soft, mild tenderness, nondistended. Normal bowel sounds. Skin: Normal temperature and turgor. No rashes or breakdown noted. Ext: positive for BL LE edema   MSK: No joint tenderness, erythema, warmth noted. AROM intact. Neuro: Alert, oriented, appropriate. Mild R sided facial droop. Sensation intact to light touch. Motor examination reveals normal strength in all four limbs diffusely.    Psych: Stable mood, normal judgement, normal affect     Lab Results   Component Value Date    WBC 3.7 (L) 12/21/2022    HGB 8.4 (L) 12/21/2022    HCT 24.9 (L) 12/21/2022    MCV 92.6 12/21/2022     12/21/2022     Lab Results   Component Value Date    INR 1.15 (H) 12/14/2022    INR 1.07 12/11/2022    INR 1.09 11/29/2022    PROTIME 14.6 (H) 12/14/2022    PROTIME 13.8 12/11/2022    PROTIME 14.1 11/29/2022     Lab Results   Component Value Date    CREATININE <0.5 (L) 12/18/2022    BUN 5 (L) 12/18/2022     12/18/2022    K 3.7 12/18/2022     12/18/2022    CO2 28 12/18/2022     Lab Results   Component Value Date    ALT 11 12/04/2022    AST 12 (L) 12/04/2022    ALKPHOS 48 12/04/2022    BILITOT 0.5 12/04/2022     On my review, CXR displays. MRI ABDOMEN W WO CONTRAST MRCP   Final Result      Limited study due to multiple factors, as outlined above. Lesion of the right hepatic lobe is most consistent with a hemangioma. Follow-up multiphase CT is recommended in 6 months. CT CHEST PULMONARY EMBOLISM W CONTRAST   Final Result      Large burden of pulmonary emboli without obvious heart strain of the exam is severely limited and difficult to evaluate for other pathology      Findings called to patient's floor nurse on 12/13/2022 at 7:09 PM      IR EMBOLIZATION HEMORRHAGE   Final Result   Impression: Technically successful superior mesenteric, common hepatic and gastroduodenal angiograms with subsequent coiling of the gastroduodenal artery. VL Extremity Venous Bilateral   Final Result      CTA ABDOMEN PELVIS W WO CONTRAST   Final Result      1. Suspected bilateral pulmonary emboli but inadequate contrast phase to assess for the pulmonary embolus. Incomplete filling of the pulmonary arteries suggests pulmonary emboli inferiorly     2. No sign of any acute or active GI bleeding on CT angiograms studies   3. No sign of any aneurysm in the abdomen with normal widely patent vessels   4. Stable appearing partially calcified left adrenal adenoma. 5. Parapelvic renal cysts, largest on left side   6. 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 4.1 x 3.5 cm right ovarian cyst, benign in appearance   8. Mild cholelithiasis   9. No free fluid or free air with right abdominal wall subcutaneous inflammation or prior instrumentation         CT ABDOMEN PELVIS W WO CONTRAST Additional Contrast? Radiologist Recommendation   Final Result      1.  9.5 cm mass in the right hepatic lobe which does not appear to represent a hemangioma.  Differential diagnosis would include primary or secondary malignancy. Liver mass protocol MRI with and without contrast is recommended. 2.  No intra-abdominal hemorrhage. 3.  Mild cholelithiasis. 4.  3.5 cm right ovarian cyst, the CT appearance suggests a simple cyst indicating a benign finding. MRI BRAIN W WO CONTRAST   Final Result      1. Postsurgical changes from right temporal and transmastoid craniotomy with large amount of fat packing in the defect and extracranial soft tissues. Large subcutaneous fluid collection is present in the scalp surrounding and extending superiorly from    the fat packing. No diffusion restriction to indicate superimposed infection. 2.  Residual meningioma in the right cerebellopontine angle, prepontine cistern, and right Meckel's cave/cavernous sinus. The tumor extends partially encasing the basilar artery   3. Small area of acute to subacute ischemia in the right brachium pontis. Small enhancing lesions superior to the right tentorium is new from the prior study and may represent an area of subacute ischemia. 4.  Small amount of extra-axial hemorrhage along the right cerebellopontine angle. 5.  Stable 12 mm left frontal meningioma. CTA PULMONARY W CONTRAST   Final Result      Significantly limited study due to motion. 1. Significantly limited study due to streak artifact and suboptimal bolus. No evidence of a central embolus. 2. Multifocal airspace consolidation is present, suboptimally evaluated due to motion. This presumably reflects pneumonia. Follow-up chest CT is recommended to document resolution. 3. There is a large mass in the right hepatic lobe measuring 8.2 x 7.4 cm, demonstrating peripheral nodular enhancement. There is significant amount of artifact through this lesion. It is still favored to reflect a hemangioma. Recommend a multiphasic CT    of the abdomen for further assessment. This could be performed on a nonurgent basis, if clinically appropriate.    4. Partially calcified nodule arising from the left adrenal gland, most likely benign and possibly reflecting old adrenal hemorrhage. XR CHEST PORTABLE   Final Result      Similar appearance of patchy airspace disease. FL MODIFIED BARIUM SWALLOW W VIDEO   Final Result      1. Laryngeal penetration, but no evidence of tracheal aspiration. XR CHEST PORTABLE   Final Result      Patchy left upper lobe airspace disease, atelectasis versus pneumonia. CT HEAD WO CONTRAST   Final Result      1. Interval postsurgical changes from resection of right cerebellopontine angle mass with associated right temporal mastoid resection and frontotemporal craniotomy and fat graft placement. 2.  Thin hyperdensity along the fat graft may represent small amount of postsurgical blood products. There is also a small focus of subarachnoid hemorrhage along the right temporal lobe. 3.  Mild pneumocephalus and bilateral subgaleal fluid is also likely postsurgical.               Assessment:  Giant Duodenal Ulcer  -Hgb 7.8, previously 12  -Transfuse pRBCs  -Protonix 40 mg BID  -IVF  -Embolization of GDA  -GI following  -IR following    BL PE, suspected  -currently on heparin gtt  - transition to PO DOAC? Meningioma  -s/p resection with TL and MCF approach  -Keppra 500 mg BID  -Bowel regiment: Senna, glycolax, and dulcolax  -Nicardipine-Roxicodone PRN  -Acycolvir 400 mg TID for 10 days  -Artifical tears q2H and eye patch  -Keep an eye on mastoid dressing  -SCDs for DVT prophylaxis   -PT/OT    HTN   -Coreg 12.5 mg BID  Losartan 100 mg daily    DM   -Lantus 15u qHS  -HDSSI    Hyperthyroidism   -Methimazole 10 mg daily    Liver Mass  -Incidental finding  -f/u imaging once acute problems have been tx    Impairments- Decreased functional mobility, Decreased ADLs     Recommendations:    ARU when medically stable. Patient is still on a Heparin gtt. Plan to transition to [address]?       This patient has been staffed and discussed with Dr. Lee Blake. Sully Chavarria MD, PGY-3      This patient has been seen, examined, and discussed with the resident. I was part of the key critical services provided to the patient. I agree with the residents documentation. This note may have been altered to reflect my own examination findings, impression, and recommendations.        KAYLEY QuintanaP.H  PM&R  12/21/2022  11:38 AM

## 2022-12-21 NOTE — PROGRESS NOTES
Occupational Therapy  Occupational Therapy  Daily Treatment Note  Patient Name: Cyn Morocho  MRN: [id number]    Chart Reviewed: Yes     Restrictions/Precautions: Fall Risk (high fall risk) Other position/activity restrictions: Up with assistance, progressive ambulation, up in the chair for meals, HOB elevated to 30 degrees; up as tolerated     Additional Pertinent Hx: Pt is a 80 yo female that presents to the hospital for a procedure;STAGE 1, RIGHT TRANSLABYRINTHINE / RETROLABYRINTHINE CRANIOTOMY POSSIBLE PETROSECTOMY  RIGHT TRANSMASTOID / ANTERIOR MIDDLE CRANIAL FOSSA , ABDOMINAL FAT GRAFT EXTERNAL AUDITORY CANAL CLOSURE on 11/28. EGD 12/11:  multiple duodenal ulcers, clip placement x 3. Abd CT 12/11:  suspected Bilat PE.  LE dopplers 12/11: neg. Pt s/p  GDA embo on 12/12.  12/13 CTPA: (+) PE; PMH; Loss of hearing bilaterally, arthritis, brain tumor, diabetes. Diagnosis: Cerebellopontine angle meningioma  Treatment Diagnosis: impaired ADLs and functional mobility    Subjective: Pt supine in bed, eager to toilet and agreeable to therapy session. General Comment: Pt supine to sit SBA with HOB raised. Pt sit to stand CGA and ambulated with rw CGA ~12ft to bathroom toilet. Pt toilet transfer CGA, with extended time pt with bowel movement. Pt CGa for brief mgmt, Dependent to thread BLEs into brief and assist for rear hygiene care in stance. Pt CGA in stance at sink ~3 min to wash hands and complete oral care. Pt ambulated ~18ft to recliner with rw at [facility]. Pt stand to sit CGA. Call light in reach and chair alarm on.     Pain: chronic back pain not rated and declined intervention    Social/Functional History  Lives With: Alone  Type of Home: House  Home Layout: One level, Able to Live on Main level with bedroom/bathroom, Performs ADL's on one level  Home Access: Stairs to enter with rails  Entrance Stairs - Number of Steps: 2  Bathroom Shower/Tub: Walk-in shower  Bathroom Equipment: Shower chair, Grab bars in shower, Hand-held shower  Home Equipment: Cane, [employer], Sock aid (walker, unsure what kind)  Has the patient had two or more falls in the past year or any fall with injury in the past year?: No  ADL Assistance: [address] [place] [address] Avenue: Independent  Homemaking Responsibilities: Yes  Ambulation Assistance: Independent (with walker, unable to determine which kind)  Transfer Assistance: Independent  Active : Yes  Leisure & Hobbies: crafts  Additional Comments: pt is questionable to poor historian; pt reports no one can stay with her upon going home  Prior Function  ADL Assistance: Independent  Homemaking Assistance: Independent  Ambulation Assistance: Independent (with walker, unable to determine which kind)  Transfer Assistance: Independent  Additional Comments: pt is questionable to poor historian; pt reports no one can stay with her upon going home    Objective:    Cognition/Orientation:WFL    Bed mobility   Rolling:to right SBA  Supine to sit: SBA  Sit to Supine: N/A  Scooting:SBA    Functional Mobility   Sit to Stand:CGA  Stand to Sit:CGA  Bed to Chair Transfer: CGA ambulating  Commode Transfer:CGA  Other:     ADLs   Grooming: CGA in stance, oral care/wash hands   Bathing:N/A  UB dressing:N/A  LB dressing: Mod A brief  Toileting:Min A for hygiene care  Other:    UE Exercises N/A    Activity Tolerance:Good      Patient Education: d/c plan, role of OT, transfer training    Safety Devices in Place: chair alarm on and call light in reach    Assessment: Pt CGA for functional mobility and transfers this date. Pt required Mod A for LB dressing and Min A for toileting (hygiene care). Pt stood at sink CGA ~3 min with grooming. Pt would greatly benefit fro intensive inpt therapy to maximize functional independence and safety. Continue with POC. [facility] Recommendations[facility] a 16/24 on the AM-PAC ADL Inpatient form.  Current research shows that an AM-PAC score of 17 or less is typically not associated with a discharge to the patient's home setting. Based on the patient's AM-PAC score and their current ADL deficits, it is recommended that the patient have 5-7 sessions per week of Occupational Therapy at d/c to increase the patient's independence. At this time, this patient demonstrates complex nursing, medical, and rehabilitative needs, and would benefit from intensive rehabilitation services upon discharge from the Inpatient setting. This patient demonstrates the ability to participate in and benefit from an intensive therapy program with a coordinated interdisciplinary team approach to foster frequent, structured, and documented communication among disciplines, who will work together to establish, prioritize, and achieve treatment goals. Please see assessment section for further patient specific details. If patient discharges prior to next session this note will serve as a discharge summary. Please see below for the latest assessment towards goals. Equipment Needs:  defer to next setting    Therapy Time:   Individual Concurrent Group Co-treatment   Time In 1301         Time Out 1324         Minutes 23         Timed Code Treatment Minutes: 23 min   Total Treatment Minutes:   23 min    Goals:  Short Term Goals  Time Frame for Short Term Goals: by discharge  Short Term Goal 1: Pt will perform bed mobility with CGA to decrease caregiver burden- goal met 12/2. NEW GOAL: Pt will perform bed mobility with supervision - SBA goal not met 12/21  Short Term Goal 2: Pt will sit EOB 5 mins with no more than CGA-  goal met 12/8 on BSC. NEW GOAL: sit unsupported 5 mins with Supervision - goal not met 12/21  Short Term Goal 3: Pt will perform grooming activity with Min A and set-up- goal met 12/1. New Goal: Pt will perform grooming ax with set-up and supervision - goal not met 12/21  Short Term Goal 4: Pt will tolerate sit to stand transfer- goal met 12/1.  NEW GOAL: sit to stand transfer with Mod x2- goal met 12/2 with Min +CGA and CGA x2; NEW GOAL: stance x2 mins with CGA- goal met 12/15; New goal: pt will perform fx mobility to/from bathroom with CGA - goal met 12/21         Plan:      Times Per Week: 5-7   Times Per Day: Once a day    If patient is discharged prior to next treatment, this note will serve as the discharge summary.       Shun Poe, [address]

## 2022-12-21 NOTE — PLAN OF CARE
Problem: Safety - Adult  Goal: Free from fall injury  Note: Heparin drip stopped , started on xarelto, second dose due at 8 pm , , plan aru 24 hours post heparin drip discontinuation , head , dressing and right eye patch in place , patch string , taped to head band , .  Up in chair with walker , gait stared , able to void in bathroom

## 2022-12-21 NOTE — PROGRESS NOTES
Hospitalist Consult Progress Note      PCP: Stormy Day    Date of Admission: 11/28/2022    Chief Complaint: consult for cas-operative [facility] Course: \"The patient is a 79 y.o. female with hx of multistage neurosurgical resection of petroclival meningioma who we are asked to see/evaluate by Dr. Tello Israel for cas-operative mgt. patient has been recovering well postoperatively from her procedure when she most recently developed melanotic stools. Gastroenterology was consulted, patient underwent EGD and was found to have multiple duodenal ulcers with visible vessel and active bleeding. Underwent injection of epi, hemostatic clip placement and chemo spray. Due to concern for possible ongoing GI bleed she underwent CTA. No active GI bleed was detected, however CAT scan picked up likely pulmonary embolism in the lower lung fields. Patient received 2 units of blood transfusion\"    Subjective:    Patient reports feeling well but bored. No specific complaints.         Medications:  Reviewed    Infusion Medications    heparin (PORCINE) Infusion 14 Units/kg/hr (12/21/22 4727)    sodium chloride      sodium chloride 25 mL/hr (12/18/22 7582)    dextrose       Scheduled Medications    potassium chloride  20 mEq Oral BID WC    pantoprazole  40 mg IntraVENous BID    levETIRAcetam  500 mg IntraVENous Q12H    cetirizine  10 mg Oral Daily    [Held by provider] hydroCHLOROthiazide  25 mg Oral Daily    insulin lispro  0-16 Units SubCUTAneous TID WC    insulin lispro  0-4 Units SubCUTAneous Nightly    methIMAzole  10 mg Oral Daily    insulin glargine  15 Units SubCUTAneous Nightly    melatonin  5 mg Oral Nightly    sodium chloride flush  5-40 mL IntraVENous 2 times per day    [Held by provider] carvedilol  12.5 mg Oral BID WC    [Held by provider] losartan  100 mg Oral Daily    sodium chloride flush  5-40 mL IntraVENous 2 times per day    latanoprost  1 drop Both Eyes Nightly     PRN Meds: polyethylene glycol, bisacodyl, labetalol, sodium chloride flush, sodium chloride, sodium chloride, acetaminophen, ondansetron **OR** ondansetron, hydrALAZINE, albuterol, glucose, dextrose bolus **OR** dextrose bolus, glucagon (rDNA), dextrose, promethazine      Intake/Output Summary (Last 24 hours) at 12/21/2022 0923  Last data filed at 12/21/2022 0843  Gross per 24 hour   Intake 850 ml   Output 900 ml   Net -50 ml       Physical Exam Performed:    /68   Pulse 63   Temp 98 °F (36.7 °C) (Oral)   Resp 16   Ht 5' 5\" (1.651 m)   Wt (!) 343 lb 4.8 oz (155.7 kg)   SpO2 95%   BMI 57.13 kg/m²     General appearance: No apparent distress. Dressing on the right side of the face  Respiratory:  Normal respiratory effort. Clear to auscultation, bilaterally without Rales/Wheezes/Rhonchi. Cardiovascular: Regular rate and rhythm with normal S1/S2 without murmurs, rubs or gallops. Abdomen: Soft, non-tender, non-distended with normal bowel sounds. Musculoskeletal: No edema on both LE   Neurologic: no new deficit  Psychiatric: Alert and oriented       Labs:   Recent Labs     12/19/22  0610 12/20/22  0635 12/21/22  0247   WBC 3.5* 3.3* 3.7*   HGB 8.2* 8.6* 8.4*   HCT 24.3* 25.8* 24.9*    199 197     No results for input(s): NA, K, CL, CO2, BUN, CREATININE, CALCIUM, PHOS in the last 72 hours. Invalid input(s): MAGNES  No results for input(s): AST, ALT, BILIDIR, BILITOT, ALKPHOS in the last 72 hours. No results for input(s): INR in the last 72 hours. No results for input(s): Jessica Marlo in the last 72 hours.     Urinalysis:      Lab Results   Component Value Date/Time    NITRU Negative 10/12/2021 01:46 PM    WBCUA 89 10/12/2021 01:46 PM    BACTERIA 2+ 10/12/2021 01:46 PM    RBCUA 7 10/12/2021 01:46 PM    BLOODU Negative 10/12/2021 01:46 PM    SPECGRAV 1.023 10/12/2021 01:46 PM    GLUCOSEU 100 10/12/2021 01:46 PM       Radiology:  MRI ABDOMEN W WO CONTRAST MRCP   Final Result      Limited study due to multiple factors, as outlined above. Lesion of the right hepatic lobe is most consistent with a hemangioma. Follow-up multiphase CT is recommended in 6 months. CT CHEST PULMONARY EMBOLISM W CONTRAST   Final Result      Large burden of pulmonary emboli without obvious heart strain of the exam is severely limited and difficult to evaluate for other pathology      Findings called to patient's floor nurse on 12/13/2022 at 7:09 PM      IR EMBOLIZATION HEMORRHAGE   Final Result   Impression: Technically successful superior mesenteric, common hepatic and gastroduodenal angiograms with subsequent coiling of the gastroduodenal artery. VL Extremity Venous Bilateral   Final Result      CTA ABDOMEN PELVIS W WO CONTRAST   Final Result      1. Suspected bilateral pulmonary emboli but inadequate contrast phase to assess for the pulmonary embolus. Incomplete filling of the pulmonary arteries suggests pulmonary emboli inferiorly     2. No sign of any acute or active GI bleeding on CT angiograms studies   3. No sign of any aneurysm in the abdomen with normal widely patent vessels   4. Stable appearing partially calcified left adrenal adenoma. 5. Parapelvic renal cysts, largest on left side   6. 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 4.1 x 3.5 cm right ovarian cyst, benign in appearance   8. Mild cholelithiasis   9. No free fluid or free air with right abdominal wall subcutaneous inflammation or prior instrumentation         CT ABDOMEN PELVIS W WO CONTRAST Additional Contrast? Radiologist Recommendation   Final Result      1.  9.5 cm mass in the right hepatic lobe which does not appear to represent a hemangioma. Differential diagnosis would include primary or secondary malignancy. Liver mass protocol MRI with and without contrast is recommended. 2.  No intra-abdominal hemorrhage.    3.  Mild cholelithiasis. 4.  3.5 cm right ovarian cyst, the CT appearance suggests a simple cyst indicating a benign finding. MRI BRAIN W WO CONTRAST   Final Result      1. Postsurgical changes from right temporal and transmastoid craniotomy with large amount of fat packing in the defect and extracranial soft tissues. Large subcutaneous fluid collection is present in the scalp surrounding and extending superiorly from    the fat packing. No diffusion restriction to indicate superimposed infection. 2.  Residual meningioma in the right cerebellopontine angle, prepontine cistern, and right Meckel's cave/cavernous sinus. The tumor extends partially encasing the basilar artery   3. Small area of acute to subacute ischemia in the right brachium pontis. Small enhancing lesions superior to the right tentorium is new from the prior study and may represent an area of subacute ischemia. 4.  Small amount of extra-axial hemorrhage along the right cerebellopontine angle. 5.  Stable 12 mm left frontal meningioma. CTA PULMONARY W CONTRAST   Final Result      Significantly limited study due to motion. 1. Significantly limited study due to streak artifact and suboptimal bolus. No evidence of a central embolus. 2. Multifocal airspace consolidation is present, suboptimally evaluated due to motion. This presumably reflects pneumonia. Follow-up chest CT is recommended to document resolution. 3. There is a large mass in the right hepatic lobe measuring 8.2 x 7.4 cm, demonstrating peripheral nodular enhancement. There is significant amount of artifact through this lesion. It is still favored to reflect a hemangioma. Recommend a multiphasic CT    of the abdomen for further assessment. This could be performed on a nonurgent basis, if clinically appropriate. 4. Partially calcified nodule arising from the left adrenal gland, most likely benign and possibly reflecting old adrenal hemorrhage.       XR CHEST PORTABLE Final Result      Similar appearance of patchy airspace disease. FL MODIFIED BARIUM SWALLOW W VIDEO   Final Result      1. Laryngeal penetration, but no evidence of tracheal aspiration. XR CHEST PORTABLE   Final Result      Patchy left upper lobe airspace disease, atelectasis versus pneumonia. CT HEAD WO CONTRAST   Final Result      1. Interval postsurgical changes from resection of right cerebellopontine angle mass with associated right temporal mastoid resection and frontotemporal craniotomy and fat graft placement. 2.  Thin hyperdensity along the fat graft may represent small amount of postsurgical blood products. There is also a small focus of subarachnoid hemorrhage along the right temporal lobe. 3.  Mild pneumocephalus and bilateral subgaleal fluid is also likely postsurgical.             IP CONSULT TO CRITICAL CARE  IP CONSULT TO PHYSICAL MEDICINE REHAB  IP CONSULT TO GI  IP CONSULT TO HOSPITALIST  IP CONSULT TO NEUROCRITICAL CARE  IP CONSULT TO PULMONOLOGY    Assessment/Plan:    Active Hospital Problems    Diagnosis     Multiple subsegmental pulmonary emboli without acute cor pulmonale ([address] [I26.94]      Priority: Medium    S/P craniotomy [Z98.890]      Priority: Medium    Acoustic neuroma ([address] [D33.3]      Priority: Medium    Cerebellopontine angle meningioma ([address] [D32.0]      Priority: Medium   -ENT following. On keppra. S/p STAGE II: RESECTION OF RIGHT PETROCLIVAL MENINGIOMA. POD 22    Acute GI bleed due to bleeding duodenal ulcers  Acute blood loss anemia  Status post EGD with intervention;CT abdomen has no signs of any ongoing acute bleeding. S/p EGD 12/16/22. Continue PPI bid. No signs of bleeding  GI following    Bilateral pulmonary emboli:  Dopplers neg for DVT. On heparin gtt currently. GI is ok with transition to NOAC today. Will start on xarelto. Monitor for GIB.     Liver lesion  -MRI Abd/pelvis showed lesion of the right hepatic lobe most consistent with a hemangioma. Follow-up multiphase CT is recommended in 6 months  -follow with PCP     DM: cont lantus and SSI for now, once stable and plan for ARU would restart home meds. HTN: stable, cont home meds, monitor. Hypokalemia  Hypomagnesemia  supplement K and mag as needed      Obesity class III: BMI 58.7. follow with PCP     DVT Prophylaxis: heparin   Diet: ADULT DIET;  Dysphagia - Soft and Bite Sized  Code Status: Full Code  PT/OT Eval Status: ongoing    Dispo - per primary          Pauline Fraser MD

## 2022-12-21 NOTE — PROGRESS NOTES
Physical Therapy  Facility/Department: [place] [address]  Physical Therapy Treatment    Name: Martínez López  : 1955  MRN: [id number]  Date of Service: 2022    Discharge Recommendations:  Martínez López scored a 17/24 on the AM-PAC short mobility form. Current research shows that an AM-PAC score of 17 or less is typically not associated with a discharge to the patient's home setting. Based on the patient's AM-PAC score and their current functional mobility deficits, it is recommended that the patient have 5-7 sessions per week of Physical Therapy at d/c to increase the patient's independence. At this time, this patient demonstrates complex nursing, medical, and rehabilitative needs, and would benefit from intensive rehabilitation services upon discharge from the Inpatient setting. This patient demonstrates the ability to participate in and benefit from an intensive therapy program with a coordinated interdisciplinary team approach to foster frequent, structured, and documented communication among disciplines, who will work together to establish, prioritize, and achieve treatment goals. Please see assessment section for further patient specific details. If patient discharges prior to next session this note will serve as a discharge summary. Please see below for the latest assessment towards goals. Patient would benefit from continued therapy after discharge   PT Equipment Recommendations  Other: plan to continue to assess and likely defer recommendations to the next level of care      Patient Diagnosis(es): Diagnoses of Acoustic neuroma [facility]), Balance problem, Meningioma ([facility] [address] and Dizziness were pertinent to this visit. Past Medical History:  has a past medical history of Arthritis, Asthma, Brain tumor ([facility] [address] Diabetes mellitus ([facility] [address] High cholesterol, Hypertension, Imbalance, Loss of hearing, and Vertigo.   Past Surgical History:  has a past surgical history that includes Total knee arthroplasty (Right, 07/14/2015); Total knee arthroplasty (Left, 10/14/15); Total hip arthroplasty (Right, 11/8/2021); Neuroma surgery (Right, 11/28/2022); mastoidectomy (Right, 11/28/2022); craniotomy (N/A, 11/29/2022); Upper gastrointestinal endoscopy (N/A, 12/11/2022); IR EMBOLIZATION HEMORRHAGE (12/12/2022); and Upper gastrointestinal endoscopy (N/A, 12/16/2022). Assessment   Body Structures, Functions, Activity Limitations Requiring Skilled Therapeutic Intervention: Decreased functional mobility ; Decreased endurance;Decreased balance;Decreased strength;Decreased ADL status  Assessment: Patient demonstrates overall improving mobility, using RW and CGA to min A for standing mobility. Patient eager to d/c to rehab, likely tomorrow per chart review. Patient limited by fatigue and low endurance during mobility. Patient continues to present well below her baseline level of mobility and will continue to benefit from additional skilled PT intervention to facilitate safe mobility and to optimize (I) to promote return to prior level of function.   Treatment Diagnosis: impaired functional mobility  Therapy Prognosis: Good  Barriers to Learning: right eye blurred vision  Requires PT Follow-Up: Yes  Activity Tolerance  Activity Tolerance: Patient tolerated treatment well;Patient limited by fatigue     Plan   Physcial Therapy Plan  General Plan: 5-7 times per week  Current Treatment Recommendations: Strengthening, Functional mobility training, Transfer training, Gait training, Endurance training, Safety education & training, Therapeutic activities, Balance training, Neuromuscular re-education, Patient/Caregiver education & training, Equipment evaluation, education, & procurement, Home exercise program  Safety Devices  Type of Devices: Call light within reach, Chair alarm in place, Gait belt, Left in chair, Nurse notified  Restraints  Restraints Initially in Place: No Restrictions  Restrictions/Precautions  Restrictions/Precautions: Fall Risk (high fall risk)  Position Activity Restriction  Other position/activity restrictions: Up with assistance, progressive ambulation, up in the chair for meals, HOB elevated to 30 degrees; up as tolerated     Subjective   General  Chart Reviewed: Yes  Additional Pertinent Hx: Pt is a 78 yo female that presents to the hospital for a procedure;STAGE 1, RIGHT TRANSLABYRINTHINE / RETROLABYRINTHINE CRANIOTOMY POSSIBLE PETROSECTOMY  RIGHT TRANSMASTOID / ANTERIOR MIDDLE CRANIAL FOSSA , ABDOMINAL FAT GRAFT EXTERNAL AUDITORY CANAL CLOSURE on 11/28. EGD 12/11:  multiple duodenal ulcers, clip placement x 3. Abd CT 12/11:  suspected Bilat PE.  LE dopplers 12/11: neg. Pt s/p  GDA embo on 12/12.  12/13 CTPA: (+) PE; PMH; Loss of hearing bilaterally, arthritis, brain tumor, diabetes. Family / Caregiver Present: No  Follows Commands: Within Functional Limits  General Comment  Comments: Patient seated in recliner upon arrival - agreeable to PT. Subjective  Subjective: Patient denies pain, reports right eye \"blurry\" - notable right side head edema present, right eye red, eye guard in place         Cognition   Orientation  Overall Orientation Status: Within Functional Limits  Orientation Level: Oriented X4  Cognition  Arousal/Alertness: Appropriate responses to stimuli  Following Commands:  Follows one step commands consistently  Attention Span: Appears intact  Memory: Decreased recall of recent events  Problem Solving: Assistance required to generate solutions;Assistance required to correct errors made;Assistance required to implement solutions  Insights: Decreased awareness of deficits  Initiation: Requires cues for some  Sequencing: Requires cues for some     Objective      Transfers  Sit to Stand: Contact guard assistance (to RW)  Stand to Sit: Contact guard assistance (from RW)  Stand Pivot Transfers: Contact guard assistance (with RW)  Ambulation  Surface: Level tile  Device: Rolling Walker  Assistance: Contact guard assistance  Quality of Gait: wide base of support, decreased (B) step length, foot clearance, and heel strike; slow darlene; no loss of balance; fatigues with standing mobility  Distance: 15ft x 2  Comments: seated rest in between trials     Balance  Posture: Fair (forward flexed, rounded shoulders)  Sitting - Static: Good  Sitting - Dynamic: Good;-  Standing - Static: Fair  Standing - Dynamic: Fair;-  Comments: CGA to min A with RW for standing balance during mobility       AM-PAC Score  AM-PAC Inpatient Mobility Raw Score : 17 (12/21/22 1348)  AM-PAC Inpatient T-Scale Score : 42.13 (12/21/22 1348)  Mobility Inpatient CMS 0-100% Score: 50.57 (12/21/22 1348)  Mobility Inpatient CMS G-Code Modifier : CK (12/21/22 1348)     Goals  Short Term Goals  Time Frame for Short Term Goals: D/C  Short Term Goal 1: supine<->sit mod A x 1-2- MET 12/6 Pt will perform bed mobility with SBA MET 12/7 update to bed mobility with independence -ongoing  Short Term Goal 2: pt will sit at EOB for 5 min with SBA- MET 12/7  Short Term Goal 3: pt will tolerate sit<->stand assessment MET 12/6 Pt will transfer with CGA to RW- goal met 12/21; updated: Pt. will demonstrate sit <-> stand with SBA and LRAD - ongoing  Short Term Goal 4: pt will tolerate bed->chair assessment with RW MET 12/7 Pt will perform stand pivot with RW and SBA -ongoing  Short Term Goal 5: pt will tolerate gait assessment-MET 12/7 Pt will ambulate x 50' and CGA -ongoing  Patient Goals   Patient Goals : \"to get better, go to rehab\"       Education  Patient Education  Education Given To: Patient  Education Provided: Role of Therapy;Plan of Care  Education Provided Comments: use of call light, benefits of increased mobility including to and from bathroom; PT recommendations  Education Method: Demonstration;Verbal  Barriers to Learning:  (right eye blurry)  Education Outcome: Verbalized understanding;Demonstrated understanding      Therapy Time   Individual Concurrent Group Co-treatment   Time In 1320         Time Out 1344         Minutes 24                 Timed Code Treatment Minutes: 24 minutes    Total Treatment Minutes: 24 Minutes    If patient discharges prior to next treatment, this note will serve as discharge summary.     Naomi Miller PT, DPT #[id number]

## 2022-12-21 NOTE — PROGRESS NOTES
NEUROSURGERY POST-OP PROGRESS NOTE    Patient Name: Mildred Simms YOB: 1955   Sex: Female Age: 79 yrs     Medical Record Number: [id number] Acct Number: [de-identified]   Room Number: 0492/8251-51 Hospital Day: Hospital Day: 24     Interval History:  Procedure(s) (LRB): POD 23 s/p  STAGE II: RESECTION OF RIGHT PETROCLIVAL MENINGIOMA (N/A) with Dr. Kelley Agosto     Drop in hemoglobin 12 -> 7.8 concern for active GI bleed  Stat CT abdomen  GI consult  Embolization of the GDA on 12/12/2022  CTPA yesterday showed large PE with no obvious heart strain. Patient nuero exam unchanged. VSS. No signs of respiratory distress, and patient is able to maintain O2 sat on room air. Pulmonology and GI discussed the PE situation and they are in agreement that the risk;benefit ratio favors heparin anticoagulation now, and transition to an oral agent today if ok with GI  Hgb remains >8.0   Neurologically stable on exam with HB 5 right facial function. Large pseudomeningocele. Subjective:  no acute complaints, anxious to transfer to rehab    Objective:    VITAL SIGNS   /74   Pulse 76   Temp 97.7 °F (36.5 °C) (Oral)   Resp 16   Ht 5' 5\" (1.651 m)   Wt (!) 343 lb 4.8 oz (155.7 kg)   SpO2 93%   BMI 57.13 kg/m²    Height Height: 5' 5\" (165.1 cm)   Weight Weight: (!) 343 lb 4.8 oz (155.7 kg)          Allergies Allergies   Allergen Reactions    Keflex [Cephalexin] Itching and Rash    Codeine Nausea And Vomiting    Tomato Rash      NPO Status ADULT DIET; Dysphagia - Soft and Bite Sized   Isolation No active isolations     LABS   Basic Metabolic Profile No results for input(s): NA, POTASSIUM, CL, CO2, BUN, CREATININE, GLUCOSE, CA, ALB, PHOS, MG in the last 72 hours.      Complete Blood Count Recent Labs     12/19/22  0610 12/20/22  0635 12/21/22  0247   WBC 3. 5* 3.3* 3.7*   RBC 2.64* 2.80* 2.69*        Coagulation Studies No results for input(s): PTT, INR in the last 72 hours. Invalid input(s): PLATELETS, PROA, PT, PTTA         MEDICATIONS   Inpatient Medications     potassium chloride, 20 mEq, Oral, BID WC    pantoprazole, 40 mg, IntraVENous, BID    levETIRAcetam, 500 mg, IntraVENous, Q12H    cetirizine, 10 mg, Oral, Daily    [Held by provider] hydroCHLOROthiazide, 25 mg, Oral, Daily    insulin lispro, 0-16 Units, SubCUTAneous, TID WC    insulin lispro, 0-4 Units, SubCUTAneous, Nightly    methIMAzole, 10 mg, Oral, Daily    insulin glargine, 15 Units, SubCUTAneous, Nightly    melatonin, 5 mg, Oral, Nightly    sodium chloride flush, 5-40 mL, IntraVENous, 2 times per day    [Held by provider] carvedilol, 12.5 mg, Oral, BID WC    [Held by provider] losartan, 100 mg, Oral, Daily    sodium chloride flush, 5-40 mL, IntraVENous, 2 times per day    latanoprost, 1 drop, Both Eyes, Nightly   Infusions    heparin (PORCINE) Infusion 12 Units/kg/hr (12/21/22 1026)    sodium chloride      sodium chloride 25 mL/hr (12/18/22 0937)    dextrose        Antibiotics   Recent Abx Admin        No antibiotic orders with administrations found. Imaging:   Abdominal CT  Impression       1.  9.5 cm mass in the right hepatic lobe which does not appear to represent a hemangioma. Differential diagnosis would include primary or secondary malignancy. Liver mass protocol MRI with and without contrast is recommended. 2.  No intra-abdominal hemorrhage. 3.  Mild cholelithiasis. 4.  3.5 cm right ovarian cyst, the CT appearance suggests a simple cyst indicating a benign finding.      Neurologic Exam:  Mental status: awake and alert and oriented x4    Cranial Nerves:  II: Visual acuity not tested, denies new visual changes / diplopia  III, IV, VI: PERRL, 3 mm bilaterally, EOMI,Patient had rightward gaze deviation but was able to follow when prompted   V: Facial sensation intact bilaterally to touch  VII: R sided facial droop  VIII: Hearing intact bilaterally to spoken voice on L, no hearing on R  IX: Palate movement equal bilaterally  XI: Shoulder shrug equal bilaterally  XII: Tongue deviates to R    Musculoskeletal:   Gait: Not tested   Tone: normal  Sensory: intact to all extremities  Motor strength:    Right  Left    Right  Left    Deltoid  5 5  Hip Flex  5 5   Biceps  5 5  Knee Extensors  5 5   Triceps  5 5  Knee Flexors  5 5   Wrist Ext  5 5  Ankle Dorsiflex. 5 5   Wrist Flex  5 5  Ankle Plantarflex. 5 5   Handgrip  5 5  Ext Ernie Longus  5 5   Thumb Ext  5 5         Mastoid Incision: sutures c/d/I     Respiratory:  Unlabored respiratory pattern    Abdomen:   Soft, ND   Last BM 12/19    Cardiovascular:  Warm, well perfused    Assessment:   Patient is a 78 yo F s/p Procedure(s) (LRB):  STAGE II: RESECTION OF RIGHT PETROCLIVAL MENINGIOMA (N/A) per Dr. Afsaneh Owen    Plan:  Neurologic exam frequency: Q4H  Mobility: PT/OT   SLP continue  PICC line  DVT Prophylaxis: SCDs, on heparin gtt   Keppra BID   Bowel Regimen: Per GI  Pain control: Tylenol  Keep SBP < 160  Incisional Care: Mastoid compression headband in place check Q4H and let NS know if leaking  GI Bleed: GI Following, Protonix 40 mg BID, Embolization of CHU done, Hgb remains stable @ 8.6, Continue Daily CBC   PE: CTPA shows large PE with no obvious heart strain. Pulmonology and GI discussed the PE situation and they are in agreement that the risk;benefit ratio favors heparin anticoagulation now, and transition to an oral agent this week. Remains on heparin gtt with anti-Xa 0.7  -management of oral agent per medicine team (ok to transition when ok with GI), hopefully today       Dispo: ok to dc to ARU once heparin drip transitioned to oral agent     Patient was discussed with Dr. Earline Cabot who agrees with above assessment and plan. Electronically signed by:  LEE Damon NP, 12/21/2022 12:12 PM   Neurosurgery Nurse Practitioner  [phone]

## 2022-12-22 ENCOUNTER — HOSPITAL ENCOUNTER (INPATIENT)
Age: 67
LOS: 10 days | Discharge: HOME HEALTH | DRG: 949 | End: 2023-01-01
Attending: PHYSICAL MEDICINE & REHABILITATION | Admitting: PHYSICAL MEDICINE & REHABILITATION
Payer: MEDICARE

## 2022-12-22 VITALS
RESPIRATION RATE: 17 BRPM | HEART RATE: 73 BPM | OXYGEN SATURATION: 97 % | WEIGHT: 293 LBS | HEIGHT: 65 IN | BODY MASS INDEX: 48.82 KG/M2 | DIASTOLIC BLOOD PRESSURE: 84 MMHG | TEMPERATURE: 98.1 F | SYSTOLIC BLOOD PRESSURE: 145 MMHG

## 2022-12-22 PROBLEM — Z86.018 S/P EXCISION OF ACOUSTIC NEUROMA: Status: ACTIVE | Noted: 2022-12-22

## 2022-12-22 PROBLEM — Z98.890 S/P EXCISION OF ACOUSTIC NEUROMA: Status: ACTIVE | Noted: 2022-12-22

## 2022-12-22 LAB
BASOPHILS ABSOLUTE: 0 K/UL (ref 0–0.2)
BASOPHILS RELATIVE PERCENT: 0.9 %
EOSINOPHILS ABSOLUTE: 0.4 K/UL (ref 0–0.6)
EOSINOPHILS RELATIVE PERCENT: 12.6 %
GLUCOSE BLD-MCNC: 117 MG/DL (ref 70–99)
GLUCOSE BLD-MCNC: 141 MG/DL (ref 70–99)
GLUCOSE BLD-MCNC: 142 MG/DL (ref 70–99)
GLUCOSE BLD-MCNC: 143 MG/DL (ref 70–99)
HCT VFR BLD CALC: 26.9 % (ref 36–48)
HEMOGLOBIN: 8.8 G/DL (ref 12–16)
LYMPHOCYTES ABSOLUTE: 0.8 K/UL (ref 1–5.1)
LYMPHOCYTES RELATIVE PERCENT: 24.7 %
MCH RBC QN AUTO: 30.5 PG (ref 26–34)
MCHC RBC AUTO-ENTMCNC: 32.8 G/DL (ref 31–36)
MCV RBC AUTO: 93 FL (ref 80–100)
MONOCYTES ABSOLUTE: 0.3 K/UL (ref 0–1.3)
MONOCYTES RELATIVE PERCENT: 10.3 %
NEUTROPHILS ABSOLUTE: 1.7 K/UL (ref 1.7–7.7)
NEUTROPHILS RELATIVE PERCENT: 51.5 %
PDW BLD-RTO: 17.9 % (ref 12.4–15.4)
PERFORMED ON: ABNORMAL
PLATELET # BLD: 197 K/UL (ref 135–450)
PMV BLD AUTO: 7.9 FL (ref 5–10.5)
RBC # BLD: 2.89 M/UL (ref 4–5.2)
WBC # BLD: 3.3 K/UL (ref 4–11)

## 2022-12-22 PROCEDURE — 6360000002 HC RX W HCPCS: Performed by: NURSE PRACTITIONER

## 2022-12-22 PROCEDURE — 97110 THERAPEUTIC EXERCISES: CPT

## 2022-12-22 PROCEDURE — 97530 THERAPEUTIC ACTIVITIES: CPT

## 2022-12-22 PROCEDURE — C9113 INJ PANTOPRAZOLE SODIUM, VIA: HCPCS | Performed by: PHYSICAL MEDICINE & REHABILITATION

## 2022-12-22 PROCEDURE — 2580000003 HC RX 258

## 2022-12-22 PROCEDURE — 85025 COMPLETE CBC W/AUTO DIFF WBC: CPT

## 2022-12-22 PROCEDURE — C9113 INJ PANTOPRAZOLE SODIUM, VIA: HCPCS | Performed by: NURSE PRACTITIONER

## 2022-12-22 PROCEDURE — 1280000000 HC REHAB R&B

## 2022-12-22 PROCEDURE — 6360000002 HC RX W HCPCS

## 2022-12-22 PROCEDURE — 6370000000 HC RX 637 (ALT 250 FOR IP)

## 2022-12-22 PROCEDURE — 36592 COLLECT BLOOD FROM PICC: CPT

## 2022-12-22 PROCEDURE — 2580000003 HC RX 258: Performed by: PHYSICAL MEDICINE & REHABILITATION

## 2022-12-22 PROCEDURE — 97116 GAIT TRAINING THERAPY: CPT

## 2022-12-22 PROCEDURE — 97535 SELF CARE MNGMENT TRAINING: CPT

## 2022-12-22 PROCEDURE — 94150 VITAL CAPACITY TEST: CPT

## 2022-12-22 PROCEDURE — 6370000000 HC RX 637 (ALT 250 FOR IP): Performed by: NURSE PRACTITIONER

## 2022-12-22 PROCEDURE — 6360000002 HC RX W HCPCS: Performed by: PHYSICAL MEDICINE & REHABILITATION

## 2022-12-22 PROCEDURE — 6370000000 HC RX 637 (ALT 250 FOR IP): Performed by: PHYSICAL MEDICINE & REHABILITATION

## 2022-12-22 PROCEDURE — 6370000000 HC RX 637 (ALT 250 FOR IP): Performed by: INTERNAL MEDICINE

## 2022-12-22 RX ORDER — PANTOPRAZOLE SODIUM 40 MG/10ML
40 INJECTION, POWDER, LYOPHILIZED, FOR SOLUTION INTRAVENOUS 2 TIMES DAILY
Status: CANCELLED | OUTPATIENT
Start: 2022-12-22

## 2022-12-22 RX ORDER — ALBUTEROL SULFATE 2.5 MG/3ML
2.5 SOLUTION RESPIRATORY (INHALATION) EVERY 4 HOURS PRN
Status: CANCELLED | OUTPATIENT
Start: 2022-12-22

## 2022-12-22 RX ORDER — LATANOPROST 50 UG/ML
1 SOLUTION/ DROPS OPHTHALMIC NIGHTLY
Status: DISCONTINUED | OUTPATIENT
Start: 2022-12-22 | End: 2023-01-01 | Stop reason: HOSPADM

## 2022-12-22 RX ORDER — LEVETIRACETAM 500 MG/1
500 TABLET ORAL 2 TIMES DAILY
Status: DISCONTINUED | OUTPATIENT
Start: 2022-12-22 | End: 2023-01-01 | Stop reason: HOSPADM

## 2022-12-22 RX ORDER — PANTOPRAZOLE SODIUM 40 MG/10ML
40 INJECTION, POWDER, LYOPHILIZED, FOR SOLUTION INTRAVENOUS 2 TIMES DAILY
Status: DISCONTINUED | OUTPATIENT
Start: 2022-12-22 | End: 2022-12-30

## 2022-12-22 RX ORDER — METHIMAZOLE 5 MG/1
10 TABLET ORAL DAILY
Status: DISCONTINUED | OUTPATIENT
Start: 2022-12-23 | End: 2023-01-01 | Stop reason: HOSPADM

## 2022-12-22 RX ORDER — ONDANSETRON 4 MG/1
4 TABLET, ORALLY DISINTEGRATING ORAL EVERY 8 HOURS PRN
Status: DISCONTINUED | OUTPATIENT
Start: 2022-12-22 | End: 2023-01-01 | Stop reason: HOSPADM

## 2022-12-22 RX ORDER — HYDRALAZINE HYDROCHLORIDE 20 MG/ML
10 INJECTION INTRAMUSCULAR; INTRAVENOUS
Status: DISCONTINUED | OUTPATIENT
Start: 2022-12-22 | End: 2023-01-01 | Stop reason: HOSPADM

## 2022-12-22 RX ORDER — ALBUTEROL SULFATE 2.5 MG/3ML
2.5 SOLUTION RESPIRATORY (INHALATION) EVERY 4 HOURS PRN
Status: DISCONTINUED | OUTPATIENT
Start: 2022-12-22 | End: 2023-01-01 | Stop reason: HOSPADM

## 2022-12-22 RX ORDER — INSULIN LISPRO 100 [IU]/ML
0-16 INJECTION, SOLUTION INTRAVENOUS; SUBCUTANEOUS
Status: CANCELLED | OUTPATIENT
Start: 2022-12-22

## 2022-12-22 RX ORDER — POLYETHYLENE GLYCOL 3350 17 G/17G
17 POWDER, FOR SOLUTION ORAL DAILY PRN
Status: DISCONTINUED | OUTPATIENT
Start: 2022-12-22 | End: 2023-01-01 | Stop reason: HOSPADM

## 2022-12-22 RX ORDER — SODIUM CHLORIDE 0.9 % (FLUSH) 0.9 %
5-40 SYRINGE (ML) INJECTION EVERY 12 HOURS SCHEDULED
Status: CANCELLED | OUTPATIENT
Start: 2022-12-22

## 2022-12-22 RX ORDER — MECOBALAMIN 5000 MCG
5 TABLET,DISINTEGRATING ORAL NIGHTLY
Status: DISCONTINUED | OUTPATIENT
Start: 2022-12-22 | End: 2022-12-27 | Stop reason: RX

## 2022-12-22 RX ORDER — DEXTROSE MONOHYDRATE 100 MG/ML
INJECTION, SOLUTION INTRAVENOUS CONTINUOUS PRN
Status: CANCELLED | OUTPATIENT
Start: 2022-12-22

## 2022-12-22 RX ORDER — LATANOPROST 50 UG/ML
1 SOLUTION/ DROPS OPHTHALMIC NIGHTLY
Status: CANCELLED | OUTPATIENT
Start: 2022-12-22

## 2022-12-22 RX ORDER — INSULIN LISPRO 100 [IU]/ML
0-16 INJECTION, SOLUTION INTRAVENOUS; SUBCUTANEOUS
Status: DISCONTINUED | OUTPATIENT
Start: 2022-12-23 | End: 2022-12-31

## 2022-12-22 RX ORDER — SODIUM CHLORIDE 0.9 % (FLUSH) 0.9 %
5-40 SYRINGE (ML) INJECTION EVERY 12 HOURS SCHEDULED
Status: DISCONTINUED | OUTPATIENT
Start: 2022-12-22 | End: 2023-01-01 | Stop reason: HOSPADM

## 2022-12-22 RX ORDER — CETIRIZINE HYDROCHLORIDE 10 MG/1
10 TABLET ORAL DAILY
Status: CANCELLED | OUTPATIENT
Start: 2022-12-23

## 2022-12-22 RX ORDER — CETIRIZINE HYDROCHLORIDE 10 MG/1
10 TABLET ORAL DAILY
Status: DISCONTINUED | OUTPATIENT
Start: 2022-12-23 | End: 2023-01-01 | Stop reason: HOSPADM

## 2022-12-22 RX ORDER — HYDRALAZINE HYDROCHLORIDE 20 MG/ML
10 INJECTION INTRAMUSCULAR; INTRAVENOUS
Status: CANCELLED | OUTPATIENT
Start: 2022-12-22

## 2022-12-22 RX ORDER — POLYETHYLENE GLYCOL 3350 17 G/17G
17 POWDER, FOR SOLUTION ORAL DAILY PRN
Status: CANCELLED | OUTPATIENT
Start: 2022-12-22

## 2022-12-22 RX ORDER — ACETAMINOPHEN 325 MG/1
650 TABLET ORAL EVERY 4 HOURS PRN
Status: DISCONTINUED | OUTPATIENT
Start: 2022-12-22 | End: 2023-01-01 | Stop reason: HOSPADM

## 2022-12-22 RX ORDER — GLUCAGON 1 MG/ML
1 KIT INJECTION PRN
Status: DISCONTINUED | OUTPATIENT
Start: 2022-12-22 | End: 2023-01-01 | Stop reason: HOSPADM

## 2022-12-22 RX ORDER — ONDANSETRON 2 MG/ML
4 INJECTION INTRAMUSCULAR; INTRAVENOUS EVERY 6 HOURS PRN
Status: CANCELLED | OUTPATIENT
Start: 2022-12-22

## 2022-12-22 RX ORDER — INSULIN LISPRO 100 [IU]/ML
0-4 INJECTION, SOLUTION INTRAVENOUS; SUBCUTANEOUS NIGHTLY
Status: CANCELLED | OUTPATIENT
Start: 2022-12-22

## 2022-12-22 RX ORDER — PROMETHAZINE HYDROCHLORIDE 25 MG/ML
6.25 INJECTION, SOLUTION INTRAMUSCULAR; INTRAVENOUS EVERY 6 HOURS PRN
Status: DISCONTINUED | OUTPATIENT
Start: 2022-12-22 | End: 2023-01-01 | Stop reason: HOSPADM

## 2022-12-22 RX ORDER — LEVETIRACETAM 500 MG/1
500 TABLET ORAL 2 TIMES DAILY
Status: CANCELLED | OUTPATIENT
Start: 2022-12-22

## 2022-12-22 RX ORDER — POTASSIUM CHLORIDE 20 MEQ/1
20 TABLET, EXTENDED RELEASE ORAL 2 TIMES DAILY WITH MEALS
Status: DISCONTINUED | OUTPATIENT
Start: 2022-12-23 | End: 2023-01-01 | Stop reason: HOSPADM

## 2022-12-22 RX ORDER — DEXTROSE MONOHYDRATE 100 MG/ML
INJECTION, SOLUTION INTRAVENOUS CONTINUOUS PRN
Status: DISCONTINUED | OUTPATIENT
Start: 2022-12-22 | End: 2023-01-01 | Stop reason: HOSPADM

## 2022-12-22 RX ORDER — LEVETIRACETAM 500 MG/1
500 TABLET ORAL 2 TIMES DAILY
Status: DISCONTINUED | OUTPATIENT
Start: 2022-12-22 | End: 2022-12-22 | Stop reason: HOSPADM

## 2022-12-22 RX ORDER — ONDANSETRON 4 MG/1
4 TABLET, ORALLY DISINTEGRATING ORAL EVERY 8 HOURS PRN
Status: CANCELLED | OUTPATIENT
Start: 2022-12-22

## 2022-12-22 RX ORDER — POTASSIUM CHLORIDE 20 MEQ/1
20 TABLET, EXTENDED RELEASE ORAL 2 TIMES DAILY WITH MEALS
Status: CANCELLED | OUTPATIENT
Start: 2022-12-22

## 2022-12-22 RX ORDER — MECOBALAMIN 5000 MCG
5 TABLET,DISINTEGRATING ORAL NIGHTLY
Status: CANCELLED | OUTPATIENT
Start: 2022-12-22

## 2022-12-22 RX ORDER — ONDANSETRON 2 MG/ML
4 INJECTION INTRAMUSCULAR; INTRAVENOUS EVERY 6 HOURS PRN
Status: DISCONTINUED | OUTPATIENT
Start: 2022-12-22 | End: 2023-01-01 | Stop reason: HOSPADM

## 2022-12-22 RX ORDER — ACETAMINOPHEN 325 MG/1
650 TABLET ORAL EVERY 4 HOURS PRN
Status: CANCELLED | OUTPATIENT
Start: 2022-12-22

## 2022-12-22 RX ORDER — INSULIN LISPRO 100 [IU]/ML
0-4 INJECTION, SOLUTION INTRAVENOUS; SUBCUTANEOUS NIGHTLY
Status: DISCONTINUED | OUTPATIENT
Start: 2022-12-22 | End: 2022-12-31

## 2022-12-22 RX ORDER — METHIMAZOLE 5 MG/1
10 TABLET ORAL DAILY
Status: CANCELLED | OUTPATIENT
Start: 2022-12-23

## 2022-12-22 RX ORDER — GLUCAGON 1 MG/ML
1 KIT INJECTION PRN
Status: CANCELLED | OUTPATIENT
Start: 2022-12-22

## 2022-12-22 RX ORDER — PROMETHAZINE HYDROCHLORIDE 25 MG/ML
6.25 INJECTION, SOLUTION INTRAMUSCULAR; INTRAVENOUS EVERY 6 HOURS PRN
Status: CANCELLED | OUTPATIENT
Start: 2022-12-22

## 2022-12-22 RX ADMIN — RIVAROXABAN 15 MG: 15 TABLET, FILM COATED ORAL at 08:00

## 2022-12-22 RX ADMIN — Medication 5 MG: at 20:04

## 2022-12-22 RX ADMIN — SODIUM CHLORIDE, PRESERVATIVE FREE 10 ML: 5 INJECTION INTRAVENOUS at 20:05

## 2022-12-22 RX ADMIN — LATANOPROST 1 DROP: 50 SOLUTION OPHTHALMIC at 20:07

## 2022-12-22 RX ADMIN — LEVETIRACETAM 500 MG: 500 TABLET, FILM COATED ORAL at 20:04

## 2022-12-22 RX ADMIN — RIVAROXABAN 15 MG: 15 TABLET, FILM COATED ORAL at 17:27

## 2022-12-22 RX ADMIN — SODIUM CHLORIDE, PRESERVATIVE FREE 10 ML: 5 INJECTION INTRAVENOUS at 08:01

## 2022-12-22 RX ADMIN — POTASSIUM CHLORIDE 20 MEQ: 20 TABLET, EXTENDED RELEASE ORAL at 17:27

## 2022-12-22 RX ADMIN — METHIMAZOLE 10 MG: 5 TABLET ORAL at 08:00

## 2022-12-22 RX ADMIN — LEVETIRACETAM 500 MG: 100 INJECTION, SOLUTION INTRAVENOUS at 10:35

## 2022-12-22 RX ADMIN — PANTOPRAZOLE SODIUM 40 MG: 40 INJECTION, POWDER, LYOPHILIZED, FOR SOLUTION INTRAVENOUS at 20:05

## 2022-12-22 RX ADMIN — POTASSIUM CHLORIDE 20 MEQ: 20 TABLET, EXTENDED RELEASE ORAL at 08:00

## 2022-12-22 RX ADMIN — PANTOPRAZOLE SODIUM 40 MG: 40 INJECTION, POWDER, LYOPHILIZED, FOR SOLUTION INTRAVENOUS at 08:00

## 2022-12-22 RX ADMIN — SODIUM CHLORIDE, PRESERVATIVE FREE 10 ML: 5 INJECTION INTRAVENOUS at 20:08

## 2022-12-22 RX ADMIN — CETIRIZINE HYDROCHLORIDE 10 MG: 10 TABLET, FILM COATED ORAL at 08:00

## 2022-12-22 RX ADMIN — INSULIN GLARGINE 15 UNITS: 100 INJECTION, SOLUTION SUBCUTANEOUS at 21:53

## 2022-12-22 RX ADMIN — BISACODYL 5 MG: 5 TABLET, COATED ORAL at 20:04

## 2022-12-22 ASSESSMENT — PAIN SCALES - GENERAL
PAINLEVEL_OUTOF10: 0
PAINLEVEL_OUTOF10: 0

## 2022-12-22 NOTE — PROGRESS NOTES
VSS on room air. Aox4. Pt [place], needs staff to speak up. No acute events this shift. Ambulating assist x1 with rw/gb, denies dizziness. Voiding adequately via BRP/purewick, denies pain or difficulty when urinating. Tolerating oral diet and PO fluids, denies N/V. Skin C/D/I with exception to scattered bruising and redness to sacrum. Pt on specialty mattress. Frequent repositioning with pillow support provided for pressure relief and comfort. All fall precautions in place.

## 2022-12-22 NOTE — DISCHARGE INSTR - COC
Continuity of Care Form    Patient Name: Magdaleno Chavez   :  1955  MRN:  [id number]    Admit date:  2022  Discharge date:  ***    Code Status Order: Full Code   Advance Directives:   [facility] Documentation       Date/Time Healthcare Directive Type of Healthcare Directive Copy in [facility] [address] Agent's Name Healthcare Agent's Phone Number    [phone] No, patient does not have an advance directive for healthcare treatment -- -- -- -- --            Admitting Physician:  Ed Garvin. Kristin Miles MD  PCP: [facility]    Discharging Nurse: [facility] Unit/Room#: 2528/5741-53  Discharging Unit Phone Number: ***    Emergency Contact:   Extended Emergency Contact Information  Primary Emergency Contact: [facility] [address] [address] Phone: [phone]  Mobile Phone: [phone]  Relation: Brother/Sister  Secondary Emergency Contact: [facility] Phone: [phone]  Mobile Phone: [phone]  Relation: Child    Past Surgical History:  Past Surgical History:   Procedure Laterality Date    CRANIOTOMY N/A 2022    STAGE II: RESECTION OF RIGHT PETROCLIVAL MENINGIOMA performed by Ed Garvin. Kristin Miles MD at [address]    IR EMBOLIZATION HEMORRHAGE  2022    IR EMBOLIZATION HEMORRHAGE 2022 [facility] SPECIAL PROCEDURES    MASTOIDECTOMY Right 2022    RIGHT TRANSMASTOID / ANTERIOR MIDDLE CRANIAL FOSSA , ABDOMINAL FAT GRAFT EXTERNAL AUDITORY CANAL CLOSURE performed by Emily Pagan MD at [facility] 77 Right 2022    STAGE 1, RIGHT TRANSLABYRINTHINE / RETROLABYRINTHINE CRANIOTOMY, PETROSECTOMY performed by Ed Garvin.  Kristin Miles MD at [facility] 227 Right 2021    RIGHT TOTAL HIP ARTHROPLASTY POSTERIOR APPROACH - Emperatriz Zaidi ADVANCED performed by Susan Milan MD at [address] Right 2015    TOTAL KNEE ARTHROPLASTY Left 10/14/15    TKA UPPER GASTROINTESTINAL ENDOSCOPY N/A 12/11/2022    EGD CONTROL HEMORRHAGE performed by Marco Carpenter MD at [address] [place] [address] N/A 12/16/2022    EGD DIAGNOSTIC ONLY performed by Michelle Tariq MD at [facility] ENDOSCOPY       Immunization History:   Immunization History   Administered Date(s) Administered    COVID-19, J&J, (age 18y+), IM, 0.5 mL 03/10/2021    COVID-19, MODERNA Bivalent BOOSTER, (age 12y+), IM, 48 mcg/0.5 mL 09/30/2022       Active Problems:  Patient Active Problem List   Diagnosis Code    Asthma J45.909    Gastroesophageal reflux disease K21.9    Hypertension I10    Adiposity E66.9    S/P total knee arthroplasty Z96.659    Primary osteoarthritis of left knee M17.12    Morbid obesity with BMI of 50.0-59.9, adult (HCC) E66.01, Z68.43    Osteoarthritis of right hip M16.11    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    Acoustic neuroma ([facility]) D33.3    S/P craniotomy Z98.890    Multiple subsegmental pulmonary emboli without acute cor pulmonale ([facility]) I26.94       Isolation/Infection:   Isolation            No Isolation          Patient Infection Status       Infection Onset Added Last Indicated Last Indicated By Review Planned Expiration Resolved Resolved By    None active    Resolved    COVID-19 (Rule Out) 11/01/21 11/01/21 11/01/21 COVID-19 (Ordered)   11/02/21 Rule-Out Test Resulted            Nurse Assessment:  Last Vital Signs: /75   Pulse 73   Temp 98.2 °F (36.8 °C) (Axillary)   Resp 16   Ht 5' 5\" (1.651 m)   Wt (!) 343 lb 4.8 oz (155.7 kg)   SpO2 97%   BMI 57.13 kg/m²     Last documented pain score (0-10 scale): Pain Level: 0  Last Weight:   Wt Readings from Last 1 Encounters:   12/20/22 (!) 343 lb 4.8 oz (155.7 kg)     Mental Status:  oriented and alert    IV Access:  PICC- RUE    Nursing Mobility/ADLs:  Walking   Assisted  Transfer  Assisted  Bathing  Assisted  Dressing  Assisted  Toileting  Assisted  Feeding  Independent  Med Admin  Assisted  Med Delivery whole    Wound Care Documentation and Therapy:  Incision 11/28/22 Head Lower;Right;Posterior (Active)   Dressing Status Clean;Dry; Intact 12/22/22 1100   Incision Cleansed Other (Comment) 12/15/22 0800   Dressing/Treatment Open to air 12/22/22 1100   Closure Sutures 12/22/22 1100   Margins Approximated 12/22/22 1100   Incision Assessment Dry 12/22/22 1100   Drainage Amount None 12/22/22 1100   Drainage Description Sanguinous 12/14/22 0400   Odor None 12/22/22 1100   Ingrid-incision Assessment Intact 12/22/22 1100   Number of days: 24       Incision 11/28/22 Abdomen Right;Upper (Active)   Dressing Status Clean;Dry; Intact 12/22/22 1100   Incision Cleansed Cleansed with saline 12/22/22 1100   Dressing/Treatment Open to air 12/22/22 1100   Closure Sutures 12/22/22 1100   Margins Approximated 12/22/22 1100   Incision Assessment Dry 12/22/22 1100   Drainage Amount None 12/22/22 1100   Odor None 12/22/22 1100   Ingrid-incision Assessment Ecchymosis 12/22/22 1100   Number of days: 23        Elimination:  Continence: Bowel: Yes  Bladder: Yes and No  Urinary Catheter: None   Colostomy/Ileostomy/Ileal Conduit: No       Date of Last BM: Pt reports 12/21/22    Intake/Output Summary (Last 24 hours) at 12/22/2022 1636  Last data filed at 12/22/2022 0651  Gross per 24 hour   Intake 320 ml   Output 500 ml   Net -180 ml     I/O last 3 completed shifts: In: 18 [P.O.:560; I.V.:10]  Out: 500 [Urine:500]    Safety Concerns: At risk for falls    Impairments/Disabilities:      None    Nutrition Therapy:  Current Nutrition Therapy:   Oral diet- small and bite sized    Routes of Feeding: Oral  Liquids: Thin Liquids  Daily Fluid Restriction: no  Last Modified Barium Swallow with Video (Video Swallowing Test): done on 12/05/2022    Treatments at the Time of Hospital Discharge:   Respiratory Treatments:   Oxygen Therapy:  is not on home oxygen therapy.   Ventilator:    - No ventilator support    Rehab Therapies: Physical Therapy, Occupational Therapy, and Speech/Language Therapy  Weight Bearing Status/Restrictions: No weight bearing restrictions  Other Medical Equipment (for information only, NOT a DME order):  walker  Other Treatments:     Patient's personal belongings (please select all that are sent with patient):  Eye patch     RN SIGNATURE:  Electronically signed by Flaquita Lai RN on 12/22/22 at 4:41 PM EST    CASE MANAGEMENT/SOCIAL WORK SECTION    Inpatient Status Date:     Readmission Risk Assessment Score:  Readmission Risk              Risk of Unplanned Readmission:  14           Discharging to Facility/ Agency   Name:   Address:  Phone:  Fax:    Dialysis Facility (if applicable)   Name:  Address:  Dialysis Schedule:  Phone:  Fax:    / signature: {Esignature:[id number]}    PHYSICIAN SECTION    Prognosis: {Prognosis:9198893636}    Condition at Discharge: [facility] Patient Condition:[id number]}    Rehab Potential (if transferring to Rehab): {Prognosis:6152574891}    Recommended Labs or Other Treatments After Discharge: ***    Physician Certification: I certify the above information and transfer of Valorie Evans  is necessary for the continuing treatment of the diagnosis listed and that she requires {Admit to Appropriate Level of Care:37523} for {GREATER/LESS:478501977} 30 days.      Update Admission H&P: {CHP DME Changes in MROXW:808469630}    PHYSICIAN SIGNATURE:  {Esignature:[id number]}

## 2022-12-22 NOTE — PLAN OF CARE
Problem: Safety - Adult  Goal: Free from fall injury  12/21/2022 1818 by Letty Vyas RN    Problem: Chronic Conditions and Co-morbidities  Goal: Patient's chronic conditions and co-morbidity symptoms are monitored and maintained or improved  Outcome: Progressing     Problem: Discharge Planning  Goal: Discharge to home or other facility with appropriate resources  Outcome: Progressing     Problem: Pain  Goal: Verbalizes/displays adequate comfort level or baseline comfort level  Outcome: Progressing     Problem: Safety - Adult  Goal: Free from fall injury  12/22/2022 0413 by Sachin Byrd RN  Outcome: Progressing    Problem: Skin/Tissue Integrity  Goal: Absence of new skin breakdown  Description: 1. Monitor for areas of redness and/or skin breakdown  2. Assess vascular access sites hourly  3. Every 4-6 hours minimum:  Change oxygen saturation probe site  4. Every 4-6 hours:  If on nasal continuous positive airway pressure, respiratory therapy assess nares and determine need for appliance change or resting period.   Outcome: Progressing

## 2022-12-22 NOTE — DISCHARGE SUMMARY
Discharge Summary    Date of Admission: 11/28/2022  5:33 AM  Date of Discharge: 12/22/2022  Admission Diagnosis: * No pre-op diagnosis entered *  Discharge Diagnosis: Same   Condition on Discharge: good  Attending for Admission: Rasheed Cristina. Leonarda Araya MD  Procedures: Procedure(s) (LRB):  EGD DIAGNOSTIC ONLY (N/A)  RESECTION OF RIGHT PETROCLIVAL MENINGIOMA   Consults: IP CONSULT TO CRITICAL CARE  IP CONSULT TO PHYSICAL MEDICINE REHAB  IP CONSULT TO GI  IP CONSULT TO HOSPITALIST  IP CONSULT TO NEUROCRITICAL CARE  IP CONSULT TO PULMONOLOGY    Reason for Admission:  Madai Tiwari is a 79 y.o. female patient who was admitted to the hospital for complaints of  ataxia and hearing loss. 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 She underwent the procedure listed above on  11/28 and 11/29. Hospital Course:  After surgery, her surgical pain was was well-controlled on oral medications. Post operatively she had dark black stool and a drop in hemoglobin 12 -> 7.8 concern for active GI bleed. After GI consult and CT abdomen she underwent embolization of the GDA on 12/12/2022. CTPA showed large PE with no obvious heart strain. She was started on a heparin drip when it was safe from a GI perspective and was transitioned to oral anticoagulation prior to discharge. The incision was clean, dry and intact. 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 Prior to discharge She was eating well, urinating and having bowel movements. Patient worked with therapy who determined she would benefit from further inpatient rehab stay.  She was admitted to ARU following discharge from hospital.     Discharge Vitals/Labs:  /75   Pulse 73   Temp 98.2 °F (36.8 °C) (Axillary)   Resp 16   Ht 5' 5\" (1.651 m)   Wt (!) 343 lb 4.8 oz (155.7 kg)   SpO2 97%   BMI 57.13 kg/m²   CBC with Differential:    Lab Results   Component Value Date/Time    WBC 3.3 12/22/2022 06:00 AM    RBC 2.89 12/22/2022 06:00 AM    HGB 8.8 12/22/2022 06:00 AM    HCT 26.9 12/22/2022 06:00 AM     12/22/2022 06:00 AM    MCV 93.0 12/22/2022 06:00 AM    MCH 30.5 12/22/2022 06:00 AM    MCHC 32.8 12/22/2022 06:00 AM    RDW 17.9 12/22/2022 06:00 AM    SEGSPCT 63.5 10/07/2015 10:33 AM    BANDSPCT 1 12/13/2022 04:28 AM    LYMPHOPCT 24.7 12/22/2022 06:00 AM    MONOPCT 10.3 12/22/2022 06:00 AM    MYELOPCT 1 12/19/2022 06:10 AM    BASOPCT 0.9 12/22/2022 06:00 AM    MONOSABS 0.3 12/22/2022 06:00 AM    LYMPHSABS 0.8 12/22/2022 06:00 AM    EOSABS 0.4 12/22/2022 06:00 AM    BASOSABS 0.0 12/22/2022 06:00 AM     CMP:    Lab Results   Component Value Date/Time     12/18/2022 06:00 AM    K 3.7 12/18/2022 06:00 AM    K 3.6 12/04/2022 03:44 AM     12/18/2022 06:00 AM    CO2 28 12/18/2022 06:00 AM    BUN 5 12/18/2022 06:00 AM    CREATININE <0.5 12/18/2022 06:00 AM    GFRAA >60 11/09/2021 04:41 AM    AGRATIO 1.6 10/12/2021 12:08 PM    LABGLOM >60 12/18/2022 06:00 AM    GLUCOSE 117 12/18/2022 06:00 AM    PROT 5.0 12/04/2022 03:44 AM    LABALBU 3.0 12/16/2022 04:05 AM    CALCIUM 8.5 12/18/2022 06:00 AM    BILITOT 0.5 12/04/2022 03:44 AM    ALKPHOS 48 12/04/2022 03:44 AM    AST 12 12/04/2022 03:44 AM    ALT 11 12/04/2022 03:44 AM       Discharge Medications: The patient suffers from a major neurological surgery that pain cannot be managed within an average of 30 MED per day. Severe acute postoperative pain is the reason for exceeding the 30 MED average, and the prescription reflects the same dosage patient received while inpatient, which is the lowest dose consistent with the patients medical condition. Non-opioid treatment options have been considered prior to prescribing opioids, and the patient has been advised of the benefits and risks of the opioid (including the potential for addiction).      Medication List        CONTINUE taking these medications      albuterol sulfate  (90 Base) MCG/ACT inhaler  Commonly known as: PROVENTIL;VENTOLIN;PROAIR     carvedilol 6.25 MG tablet  Commonly known as: COREG     hydroCHLOROthiazide 25 MG tablet  Commonly known as: HYDRODIURIL     latanoprost 0.005 % ophthalmic solution  Commonly known as: XALATAN     losartan 50 MG tablet  Commonly known as: COZAAR     medical marijuana     metFORMIN 500 MG extended release tablet  Commonly known as: GLUCOPHAGE-XR     methIMAzole 5 MG tablet  Commonly known as: TAPAZOLE     pravastatin 20 MG tablet  Commonly known as: PRAVACHOL            STOP taking these medications      meloxicam 15 MG tablet  Commonly known as: MOBIC              Discharge Destination:  The patient was discharged to Rehab. Follow-up:  The patient is to follow-up with Han Li. Dl Armas MD in the office in 2 weeks      Discharge Instructions:   Verbal and written discharge instructions were given to the patient at the time of discharge. Electronically signed by:  LEE Thorpe NP, APRN-CNP, 12/22/2022 3:24 PM  [phone]

## 2022-12-22 NOTE — PROGRESS NOTES
Physical Therapy  Daily Treatment Note    Discharge Recommendations: Scott Davis scored a 16/24 on the AM-PAC short mobility form. Current research shows that an AM-PAC score of 17 or less is typically not associated with a discharge to the patient's home setting. Based on the patient's AM-PAC score and their current functional mobility deficits, it is recommended that the patient have 5-7 sessions per week of Physical Therapy at d/c to increase the patient's independence. At this time, this patient demonstrates complex nursing, medical, and rehabilitative needs, and would benefit from intensive rehabilitation services upon discharge from the Inpatient setting. This patient demonstrates the ability to participate in and benefit from an intensive therapy program with a coordinated interdisciplinary team approach to foster frequent, structured, and documented communication among disciplines, who will work together to establish, prioritize, and achieve treatment goals. Please see assessment section for further patient specific details. Assessment:  Pt with good effort and participation despite c/o feeling fatigued. Mobility slow and effortful. Needs rest breaks between activities/exercises. Pt is currently functioning below her baseline s/p brain tumor resection. Limited by fatigue, weakness, decreased balance. Would benefit from continued IP PT at D/C to maximize strength and independence. Plan is for ARU. Equipment Needs: Defer to next level of care    Chart Reviewed: Yes   Restrictions/Precautions: Fall Risk (high fall risk) Other position/activity restrictions:  Up with assistance, progressive ambulation, up in the chair for meals, HOB elevated to 30 degrees; up as tolerated   Additional Pertinent Hx: Pt is a 80 yo female that presents to the hospital for a procedure;STAGE 1, RIGHT TRANSLABYRINTHINE / RETROLABYRINTHINE CRANIOTOMY POSSIBLE PETROSECTOMY  RIGHT TRANSMASTOID / ANTERIOR MIDDLE CRANIAL FOSSA , ABDOMINAL FAT GRAFT EXTERNAL AUDITORY CANAL CLOSURE on 11/28. EGD 12/11:  multiple duodenal ulcers, clip placement x 3. Abd CT 12/11:  suspected Bilat PE.  LE dopplers 12/11: neg. Pt s/p  GDA embo on 12/12.  12/13 CTPA: (+) PE; PMH; Loss of hearing bilaterally, arthritis, brain tumor, diabetes. Diagnosis: Cerebellopontine angle meningioma   Treatment Diagnosis: impaired functional mobility    Subjective: Pt in chair initially. Agreeable to working with PT. Reports feeling fatigued after working with OT and sitting in the chair for awhile. Pain: No c/o voiced during session. Objective:    Exercises  15 reps B LE seated ex: Heel raises, toe raises, hip abd/add, hip flexion, LAQ  Other: Rest breaks between sets. Transfers  Sit to stand: Min assist x 1 from chair; Min assist x 1 from raised height commode with armrests  Stand to sit: CGA onto raised height arms with armrests; Min assist onto bed. Decreased eccentric control    Ambulation  Assistance Level: CGA  Assistive device: Wheeled walker  Distance: 20 ft, 25 ft. Lengthy seated rest between walks. Quality of gait: Effortful; decreased pace; moderate reliance on walker for support; no LOB    Bed mobility  Sit to Supine: Mod assist for LE. HOB up partially  Rolling: Min assist to R and L  Scooting: Dependent (Max assist x 2) to scoot towards [facility]    Balance  Static stance with CGA  Ambulation with wheeled walker CGA  Sat EOB with SBA    Patient Education  Role of PT. Calling for assist with needs. Expressed understanding. Safety Devices  Pt left with needs in reach. In bed with bed alarm on. RN updated.      AM-PAC score  AM-PAC Inpatient Mobility Raw Score : 16  AM-PAC Inpatient T-Scale Score : 40.78  Mobility Inpatient CMS 0-100% Score: 54.16  Mobility Inpatient CMS G-Code Modifier : CK    Goals: (as determined and assessed by primary PT)  Time Frame for Short Term Goals: D/C  Short Term Goal 1: supine<->sit mod A x 1-2- MET 12/6 Pt will perform bed mobility with SBA MET 12/7 update to bed mobility with independence -ongoing   Short Term Goal 2: pt will sit at EOB for 5 min with SBA- MET 12/7   Short Term Goal 3: pt will tolerate sit<->stand assessment MET 12/6 Pt will transfer with CGA to RW- goal met 12/21; updated: Pt. will demonstrate sit <-> stand with SBA and LRAD - ongoing  Short Term Goal 4: pt will tolerate bed->chair assessment with RW MET 12/7 Pt will perform stand pivot with RW and SBA -ongoing  Short Term Goal 5: pt will tolerate gait assessment-MET 12/7 Pt will ambulate x 50' and CGA -ongoing    Plan:  Plan: 5-7 times per week  Current Treatment Recommendations: Strengthening, Functional mobility training, Transfer training, Gait training, Endurance training, Safety education & training, Therapeutic activities, Balance training, Neuromuscular re-education, Patient/Caregiver education & training, Equipment evaluation, education, & procurement, Home exercise program    Therapy Time    Individual  Concurrent  Group  Co-treatment    Time In  1040            Time Out  1120            Minutes  40              Timed Code Treatment Minutes: 40  Total Treatment Minutes: 40     Will continue per plan of care. If patient is discharged prior to next treatment, this note will serve as the discharge summary.     Antonio Spivey #[id number]

## 2022-12-22 NOTE — PROGRESS NOTES
Hospitalist Consult Progress Note      PCP: Zainab Cheek    Date of Admission: 11/28/2022    Chief Complaint: consult for cas-operative [facility] Course: \"The patient is a 79 y.o. female with hx of multistage neurosurgical resection of petroclival meningioma who we are asked to see/evaluate by Dr. Don Bryant for cas-operative mgt. patient has been recovering well postoperatively from her procedure when she most recently developed melanotic stools. Gastroenterology was consulted, patient underwent EGD and was found to have multiple duodenal ulcers with visible vessel and active bleeding. Underwent injection of epi, hemostatic clip placement and chemo spray. Due to concern for possible ongoing GI bleed she underwent CTA. No active GI bleed was detected, however CAT scan picked up likely pulmonary embolism in the lower lung fields. Patient received 2 units of blood transfusion\"    Subjective:    Seen and examined patient at bedside. Afebrile, VS-stable, no overnight events. AAOx4. Doing well. Denies chest pain, SOB, nausea or vomiting, diarrhea or constipation. Denies signs of bleeding.   Switched Keppra to PO       Medications:  Reviewed    Infusion Medications    sodium chloride      sodium chloride 25 mL/hr (12/18/22 0937)    dextrose       Scheduled Medications    levETIRAcetam  500 mg Oral BID    rivaroxaban  15 mg Oral BID WC    Followed by    Saintclair Muskrat ON 1/11/2023] rivaroxaban  20 mg Oral Dinner    potassium chloride  20 mEq Oral BID     pantoprazole  40 mg IntraVENous BID    cetirizine  10 mg Oral Daily    [Held by provider] hydroCHLOROthiazide  25 mg Oral Daily    insulin lispro  0-16 Units SubCUTAneous TID     insulin lispro  0-4 Units SubCUTAneous Nightly    methIMAzole  10 mg Oral Daily    insulin glargine  15 Units SubCUTAneous Nightly    melatonin  5 mg Oral Nightly    sodium chloride flush  5-40 mL IntraVENous 2 times per day    [Held by provider] carvedilol 12.5 mg Oral BID WC    [Held by provider] losartan  100 mg Oral Daily    sodium chloride flush  5-40 mL IntraVENous 2 times per day    latanoprost  1 drop Both Eyes Nightly     PRN Meds: polyethylene glycol, bisacodyl, labetalol, sodium chloride flush, sodium chloride, sodium chloride, acetaminophen, ondansetron **OR** ondansetron, hydrALAZINE, albuterol, glucose, dextrose bolus **OR** dextrose bolus, glucagon (rDNA), dextrose, promethazine      Intake/Output Summary (Last 24 hours) at 12/22/2022 1606  Last data filed at 12/22/2022 0651  Gross per 24 hour   Intake 320 ml   Output 500 ml   Net -180 ml       Physical Exam Performed:    /75   Pulse 73   Temp 98.2 °F (36.8 °C) (Axillary)   Resp 16   Ht 5' 5\" (1.651 m)   Wt (!) 343 lb 4.8 oz (155.7 kg)   SpO2 97%   BMI 57.13 kg/m²     General appearance: No apparent distress. Dressing on the right side of the face  Respiratory:  Normal respiratory effort. Clear to auscultation, bilaterally without Rales/Wheezes/Rhonchi. Cardiovascular: Regular rate and rhythm with normal S1/S2 without murmurs, rubs or gallops. Abdomen: Soft, non-tender, non-distended with normal bowel sounds. Musculoskeletal: No edema on both LE   Neurologic: no new deficit  Psychiatric: Alert and oriented       Labs:   Recent Labs     12/20/22  0635 12/21/22  0247 12/22/22  0600   WBC 3.3* 3.7* 3.3*   HGB 8.6* 8.4* 8.8*   HCT 25.8* 24.9* 26.9*    197 197     No results for input(s): NA, K, CL, CO2, BUN, CREATININE, CALCIUM, PHOS in the last 72 hours. Invalid input(s): MAGNES  No results for input(s): AST, ALT, BILIDIR, BILITOT, ALKPHOS in the last 72 hours. No results for input(s): INR in the last 72 hours. No results for input(s): [place] Bounds in the last 72 hours.     Urinalysis:      Lab Results   Component Value Date/Time    NITRU Negative 10/12/2021 01:46 PM    WBCUA 89 10/12/2021 01:46 PM    BACTERIA 2+ 10/12/2021 01:46 PM    RBCUA 7 10/12/2021 01:46 PM    BLOODU Negative 10/12/2021 01:46 PM    SPECGRAV 1.023 10/12/2021 01:46 PM    GLUCOSEU 100 10/12/2021 01:46 PM       Radiology:  MRI ABDOMEN W WO CONTRAST MRCP   Final Result      Limited study due to multiple factors, as outlined above. Lesion of the right hepatic lobe is most consistent with a hemangioma. Follow-up multiphase CT is recommended in 6 months. CT CHEST PULMONARY EMBOLISM W CONTRAST   Final Result      Large burden of pulmonary emboli without obvious heart strain of the exam is severely limited and difficult to evaluate for other pathology      Findings called to patient's floor nurse on 12/13/2022 at 7:09 PM      IR EMBOLIZATION HEMORRHAGE   Final Result   Impression: Technically successful superior mesenteric, common hepatic and gastroduodenal angiograms with subsequent coiling of the gastroduodenal artery. VL Extremity Venous Bilateral   Final Result      CTA ABDOMEN PELVIS W WO CONTRAST   Final Result      1. Suspected bilateral pulmonary emboli but inadequate contrast phase to assess for the pulmonary embolus. Incomplete filling of the pulmonary arteries suggests pulmonary emboli inferiorly     2. No sign of any acute or active GI bleeding on CT angiograms studies   3. No sign of any aneurysm in the abdomen with normal widely patent vessels   4. Stable appearing partially calcified left adrenal adenoma. 5. Parapelvic renal cysts, largest on left side   6. 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 4.1 x 3.5 cm right ovarian cyst, benign in appearance   8. Mild cholelithiasis   9.  No free fluid or free air with right abdominal wall subcutaneous inflammation or prior instrumentation         CT ABDOMEN PELVIS W WO CONTRAST Additional Contrast? Radiologist Recommendation   Final Result      1.  9.5 cm mass in the right hepatic lobe which does not appear to represent a hemangioma. Differential diagnosis would include primary or secondary malignancy. Liver mass protocol MRI with and without contrast is recommended. 2.  No intra-abdominal hemorrhage. 3.  Mild cholelithiasis. 4.  3.5 cm right ovarian cyst, the CT appearance suggests a simple cyst indicating a benign finding. MRI BRAIN W WO CONTRAST   Final Result      1. Postsurgical changes from right temporal and transmastoid craniotomy with large amount of fat packing in the defect and extracranial soft tissues. Large subcutaneous fluid collection is present in the scalp surrounding and extending superiorly from    the fat packing. No diffusion restriction to indicate superimposed infection. 2.  Residual meningioma in the right cerebellopontine angle, prepontine cistern, and right Meckel's cave/cavernous sinus. The tumor extends partially encasing the basilar artery   3. Small area of acute to subacute ischemia in the right brachium pontis. Small enhancing lesions superior to the right tentorium is new from the prior study and may represent an area of subacute ischemia. 4.  Small amount of extra-axial hemorrhage along the right cerebellopontine angle. 5.  Stable 12 mm left frontal meningioma. CTA PULMONARY W CONTRAST   Final Result      Significantly limited study due to motion. 1. Significantly limited study due to streak artifact and suboptimal bolus. No evidence of a central embolus. 2. Multifocal airspace consolidation is present, suboptimally evaluated due to motion. This presumably reflects pneumonia. Follow-up chest CT is recommended to document resolution. 3. There is a large mass in the right hepatic lobe measuring 8.2 x 7.4 cm, demonstrating peripheral nodular enhancement. There is significant amount of artifact through this lesion. It is still favored to reflect a hemangioma. Recommend a multiphasic CT    of the abdomen for further assessment.  This could be performed on a nonurgent basis, if clinically appropriate. 4. Partially calcified nodule arising from the left adrenal gland, most likely benign and possibly reflecting old adrenal hemorrhage. XR CHEST PORTABLE   Final Result      Similar appearance of patchy airspace disease. FL MODIFIED BARIUM SWALLOW W VIDEO   Final Result      1. Laryngeal penetration, but no evidence of tracheal aspiration. XR CHEST PORTABLE   Final Result      Patchy left upper lobe airspace disease, atelectasis versus pneumonia. CT HEAD WO CONTRAST   Final Result      1. Interval postsurgical changes from resection of right cerebellopontine angle mass with associated right temporal mastoid resection and frontotemporal craniotomy and fat graft placement. 2.  Thin hyperdensity along the fat graft may represent small amount of postsurgical blood products. There is also a small focus of subarachnoid hemorrhage along the right temporal lobe. 3.  Mild pneumocephalus and bilateral subgaleal fluid is also likely postsurgical.             IP CONSULT TO CRITICAL CARE  IP CONSULT TO PHYSICAL MEDICINE REHAB  IP CONSULT TO GI  IP CONSULT TO HOSPITALIST  IP CONSULT TO NEUROCRITICAL CARE  IP CONSULT TO PULMONOLOGY    Assessment/Plan:    Active Hospital Problems    Diagnosis     Multiple subsegmental pulmonary emboli without acute cor pulmonale ([address] [I26.94]      Priority: Medium    S/P craniotomy [Z98.890]      Priority: Medium    Acoustic neuroma ([address] [D33.3]      Priority: Medium    Cerebellopontine angle meningioma ([address] [D32.0]      Priority: Medium   -ENT following. On keppra. S/p STAGE II: RESECTION OF RIGHT PETROCLIVAL MENINGIOMA. POD 22    Acute GI bleed due to bleeding duodenal ulcers  Acute blood loss anemia  Status post EGD with intervention;CT abdomen has no signs of any ongoing acute bleeding. S/p EGD 12/16/22. Continue PPI bid. No signs of bleeding  GI following    Bilateral pulmonary emboli:  Dopplers neg for DVT.     On heparin gtt currently. GI is ok with transition to NOAC today. Will start on xarelto. Monitor for GIB. Liver lesion  -MRI Abd/pelvis showed lesion of the right hepatic lobe most consistent with a hemangioma. Follow-up multiphase CT is recommended in 6 months  -follow with PCP     DM: cont lantus and SSI for now, once stable and plan for ARU would restart home meds. HTN: stable, cont home meds, monitor. Hypokalemia  Hypomagnesemia  supplement K and mag as needed      Obesity class III: BMI 58.7. follow with PCP     DVT Prophylaxis: heparin   Diet: ADULT DIET;  Dysphagia - Soft and Bite Sized  Code Status: Full Code  PT/OT Eval Status: ongoing    Dispo - per primary          Cyndy Fontaine MD

## 2022-12-22 NOTE — PROGRESS NOTES
Comprehensive Nutrition Assessment    RECOMMENDATIONS:  PO Diet: continue dysphagia- soft & bite sized diet  ONS: not needed at this time w/ current intakes in place  Nutrition Education: Education not indicated   Monitor nutrition adequacy, pertinent labs, bowel habits, wt changes, and clinical progress    NUTRITION ASSESSMENT:   Nutritional summary & status: Follow up. Pt on dysphagia-soft & bite sized diet w/ PO intake ave % most all meals since admit. RD spoke w/ pt this AM. Pt reports adequate appetite & intakes. Suspect pt meeting EEN w/ current intakes in place. Pt not interested in ONS at this time, pt meeting needs w/o ONS. Rec'd continuing POC. Pt had no further nutritional concerns today, pt states she is going to ARU today. RD to continue following during LOS. Admission/PMH: Cerebelloontine angle meningioma, HTN    MALNUTRITION ASSESSMENT  Context of Malnutrition: Acute Illness   Malnutrition Status: At risk for malnutrition (Comment)  Findings of the 6 clinical characteristics of malnutrition (Minimum of 2 out of 6 clinical characteristics is required to make the diagnosis of moderate or severe Protein Calorie Malnutrition based on AND/ASPEN Guidelines):  Energy Intake:  Mild decrease in energy intake (Comment)  Weight Loss:  No significant weight loss     Body Fat Loss:  No significant body fat loss     Muscle Mass Loss:  No significant muscle mass loss    Fluid Accumulation:  No significant fluid accumulation      NUTRITION DIAGNOSIS   Overweight/Obese related to food and nutrition related knowledge deficit as evidenced by BMI    Nutrition Monitoring and Evaluation:   Food/Nutrient Intake Outcomes:  Food and Nutrient Intake  Physical Signs/Symptoms Outcomes:  Biochemical Data, Nutrition Focused Physical Findings, Weight, Chewing or Swallowing     OBJECTIVE DATA: Significant to nutrition assessment  Nutrition Related Findings:  (trending down). BM+ 12/22.  Trace edema BLE  Wounds: None  Nutrition Goals: PO intake 75% or greater, prior to discharge     [address] DIET; Dysphagia - Soft and Bite Sized  PO Intake: %   PO Supplement Intake:None Ordered  Additional Sources of Calories/IVF:N/A     ANTHROPOMETRICS  Current Height: 5' 5\" (165.1 cm)  Current Weight: (!) 343 lb 4.8 oz (155.7 kg)    Admission weight: 299 lb (135.6 kg) (PER CARDIO NOTE 11/18/22)  Ideal Body Weight (IBW): 125 lbs  (57 kg)    BMI: 57.2    COMPARATIVE STANDARDS  Energy (kcal):  5662-8024 (8-10 kcal/kg CBW)     Protein (g):  57-68 (1.0-1.2 g/kg IBW)       Fluid (mL/day):  1 mL/kcal or per MD    The patient will be monitored per nutrition standards of care. Consult dietitian if additional nutrition interventions are needed prior to RD reassessment.      Willow Mayers, [address] [place] [address], [address] [place] [address] Drive:  [phone]  Office:  [phone]

## 2022-12-22 NOTE — PLAN OF CARE
Problem: Pain  Goal: Verbalizes/displays adequate comfort level or baseline comfort level  12/22/2022 0750 by Jolene Flores RN  Outcome: Progressing   Pt denies any pain at this time. Problem: Safety - Adult  Goal: Free from fall injury  12/22/2022 0750 by Jolene Flores RN  Outcome: Progressing   All fall precautions in place. Bed locked and in lowest position with alarm on. Overbed table and personal belonings within reach. Call light within reach and patient instructed to use call light for assistance. Non-skid socks on.

## 2022-12-22 NOTE — PROGRESS NOTES
Pt is A&Ox4. Pt's vital is stable. Neuro remains unchanged. Denies pain, nausea and vomiting. Voiding adequately via pure wick. pt is sleeping most of the night. incision side CDI. All fall precaution are in place. call light within a reach. bed is lowest position. Bed Alarm is on for safety. Will continue monitor . [facility] ...

## 2022-12-22 NOTE — PROGRESS NOTES
Patient arrived to room 3101 from 5T. Patient A&Ox4, VSS. Neuro checks unchanged pt denies numbness or tingling. Surgical incision KARSON, remains clean, dry, and intact. Patient oriented to room and instructed to call out for needs. Fall precautions in place.

## 2022-12-22 NOTE — PROGRESS NOTES
NEUROSURGERY POST-OP PROGRESS NOTE    Patient Name: Sofia Bean YOB: 1955   Sex: Female Age: 79 yrs     Medical Record Number: [id number] Acct Number: [de-identified]   Room Number: 1926/6503-06 Hospital Day: Hospital Day: 25     Interval History:  Procedure(s) (LRB): POD 24 s/p  STAGE II: RESECTION OF RIGHT PETROCLIVAL MENINGIOMA (N/A) with Dr. Afsaneh Owen     Drop in hemoglobin 12 -> 7.8 concern for active GI bleed  Stat CT abdomen  GI consult  Embolization of the GDA on 12/12/2022  CTPA yesterday showed large PE with no obvious heart strain. Patient nuero exam unchanged. VSS. No signs of respiratory distress, and patient is able to maintain O2 sat on room air. Pulmonology and GI discussed the PE situation and they are in agreement that the risk;benefit ratio favors heparin anticoagulation now, and transition to an oral agent today if ok with GI  Hgb remains >8.0   Neurologically stable on exam with HB 5 right facial function. Large pseudomeningocele. Started on Xarelto 12/21    Subjective:  no acute complaints, anticipating rehab placement today and in good spirits. Objective:    VITAL SIGNS   BP (!) 147/75   Pulse 73   Temp 98.2 °F (36.8 °C) (Axillary)   Resp 18   Ht 5' 5\" (1.651 m)   Wt (!) 343 lb 4.8 oz (155.7 kg)   SpO2 90%   BMI 57.13 kg/m²    Height Height: 5' 5\" (165.1 cm)   Weight Weight: (!) 343 lb 4.8 oz (155.7 kg)          Allergies Allergies   Allergen Reactions    Keflex [Cephalexin] Itching and Rash    Codeine Nausea And Vomiting    Tomato Rash      NPO Status ADULT DIET; Dysphagia - Soft and Bite Sized   Isolation No active isolations     LABS   Basic Metabolic Profile No results for input(s): NA, POTASSIUM, CL, CO2, BUN, CREATININE, GLUCOSE, CA, ALB, PHOS, MG in the last 72 hours.      Complete Blood Count Recent Labs     12/20/22  0635 12/21/22  0247 12/22/22  0600   WBC 3.3* 3.7* 3.3*   RBC 2.80* 2.69* 2.89*        Coagulation Studies No results for input(s): PTT, INR in the last 72 hours. Invalid input(s): PLATELETS, PROA, PT, PTTA         MEDICATIONS   Inpatient Medications     rivaroxaban, 15 mg, Oral, BID WC **FOLLOWED BY** [START ON 1/11/2023] rivaroxaban, 20 mg, Oral, Dinner    potassium chloride, 20 mEq, Oral, BID WC    pantoprazole, 40 mg, IntraVENous, BID    levETIRAcetam, 500 mg, IntraVENous, Q12H    cetirizine, 10 mg, Oral, Daily    [Held by provider] hydroCHLOROthiazide, 25 mg, Oral, Daily    insulin lispro, 0-16 Units, SubCUTAneous, TID WC    insulin lispro, 0-4 Units, SubCUTAneous, Nightly    methIMAzole, 10 mg, Oral, Daily    insulin glargine, 15 Units, SubCUTAneous, Nightly    melatonin, 5 mg, Oral, Nightly    sodium chloride flush, 5-40 mL, IntraVENous, 2 times per day    [Held by provider] carvedilol, 12.5 mg, Oral, BID WC    [Held by provider] losartan, 100 mg, Oral, Daily    sodium chloride flush, 5-40 mL, IntraVENous, 2 times per day    latanoprost, 1 drop, Both Eyes, Nightly   Infusions    sodium chloride      sodium chloride 25 mL/hr (12/18/22 0937)    dextrose        Antibiotics   Recent Abx Admin        No antibiotic orders with administrations found. Imaging:   Abdominal CT  Impression       1.  9.5 cm mass in the right hepatic lobe which does not appear to represent a hemangioma. Differential diagnosis would include primary or secondary malignancy. Liver mass protocol MRI with and without contrast is recommended. 2.  No intra-abdominal hemorrhage. 3.  Mild cholelithiasis. 4.  3.5 cm right ovarian cyst, the CT appearance suggests a simple cyst indicating a benign finding.      Neurologic Exam:  Mental status: awake and alert and oriented x4    Cranial Nerves:  II: Visual acuity not tested, denies new visual changes / diplopia  III, IV, VI: PERRL, 3 mm bilaterally, EOMI,Patient had rightward gaze deviation but was able to follow when prompted   V: Facial sensation intact bilaterally to touch  VII: R sided facial droop  VIII: Hearing intact bilaterally to spoken voice on L, no hearing on R  IX: Palate movement equal bilaterally  XI: Shoulder shrug equal bilaterally  XII: Tongue deviates to R    Musculoskeletal:   Gait: Not tested   Tone: normal  Sensory: intact to all extremities  Motor strength:    Right  Left    Right  Left    Deltoid  5 5  Hip Flex  5 5   Biceps  5 5  Knee Extensors  5 5   Triceps  5 5  Knee Flexors  5 5   Wrist Ext  5 5  Ankle Dorsiflex. 5 5   Wrist Flex  5 5  Ankle Plantarflex. 5 5   Handgrip  5 5  Ext Ernie Longus  5 5   Thumb Ext  5 5         Mastoid Incision: sutures c/d/I     Respiratory:  Unlabored respiratory pattern    Abdomen:   Soft, ND   Last BM 12/20    Cardiovascular:  Warm, well perfused    Assessment:   Patient is a 80 yo F s/p Procedure(s) (LRB):  STAGE II: RESECTION OF RIGHT PETROCLIVAL MENINGIOMA (N/A) per Dr. Link Frye    Plan:  Neurologic exam frequency: Q4H  Mobility: PT/OT   SLP continue  PICC line  DVT Prophylaxis: SCDs, on anticoagulation   Keppra BID   Bowel Regimen: Per GI  Pain control: Tylenol  Keep SBP < 160  Incisional Care: Mastoid compression headband in place check Q4H and let NS know if leaking  GI Bleed: GI Following, Protonix 40 mg BID, Embolization of CHU done, Hgb remains stable @ 8.6, Continue Daily CBC   Will remove sutures on POD 30 while in ARU  PE: CTPA shows large PE with no obvious heart strain. Pulmonology and GI discussed the PE situation and they are in agreement that the risk  -transitioned to Xarelto 12/21        Dispo: dc to ARU     Patient was seen and examined with Dr. Angelo Bean who agrees with above assessment and plan. Electronically signed by:  LEE Mcgee NP, 12/22/2022 10:16 AM   Neurosurgery Nurse Practitioner  [phone]

## 2022-12-22 NOTE — CARE COORDINATION
Case Management Assessment            Discharge Note                    Date / Time of Note: 12/22/2022 12:11 PM                  Discharge Note Completed by: BONNIE Sánchez    Patient Name: Bijan Nichole   YOB: 1955  Diagnosis: Acoustic neuroma [facility]) [D33.3]  Balance problem [R26.89]  Meningioma ([facility] [address] [D32.9]  Dizziness [R42]  Cerebellopontine angle meningioma ([facility] [address] [D32.0]   Date / Time: 11/28/2022  5:33 AM    Current PCP: [address] [place] patient: No    Hospitalization in the last 30 days: No    Advance Directives:  Code Status: Full Code  [place] DNR form completed and on chart: Not Indicated    Financial:  Payor: MEDICARE / Plan: MEDICARE PART A AND B / Product Type: *No Product type* /      Pharmacy:    Replaced by [facility],[address], [place] [address] [phone] [facility] [phone]  [address] [place] [address] [address]  Phone: [phone] Fax: [address], [address] [phone] - F [phone]  [address] [facility] [address]  Phone: [phone] Fax: [phone]      Assistance purchasing medications?:    Assistance provided by Case Management: None at this time    Does patient want to participate in local refill/ meds to beds program?: No    Meds To Beds General Rules:  1. Can ONLY be done Monday- Friday between 8:30am-5pm  2. Prescription(s) must be in pharmacy by 3pm to be filled same day  3. Copy of patient's insurance/ prescription drug card and patient face sheet must be sent along with the prescription(s)  4. Cost of Rx cannot be added to hospital bill. If financial assistance is needed, please contact unit  or ;  or  CANNOT provide pharmacy voucher for patients co-pays  5.  Patients can then  the prescription on their way out of the hospital at discharge, or pharmacy can deliver to the bedside if staff is available. (payment due at time of pick-up or delivery - cash, check, or card accepted)     Able to afford home medications/ co-pay costs: Yes    ADLS:  Current PT AM-PAC Score: 16 /24  Current OT AM-PAC Score: 16 /24      DISCHARGE Disposition: Inpatient Rehab: [facility] Phone: [phone] Fax:      LOC at discharge: Not Applicable  [place] Completed: Not Indicated    Notification completed in HENS/PAS?:  Not Applicable    IMM Completed:   Not Indicated    Transportation:  Transportation PLAN for discharge:  hospital transport team    Mode of Transport:  hospital transport team  Reason for medical transport: Not Applicable  Name of [address] [place] [address],[address]: Not Applicable  Time of Transport: n/a    Transport form completed: Not Indicated    Referrals made at [facility] for outpatient continued care:  Not Applicable    Additional CM Notes: Pt is from home alone. Pt is discharging to [facility] this afternoon. Pt is in agreement with this plan. No other CM needs identified at time of discharge. The Plan for Transition of Care is related to the following treatment goals of Acoustic neuroma [facility]) [D33.3]  Balance problem [R26.89]  Meningioma ([facility] [address] [D32.9]  Dizziness [R42]  Cerebellopontine angle meningioma ([facility] [address] [D32.0]    The Patient and/or patient representative Alexandre Ruano and her family were provided with a choice of provider and agrees with the discharge plan Yes    Freedom of choice list was provided with basic dialogue that supports the patient's individualized plan of care/goals and shares the quality data associated with the providers.  Yes    Care Transitions patient: No    BONNIE Rogers  [facility]  Case Management Department  Ph: [phone]  Fax: [phone]

## 2022-12-22 NOTE — PROGRESS NOTES
Occupational Therapy  Occupational Therapy  Daily Treatment Note  Patient Name: Claudetta Dose  MRN: [id number]    Assessment:   Pt progressing this session completing ADL washing up in stance at sink with CGA requiring increased time and rest break upon completion. Transfers completed with CGA to Min A with Vcs for safe positioning. Pt would benefit from intensive inpt OT. Continue OT per Miranda 41. Discharge Recommendations:     Claudetta Dose scored a 16/24 on the AM-PAC ADL Inpatient form. Current research shows that an AM-PAC score of 17 or less is typically not associated with a discharge to the patient's home setting. Based on the patient's AM-PAC score and their current ADL deficits, it is recommended that the patient have 5-7 sessions per week of Occupational Therapy at d/c to increase the patient's independence. At this time, this patient demonstrates complex nursing, medical, and rehabilitative needs, and would benefit from intensive rehabilitation services upon discharge from the Inpatient setting. This patient demonstrates the ability to participate in and benefit from an intensive therapy program with a coordinated interdisciplinary team approach to foster frequent, structured, and documented communication among disciplines, who will work together to establish, prioritize, and achieve treatment goals. Please see assessment section for further patient specific details. If patient discharges prior to next session this note will serve as a discharge summary. Please see below for the latest assessment towards goals. Equipment Needs:   defer    Chart Reviewed: Yes     Restrictions/Precautions: Fall Risk (high fall risk) Other position/activity restrictions:  Up with assistance, progressive ambulation, up in the chair for meals, HOB elevated to 30 degrees; up as tolerated     Additional Pertinent Hx: Pt is a 80 yo female that presents to the hospital for a procedure;STAGE 1, RIGHT TRANSLABYRINTHINE / RETROLABYRINTHINE CRANIOTOMY POSSIBLE PETROSECTOMY  RIGHT TRANSMASTOID / ANTERIOR MIDDLE CRANIAL FOSSA , ABDOMINAL FAT GRAFT EXTERNAL AUDITORY CANAL CLOSURE on 11/28. EGD 12/11:  multiple duodenal ulcers, clip placement x 3. Abd CT 12/11:  suspected Bilat PE.  LE dopplers 12/11: neg. Pt s/p  GDA embo on 12/12.  12/13 CTPA: (+) PE; PMH; Loss of hearing bilaterally, arthritis, brain tumor, diabetes.       Diagnosis: Cerebellopontine angle meningioma  Treatment Diagnosis: impaired ADLs and functional mobility    Subjective: Pt met supine in bed and agreeable to OT session    Pain: denies      Social/Functional History  Lives With: Alone  Type of Home: House  Home Layout: One level, Able to Live on Main level with bedroom/bathroom, Performs ADL's on one level  Home Access: Stairs to enter with rails  Entrance Stairs - Number of Steps: 2  Bathroom Shower/Tub: Walk-in shower  Bathroom Equipment: Shower chair, Grab bars in shower, Hand-held shower  Home Equipment: [employer] [employer], Sock aid (walker, unsure what kind)  Has the patient had two or more falls in the past year or any fall with injury in the past year?: No  ADL Assistance: [facility] [address]: Independent  Homemaking Responsibilities: Yes  Ambulation Assistance: Independent (with walker, unable to determine which kind)  Transfer Assistance: Independent  Active : Yes  Leisure & Hobbies: crafts  Additional Comments: pt is questionable to poor historian; pt reports no one can stay with her upon going home  Prior Function  ADL Assistance: [address]: Independent (with walker, unable to determine which kind)  Transfer Assistance: Independent  Additional Comments: pt is questionable to poor historian; pt reports no one can stay with her upon going home    Objective:    Cognition/Orientation:  Oriented x4 decreased short term memory      Bed mobility   Supine to sit: SBA with Vcs for safe posiitoning EOB  Scooting: CGA to EOB and back in chair    Functional Mobility   Sit to Stand: CGA from EOB and recliner chair  Stand to Sit: CGA   Bed to Chair Transfer: CGA to Min A with Vcs for safe positioning to transfer surface  Commode Transfer: CGA with use of GB  Other: Functional mobility demonstrated to and from bathroom with RW and CGA twith slightly unsteady gait and slow progression    ADLs   Grooming: CGA in stance at sink with cues for initiation of task  Bathing: CGA washing UE in stance at sink  UB dressing: CGA donning gown seated in recliner chair  LB dressing: decline  Toileting: CGA with assist for positioning and Vcs for location of toilet paper      Activity Tolerance:  Pt with good tolerance but requiring rest breaks after ADL tasks    Patient Education:   Safe transfers, keeping RW close, Role of OT, - pt verb understanding    Safety Devices in Place:  Pt left seated in recliner chair with alarm on and call light irneach       Therapy Time:   Individual Concurrent Group Co-treatment   Time In  900         Time Out  938         Minutes  38           Timed Code Treatment Minutes:  38    Total Treatment Minutes:         Timed Code Treatment Minutes: Total Treatment Time:     Goals:(as determined and assessed by primary OT)   Short Term Goals  Time Frame for Short Term Goals: by discharge  Short Term Goal 1: Pt will perform bed mobility with CGA to decrease caregiver burden- goal met 12/2. NEW GOAL: Pt will perform bed mobility with supervision - SBA Goal not met  Short Term Goal 2: Pt will sit EOB 5 mins with no more than CGA-  goal met 12/8 on BSC. NEW GOAL: sit unsupported 5 mins with Supervision - goal not met   Short Term Goal 3: Pt will perform grooming activity with Min A and set-up- goal met 12/1. New Goal: Pt will perform grooming ax with set-up and supervision - goal not met  Short Term Goal 4: Pt will tolerate sit to stand transfer- goal met 12/1.  NEW GOAL: sit to stand transfer with Mod x2- goal met 12/2 with Min +CGA and CGA x2; NEW GOAL: stance x2 mins with CGA- goal met 12/15; New goal: pt will perform fx mobility to/from bathroom with CGA - goal met 12/22         Plan:      Times Per Week: 5-7   Times Per Day: Once a day    If patient is discharged prior to next treatment, this note will serve as the discharge summary.       [address] [place] [address], Ne, SUBRAMANIAN/L

## 2022-12-22 NOTE — PROGRESS NOTES
Progress Note  Physical Medicine and Rehabilitation    Patient: Jose Padron  [id number]  Date: 12/22/2022    Chief Complaint: Meningioma     Interval history:  Heparin gtt was transitioned to Xarelto yesterday (12/21). No acute events over night. Patient was seen at bed side this morning. She is feeling great and denied any symptoms. She would like to start her therapy at [facility]. History of Present Illness/Hospital Course:  Patient is a morbidly obese (Body mass index is 50.82 kg/m².), 79 y.o. female  with c/o dizziness, HA and vertigo in the setting of large right petroclival mass which is consistent with meningioma. . She presented to the hospital for a scheduled two staged petroclival meningioma resection. 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 Prior Level of Function:  Independent for mobility, ADLs, and IADLs     Current Level of Function:  Min assist     Pertinent Social History:  Support: Lives alone  Home set-up: One level house with 2 steps to enter    Past Medical History:   Diagnosis Date    Arthritis     Asthma     mild    Brain tumor ([address]     Diabetes mellitus ([address]     borderline    High cholesterol     Hypertension     Imbalance     DUE TO TUMOR- USING ANKLE BRACE / WALKER PER PREOP H&P    Loss of hearing     bilateral reported    Vertigo        Past Surgical History:   Procedure Laterality Date    CRANIOTOMY N/A 11/29/2022    STAGE II: RESECTION OF RIGHT PETROCLIVAL MENINGIOMA performed by Angela Frye MD at [address]    IR EMBOLIZATION HEMORRHAGE  12/12/2022    IR EMBOLIZATION HEMORRHAGE 12/12/2022 [facility] PROCED[facility]    MASTOIDECTOMY Right 11/28/2022    RIGHT TRANSMASTOID / ANTERIOR MIDDLE CRANIAL FOSSA , ABDOMINAL FAT GRAFT EXTERNAL AUDITORY CANAL CLOSURE performed by Soumya Calvert MD at [place] [address] Right 11/28/2022    STAGE 1, RIGHT TRANSLABYRINTHINE / RETROLABYRINTHINE CRANIOTOMY, PETROSECTOMY performed by Haile Salinas. Ishan Hess MD at [facility] Right 11/8/2021    RIGHT TOTAL HIP ARTHROPLASTY POSTERIOR APPROACH - [place]en Bonds ADVANCED performed by Merton Galeazzi, MD at [address] [facility], [facility] Right 07/14/2015    TOTAL KNEE ARTHROPLASTY Left 10/14/15    TKA    UPPER GASTROINTESTINAL ENDOSCOPY N/A 12/11/2022    EGD CONTROL HEMORRHAGE performed by Darby Oseguera MD at [address] N/A 12/16/2022    EGD DIAGNOSTIC ONLY performed by Melissa Plummer MD at [address] ENDOSCOPY       Family History   Problem Relation Age of Onset    Cancer Mother     Hypotension Mother     Cancer Father     Cancer Sister     Hypotension Sister     Cancer Maternal Grandmother     Cancer Maternal Grandfather     Cancer Paternal Grandmother     Cancer Paternal Grandfather     Hypotension Other     Cancer Other     Diabetes Other     Osteoarthritis Other        Social History     Socioeconomic History    Marital status:      Spouse name: None    Number of children: None    Years of education: None    Highest education level: None   Tobacco Use    Smoking status: Former    Smokeless tobacco: Never   Substance and Sexual Activity    Alcohol use: No    Drug use: Never           REVIEW OF SYSTEMS:   CONSTITUTIONAL: negative for fevers, chills   EYES: positive for diplopia   HEENT: positive for difficulty swallowing. negative for hearing loss, tinnitus, sinus pressure, nasal congestion  RESPIRATORY: Negative for SOB, cough  CARDIOVASCULAR: negative for chest pain, palpitations  GASTROINTESTINAL: positive for abdominal pain.  negative for hematemesis, hematochezia, nausea, vomiting, diarrhea, abdominal pain  GENITOURINARY: negative for frequency, dysuria  HEMATOLOGIC/LYMPHATIC: negative for easy bruising, bleeding and lymphadenopathy  ENDOCRINE: negative for diabetic symptoms including polyuria, polydipsia  MUSCULOSKELETAL: negative for pain, joint swelling, decreased range of motion  NEUROLOGICAL: positive for difficulty speaking, negative for headaches, unilateral weakness    Physical Examination:  Vitals: Patient Vitals for the past 24 hrs:   BP Temp Temp src Pulse Resp SpO2   12/22/22 0645 (!) 147/75 98.2 °F (36.8 °C) Axillary 73 18 90 %   12/22/22 0245 131/75 98 °F (36.7 °C) Axillary 71 16 93 %   12/21/22 2300 119/72 98.1 °F (36.7 °C) Oral 71 18 91 %   12/21/22 1915 133/72 98.1 °F (36.7 °C) Oral 73 16 94 %   12/21/22 1516 117/74 97.8 °F (36.6 °C) Oral 74 17 94 %       Const: Alert. WDWN. No distress, obese  Eyes: Conjunctiva noninjected, no icterus noted; pupils equal, round, and reactive to light. HENT: Bruising around R eye., normocephalic; Oral mucosa moist  CV: Regular rate and rhythm, no murmur rub or gallop noted  Resp: Lungs clear to auscultation bilaterally, no rales wheezes or ronchi, no retractions. Respirations unlabored. GI: Soft, mild tenderness, nondistended. Normal bowel sounds. Skin: Normal temperature and turgor. No rashes or breakdown noted. Ext: positive for BL LE edema   MSK: No joint tenderness, erythema, warmth noted. AROM intact. Neuro: Alert, oriented, appropriate. Mild R sided facial droop. Sensation intact to light touch. Motor examination reveals normal strength in all four limbs diffusely.    Psych: Stable mood, normal judgement, normal affect     Lab Results   Component Value Date    WBC 3.3 (L) 12/22/2022    HGB 8.8 (L) 12/22/2022    HCT 26.9 (L) 12/22/2022    MCV 93.0 12/22/2022     12/22/2022     Lab Results   Component Value Date    INR 1.15 (H) 12/14/2022    INR 1.07 12/11/2022    INR 1.09 11/29/2022    PROTIME 14.6 (H) 12/14/2022    PROTIME 13.8 12/11/2022    PROTIME 14.1 11/29/2022     Lab Results   Component Value Date    CREATININE <0.5 (L) 12/18/2022    BUN 5 (L) 12/18/2022     12/18/2022    K 3.7 12/18/2022     12/18/2022    CO2 28 12/18/2022     Lab Results   Component Value Date    ALT 11 12/04/2022    AST 12 (L) 12/04/2022    ALKPHOS 48 12/04/2022    BILITOT 0.5 12/04/2022     On my review, CXR displays. MRI ABDOMEN W WO CONTRAST MRCP   Final Result      Limited study due to multiple factors, as outlined above. Lesion of the right hepatic lobe is most consistent with a hemangioma. Follow-up multiphase CT is recommended in 6 months. CT CHEST PULMONARY EMBOLISM W CONTRAST   Final Result      Large burden of pulmonary emboli without obvious heart strain of the exam is severely limited and difficult to evaluate for other pathology      Findings called to patient's floor nurse on 12/13/2022 at 7:09 PM      IR EMBOLIZATION HEMORRHAGE   Final Result   Impression: Technically successful superior mesenteric, common hepatic and gastroduodenal angiograms with subsequent coiling of the gastroduodenal artery. VL Extremity Venous Bilateral   Final Result      CTA ABDOMEN PELVIS W WO CONTRAST   Final Result      1. Suspected bilateral pulmonary emboli but inadequate contrast phase to assess for the pulmonary embolus. Incomplete filling of the pulmonary arteries suggests pulmonary emboli inferiorly     2. No sign of any acute or active GI bleeding on CT angiograms studies   3. No sign of any aneurysm in the abdomen with normal widely patent vessels   4. Stable appearing partially calcified left adrenal adenoma. 5. Parapelvic renal cysts, largest on left side   6. 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 4.1 x 3.5 cm right ovarian cyst, benign in appearance   8. Mild cholelithiasis   9. No free fluid or free air with right abdominal wall subcutaneous inflammation or prior instrumentation         CT ABDOMEN PELVIS W WO CONTRAST Additional Contrast? Radiologist Recommendation   Final Result      1.  9.5 cm mass in the right hepatic lobe which does not appear to represent a hemangioma. Differential diagnosis would include primary or secondary malignancy. Liver mass protocol MRI with and without contrast is recommended. 2.  No intra-abdominal hemorrhage. 3.  Mild cholelithiasis. 4.  3.5 cm right ovarian cyst, the CT appearance suggests a simple cyst indicating a benign finding. MRI BRAIN W WO CONTRAST   Final Result      1. Postsurgical changes from right temporal and transmastoid craniotomy with large amount of fat packing in the defect and extracranial soft tissues. Large subcutaneous fluid collection is present in the scalp surrounding and extending superiorly from    the fat packing. No diffusion restriction to indicate superimposed infection. 2.  Residual meningioma in the right cerebellopontine angle, prepontine cistern, and right Meckel's cave/cavernous sinus. The tumor extends partially encasing the basilar artery   3. Small area of acute to subacute ischemia in the right brachium pontis. Small enhancing lesions superior to the right tentorium is new from the prior study and may represent an area of subacute ischemia. 4.  Small amount of extra-axial hemorrhage along the right cerebellopontine angle. 5.  Stable 12 mm left frontal meningioma. CTA PULMONARY W CONTRAST   Final Result      Significantly limited study due to motion. 1. Significantly limited study due to streak artifact and suboptimal bolus. No evidence of a central embolus. 2. Multifocal airspace consolidation is present, suboptimally evaluated due to motion. This presumably reflects pneumonia. Follow-up chest CT is recommended to document resolution. 3. There is a large mass in the right hepatic lobe measuring 8.2 x 7.4 cm, demonstrating peripheral nodular enhancement. There is significant amount of artifact through this lesion. It is still favored to reflect a hemangioma. Recommend a multiphasic CT    of the abdomen for further assessment.  This could be performed on a nonurgent basis, if clinically appropriate. 4. Partially calcified nodule arising from the left adrenal gland, most likely benign and possibly reflecting old adrenal hemorrhage. XR CHEST PORTABLE   Final Result      Similar appearance of patchy airspace disease. FL MODIFIED BARIUM SWALLOW W VIDEO   Final Result      1. Laryngeal penetration, but no evidence of tracheal aspiration. XR CHEST PORTABLE   Final Result      Patchy left upper lobe airspace disease, atelectasis versus pneumonia. CT HEAD WO CONTRAST   Final Result      1. Interval postsurgical changes from resection of right cerebellopontine angle mass with associated right temporal mastoid resection and frontotemporal craniotomy and fat graft placement. 2.  Thin hyperdensity along the fat graft may represent small amount of postsurgical blood products. There is also a small focus of subarachnoid hemorrhage along the right temporal lobe. 3.  Mild pneumocephalus and bilateral subgaleal fluid is also likely postsurgical.               Assessment:  Giant Duodenal Ulcer  -Hgb 7.8, previously 12  -Transfuse pRBCs  -Protonix 40 mg BID  -IVF  -Embolization of GDA  -GI following  -IR following    BL PE, suspected  -currently on heparin gtt  - transition to PO DOAC? Meningioma  -s/p resection with TL and MCF approach  -Keppra 500 mg BID  -Bowel regiment: Senna, glycolax, and dulcolax  -Nicardipine-Roxicodone PRN  -Acycolvir 400 mg TID for 10 days  -Artifical tears q2H and eye patch  -Keep an eye on mastoid dressing  -SCDs for DVT prophylaxis   -PT/OT    HTN   -Coreg 12.5 mg BID  Losartan 100 mg daily    DM   -Lantus 15u qHS  -HDSSI    Hyperthyroidism   -Methimazole 10 mg daily    Liver Mass  -Incidental finding  -f/u imaging once acute problems have been tx    Impairments- Decreased functional mobility, Decreased ADLs     Recommendations:    [facility] admit today      This patient has been staffed and discussed with Dr. Jayesh Fishman.     Dipak Laurent MD, PGY3    This patient has been seen, examined, and discussed with the resident. I was part of the key critical services provided to the patient. I agree with the residents documentation. This note may have been altered to reflect my own examination findings, impression, and recommendations.        KAYLEY KinseyP.H  PM&R  12/22/2022  4:21 PM

## 2022-12-23 LAB
ANION GAP SERPL CALCULATED.3IONS-SCNC: 8 MMOL/L (ref 3–16)
BASOPHILS ABSOLUTE: 0 K/UL (ref 0–0.2)
BASOPHILS RELATIVE PERCENT: 0.7 %
BUN BLDV-MCNC: 8 MG/DL (ref 7–20)
CALCIUM SERPL-MCNC: 8.7 MG/DL (ref 8.3–10.6)
CHLORIDE BLD-SCNC: 104 MMOL/L (ref 99–110)
CO2: 28 MMOL/L (ref 21–32)
CREAT SERPL-MCNC: <0.5 MG/DL (ref 0.6–1.2)
EOSINOPHILS ABSOLUTE: 0.5 K/UL (ref 0–0.6)
EOSINOPHILS RELATIVE PERCENT: 14 %
GFR SERPL CREATININE-BSD FRML MDRD: >60 ML/MIN/{1.73_M2}
GLUCOSE BLD-MCNC: 125 MG/DL (ref 70–99)
GLUCOSE BLD-MCNC: 132 MG/DL (ref 70–99)
GLUCOSE BLD-MCNC: 139 MG/DL (ref 70–99)
GLUCOSE BLD-MCNC: 163 MG/DL (ref 70–99)
HCT VFR BLD CALC: 26.8 % (ref 36–48)
HEMOGLOBIN: 9 G/DL (ref 12–16)
LYMPHOCYTES ABSOLUTE: 0.9 K/UL (ref 1–5.1)
LYMPHOCYTES RELATIVE PERCENT: 25.3 %
MCH RBC QN AUTO: 30.9 PG (ref 26–34)
MCHC RBC AUTO-ENTMCNC: 33.7 G/DL (ref 31–36)
MCV RBC AUTO: 91.8 FL (ref 80–100)
MONOCYTES ABSOLUTE: 0.4 K/UL (ref 0–1.3)
MONOCYTES RELATIVE PERCENT: 10.4 %
NEUTROPHILS ABSOLUTE: 1.7 K/UL (ref 1.7–7.7)
NEUTROPHILS RELATIVE PERCENT: 49.6 %
PDW BLD-RTO: 17.4 % (ref 12.4–15.4)
PERFORMED ON: ABNORMAL
PLATELET # BLD: 185 K/UL (ref 135–450)
PMV BLD AUTO: 8 FL (ref 5–10.5)
POTASSIUM REFLEX MAGNESIUM: 4.1 MMOL/L (ref 3.5–5.1)
RBC # BLD: 2.92 M/UL (ref 4–5.2)
SODIUM BLD-SCNC: 140 MMOL/L (ref 136–145)
WBC # BLD: 3.5 K/UL (ref 4–11)

## 2022-12-23 PROCEDURE — 6360000002 HC RX W HCPCS: Performed by: PHYSICAL MEDICINE & REHABILITATION

## 2022-12-23 PROCEDURE — C9113 INJ PANTOPRAZOLE SODIUM, VIA: HCPCS | Performed by: PHYSICAL MEDICINE & REHABILITATION

## 2022-12-23 PROCEDURE — 2580000003 HC RX 258: Performed by: PHYSICAL MEDICINE & REHABILITATION

## 2022-12-23 PROCEDURE — 97116 GAIT TRAINING THERAPY: CPT

## 2022-12-23 PROCEDURE — 6370000000 HC RX 637 (ALT 250 FOR IP): Performed by: PHYSICAL MEDICINE & REHABILITATION

## 2022-12-23 PROCEDURE — 97535 SELF CARE MNGMENT TRAINING: CPT

## 2022-12-23 PROCEDURE — 36415 COLL VENOUS BLD VENIPUNCTURE: CPT

## 2022-12-23 PROCEDURE — 92523 SPEECH SOUND LANG COMPREHEN: CPT

## 2022-12-23 PROCEDURE — 97530 THERAPEUTIC ACTIVITIES: CPT

## 2022-12-23 PROCEDURE — 85025 COMPLETE CBC W/AUTO DIFF WBC: CPT

## 2022-12-23 PROCEDURE — 92610 EVALUATE SWALLOWING FUNCTION: CPT

## 2022-12-23 PROCEDURE — 80048 BASIC METABOLIC PNL TOTAL CA: CPT

## 2022-12-23 PROCEDURE — 97162 PT EVAL MOD COMPLEX 30 MIN: CPT

## 2022-12-23 PROCEDURE — 1280000000 HC REHAB R&B

## 2022-12-23 PROCEDURE — 97166 OT EVAL MOD COMPLEX 45 MIN: CPT

## 2022-12-23 RX ADMIN — CETIRIZINE HYDROCHLORIDE 10 MG: 10 TABLET, FILM COATED ORAL at 11:08

## 2022-12-23 RX ADMIN — INSULIN GLARGINE 15 UNITS: 100 INJECTION, SOLUTION SUBCUTANEOUS at 20:51

## 2022-12-23 RX ADMIN — RIVAROXABAN 15 MG: 15 TABLET, FILM COATED ORAL at 18:49

## 2022-12-23 RX ADMIN — LEVETIRACETAM 500 MG: 500 TABLET, FILM COATED ORAL at 11:08

## 2022-12-23 RX ADMIN — PANTOPRAZOLE SODIUM 40 MG: 40 INJECTION, POWDER, LYOPHILIZED, FOR SOLUTION INTRAVENOUS at 20:46

## 2022-12-23 RX ADMIN — POTASSIUM CHLORIDE 20 MEQ: 20 TABLET, EXTENDED RELEASE ORAL at 11:08

## 2022-12-23 RX ADMIN — METHIMAZOLE 10 MG: 5 TABLET ORAL at 09:38

## 2022-12-23 RX ADMIN — SODIUM CHLORIDE, PRESERVATIVE FREE 10 ML: 5 INJECTION INTRAVENOUS at 11:17

## 2022-12-23 RX ADMIN — SODIUM CHLORIDE, PRESERVATIVE FREE 10 ML: 5 INJECTION INTRAVENOUS at 11:10

## 2022-12-23 RX ADMIN — LEVETIRACETAM 500 MG: 500 TABLET, FILM COATED ORAL at 20:46

## 2022-12-23 RX ADMIN — LATANOPROST 1 DROP: 50 SOLUTION OPHTHALMIC at 20:58

## 2022-12-23 RX ADMIN — PANTOPRAZOLE SODIUM 40 MG: 40 INJECTION, POWDER, LYOPHILIZED, FOR SOLUTION INTRAVENOUS at 11:08

## 2022-12-23 RX ADMIN — SODIUM CHLORIDE, PRESERVATIVE FREE 10 ML: 5 INJECTION INTRAVENOUS at 11:09

## 2022-12-23 RX ADMIN — RIVAROXABAN 15 MG: 15 TABLET, FILM COATED ORAL at 09:39

## 2022-12-23 RX ADMIN — POTASSIUM CHLORIDE 20 MEQ: 20 TABLET, EXTENDED RELEASE ORAL at 18:49

## 2022-12-23 RX ADMIN — Medication 5 MG: at 20:46

## 2022-12-23 ASSESSMENT — PAIN DESCRIPTION - LOCATION: LOCATION: BACK

## 2022-12-23 ASSESSMENT — PAIN SCALES - GENERAL: PAINLEVEL_OUTOF10: 1

## 2022-12-23 ASSESSMENT — PAIN DESCRIPTION - ORIENTATION: ORIENTATION: LOWER

## 2022-12-23 NOTE — PROGRESS NOTES
Physical Therapy  Facility/Department: [facility] UNIT  Rehabilitation Physical Therapy Initial Assessment    NAME: Ekta Law  : 1955 (79 y.o.)  MRN: [id number]  CODE STATUS: Full Code    Date of Service: 22      Past Medical History:   Diagnosis Date    Arthritis     Asthma     mild    B[address] [address]     Diabetes mellitus ([facility] [address] [facility]     borderline    High cholesterol     Hypertension     Imbalance     DUE TO TUMOR- USING ANKLE BRACE / WALKER PER PREOP H&P    Loss of hearing     bilateral reported    Vertigo      Past Surgical History:   Procedure Laterality Date    CRANIOTOMY N/A 2022    STAGE II: RESECTION OF RIGHT PETROCLIVAL MENINGIOMA performed by Yovana Santoyo MD at [address] [place] [address] [address]    IR EMBOLIZATION HEMORRHAGE  2022    IR EMBOLIZATION HEMORRHAGE 2022 [facility] SPECIAL PROCEDURES    MASTOIDECTOMY Right 2022    RIGHT TRANSMASTOID / ANTERIOR MIDDLE CRANIAL FOSSA , ABDOMINAL FAT GRAFT EXTERNAL AUDITORY CANAL CLOSURE performed by Starr Mena MD at [place] [address] Right 2022    STAGE 1, RIGHT TRANSLABYRINTHINE / RETROLABYRINTHINE CRANIOTOMY, PETROSECTOMY performed by Jacquelyn Reyna.  Kd Santoyo MD at [facility] Right 2021    RIGHT TOTAL HIP ARTHROPLASTY POSTERIOR APPROACH - [place] ADVANCED performed by Ethel Lundborg, MD at [place] [address] Right 2015    TOTAL KNEE ARTHROPLASTY Left 10/14/15    TKA    UPPER GASTROINTESTINAL ENDOSCOPY N/A 2022    EGD CONTROL HEMORRHAGE performed by Jimmie Mccoy MD at [address] [place] [address] N/A 2022    EGD DIAGNOSTIC ONLY performed by Celina Garcia MD at [facility] ENDOSCOPY       Chart Reviewed: Yes  Patient assessed for rehabilitation services?: Yes  Additional Pertinent Hx: Pt is a 80 yo female that presents to the hospital for a procedure;STAGE 1, RIGHT TRANSLABYRINTHINE / RETROLABYRINTHINE CRANIOTOMY POSSIBLE PETROSECTOMY  RIGHT TRANSMASTOID / ANTERIOR MIDDLE CRANIAL FOSSA , ABDOMINAL FAT GRAFT EXTERNAL AUDITORY CANAL CLOSURE on 11/28. EGD 12/11:  multiple duodenal ulcers, clip placement x 3.  Abd CT 12/11:  suspected Bilat PE.  LE dopplers 12/11: neg. Pt s/p  GDA embo on 12/12.  12/13 CTPA: (+) PE; PMH; Loss of hearing bilaterally, arthritis, brain tumor, diabetes.  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    Restrictions:  Restrictions/Precautions: Fall Risk;Seizure  Position Activity Restriction  Other position/activity restrictions: Ambulate the Patient, Up with assistance, up in the chair     SUBJECTIVE  Subjective: Patient denies pain at this time. Found sitting in recliner, agreeable to therapy.        Post Treatment Pain Screening       Prior Level of Function:         OBJECTIVE  Vision  Vision: Impaired  Vision Exceptions: Wears glasses at all times (Right eye cover)    Hearing  Hearing: Exceptions to WFL  Hearing Exceptions: Hard of hearing/hearing concerns         ROM       Strength  Strength RLE  Strength RLE: WFL  Comment: .  Strength LLE  Strength LLE: WFL  Comment: .    Quality of Movement  Tone RLE  RLE Tone: Normotonic  Tone LLE  LLE Tone: Normotonic    Sensation  Overall Sensation Status: WNL    Functional Mobility  Bed mobility  Rolling to Left: Contact guard assistance (HOB flat, but use of handrails)  Rolling to Right: Contact guard assistance (HOB flat but use of handrails)  Supine to Sit: Contact guard assistance  Sit to Supine: Contact guard assistance  Scooting: Stand by assistance  Bed Mobility Comments: Bed mobility performed too fast during evaluation, pt had an episode of vertigo. Was educated on taking her time when moving positions of her head  Transfers  Sit to Stand: Contact guard assistance (to RW  from EOB and wheelchair)  Stand to Sit: Contact guard assistance (From RW to BSC, WC, and EOB)  Car Transfer: Contact guard assistance (With RW)  Balance  Posture: Fair  Sitting - Static: Good  Sitting - Dynamic: Good;-  Standing - Static: Fair  Standing - Dynamic: Fair;-  Comments: CGA to min A with RW for standing balance during mobility    Environmental Mobility  Ambulation  Surface: Level tile  Device: Rolling Walker  Other Apparatus:  (wheelchair follow)  Assistance: Contact guard assistance  Quality of Gait: wide base of support, decreased (B) step length, foot clearance, and heel strike; slow darlene; no loss of balance; fatigues with standing mobility  Gait Deviations: Slow Darlene;Decreased step length;Decreased step height  Distance: 10ftx2+50ft  Stairs/Curb  Stairs?: Yes  Stairs  # Steps : 1  Stairs Height: 6\"  Rails: Bilateral  Assistance: Maximum assistance         ASSESSMENT  Vitals  Heart Rate Source: Monitor  BP: 112/70  BP Location: Left upper arm  MAP (Calculated): 84    Activity Tolerance  Activity Tolerance: Patient tolerated treatment well    Assessment  Assessment: Patient is a 78 yo female that presents to the [place] [address] on 11/28 for a two part procedure of excision of her acoustic neuroma. Patient is from home alone with very limited family support and will not have assistance once return to home. Pt at baseline has used a rollator for many years due to joint pain and surgeries. During the evaluation, she uses a two wheeled rolling walker to complete all transfers and ambulation. Pt is requring one person assistance for all mobility at this time. She also is requring multiple minute rest breaks and faitgues quickly with all mobility. She is limited by her endurance, functional mobility, and stairs. Pt would benefit from continued therapy in order to improve her independence, functional mobility, safety and ambulation before return home alone.   Treatment Diagnosis: Impaired endurance with gait  Therapy Prognosis: Good  Decision Making: Medium Complexity  Barriers to Learning: right eye blurred vision  Discharge Recommendations: Patient would benefit from continued therapy after discharge;Continue to assess pending progress;24 hour supervision or assist;Home with Home health PT  PT D/C Equipment  Other: plan to continue to assess  PT Equipment Recommendations  Equipment Needed: No  Other: plan to continue to assess    CLINICAL IMPRESSION   Patient is a 78 yo female that presents to the [place] [address] on 11/28 for a two part procedure of excision of her acoustic neuroma. Patient is from home alone with very limited family support and will not have assistance once return to home. Pt at baseline has used a rollator for many years due to joint pain and surgeries. During the evaluation, she uses a two wheeled rolling walker to complete all transfers and ambulation. Pt is requring one person assistance for all mobility at this time. She also is requring multiple minute rest breaks and faitgues quickly with all mobility. She is limited by her endurance, functional mobility, and stairs. Pt would benefit from continued therapy in order to improve her independence, functional mobility, safety and ambulation before return home alone. GOALS  Patient Goals   Patient Goals : To Return to independence  Short Term Goals  Time Frame for Short Term Goals: 10 days  Short Term Goal 1: Patient will perform all bed mobilty MOD I and no use of the handrails with HOB flat  Short Term Goal 2: Patient will perform sit<>stand transfers Mod I with RW  Short Term Goal 3: Patient will ambulate >150ft with RW Mod I  Short Term Goal 4: Patient will be able to ascend/descend 4 steps with one handrail Mod I  Short Term Goal 5: Patient will be able to  objects off the floor Mod I with RW.     PLAN OF CARE  Frequency: 1-2 treatment sessions per day, 5-7 days per week  Physcial Therapy Plan  Days Per Week: 5 Days  Hours Per Day: 1 hour  Therapy Duration: 10 Days  Current Treatment Recommendations: Strengthening; Functional mobility training;Transfer training;Gait training; Endurance training; Safety education & training; Therapeutic activities;Balance training;Neuromuscular re-education;Patient/Caregiver education & training;Equipment evaluation, education, & procurement;Home exercise program  Restraints  Restraints Initially in Place: No    EDUCATION  Education  Education Given To: Patient  Education Provided: Role of Therapy; Equipment;Transfer Training  Education Method: Demonstration  Barriers to Learning: Vision  Education Outcome: Verbalized understanding    ELOS:          Therapy Time   Individual Concurrent Group Co-treatment   Time In  6318         Time Out  0560         Minutes  60          Timed Code Treatment Minutes:   45    Total Treatment Minutes:  [address], [place], 12/23/22 at 3:06 PM

## 2022-12-23 NOTE — PROGRESS NOTES
Speech Language Pathology  Facility/Department: [facility] ACUTE REHAB UNIT  Initial Speech/Language/Cognitive Assessment    NAME: Lisa Marks  : 1955   MRN: [id number]  ADMISSION DATE: 2022  ADMITTING DIAGNOSIS: has Asthma; Gastroesophageal reflux disease; Hypertension; Adiposity; S/P total knee arthroplasty; Primary osteoarthritis of left knee; Morbid obesity with BMI of 50.0-59.9, adult ([address] Osteoarthritis of right hip; Cerebellopontine angle meningioma ([facility] [address] Acoustic neuroma ([facility] [address] S/P craniotomy; Multiple subsegmental pulmonary emboli without acute cor pulmonale ([facility] [address] and S/P excision of acoustic neuroma on their problem list.  DATE ONSET: 22    Date of Eval: 2022   Evaluating Therapist: PINA Carter    RECENT RESULTS  CT OF HEAD: 22  Impression       1. Interval postsurgical changes from resection of right cerebellopontine angle mass with associated right temporal mastoid resection and frontotemporal craniotomy and fat graft placement. 2.  Thin hyperdensity along the fat graft may represent small amount of postsurgical blood products. There is also a small focus of subarachnoid hemorrhage along the right temporal lobe. 3.  Mild pneumocephalus and bilateral subgaleal fluid is also likely postsurgical.       Primary Complaint: \"I just want to get this side of my face back. \"    Vision/ Hearing  Vision  Vision: Impaired  Vision Exceptions: Wears glasses at all times  Hearing  Hearing: Exceptions to [facility]  Hearing Exceptions: Hard of hearing/hearing concerns    Assessment:  Cognitive Diagnosis: Mild cognitive linguistic impairment mainly due to decreased attention and possible visuospatial difficulty. Speech Diagnosis: Although pt present with significant R sided facial droop/weakness, this does not appear to negatively impact her speech intellegibility at this time.    Diagnosis: Pt presents with mild cognitive linguistic impairment characterized by decreased attention and visual spatial skills. Pt was easily distracted and could benefit from ongoing speech therapy to assess and address cogntive needs. Pt's R sided facial weakness does not appear to negatively impact her speech intellegibility at this time but should be monitored during sessions. Pt independently utilized speech intellegibility strategies throughout session. No difficulty with word finding or naming. Strong and clear vocal quality. 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 Recommend speech therapy sessions to address mild cognitive lingustic impairment. Recommendations:  Recommendations  Requires SLP Intervention: Yes  Recommendations: Dysphagia treatment  Patient Education: Pt was educated to rational for each assessment task and to results of assessment. Patient Education Response: Verbalizes understanding  Timed Code Treatment Minutes: 0 Minutes  Total Treatment Time: 60  Duration of Treatment: LOS          Plan:   Speech Therapy Prognosis  Prognosis: Good  Prognosis Considerations: Motivation;Participation Level  Individuals consulted  Consulted and agree with results and recommendations: Patient  RN Name: Brad Angel  Safety Devices in place: Yes  Type of devices: All fall risk precautions in place  Restraints Initially in Place: No    Goals:  Short Term Goals  Time Frame for Short Term Goals: LOS  Goal 1: Pt will maintain sustained attention to graded task with min cues. Goal 2: Patient will participate in ongoing assessment of cognitive-linguistic skills with additional goals to be established as appropriate.    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[address]: Impaired  Vision Exceptions: Wears glasses at all times  Hearing  Hearing: Exceptions to [facility]  Hearing Exceptions: Hard of hearing/hearing concerns           Objective:       Oral Motor   Labial: Decreased seal;Right droop; Impaired coordination  Dentition: Intact  Oral Hygiene: Moist;Clean  Lingual: No impairment (Pt's lingual coordination appears intact at this time, including to intentinally utilize a lingual sweep on right side while eating.)  Velum: Unable to visualize  Mandible: No impairment    Motor Speech  Apraxic Characteristics: None  Dysarthric Characteristics: Imprecise (imprecise articulation due to R sided facial weakness. Pt compensates well when speaking.)  Intelligibility: No impairment  Overall Impairment Severity: Minimal  Compensatory Strategies for Motor Speech: Although facial weakness is noticeable when pt speaks, it does not appear to negatively impact her speech intelligibility. Pt could state and independently utilize compensatory speech strategies including over articulation and slowed rate.     Auditory Comprehension  Comprehension: Within Functional Limits      Expression  Primary Mode of Expression: Verbal    Verbal Expression  Verbal Expression: Within functional limits    Written Expression  Dominant Hand: Right      Pragmatics/Social Functioning  Pragmatics: Within functional limits    Cognition:      Orientation  Overall Orientation Status: Within Normal Limits  Attention  Attention: Exceptions to [facility]  Alternating Attention: Mild  Sustained Attention: Mild  Memory  Memory: Within Functional Limits  Problem Solving  Problem Solving: Unable to assess  Numeric Reasoning  Numeric Reasoning: Unable to assess    Additional Assessments:  Pt was administered the Cognitive Linguistic Quick Test (CLQT) with the following results:    Attention- mild  Memory- mild  Executive Functions- WNL  Language- WNL  Visuospatial Skills- mild  Composite Severity Rating- mild  Clock Drawing Severity Rating- WNL     Prognosis:  Speech Therapy Prognosis  Prognosis: Good  Prognosis Considerations: Motivation;Participation Level  Individuals consulted  Consulted and agree with results and recommendations: Patient  RN Name: Marilou Basurto    Education:  Patient Education: Pt was educated to rational for each assessment task and to results of assessment. Patient Education Response: Verbalizes understanding  Safety Devices in place: Yes  Type of devices:  All fall risk precautions in place    Therapy Time:   Individual Concurrent Group Co-treatment   Time In 0800         Time Out 0830         Minutes 30            Timed Code Treatment Minutes: 0 Minutes  Total Treatment Time: [address] [employer], [address]; [id number]  Speech-Language Pathologist    Electronically signed by PINA Gardner on 12/23/2022 at 1:40 PM

## 2022-12-23 NOTE — PLAN OF CARE
Problem: Discharge Planning  Goal: Discharge to home or other facility with appropriate resources  Recent Flowsheet Documentation  Taken 12/22/2022 2017 by Marney Hammans  Discharge to home or other facility with appropriate resources:   Identify barriers to discharge with patient and caregiver   Identify discharge learning needs (meds, wound care, etc)   Arrange for needed discharge resources and transportation as appropriate

## 2022-12-23 NOTE — PROGRESS NOTES
Alert x4 denies pain/discomfort, vitals stable facial droop noted s/p removal of neuroma, neuro check Q4H , all WNL, also Q2H check of mastoid dressing, call neuro if any leakage noted, pt currently resting quietly no acute distress noted call light in reach

## 2022-12-23 NOTE — CARE COORDINATION
Case Management Assessment  Initial Evaluation    Date/Time of Evaluation: 12/23/2022 3:58 PM  Assessment Completed by: BONNIE Samaniego, VIPIN    If patient is discharged prior to next notation, then this note serves as note for discharge by case management. Patient Name: Brad Hughes                   YOB: 1955  Diagnosis: S/P excision of acoustic neuroma [Z98.890, Z86.018]                   Date / Time: 12/22/2022  6:31 PM    Patient Admission Status: REHAB IP   Readmission Risk (Low < 19, Mod (19-27), High > 27): Readmission Risk Score: 16.9    Current PCP: Dewain Asp  PCP verified by CM? Yes    Chart Reviewed: Yes      History Provided by: Patient  Patient Orientation: Alert and Oriented    Patient Cognition: Alert    Hospitalization in the last 30 days (Readmission):  No    If yes, Readmission Assessment in CM Navigator will be completed. Advance Directives:      Code Status: Full Code   Patient's Primary Decision Maker is:        Discharge Planning:    Patient lives with: Alone Type of Home: House  Primary Care Giver: Self  Patient Support Systems include: Family Members, Friends/Neighbors   Current Financial resources: Medicare  Current community resources: None  Current services prior to admission: None            Current DME:              Type of Home Care services:       ADLS  Prior functional level: Independent in ADLs/IADLs, Bathing, Dressing, Toileting, Feeding, Cooking, Housework, Shopping, Mobility  Current functional level: Assistance with the following:, Bathing, Dressing, Toileting, Cooking, Feeding, Housework, Shopping, Mobility    PT AM-PAC:   /24  OT AM-PAC:   /24    Family can provide assistance at DC: Yes  Would you like Case Management to discuss the discharge plan with any other family members/significant others, and if so, who?  No  Plans to Return to Present Housing: Yes  Other Identified Issues/Barriers to RETURNING to current housing: no support home alone  Potential Assistance needed at discharge: Home Care            Potential DME:    Patient expects to discharge to: [facility] for transportation at discharge: Family    Financial    Payor: Lee Lopez / Plan: MEDICARE PART A AND B / Product Type: *No Product type* /     Does insurance require precert for SNF: No    Potential assistance Purchasing Medications: No  Meds-to-Beds request:        [address] [facility], [address] [phone] Blanca Quinton [phone]  [address] [place] [address] [address]  Phone: [phone] Fax: [address] [phone] - f [phone]  [address] [facility] [address]  Phone: [phone] Fax: [phone]      Notes:    Factors facilitating achievement of predicted outcomes: Family support, Motivated, Cooperative, Pleasant, Sense of humor, and Good insight into deficits    Barriers to discharge: Upper extremity weakness and Lower extremity weakness    Additional Case Management Notes:   SW met w/Pt at bedside. Pt is form home alone and plans to return home alone but also may stay at her sisters if she needs additional help. Pt's sister will transport her home and  her meds. Pt is not sure if she will need HHC or not. Pt has a rollator and RW. DC date TBD. The Plan for Transition of Care is related to the following treatment goals of S/P excision of acoustic neuroma [Z98.890, B11.620]    IF APPLICABLE: The Patient and/or patient representative Kailash Corrales and her family were provided with a choice of provider and agrees with the discharge plan. Freedom of choice list with basic dialogue that supports the patient's individualized plan of care/goals and shares the quality data associated with the providers was provided to: Patient   Patient Representative Name:       The Patient and/or Patient Representative Agree with the Discharge Plan?  Yes    Jalen Newman BONNIE, [place]  Case Management Department  Ph: [phone]

## 2022-12-23 NOTE — PROGRESS NOTES
NURSING ASSESSMENT: ARU ADMISSION  The Sandi Conn Dx/Hx: S/P excision of acoustic neuroma [Z98.890, G86.296]   Any Farfan  TUL:[id number]  Date of Admit: 12/22/2022  Room #: 3101/3101-01    Subjective:   Patient admitted to room 3101 @ from ICU via stretcher. Alert and oriented x4. Oriented to room and call light system. Oriented to rehab routine and therapy schedules. Informed about care conferences and ordering of meals. Drug / Medication Review:   Medications were reviewed by RN at time of admission  [x]  No potential or actual clinically significant medication issues were noted. [x]   Yes, a clinically significant medication issue was identified                 []  Adverse Drug Event:                  []  Allergy:                  []  Side Effect:                  []  Ineffective Therapy:                  []  Drug Interaction:                 []  Duplicated Therapy:                 []  Untreated Indication:                  []  Non-adherence:                 []  Other:                  Nursing/Pharmacy contacted the physician:     Date:              Time:                  Actions recommended by physician were completed:   Date :            Time:    4 Eyes Skin Assessment   The patient is being assessed for: Admission     I agree that 2 RN's have performed a thorough Head to Toe Skin Assessment on the patient. ALL assessment sites listed below have been assessed. Areas assessed by both nurses:   [x]   Head, Face, and Ears   [x]   Shoulders, Back, and Chest, Abdomen  [x]   Arms, Elbows, and Hands   [x]   Coccyx, Sacrum, and Ischium  [x]   Legs, Feet, and Heel     Does the Patient have Skin Breakdown? NO, s/p donor site on ABD , bitaleral brusing to ARMS, abd, dry skin general. Hyperpigmentation of skin labia minor, and cas-genital, patient states her normal.         Jose Prevention initiated:  Yes   Wound Care Orders initiated:   Yes [address]  nurse consulted for Pressure Injury (Stage 3,4, Unstageable, DTI, NWPT, Complex wounds)and New or Established Ostomies:  Yes , for mastoid dressing s/p head    Primary Nurse eSignature:Amy Alcazar RN   Co-signer eSignature:   Godwin Wilson RN

## 2022-12-23 NOTE — PROGRESS NOTES
Occupational Therapy  Facility/Department: [facility] ACUTE REHAB UNIT  Occupational Therapy Initial Assessment/Treatment    Name: Natalie Moreno  : 1955  MRN: [id number]  Date of Service: 2022    Discharge Recommendations:  Continue to assess pending progress, Home with Home health OT, Home with assist PRN  OT Equipment Recommendations  Other: continue to assess       Patient Diagnosis(es): There were no encounter diagnoses. Past Medical History:  has a past medical history of Arthritis, Asthma, Brain tumor ([facility] [address] Diabetes mellitus ([facility] [address] High cholesterol, Hypertension, Imbalance, Loss of hearing, and Vertigo. Past Surgical History:  has a past surgical history that includes Total knee arthroplasty (Right, 2015); Total knee arthroplasty (Left, 10/14/15); Total hip arthroplasty (Right, 2021); Neuroma surgery (Right, 2022); mastoidectomy (Right, 2022); craniotomy (N/A, 2022); Upper gastrointestinal endoscopy (N/A, 2022); IR EMBOLIZATION HEMORRHAGE (2022); and Upper gastrointestinal endoscopy (N/A, 2022). Treatment Diagnosis: impaired ADLs and functional mobility 2/2 debility      Assessment   Performance deficits / Impairments: Decreased functional mobility ; Decreased endurance;Decreased ADL status; Decreased ROM; Decreased balance;Decreased strength;Decreased vision/visual deficit; Decreased safe awareness;Decreased cognition;Decreased high-level IADLs  Assessment: Pt is 78 yo female from home alone reporting independence with ADLs and functional mobility at baseline. Presently, pt requires min A for STS transfers, Max A for LB dressing and can only ambulate short distances due to decreased activity tolerance. Pt highly motivated but requires + time for activities due to becoming easily fatigued. Pt also demonstrates impairments with cognition, insight, dynamic balance and strength. LUE support.   Pt would benefit from ongoing skilled OT to maximize functional independence and return to her PLOF. Treatment Diagnosis: impaired ADLs and functional mobility 2/2 debility  Prognosis: Good  Decision Making: Medium Complexity  Activity Tolerance  Activity Tolerance: Patient Tolerated treatment well;Patient limited by fatigue  Activity Tolerance Comments: pt req increased assistance after bathing task as pt reported feeling warn out req + time and rest breaks        Plan   Occupational Therapy Plan  Times Per Week: 60 minutes/day x 5 days/week  Current Treatment Recommendations: Strengthening, ROM, Cognitive reorientation, Balance training, Pain management, Self-Care / ADL, Functional mobility training, Safety education & training, Home management training, Endurance training, Neuromuscular re-education, Positioning, Return to work related activity, Patient/Caregiver education & training, Equipment evaluation, education, & procurement     Restrictions  Restrictions/Precautions  Restrictions/Precautions: Fall Risk, Seizure  Position Activity Restriction  Other position/activity restrictions: Ambulate the Patient, Up with assistance, up in the chair    Subjective   General  Chart Reviewed: Yes  Patient assessed for rehabilitation services?: Yes  Additional Pertinent Hx: 79 y.o. female  with c/o dizziness, HA and vertigo in the setting of large right petroclival mass which is consistent with meningioma. Pt is POD #2 from STAGE 1, RIGHT TRANSLABYRINTHINE / RETROLABYRINTHINE CRANIOTOMY, PETROSECTOMY - Right and RIGHT TRANSMASTOID / ANTERIOR MIDDLE CRANIAL FOSSA , ABDOMINAL FAT GRAFT EXTERNAL AUDITORY CANAL CLOSURE.  Pt is POD #1 from  STAGE II: RESECTION OF RIGHT PETROCLIVAL MENINGIOMA, EUSTACHIAN TUBE OBLITERATION, MASTOID OBLITERATION, ABDOMINAL FAT GRAFT, CRANIOPLASTY, EXTERNAL AUDITORY CANAL CLOSURE, OPERATING MICROSCOPE, INTRAOPERATIVE MONITORING  Family / Caregiver Present: No  Referring Practitioner: Dr Ismael Gomez  Diagnosis: Cerebellopontine angle meningioma  Subjective  Subjective: Pt met reclined in bed, pleasant and agreeable to OT evaluation. Social/Functional History  Social/Functional History  Lives With: Alone  Type of Home: House  Home Layout: One level, Able to Live on Main level with bedroom/bathroom, Performs ADL's on one level  Home Access: Stairs to enter with rails  Entrance Stairs - Number of Steps: 2  Entrance Stairs - Rails: Both  Bathroom Shower/Tub: Walk-in shower  Bathroom Toilet: Handicap height  Bathroom Equipment: Shower chair, Grab bars in shower, Hand-held shower, Toilet raiser  Home Equipment: Rufina Donora, Sock aid, David Hilt, 4 wheeled  ADL Assistance: [address]: Independent  Homemaking Responsibilities: Yes  Ambulation Assistance: Independent (has been using 4WW for past year)  Transfer Assistance: Independent  Active : Yes  Mode of Transportation: SUV (Reports not being able to drive her car due to pain in her hip prior to hip surgery.)  Education: Manfred Martell-4  Occupation: Part time employment  Type of Occupation: 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[address]: crafting, baking cookies  Additional Comments: pt has no assistance upon going home       Objective   Heart Rate: 78  Heart Rate Source: Monitor  BP: 112/70  BP Location: Left upper arm  BP Method: Automatic  Patient Position: Semi fowlers  MAP (Calculated): 84  Resp: 18  SpO2: 93 %  O2 Device: None (Room air)              Observation/Palpation  Posture: Fair (Forward flexion, right swelling/redness and edema noted on head)  Safety Devices  Type of Devices: Call light within reach; Chair alarm in place;Gait belt;Left in chair;Nurse notified  Restraints  Restraints Initially in Place: No  Balance  Sitting: Intact (spvn seated EOB and on commode)  Standing: With support (CGA with RW)  Gait  Overall Level of Assistance: Contact-guard assistance  Distance (ft):  (to/from bathroom)  Assistive Device: Gait belt;Walker, rolling  Toilet Transfers  Toilet - Technique: Ambulating  Equipment Used: Extra wide bedside commode  Toilet Transfer: Minimal assistance  Toilet Transfers Comments: pt reported difficulty standing from standard low surface toilet; + time to transition BUEs to RW  AROM: Within functional limits  Strength: Within functional limits  Coordination: Within functional limits  Tone: Normal  Sensation: Intact      ADL  Grooming: Setup  Grooming Skilled Clinical Factors: oral hygiene completed seated in recliner with setup of supplies  UE Bathing: Setup;Supervision  UE Bathing Skilled Clinical Factors: seated on bariatric commode with setup of wash cloths per RN request due to head/facial bandages  LE Bathing: Contact guard assistance; Increased time to complete;Verbal cueing;Setup  LE Bathing Skilled Clinical Factors: seated on bariatric commode pt washed lower limbs and feet via trunk flexion; pt washed front and rear periarea in stance with unilateral UE support on RW and CGA; setup of wash cloths per RN request due to head/facial bandages  UE Dressing: Supervision;Setup  UE Dressing Skilled Clinical Factors: to don shirt seated  LE Dressing: Moderate assistance  LE Dressing Skilled Clinical Factors: pt doffed brief seated on commode with + time to unthread from BLEs, pt req assist to thread briefs and pants onto BLEs due to fatigue after bathing, pt able to manage over hips in stance with CGA and + time  Toileting: Contact guard assistance; Increased time to complete;Setup  Toileting Skilled Clinical Factors: pt continent of bowel and bladder req + time to have BM; perihygiene seated with spvn; LB clothing mgmt on/off hips in stance with CGA         Activity Tolerance  Activity Tolerance: Patient tolerated treatment well  Bed mobility  Supine to Sit: Contact guard assistance (HOB flat, use of bed rail)  Scooting: Stand by assistance  Transfers  Sit to stand: Minimal assistance (EOB<RW, VCs for hand placement)  Stand to sit: Contact guard assistance  Vision  Vision: Impaired  Vision Exceptions: Wears glasses at all times (Right eye cover)  Hearing  Hearing: Exceptions to [facility]  Hearing Exceptions: Hard of hearing/hearing concerns      Cognition  Overall Cognitive Status: Exceptions  Arousal/Alertness: Appropriate responses to stimuli  Following Commands: Follows one step commands consistently  Attention Span: Appears intact  Memory: Decreased recall of recent events  Safety Judgement: Good awareness of safety precautions  Problem Solving: Assistance required to generate solutions;Assistance required to correct errors made;Assistance required to implement solutions  Insights: Decreased awareness of deficits  Initiation: Requires cues for some  Sequencing: Requires cues for some  Orientation  Overall Orientation Status: Within Functional Limits  Orientation Level: Oriented X4        Sensation  Overall Sensation Status: WNL         Education Given To: Patient  Education Provided: Transfer Training;Plan of Care;Role of Therapy; ADL Adaptive Strategies  Education Method: Verbal  Barriers to Learning: Hearing;Cognition  Education Outcome: Verbalized understanding;Continued education needed                        G-Code     OutComes Score                                                  AM-PAC Score             Tinneti Score       Goals  Short Term Goals  Time Frame for Short Term Goals: 10 days  Short Term Goal 1: Pt will complete LB dressing using AE PRN with mod I  Short Term Goal 2: Pt will complete bathing with mod I  Short Term Goal 3: Pt will complete toileting and toilet transfer with mod I  Short Term Goal 4: Pt will be independent with UE HEP to improve activity tolerance  Short Term Goal 5: Pt will tolerate 8 minutes in stance to improve independence with IADLs  Patient Goals   Patient goals : \"to regain my balance and strength\"       Therapy Time   Individual Concurrent Group Co-treatment   Time In  0930 Time Out  1030         Minutes  60          Total Treatment Time: [address] [facility], OT

## 2022-12-23 NOTE — PLAN OF CARE
ARU PATIENT TREATMENT PLAN  [address]  [phone]    Milford Rides    : 1955  Acct #: [[id number]]  MRN: [id number]  PHYSICIAN:  Yojana Teixeira DO  Primary Problem    Patient Active Problem List   Diagnosis    Asthma    Gastroesophageal reflux disease    Hypertension    Adiposity    S/P total knee arthroplasty    Primary osteoarthritis of left knee    Morbid obesity with BMI of 50.0-59.9, adult (HCC)    Osteoarthritis of right hip    Cerebellopontine angle meningioma (HCC)    Acoustic neuroma (HCC)    S/P craniotomy    Multiple subsegmental pulmonary emboli without acute cor pulmonale (HCC)    S/P excision of acoustic neuroma       Rehabilitation Diagnosis:  Brain, 2.1, Non-Traumatic  ADMIT DATE:2022    Patient Goals: \"to regain my balance and strength\"  Admitting Impairments: Decreased functional mobility ; Decreased endurance;Decreased ADL status; Decreased ROM; Decreased balance;Decreased strength;Decreased vision/visual deficit; Decreased safe awareness;Decreased cognition;Decreased high-level IADLs  Activity Restrictions: Fall Risk, Seizure  Participation Limitations: none     CARE PLAN   NURSING:  Christin Rides while on this unit will:  [x] Be continent of bowel and bladder     [x] Have an adequate number of bowel movements  [x] Urinate with no urinary retention >300ml in bladder  [] Complete bladder protocol with mckee removal  [] Maintain O2 SATs at ___%  [] Have pain managed while on ARU       [] Be pain free by discharge   [] Have no skin breakdown while on ARU  [] Have improved skin integrity via wound measurements  [] Have no signs/symptoms of infection at the wound site  [] Be free from injury during hospitalization   [] Complete education with patient/family with understanding demonstrated for:  [] Adjustment   [] Other:     Nursing Interventions will include:  [x] bowel/bladder training   [] education for medical assistive devices   [] medication education   [] O2 saturation management   [] energy conservation   [] stress management techniques   [x] fall prevention   [] alarms protocol   [] seating and positioning   [x] skin/wound care   [] pressure relief instruction   [] dressing changes     [] infection protection   [] DVT prophylaxis  [] assistance with in room safety with transfers to bed, toilet, wheelchair, shower   [] bathroom activities and hygiene  [] Other:    Patient/Caregiver Education for:  [x] Disease/sustained injury/management     [x] Medication Use  [x] Surgical intervention  [x] Safety  [x] Body mechanics and or joint protection  [x] Health maintenance     [] Other:     PHYSICAL THERAPY:  Goals:     Patient Goals   Patient Goals : To Return to independence  Short Term Goals  Time Frame for Short Term Goals: 10 days  Short Term Goal 1: Patient will perform all bed mobilty MOD I and no use of the handrails with HOB flat  Short Term Goal 2: Patient will perform sit<>stand transfers Mod I with RW  Short Term Goal 3: Patient will ambulate >150ft with RW Mod I  Short Term Goal 4: Patient will be able to ascend/descend 4 steps with one handrail Mod I  Short Term Goal 5: Patient will be able to  objects off the floor Mod I with RW. Short Term Goals  Time Frame for Short Term Goals: 10 days  Short Term Goal 1: Patient will perform all bed mobilty MOD I and no use of the handrails with HOB flat  Short Term Goal 2: Patient will perform sit<>stand transfers Mod I with RW  Short Term Goal 3: Patient will ambulate >150ft with RW Mod I  Short Term Goal 4: Patient will be able to ascend/descend 4 steps with one handrail Mod I  Short Term Goal 5: Patient will be able to  objects off the floor Mod I with RW. These goals were reviewed with this patient at the time of assessment and Darren Lutz is in agreement.      Plan of Care: Pt to be seen 5 out of 7 days per week per ARU protocol (60 minutes with PT)                  Current Treatment Recommendations: Strengthening, Functional mobility training, Transfer training, Gait training, Endurance training, Safety education & training, Therapeutic activities, Balance training, Neuromuscular re-education, Patient/Caregiver education & training, Equipment evaluation, education, & procurement, Home exercise program    OCCUPATIONAL THERAPY:  Goals:             Short Term Goals  Time Frame for Short Term Goals: 10 days  Short Term Goal 1: Pt will complete LB dressing using AE PRN with mod I  Short Term Goal 2: Pt will complete bathing with mod I  Short Term Goal 3: Pt will complete toileting and toilet transfer with mod I  Short Term Goal 4: Pt will be independent with UE HEP to improve activity tolerance  Short Term Goal 5: Pt will tolerate 8 minutes in stance to improve independence with IADLs :    :  These goals were reviewed with this patient at the time of assessment and Bianca Markley is in agreement    Plan of Care:  Pt to be seen 5 out of 7 days per week per ARU protocol (60 minutes with OT)     SPEECH THERAPY:   Goals:             Short-term Goals  Timeframe for Short-term Goals: LOS  Goal 1: Pt will tolerate trials of increased texture in order to advance to least restrictive diet consistency.  Goal 2: Patient will independently demonstrate understanding of swallowing concerns, compensatory strategies, and recommendations.                                           Short Term Goals  Time Frame for Short Term Goals: LOS  Goal 1: Pt will maintain sustained attention to graded task with min cues.  Goal 2: Patient will participate in ongoing assessment of cognitive-linguistic skills with additional goals to be established as appropriate.              Time Frame for Short Term Goals: LOS    Plan of Care:  Pt to be seen 5 out of 7 days per week per ARU protocol (60 minutes with SLP)    Therapy Treatments will include:  [x]  therapeutic  exercises    [x]  gait training     [x]  neuromuscular re-ed                            [x]  transfer training             [] community reintegration    [x] bed mobility                          []  w/c mobility and training  [x]  self care    [x]home mgmt    [x]  cognitive training            [x]  energy conservation        [x]  dysphagia tx    []  speech/language/communication therapy   []  group therapy    [x]  patient/family education    [] Other:    CASE MANAGEMENT:  Goals: Assist patient/family with discharge planning, patient/family counseling, and coordination with insurance during ARU stay.     Admission Period/Goal QM SCORES  QM Admit/Goal Score   Eating CARE Score: 5 /  Discharge Goal: Independent   Oral Hygiene CARE Score: 5 / Discharge Goal: Independent   Shower/Bathing CARE Score: 4 / Discharge Goal: Independent   UB Dressing CARE Score: 4 / Discharge Goal: Independent   LB Dressing CARE Score: 3 / Discharge Goal: Independent   Putting on/off Footwear CARE Score: 4 / Discharge Goal: Independent   Toileting Hygiene CARE Score: 4 / Discharge Goal: Independent   Bladder Continence   Continent   Bowel Continence   Continent   Toilet Transfers CARE Score: 3 / Discharge Goal: Independent   Shower/Bathe Self  CARE Score: 4 / Discharge Goal: Independent   Rolling Left and Right CARE Score: 4 / Discharge Goal: Independent   Sit to Lying CARE Score: 4 / Discharge Goal: Independent   Lying to Sitting on Bedside CARE Score: 4 / Discharge Goal: Independent   Sit to Stand CARE Score: 3 / Discharge Goal: Independent   Chair/Bed to Chair Transfer CARE Score: 3 /  Discharge Goal: Independent   Car Transfers CARE Score: 3 / Discharge Goal: Independent   Walk 10 Feet CARE Score: 3 / Discharge Goal: Independent   Walk 50 Feet with Two Turns CARE Score: 1 / Discharge Goal: Independent   Walk 150 Feet CARE Score: 88 / Discharge Goal: Independent   Walk 10 Feet on Uneven Surfaces CARE Score: 88 / Discharge Goal: Independent 1 Step (Curb) CARE Score: 2 / Discharge Goal: Supervision or touching assistance   4 Steps CARE Score: 88 / Discharge Goal: Independent   12 Steps CARE Score: 88/     Picking up Object from Floor CARE Score: 88 / Discharge Goal: Independent   Wheel 50 Feet with 2 Turns   /     Type         [] Manual        [] Motorized        [x] N/A   Wheel 150 Feet   /     Type         [] Manual        [] Motorized        [x] N/A        Dorian Done will be seen a minimum of 3 hours of therapy per day, a minimum of 5 out of 7 days per week (please see above for specific treatment plan per PT/OT/SLP). [] In this rare instance due to the nature of this patient's medical involvement, this patient will be seen 15 hours per week (900 minutes within a 7day period). In addition, dietician/nutritionist may monitor calorie count as well as intake and collaboratively work with SLP on dietary upgrades. Neuropsychology/Psychology may evaluate and provide necessary support. Medical issues being managed closely and that require 24hour availability of a physician:  [x] Swallowing Precautions  [x] Bowel/Bladder Fx  [] Weight bearing precautions  [x] Wound Care    [x] Pain Mgmt   [x] Infection Protection  [x] DVT Prophylaxis   [x] Fall Precautions  [x] Fluid/Electrolyte/Nutrition Balance  [x] Voice Protection   [] Respiratory  [] Other:    Medical Prognosis: [] Good  [x] Fair    [] Guarded   Total expected IRF days: 10 days  Anticipated discharge destination:   [x] Home Independently   [] Home Modified Independent  [] Home with supervision    []SNF     [] Other                                           Physician anticipated functional outcomes: Patient will progress to a level of independence with functional mobility and all ADLs to allow for a safe return to prior living environment. IPOC brief synthesis:  This is a morbidly obese (Body mass index is 50.82 kg/m².), 79 y.o. female  with c/o dizziness, HA and vertigo in the setting of large right petroclival mass which is consistent with meningioma. . She presented to the hospital for a scheduled two staged petroclival meningioma resection. 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 This initial ARU patient treatment plan of care, together with the IPOC & the Education plan, form the foundation for the patient's plan of care. Weekly patient care conferences are held to evaluate progress towards the initial treatment plan & goals.     I have reviewed this initial plan of care and agree with its contents:    Title   Name    Date    Time    Physician: KAYLEY FloresPSusieH  PM&R  12/24/2022  10:01 AM      Case Mgmt:  BONNIE Aragon LWS, 12/23/22 @ 9:30 AM    OT: Henry Lechuga OTR/L 12/23/22 @ [id number]    PT: Carmen Bolton , PT, DPT #[id number] 12/23/22 @3080    RN: Tracy Malone RN 12/24/2022 @ [phone]    ST: Bogdan Watts, [facility]-SLP; [id number] 12/23/22 @1412    ARU Supervisor: Emma Aguilar OTR/L 12/23/2022 16:23

## 2022-12-23 NOTE — PLAN OF CARE
Problem: Skin/Tissue Integrity  Goal: Absence of new skin breakdown  Description: 1. Monitor for areas of redness and/or skin breakdown  2. Assess vascular access sites hourly  3. Every 4-6 hours minimum:  Change oxygen saturation probe site  4. Every 4-6 hours:  If on nasal continuous positive airway pressure, respiratory therapy assess nares and determine need for appliance change or resting period.   Outcome: Progressing     Problem: Pain  Goal: Verbalizes/displays adequate comfort level or baseline comfort level  Outcome: Progressing     Problem: Safety - Adult  Goal: Free from fall injury  Outcome: Progressing  Flowsheets (Taken 12/22/2022 2000)  Free From Fall Injury: Instruct family/caregiver on patient safety

## 2022-12-23 NOTE — PROGRESS NOTES
ARU Admission Assessment    Ethnicity  \"Are you of , /a, or Ethiopian origin? \"  Check all that apply:  [x] A. No, not of , /a, or [place] Origin  [] B.  Yes, [place], [place] American, Chicano/a  [] C.  Yes, [address] [place] [address]  [] D.  Yes, [place]  [] E.  Yes, another , , or Ethiopian origin  [] X. Patient unable to respond  [] Y. Patient declines to respond    Race  \"What is your race? \"  Check all that apply:  [x] A. White  [] B. Black or   [] C. American [place] [facility] or [place] Native  [] D.  [place] [facility]  [] E. [place]  [] F. Malian  [] G. [place]  [] [facility]  [] I. [facility]  [] J.  Other   [] K.   [] L. [place]ander or [place]  [] M. [place]  [] N. Other [place]  [] X. Patient unable to respond  [] Y. Patient declines to respond  [] Z. None of the above    Language  A. \"What is your preferred language? \"   English    B. \"Do you need or want an  to communicate with a doctor or health care staff? \"  Check only one:  [x] 0. No  [] 1. Yes  [] 9. Unable to determine    Transportation  \"Has lack of transportation kept you from medical appointments, meetings, work, or from getting things needed for daily living? \"Check all that apply:  [] A.  Yes, it has kept me from medical appointments or from getting my medications  [] B.  Yes, it has kept me from non-medical meetings, appointments, work, or from getting things that I need  [x] C.  No  [] X. Patient unable to respond  [] Y. Patient declines to respond    Hearing  Ability to hear (with hearing aid or hearing appliances if normally used)  []  0. Adequate - no difficulty in normal conversation, social interaction, listening to TV  []  1. Minimal difficulty - difficulty in some environments (e.g. when person speaks softly or setting is noisy)  [x]  2. Moderate difficulty - speaker has to increase volume and speak distinctly   []  3.   Highly impaired - absence of useful hearing    Vision  Ability to see in adequate light (with glasses or other visual appliances)  []  0. Adequate - sees fine detail, such as regular print in newspapers/books  []  1. Impaired - sees large print, but not regular print in newspapers/books  [x]  2. Moderately impaired - limited vision; not able to see newspaper headlines but can identify objects  []  3. Highly impaired - object identification in question, but eyes appear to follow objects  []  4. Severely impaired - no vision or sees only light, colors, or shapes; eyes do not appear to follow objects    Health Literacy  \"How often do you need to have someone help you when you read instructions, pamphlets, or other written material from your doctor or pharmacy? \"  []  0. Never  []  1. Rarely  []  2. Sometimes  [x]  3. Often  []  4. Always  []  8. Patient unable to respond    BIMS - **Must be completed in the flowsheet at admission prior to proceeding with Delirium Assessment**  [x] BIMS completed in flowsheet at admission    Signs and Symptoms of Delirium  A. Acute Onset Mental Status Change - Is there evidence of an acute change in mental status from the patient's baseline? [x] 0. No  [] 1. Yes    B. Inattention - Did the patient have difficulty focusing attention, for example being easily distractible or having difficulty keeping track of what was being said? [x]  0. Behavior not present  []  1. Behavior continuously present, does not fluctuate  []  2. Behavior present, fluctuates (comes and goes, changes in severity)    C. Disorganized thinking - Was the patient's thinking disorganized or incoherent (rambling or irrelevant conversation, unclear or illogical flow of ideas, or unpredictable switching from subject to subject)? [x]  0. Behavior not present  []  1. Behavior continuously present, does not fluctuate  []  2. Behavior present, fluctuates (comes and goes, changes in severity)    D.   Altered level of consciousness - Did the patient have altered level of consciousness as indicated by any of the following criteria? Vigilant - startled easily to any sound or touch  Lethargic - repeatedly dozed off while being asked questions, but responded to voice or touch  Stuporous - very difficulty to arouse and keep aroused for the interview  Comatose - could not be aroused  [x]  0. Behavior not present  []  1. Behavior continuously present, does not fluctuate  []  2. Behavior present, fluctuates (comes and goes, changes in severity)    Mood    \"Over the last 2 weeks, have you been bothered by any of the following problems?\" 1. Symptom Presence    0 = No  1 = Yes  9 = No Response 2. Symptom Frequency    0 = Never or 1 day  1 = 2-6 days (several days)  2 = 7-11 days (half or more of the days)  3 = 12-14 days (nearly every day)  **Leave blank if 'No Reponse'**      Enter scores in boxes    Column 1 Column 2   Little interest or pleasure in doing things   0 0   Feeling down, depressed, or hopeless   0 0   **If either A or B in column 2 is coded 2 or 3, CONTINUE asking the questions below. If not, END the interview. **     Trouble falling or staying asleep, or sleeping too much       Feeling tired or having little energy       Poor appetite or overeating       Feeling bad about yourself - or that you are a failure or have let yourself or your family down       Trouble concentrating on things, such as reading the newspaper or watching television       Moving or speaking so slowly that other people could have noticed. Or the opposite- being so fidgety or restless that you have been moving around a lot more than usual.       Thoughts that you would be better off dead, or of hurting yourself in some way. Total Severity: Add scores for all frequency responses in column 2 (possible score 0-27, or enter 99 if unable to complete (if symptom frequency (column 2) is blank for 3 or more items).         Social Isolation  \"How often do you feel lonely or isolated from those around you? \"  [] 0. Never  [x] 1. Rarely  [] 2. Sometimes  [] 3. Often  [] 4. Always  [] 7. Patient declines to respond  [] 8. Patient unable to respond    Pain Effect on Sleep  \"Over the past 5 days, how much of the time has pain made it hard for you to sleep at night? \"  []  0. Does not apply - I have not had any pain or hurting in the past 5 days  [x]  1. Rarely or not at all  []  2. Occasionally  []  3. Frequently  []  4. Almost constantly  []  8. Unable to answer    **If the patient answers \"0. Does not apply\" to this question, skip the next two \"Pain Effect. Shiv Power Shiv Power \" questions**    Pain Interference with Therapy Activities  \"Over the past 5 days, how often have you limited your participation in rehabilitation therapy sessions due to pain? \"  []  0. Does not apply - I have not received rehabilitation therapy in the past 5 days  [x]  1. Rarely or not at all  []  2. Occasionally  []  3. Frequently  []  4. Almost constantly  []  8. Unable to answer    Pain Interference with Day-to-Day Activities: \"Over the past 5 days, how often have you limited your day-to-day activities (excluding rehabilitation therapy session)? \"  [x]  1. Rarely or not at all  []  2. Occasionally  []  3. Frequently  []  4. Almost constantly  []  8. Unable to answer    Nutritional Approaches  Check all of the following nutritional approaches that apply on admission:  []  A. Parenteral/IV feeding (including IV fluids if needed for hydration, but not as part of dialysis/chemo)  []  B. Feeding tube (e.g., nasogastric or abdominal (PEG))  [x]  C. Mechanically altered diet - requires change in texture of food or liquids (e.g., pureed food, thickened liquids)  []  D. Therapeutic diet (e.g., low salt, diabetic, low cholesterol)  []  Z.   None of the above    High Risk Drug Classes:  Use and Indication    Is taking: Check if the pt is taking any medications by pharmacological classification, not how it is used, in the following classes  Indication noted: If column 1 is checked, check if there is an indication noted for all meds in the drug class Is taking  (check all that apply) Indication noted (check all that apply)   Antipsychotic [] []   Anticoagulant [x] [x]   Antibiotic [] []   Opioid [] []   Antiplatelet [] []   Hypoglycemic (including insulin) [x] [x]   None of the above []     Special Treatments, Procedures, and Programs    Check all of the following treatments, procedures, and programs that apply on admission. On admission (check all that apply)   Cancer Treatments   A1. Chemotherapy []           A2. IV []           A3. Oral []           A10. Other []   B1. Radiation []   Respiratory Therapies   C1. Oxygen Therapy [x]           C2. Continuous (continuously for at least 14 hours per day) []           C3. Intermittent [x]           C4. High-concentration []   D1. Suctioning (Does not include oral suctioning) []           D2. Scheduled []           D3. As needed []   E1. Tracheostomy Care []   F1. Invasive Mechanical Ventilator (ventilator or respirator) []   G1. Non-invasive Mechanical Ventilator []           G2. BiPAP []           G3. CPAP []   Other   H1. IV Medications (Do not include sub Q pumps, flushes, Dextrose 50% or lactated ringers) []           H2. Vasoactive medications []           H3. Antibiotics []           H4. Anticoagulation []           H10. Other []   I1. Transfusions []   J1. Dialysis []           J2. Hemodialysis []           J3. Peritoneal dialysis []   O1. IV access (including a catheter in a vein) []           O2. Peripheral [x]           O3. Midline []           O4. Central (PICC, tunneled, port) [x]      None of the above (select if no Cancer, Respiratory, or Other boxes are checked) []     The above items have been reviewed and updated as necessary, and are accurate for the admission assessment period.     Assessing RN: Tatum Menjivar    Reviewing RN:

## 2022-12-23 NOTE — PROGRESS NOTES
Physical Therapy  Facility/Department: [facility] UNIT  Rehabilitation Physical Therapy Initial Assessment    NAME: Tori Gonzáles  : 1955 (79 y.o.)  MRN: [id number]  CODE STATUS: Full Code    Date of Service: 22      Past Medical History:   Diagnosis Date    Arthritis     Asthma     mild    Brain tumor ([facility]     Diabetes mellitus ([facility]     borderline    High cholesterol     Hypertension     Imbalance     DUE TO TUMOR- USING ANKLE BRACE / WALKER PER PREOP H&P    Loss of hearing     bilateral reported    Vertigo      Past Surgical History:   Procedure Laterality Date    CRANIOTOMY N/A 2022    STAGE II: RESECTION OF RIGHT PETROCLIVAL MENINGIOMA performed by Aracely Whitaker MD at [address] [place] [address] [address]    IR EMBOLIZATION HEMORRHAGE  2022    IR EMBOLIZATION HEMORRHAGE 2022 [facility] SPECIAL PROCEDURES    MASTOIDECTOMY Right 2022    RIGHT TRANSMASTOID / ANTERIOR MIDDLE CRANIAL FOSSA , ABDOMINAL FAT GRAFT EXTERNAL AUDITORY CANAL CLOSURE performed by Candice Malin MD at [place] [address] Right 2022    STAGE 1, RIGHT TRANSLABYRINTHINE / RETROLABYRINTHINE CRANIOTOMY, PETROSECTOMY performed by Will Moran.  Elle Whitaker MD at [facility] Right 2021    RIGHT TOTAL HIP ARTHROPLASTY POSTERIOR APPROACH - Karthik Wiggins ADVANCED performed by Kyle Hall MD at [place] [address] Right 2015    TOTAL KNEE ARTHROPLASTY Left 10/14/15    TKA    UPPER GASTROINTESTINAL ENDOSCOPY N/A 2022    EGD CONTROL HEMORRHAGE performed by Caleb Dominguez MD at [address] [place] [address] N/A 2022    EGD DIAGNOSTIC ONLY performed by Ganesh Rivers MD at [facility] ENDOSCOPY       Chart Reviewed: Yes  Patient assessed for rehabilitation services?: Yes  Additional Pertinent Hx: Pt is a 80 yo female that presents to the hospital for a procedure;STAGE 1, RIGHT TRANSLABYRINTHINE / RETROLABYRINTHINE CRANIOTOMY POSSIBLE PETROSECTOMY  RIGHT TRANSMASTOID / ANTERIOR MIDDLE CRANIAL FOSSA , ABDOMINAL FAT GRAFT EXTERNAL AUDITORY CANAL CLOSURE on 11/28. EGD 12/11:  multiple duodenal ulcers, clip placement x 3.  Abd CT 12/11:  suspected Bilat PE.  LE dopplers 12/11: neg. Pt s/p  GDA embo on 12/12.  12/13 CTPA: (+) PE; PMH; Loss of hearing bilaterally, arthritis, brain tumor, diabetes.  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    Restrictions:  Restrictions/Precautions: Fall Risk;Seizure  Position Activity Restriction  Other position/activity restrictions: Ambulate the Patient, Up with assistance, up in the chair     SUBJECTIVE  Subjective: Patient denies pain at this time. Sitting in bed side chair, agreeable to therapy.        Post Treatment Pain Screening       Prior Level of Function:         OBJECTIVE  Vision  Vision: Impaired  Vision Exceptions: Wears glasses at all times (Right eye cover)    Hearing  Hearing: Exceptions to WFL  Hearing Exceptions: Hard of hearing/hearing concerns         ROM       Strength  Strength RLE  Strength RLE: WFL  Comment: .  Strength LLE  Strength LLE: WFL  Comment: .    Quality of Movement  Tone RLE  RLE Tone: Normotonic  Tone LLE  LLE Tone: Normotonic    Sensation  Overall Sensation Status: WNL    Functional Mobility  Bed mobility  Rolling to Left: Contact guard assistance (HOB flat, but use of handrails)  Rolling to Right: Contact guard assistance (HOB flat but use of handrails)  Supine to Sit: Contact guard assistance  Sit to Supine: Contact guard assistance  Scooting: Stand by assistance  Bed Mobility Comments: Bed mobility performed too fast during evaluation, pt had an episode of vertigo. Was educated on taking her time when moving positions of her head  Transfers  Sit to Stand: Minimal Assistance (To RW from  BSC and wheelchair)  Stand to Sit: Minimal Assistance  Car Transfer: Contact guard assistance (With RW)  Balance  Posture: Fair  Sitting - Static: Good  Sitting - Dynamic: Good;-  Standing - Static: Fair  Standing - Dynamic: Fair;-  Comments: CGA to min A with RW for standing balance during mobility    Environmental Mobility  Ambulation  Surface: Level tile  Device: Rolling Walker  Other Apparatus:  (wheelchair follow)  Assistance: Minimal assistance  Quality of Gait: wide base of support, decreased (B) step length, foot clearance, and heel strike; slow darlene; no loss of balance; fatigues with standing mobility  Gait Deviations: Slow Darlene;Decreased step length;Decreased step height  Distance: 10ftx2+50ft  Stairs/Curb  Stairs?: Yes  Stairs  # Steps : 1  Stairs Height: 6\"  Rails: Bilateral  Assistance: Maximum assistance         ASSESSMENT  Vitals  Heart Rate Source: Monitor  BP: 112/70  BP Location: Left upper arm  MAP (Calculated): 84    Activity Tolerance  Activity Tolerance: Patient tolerated treatment well    Assessment  Assessment: Patient is a 78 yo female that presents to the [place] [address] on 11/28 for a two part procedure of excision of her acoustic neuroma. Patient is from home alone with very limited family support and will not have assistance once return to home. Pt at baseline has used a rollator for many years due to joint pain and surgeries. During the evaluation, she uses a two wheeled rolling walker to complete all transfers and ambulation. Pt is requring one person assistance for all mobility at this time. She also is requring multiple minute rest breaks and faitgues quickly with all mobility. She is limited by her endurance, functional mobility, and stairs. Pt would benefit from continued therapy in order to improve her independence, functional mobility, safety and ambulation before return home alone.   Treatment Diagnosis: Impaired endurance with gait  Therapy Prognosis: Good  Decision Making: Medium Complexity  Barriers to Learning: right eye blurred vision  Discharge Recommendations: Patient would benefit from continued therapy after discharge;Continue to assess pending progress;24 hour supervision or assist;Home with Home health PT  PT D/C Equipment  Other: plan to continue to assess  PT Equipment Recommendations  Equipment Needed: No  Other: plan to continue to assess    CLINICAL IMPRESSION   Patient is a 80 yo female that presents to the [place] [address] on 11/28 for a two part procedure of excision of her acoustic neuroma. Patient is from home alone with very limited family support and will not have assistance once return to home. Pt at baseline has used a rollator for many years due to joint pain and surgeries. During the evaluation, she uses a two wheeled rolling walker to complete all transfers and ambulation. Pt is requring one person assistance for all mobility at this time. She also is requring multiple minute rest breaks and faitgues quickly with all mobility. She is limited by her endurance, functional mobility, and stairs. Pt would benefit from continued therapy in order to improve her independence, functional mobility, safety and ambulation before return home alone. GOALS  Patient Goals   Patient Goals : To Return to independence  Short Term Goals  Time Frame for Short Term Goals: 10 days  Short Term Goal 1: Patient will perform all bed mobilty MOD I and no use of the handrails with HOB flat  Short Term Goal 2: Patient will perform sit<>stand transfers Mod I with RW  Short Term Goal 3: Patient will ambulate >150ft with RW Mod I  Short Term Goal 4: Patient will be able to ascend/descend 4 steps with one handrail Mod I  Short Term Goal 5: Patient will be able to  objects off the floor Mod I with RW.     PLAN OF CARE  Frequency: 1-2 treatment sessions per day, 5-7 days per week  Physcial Therapy Plan  Days Per Week: 5 Days  Hours Per Day: 1 hour  Therapy Duration: 10 Days  Current Treatment Recommendations: Strengthening; Functional mobility training;Transfer training;Gait training; Endurance training; Safety education & training; Therapeutic activities;Balance training;Neuromuscular re-education;Patient/Caregiver education & training;Equipment evaluation, education, & procurement;Home exercise program  Restraints  Restraints Initially in Place: No    EDUCATION  Education  Education Given To: Patient  Education Provided: Role of Therapy; Equipment;Transfer Training  Education Method: Demonstration  Barriers to Learning: Vision  Education Outcome: Verbalized understanding    ELOS:          Therapy Time   Individual Concurrent Group Co-treatment   Time In 3910         Time Out 6502         Minutes 60           Timed Code Treatment Minutes:  45    Total Treatment Minutes:  [address], [place], 12/23/22 at 3:14 PM

## 2022-12-23 NOTE — H&P
Department of Physical Medicine & Rehabilitation  History & Physical      Patient Identification:  Monica Bhakta  : 1955  Admit date: 2022   Attending provider: Roro Ward DO        Primary care provider: Karolina Tapia     Chief Complaint: Meningioma    History of Present Illness/Hospital Course: This is a morbidly obese (Body mass index is 50.82 kg/m².), 79 y.o. female  with c/o dizziness, HA and vertigo in the setting of large right petroclival mass which is consistent with meningioma. . She presented to the hospital for a scheduled two staged petroclival meningioma resection. 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 Prior Level of Function:  Mod Independent for mobility, ADLs, and IADLs    Current Level of Function:  Mod assist     Pertinent Social History:  Support: Lives alone  Home set-up: One level house with 2 steps to enter    Past Medical History:   Diagnosis Date    Arthritis     Asthma     mild    Brain tumor ([address]     Diabetes mellitus ([address]     borderline    High cholesterol     Hypertension     Imbalance     DUE TO TUMOR- USING ANKLE BRACE / WALKER PER PREOP H&P    Loss of hearing     bilateral reported    Vertigo      Fall in past year: No    Past Surgical History:   Procedure Laterality Date    CRANIOTOMY N/A 2022    STAGE II: RESECTION OF RIGHT PETROCLIVAL MENINGIOMA performed by Nydia Saravia. Ishan Hess MD at [address] [place] [address] [address]    IR EMBOLIZATION HEMORRHAGE  2022    IR EMBOLIZATION HEMORRHAGE 2022 [facility] SPECIAL PROCEDURES    MASTOIDECTOMY Right 2022    RIGHT TRANSMASTOID / ANTERIOR MIDDLE CRANIAL FOSSA , ABDOMINAL FAT GRAFT EXTERNAL AUDITORY CANAL CLOSURE performed by Heaven Duron MD at [place] [address] Right 2022    STAGE 1, RIGHT TRANSLABYRINTHINE / RETROLABYRINTHINE CRANIOTOMY, PETROSECTOMY performed by Nydia Saravia.  Ishan Hess MD at [facility] 11/8/2021    RIGHT TOTAL HIP ARTHROPLASTY POSTERIOR APPROACH - JOSÉ ANTONIO ADVANCED performed by Kelly Moya MD at [place] [address] Right 07/14/2015    TOTAL KNEE ARTHROPLASTY Left 10/14/15    TKA    UPPER GASTROINTESTINAL ENDOSCOPY N/A 12/11/2022    EGD CONTROL HEMORRHAGE performed by Norris Burnett MD at [address] [place] [address] 12/16/2022    EGD DIAGNOSTIC ONLY performed by Kvng Mcrae MD at [facility] Surgery in past 100 days: yes    Family History   Problem Relation Age of Onset    Cancer Mother     Hypotension Mother     Cancer Father     Cancer Sister     Hypotension Sister     Cancer Maternal Grandmother     Cancer Maternal Grandfather     Cancer Paternal Grandmother     Cancer Paternal Grandfather     Hypotension Other     Cancer Other     Diabetes Other     Osteoarthritis Other        Social History     Socioeconomic History    Marital status:    Tobacco Use    Smoking status: Former    Smokeless tobacco: Never   Substance and Sexual Activity    Alcohol use: No    Drug use: Never       Allergies   Allergen Reactions    Keflex [Cephalexin] Itching and Rash    Codeine Nausea And Vomiting    Tomato Rash         Current Facility-Administered Medications   Medication Dose Route Frequency Provider Last Rate Last Admin    albuterol (PROVENTIL) nebulizer solution 2.5 mg  2.5 mg Nebulization Q4H PRN Rashard Teixeira DO        cetirizine (ZYRTEC) tablet 10 mg  10 mg Oral Daily Rashard Teixeira DO        hydrALAZINE (APRESOLINE) injection 10 mg  10 mg IntraVENous Q1H PRN Rashard Teixeira DO        insulin glargine (LANTUS;BASAGLAR) injection pen 15 Units  15 Units SubCUTAneous Nightly Rashard Teixeira DO   15 Units at 12/22/22 2153    insulin lispro (1 Unit Dial) (HUMALOG/ADMELOG) pen 0-16 Units  0-16 Units SubCUTAneous TID  Rashard Teixeira DO        insulin lispro (1 Unit Dial) (HUMALOG/ADMELOG) pen 0-4 Units  0-4 Units SubCUTAneous Nightly Soto DOUGHERTY Heis, DO        latanoprost (XALATAN) 0.005 % ophthalmic solution 1 drop  1 drop Both Eyes Nightly Rashard L Heis, DO   1 drop at 12/22/22 2007    levETIRAcetam (KEPPRA) tablet 500 mg  500 mg Oral BID Rashard L Heis, DO   500 mg at 12/22/22 2004    melatonin disintegrating tablet 5 mg  5 mg Oral Nightly Rashard L Heis, DO   5 mg at 12/22/22 2004    methIMAzole (TAPAZOLE) tablet 10 mg  10 mg Oral Daily Rashard L Heis, DO        ondansetron (ZOFRAN-ODT) disintegrating tablet 4 mg  4 mg Oral Q8H PRN Rashard L Heis, DO        Or    ondansetron (ZOFRAN) injection 4 mg  4 mg IntraVENous Q6H PRN Rashard L Heis, DO        pantoprazole (PROTONIX) injection 40 mg  40 mg IntraVENous BID Rashard L Heis, DO   40 mg at 12/22/22 2005    potassium chloride (KLOR-CON M) extended release tablet 20 mEq  20 mEq Oral BID  Rashard L Emmanuelis, DO        promethazine (PHENERGAN) injection 6.25 mg  6.25 mg IntraMUSCular Q6H PRN Rashard L Heis, DO        rivaroxaban (XARELTO) tablet 15 mg  15 mg Oral BID  Rashard L Emmanuelis, DO        Followed by    [START ON 1/11/2023] rivaroxaban (XARELTO) tablet 20 mg  20 mg Oral Dinner Rashard L Heis, DO        sodium chloride flush 0.9 % injection 5-40 mL  5-40 mL IntraVENous 2 times per day Rashard L Heis, DO   10 mL at 12/22/22 2005    sodium chloride flush 0.9 % injection 5-40 mL  5-40 mL IntraVENous 2 times per day Rashard L Heis, DO   10 mL at 12/22/22 2008    glucose chewable tablet 16 g  4 tablet Oral PRN Rashard L Heis, DO        dextrose bolus 10% 125 mL  125 mL IntraVENous PRN Rashard L Heis, DO        Or    dextrose bolus 10% 250 mL  250 mL IntraVENous PRN Rashard L Heis, DO        glucagon injection 1 mg  1 mg SubCUTAneous PRN Rashard L Heis, DO        dextrose 10 % infusion   IntraVENous Continuous PRN Rashard L Heis, DO        acetaminophen (TYLENOL) tablet 650 mg  650 mg Oral Q4H PRN Rashard L Heis, DO        bisacodyl (DULCOLAX) EC tablet 5 mg  5 mg Oral Daily Rashard Teixeira DO   5 mg at  12/22/22 2004    magnesium hydroxide (MILK OF MAGNESIA) 400 MG/5ML suspension 30 mL  30 mL Oral Daily PRN Rashard Teixeira DO        polyethylene glycol (GLYCOLAX) packet 17 g  17 g Oral Daily PRN Rashard Teixeira DO             REVIEW OF SYSTEMS:   CONSTITUTIONAL: negative for fevers, chills   EYES: positive for diplopia   HEENT: positive for difficulty swallowing. negative for hearing loss, tinnitus, sinus pressure, nasal congestion  RESPIRATORY: Negative for SOB, cough  CARDIOVASCULAR: negative for chest pain, palpitations  GASTROINTESTINAL: positive for abdominal pain. negative for hematemesis, hematochezia, nausea, vomiting, diarrhea, abdominal pain  GENITOURINARY: negative for frequency, dysuria  HEMATOLOGIC/LYMPHATIC: negative for easy bruising, bleeding and lymphadenopathy  ENDOCRINE: negative for diabetic symptoms including polyuria, polydipsia  MUSCULOSKELETAL: negative for pain, joint swelling, decreased range of motion  NEUROLOGICAL: positive for difficulty speaking, negative for headaches, unilateral weakness    All pertinent positives are noted in the HPI. Physical Examination:  Vitals: Patient Vitals for the past 24 hrs:   BP Temp Temp src Pulse Resp SpO2 Height Weight   12/22/22 2258 -- -- -- -- -- -- 5' 5\" (1.651 m) (!) 342 lb (155.1 kg)   12/22/22 1945 137/88 97.9 °F (36.6 °C) Oral 73 18 92 % -- --   12/22/22 1836 (!) 146/81 98.2 °F (36.8 °C) Axillary 76 18 92 % -- --       Const: Alert. WDWN. No distress, obese  Eyes: Conjunctiva noninjected, no icterus noted; pupils equal, round, and reactive to light. HENT: Bruising around R eye., normocephalic; Oral mucosa moist  CV: Regular rate and rhythm, no murmur rub or gallop noted  Resp: Lungs clear to auscultation bilaterally, no rales wheezes or ronchi, no retractions. Respirations unlabored. GI: Soft, mild tenderness, nondistended. Normal bowel sounds. Skin: Normal temperature and turgor. No rashes or breakdown noted.    Ext: positive for BL LE edema   MSK: No joint tenderness, erythema, warmth noted. AROM intact. Neuro: Alert, oriented, appropriate. Mild R sided facial droop. Sensation intact to light touch. Motor examination reveals normal strength in all four limbs diffusely. Psych: Stable mood, normal judgement, normal affect        Lab Results   Component Value Date    WBC 3.5 (L) 12/23/2022    HGB 9.0 (L) 12/23/2022    HCT 26.8 (L) 12/23/2022    MCV 91.8 12/23/2022     12/23/2022     Lab Results   Component Value Date    INR 1.15 (H) 12/14/2022    INR 1.07 12/11/2022    INR 1.09 11/29/2022    PROTIME 14.6 (H) 12/14/2022    PROTIME 13.8 12/11/2022    PROTIME 14.1 11/29/2022     Lab Results   Component Value Date    CREATININE <0.5 (L) 12/23/2022    BUN 8 12/23/2022     12/23/2022    K 4.1 12/23/2022     12/23/2022    CO2 28 12/23/2022     Lab Results   Component Value Date    ALT 11 12/04/2022    AST 12 (L) 12/04/2022    ALKPHOS 48 12/04/2022    BILITOT 0.5 12/04/2022         No orders to display           The above laboratory data have been reviewed. The above imaging data have been reviewed. The above medical testing have been reviewed. Body mass index is 56.91 kg/m². POST ADMISSION PHYSICIAN EVALUATION  The patient has agreed to being admitted to our comprehensive inpatient rehabilitation facility and can tolerate the intensity of service consisting of at least:  --180 minutes of therapy a day, 5 out of 7 days a week. OR  --15 hours of intensive therapy within a 7 consecutive day period. The patient/family has a good understanding of our discharge process and will benefit from an interdisciplinary inpatient rehabilitation program. The patient has potential to make improvement and is in need of at least two of the following multidisciplinary therapies including but not limited to physical, occupational, respiratory, and speech, nutritional services, wound care, and prosthetics and orthotics.  Given the patients complex condition and risk of further medical complications, rehabilitation services cannot be safely provided at a lower level of care such as a skilled nursing facility. All of the goals listed below were reviewed with the patient and he/she is in agreement. I have compared the patients medical and functional status at the time of the preadmission screening and the same on this date, and there are no significant changes. By signing this document, I acknowledge that I have personally performed a full physical examination on this patient within 24 hours of admission to this inpatient rehabilitation facility and have determined the patient to be able to tolerate the above course of treatment at an intensive level for a reasonable period of time. I will be completing a detailed individualized  Plan of Care for this patient by day four of the patients stay based upon the Preadmission Screen, this Post-Admission Evaluation, and the therapy evaluations.      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, Non-Traumatic      Assessment and Plan:  Giant Duodenal Ulcer  -monitor Hgb  -Transfuse pRBCs  -Protonix 40 mg BID  -Embolization of GDA  -GI following  -IR following     BL PE, suspected  -currently on heparin gtt  - transition to xarelto     Meningioma  -s/p resection with TL and MCF approach  -Keppra 500 mg BID  -Bowel regiment: Senna, glycolax, and dulcolax  -Nicardipine-Roxicodone PRN  -Acycolvir 400 mg TID for 10 days  -Artifical tears q2H and eye patch  -Keep an eye on mastoid dressing  -SCDs for DVT prophylaxis   -PT/OT     HTN   -Coreg 12.5 mg BID  Losartan 100 mg daily     DM   -Lantus 15u qHS  -HDSSI     Hyperthyroidism   -Methimazole 10 mg daily     Liver Mass  -Incidental finding  -f/u imaging once acute problems have been tx    Impairments: Decreased functional mobility, Decreased ADLs    Bladder - high risk retention - Monitor PVRs, SC prn >300cc    Bowel - high risk constipation - colace BID, PRN miralax and MoM. follow bowel movements. Enema or suppository if needed.      Safety - fall precautions    PPx  DVT: xarelto   GI: pantoprazole    FULL CODE    KAYLEY AaronP.H  PM&R  12/23/2022  9:27 AM

## 2022-12-23 NOTE — DISCHARGE INSTR - COC
Continuity of Care Form    Patient Name: Nicolas Horne   :  1955  MRN:  [id number]    Admit date:  2022  Discharge date:  2022    Code Status Order: Full Code   Advance Directives:     Admitting Physician:  Willie Diego DO  PCP: [facility]    Discharging Nurse: [facility] Unit/Room#: 3101/3101-01  Discharging Unit Phone Number: [phone]    Emergency Contact:   Extended Emergency Contact Information  Primary Emergency Contact: [facility] [address] [address] Phone: [phone]  Mobile Phone: [phone]  Relation: Brother/Sister  Secondary Emergency Contact: [facility] Phone: [phone]  Mobile Phone: [phone]  Relation: Child    Past Surgical History:  Past Surgical History:   Procedure Laterality Date    CRANIOTOMY N/A 2022    STAGE II: RESECTION OF RIGHT PETROCLIVAL MENINGIOMA performed by Minus Hipps. [place], MD at [address] [facility] [address]    IR EMBOLIZATION HEMORRHAGE  2022    IR EMBOLIZATION HEMORRHAGE 2022 [facility] SPECIAL PROCEDURES    MASTOIDECTOMY Right 2022    RIGHT TRANSMASTOID / ANTERIOR MIDDLE CRANIAL FOSSA , ABDOMINAL FAT GRAFT EXTERNAL AUDITORY CANAL CLOSURE performed by Reinaldo Arias MD at [place] [address] Right 2022    STAGE 1, RIGHT TRANSLABYRINTHINE / RETROLABYRINTHINE CRANIOTOMY, PETROSECTOMY performed by Minus Hipps.  Ishan Hess MD at [facility] Right 2021    RIGHT TOTAL HIP ARTHROPLASTY POSTERIOR APPROACH - Fredrica Cheese ADVANCED performed by Ifrah Springer MD at [address] [place] [address] Right 2015    TOTAL KNEE ARTHROPLASTY Left 10/14/15    TKA    UPPER GASTROINTESTINAL ENDOSCOPY N/A 2022    EGD CONTROL HEMORRHAGE performed by Phuc Palumbo MD at [address] 2022    EGD DIAGNOSTIC ONLY performed by Urmila Steward MD at [address] ENDOSCOPY       Immunization History:   Immunization History Administered Date(s) Administered    COVID-19, J&J, (age 18y+), IM, 0.5 mL 03/10/2021    COVID-19, MODERNA Bivalent BOOSTER, (age 12y+), IM, 48 mcg/0.5 mL 09/30/2022       Active Problems:  Patient Active Problem List   Diagnosis Code    Asthma J45.909    Gastroesophageal reflux disease K21.9    Hypertension I10    Adiposity E66.9    S/P total knee arthroplasty Z96.659    Primary osteoarthritis of left knee M17.12    Morbid obesity with BMI of 50.0-59.9, adult (HCC) E66.01, Z68.43    Osteoarthritis of right hip M16.11    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    Acoustic neuroma ([facility]) D33.3    S/P craniotomy Z98.890    Multiple subsegmental pulmonary emboli without acute cor pulmonale ([facility]) I26.94    S/P excision of acoustic neuroma Z98.890, Z86.018       Isolation/Infection:   Isolation            No Isolation          Patient Infection Status       Infection Onset Added Last Indicated Last Indicated By Review Planned Expiration Resolved Resolved By    None active    Resolved    COVID-19 (Rule Out) 11/01/21 11/01/21 11/01/21 COVID-19 (Ordered)   11/02/21 Rule-Out Test Resulted            Nurse Assessment:  Last Vital Signs: /88   Pulse 73   Temp 97.9 °F (36.6 °C) (Oral)   Resp 18   Ht 5' 5\" (1.651 m)   Wt (!) 342 lb (155.1 kg)   SpO2 92%   BMI 56.91 kg/m²     Last documented pain score (0-10 scale): Pain Level: 0  Last Weight:   Wt Readings from Last 1 Encounters:   12/22/22 (!) 342 lb (155.1 kg)     Mental Status:  oriented    IV Access:  - None    Nursing Mobility/ADLs:  Walking   Assisted  Transfer  Assisted  Bathing  Assisted  Dressing  Independent  Toileting  Independent  Feeding  Independent  Med Admin  Assisted  Med Delivery   whole    Wound Care Documentation and Therapy:  Incision 11/28/22 Head Lower;Right;Posterior (Active)   Dressing Status Clean;Dry; Intact 12/22/22 1500   Incision Cleansed Other (Comment) 12/15/22 0800   Dressing/Treatment Open to air 12/22/22 1849   Closure Sutures 12/22/22 1849   Margins Approximated 12/22/22 1849   Incision Assessment Dry 12/22/22 1849   Drainage Amount None 12/22/22 1849   Drainage Description Sanguinous 12/14/22 0400   Odor None 12/22/22 1849   Ingrid-incision Assessment Intact 12/22/22 1500   Number of days: 25       Incision 11/28/22 Abdomen Right;Upper (Active)   Dressing Status Clean;Dry; Intact 12/22/22 1500   Incision Cleansed Cleansed with saline 12/22/22 1500   Dressing/Treatment Open to air 12/22/22 1849   Closure Sutures 12/22/22 1849   Margins Approximated 12/22/22 1849   Incision Assessment Dry 12/22/22 1849   Drainage Amount None 12/22/22 1849   Odor None 12/22/22 1849   Ingrid-incision Assessment Ecchymosis 12/22/22 1500   Number of days: 24        Elimination:  Continence: Bowel: Yes  Bladder: Yes  Urinary Catheter: None   Colostomy/Ileostomy/Ileal Conduit: No       Date of Last BM: 12/31/2022    Intake/Output Summary (Last 24 hours) at 12/23/2022 5042  Last data filed at 12/22/2022 2008  Gross per 24 hour   Intake 20 ml   Output --   Net 20 ml     I/O last 3 completed shifts: In: 20 [I.V.:20]  Out: -     Safety Concerns: At Risk for Falls    Impairments/Disabilities:      Hearing    Nutrition Therapy:  Current Nutrition Therapy:   - Oral Diet:  General    Routes of Feeding: Oral  Liquids: Thin Liquids  Daily Fluid Restriction: no  Last Modified Barium Swallow with Video (Video Swallowing Test): not done    Treatments at the Time of Hospital Discharge:   Respiratory Treatments: none  Oxygen Therapy:  is not on home oxygen therapy.   Ventilator:    - No ventilator support    Rehab Therapies: none just HH  Weight Bearing Status/Restrictions: No weight bearing restrictions  Other Medical Equipment (for information only, NOT a DME order):  has what needs at home  Other Treatments: none    Patient's personal belongings (please select all that are sent with patient):  Cell phone,     RN SIGNATURE:  Electronically signed by Addis Isabel Katie Pastor RN on 1/1/23 at 1:20 PM EST    CASE MANAGEMENT/SOCIAL WORK SECTION    Inpatient Status Date:     Readmission Risk Assessment Score:  Readmission Risk              Risk of Unplanned Readmission:  11           Discharging to Facility/ Agency   Name: [facility]  Address:  Phone: [phone]  Fax:    Dialysis Facility (if applicable)   Name:  Address:  Dialysis Schedule:  Phone:  Fax:    / signature: Electronically signed by VIPIN Cardenas on 12/30/22 at 2:55 PM EST    PHYSICIAN SECTION    Prognosis: {Prognosis:2259635678}    Condition at Discharge: [address] Patient Condition:408838263}    Rehab Potential (if transferring to Rehab): {Prognosis:9407691209}    Recommended Labs or Other Treatments After Discharge: ***    Physician Certification: I certify the above information and transfer of Nidhi Coleman  is necessary for the continuing treatment of the diagnosis listed and that she requires {Admit to Appropriate Level of Care:65711} for {GREATER/LESS:454629952} 30 days.      Update Admission H&P: {CHP DME Changes in TECNO:037204783}    PHYSICIAN SIGNATURE:  {Esignature:[id number]}

## 2022-12-23 NOTE — PROGRESS NOTES
Speech Language Pathology  Facility/Department: [facility] ACUTE REHAB UNIT   CLINICAL BEDSIDE SWALLOW EVALUATION    NAME: Last Zee  : 1955  MRN: [id number]    ADMISSION DATE: 2022  ADMITTING DIAGNOSIS: has Asthma; Gastroesophageal reflux disease; Hypertension; Adiposity; S/P total knee arthroplasty; Primary osteoarthritis of left knee; Morbid obesity with BMI of 50.0-59.9, adult ([facility] [address] Osteoarthritis of right hip; Cerebellopontine angle meningioma ([facility] [address] Acoustic neuroma ([facility] [address] S/P craniotomy; Multiple subsegmental pulmonary emboli without acute cor pulmonale ([facility] [address] and S/P excision of acoustic neuroma on their problem list.  ONSET DATE: 22    Recent CT of Chest: (22): Impression       Large burden of pulmonary emboli without obvious heart strain of the exam is severely limited and difficult to evaluate for other pathology       Findings called to patient's floor nurse on 2022 at 7:09 PM       Date of Eval: 2022  Evaluating Therapist: PINA Najera    Current Diet level:  Current Diet : Soft and Bite-Sized      Primary Complaint  Patient Complaint: \"I just want this part of my face back. \" *touched right side of face*    Pain:  Pain Assessment  Pain Assessment: 0-10  Pain Level: 1  Patient's Stated Pain Goal: 0 - No pain  Pain Location: Back  Pain Orientation: Lower  Response to Pain Intervention: Patient satisfied    Reason for Referral  Last Zee was referred for a bedside swallow evaluation to assess the efficiency of her swallow function, identify signs and symptoms of aspiration and make recommendations regarding safe dietary consistencies, effective compensatory strategies, and safe eating environment. Impression  Dysphagia Diagnosis: Mild to moderate oral stage dysphagia  Dysphagia Impression : Mild to moderate oral stage dysphagia noted this session. Given sip from side of cup, anterior loss x1.  Pt asked for a straw and independently placed straw on L side to decrease anterior loss. Adequate mastication noted however oral residue was present on R side. Partial anterior loss of solid bolus x2 on R side. Pt independently utilized lingual sweep and liquid flush to decrease anterior loss of solids and to effectively clear oral residue. Dysphagia Outcome Severity Scale: Level 5: Mild dysphagia- Distant supervision. May need one diet consistency restricted     Treatment Plan  Requires SLP Intervention: Yes  Duration of Treatment: LOS  D/C Recommendations: Ongoing speech therapy is recommended during this hospitalization       Recommended Diet and Intervention  Recommended Diet Consistency: Soft and Bite Size with Thin Liquids  Recommended Form of Meds: PO  Recommendations: Dysphagia treatment  Therapeutic Interventions: Diet tolerance monitoring;Oral motor exercises; Patient/Family education    Compensatory Swallowing Strategies  Compensatory Swallowing Strategies : Alternate solids and liquids;Eat/Feed slowly;Upright as possible for all oral intake; Check for pocketing of food on the Right;Lingual sweep;Small bites/sips    Treatment/Goals  Short-term Goals  Timeframe for Short-term Goals: LOS  Goal 1: Pt will tolerate trials of increased texture in order to advance to least restrictive diet consistency. Goal 2: Patient will independently demonstrate understanding of swallowing concerns, compensatory strategies, and recommendations. General  Chart Reviewed: Yes  Comments: Per MD report, pt is a 79 y.o. female with c/o dizziness, HA and vertigo in the setting of large right petroclival mass which is consistent with meningioma. . She presented to the hospital for a scheduled two staged petroclival meningioma resection. 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 Subjective  Subjective: Pt was visited laying in bed. RN and SLP repositioned pt in bed to safe swallow position.  Pt was agreeable to evaluation. Behavior/Cognition: Alert; Cooperative;Pleasant mood  Respiratory Status: Room air  O2 Device: None (Room air)  Communication Observation: Dysarthria  Follows Directions: Simple  Dentition: Adequate  Patient Positioning: Upright in bed  Baseline Vocal Quality: Normal  Prior Dysphagia History: Per chart review, none prior to this hospital stay. Consistencies Administered: Soft and Bite-Sized; Thin - cup; Thin - straw    Vision/Hearing  Vision  Vision: Impaired  Vision Exceptions: Wears glasses at all times  Hearing  Hearing: Exceptions to [facility]  Hearing Exceptions: Hard of hearing/hearing concerns    Oral Motor Deficits  Oral/Motor  Oral Hygiene: Moist;Clean    Oral Phase Dysfunction  Oral Phase  Oral Phase: Exceptions  Oral Phase  Oral Phase - Comment: Pt independently utilize lingual sweep and liquid flush in order to check R side of residue. Anterior loss of thin liquid via side of cup. No anterior loss noted when pt used straw and placed straw on L side. Anterior loss of solids noted x2. Pt appeared unaware. Indicators of Pharyngeal Phase Dysfunction   Pharyngeal Phase   Pharyngeal Phase: No overt s/s of aspiration or penetration at this time. Prognosis  Individuals consulted  Consulted and agree with results and recommendations: Patient  RN Name: Amara Whitman    Education  Patient Education: Pt was educated to rational for each assessment task and to results of assessment. Patient Education Response: Verbalizes understanding  Safety Devices in place: Yes  Type of devices:  All fall risk precautions in place       Therapy Time  SLP Individual Minutes  Time In: 0830  Time Out: 0900  Minutes: 30     SLP Total Treatment Time  Timed Code Treatment Minutes: 0 Minutes  Total Treatment Time: 30    Electronically Signed By:  Felipe Tomlinson, [address] [place] [address] Road; [id number]  Speech-Language Pathologist      Felipe Tomlinson, PINA  12/23/2022 2:00 PM

## 2022-12-23 NOTE — PROGRESS NOTES
Oriented x 4. VSS. Assist x 1 gait belt and walker. Cranial right incision negative exudate, incision well approximated, sutures intact, positive edema. Patient states the edema feels the same as previous days; she reports her right ear hurts, \"low Pain\", and has hurt for the past 4 days. She agrees to try a cold pack on her right ear. Call light in reach, alarm on for safety.

## 2022-12-24 LAB
GLUCOSE BLD-MCNC: 109 MG/DL (ref 70–99)
GLUCOSE BLD-MCNC: 117 MG/DL (ref 70–99)
GLUCOSE BLD-MCNC: 123 MG/DL (ref 70–99)
GLUCOSE BLD-MCNC: 137 MG/DL (ref 70–99)
PERFORMED ON: ABNORMAL

## 2022-12-24 PROCEDURE — 97535 SELF CARE MNGMENT TRAINING: CPT

## 2022-12-24 PROCEDURE — 97110 THERAPEUTIC EXERCISES: CPT

## 2022-12-24 PROCEDURE — 97129 THER IVNTJ 1ST 15 MIN: CPT

## 2022-12-24 PROCEDURE — 92526 ORAL FUNCTION THERAPY: CPT

## 2022-12-24 PROCEDURE — C9113 INJ PANTOPRAZOLE SODIUM, VIA: HCPCS | Performed by: PHYSICAL MEDICINE & REHABILITATION

## 2022-12-24 PROCEDURE — 1280000000 HC REHAB R&B

## 2022-12-24 PROCEDURE — 97116 GAIT TRAINING THERAPY: CPT

## 2022-12-24 PROCEDURE — 97530 THERAPEUTIC ACTIVITIES: CPT

## 2022-12-24 PROCEDURE — 2580000003 HC RX 258: Performed by: PHYSICAL MEDICINE & REHABILITATION

## 2022-12-24 PROCEDURE — 6370000000 HC RX 637 (ALT 250 FOR IP): Performed by: PHYSICAL MEDICINE & REHABILITATION

## 2022-12-24 PROCEDURE — 97130 THER IVNTJ EA ADDL 15 MIN: CPT

## 2022-12-24 PROCEDURE — 6360000002 HC RX W HCPCS: Performed by: PHYSICAL MEDICINE & REHABILITATION

## 2022-12-24 RX ADMIN — RIVAROXABAN 15 MG: 15 TABLET, FILM COATED ORAL at 08:58

## 2022-12-24 RX ADMIN — SODIUM CHLORIDE, PRESERVATIVE FREE 10 ML: 5 INJECTION INTRAVENOUS at 21:33

## 2022-12-24 RX ADMIN — RIVAROXABAN 15 MG: 15 TABLET, FILM COATED ORAL at 17:07

## 2022-12-24 RX ADMIN — POTASSIUM CHLORIDE 20 MEQ: 20 TABLET, EXTENDED RELEASE ORAL at 17:07

## 2022-12-24 RX ADMIN — LATANOPROST 1 DROP: 50 SOLUTION OPHTHALMIC at 21:43

## 2022-12-24 RX ADMIN — SODIUM CHLORIDE, PRESERVATIVE FREE 10 ML: 5 INJECTION INTRAVENOUS at 21:32

## 2022-12-24 RX ADMIN — PANTOPRAZOLE SODIUM 40 MG: 40 INJECTION, POWDER, LYOPHILIZED, FOR SOLUTION INTRAVENOUS at 21:27

## 2022-12-24 RX ADMIN — PANTOPRAZOLE SODIUM 40 MG: 40 INJECTION, POWDER, LYOPHILIZED, FOR SOLUTION INTRAVENOUS at 07:42

## 2022-12-24 RX ADMIN — ACETAMINOPHEN 650 MG: 325 TABLET ORAL at 21:33

## 2022-12-24 RX ADMIN — POTASSIUM CHLORIDE 20 MEQ: 20 TABLET, EXTENDED RELEASE ORAL at 07:42

## 2022-12-24 RX ADMIN — Medication 5 MG: at 21:27

## 2022-12-24 RX ADMIN — METHIMAZOLE 10 MG: 5 TABLET ORAL at 08:58

## 2022-12-24 RX ADMIN — CETIRIZINE HYDROCHLORIDE 10 MG: 10 TABLET, FILM COATED ORAL at 07:42

## 2022-12-24 RX ADMIN — SODIUM CHLORIDE, PRESERVATIVE FREE 10 ML: 5 INJECTION INTRAVENOUS at 07:43

## 2022-12-24 RX ADMIN — BISACODYL 5 MG: 5 TABLET, COATED ORAL at 07:41

## 2022-12-24 RX ADMIN — LEVETIRACETAM 500 MG: 500 TABLET, FILM COATED ORAL at 21:27

## 2022-12-24 RX ADMIN — LEVETIRACETAM 500 MG: 500 TABLET, FILM COATED ORAL at 07:42

## 2022-12-24 RX ADMIN — INSULIN GLARGINE 15 UNITS: 100 INJECTION, SOLUTION SUBCUTANEOUS at 21:37

## 2022-12-24 ASSESSMENT — PAIN - FUNCTIONAL ASSESSMENT: PAIN_FUNCTIONAL_ASSESSMENT: ACTIVITIES ARE NOT PREVENTED

## 2022-12-24 ASSESSMENT — PAIN DESCRIPTION - DESCRIPTORS: DESCRIPTORS: ACHING

## 2022-12-24 ASSESSMENT — PAIN DESCRIPTION - ONSET: ONSET: ON-GOING

## 2022-12-24 ASSESSMENT — PAIN SCALES - GENERAL
PAINLEVEL_OUTOF10: 0
PAINLEVEL_OUTOF10: 3

## 2022-12-24 ASSESSMENT — PAIN DESCRIPTION - FREQUENCY: FREQUENCY: INTERMITTENT

## 2022-12-24 ASSESSMENT — PAIN DESCRIPTION - PAIN TYPE: TYPE: ACUTE PAIN;SURGICAL PAIN

## 2022-12-24 ASSESSMENT — PAIN DESCRIPTION - LOCATION: LOCATION: HEAD;JAW

## 2022-12-24 ASSESSMENT — PAIN DESCRIPTION - ORIENTATION: ORIENTATION: RIGHT

## 2022-12-24 NOTE — PROGRESS NOTES
Department of Physical Medicine & Rehabilitation  Progress Note    Patient Identification:  Gonzales Lo  [id number]  : 1955  Admit date: 2022    Chief Complaint: S/P excision of acoustic neuroma    Subjective:   No acute events overnight. Patient seen this am. She is working in therapy but very tired today. No other complaints. Happy that she doesn't have to brave the cold outside right now. Seen with PT this morning. ROS: No f/c, n/v, cp     Objective:  Patient Vitals for the past 24 hrs:   BP Temp Temp src Pulse Resp SpO2 Height   22 0922 -- -- -- -- -- -- 5' 5\" (1.651 m)   22 0730 (!) 144/78 97.8 °F (36.6 °C) Oral 79 18 95 % --   22 2045 (!) 143/77 97.6 °F (36.4 °C) Oral 74 18 93 % --   22 1300 112/70 -- -- -- -- -- --     Const: Alert. No distress, pleasant. HEENT: right sided swelling- wound care  CV: Regular rate and rhythm. Resp: No respiratory distress. Lungs CTAB. Abd: Soft, nontender, nondistended, NABS+   Ext: No edema. Neuro: Alert, oriented, appropriately interactive. Psych: Cooperative, appropriate mood and affect    Laboratory data: Available via EMR.    Last 24 hour lab  Recent Results (from the past 24 hour(s))   POCT Glucose    Collection Time: 22 11:20 AM   Result Value Ref Range    POC Glucose 139 (H) 70 - 99 mg/dl    Performed on ACCU-CHEK    POCT Glucose    Collection Time: 22  5:16 PM   Result Value Ref Range    POC Glucose 125 (H) 70 - 99 mg/dl    Performed on ACCU-CHEK    POCT Glucose    Collection Time: 22  8:50 PM   Result Value Ref Range    POC Glucose 163 (H) 70 - 99 mg/dl    Performed on ACCU-CHEK    POCT Glucose    Collection Time: 22  7:35 AM   Result Value Ref Range    POC Glucose 109 (H) 70 - 99 mg/dl    Performed on ACCU-CHEK        Therapy progress:  PT  Position Activity Restriction  Other position/activity restrictions: Ambulate the Patient, Up with assistance, up in the chair  Objective     Sit to Stand: Minimal Assistance (To RW from [facility] and wheelchair)  Stand to Sit: Minimal Assistance  Device: Rolling Walker  Other Apparatus:  (wheelchair follow)  Assistance: Minimal assistance  Distance: 10ftx2+50ft  OT  PT Equipment Recommendations  Equipment Needed: No  Other: plan to continue to assess  Toilet - Technique: Ambulating  Equipment Used: Extra wide bedside commode  Toilet Transfers Comments: pt reported difficulty standing from standard low surface toilet; + time to transition BUEs to RW  Assessment        SLP          Body mass index is 56.91 kg/m².     Assessment and Plan:  Giant Duodenal Ulcer  -monitor Hgb  -Transfuse pRBCs  -Protonix 40 mg BID  -Embolization of GDA  -GI following  -IR following     BL PE, suspected  -currently on heparin gtt  - transition to xarelto     Meningioma  -s/p resection with TL and MCF approach  -Keppra 500 mg BID  -Bowel regiment: Senna, glycolax, and dulcolax  -Nicardipine-Roxicodone PRN  -Acycolvir 400 mg TID for 10 days  -Artifical tears q2H and eye patch  -Keep an eye on mastoid dressing  -SCDs for DVT prophylaxis   -PT/OT     HTN   -Coreg 12.5 mg BID  Losartan 100 mg daily     DM   -Lantus 15u qHS  -HDSSI     Hyperthyroidism   -Methimazole 10 mg daily     Liver Mass  -Incidental finding  -f/u imaging once acute problems have been tx    Rehab Progress: Improving  Anticipated Dispo: home  Services/DME: [facility]  ELOS: KAYLEY SalazarP.H  PM&R  12/24/2022  10:01 AM

## 2022-12-24 NOTE — PROGRESS NOTES
Occupational Therapy  Facility/Department: [facility] ACUTE REHAB UNIT  Rehabilitation Occupational Therapy Daily Treatment Note    Date: 22  Patient Name: Nidhi Coleman       Room: 3101/3101-01  MRN: [id number]  Account: [de-identified]   : 1955  (78 y.o.) Gender: female                    Past Medical History:  has a past medical history of Arthritis, Asthma, Brain tumor ([facility] [address] Diabetes mellitus ([facility] [address] High cholesterol, Hypertension, Imbalance, Loss of hearing, and Vertigo. Past Surgical History:   has a past surgical history that includes Total knee arthroplasty (Right, 2015); Total knee arthroplasty (Left, 10/14/15); Total hip arthroplasty (Right, 2021); Neuroma surgery (Right, 2022); mastoidectomy (Right, 2022); craniotomy (N/A, 2022); Upper gastrointestinal endoscopy (N/A, 2022); IR EMBOLIZATION HEMORRHAGE (2022); and Upper gastrointestinal endoscopy (N/A, 2022). Restrictions  Restrictions/Precautions: Fall Risk;Seizure  Other position/activity restrictions: Ambulate the Patient, Up with assistance, up in the chair    Subjective  Subjective: Pt asleep in bed upon arrival, aroused w/ VCs and TCs, verbalizing fatigue but agreeable to therapy  Restrictions/Precautions: Fall Risk;Seizure             Objective     Cognition  Overall Cognitive Status: Exceptions  Arousal/Alertness: Appropriate responses to stimuli;Delayed responses to stimuli (delayed at times, pt frequently closing eyes d/t fatigue)  Following Commands: Follows one step commands consistently; Follows multistep commands with repitition  Attention Span: Appears intact  Memory: Decreased recall of recent events  Safety Judgement: Good awareness of safety precautions  Problem Solving: Assistance required to generate solutions;Assistance required to correct errors made;Assistance required to implement solutions  Insights: Decreased awareness of deficits  Initiation: Requires cues for some  Sequencing: Requires cues for some  Cognition Comment: pt w/ decreased participation in conversation and closing eyes at times, verbalized feeling very fatigued this morning. req repeptition of instructions d/t hearing deficits (reports she can only hear out of L ear)  Orientation  Overall Orientation Status: Within Functional Limits  Orientation Level: Oriented X4         ADL  Feeding  Assistance Level: Independent  Skilled Clinical Factors: Breakfast tray provided at [facility]. Pt opened all containers i'ly, SLP present for meal  Grooming/Oral Hygiene  Assistance Level: Increased time to complete;Contact guard assist  Skilled Clinical Factors: Pt initially attempted to complete oral care in stance at sink w/ CGA, however leaning heavily onto forearms on sink and requesting to sit d/t fatigue. Pt finished completing oral care seated in w/c and washed face i'ly  Upper Extremity Dressing  Assistance Level: Set-up  Skilled Clinical Factors: Pt doffed/donned hospital gown seated in recliner w/ assist to tie in back  Lower Extremity Dressing  Assistance Level: Contact guard assist;Increased time to complete;Set-up  Skilled Clinical Factors: Pt doffed brief seated on BSC placed over toilet, threaded clean brief and pants seated on BSC w/ + time by flexing forward at trunk, and completed pants mgmt up over hips w/ alternating UE support on RW and + time to pull pants up in back (ripped front seam of hospital pants for more comfortable fit)  Putting On/Taking Off Footwear  Assistance Level: Set-up; Supervision  Skilled Clinical Factors: Pt donned hospital socks seated on EOB using figure 4 technique  Toileting  Assistance Level: Contact guard assist;Increased time to complete  Skilled Clinical Factors: Pt continent of urine and completed pericare seated on BSC placed over toilet.  Pt req CGA for pants mgmt down/up over hips w/ alternating UE support on RW  Toilet Transfers  Technique:  (ambulating w/ RW)  Equipment: Beside commode (placed over toilet)  Assistance Level: Contact guard assist          Functional Mobility  Device: Rolling walker  Activity: To/From bathroom  Assistance Level: Contact guard assist  Skilled Clinical Factors: Very slow darlene, decreased step length and height. Pt reported feeling weak during ambulation  Supine to Sit  Assistance Level: Stand by assist  Skilled Clinical Factors: HOB slightly elevated, use of bedrail, increased time and effort to complete  Scooting  Assistance Level: Stand by assist  Skilled Clinical Factors: Scooting to EOB and back in recliner  Transfers  Surface: From bed; To chair with arms;From chair with arms  Sit to Stand  Assistance Level: Contact guard assist  Skilled Clinical Factors: EOB > RW, w/c > RW, recliner > RW  Stand to Sit  Assistance Level: Contact guard assist  Skilled Clinical Factors: RW > recliner, sink > w/c, VC to back up fully to surfaces prior to reaching back to sit   OT Exercises  Exercise Treatment: Pt completed the following seated BUE therex to increase strength and activity tolerance for ADLs and functional transfers. Pt completed 2 sets x 10 reps w/ free weights. Pt utilized 2# free weights for elbow flexion but switched to 1# for all other exercises. Pt stated \"Two pounds, this feels like 20 pounds, I must be really weak. \" Pt performed elbow flexion, shoulder flexion, shoulder abduction, internal/external rotation, and wrist flexion/extension. Pt tolerated seated exercises fair w/ rest breaks between each set and intermittent VC for proper technique     Assessment  Assessment  Assessment: Pt tolerated OT session fair and req CGA for all functional transfers and functional mobility w/ RW. Pt progressed to CGA for LE dressing however required ++ time for all ADLs and completed grooming at seated level today d/t increased fatigue. Pt cont to present below baseline and is limited by decreased strength and activity tolerance, and impaired balance and cognition. Pt benefits from skilled OT to maximize independence and safety w/ functional tasks, cont OT per POC. Activity Tolerance: Patient tolerated evaluation without incident;Patient limited by fatigue  Discharge Recommendations: Continue to assess pending progress;Home with Home health OT; Home with assist PRN  OT Equipment Recommendations  Other: continue to assess  Safety Devices  Safety Devices in place: Yes  Type of devices: Left in chair;Chair alarm in place;Call light within reach    Patient Education  Education  Education Given To: Patient  Education Provided: Role of Therapy;Plan of Care;Transfer Training;ADL Function; Safety  Education Provided Comments: role of OT in ARU, benefit of activity promotion and participation in therapy, e-con strategies during ADLs  Education Method: Verbal  Barriers to Learning: None  Education Outcome: Verbalized understanding;Continued education needed    Plan  Occupational Therapy Plan  Times Per Week: 60 minutes/day x 5 days/week  Current Treatment Recommendations: Strengthening;ROM;Cognitive reorientation;Balance training;Pain management;Self-Care / ADL; Functional mobility training; Safety education & training;Home management training; Endurance training;Neuromuscular re-education;Positioning;Return to work related activity; Patient/Caregiver education & training;Equipment evaluation, education, & procurement    Goals  Patient Goals   Patient goals : \"to regain my balance and strength\"  Short Term Goals  Time Frame for Short Term Goals: 10 days - all goals ongoing  Short Term Goal 1: Pt will complete LB dressing using AE PRN with mod I  Short Term Goal 2: Pt will complete bathing with mod I  Short Term Goal 3: Pt will complete toileting and toilet transfer with mod I  Short Term Goal 4: Pt will be independent with UE HEP to improve activity tolerance  Short Term Goal 5: Pt will tolerate 8 minutes in stance to improve independence with IADLs                 Therapy Time   Individual Concurrent Group Co-treatment   Time In 0730         Time Out 0830         Minutes 60         Timed Code Treatment Minutes: [address] [place], [place]

## 2022-12-24 NOTE — PLAN OF CARE
Problem: Skin/Tissue Integrity  Goal: Absence of new skin breakdown  Description: 1. Monitor for areas of redness and/or skin breakdown  2. Assess vascular access sites hourly  3. Every 4-6 hours minimum:  Change oxygen saturation probe site  4. Every 4-6 hours:  If on nasal continuous positive airway pressure, respiratory therapy assess nares and determine need for appliance change or resting period.   Outcome: Progressing     Problem: Pain  Goal: Verbalizes/displays adequate comfort level or baseline comfort level  Outcome: Progressing     Problem: Safety - Adult  Goal: Free from fall injury  Outcome: Progressing     Problem: Chronic Conditions and Co-morbidities  Goal: Patient's chronic conditions and co-morbidity symptoms are monitored and maintained or improved  Outcome: Progressing     Problem: Discharge Planning  Goal: Discharge to home or other facility with appropriate resources  Outcome: Progressing

## 2022-12-24 NOTE — PROGRESS NOTES
Physical Therapy  Facility/Department: [facility] ACUTE REHAB UNIT  Rehabilitation Physical Therapy Treatment Note    NAME: Gonzales Lo  : 1955 (79 y.o.)  MRN: [id number]  CODE STATUS: Full Code    Date of Service: 22     Restrictions:  Restrictions/Precautions: Fall Risk;Seizure  Position Activity Restriction  Other position/activity restrictions: Ambulate the Patient, Up with assistance, up in the chair     SUBJECTIVE  Subjective  Subjective: Pt seated in recliner and agreeable to PT despite fatigue. Pain: denies     OBJECTIVE  Cognition  Overall Cognitive Status: Exceptions  Arousal/Alertness: Appropriate responses to stimuli;Delayed responses to stimuli (delayed at times, pt frequently closing eyes d/t fatigue)  Following Commands: Follows one step commands consistently; Follows multistep commands with repitition  Attention Span: Appears intact  Memory: Decreased recall of recent events  Safety Judgement: Good awareness of safety precautions  Problem Solving: Assistance required to generate solutions;Assistance required to correct errors made;Assistance required to implement solutions  Insights: Decreased awareness of deficits  Initiation: Requires cues for some  Sequencing: Requires cues for some  Cognition Comment: pt w/ decreased participation in conversation and closing eyes at times, verbalized feeling very fatigued this morning. req repeptition of instructions d/t hearing deficits (reports she can only hear out of L ear)  Orientation  Overall Orientation Status: Within Functional Limits  Orientation Level: Oriented X4    Functional Mobility  Balance  Sitting Balance: Stand by assistance (SBA seated on elevated commode)  Standing Balance: Contact guard assistance (CGA at RW)  Standing Balance  Activity: pt perf ambulatory toilet transfer with RW and CGA, pt manages own pants and pericare. Continent of bladder.   Transfers  Surface: Wheelchair;Raised toilet Seat;From chair with arms  Additional Factors: Increased time to complete  Device: Walker  Sit to Stand  Assistance Level: Minimal assistance;Contact guard assist;Stand by assist  Skilled Clinical Factors: Initially req'd min A to stand from recliner to RW, progressed to CGA from elevated toilet, CGA/SBA from wheelchair to RW. Environmental Mobility  Ambulation  Surface: Level surface  Device: Rolling walker  Distance: 91+42+08+41+82SH  Additional Factors: Increased time to complete  Assistance Level: Contact guard assist  Gait Deviations: Slow darlene  Skilled Clinical Factors: inc time to complete, slow darlene. No LOB noted but pt intermittently reporting \"I feel like I'm about to fall. \" Distance limited by fatigue and requesting to sit, distance improved when visual distance goals set by PT prior to trial.    PT Exercises  Exercise Treatment: Seated marches, LAQ, ankle pumps, hip abd/add x20 each 1.5#      ASSESSMENT/PROGRESS TOWARDS GOALS       Assessment  Activity Tolerance: Patient limited by fatigue;Patient tolerated treatment well  Discharge Recommendations: Patient would benefit from continued therapy after discharge;Continue to assess pending progress;24 hour supervision or assist;Home with Home health PT  PT Equipment Recommendations  Equipment Needed: No  Other: plan to continue to assess    Goals  Patient Goals   Patient Goals : To Return to independence  Short Term Goals  Time Frame for Short Term Goals: 10 days - ongoing  Short Term Goal 1: Patient will perform all bed mobilty MOD I and no use of the handrails with HOB flat  Short Term Goal 2: Patient will perform sit<>stand transfers Mod I with RW  Short Term Goal 3: Patient will ambulate >150ft with RW Mod I  Short Term Goal 4: Patient will be able to ascend/descend 4 steps with one handrail Mod I  Short Term Goal 5: Patient will be able to  objects off the floor Mod I with RW.     PLAN OF CARE/SAFETY  Physcial Therapy Plan  Days Per Week: 5 Days  Hours Per Day: 1 hour  Therapy Duration: 10 Days  Current Treatment Recommendations: Strengthening; Functional mobility training;Transfer training;Gait training; Endurance training; Safety education & training; Therapeutic activities;Balance training;Neuromuscular re-education;Patient/Caregiver education & training;Equipment evaluation, education, & procurement;Home exercise program  Safety Devices  Type of Devices: Call light within reach; Chair alarm in place;Gait belt;Left in chair;Nurse notified    EDUCATION  Education  Education Given To: Patient  Education Provided: Role of Therapy; Equipment;Transfer Training;Plan of Care; Mobility Training  Education Method: Demonstration;Verbal  Barriers to Learning: Vision; Hearing  Education Outcome: Verbalized understanding        Therapy Time   Individual Concurrent Group Co-treatment   Time In 0930         Time Out 1030         Minutes 60               Timed Code Treatment Minutes:60    Total Treatment Minutes:60      Patricia Kwan PT, 12/24/22 at 10:17 AM

## 2022-12-24 NOTE — PROGRESS NOTES
ACUTE REHAB UNIT  SPEECH/LANGUAGE PATHOLOGY      [x] Daily  [] Weekly Care Conference Note  [] Discharge    Patient:Bianca Gandhi      P:[birth year]  YKI:[id number]  Rehab Dx/Hx: S/P excision of acoustic neuroma [Z98.890, P88.591]    Precautions: [x] Aspiration  [x] Fall risk  [] Sternal  [] Seizure [] Hip  [] Weight Bearing [] Other  ST Dx: [] Aphasia  [x] Dysarthria  [] Apraxia   [x] Oropharyngeal dysphagia [x] Cognitive Impairment  [] Other:   Date of Admit: 12/22/2022  Room #: 3101/3101-01  Date: 12/24/2022          Current Diet Order:ADULT DIET; Dysphagia - Soft and Bite Sized   Recommended Form of Meds: PO  Compensatory Swallowing Strategies : Alternate solids and liquids, Eat/Feed slowly, Upright as possible for all oral intake, Check for pocketing of food on the Right, Lingual sweep, Small bites/sips      MBSS 12/1/22:  Oral Phase  Moderate dysfunction characterized by right-sided oral weakness with reduced labial seal and coordination, resulting in anterior loss of liquid via tsp and inability to use straw when placed on right or midline. For left-sided straw placement, pt able to create appropriate seal/suction. Prolonged mastication of soft solids, with slowed a-p transit, mild stasis. Pharyngeal Phase  Grossly WFL. Overall timely swallow initiation with appropriate hyolaryngeal mechanics. Single instance trace flash (self clearing) penetration of thins when straw was placed on right side. Otherwise good airway protection throughout with no aspiration. No significant stasis.   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 Date: 12/24/2022      Tx session 1 Tx session 2   Total Timed Code Min 0  30   Total Treatment Minutes 30 35   Individual Treatment Minutes 30 35   Group Treatment Minutes 0 0   Co-Treat Minutes 0 0   Brief Exception: N/A N/A   Pain None indicated at time of session. None indicated   Pain Intervention: [] RN notified  [] Repositioned  [] Intervention offered and patient declined  [x] N/A  [] Other: [] RN notified  [] Repositioned  [] Intervention offered and patient declined  [x] N/A  [] Other:   Subjective:     Pt is exceptionally pleasant and engaged in therapy throughout. Pt endorses it is not currently a goal to work towards advancing diet at this time due to facial palsy. Pt appears upset this date and just reports discouraged with less ambulation distance this date as compared to yesterday. Encouragement provided. Objective / Goals:     Pt will tolerate trials of increased texture in order to advance to least restrictive diet consistency. Pt declines this to be a goal at this time. X2 anterior loss vs placement with puree due to perioral weakness / labial droop preventing full entry to oral cavity. No oral reidue was noted. Targeted perioral weakness via passive stimulation; pt able to model independently. Patient will independently demonstrate understanding of swallowing concerns, compensatory strategies, and  Independent with strategies. Goal not targeted this session. Pt will maintain sustained attention to graded task with min cues. Adequate attention between meal and conversational turns. No overt loss of attention noted, consistent presentation across session. Patient will participate in ongoing assessment of cognitive-linguistic skills with additional goals to be established as appropriate.  Goal not targeted this session. Patient was administered portions of the TONI (Assessment of Language-Related Functional Activities) with the following results:  Counting Money: 100% accuracy; completed in less than 5 minutes. This percentage correlates with an Independent Functioning Rating of 1 which indicates a high probability of independent functioning on this task. Solving Daily Math Problems: 100% accuracy; completed in less than 5 minutes. This percentage correlates with an Independent Functioning Rating of 1 which indicates a high probability of independent functioning on this task. Understanding Medication Labels:  100% accuracy. This percentage correlates with an Independent Functioning Rating of 1 which indicates a high probability of independent functioning on this task. Other areas targeted:     Education:   Education ongoing re: rationale for tasks, prognostic info obtained from NSY, passive stimulation. Education re: skills targeted this date. Discussed plan to trial information transfer tasks and higher level math with likely reduction in plan of care. 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 NSY reports pt could have some return of function but could take up to 3 months; limited modalities can be utilized at this time due to complete loss of motor function. Pt presents with a mild dysarthria secondary to her facial paralysis. Pt endorses symmetrical sensation to thermal stim. Plan: Continue as per plan of care.       Interventions used this date:  [] Speech/Language Treatment  [] Instruction in HEP  [x] Dysphagia Treatment [x] Cognitive Treatment   [] Other:    Discharge recommendations: [x] Home independently  [] Home with assistance []  24 hour supervision  [] ECF [] Other  Continued Tx Upon Discharge: ? [x] Yes    [] No    [] TBD based on progress while on ARU     [] Vital Stim indicated     [] Other:   Estimated discharge date: Not yet established    Electronically signed by  Karan Oliver M.A., [place]  Speech-Language Pathologist

## 2022-12-24 NOTE — PROGRESS NOTES
Comprehensive Nutrition Assessment    RECOMMENDATIONS:  PO Diet: Dysphagia-Soft & Bite Sized  ONS: n/a  Nutrition Education: No recommendation at this time       NUTRITION ASSESSMENT:   Nutritional summary & status: Consult for ONS; new admit to ARU. Pt is receiving a Dysphagia-Soft & Bite Sized Diet per SLP. Meal intakes of % meeting nutrition needs. Pt not interested in ONS on previous RD visit.  lbs. Difficult to assess weight changes due to varied weight fluctuations throughout stay. Noted hx of DM with slightly elevated BG. Not currently on carbohydrate controlled diet. Will monitor BG and need for carbohydrate restriction. No nutrition concerns at present, however, will monitor for any decline in po intake. Admission/PMH: Admit: Dysphagia - Soft & Bite Sized. PMHx: arthritis, asthma, brain tumor, DM    MALNUTRITION ASSESSMENT  Context of Malnutrition: Acute Illness   Malnutrition Status: No malnutrition  Findings of the 6 clinical characteristics of malnutrition (Minimum of 2 out of 6 clinical characteristics is required to make the diagnosis of moderate or severe Protein Calorie Malnutrition based on AND/ASPEN Guidelines):  Energy Intake:  No significant decrease in energy intake  Weight Loss:  No significant weight loss     Body Fat Loss:  No significant body fat loss     Muscle Mass Loss:  No significant muscle mass loss    Fluid Accumulation:  Mild     Strength:  Not Performed    NUTRITION DIAGNOSIS   No nutrition diagnosis at this time related to   as evidenced by      Nutrition Monitoring and Evaluation:   Food/Nutrient Intake Outcomes:  Food and Nutrient Intake  Physical Signs/Symptoms Outcomes:  Biochemical Data, Weight, Chewing or Swallowing     OBJECTIVE DATA: Significant to nutrition assessment  Nutrition Related Findings: . Lytes wnl. Generalized edema; non-pitting. CKB22/34.   Wounds: Surgical Incision (head, abd)  Nutrition Goals: PO intake 75% or greater, within 7 days CURRENT NUTRITION THERAPIES  ADULT DIET; Dysphagia - Soft and Bite Sized  PO Intake: %   PO Supplement Intake:None Ordered  Additional Sources of Calories/IVF:n/a     ANTHROPOMETRICS  Current Height: 5' 5\" (165.1 cm)  Current Weight: (!) 342 lb (155.1 kg)    Admission weight: (!) 342 lb (155.1 kg)  Ideal Body Weight (IBW): 125 lbs  (57 kg)    Usual Bodyweight     BMI: 57    COMPARATIVE STANDARDS  Energy (kcal):        Protein (g):          Fluid (mL/day): The patient will be monitored per nutrition standards of care. Consult dietitian if additional nutrition interventions are needed prior to RD reassessment.      Juan F Mccoy, [address]:  [phone]  Office:  [phone]

## 2022-12-25 LAB
GLUCOSE BLD-MCNC: 110 MG/DL (ref 70–99)
GLUCOSE BLD-MCNC: 114 MG/DL (ref 70–99)
GLUCOSE BLD-MCNC: 123 MG/DL (ref 70–99)
GLUCOSE BLD-MCNC: 137 MG/DL (ref 70–99)
PERFORMED ON: ABNORMAL

## 2022-12-25 PROCEDURE — 6360000002 HC RX W HCPCS: Performed by: PHYSICAL MEDICINE & REHABILITATION

## 2022-12-25 PROCEDURE — 6370000000 HC RX 637 (ALT 250 FOR IP): Performed by: PHYSICAL MEDICINE & REHABILITATION

## 2022-12-25 PROCEDURE — 1280000000 HC REHAB R&B

## 2022-12-25 PROCEDURE — 2580000003 HC RX 258: Performed by: PHYSICAL MEDICINE & REHABILITATION

## 2022-12-25 PROCEDURE — C9113 INJ PANTOPRAZOLE SODIUM, VIA: HCPCS | Performed by: PHYSICAL MEDICINE & REHABILITATION

## 2022-12-25 RX ADMIN — INSULIN GLARGINE 15 UNITS: 100 INJECTION, SOLUTION SUBCUTANEOUS at 20:53

## 2022-12-25 RX ADMIN — Medication 5 MG: at 20:20

## 2022-12-25 RX ADMIN — METHIMAZOLE 10 MG: 5 TABLET ORAL at 10:22

## 2022-12-25 RX ADMIN — POTASSIUM CHLORIDE 20 MEQ: 20 TABLET, EXTENDED RELEASE ORAL at 10:27

## 2022-12-25 RX ADMIN — SODIUM CHLORIDE, PRESERVATIVE FREE 10 ML: 5 INJECTION INTRAVENOUS at 20:22

## 2022-12-25 RX ADMIN — SODIUM CHLORIDE, PRESERVATIVE FREE 10 ML: 5 INJECTION INTRAVENOUS at 20:21

## 2022-12-25 RX ADMIN — SODIUM CHLORIDE, PRESERVATIVE FREE 10 ML: 5 INJECTION INTRAVENOUS at 10:19

## 2022-12-25 RX ADMIN — LATANOPROST 1 DROP: 50 SOLUTION OPHTHALMIC at 20:22

## 2022-12-25 RX ADMIN — PANTOPRAZOLE SODIUM 40 MG: 40 INJECTION, POWDER, LYOPHILIZED, FOR SOLUTION INTRAVENOUS at 20:20

## 2022-12-25 RX ADMIN — CETIRIZINE HYDROCHLORIDE 10 MG: 10 TABLET, FILM COATED ORAL at 10:22

## 2022-12-25 RX ADMIN — RIVAROXABAN 15 MG: 15 TABLET, FILM COATED ORAL at 10:22

## 2022-12-25 RX ADMIN — LEVETIRACETAM 500 MG: 500 TABLET, FILM COATED ORAL at 10:22

## 2022-12-25 RX ADMIN — POTASSIUM CHLORIDE 20 MEQ: 20 TABLET, EXTENDED RELEASE ORAL at 17:00

## 2022-12-25 RX ADMIN — RIVAROXABAN 15 MG: 15 TABLET, FILM COATED ORAL at 17:01

## 2022-12-25 RX ADMIN — PANTOPRAZOLE SODIUM 40 MG: 40 INJECTION, POWDER, LYOPHILIZED, FOR SOLUTION INTRAVENOUS at 10:22

## 2022-12-25 RX ADMIN — LEVETIRACETAM 500 MG: 500 TABLET, FILM COATED ORAL at 20:20

## 2022-12-25 ASSESSMENT — PAIN DESCRIPTION - FREQUENCY: FREQUENCY: INTERMITTENT

## 2022-12-25 ASSESSMENT — PAIN DESCRIPTION - LOCATION: LOCATION: BACK

## 2022-12-25 ASSESSMENT — PAIN DESCRIPTION - DESCRIPTORS: DESCRIPTORS: ACHING

## 2022-12-25 ASSESSMENT — PAIN SCALES - GENERAL
PAINLEVEL_OUTOF10: 0
PAINLEVEL_OUTOF10: 6

## 2022-12-25 ASSESSMENT — PAIN DESCRIPTION - ORIENTATION: ORIENTATION: LOWER

## 2022-12-25 ASSESSMENT — PAIN DESCRIPTION - PAIN TYPE: TYPE: ACUTE PAIN

## 2022-12-25 ASSESSMENT — PAIN - FUNCTIONAL ASSESSMENT: PAIN_FUNCTIONAL_ASSESSMENT: ACTIVITIES ARE NOT PREVENTED

## 2022-12-25 NOTE — FLOWSHEET NOTE
Pt. Remained A & O X 4, voiced surgical pain to right side of head. VSS, afebrile, PRN Tylenol administered per protocol. Vitals:    12/24/22 2126   BP: (!) 144/106   Pulse: 77   Resp: 16   Temp: 97.8 °F (36.6 °C)   SpO2: 92%   Eye patch and head band in place, adjusted PRN, eye care with warm wash cloth. Call light and over bed table within reach. Hourly rounding and frequent visual checks in place.

## 2022-12-25 NOTE — PROGRESS NOTES
Department of Physical Medicine & Rehabilitation  Progress Note    Patient Identification:  Monica Bhakta  [id number]  : 1955  Admit date: 2022    Chief Complaint: S/P excision of acoustic neuroma    Subjective:   Slept better last night and is resting today. She is worn out from therapy but improving. Wishing her son was here today. No other complaints. ROS: No f/c, n/v, cp     Objective:  Patient Vitals for the past 24 hrs:   BP Temp Temp src Pulse Resp SpO2   22 0815 (!) 148/72 97.4 °F (36.3 °C) Oral 65 16 95 %   22 2126 (!) 144/106 97.8 °F (36.6 °C) Oral 77 16 92 %   22 1700 137/69 97.6 °F (36.4 °C) Oral 75 16 96 %       Const: Alert. No distress, pleasant. HEENT: right sided swelling- wound care  CV: Regular rate and rhythm. Resp: No respiratory distress. Lungs CTAB. Abd: Soft, nontender, nondistended, NABS+   Ext: No edema. Neuro: Alert, oriented, appropriately interactive. Psych: Cooperative, appropriate mood and affect    Laboratory data: Available via EMR.    Last 24 hour lab  Recent Results (from the past 24 hour(s))   POCT Glucose    Collection Time: 22 12:42 PM   Result Value Ref Range    POC Glucose 123 (H) 70 - 99 mg/dl    Performed on ACCU-CHEK    POCT Glucose    Collection Time: 22  4:54 PM   Result Value Ref Range    POC Glucose 117 (H) 70 - 99 mg/dl    Performed on ACCU-CHEK    POCT Glucose    Collection Time: 22  9:36 PM   Result Value Ref Range    POC Glucose 137 (H) 70 - 99 mg/dl    Performed on ACCU-CHEK    POCT Glucose    Collection Time: 22  8:39 AM   Result Value Ref Range    POC Glucose 123 (H) 70 - 99 mg/dl    Performed on ACCU-CHEK        Therapy progress:  PT  Position Activity Restriction  Other position/activity restrictions: Ambulate the Patient, Up with assistance, up in the chair  Objective     Sit to Stand: Minimal Assistance (To RW from [facility] and wheelchair)  Stand to Sit: Minimal Assistance  Device: Rolling Walker  Other Apparatus:  (wheelchair follow)  Assistance: Minimal assistance  Distance: 10ftx2+50ft  OT  PT Equipment Recommendations  Equipment Needed: No  Other: plan to continue to assess  Toilet - Technique: Ambulating  Equipment Used: Extra wide bedside commode  Toilet Transfers Comments: pt reported difficulty standing from standard low surface toilet; + time to transition BUEs to RW  Assessment        SLP          Body mass index is 56.91 kg/m².     Assessment and Plan:  Giant Duodenal Ulcer  -monitor Hgb  -Transfuse pRBCs  -Protonix 40 mg BID  -Embolization of GDA  -GI following  -IR following     BL PE, suspected  -currently on heparin gtt  - transition to xarelto     Meningioma  -s/p resection with TL and MCF approach  -Keppra 500 mg BID  -Bowel regiment: Senna, glycolax, and dulcolax  -Nicardipine-Roxicodone PRN  -Acycolvir 400 mg TID for 10 days  -Artifical tears q2H and eye patch  -Keep an eye on mastoid dressing  -SCDs for DVT prophylaxis   -PT/OT     HTN   -Coreg 12.5 mg BID  Losartan 100 mg daily     DM   -Lantus 15u qHS  -HDSSI     Hyperthyroidism   -Methimazole 10 mg daily     Liver Mass  -Incidental finding  -f/u imaging once acute problems have been tx    Rehab Progress: Improving  Anticipated Dispo: home  Services/DME: [facility]CI[facility]  ELOS: LUCIUS Devine D.O. M.P.H  PM&R  12/25/2022  11:35 AM

## 2022-12-25 NOTE — PLAN OF CARE
Problem: Discharge Planning  Goal: Discharge to home or other facility with appropriate resources  Outcome: Progressing     Problem: Skin/Tissue Integrity  Goal: Absence of new skin breakdown  Description: 1. Monitor for areas of redness and/or skin breakdown  2. Assess vascular access sites hourly  3. Every 4-6 hours minimum:  Change oxygen saturation probe site  4. Every 4-6 hours:  If on nasal continuous positive airway pressure, respiratory therapy assess nares and determine need for appliance change or resting period. Outcome: Progressing     Problem: Pain  Goal: Verbalizes/displays adequate comfort level or baseline comfort level  Outcome: Progressing   Pt. Voiced surgical pain, refused ice pack, PRN tylenol administered per protocol. Pain level will be decreased or be at a satisfactory level by discharge.

## 2022-12-26 LAB
ANION GAP SERPL CALCULATED.3IONS-SCNC: 8 MMOL/L (ref 3–16)
BASOPHILS ABSOLUTE: 0 K/UL (ref 0–0.2)
BASOPHILS RELATIVE PERCENT: 1 %
BUN BLDV-MCNC: 10 MG/DL (ref 7–20)
CALCIUM SERPL-MCNC: 8.8 MG/DL (ref 8.3–10.6)
CHLORIDE BLD-SCNC: 106 MMOL/L (ref 99–110)
CO2: 27 MMOL/L (ref 21–32)
CREAT SERPL-MCNC: <0.5 MG/DL (ref 0.6–1.2)
EOSINOPHILS ABSOLUTE: 0.4 K/UL (ref 0–0.6)
EOSINOPHILS RELATIVE PERCENT: 14.5 %
GFR SERPL CREATININE-BSD FRML MDRD: >60 ML/MIN/{1.73_M2}
GLUCOSE BLD-MCNC: 121 MG/DL (ref 70–99)
GLUCOSE BLD-MCNC: 127 MG/DL (ref 70–99)
GLUCOSE BLD-MCNC: 162 MG/DL (ref 70–99)
GLUCOSE BLD-MCNC: 190 MG/DL (ref 70–99)
HCT VFR BLD CALC: 28.1 % (ref 36–48)
HEMOGLOBIN: 9.1 G/DL (ref 12–16)
LYMPHOCYTES ABSOLUTE: 0.7 K/UL (ref 1–5.1)
LYMPHOCYTES RELATIVE PERCENT: 26 %
MCH RBC QN AUTO: 29.9 PG (ref 26–34)
MCHC RBC AUTO-ENTMCNC: 32.3 G/DL (ref 31–36)
MCV RBC AUTO: 92.5 FL (ref 80–100)
MONOCYTES ABSOLUTE: 0.3 K/UL (ref 0–1.3)
MONOCYTES RELATIVE PERCENT: 11.3 %
NEUTROPHILS ABSOLUTE: 1.4 K/UL (ref 1.7–7.7)
NEUTROPHILS RELATIVE PERCENT: 47.2 %
PDW BLD-RTO: 18.1 % (ref 12.4–15.4)
PERFORMED ON: ABNORMAL
PLATELET # BLD: 182 K/UL (ref 135–450)
PMV BLD AUTO: 8.2 FL (ref 5–10.5)
POTASSIUM REFLEX MAGNESIUM: 4 MMOL/L (ref 3.5–5.1)
RBC # BLD: 3.04 M/UL (ref 4–5.2)
SODIUM BLD-SCNC: 141 MMOL/L (ref 136–145)
WBC # BLD: 2.9 K/UL (ref 4–11)

## 2022-12-26 PROCEDURE — 97116 GAIT TRAINING THERAPY: CPT

## 2022-12-26 PROCEDURE — 85025 COMPLETE CBC W/AUTO DIFF WBC: CPT

## 2022-12-26 PROCEDURE — 80048 BASIC METABOLIC PNL TOTAL CA: CPT

## 2022-12-26 PROCEDURE — 6370000000 HC RX 637 (ALT 250 FOR IP): Performed by: PHYSICAL MEDICINE & REHABILITATION

## 2022-12-26 PROCEDURE — 97129 THER IVNTJ 1ST 15 MIN: CPT

## 2022-12-26 PROCEDURE — 2580000003 HC RX 258: Performed by: PHYSICAL MEDICINE & REHABILITATION

## 2022-12-26 PROCEDURE — 97535 SELF CARE MNGMENT TRAINING: CPT

## 2022-12-26 PROCEDURE — 92526 ORAL FUNCTION THERAPY: CPT

## 2022-12-26 PROCEDURE — 6360000002 HC RX W HCPCS: Performed by: PHYSICAL MEDICINE & REHABILITATION

## 2022-12-26 PROCEDURE — 97530 THERAPEUTIC ACTIVITIES: CPT

## 2022-12-26 PROCEDURE — C9113 INJ PANTOPRAZOLE SODIUM, VIA: HCPCS | Performed by: PHYSICAL MEDICINE & REHABILITATION

## 2022-12-26 PROCEDURE — 97130 THER IVNTJ EA ADDL 15 MIN: CPT

## 2022-12-26 PROCEDURE — 97110 THERAPEUTIC EXERCISES: CPT

## 2022-12-26 PROCEDURE — 1280000000 HC REHAB R&B

## 2022-12-26 RX ADMIN — SODIUM CHLORIDE, PRESERVATIVE FREE 10 ML: 5 INJECTION INTRAVENOUS at 21:25

## 2022-12-26 RX ADMIN — LEVETIRACETAM 500 MG: 500 TABLET, FILM COATED ORAL at 07:50

## 2022-12-26 RX ADMIN — BISACODYL 5 MG: 5 TABLET, COATED ORAL at 07:50

## 2022-12-26 RX ADMIN — PANTOPRAZOLE SODIUM 40 MG: 40 INJECTION, POWDER, LYOPHILIZED, FOR SOLUTION INTRAVENOUS at 07:50

## 2022-12-26 RX ADMIN — LATANOPROST 1 DROP: 50 SOLUTION OPHTHALMIC at 21:14

## 2022-12-26 RX ADMIN — POTASSIUM CHLORIDE 20 MEQ: 20 TABLET, EXTENDED RELEASE ORAL at 18:43

## 2022-12-26 RX ADMIN — POTASSIUM CHLORIDE 20 MEQ: 20 TABLET, EXTENDED RELEASE ORAL at 07:51

## 2022-12-26 RX ADMIN — PANTOPRAZOLE SODIUM 40 MG: 40 INJECTION, POWDER, LYOPHILIZED, FOR SOLUTION INTRAVENOUS at 21:13

## 2022-12-26 RX ADMIN — RIVAROXABAN 15 MG: 15 TABLET, FILM COATED ORAL at 07:50

## 2022-12-26 RX ADMIN — INSULIN GLARGINE 15 UNITS: 100 INJECTION, SOLUTION SUBCUTANEOUS at 21:18

## 2022-12-26 RX ADMIN — CETIRIZINE HYDROCHLORIDE 10 MG: 10 TABLET, FILM COATED ORAL at 07:50

## 2022-12-26 RX ADMIN — ACETAMINOPHEN 650 MG: 325 TABLET ORAL at 21:13

## 2022-12-26 RX ADMIN — SODIUM CHLORIDE, PRESERVATIVE FREE 10 ML: 5 INJECTION INTRAVENOUS at 07:50

## 2022-12-26 RX ADMIN — SODIUM CHLORIDE, PRESERVATIVE FREE 10 ML: 5 INJECTION INTRAVENOUS at 21:23

## 2022-12-26 RX ADMIN — Medication 5 MG: at 21:13

## 2022-12-26 RX ADMIN — LEVETIRACETAM 500 MG: 500 TABLET, FILM COATED ORAL at 21:13

## 2022-12-26 RX ADMIN — RIVAROXABAN 15 MG: 15 TABLET, FILM COATED ORAL at 18:43

## 2022-12-26 RX ADMIN — METHIMAZOLE 10 MG: 5 TABLET ORAL at 07:50

## 2022-12-26 ASSESSMENT — PAIN SCALES - GENERAL
PAINLEVEL_OUTOF10: 3
PAINLEVEL_OUTOF10: 2

## 2022-12-26 ASSESSMENT — PAIN - FUNCTIONAL ASSESSMENT: PAIN_FUNCTIONAL_ASSESSMENT: ACTIVITIES ARE NOT PREVENTED

## 2022-12-26 ASSESSMENT — PAIN DESCRIPTION - ORIENTATION: ORIENTATION: LOWER

## 2022-12-26 ASSESSMENT — PAIN DESCRIPTION - FREQUENCY: FREQUENCY: INTERMITTENT

## 2022-12-26 ASSESSMENT — PAIN DESCRIPTION - DESCRIPTORS: DESCRIPTORS: ACHING

## 2022-12-26 ASSESSMENT — PAIN DESCRIPTION - LOCATION: LOCATION: BACK

## 2022-12-26 ASSESSMENT — PAIN DESCRIPTION - PAIN TYPE: TYPE: ACUTE PAIN

## 2022-12-26 NOTE — PROGRESS NOTES
ACUTE REHAB UNIT  SPEECH/LANGUAGE PATHOLOGY      [x] Daily  [] Weekly Care Conference Note  [] Discharge    Patient:Bianca Jones      VOY:1/40/[birth year]194  CEW:[id number]  Rehab Dx/Hx: S/P excision of acoustic neuroma [Z98.890, R73.089]    Precautions: [x] Aspiration  [x] Fall risk  [] Sternal  [] Seizure [] Hip  [] Weight Bearing [] Other  ST Dx: [] Aphasia  [x] Dysarthria  [] Apraxia   [x] Oropharyngeal dysphagia [x] Cognitive Impairment  [] Other:   Date of Admit: 12/22/2022  Room #: 3101/3101-01  Date: 12/26/2022          Current Diet Order:ADULT DIET; Dysphagia - Soft and Bite Sized   Recommended Form of Meds: PO  Compensatory Swallowing Strategies : Alternate solids and liquids, Eat/Feed slowly, Upright as possible for all oral intake, Check for pocketing of food on the Right, Lingual sweep, Small bites/sips      MBSS 12/1/22:  Oral Phase  Moderate dysfunction characterized by right-sided oral weakness with reduced labial seal and coordination, resulting in anterior loss of liquid via tsp and inability to use straw when placed on right or midline. For left-sided straw placement, pt able to create appropriate seal/suction. Prolonged mastication of soft solids, with slowed a-p transit, mild stasis. Pharyngeal Phase  Grossly WFL. Overall timely swallow initiation with appropriate hyolaryngeal mechanics. Single instance trace flash (self clearing) penetration of thins when straw was placed on right side. Otherwise good airway protection throughout with no aspiration. No significant stasis.   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 Date: 12/26/2022      Tx session 1 Tx session 2   Total Timed Code Min 0  30   Total Treatment Minutes 30 30   Individual Treatment Minutes 30 30   Group Treatment Minutes 0 0   Co-Treat Minutes 0 0   Brief Exception: N/A N/A   Pain None indicated at this time. None indicated at this time   Pain Intervention: [] RN notified  [] Repositioned  [] Intervention offered and patient declined  [x] N/A  [] Other: [] RN notified  [] Repositioned  [] Intervention offered and patient declined  [x] N/A  [] Other:   Subjective:     Pt was visited sitting upright in bedside chair and was agreeable to therapy this date. She again endorsed that she is not interested in advancing her diet to regular consistency solids at this time. Pt was visited sitting upright in bedside chair and was agreeable to therapy. Objective / Goals:     Pt will tolerate trials of increased texture in order to advance to least restrictive diet consistency. Pt declines this to be a goal at this time. No instances of anterior loss noted this date. Pt verbalized that she was trying to be more careful about the amount of food she is putting on her spoon and where she is placing the food in her mouth. She independently utilized a lingual sweep and liquid flush that clear all residue at the end of her meal. Goal not targeted this session. Patient will independently demonstrate understanding of swallowing concerns, compensatory strategies, and  Pt independently verbalized and utilize safe swallow compensatory strategies. Goal not targeted this session. Pt will maintain sustained attention to graded task with min cues. Adequate attention between meal and conversational turns. Pt was able to sustain attention through a figural sequencing task and deductive reasoning task.     Patient will participate in ongoing assessment of cognitive-linguistic skills with additional goals to be established as appropriate. Goal not targeted this session. Pt completed a two factor figural sequencing task with 100% accuracy. She also completed a higher level deductive reasoning task with 100% accuracy. Other areas targeted:     Education:   Pt was educated to rational for tx. Pt was re-educated to rational for each tx task and to possible reduction in plan of care. Safety Devices: [x] Call light within reach  [x] Chair alarm activated and connected to nurse call light system  [] Bed alarm activated   [] Other: [x] Call light within reach  [x] Chair alarm activated and connected to nurse call light system  [] Bed alarm activated  [] Other:    Assessment: Pt continues to presents with a mild dysarthria secondary to her facial paralysis however her comprehensibility appears unaffected. Pt independently utilized safe swallow strategies this date without any anterior loss throughout her meal. Cognition appears to be intact. Plan: Continue as per plan of care.       Interventions used this date:  [] Speech/Language Treatment  [] Instruction in HEP  [x] Dysphagia Treatment [x] Cognitive Treatment   [] Other:    Discharge recommendations:  [x] Home independently  [] Home with assistance []  24 hour supervision  [] ECF [] Other  Continued Tx Upon Discharge: ? [x] Yes    [] No    [] TBD based on progress while on ARU     [] Vital Stim indicated     [] Other:   Estimated discharge date: Not yet established    Electronically signed by:  Joanne Eaton CCC-SLP; [id number]  Speech-Language Pathologist

## 2022-12-26 NOTE — PROGRESS NOTES
Physical Therapy  Facility/Department: [facility] ACUTE REHAB UNIT  Rehabilitation Physical Therapy Treatment Note    NAME: Jayant Wiley  : 1955 (79 y.o.)  MRN: [id number]  CODE STATUS: Full Code    Date of Service: 22       Restrictions:  Restrictions/Precautions: Fall Risk;Seizure  Position Activity Restriction  Other position/activity restrictions: Ambulate the Patient, Up with assistance, up in the chair     SUBJECTIVE  Subjective  Subjective: Pt seated in recliner alert and agreeable to session . pt reports needs to use bathroom  Pain: slight back pain , no number given        Post Treatment Pain Screening         OBJECTIVE  Cognition  Arousal/Alertness:  (pt w/ occasional delays - ?? if d/t cog or [place] (pt reporting no hearing R ear, and [place] L ear))  Orientation  Overall Orientation Status: Within Functional Limits  Orientation Level: Oriented X4    Functional Mobility  Balance  Standing Balance: Contact guard assistance  Standing Balance  Activity: Pt able to stand at toilet with RW and CGA to perform cas care and pants management , then stand at sink with RW and wash hands all with CGA and RW . No LOB. Transfers  Surface: From chair with arms;Raised toilet Seat;Wheelchair  Additional Factors: Increased time to complete  Device: Walker  Sit to Stand  Assistance Level: Contact guard assist;Stand by assist  Skilled Clinical Factors: Initially required CGA to stand from recliner to RW, progressed to SBA for elevated toilet seat , WC, and chair. RW for all transfers. Cues for set up and anterior weight shift  Stand to Sit  Assistance Level: Stand by assist  Skilled Clinical Factors: cues to reach back for chair and controlled descent      Environmental Mobility  Ambulation  Surface: Level surface  Device: Rolling walker  Distance: 10ft, 100 ft x 2 , 50 ft x 2  Activity: Within Room; Within Unit  Activity Comments: WC follow from PT with prolonged seated rest between each ambulation  Additional Factors: Increased time to complete;Verbal cues  Assistance Level: Contact guard assist  Gait Deviations: Slow darelne;Decreased step length bilateral;Decreased heel strike right;Decreased heel strike left  Skilled Clinical Factors: distance limited by activity tolerance. Cues for posture throghout  Stairs  Stair Height: 6''  Device: Bilateral handrails  Number of Stairs: 3  Additional Factors: Non-reciprocal going up;Non-reciprocal going down  Assistance Level: Moderate assistance;Minimal assistance  Skilled Clinical Factors: cues for sequencing . Mod A to ascend and Leah to descend             PT Exercises  Static Standing Balance Exercises: Standing balance with no AD reaching outside [place] and across midline for cone then placing on table 2x 10 reaches BUE with seated rest between , CGA- min A for balance. 1 LOB post min A to correct. Pt then performed standing balance no AD reaching outside [place] for bean bag and tossing to target. Pt CGA with no LOB noted 3x 7 tosses . Multiple rest breaks to complete all mobility      ASSESSMENT/PROGRESS TOWARDS GOALS       Assessment  Assessment: Pt tolerated session well . Pt with increased gait distance to 100 ft with RW and increased sit to stand to CGA consistantly. Pt remains significantly below baseline and requries continued skilled PT services in order to increase functional mobility and maximize rehab potential.  Activity Tolerance: Patient limited by fatigue;Patient tolerated treatment well  Discharge Recommendations: 24 hour supervision or assist;Home with Home health PT  PT Equipment Recommendations  Other: plan to continue to assess    Goals  Patient Goals   Patient Goals :  To Return to independence  Short Term Goals  Time Frame for Short Term Goals: 10 days - ongoing  Short Term Goal 1: Patient will perform all bed mobilty MOD I and no use of the handrails with HOB flat  Short Term Goal 2: Patient will perform sit<>stand transfers Mod I with RW  Short Term Goal 3: Patient will ambulate >150ft with RW Mod I  Short Term Goal 4: Patient will be able to ascend/descend 4 steps with one handrail Mod I  Short Term Goal 5: Patient will be able to  objects off the floor Mod I with RW. PLAN OF CARE/SAFETY  Physcial Therapy Plan  Days Per Week: 5 Days  Hours Per Day: 1 hour  Therapy Duration: 10 Days  Current Treatment Recommendations: Strengthening; Functional mobility training;Transfer training;Gait training; Endurance training; Safety education & training; Therapeutic activities;Balance training;Neuromuscular re-education;Patient/Caregiver education & training;Equipment evaluation, education, & procurement;Home exercise program  Safety Devices  Type of Devices: Call light within reach; Chair alarm in place; Left in chair;Nurse notified  Restraints  Restraints Initially in Place: No    EDUCATION  Education  Education Given To: Patient  Education Provided: Role of Therapy; Equipment;Transfer Training;Plan of Care; Mobility Training;Precautions; Safety; Energy Conservation; Fall Prevention Strategies  Education Method: Demonstration;Verbal  Barriers to Learning: Vision; Hearing  Education Outcome: Verbalized understanding        Therapy Time   Individual Concurrent Group Co-treatment   Time In [facility]         Time Out 1345         Minutes 60            Total mins : 60        Ankita Cruz PT, 12/26/22 at 2:29 PM

## 2022-12-26 NOTE — PATIENT CARE CONFERENCE
Inpatient Rehabilitation  Weekly Team Conference Note  The [address] [place] [address],[address]  [phone]  Patient Name: Felisa Conley        MRN: [id number]    : 1955  (79 y.o.)  Gender: female   Referring Practitioner: Paddy Teixeira DO  Diagnosis: Cerebellopontine angle meningioma/ S/P excision of Acoustic Neuroma  The team conference for this patient was held on 2022 at 10:30am by:  Paddy Teixeira DO    Current/Goal QM SCORES  QM Current/Goal Score   Eating CARE Score: 6 /     Oral Hygiene CARE Score: 4 / Discharge Goal: Independent   Shower/Bathing CARE Score: 4 / Discharge Goal: Independent   UB Dressing CARE Score: 5 / Discharge Goal: Independent   LB Dressing CARE Score: 4 / Discharge Goal: Independent   Putting on/off Footwear CARE Score: 5 / Discharge Goal: Independent   Toileting Hygiene CARE Score: 6 / Discharge Goal: Independent   Bladder Continence Bladder Continence: Always continent    Bowel Continence Bowel Continence: Always continent    Toilet Transfers CARE Score: 4 / Discharge Goal: Independent   Shower/Bathe Self  CARE Score: 4 / Discharge Goal: Independent   Rolling Left and Right CARE Score: 6 / Discharge Goal: Independent   Sit to Lying CARE Score: 4 / Discharge Goal: Independent   Lying to Sitting on Bedside CARE Score: 4 / Discharge Goal: Independent   Sit to Stand CARE Score: 4 / Discharge Goal: Independent   Chair/Bed to Chair Transfer CARE Score: 4 /     Car Transfers CARE Score: 3 / Discharge Goal: Independent   Walk 10 Feet CARE Score: 4 / Discharge Goal: Independent   Walk 50 Feet with Two Turns CARE Score: 4 / Discharge Goal: Independent   Walk 150 Feet CARE Score: 88 / Discharge Goal: Independent   Walk 10 Feet on Uneven Surfaces CARE Score: 88 / Discharge Goal: Independent   1 Step (Curb) CARE Score: 3 / Discharge Goal: Supervision or touching assistance   4 Steps CARE Score: 88 / Discharge Goal: Independent   12 Steps CARE Score: - /     Picking up Object from Floor CARE Score: 88 / Discharge Goal: Independent     NURSING:  A&Ox: Level of Consciousness: Alert (0)  Orientation Level: Oriented X4, Oriented to situation, Oriented to time, Oriented to place, Oriented to person  [place] Risk Score: Greta Total Score: 85  Admission BIMS: 15   [] Unable to complete BIMS on Admission, Reasoning: n/a  Wounds/Incisions/Ulcers: Surgical incision to abdomen, Surgical incision to lower right posterior head  Medication Review: Yes  Pain: Yes,   Consultations: Inpatient consult to case management  Imaging: None     Active Comorbid Conditions:DM 2, 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 APTT: No results for input(s): APTT in the last 72 hours. Liver Profile:  Lab Results   Component Value Date/Time    AST 12 12/04/2022 03:44 AM    ALT 11 12/04/2022 03:44 AM    BILIDIR <0.2 12/04/2022 03:44 AM    BILITOT 0.5 12/04/2022 03:44 AM    ALKPHOS 48 12/04/2022 03:44 AM   No results found for: CHOL, HDL, TRIG  Recent Labs     12/26/22 0610   WBC 2.9*   HGB 9.1*   HCT 28.1*      MCV 92.5     Recent Labs     12/26/22 0610      K 4.0      CO2 27   BUN 10   CREATININE <0.5*   No results for input(s): AST, ALT, ALB, BILIDIR, BILITOT, ALKPHOS in the last 72 hours. No results for input(s): MG in the last 72 hours.   Recent Labs     12/26/22 0610   WBC 2.9*   HGB 9.1*   HCT 28.1*        Recent Labs 12/26/22  0610      K 4.0      CO2 27   BUN 10   CREATININE <0.5*   CALCIUM 8.8   No results for input(s): AST, ALT, BILIDIR, BILITOT, ALKPHOS in the last 72 hours. No results for input(s): INR in the last 72 hours. No results for input(s): Portia Travis in the last 72 hours. PHYSICAL THERAPY:  Bed Mobility: Scooting: Stand by assistance    Transfers:  Sit to Stand: Minimal Assistance (To RW from [facility] and wheelchair)  Stand to Sit: Minimal Assistance    Ambulation  Surface: Level tile  Device: Rolling Walker  Other Apparatus:  (wheelchair follow)  Assistance: Minimal assistance  Quality of Gait: wide base of support, decreased (B) step length, foot clearance, and heel strike; slow bev; no loss of balance; fatigues with standing mobility  Gait Deviations: Slow Bev, Decreased step length, Decreased step height  Distance: 10ftx2+50ft    Stairs  # Steps : 1  Stairs Height: 6\"  Rails: Bilateral  Assistance: Maximum assistance    OCCUPATIONAL THERAPY:  ADL  Grooming:  (declined)  Grooming Skilled Clinical Factors: oral hygiene completed seated in recliner with setup of supplies  UE Bathing: Setup, Supervision  UE Bathing Skilled Clinical Factors: seated on bariatric commode with setup of wash cloths per RN request due to head/facial bandages  LE Bathing: Contact guard assistance, Increased time to complete, Verbal cueing, Setup  LE Bathing Skilled Clinical Factors: seated on bariatric commode pt washed lower limbs and feet via trunk flexion; pt washed front and rear periarea in stance with unilateral UE support on RW and CGA; setup of wash cloths per RN request due to head/facial bandages  UE Dressing: Setup  UE Dressing Skilled Clinical Factors: to don shirt seated  LE Dressing: Contact guard assistance  LE Dressing Skilled Clinical Factors: Pt doffed and donned brief seated on BSC placed over toilet as frame, cue for direction of brief.  Doffed hosp socks, donned personal harry socks / L foot brace / harry shoes seated on recliner - req  CGA in stance for clothing mgt  Toileting: Contact guard assistance  Toileting Skilled Clinical Factors: continent bowel and bladder void on toilet. SBA hygiene - CGA in stance for clothing mgt    Toilet Transfers: Toilet Transfers  Toilet - Technique: Ambulating  Equipment Used: Extra wide bedside commode  Toilet Transfer: Minimal assistance  Toilet Transfers Comments: pt reported difficulty standing from standard low surface toilet; + time to transition BUEs to RW    Tub/ShowerTransfers:          SPEECH THERAPY:  Assessment: Speech Therapy Diagnosis  Cognitive Diagnosis: Mild cognitive linguistic impairment mainly due to decreased attention and possible visuospatial difficulty. Speech Diagnosis: Although pt present with significant R sided facial droop/weakness, this does not appear to negatively impact her speech intellegibility at this time. NUTRITION:  Current Weight: 277 lb 9 oz (125.9 kg) BMI (Calculated): 46.3  Current diet order: Current diet and supplement order: ADULT DIET; Dysphagia - Soft and Bite Sized         Feeding: Able to feed self      NSG Recorded PO: PO Meals Eaten (%): 51 - 75%    Malnutrition Status Malnutrition Status: No malnutrition    CASE MANAGEMENT:  Psychosocial/Behavioral Issues: none  Assessment:  Pt is from home alone. Pt states she may be able to stay at her sister's home at [place]. SW will arrange skilled Home Care and any needed DME. Patient/Family Education provided by team:  Role of PT/OT, HEP    Patient Goals for Rehab stay:  1. to regain my balance and strength    Rehab Team Goals for patient for the Upcoming Week:  1. increase independence with ADLs  2. Improve ambulation endurance    Barriers to Progress/Attainment of Goals & Efforts/Interventions to remove Barriers:  1. decreased dynamic balance-continue OT/PT  2.  Decreased functional strength- continue OT/PT    Discharge Plan:  Estimated Length of Stay: 10 days  Destination: home health  Services at Discharge: [facility], Occupational Therapy, Speech Therapy, and Nursing 3x week  Equipment at Discharge:  2-Wheeled [employer] Resources:   Factors facilitating achievement of predicted outcomes: Motivated, Cooperative, Pleasant, Sense of humor, and Home is wheelchair accessible  Barriers to the achievement of predicted outcomes: Limited family support, Confusion, Cognitive deficit, Impulsivity, Decreased endurance, Lower extremity weakness, Impaired vision, and Stairs at home    Special Needs in the Upcoming Week:   [x] Family/Caregiver Education  [] Home visit  []Therapeutic Pass [] Consults:_______   [] Family/Caregiver Training  [] Stroke Risk Factor Education     [] Other;_______     TEAM SUMMARY: Will continue with current poc & goals until anticipated d/c date of 1/1/2023.     MD:   Stroke Risk Factors:   [] N/A for this patient [x] HTN  [x]  Diabetes  [x] Hyperlipidemia  [x]Obesity BMI >25  [] Atrial Fibrillation [] Smoker (current)  [] Smoker (quit in last 12 months)  [] Sleep Apnea [] Other:     Risk for Readmission: Moderate: 18%    Justification for Continued Stay:   Criteria for continued IRF stay:  Based on my medical assessment of the patient and review of information from the interdisciplinary team, as part of this weekly team conference, the patient continues to meet the following criteria for IRF level of care:  [x] The patient requires 24-hour rehabilitation nursing care   [x] The patient requires an intensive rehabilitation therapy program  [x] The patient requires active and ongoing intervention of multiple therapy disciplines  [x] The patient requires continued physician supervision by a rehabilitation physician  [x] The patient requires an intensive and coordinated interdisciplinary team approach to the delivery of rehabilitative care    Medical Necessity-continued close physician medical management is required for:   [] Cardiac/Circulatory dysfunction  [] Respiratory/Pulmonary dysfunction  [] Integumentary complications  [] Peripheral Vascular dysfunction  [] Musculoskeletal dysfunction  [x] Neurological dysfunction d/t:  [x] CVA  [] SCI  [] TBI  [] Other: __________  [] Renal dysfunction  [] Hematologic dysfunction    [] Endocrine disorders  [] GI disorders     [] Genito-Urinary dysfunction    Assessment/Plan:  [x] The patient is making good progression towards their LTG's, is actively participating in, and has a reasonable expectation to continue to benefit from the intensive rehabilitation program.  [] The estimated discharge date has been changed from initial team conference due to:   [] The estimated discharge destination has been changed from initial team conference due to:     Rehab Team Members in attendance for Team Conference:  ARU Supervisor/PPS Coordinator:  Patricia Pérez, PT, DPT    Therapy Manager/ARU :  Jaimie Calvin PT, DPT    Nursing Manager:  Brian Montejo RN    Social Work:  [place], [place]    Nursing:  Georgi Cornelius RN    Therapy:  Rahel Couch, PT  Delano Hankins, PT, DPT  Joey Ken, PT, DPT    Nury Bird, OTR/L    Rey Melgar MA-CHERISE, SLP    Nutrition:  Alfredo Perez, [address]:    [place]: [phone]  Office:  [phone]      I approve the established interdisciplinary plan of care as documented within the medical record of Burtonmarva Gustavo.     MD Shama Reynaga D.O. M.P.H  PM&R  12/27/2022  12:09 PM

## 2022-12-26 NOTE — PLAN OF CARE
Problem: Discharge Planning  Goal: Discharge to home or other facility with appropriate resources  Outcome: Progressing  Flowsheets (Taken 12/25/2022 2100)  Discharge to home or other facility with appropriate resources: Identify barriers to discharge with patient and caregiver     Problem: Skin/Tissue Integrity  Goal: Absence of new skin breakdown  Description: 1. Monitor for areas of redness and/or skin breakdown  2. Assess vascular access sites hourly  3. Every 4-6 hours minimum:  Change oxygen saturation probe site  4. Every 4-6 hours:  If on nasal continuous positive airway pressure, respiratory therapy assess nares and determine need for appliance change or resting period.   Outcome: Progressing     Problem: Pain  Goal: Verbalizes/displays adequate comfort level or baseline comfort level  Outcome: Progressing  Flowsheets (Taken 12/25/2022 2000)  Verbalizes/displays adequate comfort level or baseline comfort level: Encourage patient to monitor pain and request assistance

## 2022-12-26 NOTE — PROGRESS NOTES
Occupational Therapy  Facility/Department: [facility] ACUTE REHAB UNIT  Daily Treatment Note  NAME: Kash Guerra  : 1955  MRN: [id number]    Date of Service: 2022    Discharge Recommendations:  Continue to assess pending progress, Home with Home health OT, Home with assist PRN  OT Equipment Recommendations  Other: continue to assess      Patient Diagnosis(es): There were no encounter diagnoses. Assessment    Assessment: Pt completed toileting and LB dressign this session CGA, UB dressing set-up. Pt able to doff/don footwear SBA, seated at recliner  in room, and CGA for pulling pants up. Pt tolerated seated UE therex - pt noting increased weight used, #2, for most of exercises. Pt cont to perform below functional baseline, with primary barriers being limitations w/ strength and fxl activity tolerance, secondary decreased cognition and balance. Pt may benefit from cont'd skilled OT to maximize safety and IND w/ ADL and fxl mobitliy - cont OT per POC  Activity Tolerance: Patient limited by fatigue;Patient tolerated treatment well  Discharge Recommendations: Continue to assess pending progress;Home with Home health OT; Home with assist PRN  Other: continue to assess      Plan   Occupational Therapy Plan  Times Per Week: 60 minutes/day x 5 days/week  Current Treatment Recommendations: Strengthening;ROM;Cognitive reorientation;Balance training;Pain management;Self-Care / ADL; Functional mobility training; Safety education & training;Home management training; Endurance training;Neuromuscular re-education;Positioning;Return to work related activity; Patient/Caregiver education & training;Equipment evaluation, education, & procurement       Subjective   Subjective  Subjective: Pt  side-lying on OT appraoch and agreed to tx.   Pain: denied  Orientation  Overall Orientation Status: Within Functional Limits  Cognition  Arousal/Alertness: Appropriate responses to stimuli (pt w/ occasional delays - ?? if d/t cog or [facility] (pt reporting no hearing R ear, and [place] L ear))        Objective      ADL  Grooming:  (declined)  UE Dressing: Setup  UE Dressing Skilled Clinical Factors: to don shirt seated  LE Dressing: Contact guard assistance  LE Dressing Skilled Clinical Factors: Pt doffed and donned brief seated on BSC placed over toilet as frame, cue for direction of brief. Doffed hosp socks, donned personal harry socks / L foot brace / harry shoes seated on recliner - req  CGA in stance for clothing mgt  Toileting: Contact guard assistance  Toileting Skilled Clinical Factors: continent bowel and bladder void on toilet. SBA hygiene - CGA in stance for clothing mgt  OT Exercises  Exercise Treatment: BUE therex (seated  - recliner) to increase strength and activity tolerance for ADLs and functional transfers. Pt completed 1 sets x 10 reps w/ free weights. Pt utilized 2#: elbow flx/ext, shldr press, int/ext rotn. 1#: anterior raise. Occ cueing (verbal, modeling). Tolerated well. Safety Devices  Type of Devices: Call light within reach; Chair alarm in place;Gait belt;Left in chair;Nurse notified  Restraints  Restraints Initially in Place: No     Patient Education  Education Given To: Patient  Education Provided: Transfer Training;Plan of Care;Role of Therapy; ADL Adaptive Strategies  Education Method: Verbal  Barriers to Learning: Hearing;Cognition  Education Outcome: Verbalized understanding;Continued education needed    Goals  Short Term Goals  Time Frame for Short Term Goals: 10 days - all goals ongoing  Short Term Goal 1: Pt will complete LB dressing using AE PRN with mod I  Short Term Goal 2: Pt will complete bathing with mod I  Short Term Goal 3: Pt will complete toileting and toilet transfer with mod I  Short Term Goal 4: Pt will be independent with UE HEP to improve activity tolerance  Short Term Goal 5: Pt will tolerate 8 minutes in stance to improve independence with IADLs  Patient Goals   Patient goals : \"to regain my balance and strength\"       Therapy Time   Individual Concurrent Group Co-treatment   Time In 0730         Time Out 0830         Minutes 60         Timed Code Treatment Minutes: [address], MOT-OTR/L [id number]

## 2022-12-26 NOTE — PLAN OF CARE
Problem: Discharge Planning  Goal: Discharge to home or other facility with appropriate resources  12/26/2022 1035 by Paulina Vivas RN  Outcome: Progressing  Discharge to home or other facility with appropriate resources: Identify barriers to discharge with patient and caregiver     Problem: Skin/Tissue Integrity  Goal: Absence of new skin breakdown  Description: 1. Monitor for areas of redness and/or skin breakdown  2. Assess vascular access sites hourly  3. Every 4-6 hours minimum:  Change oxygen saturation probe site  4. Every 4-6 hours:  If on nasal continuous positive airway pressure, respiratory therapy assess nares and determine need for appliance change or resting period.   12/26/2022 1035 by Erica Milton RN  Outcome: Progressing    Problem: Pain  Goal: Verbalizes/displays adequate comfort level or baseline comfort level  12/26/2022 1035 by Erica Milton RN  Outcome: Progressing  Verbalizes/displays adequate comfort level or baseline comfort level: Encourage patient to monitor pain and request assistance      Problem: Safety - Adult  Goal: Free from fall injury  Outcome: Progressing     Problem: Chronic Conditions and Co-morbidities  Goal: Patient's chronic conditions and co-morbidity symptoms are monitored and maintained or improved  Outcome: Progressing  Care Plan - Patient's Chronic Conditions and Co-Morbidity Symptoms are Monitored and Maintained or Improved: Monitor and assess patient's chronic conditions and comorbid symptoms for stability, deterioration, or improvement

## 2022-12-26 NOTE — PLAN OF CARE
Problem: Discharge Planning  Goal: Discharge to home or other facility with appropriate resources  12/25/2022 2235 by Laura Ramos  Outcome: Progressing  12/25/2022 2235 by Laura Ramos  Outcome: Progressing  Flowsheets (Taken 12/25/2022 2100)  Discharge to home or other facility with appropriate resources: Identify barriers to discharge with patient and caregiver     Problem: Skin/Tissue Integrity  Goal: Absence of new skin breakdown  Description: 1. Monitor for areas of redness and/or skin breakdown  2. Assess vascular access sites hourly  3. Every 4-6 hours minimum:  Change oxygen saturation probe site  4. Every 4-6 hours:  If on nasal continuous positive airway pressure, respiratory therapy assess nares and determine need for appliance change or resting period.   12/25/2022 2235 by Laura Ramos  Outcome: Progressing  12/25/2022 2235 by Laura Ramos  Outcome: Progressing     Problem: Pain  Goal: Verbalizes/displays adequate comfort level or baseline comfort level  12/25/2022 2235 by Laura Ramos  Outcome: Progressing  12/25/2022 2235 by Laura Ramos  Outcome: Progressing  Flowsheets (Taken 12/25/2022 2000)  Verbalizes/displays adequate comfort level or baseline comfort level: Encourage patient to monitor pain and request assistance

## 2022-12-26 NOTE — PROGRESS NOTES
Alert x4 denies pain/discomfort s/p  meningioma removal right side of head sutures intact no drainage noted, rt ear noted with sutures, C/D/I ABD graft site unremarkable sutures intact , no drainage, odor noted no acute distress noted call light in reach

## 2022-12-27 LAB
GLUCOSE BLD-MCNC: 119 MG/DL (ref 70–99)
GLUCOSE BLD-MCNC: 126 MG/DL (ref 70–99)
GLUCOSE BLD-MCNC: 133 MG/DL (ref 70–99)
GLUCOSE BLD-MCNC: 138 MG/DL (ref 70–99)
GLUCOSE BLD-MCNC: 145 MG/DL (ref 70–99)
PERFORMED ON: ABNORMAL

## 2022-12-27 PROCEDURE — 97110 THERAPEUTIC EXERCISES: CPT

## 2022-12-27 PROCEDURE — 97130 THER IVNTJ EA ADDL 15 MIN: CPT

## 2022-12-27 PROCEDURE — 97530 THERAPEUTIC ACTIVITIES: CPT

## 2022-12-27 PROCEDURE — 92526 ORAL FUNCTION THERAPY: CPT

## 2022-12-27 PROCEDURE — C9113 INJ PANTOPRAZOLE SODIUM, VIA: HCPCS | Performed by: PHYSICAL MEDICINE & REHABILITATION

## 2022-12-27 PROCEDURE — 97129 THER IVNTJ 1ST 15 MIN: CPT

## 2022-12-27 PROCEDURE — 6370000000 HC RX 637 (ALT 250 FOR IP): Performed by: PHYSICAL MEDICINE & REHABILITATION

## 2022-12-27 PROCEDURE — 97116 GAIT TRAINING THERAPY: CPT

## 2022-12-27 PROCEDURE — 1280000000 HC REHAB R&B

## 2022-12-27 PROCEDURE — 6360000002 HC RX W HCPCS: Performed by: PHYSICAL MEDICINE & REHABILITATION

## 2022-12-27 PROCEDURE — 97535 SELF CARE MNGMENT TRAINING: CPT

## 2022-12-27 PROCEDURE — 2580000003 HC RX 258: Performed by: PHYSICAL MEDICINE & REHABILITATION

## 2022-12-27 RX ORDER — LANOLIN ALCOHOL/MO/W.PET/CERES
4.5 CREAM (GRAM) TOPICAL NIGHTLY
Status: DISCONTINUED | OUTPATIENT
Start: 2022-12-27 | End: 2023-01-01 | Stop reason: HOSPADM

## 2022-12-27 RX ADMIN — CETIRIZINE HYDROCHLORIDE 10 MG: 10 TABLET, FILM COATED ORAL at 09:34

## 2022-12-27 RX ADMIN — POTASSIUM CHLORIDE 20 MEQ: 20 TABLET, EXTENDED RELEASE ORAL at 09:35

## 2022-12-27 RX ADMIN — SODIUM CHLORIDE, PRESERVATIVE FREE 10 ML: 5 INJECTION INTRAVENOUS at 21:38

## 2022-12-27 RX ADMIN — Medication 4.5 MG: at 22:59

## 2022-12-27 RX ADMIN — LEVETIRACETAM 500 MG: 500 TABLET, FILM COATED ORAL at 21:36

## 2022-12-27 RX ADMIN — LEVETIRACETAM 500 MG: 500 TABLET, FILM COATED ORAL at 09:34

## 2022-12-27 RX ADMIN — INSULIN GLARGINE 15 UNITS: 100 INJECTION, SOLUTION SUBCUTANEOUS at 21:41

## 2022-12-27 RX ADMIN — POTASSIUM CHLORIDE 20 MEQ: 20 TABLET, EXTENDED RELEASE ORAL at 18:11

## 2022-12-27 RX ADMIN — METHIMAZOLE 10 MG: 5 TABLET ORAL at 09:35

## 2022-12-27 RX ADMIN — PANTOPRAZOLE SODIUM 40 MG: 40 INJECTION, POWDER, LYOPHILIZED, FOR SOLUTION INTRAVENOUS at 21:37

## 2022-12-27 RX ADMIN — LATANOPROST 1 DROP: 50 SOLUTION OPHTHALMIC at 21:39

## 2022-12-27 RX ADMIN — RIVAROXABAN 15 MG: 15 TABLET, FILM COATED ORAL at 18:12

## 2022-12-27 RX ADMIN — RIVAROXABAN 15 MG: 15 TABLET, FILM COATED ORAL at 09:35

## 2022-12-27 ASSESSMENT — PAIN SCALES - GENERAL: PAINLEVEL_OUTOF10: 0

## 2022-12-27 NOTE — PLAN OF CARE
Problem: Skin/Tissue Integrity  Goal: Absence of new skin breakdown  Description: 1. Monitor for areas of redness and/or skin breakdown  2. Assess vascular access sites hourly  3. Every 4-6 hours minimum:  Change oxygen saturation probe site  4. Every 4-6 hours:  If on nasal continuous positive airway pressure, respiratory therapy assess nares and determine need for appliance change or resting period. Outcome: Progressing     Problem: Pain  Goal: Verbalizes/displays adequate comfort level or baseline comfort level  Outcome: Progressing   PRN tylenol administered for pain. Pt satisfied with current pain management. Problem: Safety - Adult  Goal: Free from fall injury  Outcome: Progressing   Pt remains free from falls during this stay on the ARU. 1:1 and education provided on the importance of using call light to ask for assistance prior to transfers and ambulation. Pt voices understanding. Will continue to monitor and re-educate as needed.

## 2022-12-27 NOTE — CARE COORDINATION
Team Conference held this morning. Team discussed DC date for 1/1/23. SW met with Pt at bedside and discussed DC date. Pt stated, \"I have mixed feelings about that date. I desperately want to go home but I don't think I will be strong enough by then. \" Pt lives at home alone. Pt states that she can go to her sister's home but that her sister works full-days Wednesday through Saturday and she would be home alone during this time. Pt asked if we can see how she does with therapy the next few days and and then re-eval. SW will follow up with Pt.      Natasha Cordova, [facility]  Case Management  [phone]

## 2022-12-27 NOTE — PROGRESS NOTES
Physical Therapy  Facility/Department: [facility] ACUTE REHAB UNIT  Rehabilitation Physical Therapy Treatment Note    NAME: Erin Guillen  : 1955 (79 y.o.)  MRN: [id number]  CODE STATUS: Full Code    Date of Service: 22       Restrictions:  Position Activity Restriction  Other position/activity restrictions: Ambulate the Patient, Up with assistance, up in the chair     SUBJECTIVE  Subjective  Subjective: Pt seated in recliner alert and agreeable to session . Ending session with speech        Post Treatment Pain Screening         OBJECTIVE  Cognition  Overall Cognitive Status: WFL  Orientation  Overall Orientation Status: Within Functional Limits  Orientation Level: Oriented X4    Functional Mobility  Balance  Sitting Balance: Stand by assistance (Sitting in chair)  Standing Balance: Contact guard assistance  Transfers  Surface: Wheelchair;From chair with arms  Additional Factors: Increased time to complete  Device: Walker  Sit to Stand  Assistance Level: Contact guard assist;Stand by assist  Skilled Clinical Factors: initally CGA from the recliner, progressing to SBA from Wheelchair and chair with arms. Continued cues for anterior weight shifts  Stand to Sit  Assistance Level: Stand by assist  Skilled Clinical Factors: cues to reach back for chair and controlled descent      Environmental Mobility  Ambulation  Surface: Level surface  Device: Rolling walker  Distance: 125ft before needing rest break then ambulating into the gym, 75ft to the room before needing rest break, wheeled the rest of the way in. Activity: Within Room; Within Unit  Activity Comments: WC follow from PT with prolonged seated rest between each ambulation  Additional Factors: Increased time to complete;Verbal cues  Assistance Level: Contact guard assist  Gait Deviations: Slow darlene;Decreased step length bilateral;Decreased heel strike right;Decreased heel strike left  Skilled Clinical Factors: distance limited by activity tolerance. PT Exercises  Exercise Treatment: Patient performed Step ups on 6 inch box 2x2 on each leg, Right leg weaker than the left with eccentric descend requiring Mod A. Pt then performed 10 sit<>stands with a rest break with no assistive device and 5 second hold at a full erect posture. Static Standing Balance Exercises: Patient performed standing balance with no AD reaching outside [place] and across her midline for bean bag 2 times each side with seated rest breaks between sides of her body. Pt is CGA at times for all balance. ASSESSMENT/PROGRESS TOWARDS GOALS       Assessment  Assessment: Pt continues to tolerate session well and improving her ambulation tolerance with a RW each session. Toward the end of the session patient fatigues quickly and needs multiple minute rest breaks before performing another activity. Pt is still functioning well below her baseline and requires continued skilled PT service in order to increase her functional mobility, independence, transfers and endurance. Activity Tolerance: Patient limited by fatigue;Patient tolerated treatment well  Discharge Recommendations: 24 hour supervision or assist;Home with Home health PT  PT Equipment Recommendations  Other: plan to continue to assess    Goals  Patient Goals   Patient Goals : To Return to independence  Short Term Goals  Time Frame for Short Term Goals: 10 days - ongoing  Short Term Goal 1: Patient will perform all bed mobilty MOD I and no use of the handrails with HOB flat  Short Term Goal 2: Patient will perform sit<>stand transfers Mod I with RW  Short Term Goal 3: Patient will ambulate >150ft with RW Mod I  Short Term Goal 4: Patient will be able to ascend/descend 4 steps with one handrail Mod I  Short Term Goal 5: Patient will be able to  objects off the floor Mod I with RW.     PLAN OF CARE/SAFETY  Physcial Therapy Plan  Days Per Week: 5 Days  Hours Per Day:  (1.25)  Therapy Duration: 10 Days  Current Treatment Recommendations: Strengthening; Functional mobility training;Transfer training;Gait training; Endurance training; Safety education & training; Therapeutic activities;Balance training;Neuromuscular re-education;Patient/Caregiver education & training;Equipment evaluation, education, & procurement;Home exercise program  Safety Devices  Type of Devices: Call light within reach; Chair alarm in place; Left in chair;Nurse notified; All fall risk precautions in place    EDUCATION  Education  Education Given To: Patient  Education Provided: Role of Therapy; Equipment;Transfer Training;Plan of Care; Mobility Training;Precautions; Safety; Energy Conservation; Fall Prevention Strategies  Education Method: Demonstration;Verbal  Barriers to Learning: Vision; Hearing  Education Outcome: Verbalized understanding        Therapy Time   Individual Concurrent Group Co-treatment   Time In 0830         Time Out 0930         Minutes 60         Timed Code Treatment Minutes:   60    Total Treatment Minutes:  [address], [place], 12/27/22 at 3:04 PM

## 2022-12-27 NOTE — PROGRESS NOTES
Occupational Therapy  Facility/Department: [facility] ACUTE REHAB UNIT  Rehabilitation Occupational Therapy Daily Treatment Note    Date: 22  Patient Name: Susan Farah       Room: 3101/3101-01  MRN: [id number]  Account: [de-identified]   : 1955  (78 y.o.) Gender: female                    Past Medical History:  has a past medical history of Arthritis, Asthma, Brain tumor ([facility] [address] Diabetes mellitus ([facility] [address] High cholesterol, Hypertension, Imbalance, Loss of hearing, and Vertigo. Past Surgical History:   has a past surgical history that includes Total knee arthroplasty (Right, 2015); Total knee arthroplasty (Left, 10/14/15); Total hip arthroplasty (Right, 2021); Neuroma surgery (Right, 2022); mastoidectomy (Right, 2022); craniotomy (N/A, 2022); Upper gastrointestinal endoscopy (N/A, 2022); IR EMBOLIZATION HEMORRHAGE (2022); and Upper gastrointestinal endoscopy (N/A, 2022). Restrictions  Restrictions/Precautions: Fall Risk;Seizure  Other position/activity restrictions: Ambulate the Patient, Up with assistance, up in the chair    Subjective  Subjective: Sitting in recliner upon arrival, pleasant and agreeable to OT session requesting to toilet. Restrictions/Precautions: Fall Risk;Seizure             Objective     Cognition  Overall Cognitive Status: WFL  Orientation  Overall Orientation Status: Within Functional Limits         ADL  Grooming/Oral Hygiene  Assistance Level: Contact guard assist  Skilled Clinical Factors: oral hygiene in stance at sink and washing face, with fatigue pt began offloading with BUEs on sink  Toileting  Assistance Level: Contact guard assist;Increased time to complete  Skilled Clinical Factors: Pt continent of urine and completed pericare seated on BSC placed over toilet.  Pt req CGA for pants mgmt down/up over hips w/ alternating UE support on RW  Toilet Transfers  Technique:  (ambulatory with RW)  Equipment: Beside commode  Assistance Level: Contact guard assist      Instrumental ADL's  Instrumental ADLs: Yes  Light Housekeeping  Light Housekeeping Level: [employer] Level of Assistance: Contact guard assistance;Stand by assistance  Light Housekeeping: Pt reports at baseline that she frequently microwaves meals for breakfast, lunch and dinner which she retrieves from slightly below waist level  freezer. Pt typically uses a rollator and transports her food items on the seat on her 4WW. As a result, pt educated on use of walker basket for safe item transport to maintain BUEs on RW. Pt demonstrated understanding utilizing walker basket and RW to retrieve items from fridge with CGA progressing to SBA maintaining unilateral UE support on RW. Pt placed into walker basket and transported to microwave with SBA and placed into micrwoave maintaining unilateral UE support on countertop. Pt returned item using the same technique and SBA with no overt LOB. Pt tolerated ~4 minutes in stance for duration of task and required prolonged rest break due to fatigue. Functional Mobility  Device: Rolling walker  Activity: To/From bathroom; To/From therapy gym;Retrieve items;Transport items (to/from dining room)  Assistance Level:  (progressing to SBA)  Skilled Clinical Factors: Very slow darlene, decreased step length and height  Sit to Stand  Assistance Level: Stand by assist  Skilled Clinical Factors: from recliner and w/c to RW  Stand to Sit  Assistance Level: Stand by assist         Assessment  Assessment  Assessment: Pt progressing to SBA for short distance functional mobility using RW. Pt tolerated 4 minutes in stance for IADL meal prep simulation task using walker basket. Pt requires problem solving for energy conservation as pt fatigues easily and is limited by decreased activity tolerance and chronic LB pain. Pt functioning below baseline, cont per OT POC.   Activity Tolerance: Patient limited by fatigue;Patient tolerated treatment well  Discharge Recommendations: Continue to assess pending progress;Home with Home health OT; Home with assist PRN  OT Equipment Recommendations  Other: continue to assess  Safety Devices  Safety Devices in place: Yes  Type of devices: Left in chair;Chair alarm in place;Call light within reach    Patient Education  Education  Education Given To: Patient  Education Provided: Role of Therapy;Plan of Care;Transfer Training;ADL Function; Safety;IADL Function; Energy Conservation; Fall Prevention Strategies  Education Provided Comments: pt educated on use of walker basket for item retrieval and transport for meal prep; extensive time problem solving pt completion of laundry upon d/c taking daily dirty items from folding over RW and placing into washing machine or in hamper (leaving hamper in laundry room vs transporting via 4WW as pt typically did) as well as using reacher to retrieve low, harder to reach items or delegating  Education Method: Verbal  Barriers to Learning: None  Education Outcome: Verbalized understanding;Continued education needed    Plan  Occupational Therapy Plan  Times Per Week: 75 minutes/day x 5 days/week  Current Treatment Recommendations: Strengthening;ROM;Cognitive reorientation;Balance training;Pain management;Self-Care / ADL; Functional mobility training; Safety education & training;Home management training; Endurance training;Neuromuscular re-education;Positioning;Return to work related activity; Patient/Caregiver education & training;Equipment evaluation, education, & procurement    Goals  Patient Goals   Patient goals : \"to regain my balance and strength\"  Short Term Goals  Time Frame for Short Term Goals: 10 days - all goals ongoing  Short Term Goal 1: Pt will complete LB dressing using AE PRN with mod I  Short Term Goal 2: Pt will complete bathing with mod I  Short Term Goal 3: Pt will complete toileting and toilet transfer with mod I  Short Term Goal 4: Pt will be independent with UE HEP to improve activity tolerance  Short Term Goal 5: Pt will tolerate 8 minutes in stance to improve independence with IADLs    AM-PAC Score               Therapy Time   Individual Concurrent Group Co-treatment   Time In 1245         Time Out 1345         Minutes 60         Timed Code Treatment Minutes: [address] [facility], OT

## 2022-12-27 NOTE — PROGRESS NOTES
Message noted: Chart reviewed and may refill medication as requested. Script sent to listed pharmacy by secure method.     Pt notified through [facility] Shift assessment complete, VSS, afebrile, medications taken without difficulty. Pt. voiced back pain, PRN tylenol administered, see Flow sheet. Surgical sites dry and intact with sutures,  swelling to left lateral head. remains A & O X 4. Pt. Calls out appropriately. Call light and over bed table within reach. Hourly rounding and frequent visual checks in place.

## 2022-12-27 NOTE — PROGRESS NOTES
Department of Physical Medicine & Rehabilitation  Progress Note    Patient Identification:  Lisa Marks  [id number]  : 1955  Admit date: 2022    Chief Complaint: S/P excision of acoustic neuroma    Subjective:   80 yo female alert and oriented in no apparent acute distress. Pt was undergoing speech therapy and was doing well with it. Pt also did undergo PT this AM and did well. Improving. Team conference today. ROS: No f/c, n/v, cp     Objective:  Patient Vitals for the past 24 hrs:   BP Temp Temp src Pulse Resp SpO2 Weight   22 0930 114/79 97.3 °F (36.3 °C) Oral 80 -- 95 % --   22 0730 -- -- -- -- -- -- 277 lb 9 oz (125.9 kg)   22 132/75 97.9 °F (36.6 °C) Oral 76 16 93 % --   22 1618 127/83 97.9 °F (36.6 °C) Oral 80 16 97 % --       Const: Alert. No distress, pleasant. HEENT: right sided swelling- wound care  CV: Regular rate and rhythm. Resp: No respiratory distress. Lungs CTAB. Abd: Soft, nontender, nondistended, NABS+   Ext: No edema. Neuro: Alert, oriented, appropriately interactive. Psych: Cooperative, appropriate mood and affect    Laboratory data: Available via EMR.    Last 24 hour lab  Recent Results (from the past 24 hour(s))   POCT Glucose    Collection Time: 22 12:08 PM   Result Value Ref Range    POC Glucose 121 (H) 70 - 99 mg/dl    Performed on ACCU-CHEK    POCT Glucose    Collection Time: 22  4:53 PM   Result Value Ref Range    POC Glucose 133 (H) 70 - 99 mg/dl    Performed on ACCU-CHEK    POCT Glucose    Collection Time: 22  7:48 PM   Result Value Ref Range    POC Glucose 190 (H) 70 - 99 mg/dl    Performed on ACCU-CHEK    POCT Glucose    Collection Time: 22  7:34 AM   Result Value Ref Range    POC Glucose 145 (H) 70 - 99 mg/dl    Performed on ACCU-CHEK        Therapy progress:  PT  Position Activity Restriction  Other position/activity restrictions: Ambulate the Patient, Up with assistance, up in the chair  Objective     Sit to Stand: Minimal Assistance (To RW from [facility] and wheelchair)  Stand to Sit: Minimal Assistance  Device: Rolling Walker  Other Apparatus:  (wheelchair follow)  Assistance: Minimal assistance  Distance: 10ftx2+50ft  OT  PT Equipment Recommendations  Equipment Needed: No  Other: plan to continue to assess  Toilet - Technique: Ambulating  Equipment Used: Extra wide bedside commode  Toilet Transfers Comments: pt reported difficulty standing from standard low surface toilet; + time to transition BUEs to RW  Assessment        SLP          Body mass index is 46.19 kg/m². Assessment and Plan:  Giant Duodenal Ulcer  -monitor Hgb  -Transfuse pRBCs  -Protonix 40 mg BID  -Embolization of GDA  -GI following  -IR following     BL PE, suspected  -currently on heparin gtt  - transition to xarelto     Meningioma  -s/p resection with TL and MCF approach  -Keppra 500 mg BID  -Bowel regiment: Senna, glycolax, and dulcolax  -Nicardipine-Roxicodone PRN  -Acycolvir 400 mg TID for 10 days  -Artifical tears q2H and eye patch  -Keep an eye on mastoid dressing  -SCDs for DVT prophylaxis   -PT/OT     HTN   -Coreg 12.5 mg BID  Losartan 100 mg daily     DM   -Lantus 15u qHS  -HDSSI     Hyperthyroidism   -Methimazole 10 mg daily     Liver Mass  -Incidental finding  -f/u imaging once acute problems have been tx      Bed issues- requires a bariatric mattress unfortunately     Rehab Progress: Improving  Anticipated Dispo: home  Services/DME:   ELOS: 1/1    Team conference was held today on the patient and discussed directly with the patient utilizing their entire treatment team. Please see separate team note for details. Total treatment time for today's care >35 min. >50% of time spent counseling with patient and coordinating care. Yosi Ramirez MD  PGY1    Staffed and discussed with attending Dr. Gabbie Best      This patient has been seen, examined, and discussed with the resident.  I was part of the key critical services provided to the patient. I agree with the residents documentation. This note may have been altered to reflect my own examination findings, impression, and recommendations.        KAYLEY BaumP.H  PM&R  12/27/2022  9:53 AM

## 2022-12-28 LAB
ANION GAP SERPL CALCULATED.3IONS-SCNC: 10 MMOL/L (ref 3–16)
BASOPHILS ABSOLUTE: 0 K/UL (ref 0–0.2)
BASOPHILS RELATIVE PERCENT: 1 %
BUN BLDV-MCNC: 13 MG/DL (ref 7–20)
CALCIUM SERPL-MCNC: 9.5 MG/DL (ref 8.3–10.6)
CHLORIDE BLD-SCNC: 104 MMOL/L (ref 99–110)
CO2: 26 MMOL/L (ref 21–32)
CREAT SERPL-MCNC: <0.5 MG/DL (ref 0.6–1.2)
EOSINOPHILS ABSOLUTE: 0.4 K/UL (ref 0–0.6)
EOSINOPHILS RELATIVE PERCENT: 14.1 %
GFR SERPL CREATININE-BSD FRML MDRD: >60 ML/MIN/{1.73_M2}
GLUCOSE BLD-MCNC: 128 MG/DL (ref 70–99)
GLUCOSE BLD-MCNC: 139 MG/DL (ref 70–99)
GLUCOSE BLD-MCNC: 140 MG/DL (ref 70–99)
GLUCOSE BLD-MCNC: 147 MG/DL (ref 70–99)
GLUCOSE BLD-MCNC: 152 MG/DL (ref 70–99)
HCT VFR BLD CALC: 30.5 % (ref 36–48)
HEMOGLOBIN: 9.9 G/DL (ref 12–16)
LYMPHOCYTES ABSOLUTE: 0.9 K/UL (ref 1–5.1)
LYMPHOCYTES RELATIVE PERCENT: 27.9 %
MCH RBC QN AUTO: 30.3 PG (ref 26–34)
MCHC RBC AUTO-ENTMCNC: 32.4 G/DL (ref 31–36)
MCV RBC AUTO: 93.7 FL (ref 80–100)
MONOCYTES ABSOLUTE: 0.3 K/UL (ref 0–1.3)
MONOCYTES RELATIVE PERCENT: 11.4 %
NEUTROPHILS ABSOLUTE: 1.4 K/UL (ref 1.7–7.7)
NEUTROPHILS RELATIVE PERCENT: 45.6 %
PDW BLD-RTO: 18.4 % (ref 12.4–15.4)
PERFORMED ON: ABNORMAL
PLATELET # BLD: 197 K/UL (ref 135–450)
PMV BLD AUTO: 8.1 FL (ref 5–10.5)
POTASSIUM REFLEX MAGNESIUM: 4.1 MMOL/L (ref 3.5–5.1)
RBC # BLD: 3.26 M/UL (ref 4–5.2)
SODIUM BLD-SCNC: 140 MMOL/L (ref 136–145)
WBC # BLD: 3.1 K/UL (ref 4–11)

## 2022-12-28 PROCEDURE — 6370000000 HC RX 637 (ALT 250 FOR IP): Performed by: PHYSICAL MEDICINE & REHABILITATION

## 2022-12-28 PROCEDURE — 1280000000 HC REHAB R&B

## 2022-12-28 PROCEDURE — 85025 COMPLETE CBC W/AUTO DIFF WBC: CPT

## 2022-12-28 PROCEDURE — C9113 INJ PANTOPRAZOLE SODIUM, VIA: HCPCS | Performed by: PHYSICAL MEDICINE & REHABILITATION

## 2022-12-28 PROCEDURE — 97110 THERAPEUTIC EXERCISES: CPT

## 2022-12-28 PROCEDURE — 2580000003 HC RX 258: Performed by: PHYSICAL MEDICINE & REHABILITATION

## 2022-12-28 PROCEDURE — 97530 THERAPEUTIC ACTIVITIES: CPT

## 2022-12-28 PROCEDURE — 6360000002 HC RX W HCPCS: Performed by: PHYSICAL MEDICINE & REHABILITATION

## 2022-12-28 PROCEDURE — 97535 SELF CARE MNGMENT TRAINING: CPT

## 2022-12-28 PROCEDURE — 97130 THER IVNTJ EA ADDL 15 MIN: CPT

## 2022-12-28 PROCEDURE — 97129 THER IVNTJ 1ST 15 MIN: CPT

## 2022-12-28 PROCEDURE — 80048 BASIC METABOLIC PNL TOTAL CA: CPT

## 2022-12-28 PROCEDURE — 36592 COLLECT BLOOD FROM PICC: CPT

## 2022-12-28 PROCEDURE — 97116 GAIT TRAINING THERAPY: CPT

## 2022-12-28 RX ADMIN — INSULIN GLARGINE 15 UNITS: 100 INJECTION, SOLUTION SUBCUTANEOUS at 20:11

## 2022-12-28 RX ADMIN — PANTOPRAZOLE SODIUM 40 MG: 40 INJECTION, POWDER, LYOPHILIZED, FOR SOLUTION INTRAVENOUS at 20:03

## 2022-12-28 RX ADMIN — RIVAROXABAN 15 MG: 15 TABLET, FILM COATED ORAL at 11:34

## 2022-12-28 RX ADMIN — PANTOPRAZOLE SODIUM 40 MG: 40 INJECTION, POWDER, LYOPHILIZED, FOR SOLUTION INTRAVENOUS at 11:31

## 2022-12-28 RX ADMIN — POTASSIUM CHLORIDE 20 MEQ: 20 TABLET, EXTENDED RELEASE ORAL at 11:33

## 2022-12-28 RX ADMIN — LEVETIRACETAM 500 MG: 500 TABLET, FILM COATED ORAL at 11:31

## 2022-12-28 RX ADMIN — SODIUM CHLORIDE, PRESERVATIVE FREE 10 ML: 5 INJECTION INTRAVENOUS at 11:31

## 2022-12-28 RX ADMIN — METHIMAZOLE 10 MG: 5 TABLET ORAL at 11:33

## 2022-12-28 RX ADMIN — SODIUM CHLORIDE, PRESERVATIVE FREE 10 ML: 5 INJECTION INTRAVENOUS at 20:04

## 2022-12-28 RX ADMIN — SODIUM CHLORIDE, PRESERVATIVE FREE 10 ML: 5 INJECTION INTRAVENOUS at 20:05

## 2022-12-28 RX ADMIN — Medication 4.5 MG: at 21:34

## 2022-12-28 RX ADMIN — CETIRIZINE HYDROCHLORIDE 10 MG: 10 TABLET, FILM COATED ORAL at 11:31

## 2022-12-28 RX ADMIN — LEVETIRACETAM 500 MG: 500 TABLET, FILM COATED ORAL at 20:03

## 2022-12-28 RX ADMIN — RIVAROXABAN 15 MG: 15 TABLET, FILM COATED ORAL at 18:12

## 2022-12-28 RX ADMIN — LATANOPROST 1 DROP: 50 SOLUTION OPHTHALMIC at 20:03

## 2022-12-28 RX ADMIN — POTASSIUM CHLORIDE 20 MEQ: 20 TABLET, EXTENDED RELEASE ORAL at 18:11

## 2022-12-28 ASSESSMENT — PAIN SCALES - GENERAL: PAINLEVEL_OUTOF10: 0

## 2022-12-28 NOTE — PLAN OF CARE
"25 y.o.  @ 20w1d presents to hospital with complaints of n/v that has been increasingly worse since last night. States "I've lost 16 pounds this pregnancy, and I keep doing this."   " Problem: Pain  Goal: Verbalizes/displays adequate comfort level or baseline comfort level  12/28/2022 1347 by Shira Cardozo RN  Outcome: Progressing

## 2022-12-28 NOTE — PROGRESS NOTES
Pt. Up in chair, assisted to bathroom and back to bed. Shift assessment complete, VSS, afebrile, medications taken without difficulty, pt. Denies pain thus far, remains A & O X 4. Surgical sites to head and abdomen intact. Swelling noted on surgical site to right posterior head. Call light and over bed table within reach. Hourly rounding and frequent visual checks in place.

## 2022-12-28 NOTE — PROGRESS NOTES
Occupational Therapy  Facility/Department: [facility] ACUTE REHAB UNIT  Rehabilitation Occupational Therapy Daily Treatment Note    Date: 22  Patient Name: Darwin Ventura       Room: 3101/3101-01  MRN: [id number]  Account: [de-identified]   : 1955  (78 y.o.) Gender: female                    Past Medical History:  has a past medical history of Arthritis, Asthma, Brain tumor ([facility] [address] Diabetes mellitus ([facility] [address] High cholesterol, Hypertension, Imbalance, Loss of hearing, and Vertigo. Past Surgical History:   has a past surgical history that includes Total knee arthroplasty (Right, 2015); Total knee arthroplasty (Left, 10/14/15); Total hip arthroplasty (Right, 2021); Neuroma surgery (Right, 2022); mastoidectomy (Right, 2022); craniotomy (N/A, 2022); Upper gastrointestinal endoscopy (N/A, 2022); IR EMBOLIZATION HEMORRHAGE (2022); and Upper gastrointestinal endoscopy (N/A, 2022). Restrictions  Restrictions/Precautions: Fall Risk;Seizure  Other position/activity restrictions: Ambulate the Patient, Up with assistance, up in the chair    Subjective  Subjective: Sitting in recliner upon arrival, pleasant and agreeable to OT session requesting to toilet. Restrictions/Precautions: Fall Risk;Seizure             Objective     Cognition  Overall Cognitive Status: WFL  Orientation  Overall Orientation Status: Within Functional Limits         ADL  Grooming/Oral Hygiene  Assistance Level: Stand by assist  Skilled Clinical Factors: hand hygiene in stance at sink  Lower Extremity Dressing  Assistance Level: Stand by assist;Verbal cues; Set-up; Increased time to complete  Skilled Clinical Factors: Pt doffed brief and pants seated on BSC placed over toilet, threaded clean brief and pants seated on BSC w/ + time by flexing forward at trunk, and completed pants mgmt up over hips w/ alternating UE support on RW and SBA  Toileting  Assistance Level: Stand by assist;Increased time to complete  Skilled Clinical Factors: Pt continent of bowel and bladder completed pericare seated on BSC placed over toilet; ++ time to have BM; Pt req SBA for pants mgmt down/up over hips without UE support  Toilet Transfers  Technique:  (ambulatory using RW)  Equipment: Beside commode  Assistance Level: Stand by assist          Functional Mobility  Device: Rolling walker  Activity: To/From bathroom  Assistance Level: Stand by assist  Skilled Clinical Factors: decreased darlene, decreased step length and height  Sit to Stand  Assistance Level: Stand by assist  Skilled Clinical Factors: from recliner and w/c to RW  Stand to Sit  Assistance Level: Stand by assist   OT Exercises  Exercise Treatment: Pt completed BUE therex seated in recliner to increase strength and activity tolerance for ADLs and functional transfers; x12 of the following exercises using orange theraband: elbow flexion, sh h abduction, shoulder diagonals up with VCs and demo     Assessment  Assessment  Assessment: Pt demo SBA for functional mobility to/from bathroom, toileting and toilet transfer this date. Pt reporting feeling more confident with basic tasks but still concerned about going home due to decreased endurance. Pt highly motivated, cont per OT POC. Activity Tolerance: Patient limited by fatigue;Patient tolerated treatment well  Discharge Recommendations: Continue to assess pending progress;Home with Home health OT; Home with assist PRN  OT Equipment Recommendations  Other: continue to assess  Safety Devices  Safety Devices in place: Yes  Type of devices: Left in chair;Chair alarm in place;Call light within reach    Patient Education  Education  Education Given To: Patient  Education Provided: Role of Therapy;Plan of Care;Transfer Training;ADL Function; Safety;IADL Function; Energy Conservation; Fall Prevention Strategies  Education Method: Verbal  Barriers to Learning: None  Education Outcome: Verbalized understanding;Continued education needed    Second Session: Pt sitting in bedside recliner upon arrival, pleasant and agreeable to OT session requesting to toilet. Sister Porsche present. All STS completed from recliner and w/c to RW with SBA. Functional mobility to/from bathroom using RW with SBA. Ambulatory toilet transfer onto RTS with SBA. Pt continent of bladder completing perihygiene seated i'ly and LB clothing mgmt without UE support and SBA. Hand hygiene in stance at sink with SBA. Pt required prolonged rest break. Pt and sister had long discussion about barriers to d/c including pt worried her endurance is not where it needs to be to navigate long distances in her house and she is currently unable to navigate stairs without assist. Pt educated on adapting environment to have chairs set up to rest in if she is afraid she cannot make the distance from her bedroom to the living room in one consecutive bout. Pt stated, \"if I have to go home and I can't make it everywhere then I will probably just stay in the chair and do nothing. \" Pt provided emotional support and educated on importance of establishing a routine, not feeling defeated and that HHOT/PT will come to the house to support her ongoing endurance, strength and balance needs. Pt completed functional mobility to/from dining room using RW with SBA. Pt then completed IADL laundry simulation task as educated yesterday to transport one day's worth of clothes using RW to laundry room and either placing in laundry hamper or directly into washing machine until there is a full load. Pt retrieved two clothing items from within dining room and folded onto RW with SBA. Pt placed into below waist level washing machine with unilateral UE support and SBA. Pt retrieved clothing using reacher with VCs and SBA and transported into above waist dryer. Pt tolerated ~3.5 minutes in stance for duration of task and required rest break due to fatigue in w/c. Pt reported need to toilet \"cramping\" in stomach. Functional mobility to bathroom using RW with SBA. +++time to have BM requiring same level assist for toilet transfer and toileting as stated above. Pt left in bedside recliner at end of session with chair alarm engaged, call light within reach and all needs met. Plan  Occupational Therapy Plan  Times Per Week: 75 minutes/day x 5 days/week  Current Treatment Recommendations: Strengthening;ROM;Cognitive reorientation;Balance training;Pain management;Self-Care / ADL; Functional mobility training; Safety education & training;Home management training; Endurance training;Neuromuscular re-education;Positioning;Return to work related activity; Patient/Caregiver education & training;Equipment evaluation, education, & procurement    Goals  Patient Goals   Patient goals : \"to regain my balance and strength\"  Short Term Goals  Time Frame for Short Term Goals: 10 days - all goals ongoing  Short Term Goal 1: Pt will complete LB dressing using AE PRN with mod I  Short Term Goal 2: Pt will complete bathing with mod I  Short Term Goal 3: Pt will complete toileting and toilet transfer with mod I  Short Term Goal 4: Pt will be independent with UE HEP to improve activity tolerance  Short Term Goal 5: Pt will tolerate 8 minutes in stance to improve independence with IADLs    AM-PAC Score               Therapy Time   Individual Concurrent Group Co-treatment   Time In 0930         Time Out 1015         Minutes 45         Timed Code Treatment Minutes: 45 Minutes     Second Session Therapy Time:   Individual Concurrent Group Co-treatment   Time In 1323         Time Out 1822         Minutes 40           Timed Code Treatment Minutes:  45  40    Total Treatment Minutes:  Nico Britton, OT

## 2022-12-28 NOTE — PROGRESS NOTES
Physical Therapy  Facility/Department: [facility] ACUTE REHAB UNIT  Rehabilitation Physical Therapy Treatment Note    NAME: Kiah Hicks  : 1955 (79 y.o.)  MRN: [id number]  CODE STATUS: Full Code    Date of Service: 22    Restrictions:  Restrictions/Precautions: Fall Risk;Seizure  Position Activity Restriction  Other position/activity restrictions: Ambulate the Patient, Up with assistance, up in the chair     SUBJECTIVE  Subjective  Subjective: Pt supine in bed sleeping upon approach. Increased time required to wake pt however nce woken agreeable to PT. Pt reports fatigue due to not sleeping well and being woken at 5:30 in order to get dressed and just falling back asleep. Pain: Denies pain    OBJECTIVE  Cognition  Cognition Comment: repetition of directions required due to pt [place]    Functional Mobility  Supine to Sit  Assistance Level: Stand by assist  Balance  Sitting Balance: Supervision (seated EOB/on commode)  Standing Balance: Contact guard assistance (for brief/pants management and hand washing without UE support)  Transfers  Surface: From bed;Raised toilet Seat; To chair with arms; Wheelchair  Additional Factors: Increased time to complete  Device: Walker  Sit to Stand  Assistance Level: Contact guard assist  Skilled Clinical Factors: initally CGA from EOB/commode, progressing to SBA from Wheelchair and chair with arms. Continued cues for anterior weight shifts  Stand to Sit  Assistance Level: Stand by assist  Skilled Clinical Factors: cues to reach back for chair and controlled descent      Environmental Mobility  Ambulation  Surface: Level surface  Device: Rolling walker  Distance: 15', 90', 70' x 2 (ascent/descent of 70' ramp), 100'  Activity: Within Room; Within Unit  Activity Comments: WC follow from PT with prolonged seated rest between each ambulation  Additional Factors: Increased time to complete;Verbal cues (verbal cues for upright posture and scapular depression/retraction as well as increased step height)  Assistance Level: Contact guard assist  Gait Deviations: Slow darlene;Decreased step length bilateral;Decreased heel strike right;Decreased heel strike left    Second Session:   Pt seated in recliner upon approach and agreeable to PT. Pt completes sit<>stands at recliner/wc/commode with SBA and RW. Pt ambulates 125' with RW, CGA and same gait mechanics/cues as above. 30' amb completed with HR in hallway due to pt reporting wanting to progress towards using cane - pt demonstrates decreased B step height/length, darlene and stability compared to with RW. Experiences increased L ankle pain with UL UE support. Pt completes x 15' lateral amb ea direction with B UEs on HR and CGA- VC for upright posture and increased step height. Pt limited by fatigue in all activities with extended seated rest required to recover. Pt requires CGA for brief/pants management and handwashing without UE support. Supv for seated balance on commode including B weight shifting for pericare. Upon completion of session pt left seated in recliner with needs in reach and chair alarm on. ASSESSMENT/PROGRESS TOWARDS GOALS    Assessment  Assessment: Pt continues to be limited by decreased endurance however demonstrates improved endurance through completeion of ascent/descent 70' ramp with CGA and RW. Requires extended seated rest breaks between activities. Pt is still functioning well below her baseline and requires continued skilled PT service in order to increase her functional mobility, independence, transfers and endurance. Activity Tolerance: Patient limited by fatigue;Patient tolerated treatment well  Discharge Recommendations: 24 hour supervision or assist;Home with Home health PT  PT Equipment Recommendations  Equipment Needed: No  Other: plan to continue to assess    Goals  Patient Goals   Patient Goals :  To Return to independence  Short Term Goals  Time Frame for Short Term Goals: 10 days - ongoing  Short Term Goal 1: Patient will perform all bed mobilty MOD I and no use of the handrails with HOB flat  Short Term Goal 2: Patient will perform sit<>stand transfers Mod I with RW  Short Term Goal 3: Patient will ambulate >150ft with RW Mod I  Short Term Goal 4: Patient will be able to ascend/descend 4 steps with one handrail Mod I  Short Term Goal 5: Patient will be able to  objects off the floor Mod I with RW. PLAN OF CARE/SAFETY  Physcial Therapy Plan  General Plan: 5-7 times per week  Days Per Week: 5 Days  Hours Per Day:  (75 min)  Current Treatment Recommendations: Strengthening; Functional mobility training;Transfer training;Gait training; Endurance training; Safety education & training; Therapeutic activities;Balance training;Neuromuscular re-education;Patient/Caregiver education & training;Equipment evaluation, education, & procurement;Home exercise program  Safety Devices  Type of Devices: Call light within reach; Chair alarm in place; Left in chair;Nurse notified; All fall risk precautions in place    EDUCATION  Education  Education Given To: Patient  Education Provided: Equipment;Transfer Training;Plan of Care; Mobility Training;Precautions; Safety; Energy Conservation; Fall Prevention Strategies  Education Method: Demonstration;Verbal  Barriers to Learning: Vision; Hearing  Education Outcome: Verbalized understanding        Therapy Time   Individual Concurrent Group Co-treatment   Time In 0730         Time Out 0815         Minutes 39           Second Session Therapy Time:   Individual Concurrent Group Co-treatment   Time In 1423         Time Out 1453         Minutes 30           Timed Code Treatment Minutes:  75    Total Treatment Minutes:   [address] PT, [place] [id number]

## 2022-12-28 NOTE — PROGRESS NOTES
Department of Physical Medicine & Rehabilitation  Progress Note    Patient Identification:  Last Zee  [id number]  : 1955  Admit date: 2022    Chief Complaint: S/P excision of acoustic neuroma    Subjective:   78 yo female alert and oriented in no apparent acute distress. Pt doing well with therapy. States she feels like she is slowly getting better. Denies any specific concerns today. Good appetite and BMs are regular. ROS: No f/c, n/v, cp     Objective:  Patient Vitals for the past 24 hrs:   BP Temp Temp src Pulse Resp SpO2   22 1937 139/67 97.9 °F (36.6 °C) Oral 75 16 95 %     Const: Alert. No distress, pleasant. HEENT: right sided swelling- wound care  CV: Regular rate and rhythm. Resp: No respiratory distress. Lungs CTAB. Abd: Soft, nontender, nondistended, NABS+   Ext: No edema. Neuro: Alert, oriented, appropriately interactive. Psych: Cooperative, appropriate mood and affect    Laboratory data: Available via EMR.    Last 24 hour lab  Recent Results (from the past 24 hour(s))   POCT Glucose    Collection Time: 22 11:41 AM   Result Value Ref Range    POC Glucose 138 (H) 70 - 99 mg/dl    Performed on ACCU-CHEK    POCT Glucose    Collection Time: 22  4:34 PM   Result Value Ref Range    POC Glucose 126 (H) 70 - 99 mg/dl    Performed on ACCU-CHEK    POCT Glucose    Collection Time: 22  9:34 PM   Result Value Ref Range    POC Glucose 119 (H) 70 - 99 mg/dl    Performed on ACCU-CHEK    Basic Metabolic Panel w/ Reflex to MG    Collection Time: 22  5:50 AM   Result Value Ref Range    Sodium 140 136 - 145 mmol/L    Potassium reflex Magnesium 4.1 3.5 - 5.1 mmol/L    Chloride 104 99 - 110 mmol/L    CO2 26 21 - 32 mmol/L    Anion Gap 10 3 - 16    Glucose 147 (H) 70 - 99 mg/dL    BUN 13 7 - 20 mg/dL    Creatinine <0.5 (L) 0.6 - 1.2 mg/dL    Est, Glom Filt Rate >60 >60    Calcium 9.5 8.3 - 10.6 mg/dL   CBC auto differential    Collection Time: 22  5:50 AM Result Value Ref Range    WBC 3.1 (L) 4.0 - 11.0 K/uL    RBC 3.26 (L) 4.00 - 5.20 M/uL    Hemoglobin 9.9 (L) 12.0 - 16.0 g/dL    Hematocrit 30.5 (L) 36.0 - 48.0 %    MCV 93.7 80.0 - 100.0 fL    MCH 30.3 26.0 - 34.0 pg    MCHC 32.4 31.0 - 36.0 g/dL    RDW 18.4 (H) 12.4 - 15.4 %    Platelets 292 067 - 720 K/uL    MPV 8.1 5.0 - 10.5 fL    Neutrophils % 45.6 %    Lymphocytes % 27.9 %    Monocytes % 11.4 %    Eosinophils % 14.1 %    Basophils % 1.0 %    Neutrophils Absolute 1.4 (L) 1.7 - 7.7 K/uL    Lymphocytes Absolute 0.9 (L) 1.0 - 5.1 K/uL    Monocytes Absolute 0.3 0.0 - 1.3 K/uL    Eosinophils Absolute 0.4 0.0 - 0.6 K/uL    Basophils Absolute 0.0 0.0 - 0.2 K/uL   POCT Glucose    Collection Time: 12/28/22  8:17 AM   Result Value Ref Range    POC Glucose 140 (H) 70 - 99 mg/dl    Performed on ACCU-CHEK        Therapy progress:  PT  Position Activity Restriction  Other position/activity restrictions: Ambulate the Patient, Up with assistance, up in the chair  Objective     Sit to Stand: Minimal Assistance (To RW from [facility] and wheelchair)  Stand to Sit: Minimal Assistance  Device: Rolling Walker  Other Apparatus:  (wheelchair follow)  Assistance: Minimal assistance  Distance: 10ftx2+50ft  OT  PT Equipment Recommendations  Equipment Needed: No  Other: plan to continue to assess  Toilet - Technique: Ambulating  Equipment Used: Extra wide bedside commode  Toilet Transfers Comments: pt reported difficulty standing from standard low surface toilet; + time to transition BUEs to RW  Assessment        SLP          Body mass index is 46.19 kg/m².     Assessment and Plan:  Giant Duodenal Ulcer  -monitor Hgb  -Transfuse pRBCs  -Protonix 40 mg BID  -Embolization of GDA  -GI following  -IR following     BL PE, suspected  -currently on heparin gtt  - transition to xarelto     Meningioma  -s/p resection with TL and MCF approach  -Keppra 500 mg BID  -Bowel regiment: Senna, glycolax, and dulcolax  -Nicardipine-Roxicodone PRN  -Acycolvir 400 mg TID for 10 days  -Artifical tears q2H and eye patch  -Keep an eye on mastoid dressing  -SCDs for DVT prophylaxis   -PT/OT     HTN   -Coreg 12.5 mg BID  Losartan 100 mg daily     DM   -Lantus 15u qHS  -HDSSI     Hyperthyroidism   -Methimazole 10 mg daily     Liver Mass  -Incidental finding  -f/u imaging once acute problems have been tx      Bed issues- requires a bariatric mattress unfortunately     Rehab Progress: Improving  Anticipated Dispo: home  Services/DME:   ELOS: 1/1      Kiki Davison MD  PGY3 IM resident     Staffed and discussed with attending Dr. Gerard Hudson    This patient has been seen, examined, and discussed with the resident. I was part of the key critical services provided to the patient. I agree with the residents documentation. This note may have been altered to reflect my own examination findings, impression, and recommendations.        Sterling Jefferson D.O. M.P.H  PM&R  12/28/2022  11:06 AM

## 2022-12-28 NOTE — PROGRESS NOTES
ACUTE REHAB UNIT  SPEECH/LANGUAGE PATHOLOGY      [x] Daily  [] Weekly Care Conference Note  [] Discharge    Patient:Bianca Gaona      LDM:[birth year]  NSS:[id number]  Rehab Dx/Hx: S/P excision of acoustic neuroma [Z98.890, M41.868]    Precautions: [x] Aspiration  [x] Fall risk  [] Sternal  [] Seizure [] Hip  [] Weight Bearing [] Other  ST Dx: [] Aphasia  [x] Dysarthria  [] Apraxia   [x] Oropharyngeal dysphagia [x] Cognitive Impairment  [] Other:   Date of Admit: 12/22/2022  Room #: 3101/3101-01  Date: 12/28/2022          Current Diet Order:ADULT DIET; Dysphagia - Soft and Bite Sized   Recommended Form of Meds: PO  Compensatory Swallowing Strategies : Alternate solids and liquids, Eat/Feed slowly, Upright as possible for all oral intake, Check for pocketing of food on the Right, Lingual sweep, Small bites/sips      MBSS 12/1/22:  Oral Phase  Moderate dysfunction characterized by right-sided oral weakness with reduced labial seal and coordination, resulting in anterior loss of liquid via tsp and inability to use straw when placed on right or midline. For left-sided straw placement, pt able to create appropriate seal/suction. Prolonged mastication of soft solids, with slowed a-p transit, mild stasis. Pharyngeal Phase  Grossly WFL. Overall timely swallow initiation with appropriate hyolaryngeal mechanics. Single instance trace flash (self clearing) penetration of thins when straw was placed on right side. Otherwise good airway protection throughout with no aspiration. No significant stasis.   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 Date: 12/28/2022     Tx session 1   Total Timed Code Min 30   Total Treatment Minutes 30   Individual Treatment Minutes 30   Group Treatment Minutes 0   Co-Treat Minutes 0   Brief Exception: N/A   Pain None indicated at this time. Pain Intervention: [] RN notified  [] Repositioned  [] Intervention offered and patient declined  [x] N/A  [] Other:   Subjective:     Pt seated in chair upon entry and agreeable to tx session. Objective / Goals:    Pt will tolerate trials of increased texture in order to advance to least restrictive diet consistency. GOAL MET    Pt stated she eats a regular diet at home and would be interested in trialing a regular solid snack, which contradicts what was documented 12/27 pt stating she does not want to advance diet at this time. Patient will independently demonstrate understanding of swallowing concerns, compensatory strategies, and  Goal not targeted this session. Pt will maintain sustained attention to graded task with min cues. Pt with adequate sustained attention independently this session. NEW GOAL:  Pt will accurately complete higher level executive functioning tasks with min cues. Information transfer task: pt completed with 100% accuracy independently adequate executive function including self-monitoring and self-correcting. Mathematical calculations: pt completed mental math with 100% accuracy independently. Barrier is vision. Patient will participate in ongoing assessment of cognitive-linguistic skills with additional goals to be established as appropriate. Goal not targeted this session. Other areas targeted:    Education:   Pt educated to rationale for tx session and progress towards goal target areas.     Safety Devices: [x] Call light within reach  [x] Chair alarm activated and connected to nurse call light system  [] Bed alarm activated   [x] Other: reinforced use of call light. Assessment: Pt with cognitive-linguistic impairment that appears to be close to baseline. Pt this date with conflicting endorsement for diet advancement as pt previously refused wanting to advance diet. Plan: Pt met dysphagia goals therefore plan of care was decreased to 30 minutes for cognition only.      Interventions used this date:  [] Speech/Language Treatment  [] Instruction in HEP  [] Dysphagia Treatment [x] Cognitive Treatment   [] Other:    Discharge recommendations:  [x] Home independently  [] Home with assistance []  24 hour supervision  [] ECF [] Other  Continued Tx Upon Discharge: ? [x] Yes    [] No    [] TBD based on progress while on ARU     [] Vital Stim indicated     [] Other:   Estimated discharge date: 01/01/2023    Electronically signed by:  Gaurav Ellis, [address] [facility] [place], [address] [facility]  Speech-Language Pathologist

## 2022-12-28 NOTE — PLAN OF CARE
Problem: Discharge Planning  Goal: Discharge to home or other facility with appropriate resources  Outcome: Progressing       Problem: Skin/Tissue Integrity  Goal: Absence of new skin breakdown  Description: 1. Monitor for areas of redness and/or skin breakdown  2. Assess vascular access sites hourly  3. Every 4-6 hours minimum:  Change oxygen saturation probe site  4. Every 4-6 hours:  If on nasal continuous positive airway pressure, respiratory therapy assess nares and determine need for appliance change or resting period. Outcome: Progressing   Skin is kept clean and dry. Pt. Turns self, encouraged to turn and reposition to relieve pressure off bony prominences to improve or maintain skin integrity. No new skin issues found. No s/sx of infection noted. Will continue to monitor. Problem: Pain  Goal: Verbalizes/displays adequate comfort level or baseline comfort level  Outcome: Progressing   No pain voiced thus far this shift. Problem: Safety - Adult  Goal: Free from fall injury  Outcome: Progressing   Pt remains free from falls during this stay on the ARU. 1:1 and education provided on the importance of using call light to ask for assistance prior to transfers and ambulation. Pt voices understanding. Will continue to monitor and re-educate as needed.      Problem: Chronic Conditions and Co-morbidities  Goal: Patient's chronic conditions and co-morbidity symptoms are monitored and maintained or improved  Outcome: Progressing

## 2022-12-29 LAB
GLUCOSE BLD-MCNC: 132 MG/DL (ref 70–99)
GLUCOSE BLD-MCNC: 140 MG/DL (ref 70–99)
GLUCOSE BLD-MCNC: 141 MG/DL (ref 70–99)
GLUCOSE BLD-MCNC: 173 MG/DL (ref 70–99)
PERFORMED ON: ABNORMAL

## 2022-12-29 PROCEDURE — 97530 THERAPEUTIC ACTIVITIES: CPT

## 2022-12-29 PROCEDURE — 6360000002 HC RX W HCPCS: Performed by: PHYSICAL MEDICINE & REHABILITATION

## 2022-12-29 PROCEDURE — 1280000000 HC REHAB R&B

## 2022-12-29 PROCEDURE — 97129 THER IVNTJ 1ST 15 MIN: CPT

## 2022-12-29 PROCEDURE — 92526 ORAL FUNCTION THERAPY: CPT

## 2022-12-29 PROCEDURE — 2580000003 HC RX 258: Performed by: PHYSICAL MEDICINE & REHABILITATION

## 2022-12-29 PROCEDURE — 6370000000 HC RX 637 (ALT 250 FOR IP): Performed by: PHYSICAL MEDICINE & REHABILITATION

## 2022-12-29 PROCEDURE — C9113 INJ PANTOPRAZOLE SODIUM, VIA: HCPCS | Performed by: PHYSICAL MEDICINE & REHABILITATION

## 2022-12-29 PROCEDURE — 97116 GAIT TRAINING THERAPY: CPT

## 2022-12-29 PROCEDURE — 97110 THERAPEUTIC EXERCISES: CPT

## 2022-12-29 PROCEDURE — 97535 SELF CARE MNGMENT TRAINING: CPT

## 2022-12-29 RX ADMIN — PANTOPRAZOLE SODIUM 40 MG: 40 INJECTION, POWDER, LYOPHILIZED, FOR SOLUTION INTRAVENOUS at 10:47

## 2022-12-29 RX ADMIN — SODIUM CHLORIDE, PRESERVATIVE FREE 10 ML: 5 INJECTION INTRAVENOUS at 11:49

## 2022-12-29 RX ADMIN — SODIUM CHLORIDE, PRESERVATIVE FREE 10 ML: 5 INJECTION INTRAVENOUS at 11:48

## 2022-12-29 RX ADMIN — LEVETIRACETAM 500 MG: 500 TABLET, FILM COATED ORAL at 20:04

## 2022-12-29 RX ADMIN — INSULIN GLARGINE 15 UNITS: 100 INJECTION, SOLUTION SUBCUTANEOUS at 20:03

## 2022-12-29 RX ADMIN — RIVAROXABAN 15 MG: 15 TABLET, FILM COATED ORAL at 17:23

## 2022-12-29 RX ADMIN — Medication 4.5 MG: at 20:05

## 2022-12-29 RX ADMIN — METHIMAZOLE 10 MG: 5 TABLET ORAL at 08:43

## 2022-12-29 RX ADMIN — SODIUM CHLORIDE, PRESERVATIVE FREE 10 ML: 5 INJECTION INTRAVENOUS at 20:05

## 2022-12-29 RX ADMIN — LEVETIRACETAM 500 MG: 500 TABLET, FILM COATED ORAL at 08:43

## 2022-12-29 RX ADMIN — LATANOPROST 1 DROP: 50 SOLUTION OPHTHALMIC at 20:04

## 2022-12-29 RX ADMIN — CETIRIZINE HYDROCHLORIDE 10 MG: 10 TABLET, FILM COATED ORAL at 08:43

## 2022-12-29 RX ADMIN — POTASSIUM CHLORIDE 20 MEQ: 20 TABLET, EXTENDED RELEASE ORAL at 17:23

## 2022-12-29 RX ADMIN — PANTOPRAZOLE SODIUM 40 MG: 40 INJECTION, POWDER, LYOPHILIZED, FOR SOLUTION INTRAVENOUS at 20:05

## 2022-12-29 RX ADMIN — RIVAROXABAN 15 MG: 15 TABLET, FILM COATED ORAL at 08:43

## 2022-12-29 RX ADMIN — POTASSIUM CHLORIDE 20 MEQ: 20 TABLET, EXTENDED RELEASE ORAL at 08:43

## 2022-12-29 NOTE — CARE COORDINATION
Cm met with pt at bedside. Pt plans to dc home with home care. Pt has had home care in past but unsure of agency. Pt does not have a preference in agency. Pt has walker and rollator at home. CM sent message to [facility] for referral for home care.

## 2022-12-29 NOTE — PROGRESS NOTES
Alert x4 , denies pain/discomfort took all hs meds w/o difficulty. S/p surgical site WNL sutures well approx. , ABD fat graft intact, S utures to be removed tomorrow per Balbina Mcgowan. NP per report. Pt currently in bed no acute distress noted resp e/e call light in reach.

## 2022-12-29 NOTE — PLAN OF CARE
Problem: Discharge Planning  Goal: Discharge to home or other facility with appropriate resources  Outcome: Progressing     Problem: Skin/Tissue Integrity  Goal: Absence of new skin breakdown  Description: 1. Monitor for areas of redness and/or skin breakdown  2. Assess vascular access sites hourly  3. Every 4-6 hours minimum:  Change oxygen saturation probe site  4. Every 4-6 hours:  If on nasal continuous positive airway pressure, respiratory therapy assess nares and determine need for appliance change or resting period.   Outcome: Progressing     Problem: Pain  Goal: Verbalizes/displays adequate comfort level or baseline comfort level  Outcome: Progressing  Flowsheets (Taken 12/29/2022 0835)  Verbalizes/displays adequate comfort level or baseline comfort level:   Encourage patient to monitor pain and request assistance   Assess pain using appropriate pain scale   Administer analgesics based on type and severity of pain and evaluate response   Implement non-pharmacological measures as appropriate and evaluate response     Problem: Safety - Adult  Goal: Free from fall injury  Outcome: Progressing     Problem: Chronic Conditions and Co-morbidities  Goal: Patient's chronic conditions and co-morbidity symptoms are monitored and maintained or improved  Outcome: Progressing  Flowsheets (Taken 12/29/2022 0835)  Care Plan - Patient's Chronic Conditions and Co-Morbidity Symptoms are Monitored and Maintained or Improved: Monitor and assess patient's chronic conditions and comorbid symptoms for stability, deterioration, or improvement

## 2022-12-29 NOTE — PROGRESS NOTES
Occupational Therapy  Facility/Department: [facility]  Rehabilitation Occupational Therapy Daily Treatment Note    Date: 22  Patient Name: Monica Bhakta       Room: 3101/3101-01  MRN: [id number]  Account: [de-identified]   : 1955  (78 y.o.) Gender: female                    Past Medical History:  has a past medical history of Arthritis, Asthma, Brain tumor ([facility] [address] Diabetes mellitus ([facility] [address] High cholesterol, Hypertension, Imbalance, Loss of hearing, and Vertigo. Past Surgical History:   has a past surgical history that includes Total knee arthroplasty (Right, 2015); Total knee arthroplasty (Left, 10/14/15); Total hip arthroplasty (Right, 2021); Neuroma surgery (Right, 2022); mastoidectomy (Right, 2022); craniotomy (N/A, 2022); Upper gastrointestinal endoscopy (N/A, 2022); IR EMBOLIZATION HEMORRHAGE (2022); and Upper gastrointestinal endoscopy (N/A, 2022). Restrictions  Restrictions/Precautions: Fall Risk;Seizure  Other position/activity restrictions: Ambulate the Patient, Up with assistance, up in the chair    Subjective  Subjective: Pt supine in bed upon arrival, pleasant and agreeable to OT session excited to shower. Restrictions/Precautions: Fall Risk;Seizure             Objective     Cognition  Overall Cognitive Status: WFL  Orientation  Overall Orientation Status: Within Functional Limits         ADL  Upper Extremity Bathing  Assistance Level: Modified independent  Skilled Clinical Factors: seated on bariatric commode placed in shower  Lower Extremity Bathing  Assistance Level: Stand by assist  Skilled Clinical Factors: seated on bariatric commode placed in shower, trunk flexion to wash lower limbs and feet, lateral WS to wash periarea, in stance with unilateral UE support on GB to dry buttocks  Upper Extremity Dressing  Assistance Level:  Independent  Skilled Clinical Factors: pt donned nightgown seated, includes item retrieval and transport to bathroom  Lower Extremity Dressing  Assistance Level: Stand by assist;Verbal cues; Set-up; Increased time to complete  Skilled Clinical Factors: Pt doffed brief and pants seated on BSC placed over toilet, threaded clean brief and pants seated on BSC placed in shower w/ + time by flexing forward at trunk, and completed pants mgmt up over hips w/ alternating UE support on GB and SBA, + time to manage in back due to slightly damp skin post shower  Putting On/Taking Off Footwear  Assistance Level: Independent  Skilled Clinical Factors: pt doffed/donned socks seated on BSC placed in shower via figure 4  Toileting  Assistance Level: Stand by assist;Increased time to complete  Skilled Clinical Factors: Pt continent of bladder, completed pericare seated on BSC placed over toilet; SBA for pants mgmt off hips without UE support, removed briefs i'ly in prep for shower  Toilet Transfers  Technique:  (ambulatory using RW)  Equipment: Beside commode (placed over toilet)  Assistance Level: Stand by assist  Tub/Shower Transfers  Type: Shower  Transfer From: Rolling walker  Transfer To:  (bariatric BSC placed in shower)  Additional Factors: Verbal cues;Cues for hand placement  Assistance Level: Stand by assist  Skilled Clinical Factors: VCs to sequence, use of GB for lateral step in over shower threshold          Functional Mobility  Device: Rolling walker  Activity: To/From bathroom  Assistance Level: Stand by assist  Supine to Sit  Assistance Level: Modified independent  Skilled Clinical Factors: HOB slightly elevated, use of bedrail  Scooting  Assistance Level: Supervision  Sit to Stand  Assistance Level: Stand by assist  Stand to Sit  Assistance Level: Stand by assist         Assessment  Assessment  Assessment: Pt tolerated shower this date completing UB bathing with mod I and LB bathing with SBA. Pt completing LB dressing with SBA and + time.  Pt continues to be limited by decreased activity tolerance and generalized debility but is motivated. Pt functioning below baseline and benefits from ongoing skilled OT, cont per OT POC. Activity Tolerance: Patient tolerated treatment well;Patient limited by fatigue  Discharge Recommendations: Continue to assess pending progress;Home with Home health OT; Home with assist PRN  OT Equipment Recommendations  Equipment Needed: No  Other: continue to assess  Safety Devices  Safety Devices in place: Yes  Type of devices: Left in chair;Chair alarm in place;Call light within reach;Nurse notified    Patient Education  Education  Education Given To: Patient  Education Provided: Role of Therapy;Plan of Care;Transfer Training;ADL Function; Safety; Mobility Training  Education Method: Verbal  Barriers to Learning: None  Education Outcome: Verbalized understanding;Continued education needed    Second Session: Pt sitting in bedside recliner upon arrival, pleasant and agreeable to OT session requesting to use toilet. STS from recliner to RW with SBA. Functional mobility to/from bathroom using RW with SBA. Ambulatory toilet transfer onto [facility] placed over toilet with SBA completing LB clothing mgmt on/off hips in stance without UE support and SBA. Pt continent of bowel and bladder req ++ time to have BM. Perihygiene seated i'ly. Hand and oral hygiene in stance at sink with SBA. While seated in recliner pt completed the following BUE therex using orange theraband in order to improve activity tolerance for improved ADL/IADL performance: x 10 elbow flexion, x 10 sh h abduction, x 10 fwd chest punches, x 8 sh horizontal diagonals. Pt demo decreased ROM during sh diagonals with LUE due to baseline injury. Pt able to i'ly recall 2 of the exercises, will continue to assess carryover for increased independence to complete at d/c. Pt left in bedside recliner at end of session with chair alarm engaged, call light within reach and all needs met.       Plan  Occupational Therapy Plan  Times Per Week: 75 minutes/day x 5 days/week  Current Treatment Recommendations: Strengthening;ROM;Cognitive reorientation;Balance training;Pain management;Self-Care / ADL; Functional mobility training; Safety education & training;Home management training; Endurance training;Neuromuscular re-education;Positioning;Return to work related activity; Patient/Caregiver education & training;Equipment evaluation, education, & procurement    Goals  Patient Goals   Patient goals : \"to regain my balance and strength\"  Short Term Goals  Time Frame for Short Term Goals: 10 days - all goals ongoing  Short Term Goal 1: Pt will complete LB dressing using AE PRN with mod I  Short Term Goal 2: Pt will complete bathing with mod I  Short Term Goal 3: Pt will complete toileting and toilet transfer with mod I  Short Term Goal 4: Pt will be independent with UE HEP to improve activity tolerance  Short Term Goal 5: Pt will tolerate 8 minutes in stance to improve independence with IADLs    AM-PAC Score               Therapy Time   Individual Second Session Group Co-treatment   Time In 0830  1400       Time Out 0930  1430       Minutes 60  30       Timed Code Treatment Minutes: 60 Minutes+30  Total Treatment Time: 90 Minutes       Chuy Win OT

## 2022-12-29 NOTE — PROGRESS NOTES
Physical Therapy  Facility/Department: [facility] ACUTE REHAB UNIT  Rehabilitation Physical Therapy Treatment Note    NAME: Ekta Law  : 1955 (79 y.o.)  MRN: [id number]  CODE STATUS: Full Code    Date of Service: 22    Restrictions:  Restrictions/Precautions: Fall Risk;Seizure  Position Activity Restriction  Other position/activity restrictions: Ambulate the Patient, Up with assistance, up in the chair     SUBJECTIVE  Subjective  Subjective: Pt seated in recliner upon approach and agreeable to PT  Pain: Denies pain    OBJECTIVE  Cognition  Overall Cognitive Status: WFL  Orientation  Overall Orientation Status: Within Functional Limits    Functional Mobility  Balance  Sitting Balance: Supervision (seated on commode)  Standing Balance: Stand by assistance (SBA for brief/pants management without UE support)  Transfers  Surface: Raised toilet Seat; To chair with arms; Wheelchair;From chair with arms  Additional Factors: Increased time to complete  Device: Walker  Sit to Stand  Assistance Level: Stand by assist  Stand to Sit  Assistance Level: Stand by assist      Environmental Mobility  Ambulation  Surface: Level surface  Device: Rolling walker  Distance: 15', 165' x 2  Activity: Within Room; Within Unit  Additional Factors: Increased time to complete;Verbal cues (verbal cues for upright posture and increased step ehight/length)  Assistance Level: Stand by assist;Contact guard assist (CGA>>SBA)  Gait Deviations: Slow darlene;Decreased step length bilateral;Decreased heel strike right;Decreased heel strike left  Stairs  Stair Height: 6''  Device: Bilateral handrails  Number of Stairs: 3  Additional Factors: Non-reciprocal going up;Non-reciprocal going down  Assistance Level: Minimal assistance; Moderate assistance;Contact guard assist (min-mod assist on ascent of steps, CGA on descent)  Skilled Clinical Factors: VC for upriht posture and controlled descent    Second Session:   Pt seated in recliner upon approach and agreeable to PT. Pt completes sit<>stands throughout session at recliner/raised toilet seat/ chair with arms with SBA. While in bathroom for unsucessful BM attempt pt requires same assist levels as above for seated/standing balance/ pants management. Pt completes 165' x 2 + 15' amb with RW, SBA and same gait mechanics/cues as above with exception off cues for scapular depression/retraction. Pt completes x 10 OH raise and B trunk rotation with physioball- CGA throughotu including periods of increased postural sway. Pt completes 2 x ~ 1 min stance on airex while attempting to complete max time without UE support on RW: first round completed with CAG-min and 18 seconds max before requiring UE righting reaction; second round requiring CGA throughout and reaching 32 seconds before requiring UE righting reaction. Pt limited by fear of falling with encouragement required to attempt balance activities. Upon completion of session pt left seated in recliner with needs in reach and chair alarm on. ASSESSMENT/PROGRESS TOWARDS GOALS  Assessment  Assessment: Pt demonstrates increased endurance through making walk to and from gym without seated rest breaks. Reports getting a good sleep last night and taking a shower this morning helped to boost her energy levels. Pt requires CGA-mod for ascent/descent of 3, 6\" steps with B HR  however pt reports her steps at home are shorter steps. Plan to attempt 4\" steps tomorrow. Pt will benefit from continued skilled PT in order to progress towards PLOF and independence. Activity Tolerance: Patient tolerated treatment well;Patient limited by fatigue  Discharge Recommendations: 24 hour supervision or assist;Home with Home health PT  PT Equipment Recommendations  Equipment Needed: No    Goals  Patient Goals   Patient Goals :  To Return to independence  Short Term Goals  Time Frame for Short Term Goals: 10 days - ongoing  Short Term Goal 1: Patient will perform all bed mobilty MOD I and no use of the handrails with HOB flat  Short Term Goal 2: Patient will perform sit<>stand transfers Mod I with RW  Short Term Goal 3: Patient will ambulate >150ft with RW Mod I  Short Term Goal 4: Patient will be able to ascend/descend 4 steps with one handrail Mod I  Short Term Goal 5: Patient will be able to  objects off the floor Mod I with RW. PLAN OF CARE/SAFETY  Physcial Therapy Plan  Days Per Week: 5 Days  Hours Per Day:  (75 min)  Therapy Duration: 10 Days  Current Treatment Recommendations: Strengthening; Functional mobility training;Transfer training;Gait training; Endurance training; Safety education & training; Therapeutic activities;Balance training;Neuromuscular re-education;Patient/Caregiver education & training;Equipment evaluation, education, & procurement;Home exercise program  Safety Devices  Type of Devices: Call light within reach; Chair alarm in place; Left in chair;Nurse notified; All fall risk precautions in place  Restraints  Restraints Initially in Place: No    EDUCATION  Education  Education Given To: Patient  Education Provided: Equipment;Transfer Training;Plan of Care; Mobility Training;Precautions; Safety; Energy Conservation; Fall Prevention Strategies  Education Method: Demonstration;Verbal  Barriers to Learning: Vision; Hearing  Education Outcome: Verbalized understanding        Therapy Time   Individual Concurrent Group Co-treatment   Time In 1000         Time Out 1030         Minutes 30           Second Session Therapy Time:   Individual Concurrent Group Co-treatment   Time In 1115         Time Out 1200         Minutes 45           Timed Code Treatment Minutes:  75    Total Treatment Minutes:   [address] [facility] [address], [place] [address]

## 2022-12-29 NOTE — PROGRESS NOTES
Shift assessment complete. VSS. A&Ox4. Denies pain at this time. Fall precautions in place. Patient up to chair for lunch, chair alarm utilized, call light within reach. Patient resting in chair, awaiting therapy. No new complaints at this time.      Vitals:    12/29/22 0835   BP: 134/80   Pulse: 71   Resp: 16   Temp: 97.5 °F (36.4 °C)   SpO2: 96%

## 2022-12-29 NOTE — PROGRESS NOTES
Comprehensive Nutrition Assessment    RECOMMENDATIONS:  PO Diet: Dysphagia- Soft and Bite Sized  ONS: n/a  Nutrition Education: No recommendation at this time       NUTRITION ASSESSMENT:   Nutritional summary & status: Follow up. Pt is on a Dysphagia-Soft & Bite Sized Diet with goal for diet advancement per SLP. Meal intake varied at 51-75% and 76-00% with 76%=/>avg which should meet estimated nutrition needs. Attempted to visit, however, other staff in room. Pt declined ONS on previous RD visit. Glu remains elevated, however, trending down. Question  lbs, down 65 lbs from previous wt of 342 lbs(12/22). Doubt actual wt loss based on intake. Will continue to monitor and if intake consistently 50% or less, will re-approach regarding ONS. Admission/PMH: Admit: Dysphagia - Soft & Bite Sized. PMHx: arthritis, asthma, brain tumor, DM    MALNUTRITION ASSESSMENT  Context of Malnutrition: Acute Illness   Malnutrition Status: No malnutrition  Findings of the 6 clinical characteristics of malnutrition (Minimum of 2 out of 6 clinical characteristics is required to make the diagnosis of moderate or severe Protein Calorie Malnutrition based on AND/ASPEN Guidelines):  Energy Intake:  No significant decrease in energy intake  Weight Loss:  No significant weight loss     Body Fat Loss:  No significant body fat loss     Muscle Mass Loss:  No significant muscle mass loss    Fluid Accumulation:  Mild     Strength:  Not Performed    NUTRITION DIAGNOSIS   No nutrition diagnosis at this time   Nutrition Monitoring and Evaluation:   Food/Nutrient Intake Outcomes:  Food and Nutrient Intake  Physical Signs/Symptoms Outcomes:  Chewing or Swallowing, Biochemical Data, Weight     OBJECTIVE DATA: Significant to nutrition assessment  Nutrition Related Findings: LBM12/28. Glu 132. Lytes wnl. RLE non pitting edema. LLE trace edema.  Meds reviewed; bisacodyl, insulin, KCL  Wounds: Surgical Incision  Nutrition Goals: PO intake 75% or greater, prior to discharge     [facility] DIET; Dysphagia - Soft and Bite Sized  PO Intake: %, 51-75%   PO Supplement Intake:None Ordered  Additional Sources of Calories/IVF:N/A     ANTHROPOMETRICS  Current Height: 5' 5\" (165.1 cm)  Current Weight: 277 lb 9 oz (125.9 kg)    Admission weight: (!) 342 lb (155.1 kg)  Ideal Body Weight (IBW): 125 lbs  (57 kg)    Usual Bodyweight     BMI: 46.3    COMPARATIVE STANDARDS  Energy (kcal):  0840-2368 (8-11 kcal/admit wt/155 kg)     Protein (g):  113 -119 (gm/2-2.1 gm/IBW)       Fluid (mL/day):  2422-8724 (1ml/kcal) or per provider    The patient will be monitored per nutrition standards of care. Consult dietitian if additional nutrition interventions are needed prior to RD reassessment.      Ismael Angel, [facility]:  [phone]  Office:  [phone]

## 2022-12-29 NOTE — PLAN OF CARE
Problem: Discharge Planning  Goal: Discharge to home or other facility with appropriate resources  12/28/2022 1941 by Chris Caballero  Outcome: Progressing  12/28/2022 1347 by Kaye Barclay RN  Outcome: Progressing     Problem: Skin/Tissue Integrity  Goal: Absence of new skin breakdown  Description: 1. Monitor for areas of redness and/or skin breakdown  2. Assess vascular access sites hourly  3. Every 4-6 hours minimum:  Change oxygen saturation probe site  4. Every 4-6 hours:  If on nasal continuous positive airway pressure, respiratory therapy assess nares and determine need for appliance change or resting period.   12/28/2022 1941 by Chris Caballero  Outcome: Progressing  12/28/2022 1347 by Kaye Barclay RN  Outcome: Progressing     Problem: Pain  Goal: Verbalizes/displays adequate comfort level or baseline comfort level  12/28/2022 1941 by Chris Caballero  Outcome: Progressing  12/28/2022 1347 by Kaye Barclay RN  Outcome: Progressing     Problem: Safety - Adult  Goal: Free from fall injury  12/28/2022 1347 by Kaye Barclay RN  Outcome: Progressing     Problem: Chronic Conditions and Co-morbidities  Goal: Patient's chronic conditions and co-morbidity symptoms are monitored and maintained or improved  12/28/2022 1347 by Kaye Barclay RN  Outcome: Progressing

## 2022-12-29 NOTE — PROGRESS NOTES
Department of Physical Medicine & Rehabilitation  Progress Note    Patient Identification:  Sarah Graham  [id number]  : 1955  Admit date: 2022    Chief Complaint: S/P excision of acoustic neuroma    Subjective:   Feels well. No new complaints overnight. She will be seen by Nsgy before the weekend. Slept well last night. She is worried about her discharge home. Seen in her room this morning. ROS: No f/c, n/v, cp     Objective:  Patient Vitals for the past 24 hrs:   BP Temp Temp src Pulse Resp SpO2   22 1945 118/71 98.1 °F (36.7 °C) Oral 76 16 97 %   22 1128 132/74 98 °F (36.7 °C) Oral 71 16 98 %       Const: Alert. No distress, pleasant. HEENT: right sided swelling- wound care  CV: Regular rate and rhythm. Resp: No respiratory distress. Lungs CTAB. Abd: Soft, nontender, nondistended, NABS+   Ext: No edema. Neuro: Alert, oriented, appropriately interactive. Psych: Cooperative, appropriate mood and affect    Laboratory data: Available via EMR.    Last 24 hour lab  Recent Results (from the past 24 hour(s))   POCT Glucose    Collection Time: 22  8:17 AM   Result Value Ref Range    POC Glucose 140 (H) 70 - 99 mg/dl    Performed on ACCU-CHEK    POCT Glucose    Collection Time: 22 11:32 AM   Result Value Ref Range    POC Glucose 139 (H) 70 - 99 mg/dl    Performed on ACCU-CHEK    POCT Glucose    Collection Time: 22  4:51 PM   Result Value Ref Range    POC Glucose 128 (H) 70 - 99 mg/dl    Performed on ACCU-CHEK    POCT Glucose    Collection Time: 22  7:41 PM   Result Value Ref Range    POC Glucose 152 (H) 70 - 99 mg/dl    Performed on ACCU-CHEK    POCT Glucose    Collection Time: 22  7:32 AM   Result Value Ref Range    POC Glucose 141 (H) 70 - 99 mg/dl    Performed on ACCU-CHEK        Therapy progress:  PT  Position Activity Restriction  Other position/activity restrictions: Ambulate the Patient, Up with assistance, up in the chair  Objective     Sit to Stand: Minimal Assistance (To RW from [facility] and wheelchair)  Stand to Sit: Minimal Assistance  Device: Rolling Walker  Other Apparatus:  (wheelchair follow)  Assistance: Minimal assistance  Distance: 10ftx2+50ft  OT  PT Equipment Recommendations  Equipment Needed: No  Other: plan to continue to assess  Toilet - Technique: Ambulating  Equipment Used: Extra wide bedside commode  Toilet Transfers Comments: pt reported difficulty standing from standard low surface toilet; + time to transition BUEs to RW  Assessment        SLP          Body mass index is 46.19 kg/m².     Assessment and Plan:  Giant Duodenal Ulcer  -monitor Hgb  -Transfuse pRBCs  -Protonix 40 mg BID  -Embolization of GDA  -GI following  -IR following     BL PE, suspected  -currently on heparin gtt  - transition to xarelto     Meningioma  -s/p resection with TL and MCF approach  -Keppra 500 mg BID  -Bowel regiment: Senna, glycolax, and dulcolax  -Nicardipine-Roxicodone PRN  -Acycolvir 400 mg TID for 10 days  -Artifical tears q2H and eye patch  -Keep an eye on mastoid dressing  -SCDs for DVT prophylaxis   -PT/OT     HTN   -Coreg 12.5 mg BID  Losartan 100 mg daily     DM   -Lantus 15u qHS  -HDSSI     Hyperthyroidism   -Methimazole 10 mg daily     Liver Mass  -Incidental finding  -f/u imaging once acute problems have been tx      Bed issues- requires a bariatric mattress unfortunately     Rehab Progress: Improving  Anticipated Dispo: home  Services/DME: BRIANA  ELOS: 1/1        KAYLEY Pearl.P.H  PM&R  12/29/2022  7:49 AM

## 2022-12-29 NOTE — PROGRESS NOTES
ACUTE REHAB UNIT  SPEECH/LANGUAGE PATHOLOGY      [x] Daily  [] Weekly Care Conference Note  [] Discharge    Patient:Bianca Conroy      OTZ:[birth year]  OBD:[id number]  Rehab Dx/Hx: S/P excision of acoustic neuroma [Z98.890, J61.353]    Precautions: [x] Aspiration  [x] Fall risk  [] Sternal  [] Seizure [] Hip  [] Weight Bearing [] Other  ST Dx: [] Aphasia  [x] Dysarthria  [] Apraxia   [x] Oropharyngeal dysphagia [x] Cognitive Impairment  [] Other:   Date of Admit: 12/22/2022  Room #: 3101/3101-01  Date: 12/29/2022          Current Diet Order:ADULT DIET; Dysphagia - Soft and Bite Sized   Recommended Form of Meds: PO  Compensatory Swallowing Strategies : Alternate solids and liquids, Eat/Feed slowly, Upright as possible for all oral intake, Check for pocketing of food on the Right, Lingual sweep, Small bites/sips      MBSS 12/1/22:  Oral Phase  Moderate dysfunction characterized by right-sided oral weakness with reduced labial seal and coordination, resulting in anterior loss of liquid via tsp and inability to use straw when placed on right or midline. For left-sided straw placement, pt able to create appropriate seal/suction. Prolonged mastication of soft solids, with slowed a-p transit, mild stasis. Pharyngeal Phase  Grossly WFL. Overall timely swallow initiation with appropriate hyolaryngeal mechanics. Single instance trace flash (self clearing) penetration of thins when straw was placed on right side. Otherwise good airway protection throughout with no aspiration. No significant stasis.   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 Date: 12/29/2022     Tx session 1   Total Timed Code Min 10   Total Treatment Minutes 30   Individual Treatment Minutes 30   Group Treatment Minutes 0   Co-Treat Minutes 0   Brief Exception: N/A   Pain None indicated at this time. Pain Intervention: [] RN notified  [] Repositioned  [] Intervention offered and patient declined  [x] N/A  [] Other:   Subjective:     Pt seated in chair upon entry and agreeable to tx session. RN at side administering medications. Pleasant and participatory throughout session. Objective / Goals:    Pt will tolerate trials of increased texture in order to advance to least restrictive diet consistency. Pt seen with SLP trial of regular solid texture. Pt demonstrated adequate awareness of residue on R side and independently identified use of lingual sweep or liquid wash to assist with clearing. Demonstrated adequate mastication, however, masticated only on L side due to reduced sensation on R side. Patient will independently demonstrate understanding of swallowing concerns, compensatory strategies, and  Pt endorses desire to advance diet level and discussed completing regular solid trial trays. Identified various items to trial during meal time tomorrow. Pt will maintain sustained attention to graded task with min cues. Pt with adequate sustained attention throughout session. Pt will accurately complete higher level executive functioning tasks with min cues. Pt completed checkbook register with 100% accuracy independently. Demonstrated self-monitoring skills and able to independently self-correct. Pt was able to complete all mathematical calculation using mental math with 100% accuracy independently. Pt with adequate organization and attention to details.       Patient will participate in ongoing assessment of cognitive-linguistic skills with additional goals to be established as appropriate. Goal not targeted this session. Other areas targeted:    Education:   Pt educated to regular solid trial trays and recommendation for discontinuing cognitive tx, but continuing with dysphagia tx for diet advancement. Safety Devices: [x] Call light within reach  [x] Chair alarm activated and connected to nurse call light system  [] Bed alarm activated   [x] Other: reinforced use of call light. Assessment: Pt with resolved cognitive-linguistic impairment. Pt now would like diet advancement, so recommend regular solid trials at meal time. Plan: Continue as per POC of 30 minutes for dysphagia only.      Interventions used this date:  [] Speech/Language Treatment  [] Instruction in HEP  [x] Dysphagia Treatment [x] Cognitive Treatment   [] Other:    Discharge recommendations:  [x] Home independently  [] Home with assistance []  24 hour supervision  [] ECF [] Other  Continued Tx Upon Discharge: ? [x] Yes    [] No    [] TBD based on progress while on ARU     [] Vital Stim indicated     [] Other:   Estimated discharge date: 01/01/2023    Electronically signed by:  Joanne Alvarez, [address]  Speech-Language Pathologist

## 2022-12-30 LAB
ANION GAP SERPL CALCULATED.3IONS-SCNC: 8 MMOL/L (ref 3–16)
BASOPHILS ABSOLUTE: 0 K/UL (ref 0–0.2)
BASOPHILS RELATIVE PERCENT: 0.4 %
BUN BLDV-MCNC: 14 MG/DL (ref 7–20)
CALCIUM SERPL-MCNC: 9.1 MG/DL (ref 8.3–10.6)
CHLORIDE BLD-SCNC: 106 MMOL/L (ref 99–110)
CO2: 26 MMOL/L (ref 21–32)
CREAT SERPL-MCNC: <0.5 MG/DL (ref 0.6–1.2)
EOSINOPHILS ABSOLUTE: 0.3 K/UL (ref 0–0.6)
EOSINOPHILS RELATIVE PERCENT: 10 %
GFR SERPL CREATININE-BSD FRML MDRD: >60 ML/MIN/{1.73_M2}
GLUCOSE BLD-MCNC: 132 MG/DL (ref 70–99)
GLUCOSE BLD-MCNC: 136 MG/DL (ref 70–99)
GLUCOSE BLD-MCNC: 146 MG/DL (ref 70–99)
GLUCOSE BLD-MCNC: 149 MG/DL (ref 70–99)
GLUCOSE BLD-MCNC: 153 MG/DL (ref 70–99)
HCT VFR BLD CALC: 29.2 % (ref 36–48)
HEMOGLOBIN: 9.4 G/DL (ref 12–16)
LYMPHOCYTES ABSOLUTE: 0.8 K/UL (ref 1–5.1)
LYMPHOCYTES RELATIVE PERCENT: 29 %
MCH RBC QN AUTO: 30.2 PG (ref 26–34)
MCHC RBC AUTO-ENTMCNC: 32.1 G/DL (ref 31–36)
MCV RBC AUTO: 93.8 FL (ref 80–100)
MONOCYTES ABSOLUTE: 0.3 K/UL (ref 0–1.3)
MONOCYTES RELATIVE PERCENT: 11.5 %
NEUTROPHILS ABSOLUTE: 1.3 K/UL (ref 1.7–7.7)
NEUTROPHILS RELATIVE PERCENT: 49.1 %
PDW BLD-RTO: 18.3 % (ref 12.4–15.4)
PERFORMED ON: ABNORMAL
PLATELET # BLD: 178 K/UL (ref 135–450)
PMV BLD AUTO: 8 FL (ref 5–10.5)
POTASSIUM REFLEX MAGNESIUM: 3.8 MMOL/L (ref 3.5–5.1)
RBC # BLD: 3.11 M/UL (ref 4–5.2)
SODIUM BLD-SCNC: 140 MMOL/L (ref 136–145)
WBC # BLD: 2.7 K/UL (ref 4–11)

## 2022-12-30 PROCEDURE — 97116 GAIT TRAINING THERAPY: CPT

## 2022-12-30 PROCEDURE — 6360000002 HC RX W HCPCS: Performed by: PHYSICAL MEDICINE & REHABILITATION

## 2022-12-30 PROCEDURE — 97530 THERAPEUTIC ACTIVITIES: CPT

## 2022-12-30 PROCEDURE — 92526 ORAL FUNCTION THERAPY: CPT

## 2022-12-30 PROCEDURE — C9113 INJ PANTOPRAZOLE SODIUM, VIA: HCPCS | Performed by: PHYSICAL MEDICINE & REHABILITATION

## 2022-12-30 PROCEDURE — 80048 BASIC METABOLIC PNL TOTAL CA: CPT

## 2022-12-30 PROCEDURE — 2580000003 HC RX 258: Performed by: PHYSICAL MEDICINE & REHABILITATION

## 2022-12-30 PROCEDURE — 1280000000 HC REHAB R&B

## 2022-12-30 PROCEDURE — 6370000000 HC RX 637 (ALT 250 FOR IP): Performed by: PHYSICAL MEDICINE & REHABILITATION

## 2022-12-30 PROCEDURE — 85025 COMPLETE CBC W/AUTO DIFF WBC: CPT

## 2022-12-30 PROCEDURE — 36592 COLLECT BLOOD FROM PICC: CPT

## 2022-12-30 RX ORDER — PANTOPRAZOLE SODIUM 40 MG/1
40 TABLET, DELAYED RELEASE ORAL
Status: DISCONTINUED | OUTPATIENT
Start: 2022-12-30 | End: 2023-01-01 | Stop reason: HOSPADM

## 2022-12-30 RX ADMIN — SODIUM CHLORIDE, PRESERVATIVE FREE 10 ML: 5 INJECTION INTRAVENOUS at 09:23

## 2022-12-30 RX ADMIN — PANTOPRAZOLE SODIUM 40 MG: 40 INJECTION, POWDER, LYOPHILIZED, FOR SOLUTION INTRAVENOUS at 09:24

## 2022-12-30 RX ADMIN — METHIMAZOLE 10 MG: 5 TABLET ORAL at 09:25

## 2022-12-30 RX ADMIN — INSULIN GLARGINE 15 UNITS: 100 INJECTION, SOLUTION SUBCUTANEOUS at 21:21

## 2022-12-30 RX ADMIN — Medication 4.5 MG: at 21:18

## 2022-12-30 RX ADMIN — PANTOPRAZOLE SODIUM 40 MG: 40 TABLET, DELAYED RELEASE ORAL at 18:40

## 2022-12-30 RX ADMIN — SODIUM CHLORIDE, PRESERVATIVE FREE 10 ML: 5 INJECTION INTRAVENOUS at 09:24

## 2022-12-30 RX ADMIN — RIVAROXABAN 15 MG: 15 TABLET, FILM COATED ORAL at 09:25

## 2022-12-30 RX ADMIN — CETIRIZINE HYDROCHLORIDE 10 MG: 10 TABLET, FILM COATED ORAL at 09:24

## 2022-12-30 RX ADMIN — RIVAROXABAN 15 MG: 15 TABLET, FILM COATED ORAL at 17:22

## 2022-12-30 RX ADMIN — SODIUM CHLORIDE, PRESERVATIVE FREE 10 ML: 5 INJECTION INTRAVENOUS at 21:19

## 2022-12-30 RX ADMIN — LEVETIRACETAM 500 MG: 500 TABLET, FILM COATED ORAL at 21:18

## 2022-12-30 RX ADMIN — LATANOPROST 1 DROP: 50 SOLUTION OPHTHALMIC at 21:18

## 2022-12-30 RX ADMIN — LEVETIRACETAM 500 MG: 500 TABLET, FILM COATED ORAL at 09:24

## 2022-12-30 RX ADMIN — POTASSIUM CHLORIDE 20 MEQ: 20 TABLET, EXTENDED RELEASE ORAL at 17:22

## 2022-12-30 RX ADMIN — POTASSIUM CHLORIDE 20 MEQ: 20 TABLET, EXTENDED RELEASE ORAL at 09:24

## 2022-12-30 ASSESSMENT — PAIN DESCRIPTION - ORIENTATION
ORIENTATION: LEFT
ORIENTATION: LEFT

## 2022-12-30 ASSESSMENT — PAIN SCALES - GENERAL
PAINLEVEL_OUTOF10: 4
PAINLEVEL_OUTOF10: 0
PAINLEVEL_OUTOF10: 0
PAINLEVEL_OUTOF10: 8
PAINLEVEL_OUTOF10: 0

## 2022-12-30 ASSESSMENT — PAIN DESCRIPTION - DESCRIPTORS
DESCRIPTORS: ACHING
DESCRIPTORS: ACHING

## 2022-12-30 ASSESSMENT — PAIN - FUNCTIONAL ASSESSMENT: PAIN_FUNCTIONAL_ASSESSMENT: ACTIVITIES ARE NOT PREVENTED

## 2022-12-30 ASSESSMENT — PAIN DESCRIPTION - LOCATION
LOCATION: ANKLE
LOCATION: ANKLE

## 2022-12-30 NOTE — PLAN OF CARE
Problem: Discharge Planning  Goal: Discharge to home or other facility with appropriate resources  12/29/2022 2033 by Steve Flores RN  Outcome: Progressing     Problem: Skin/Tissue Integrity  Goal: Absence of new skin breakdown  Description: 1. Monitor for areas of redness and/or skin breakdown  2. Assess vascular access sites hourly  3. Every 4-6 hours minimum:  Change oxygen saturation probe site  4. Every 4-6 hours:  If on nasal continuous positive airway pressure, respiratory therapy assess nares and determine need for appliance change or resting period.   12/29/2022 2033 by Steve Flores RN  Outcome: Progressing     Problem: Pain  Goal: Verbalizes/displays adequate comfort level or baseline comfort level  12/29/2022 2033 by Steve Flores RN  Outcome: Progressing     Problem: Safety - Adult  Goal: Free from fall injury  12/29/2022 2033 by Steve Flores RN  Outcome: Progressing     Problem: Chronic Conditions and Co-morbidities  Goal: Patient's chronic conditions and co-morbidity symptoms are monitored and maintained or improved  12/29/2022 2033 by Steve Flores RN  Outcome: Progressing     Problem: Nutrition Deficit:  Goal: Optimize nutritional status  Outcome: Progressing  Flowsheets (Taken 12/29/2022 1251 by Susannah Bee RD)  Nutrient intake appropriate for improving, restoring, or maintaining nutritional needs:   Assess nutritional status and recommend course of action   Monitor oral intake, labs, and treatment plans   Recommend appropriate diets, oral nutritional supplements, and vitamin/mineral supplements

## 2022-12-30 NOTE — PROGRESS NOTES
Notification of IV to PO Conversion     IV To Po Conversion:   Will convert Pantoprazole 40mg IV BID to Pantoprazole 40mg PO BID based on [address] IV to PO policy (see below) as H/H is stable with no active bleeding per notes.      Please call with questions--  Thanks--  Homer Lord, PharmD, BCPS, BCGP  [id number] ([facility])   12/30/2022 11:33 AM      Criteria for conversion from IV to PO therapy per [address] IV to PO Protocol:   Patients should meet all of the following inclusion criteria and none of the exclusion criteria    Criteria to initiate medication route switch (Inclusion Criteria)  IV therapy for > 24 hours (antibiotics only)  Tolerating diet more advanced than clear liquids  Tolerating oral (PO) medications  Does not require vasopressor therapy for blood pressure support  No seizures for 72hrs (antiepileptic medications only)      Criteria indicating that the patient is NOT a candidate for IV to PO conversion (Exclusion Criteria)  Infections requiring IV therapy (ie: meningitis, endocarditis, osteomyelitis, pancreatitis)  Nausea and/or vomiting or severe diarrhea within past 24 hours  Has gastrectomy, ileus, gastric outlet or bowel obstruction, or malabsorption syndromes  Has significant, painful oral ulceration  TPN with an NPO order  Active GI bleed  Unable to swallow  Nothing by Mouth (NPO) status  Febrile in the last 24 hours- (antibiotics only)  Clinical deteriorating or unstable - (antibiotics only)  Pediatric patients and patients who are not euthyroid (not on oral levothyroxine/not stabilized on oral levothyroxine) - (Levothyroxine only)  Patients being treated for myxedema coma or during the organ donation process - (Levothyroxine only)    Other notes-   Patients may be given suppositories when available for the product being ordered if no contraindications exist (ie: rectal surgery or infection)   Oral solutions/suspensions may be considered for patients with a functioning enteral tube not on continuous suction (if medication is available in this formulation)

## 2022-12-30 NOTE — PROGRESS NOTES
ACUTE REHAB UNIT  SPEECH/LANGUAGE PATHOLOGY      [x] Daily  [] Weekly Care Conference Note  [] Discharge    Patient:Bianca Ayala      ARNULFO:[birth year]  ZYS:[id number]  Rehab Dx/Hx: S/P excision of acoustic neuroma [Z98.890, C52.700]    Precautions: [x] Aspiration  [x] Fall risk  [] Sternal  [] Seizure [] Hip  [] Weight Bearing [] Other  ST Dx: [] Aphasia  [x] Dysarthria  [] Apraxia   [x] Oropharyngeal dysphagia [x] Cognitive Impairment  [] Other:   Date of Admit: 12/22/2022  Room #: 3101/3101-01  Date: 12/30/2022          Current Diet Order:ADULT DIET; Dysphagia - Soft and Bite Sized   Recommended Form of Meds: PO  Compensatory Swallowing Strategies : Alternate solids and liquids, Eat/Feed slowly, Upright as possible for all oral intake, Check for pocketing of food on the Right, Lingual sweep, Small bites/sips      MBSS 12/1/22:  Oral Phase  Moderate dysfunction characterized by right-sided oral weakness with reduced labial seal and coordination, resulting in anterior loss of liquid via tsp and inability to use straw when placed on right or midline. For left-sided straw placement, pt able to create appropriate seal/suction. Prolonged mastication of soft solids, with slowed a-p transit, mild stasis. Pharyngeal Phase  Grossly WFL. Overall timely swallow initiation with appropriate hyolaryngeal mechanics. Single instance trace flash (self clearing) penetration of thins when straw was placed on right side. Otherwise good airway protection throughout with no aspiration. No significant stasis.   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 As of 12/28, pt now requesting to advance diet level    Date: 12/30/2022      Tx session 1 Tx session 2   Total Timed Code Min 0 0   Total Treatment Minutes 24 25   Individual Treatment Minutes 24 25   Group Treatment Minutes 0 0   Co-Treat Minutes 0 0   Brief Exception: N/A N/A   Pain None indicated at this time. None indicated   Pain Intervention: [] RN notified  [] Repositioned  [] Intervention offered and patient declined  [x] N/A  [] Other: [] RN notified  [] Repositioned  [] Intervention offered and patient declined  [x] N/A  [] Other:   Subjective:     Pt seated in chair upon entry and agreeable to tx session. Seen with meal tray and SLP trial tray. Pt seated in chair upon entry and agreeable to tx session. Pt seen with SLP trial tray. Objective / Goals:     Pt will tolerate trials of increased texture in order to advance to least restrictive diet consistency. Pt seen with trial corky consisting of regular solids. Pt reported single instance of mild amount of pocketing on R side. No overt s/s associated with aspiration observed. This includes pt's tray consisting of soft and bite sized solids and thin liquids as well. Pt seen with SLP trial tray consisting of regular solids. Pt with no overt s/s associated with aspiration with any trial. Pt endorsed difficulty masticating part of the trial (bread on a sandwich) and independently identified x2 possible solutions. Despite pt's endorsement of increased difficulty with mastication, pt with no pocketing and independently implemented all swallowing strategies. Pt demonstrated adequate awareness of swallowing concerns and strategies evidenced by statement \"With the sandwich, I'm not able to see the actual bite size, so I'm taking bigger bites than I normally would. \" Independent use for lingual sweep occasionally and liquid wash. Pt with x2 instances of mild anterior loss with regular solids. Patient will independently demonstrate understanding of swallowing concerns, compensatory strategies, and  Pt independently implemented all swallow strategies this session and demo'd adequate awareness of rationale and possible pocketing. Pt independently took small bites/sips, placed bolus on L side of oral cavity, implemented a lingual sweep when necessary as well as a liquid wash. Despite pt with mastication only on L side of oral cavity, it was timely. See above. Other areas targeted:     Education:   Pt educated to rationale for tx session. Pt educated to rationale for tx session and SLP recommendations for diet texture level. Pt agreeable to Dysphagia Easy to Chew solids. Safety Devices: [x] Call light within reach  [x] Chair alarm activated and connected to nurse call light system  [] Bed alarm activated   [x] Other: reinforced use of call light. [x] Call light within reach  [x] Chair alarm activated and connected to nurse call light system  [] Bed alarm activated   [x] Other: reinforced use of call light. Assessment: Pt with resolved cognitive-linguistic impairment. Pt with adequate awareness of rationale for swallowing strategies and recommendations, as well as ability to independently implement strategies throughout meal time. Recommend easy to chew solids. Plan: Continue as per POC. Advance to easy to chew solids.        Interventions used this date:  [] Speech/Language Treatment  [] Instruction in HEP  [x] Dysphagia Treatment [] Cognitive Treatment   [] Other:    Discharge recommendations:  [x] Home independently  [] Home with assistance []  24 hour supervision  [] ECF [] Other  Continued Tx Upon Discharge: ? [x] Yes    [] No    [] TBD based on progress while on ARU     [] Vital Stim indicated     [] Other:   Estimated discharge date: 01/01/2023    Electronically signed by:  Rosa Gonzalez [place], [facility]  Speech-Language Pathologist

## 2022-12-30 NOTE — CARE COORDINATION
Case Management Assessment            Discharge Note                    Date / Time of Note: 12/30/2022 2:48 PM                  Discharge Note Completed by: VIPIN Valderrama    Patient Name: Darwin Ventura   YOB: 1955  Diagnosis: S/P excision of acoustic neuroma [Z98.890, W01.677]   Date / Time: 12/22/2022  6:31 PM    Current PCP: [address] [place] patient: No    Hospitalization in the last 30 days: No    Advance Directives:  Code Status: Full Code  [place] DNR form completed and on chart: No    Financial:  Payor: MEDICARE / Plan: MEDICARE PART A AND B / Product Type: *No Product type* /      Pharmacy:    [address] [facility]  [phone] [place] [phone]  [address] [place] [address] [address]  Phone: [phone] Fax: [address] [phone] - F [phone]  [address] [facility] [address]  Phone: [phone] Fax: [phone]      Assistance purchasing medications?: Potential Assistance Purchasing Medications: No  Assistance provided by Case Management: None at this time    Does patient want to participate in local refill/ meds to beds program?:      Meds To Beds General Rules:  1. Can ONLY be done Monday- Friday between 8:30am-5pm  2. Prescription(s) must be in pharmacy by 3pm to be filled same day  3. Copy of patient's insurance/ prescription drug card and patient face sheet must be sent along with the prescription(s)  4. Cost of Rx cannot be added to hospital bill. If financial assistance is needed, please contact unit  or ;  or  CANNOT provide pharmacy voucher for patients co-pays  5.  Patients can then  the prescription on their way out of the hospital at discharge, or pharmacy can deliver to the bedside if staff is available. (payment due at time of pick-up or delivery - cash, check, or card accepted)     Able to afford home medications/ co-pay costs: Yes    ADLS:  Current PT AM-PAC Score:   /24  Current OT AM-PAC Score:   /24      DISCHARGE Disposition: Home with [address] [place] [address] Way: [facility]     LOC at discharge: Not Applicable  [place] Completed: Yes    Notification completed in HENS/PAS?:  Not Applicable    IMM Completed:   Pt did not want to do IMM now as she feels she may need 1-2 more days on ARU and wants to discuss with Dr. Liam Glass in AM.     Transportation:  Transportation PLAN for discharge: family   Mode of Transport: [address]:  Nestor Hita ordered at discharge: Yes  [address]: Jareth Weinberg  Phone: [phone]  Fax: [phone]  Orders faxed: Yes by Marjorie Lowe Medical Equipment:  DME Provider:   Equipment obtained during hospitalization: none    Home Oxygen and Respiratory Equipment:  Oxygen needed at discharge?: No  Home [address]: Not Applicable  Portable tank available for discharge?: No    Dialysis:  Dialysis patient: No    Additional CM Notes:   SW met with Pt at bedside and discussed DC date again for 1/1/23. Pt states, \"I think I am ready and PT/OT says I am but I think I might need until Monday or Tuesday. \" Pt to further discuss with Dr. Liam Glass in AM. SW explained that skilled 1 [address] Yhat is arranged with [facility]. Pt states her sister can provide transport and stay with her until Tuesday evening because she then works on Wednesday morning. Emotional support provided. The Plan for Transition of Care is related to the following treatment goals of S/P excision of acoustic neuroma [Z98.890, J60.287]    The Patient and/or patient representative Ty Hope and her family were provided with a choice of provider and agrees with the discharge plan Yes    Freedom of choice list was provided with basic dialogue that supports the patient's individualized plan of care/goals and shares the quality data associated with the providers. Yes    Care Transitions patient: No    Etelvina Cox [place] [id number]  Case Management Department  Ph: [phone]

## 2022-12-30 NOTE — PROGRESS NOTES
Patient's PICC line dressing detached in corner. Neuro contacted and stated Ok to remove since no longer on IV antibiotics. PICC line removed without any bleeding or complications.  Occlusive dressing in place

## 2022-12-30 NOTE — PROGRESS NOTES
Patient alert and oriented x4, calm and cooperative. Shift assessment done earlier as charted. PICC flushing. C/o pain in ankle, but refused pain medication or ice, educated on pain management, will continue to monitor. Spoke with Dr. Dillon Beasley protonix switched to PO.  PICC line dressing coming up in corner, neuro contacted to see if want PICC line removed, if not will replace dressing

## 2022-12-30 NOTE — PROGRESS NOTES
Physical Therapy  Facility/Department: [facility] ACUTE REHAB UNIT  Rehabilitation Physical Therapy Treatment Note    NAME: Sherine Vyas  : 1955 (79 y.o.)  MRN: [id number]  CODE STATUS: Full Code    Date of Service: 22    Restrictions:  Restrictions/Precautions: Fall Risk;Seizure  Position Activity Restriction  Other position/activity restrictions: Ambulate the Patient, Up with assistance, up in the chair     SUBJECTIVE  Subjective  Subjective: Pt seated in recliner upon approach and agreeable to PT  Pain: reports pain in ankle (not rated) - denies need for ice/ pain meds at this time, ankle brace donned for pain management during mobility    OBJECTIVE  Cognition  Overall Cognitive Status: WFL  Orientation  Overall Orientation Status: Within Functional Limits    Functional Mobility  Balance  Sitting Balance: Independent (seated upright/bending froward in recliner during donning socks/shoes/brace independently; seated on commode including B weight shifting for pericare)  Standing Balance: Stand by assistance (SBA for brief/pants management without UE support)  Standing Balance  Comments: extended time required seated on commode for BM  Transfers  Surface: Raised toilet Seat; To chair with arms; Wheelchair;From chair with arms  Additional Factors: Increased time to complete  Device: Walker  Sit to Stand  Assistance Level: Stand by assist  Stand to Sit  Assistance Level: Stand by assist      Environmental Mobility  Ambulation  Surface: Level surface  Device: Rolling walker  Distance: 165' x 2, 15'  Activity: Within Room; Within Unit  Additional Factors: Increased time to complete;Verbal cues (verbal cues for upright posture and scapular retraction/depression)  Assistance Level: Stand by assist;Supervision (SBA>>supervision)  Gait Deviations: Slow darlene;Decreased step length bilateral (forward flexed posture with elevated/forward shoulders)  Stairs  Stair Height: 4''  Device: Bilateral handrails  Number of Stairs: 4  Additional Factors: Non-reciprocal going up;Non-reciprocal going down  Assistance Level: Contact guard assist  Skilled Clinical Factors: VC for upriht posture and controlled descent, slow and effortful  Skilled Clinical Factors - Comments: educated on recommendation to have someone present when completing steps in/out of house    Second Session:   Pt seated in recliner upon approach and agreeable to PT. Pt completes 150' + 15' x 2 amb with supv, RW, and same gait mechanics/cues as above. Pt completes sit<>stands at recliner/commode with RW and supv except for SBA during x7 repeated sit<>stand at recliner. Pt completes seated balance on commode with indp and balance during brief/pants management with UL-no UE support and supervision. Pt completes ~1 min stand on airex including 30 seconds max without UE support and CGA throughout including periods of increased postural sway. Pt completes 2 x ~ 2 min on airex completion reaching outside of EDINSON and crossbody with BUEs and CGA with exception of 1  instance of min A during posterior LOB. Periodic UE righting reactions on RW during periods of increased postural sway/ LOB. Upon completion if session pt left seated in recliner with needs in reach and chair alarm on. Ice applied to ankle due to increased ankle pain with mobility (not rated). ASSESSMENT/PROGRESS TOWARDS GOALS  Assessment  Assessment: Pt continues to be able to make walk to/from gym with RW and SBA>supervision despite reporting increased fatigue compared to yesterday. Demosntrates ability to complete 4, 4\" steps with B HR and CGA. Educated on recommendation to compelte steps in/out of house with someone present. Pt would benefit from continued skilled PT inorder to progress towards prior level of function and independece.   Activity Tolerance: Patient tolerated treatment well;Patient limited by fatigue  Discharge Recommendations: 24 hour supervision or assist;Home with Home health PT (initial 24/7>> prn assist)  PT Equipment Recommendations  Equipment Needed: No    Goals  Patient Goals   Patient Goals : To Return to independence  Short Term Goals  Time Frame for Short Term Goals: 10 days - ongoing  Short Term Goal 1: Patient will perform all bed mobilty MOD I and no use of the handrails with HOB flat  Short Term Goal 2: Patient will perform sit<>stand transfers Mod I with RW  Short Term Goal 3: Patient will ambulate >150ft with RW Mod I  Short Term Goal 4: Patient will be able to ascend/descend 4 steps with one handrail Mod I  Short Term Goal 5: Patient will be able to  objects off the floor Mod I with RW. PLAN OF CARE/SAFETY  Physcial Therapy Plan  Days Per Week: 5 Days  Hours Per Day:  (75 min)  Therapy Duration: 10 Days  Current Treatment Recommendations: Strengthening; Functional mobility training;Transfer training;Gait training; Endurance training; Safety education & training; Therapeutic activities;Balance training;Neuromuscular re-education;Patient/Caregiver education & training;Equipment evaluation, education, & procurement;Home exercise program  Safety Devices  Type of Devices: Call light within reach; Chair alarm in place; Left in chair;Nurse notified; All fall risk precautions in place  Restraints  Restraints Initially in Place: No    EDUCATION  Education  Education Given To: Patient  Education Provided: Equipment;Plan of Care; Mobility Training;Precautions; Safety; Energy Conservation; Fall Prevention Strategies  Education Method: Demonstration;Verbal  Barriers to Learning: Vision; Hearing  Education Outcome: Verbalized understanding        Therapy Time   Individual Concurrent Group Co-treatment   Time In 0830         Time Out 0915         Minutes 39           Second Session Therapy Time:   Individual Concurrent Group Co-treatment   Time In 1405         Time Out 1435         Minutes 30           Timed Code Treatment Minutes:  75    Total Treatment Minutes:   [address] [place], [place] [address]

## 2022-12-30 NOTE — PLAN OF CARE
Problem: Discharge Planning  Goal: Discharge to home or other facility with appropriate resources  Outcome: Progressing  Flowsheets (Taken 12/30/2022 0921)  Discharge to home or other facility with appropriate resources: Identify barriers to discharge with patient and caregiver     Problem: Skin/Tissue Integrity  Goal: Absence of new skin breakdown  Description: 1. Monitor for areas of redness and/or skin breakdown  2. Assess vascular access sites hourly  3. Every 4-6 hours minimum:  Change oxygen saturation probe site  4. Every 4-6 hours:  If on nasal continuous positive airway pressure, respiratory therapy assess nares and determine need for appliance change or resting period.   Outcome: Progressing     Problem: Pain  Goal: Verbalizes/displays adequate comfort level or baseline comfort level  Outcome: Progressing  Flowsheets (Taken 12/29/2022 0835 by Sterling Milian RN)  Verbalizes/displays adequate comfort level or baseline comfort level:   Encourage patient to monitor pain and request assistance   Assess pain using appropriate pain scale   Administer analgesics based on type and severity of pain and evaluate response   Implement non-pharmacological measures as appropriate and evaluate response     Problem: Safety - Adult  Goal: Free from fall injury  Outcome: Progressing  Flowsheets (Taken 12/22/2022 2000 by Kenji Hay)  Free From Fall Injury: Instruct family/caregiver on patient safety     Problem: Chronic Conditions and Co-morbidities  Goal: Patient's chronic conditions and co-morbidity symptoms are monitored and maintained or improved  Outcome: Progressing  Flowsheets (Taken 12/30/2022 0921)  Care Plan - Patient's Chronic Conditions and Co-Morbidity Symptoms are Monitored and Maintained or Improved: Monitor and assess patient's chronic conditions and comorbid symptoms for stability, deterioration, or improvement     Problem: Nutrition Deficit:  Goal: Optimize nutritional status  Outcome: Progressing  Flowsheets (Taken 12/29/2022 1251 by Rogerio Anderson RD)  Nutrient intake appropriate for improving, restoring, or maintaining nutritional needs:   Assess nutritional status and recommend course of action   Monitor oral intake, labs, and treatment plans   Recommend appropriate diets, oral nutritional supplements, and vitamin/mineral supplements

## 2022-12-30 NOTE — PROGRESS NOTES
Department of Physical Medicine & Rehabilitation  Progress Note    Patient Identification:  Darwin Ventura  [id number]  : 1955  Admit date: 2022    Chief Complaint: S/P excision of acoustic neuroma    Subjective:   Jayesh Bae. Seen by speech this AM. Doing well with therapy. On q4 neuro checks. Seizure and fall precautions in place. She did not sleep well last night. Asking to see neurosurgery again. ROS: No f/c, n/v, cp     Objective:  Patient Vitals for the past 24 hrs:   BP Temp Temp src Pulse Resp SpO2   22 0921 125/66 97.9 °F (36.6 °C) Oral -- 16 93 %   22 1930 (!) 153/73 97.8 °F (36.6 °C) Oral 82 16 94 %       Const: Alert. No distress, pleasant. HEENT: right sided swelling- wound care  CV: Regular rate and rhythm. Resp: No respiratory distress. Lungs CTAB. Abd: Soft, nontender, nondistended, NABS+   Ext: No edema. Neuro: Alert, oriented, appropriately interactive. Psych: Cooperative, appropriate mood and affect    Laboratory data: Available via EMR.    Last 24 hour lab  Recent Results (from the past 24 hour(s))   POCT Glucose    Collection Time: 22 12:12 PM   Result Value Ref Range    POC Glucose 132 (H) 70 - 99 mg/dl    Performed on ACCU-CHEK    POCT Glucose    Collection Time: 22  4:26 PM   Result Value Ref Range    POC Glucose 140 (H) 70 - 99 mg/dl    Performed on ACCU-CHEK    POCT Glucose    Collection Time: 22  8:02 PM   Result Value Ref Range    POC Glucose 173 (H) 70 - 99 mg/dl    Performed on ACCU-CHEK    Basic Metabolic Panel w/ Reflex to MG    Collection Time: 22  4:30 AM   Result Value Ref Range    Sodium 140 136 - 145 mmol/L    Potassium reflex Magnesium 3.8 3.5 - 5.1 mmol/L    Chloride 106 99 - 110 mmol/L    CO2 26 21 - 32 mmol/L    Anion Gap 8 3 - 16    Glucose 149 (H) 70 - 99 mg/dL    BUN 14 7 - 20 mg/dL    Creatinine <0.5 (L) 0.6 - 1.2 mg/dL    Est, Glom Filt Rate >60 >60    Calcium 9.1 8.3 - 10.6 mg/dL   CBC auto differential Collection Time: 12/30/22  4:30 AM   Result Value Ref Range    WBC 2.7 (L) 4.0 - 11.0 K/uL    RBC 3.11 (L) 4.00 - 5.20 M/uL    Hemoglobin 9.4 (L) 12.0 - 16.0 g/dL    Hematocrit 29.2 (L) 36.0 - 48.0 %    MCV 93.8 80.0 - 100.0 fL    MCH 30.2 26.0 - 34.0 pg    MCHC 32.1 31.0 - 36.0 g/dL    RDW 18.3 (H) 12.4 - 15.4 %    Platelets 494 583 - 022 K/uL    MPV 8.0 5.0 - 10.5 fL    Neutrophils % 49.1 %    Lymphocytes % 29.0 %    Monocytes % 11.5 %    Eosinophils % 10.0 %    Basophils % 0.4 %    Neutrophils Absolute 1.3 (L) 1.7 - 7.7 K/uL    Lymphocytes Absolute 0.8 (L) 1.0 - 5.1 K/uL    Monocytes Absolute 0.3 0.0 - 1.3 K/uL    Eosinophils Absolute 0.3 0.0 - 0.6 K/uL    Basophils Absolute 0.0 0.0 - 0.2 K/uL   POCT Glucose    Collection Time: 12/30/22  8:04 AM   Result Value Ref Range    POC Glucose 136 (H) 70 - 99 mg/dl    Performed on ACCU-CHEK        Therapy progress:  PT  Position Activity Restriction  Other position/activity restrictions: Ambulate the Patient, Up with assistance, up in the chair  Objective     Sit to Stand: Minimal Assistance (To RW from [facility] and wheelchair)  Stand to Sit: Minimal Assistance  Device: Rolling Walker  Other Apparatus:  (wheelchair follow)  Assistance: Minimal assistance  Distance: 10ftx2+50ft  OT  PT Equipment Recommendations  Equipment Needed: No  Other: plan to continue to assess  Toilet - Technique: Ambulating  Equipment Used: Extra wide bedside commode  Toilet Transfers Comments: pt reported difficulty standing from standard low surface toilet; + time to transition BUEs to RW  Assessment        SLP          Body mass index is 46.19 kg/m².     Assessment and Plan:  Giant Duodenal Ulcer  -monitor Hgb  -Transfuse pRBCs  -Protonix 40 mg BID  -Embolization of GDA  -GI following  -IR following     BL PE, suspected  -currently on heparin gtt  - transition to xarelto     Meningioma  -s/p resection with TL and MCF approach  -Keppra 500 mg BID  -Bowel regiment: Senna, glycolax, and dulcolax  -Nicardipine-Roxicodone PRN  -Acycolvir 400 mg TID for 10 days  -Artifical tears q2H and eye patch  -Keep an eye on mastoid dressing  -SCDs for DVT prophylaxis   -PT/OT     HTN   -Coreg 12.5 mg BID  Losartan 100 mg daily     DM   -Lantus 15u qHS  -HDSSI     Hyperthyroidism   -Methimazole 10 mg daily     Liver Mass  -Incidental finding  -f/u imaging once acute problems have been tx      Bed issues- requires a bariatric mattress unfortunately     Rehab Progress: Improving  Anticipated Dispo: home  Services/DME:   ELOS: 1/1    Yosi Miller MD  PGY1 - IM    Staffed and discussed with Dr. Elissa Song. This patient has been seen, examined, and discussed with the resident. I was part of the key critical services provided to the patient. I agree with the residents documentation. This note may have been altered to reflect my own examination findings, impression, and recommendations.        David Pastor D.O. M.P.H  PM&R  12/30/2022  9:44 AM

## 2022-12-30 NOTE — PROGRESS NOTES
Pt in bed, alert and oriented. VSS. No pain reported. All safety measures are in place. Call light within reach. Will continue to monitor.    Vitals:    12/29/22 1930   BP: (!) 153/73   Pulse: 82   Resp: 16   Temp: 97.8 °F (36.6 °C)   SpO2: 94%

## 2022-12-30 NOTE — PROGRESS NOTES
Occupational Therapy  Facility/Department: [facility] ACUTE REHAB UNIT  Rehabilitation Occupational Therapy Daily Treatment Note    Date: 22  Patient Name: Suzi Healy       Room: 3101/3101-01  MRN: [id number]  Account: [de-identified]   : 1955  (78 y.o.) Gender: female                    Past Medical History:  has a past medical history of Arthritis, Asthma, Brain tumor ([facility] [address] Diabetes mellitus ([facility] [address] High cholesterol, Hypertension, Imbalance, Loss of hearing, and Vertigo. Past Surgical History:   has a past surgical history that includes Total knee arthroplasty (Right, 2015); Total knee arthroplasty (Left, 10/14/15); Total hip arthroplasty (Right, 2021); Neuroma surgery (Right, 2022); mastoidectomy (Right, 2022); craniotomy (N/A, 2022); Upper gastrointestinal endoscopy (N/A, 2022); IR EMBOLIZATION HEMORRHAGE (2022); and Upper gastrointestinal endoscopy (N/A, 2022). Restrictions  Restrictions/Precautions: Fall Risk;Seizure  Other position/activity restrictions: Ambulate the Patient, Up with assistance, up in the chair    Subjective  Subjective: SItting in recliner upon arrival, pleasant and agreeable to OT session requesting to toilet.  \"I'm tired\"  Restrictions/Precautions: Fall Risk;Seizure             Objective     Cognition  Overall Cognitive Status: WFL  Orientation  Overall Orientation Status: Within Functional Limits         ADL  Grooming/Oral Hygiene  Assistance Level: Stand by assist  Skilled Clinical Factors: hand hygiene and oral hygiene in stance at sink, heavy offloading of BUEs on sink  Toileting  Assistance Level: Supervision  Skilled Clinical Factors: Pt continent of bladder, completed pericare seated on BSC placed over toilet; SBA-spvn for pants mgmt off hips without UE support  Toilet Transfers  Technique:  (ambulatory with RW)  Equipment: Beside commode  Assistance Level: Stand by assist          Functional Mobility  Device: Rolling walker  Activity: To/From bathroom (to/from dining room)  Assistance Level: Stand by assist  Skilled Clinical Factors: decreased darlene, decreased step length and height  Sit to Stand  Assistance Level: Supervision  Skilled Clinical Factors: from recliner and w/c to RW  Stand to Sit  Assistance Level: Supervision   OT Exercises  Static Standing Balance Exercises: In order to improve standing tolerance and functional endurance pt completed 2 bouts of playing [employer] in stance: first trial pt in stance for ~3.5 minutes with mostly no UE support intermittently requiring unilateral UE support on RW due to wavering/instability but pt able to self correct with SBA; on second trial pt in stance for ~3.5 minutes with unilateral UE support and writing on vertical surface within and slightly outside EDINSON req CGA when writing at upper right quadrant of whiteboard, pt able to self correct slight instability but no overt LOB     Assessment  Assessment  Assessment: Pt progressing to spvn for toileting but continues to be limited by decrease standing tolerance and endurance standing for maximum of 3.5 minutes statically due to fatigue and generalized debility/BLE pain. Pt able to self right without assist when she experiences slight instability during standing activities but is worried about her balance deficits upon d/c. Pt functioning below baseline, cont per OT POC. Activity Tolerance: Patient tolerated treatment well;Patient limited by fatigue  Discharge Recommendations: Continue to assess pending progress;Home with Home health OT; Home with assist PRN  OT Equipment Recommendations  Other: continue to assess  Safety Devices  Safety Devices in place: Yes  Type of devices: Left in chair;Chair alarm in place;Call light within reach    Patient Education  Education  Education Given To: Patient  Education Provided: Role of Therapy;Plan of Care;Transfer Training;ADL Function; Safety; Mobility Training  Education Method: Verbal  Barriers to Learning: None  Education Outcome: Verbalized understanding;Continued education needed    Second Session: Pt sitting in bedside recliner upon arrival, pleasant and agreeable to OT session. Pt completed all STS transfers with SBA-spvn from recliner and w/c to RW. Functional mobility to/from bathroom and to/from dining room using RW with SBA. Pt completed ambulatory toilet transfer using RW with SBA onto BSC placed over toilet. Pt continent of urine completing perihygiene seated i'ly. Hand hygiene in stance at sink with spvn assist. Once in dining room pt completed short rest break seated in w/c to discuss home setup prior to completing IADL meal prep task to bake cookies which pt reports is a hobby of hers. Pt provided with demo on proper setup to retrieve cookie sheet from below waist level cabinet with RW perpendicular to counter and maintaining unilateral UE support on countertop with good body mechanics bending at knees and maintaining neutral spine. Pt verb understanding. Pt completed cooking baking task in stance using RW with SBA throughout activity including retrieving items from overhead cabinet, waist level drawer and below waist level cabinet maintaining unilateral UE support on counter or RW at all times and no overt LOB. Pt retrieved mixing bowl from overhead cabinet and i'ly scooted along countertop while maintaining unilateral UE support on RW with SBA. Pt measured oil and water, poured mixture, mixed the batter in bowl, formed balls and placed onto cookie sheet in stance without UE support and SBA. Pt placed cookie sheet into top rack of oven using oven frances and demo good safety awareness with SBA, no overt LOB. Pt tolerated ~6 minutes in stance for duration of task before requiring seated rest break. Pt then retrieved cookie sheet from oven with SBA maintaining unilateral UE support on RW and good safety awareness with hot surface.  Functional mobility back to room using RW with NEENA. Pt left in bedside recliner at end of session with chair alarm engaged, call light within reach and all needs met. Plan  Occupational Therapy Plan  Times Per Week: 75 minutes/day x 5 days/week  Current Treatment Recommendations: Strengthening;ROM;Cognitive reorientation;Balance training;Pain management;Self-Care / ADL; Functional mobility training; Safety education & training;Home management training; Endurance training;Neuromuscular re-education;Positioning;Return to work related activity; Patient/Caregiver education & training;Equipment evaluation, education, & procurement    Goals  Patient Goals   Patient goals : \"to regain my balance and strength\"  Short Term Goals  Time Frame for Short Term Goals: 10 days - all goals ongoing  Short Term Goal 1: Pt will complete LB dressing using AE PRN with mod I  Short Term Goal 2: Pt will complete bathing with mod I  Short Term Goal 3: Pt will complete toileting and toilet transfer with mod I  Short Term Goal 4: Pt will be independent with UE HEP to improve activity tolerance  Short Term Goal 5: Pt will tolerate 8 minutes in stance to improve independence with IADLs    AM-PAC Score               Therapy Time   Individual Second Session Group Co-treatment   Time In 1000 1245       Time Out 1030 1315       Minutes 30 45       Timed Code Treatment Minutes: 30 Minutes+ 45 Minutes  Total Treatment Time: 75 Minutes       Melissa Dash OT

## 2022-12-31 LAB
GLUCOSE BLD-MCNC: 155 MG/DL (ref 70–99)
PERFORMED ON: ABNORMAL

## 2022-12-31 PROCEDURE — 92526 ORAL FUNCTION THERAPY: CPT

## 2022-12-31 PROCEDURE — 2580000003 HC RX 258: Performed by: PHYSICAL MEDICINE & REHABILITATION

## 2022-12-31 PROCEDURE — 97530 THERAPEUTIC ACTIVITIES: CPT

## 2022-12-31 PROCEDURE — 97535 SELF CARE MNGMENT TRAINING: CPT

## 2022-12-31 PROCEDURE — 97116 GAIT TRAINING THERAPY: CPT

## 2022-12-31 PROCEDURE — 97110 THERAPEUTIC EXERCISES: CPT

## 2022-12-31 PROCEDURE — 6370000000 HC RX 637 (ALT 250 FOR IP): Performed by: PHYSICAL MEDICINE & REHABILITATION

## 2022-12-31 PROCEDURE — 1280000000 HC REHAB R&B

## 2022-12-31 RX ADMIN — PANTOPRAZOLE SODIUM 40 MG: 40 TABLET, DELAYED RELEASE ORAL at 19:00

## 2022-12-31 RX ADMIN — CETIRIZINE HYDROCHLORIDE 10 MG: 10 TABLET, FILM COATED ORAL at 10:39

## 2022-12-31 RX ADMIN — METFORMIN HYDROCHLORIDE 850 MG: 850 TABLET ORAL at 19:00

## 2022-12-31 RX ADMIN — PANTOPRAZOLE SODIUM 40 MG: 40 TABLET, DELAYED RELEASE ORAL at 06:24

## 2022-12-31 RX ADMIN — RIVAROXABAN 15 MG: 15 TABLET, FILM COATED ORAL at 10:42

## 2022-12-31 RX ADMIN — POTASSIUM CHLORIDE 20 MEQ: 20 TABLET, EXTENDED RELEASE ORAL at 13:00

## 2022-12-31 RX ADMIN — SODIUM CHLORIDE, PRESERVATIVE FREE 10 ML: 5 INJECTION INTRAVENOUS at 12:16

## 2022-12-31 RX ADMIN — LEVETIRACETAM 500 MG: 500 TABLET, FILM COATED ORAL at 10:41

## 2022-12-31 RX ADMIN — METHIMAZOLE 10 MG: 5 TABLET ORAL at 10:41

## 2022-12-31 RX ADMIN — Medication 4.5 MG: at 21:54

## 2022-12-31 RX ADMIN — LATANOPROST 1 DROP: 50 SOLUTION OPHTHALMIC at 21:55

## 2022-12-31 RX ADMIN — METFORMIN HYDROCHLORIDE 850 MG: 850 TABLET ORAL at 13:00

## 2022-12-31 RX ADMIN — POTASSIUM CHLORIDE 20 MEQ: 20 TABLET, EXTENDED RELEASE ORAL at 19:00

## 2022-12-31 RX ADMIN — SODIUM CHLORIDE, PRESERVATIVE FREE 10 ML: 5 INJECTION INTRAVENOUS at 21:55

## 2022-12-31 RX ADMIN — SODIUM CHLORIDE, PRESERVATIVE FREE 10 ML: 5 INJECTION INTRAVENOUS at 12:15

## 2022-12-31 RX ADMIN — LEVETIRACETAM 500 MG: 500 TABLET, FILM COATED ORAL at 21:54

## 2022-12-31 ASSESSMENT — PAIN SCALES - GENERAL: PAINLEVEL_OUTOF10: 0

## 2022-12-31 NOTE — PROGRESS NOTES
ACUTE REHAB UNIT  SPEECH/LANGUAGE PATHOLOGY      [x] Daily  [] Weekly Care Conference Note  [x] Discharge    Patient:Bianca James      U:[id number]  F:[id number]  Rehab Dx/Hx: S/P excision of acoustic neuroma [Z98.890, B39.559]    Precautions: [x] Aspiration  [x] Fall risk  [] Sternal  [] Seizure [] Hip  [] Weight Bearing [] Other  ST Dx: [] Aphasia  [x] Dysarthria  [] Apraxia   [x] Oropharyngeal dysphagia [x] Cognitive Impairment  [] Other:   Date of Admit: 12/22/2022  Room #: 3101/3101-01  Date: 12/31/2022          Current Diet Order:ADULT DIET; Easy to Chew   Recommended Form of Meds: PO  Compensatory Swallowing Strategies : Alternate solids and liquids, Eat/Feed slowly, Upright as possible for all oral intake, Check for pocketing of food on the Right, Lingual sweep, Small bites/sips      MBSS 12/1/22:  Oral Phase  Moderate dysfunction characterized by right-sided oral weakness with reduced labial seal and coordination, resulting in anterior loss of liquid via tsp and inability to use straw when placed on right or midline. For left-sided straw placement, pt able to create appropriate seal/suction. Prolonged mastication of soft solids, with slowed a-p transit, mild stasis. Pharyngeal Phase  Grossly WFL. Overall timely swallow initiation with appropriate hyolaryngeal mechanics. Single instance trace flash (self clearing) penetration of thins when straw was placed on right side. Otherwise good airway protection throughout with no aspiration. No significant stasis.   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 As of 12/28, pt now requesting to advance diet level    Date: 12/31/2022      Tx session 1 Discharge Summary   Total Timed Code Min 0    Total Treatment Minutes 30    Individual Treatment Minutes 30    Group Treatment Minutes 0    Co-Treat Minutes 0    Brief Exception: N/A    Pain None indicated at this time. Pain Intervention: [] RN notified  [] Repositioned  [] Intervention offered and patient declined  [x] N/A  [] Other:    Subjective:     Pt seated in recliner upon entry and agreeable to tx session. Seen with meal tray. Objective / Goals:     Pt will tolerate trials of increased texture in order to advance to least restrictive diet consistency. Pt seen with meal tray consisting of easy to chew solids and thin liquids. Pt with intermittent throat clears this session and endorsed that swallowing felt slightly more difficult today compared to yesterday (same foods assessed with AM meal). Cued pt to alternate solids and liquids more frequently and pt endorsed this helped. Pt was judged to have timely mastication despite mastication only occurring on L side due to facial palsy. GOAL MET   Patient will independently demonstrate understanding of swallowing concerns, compensatory strategies, and  Pt independently implemented small bites/sips, lingual sweep on R side, and liquid wash. Pt required min cues fading to independence for alternating food and drink. Pt was able to verbalize back information provided re: current diet level. GOAL MET   Other areas targeted:     Education:   Pt educated to rationale for tx session. Also provided pt a handout from [employer] re: easy to chew solids and reviewed the information within the packet. Instructed pt to notify RN if continues to feel swallowing is more difficult.      Safety Devices: [x] Call light within reach  [x] Chair alarm activated and connected to nurse call light system  [] Bed alarm activated   [x] Other: reinforced use of call light. Assessment: Pt with resolved cognitive-linguistic impairment. Pt with adequate awareness of rationale for swallowing strategies and recommendations, as well as ability to independently implement strategies throughout meal time. Recommend continuing easy to chew solids at this time. Pt with intermittent throat clears this session and endorsed solids were slightly more difficult to swallow, but eased with implementation of alternating solids/liquids. Plan: Continue as per POC, d/c tomorrow.        Interventions used this date:  [] Speech/Language Treatment  [] Instruction in HEP  [x] Dysphagia Treatment [] Cognitive Treatment   [] Other:    Discharge recommendations:  [x] Home independently  [] Home with assistance []  24 hour supervision  [] ECF [] Other  Continued Tx Upon Discharge: ? [x] Yes    [] No    [] TBD based on progress while on ARU     [] Vital Stim indicated     [] Other:   Estimated discharge date: 01/01/2023    Electronically signed by:  Tracy Westfall, [address] [facility] [place], [address] [facility]  Speech-Language Pathologist

## 2022-12-31 NOTE — PROGRESS NOTES
Department of Physical Medicine & Rehabilitation  Progress Note    Patient Identification:  Tori Gonzáles  [id number]  : 1955  Admit date: 2022    Chief Complaint: S/P excision of acoustic neuroma    Subjective:   No new complaints this morning. She is getting ready for discharge tomorrow. She walked up a flight of stairs this morning and feels like she will be safe at home with her sister in town. She would like to confirm that home health is ordered. Seen in her room today with PT.     ROS: No f/c, n/v, cp     Objective:  Patient Vitals for the past 24 hrs:   BP Temp Temp src Pulse Resp SpO2   22 1030 (!) 149/77 97.7 °F (36.5 °C) Oral 73 16 94 %   22 2115 (!) 141/73 97.4 °F (36.3 °C) Oral 69 16 93 %       Const: Alert. No distress, pleasant. HEENT: right sided swelling- wound care  CV: Regular rate and rhythm. Resp: No respiratory distress. Lungs CTAB. Abd: Soft, nontender, nondistended, NABS+   Ext: No edema. Neuro: Alert, oriented, appropriately interactive. Psych: Cooperative, appropriate mood and affect    Laboratory data: Available via EMR.    Last 24 hour lab  Recent Results (from the past 24 hour(s))   POCT Glucose    Collection Time: 22 12:03 PM   Result Value Ref Range    POC Glucose 132 (H) 70 - 99 mg/dl    Performed on ACCU-CHEK    POCT Glucose    Collection Time: 22  5:19 PM   Result Value Ref Range    POC Glucose 153 (H) 70 - 99 mg/dl    Performed on ACCU-CHEK    POCT Glucose    Collection Time: 22  9:13 PM   Result Value Ref Range    POC Glucose 146 (H) 70 - 99 mg/dl    Performed on ACCU-CHEK    POCT Glucose    Collection Time: 22  8:16 AM   Result Value Ref Range    POC Glucose 155 (H) 70 - 99 mg/dl    Performed on ACCU-CHEK        Therapy progress:  PT  Position Activity Restriction  Other position/activity restrictions: Ambulate the Patient, Up with assistance, up in the chair  Objective     Sit to Stand: Minimal Assistance (To RW from BSC and wheelchair)  Stand to Sit: Minimal Assistance  Device: Rolling Walker  Other Apparatus:  (wheelchair follow)  Assistance: Minimal assistance  Distance: 10ftx2+50ft  OT  PT Equipment Recommendations  Equipment Needed: No  Other: plan to continue to assess  Toilet - Technique: Ambulating  Equipment Used: Extra wide bedside commode  Toilet Transfers Comments: pt reported difficulty standing from standard low surface toilet; + time to transition BUEs to RW  Assessment        SLP          Body mass index is 46.19 kg/m².     Assessment and Plan:  Giant Duodenal Ulcer  -monitor Hgb  -Transfuse pRBCs  -Protonix 40 mg BID  -Embolization of GDA  -GI following  -IR following     BL PE, suspected  -currently on heparin gtt  - transition to xarelto     Meningioma  -s/p resection with TL and MCF approach  -Keppra 500 mg BID  -Bowel regiment: Senna, glycolax, and dulcolax  -Nicardipine-Roxicodone PRN  -Acycolvir 400 mg TID for 10 days  -Artifical tears q2H and eye patch  -Keep an eye on mastoid dressing  -SCDs for DVT prophylaxis   -PT/OT     HTN   -Coreg 12.5 mg BID  Losartan 100 mg daily     DM   -Lantus 15u qHS  -HDSSI     Hyperthyroidism   -Methimazole 10 mg daily     Liver Mass  -Incidental finding  -f/u imaging once acute problems have been tx      Bed issues- requires a bariatric mattress unfortunately     Rehab Progress: Improving  Anticipated Dispo: home  Services/DME: BRIANA  ELOS: 1/1        KAYLEY BellP.H  PM&R  12/31/2022  10:50 AM

## 2022-12-31 NOTE — PROGRESS NOTES
ARU Discharge Assessment    Transportation  \"Has lack of transportation kept you from medical appointments, meetings, work, or from getting things needed for daily living?\"Check all that apply:  [] A.  Yes, it has kept me from medical appointments or from getting my medications  [] B.  Yes, it has kept me from non-medical meetings, appointments, work, or from getting things that I need  [x] C.  No  [] X.  Patient unable to respond  [] Y.  Patient declines to respond    Provision of Current Reconciled Medication List to Subsequent Provider at Discharge  [] No, current reconciled medication list not provided to the subsequent provider.  [x] Yes, current reconciled medication list provided to the subsequent provider. (**Select route of transmission below**)   [x] Via Electronic Health Record   [] Via Health Information Exchange Organization  [] Verbal (e.g. in person, telephone, video conferencing)  [x] Paper-based (e.g. fax, copies, printouts)   [] Other Methods (e.g. texting, email, CDs)    Provision of Current Reconciled Medication List to Patient at Discharge  [] No, current reconciled medication list not provided to the patient, family and/or caregiver.   [x] Yes, current reconciled medication list provided to the patient, family and/or caregiver.  (**Select route of transmission below**)   [x] Via Electronic Health Record (e.g., electronic access to patient portal)   [] Via Health Information Exchange Organization  [] Verbal (e.g. in person, telephone, video conferencing)  [x] Paper-based (e.g. fax, copies, printouts)   [] Other Methods (e.g. texting, email, CDs)    Health Literacy  \"How often do you need to have someone help you when you read instructions, pamphlets, or other written material from your doctor or pharmacy?\"  []  0.  Never  [x]  1.  Rarely  []  2.  Sometimes  []  3.  Often  []  4.  Always  []  8.  Patient unable to respond    BIMS - **Must be completed in the flowsheet at discharge prior to  proceeding with Delirium Assessment**  [x] BIMS completed in flowsheet at discharge    Signs and Symptoms of Delirium  A. Acute Onset Mental Status Change - Is there evidence of an acute change in mental status from the patient's baseline? [x] 0. No  [] 1. Yes    B. Inattention - Did the patient have difficulty focusing attention, for example being easily distractible or having difficulty keeping track of what was being said? [x]  0. Behavior not present  []  1. Behavior continuously present, does not fluctuate  []  2. Behavior present, fluctuates (comes and goes, changes in severity)    C. Disorganized thinking - Was the patient's thinking disorganized or incoherent (rambling or irrelevant conversation, unclear or illogical flow of ideas, or unpredictable switching from subject to subject)? [x]  0. Behavior not present  []  1. Behavior continuously present, does not fluctuate  []  2. Behavior present, fluctuates (comes and goes, changes in severity)    D. Altered level of consciousness - Did the patient have altered level of consciousness as indicated by any of the following criteria? Vigilant - startled easily to any sound or touch  Lethargic - repeatedly dozed off while being asked questions, but responded to voice or touch  Stuporous - very difficulty to arouse and keep aroused for the interview  Comatose - could not be aroused  [x]  0. Behavior not present  []  1. Behavior continuously present, does not fluctuate  []  2. Behavior present, fluctuates (comes and goes, changes in severity)    Mood    \"Over the last 2 weeks, have you been bothered by any of the following problems?\" 1. Symptom Presence    0 = No  1 = Yes  9 = No Response 2.  Symptom Frequency    0 = Never or 1 day  1 = 2-6 days (several days)  2 = 7-11 days (half or more of the days)  3 = 12-14 days (nearly every day)  **Leave blank if 'No Reponse'**      Enter scores in boxes    Column 1 Column 2   Little interest or pleasure in doing things   0 0   Feeling down, depressed, or hopeless   1 1   **If either A or B in column 2 is coded 2 or 3, CONTINUE asking the questions below. If not, END the interview. **     Trouble falling or staying asleep, or sleeping too much       Feeling tired or having little energy       Poor appetite or overeating       Feeling bad about yourself - or that you are a failure or have let yourself or your family down       Trouble concentrating on things, such as reading the newspaper or watching television       Moving or speaking so slowly that other people could have noticed. Or the opposite- being so fidgety or restless that you have been moving around a lot more than usual.       Thoughts that you would be better off dead, or of hurting yourself in some way. Total Severity: Add scores for all frequency responses in column 2 (possible score 0-27, or enter 99 if unable to complete (if symptom frequency (column 2) is blank for 3 or more items). Social Isolation  \"How often do you feel lonely or isolated from those around you? \"  [] 0. Never  [x] 1. Rarely  [] 2. Sometimes  [] 3. Often  [] 4. Always  [] 7. Patient declines to respond  [] 8. Patient unable to respond    Pain Effect on Sleep  \"Over the past 5 days, how much of the time has pain made it hard for you to sleep at night? \"  []  0. Does not apply - I have not had any pain or hurting in the past 5 days  []  1. Rarely or not at all  [x]  2. Occasionally  []  3. Frequently  []  4. Almost constantly  []  8. Unable to answer    **If the patient answers \"0. Does not apply\" to this question, skip the next two \"Pain Effect. Cloteal Friedman Cloteal Friedman \" questions**    Pain Interference with Therapy Activities  \"Over the past 5 days, how often have you limited your participation in rehabilitation therapy sessions due to pain? \"  []  0. Does not apply - I have not received rehabilitation therapy in the past 5 days  [x]  1. Rarely or not at all  []  2. Occasionally  []  3. Frequently  []  4. Almost constantly  []  8. Unable to answer    Pain Interference with Day-to-Day Activities: \"Over the past 5 days, how often have you limited your day-to-day activities (excluding rehabilitation therapy session)? \"  [x]  1. Rarely or not at all  []  2. Occasionally  []  3. Frequently  []  4. Almost constantly  []  8. Unable to answer    Nutritional Approaches  Last 7 Days - Check all of the following nutritional approaches that were received within the last 7 days:  []  A. Parenteral/IV feeding  []  B. Feeding tube (e.g., nasogastric or abdominal (PEG))  []  C. Mechanically altered diet - requires change in texture of food or liquids (e.g., pureed food, thickened liquids)  []  D. Therapeutic diet (e.g., low salt, diabetic, low cholesterol)  [x]  Z. None of the above    At time of Discharge - Check all of the following nutritional approaches that were being received at discharge:  []  A. Parenteral/IV feeding (including IV fluids if needed for hydration, but not as part of dialysis/chemo)  []  B. Feeding tube (e.g., nasogastric or abdominal (PEG))  []  C. Mechanically altered diet - requires change in texture of food or liquids (e.g., pureed food, thickened liquids)  []  D. Therapeutic diet (e.g., low salt, diabetic, low cholesterol  [x]  Z. None of the above    High Risk Drug Classes:  Use and Indication    Is taking: Check if the pt is taking any medications by pharmacological classification, not how it is used, in the following classes  Indication noted:  If column 1 is checked, check if there is an indication noted for all meds in the drug class Is taking  (check all that apply) Indication noted (check all that apply)   Antipsychotic [] []   Anticoagulant [] []   Antibiotic [] []   Opioid / Medical Marijuana [x] []   Antiplatelet [] []   Hypoglycemic (including insulin) [] []   None of the above []     Special Treatments, Procedures, and Programs    Check all of the following treatments, procedures, and programs that apply at discharge. At Discharge (check all that apply)   Cancer Treatments   A1. Chemotherapy []           A2. IV []           A3. Oral []           A10. Other []   B1. Radiation []   Respiratory Therapies   C1. Oxygen Therapy [x]           C2. Continuous (continuously for at least 14 hours per day) []           C3. Intermittent [x]           C4. High-concentration []   D1. Suctioning (Does not include oral suctioning) []           D2. Scheduled []           D3. As needed []   E1. Tracheostomy Care []   F1. Invasive Mechanical Ventilator (ventilator or respirator) []   G1. Non-invasive Mechanical Ventilator []           G2. BiPAP []           G3. CPAP []   Other   H1. IV Medications (Do not include sub Q pumps, flushes, Dextrose 50% or lactated ringers) []           H2. Vasoactive medications []           H3. Antibiotics []           H4. Anticoagulation []           H10. Other []   I1. Transfusions []   J1. Dialysis []           J2. Hemodialysis []           J3. Peritoneal dialysis []   O1. IV access (including a catheter in a vein) []           O2. Peripheral []           O3. Midline []           O4.  Central (PICC, tunneled, port) []      None of the above (select if no Cancer, Respiratory, or Other boxes are checked) [x]

## 2022-12-31 NOTE — PROGRESS NOTES
Physical Therapy  Facility/Department: [facility] ACUTE REHAB UNIT  Rehabilitation Physical Therapy Treatment Note    NAME: Susan Farah  : 1955 (79 y.o.)  MRN: [id number]  CODE STATUS: Full Code    Date of Service: 22    PT Equipment Recommendations  Equipment Needed: Yes  Mobility Devices: [place]: Rolling  Other: pt reports she has a rollator at home, not a RW. Pt will need RW at DC. Restrictions:  Restrictions/Precautions: Fall Risk;Seizure  Position Activity Restriction  Other position/activity restrictions: Ambulate the Patient, Up with assistance, up in the chair     SUBJECTIVE  Subjective  Subjective: Pt seated in recliner upon approach and agreeable to PT. Pain: reports pain in ankle (not rated) - denies need for ice/ pain meds at this time, ankle brace donned for pain management during mobility     OBJECTIVE  Cognition  Overall Cognitive Status: WFL  Orientation  Overall Orientation Status: Within Functional Limits    Functional Mobility  Bed Mobility  Overall Assistance Level: Independent  Roll Left  Assistance Level: Independent  Sit to Supine  Assistance Level: Independent  Skilled Clinical Factors: On mat table, no rails  Supine to Sit  Assistance Level: Independent  Skilled Clinical Factors: On mat table, no rails  Scooting  Assistance Level: Independent  Balance  Sitting Balance: Independent  Standing Balance: Modified independent  (with RW)  Standing Balance  Activity: Pt able to stand at toilet with RW mod I to perform cas care and pants management with ++time, then stand at sink with RW and wash hands with RW mod I. No LOB. Transfers  Surface: Raised toilet Seat;Wheelchair;From chair with arms;From mat; To chair with arms  Additional Factors: Increased time to complete  Device: Walker  Sit to Stand  Assistance Level: Modified independent  Skilled Clinical Factors: Several STS from wheelchair, toilet, and standard chair with RW mod I. Minh's good safety awareness with transfers and keeps walker close to EDINSON, no cues req'd. Stand to Sit  Assistance Level: Modified independent  Skilled Clinical Factors: No cues req'd. Demo'd good safety awarenss, steps fully back to seat prior to initiating sit. Car Transfer  Assistance Level: Modified independent  Skilled Clinical Factors: Ambulatory transfer with RW into sedan level transit, passenger side. Pt demo'd good safety awareness, sits before initiating lifting LEs into transit. Environmental Mobility  Ambulation  Surface: Level surface  Device: Rolling walker  Distance: 165' x 2  Activity: Within Room; Within Unit  Assistance Level: Modified independent  Gait Deviations: Slow darlene (elevated shoulders)  Skilled Clinical Factors: No LOB or knee buckling with ambulation. Pt demo's good stepthrough pattern. Pt self-aware of scapular elevation, inc reliance on BUEs on walker, reports this is due to decreased confidence with ambulation \"I'm working on it. \" No cues required. Stairs  Stair Height: 4''  Device: Bilateral handrails  Number of Stairs: 12  Additional Factors: Non-reciprocal going up;Non-reciprocal going down  Assistance Level: Contact guard assist;Stand by assist  Skilled Clinical Factors: slow and effortful, vc for technique leading up with LLE, down with RLE. CGA provided for safety, progressing to SBA. Skilled Clinical Factors - Comments: educated on recommendation to have someone present when completing steps in/out of house    ASSESSMENT/PROGRESS TOWARDS GOALS  Assessment  Assessment: Pt demonstrated IND transfers with RW this session, ambulated >150ft with RW mod I, and navigated 12 stairs with CGA progressing to SBA with use of rails. Pt has progressed well and demonstrates preparedness for DC home at this time. Pt reports her sister will be able to provide 24/7 assist until Tuesday. PT rec pt have assist initially for stair negotiation into her home.  PT also recommended pt have a RW at HI - pt states she only has a rollator. PT educated on risks of rollator and benefits/safety of RW. Pt would also benefit from continued PT at DC to progress strength, balance, and endurance. Activity Tolerance: Patient tolerated treatment well  Discharge Recommendations: Home with Home health PT;Home with assist PRN  PT Equipment Recommendations  Equipment Needed: Yes  Mobility Devices: Cammie Herb: Rolling  Other: pt reports she has a rollator at home, not a RW. Goals  Patient Goals   Patient Goals : To Return to independence  Short Term Goals  Time Frame for Short Term Goals: 10 days - ongoing  Short Term Goal 1: Patient will perform all bed mobilty MOD I and no use of the handrails with HOB flat - goal met 12/31  Short Term Goal 2: Patient will perform sit<>stand transfers Mod I with RW - goal met 12/31  Short Term Goal 3: Patient will ambulate >150ft with RW Mod I - goal met 12/31  Short Term Goal 4: Patient will be able to ascend/descend 4 steps with one handrail Mod I - goal not met  Short Term Goal 5: Patient will be able to  objects off the floor Mod I with RW. - goal met 12/31    PLAN OF CARE/SAFETY  Physcial Therapy Plan  Days Per Week: 5 Days  Hours Per Day:  (75 min)  Therapy Duration: 10 Days  Current Treatment Recommendations: Strengthening; Functional mobility training;Transfer training;Gait training; Endurance training; Safety education & training; Therapeutic activities;Balance training;Neuromuscular re-education;Patient/Caregiver education & training;Equipment evaluation, education, & procurement;Home exercise program  Safety Devices  Type of Devices: Call light within reach; Chair alarm in place; Left in chair;Nurse notified; All fall risk precautions in place  Restraints  Restraints Initially in Place: No    EDUCATION  Education  Education Given To: Patient  Education Provided: Equipment;Plan of Care; Mobility Training;Precautions; Safety; Energy Conservation; Fall Prevention Strategies  Education Method: Demonstration;Verbal  Barriers to Learning: Vision; Hearing  Education Outcome: Verbalized understanding        Therapy Time   Individual Concurrent Group Co-treatment   Time In 0900         Time Out 1015         Minutes 75            Timed Code Treatment Minutes:75    Total Treatment Minutes:75         Maggie Banks PT, 12/31/22 at 2:45 PM

## 2022-12-31 NOTE — PROGRESS NOTES
Pt. Sitting up in chair. Shift assessment complete, Vss, afebrile, remains A & O X 4, denies pain thus far. Surgical sites intact, swelling noticeably reducing to right side of head. Pt. Assisted to bathroom CGA X1 walker then back to bed. Call light and over bed table within reach, pt. Calls out appropriately, fall prevention measures ongoing, hourly rounding and frequent visual checks in place.

## 2022-12-31 NOTE — PLAN OF CARE
Problem: Discharge Planning  Goal: Discharge to home or other facility with appropriate resources  Outcome: Progressing  Flowsheets (Taken 12/30/2022 2115)  Discharge to home or other facility with appropriate resources: Identify discharge learning needs (meds, wound care, etc)     Problem: Skin/Tissue Integrity  Goal: Absence of new skin breakdown  Description: 1. Monitor for areas of redness and/or skin breakdown  2. Assess vascular access sites hourly  3. Every 4-6 hours minimum:  Change oxygen saturation probe site  4. Every 4-6 hours:  If on nasal continuous positive airway pressure, respiratory therapy assess nares and determine need for appliance change or resting period. Outcome: Adequate for Discharge     Problem: Safety - Adult  Goal: Free from fall injury  Outcome: Progressing   Pt remains free from falls during this stay on the ARU. 1:1 and education provided on the importance of using call light to ask for assistance prior to transfers and ambulation. Pt voices understanding. Will continue to monitor and re-educate as needed.

## 2022-12-31 NOTE — PROGRESS NOTES
Occupational Therapy  Facility/Department: [facility] UNIT  Rehabilitation Occupational Therapy Daily Treatment Note/Discharge Summary     Date: 22  Patient Name: Sola Matthews       Room: 3101/3101-01  MRN: [id number]  Account: [de-identified]   : 1955  (78 y.o.) Gender: female                    Past Medical History:  has a past medical history of Arthritis, Asthma, Brain tumor ([facility] [address] Diabetes mellitus ([facility] [address] High cholesterol, Hypertension, Imbalance, Loss of hearing, and Vertigo. Past Surgical History:   has a past surgical history that includes Total knee arthroplasty (Right, 2015); Total knee arthroplasty (Left, 10/14/15); Total hip arthroplasty (Right, 2021); Neuroma surgery (Right, 2022); mastoidectomy (Right, 2022); craniotomy (N/A, 2022); Upper gastrointestinal endoscopy (N/A, 2022); IR EMBOLIZATION HEMORRHAGE (2022); and Upper gastrointestinal endoscopy (N/A, 2022). Restrictions  Restrictions/Precautions: Fall Risk;Seizure  Other position/activity restrictions: Ambulate the Patient, Up with assistance, up in the chair    Subjective  Subjective: Pt was seated in recliner chair upon arrival. Pt was pleasant and agreeable to ADL w/ OT. Pt reported she feels more comfortable w/ going home tomorrow since her sister will be with her a couple days. Restrictions/Precautions: Fall Risk;Seizure             Objective     Cognition  Overall Cognitive Status: WFL  Orientation  Overall Orientation Status: Within Functional Limits         ADL    Grooming/Oral Hygiene  Skilled Clinical Factors: pt stated she completed oral care this AM  Upper Extremity Bathing  Assistance Level: Modified independent  Skilled Clinical Factors: Pt washed and dried 4/4 components of UE bathing seated on TTB  Lower Extremity Bathing  Assistance Level: Modified independent  Skilled Clinical Factors: Pt washed and dried 6/6 components of LE bathing seated on TTB.  Pt maintained stance at GB to dry buttocks  Upper Extremity Dressing  Assistance Level: Independent  Skilled Clinical Factors: Pt donned nightgown following shower. Pt retrieved clothes from the couch I'ly  Lower Extremity Dressing  Assistance Level: Modified independent  Skilled Clinical Factors: Pt threaded BLEs into underwear seated on TTB and managed clothes over hips at GB MOD I. Pt w/ no LOB but required + time due to damp skin  Putting On/Taking Off Footwear  Assistance Level: Independent  Skilled Clinical Factors: Pt doffed/donned socks seated  Toileting  Assistance Level: Modified independent  Skilled Clinical Factors: Pt was continent of urine and completed pants management and cas care MOD I  Toilet Transfers  Technique:  (ambulating)  Equipment: Beside commode (over toilet)  Assistance Level: Modified independent  Tub/Shower Transfers  Type: Shower  Transfer From: Yoav Au  Transfer To: Tub transfer bench  Assistance Level: Supervision  Skilled Clinical Factors: use of GB to step over small shower threshold            Functional Mobility  Device: Rolling walker  Activity: To/From bathroom  Assistance Level: Modified independent  Skilled Clinical Factors: No LOB noted  Sit to Stand  Assistance Level: Modified independent  Stand to Sit  Assistance Level: Modified independent     OT Exercises  A/AROM Exercises: Pt performed UE HEP to increase UE strength and activity tolerance for improved participation in ADLs. Pt provided w/ handout and completed the following: elbow flexion, shoulder flexion, shoulder abduction/adduction, horizontal abduction/adduction, shoulder diagonals. Pt completed 10 reps each, except shoulder flexion due to increased B shoulder pain w/ increased reps. Pt reported understanding of exercises and had no questions regarding handout. Assessment  Assessment  Assessment: Pt has demonstrated good progress in OT since admission and has met 4/5 OT goals.  Pt is now MOD I for bathing, dressing, and toileting tasks and pt is MOD I w/ use of RW. Pt reported she has all necessary equipment at home including a shower chair, GBs, and RW. Pt reported confidence w/ going home and she plans to have her sister stay w/ her initially. Pt w/ no questions or concerns for d/c at this time and plans to d/c tomorrow w/ HH OT. Activity Tolerance: Patient tolerated treatment well  Discharge Recommendations: Home with Home health OT; Home with assist PRN  OT Equipment Recommendations  Other: no needs  Safety Devices  Safety Devices in place: Yes  Type of devices: Call light within reach; Left in chair;Chair alarm in place;Nurse notified    Patient Education  Education  Education Given To: Patient  Education Provided: Role of Therapy;Plan of Care;Transfer Training;ADL Function; Safety; Mobility Training;Equipment;DME/Home Modifications  Education Provided Comments: OT reviewed d/c plans and education. OT address fall prevention tips and educated regarding removal of loose rugs, removal of clutter, safety when getting up in the middle of the night, return to driving recommendations and follow up w/ MD, equipment at home, etc. Pt has all necessary equipment and pt verb understanding of d/c. Pt w/ no questions or concerns and stated she feels more confident w/ going home and is happy her sister will be with her until Tuesday. Education Method: Verbal;Demonstration  Barriers to Learning: None  Education Outcome: Verbalized understanding    Plan  Occupational Therapy Plan  Times Per Week: 75 minutes/day x 5 days/week  Current Treatment Recommendations: Strengthening;ROM;Cognitive reorientation;Balance training;Pain management;Self-Care / ADL; Functional mobility training; Safety education & training;Home management training; Endurance training;Neuromuscular re-education;Positioning;Return to work related activity; Patient/Caregiver education & training;Equipment evaluation, education, & procurement    Goals  Patient Goals Patient goals : \"to regain my balance and strength\"  Short Term Goals  Time Frame for Short Term Goals: 10 days  Short Term Goal 1: Pt will complete LB dressing using AE PRN with mod I- goal met 12/31  Short Term Goal 2: Pt will complete bathing with mod I- goa met 12/31  Short Term Goal 3: Pt will complete toileting and toilet transfer with mod I-goal met 12/31  Short Term Goal 4: Pt will be independent with UE HEP to improve activity tolerance- goal met 12/31  Short Term Goal 5: Pt will tolerate 8 minutes in stance to improve independence with IADLs- not met    AM-PAC Score               Therapy Time   Individual Concurrent Group Co-treatment   Time In 1245         Time Out 1400         Minutes 75         Timed Code Treatment Minutes: 75 Minutes       Roselia Zuleta, OT

## 2023-01-01 VITALS
SYSTOLIC BLOOD PRESSURE: 129 MMHG | WEIGHT: 277.56 LBS | OXYGEN SATURATION: 94 % | DIASTOLIC BLOOD PRESSURE: 71 MMHG | HEART RATE: 67 BPM | TEMPERATURE: 97.7 F | HEIGHT: 65 IN | RESPIRATION RATE: 16 BRPM | BODY MASS INDEX: 46.24 KG/M2

## 2023-01-01 PROCEDURE — 6370000000 HC RX 637 (ALT 250 FOR IP): Performed by: PHYSICAL MEDICINE & REHABILITATION

## 2023-01-01 RX ADMIN — METHIMAZOLE 10 MG: 5 TABLET ORAL at 09:37

## 2023-01-01 RX ADMIN — LEVETIRACETAM 500 MG: 500 TABLET, FILM COATED ORAL at 09:36

## 2023-01-01 RX ADMIN — CETIRIZINE HYDROCHLORIDE 10 MG: 10 TABLET, FILM COATED ORAL at 09:36

## 2023-01-01 RX ADMIN — BISACODYL 5 MG: 5 TABLET, COATED ORAL at 09:35

## 2023-01-01 RX ADMIN — METFORMIN HYDROCHLORIDE 850 MG: 850 TABLET ORAL at 09:36

## 2023-01-01 RX ADMIN — PANTOPRAZOLE SODIUM 40 MG: 40 TABLET, DELAYED RELEASE ORAL at 06:45

## 2023-01-01 RX ADMIN — RIVAROXABAN 15 MG: 15 TABLET, FILM COATED ORAL at 09:36

## 2023-01-01 RX ADMIN — POTASSIUM CHLORIDE 20 MEQ: 20 TABLET, EXTENDED RELEASE ORAL at 09:35

## 2023-01-01 NOTE — PLAN OF CARE
Problem: Discharge Planning  Goal: Discharge to home or other facility with appropriate resources  Outcome: Progressing  Flowsheets (Taken 12/31/2022 1030 by Benson Crum)  Discharge to home or other facility with appropriate resources: Identify barriers to discharge with patient and caregiver     Problem: Skin/Tissue Integrity  Goal: Absence of new skin breakdown  Description: 1. Monitor for areas of redness and/or skin breakdown  2. Assess vascular access sites hourly  3. Every 4-6 hours minimum:  Change oxygen saturation probe site  4. Every 4-6 hours:  If on nasal continuous positive airway pressure, respiratory therapy assess nares and determine need for appliance change or resting period.   Outcome: Progressing     Problem: Pain  Goal: Verbalizes/displays adequate comfort level or baseline comfort level  Outcome: Progressing

## 2023-01-01 NOTE — PLAN OF CARE
Problem: Discharge Planning  Goal: Discharge to home or other facility with appropriate resources  1/1/2023 1458 by Leonides Hdez RN  Outcome: Completed     Problem: Skin/Tissue Integrity  Goal: Absence of new skin breakdown  Description: 1. Monitor for areas of redness and/or skin breakdown  2. Assess vascular access sites hourly  3. Every 4-6 hours minimum:  Change oxygen saturation probe site  4. Every 4-6 hours:  If on nasal continuous positive airway pressure, respiratory therapy assess nares and determine need for appliance change or resting period.   1/1/2023 1458 by Leonides Hdez RN  Outcome: Completed     Problem: Pain  Goal: Verbalizes/displays adequate comfort level or baseline comfort level  1/1/2023 1458 by Leonides Hdez RN  Outcome: Completed     Problem: Safety - Adult  Goal: Free from fall injury  1/1/2023 1458 by Leonides Hdez RN  Outcome: Completed     Problem: Chronic Conditions and Co-morbidities  Goal: Patient's chronic conditions and co-morbidity symptoms are monitored and maintained or improved  1/1/2023 1458 by Leonides Hdez RN  Outcome: Completed     Problem: Nutrition Deficit:  Goal: Optimize nutritional status  1/1/2023 1458 by Leonides Hdez RN  Outcome: Completed

## 2023-01-01 NOTE — PROGRESS NOTES
Patient alert and oriented x 4, VSS, assessment done as charted. Patient ready to be discharged today, sister will be coming to pick her up this afternoon.  Will continue to monitor

## 2023-01-01 NOTE — PROGRESS NOTES
Patient alert and oriented VSS skin dry warm and intact, incisions look good. IV removed. Discharge paperwork reviewed with patient and sister, patient assured that has home medication supplied, reviewed medications, provided number for floor and for Dr. Danial Pollack in case she is missing any meds. Pt will call to set up neuro appointment follow up tomorrow. Discharged via wheelchair to sister's car and assisted in.

## 2023-01-01 NOTE — PLAN OF CARE
Problem: Discharge Planning  Goal: Discharge to home or other facility with appropriate resources  1/1/2023 1112 by Hilary Bower RN  Outcome: Adequate for Discharge  Flowsheets (Taken 12/31/2022 1030 by Aracelis Ochoa)  Discharge to home or other facility with appropriate resources: Identify barriers to discharge with patient and caregiver     Problem: Skin/Tissue Integrity  Goal: Absence of new skin breakdown  Description: 1. Monitor for areas of redness and/or skin breakdown  2. Assess vascular access sites hourly  3. Every 4-6 hours minimum:  Change oxygen saturation probe site  4. Every 4-6 hours:  If on nasal continuous positive airway pressure, respiratory therapy assess nares and determine need for appliance change or resting period.   1/1/2023 1112 by Hilary Bower RN  Outcome: Adequate for Discharge     Problem: Pain  Goal: Verbalizes/displays adequate comfort level or baseline comfort level  1/1/2023 1112 by Hilary Bower RN  Outcome: Adequate for Discharge  Flowsheets (Taken 1/1/2023 0930)  Verbalizes/displays adequate comfort level or baseline comfort level: Encourage patient to monitor pain and request assistance     Problem: Safety - Adult  Goal: Free from fall injury  Outcome: Adequate for Discharge     Problem: Chronic Conditions and Co-morbidities  Goal: Patient's chronic conditions and co-morbidity symptoms are monitored and maintained or improved  Outcome: Adequate for Discharge     Problem: Nutrition Deficit:  Goal: Optimize nutritional status  Outcome: Adequate for Discharge

## 2023-01-04 NOTE — DISCHARGE SUMMARY
Physical Medicine & Rehabilitation  Discharge Summary     Patient Identification:  Natalia Greenwood  : 1955  Admit date: 2022  Discharge date: 2023   Attending provider: Kallie Alfaro DO       Primary care provider: [facility]     Discharge Diagnoses:   Patient Active Problem List   Diagnosis    Asthma    Gastroesophageal reflux disease    Hypertension    Adiposity    S/P total knee arthroplasty    Primary osteoarthritis of left knee    Morbid obesity with BMI of 50.0-59.9, adult ([address]    Osteoarthritis of right hip    Cerebellopontine angle meningioma ([address]    Acoustic neuroma ([address]    S/P craniotomy    Multiple subsegmental pulmonary emboli without acute cor pulmonale (HCC)    S/P excision of acoustic neuroma       History of Present Illness/Acute Hospital Course:  Giant Duodenal Ulcer  -monitor Hgb  -Transfuse pRBCs  -Protonix 40 mg BID  -Embolization of GDA  -GI following  -IR following     BL PE, suspected  -currently on heparin gtt  - transition to xarelto     Meningioma  -s/p resection with TL and MCF approach  -Keppra 500 mg BID  -Bowel regiment: Senna, glycolax, and dulcolax  -Nicardipine-Roxicodone PRN  -Acycolvir 400 mg TID for 10 days  -Artifical tears q2H and eye patch  -Keep an eye on mastoid dressing  -SCDs for DVT prophylaxis   -PT/OT     HTN   -Coreg 12.5 mg BID  Losartan 100 mg daily     DM   -Lantus 15u qHS  -HDSSI     Hyperthyroidism   -Methimazole 10 mg daily     Liver Mass  -Incidental finding  -f/u imaging once acute problems have been tx    Inpatient Rehabilitation Course:   Natalia Greenwood is a 79 y.o. female admitted to inpatient rehabilitation on 2022 with S/P excision of acoustic neuroma . The patient participated in an aggressive multidisciplinary inpatient rehabilitation program involving 3 hours of therapy per day, at least 5 days per week.      Impairments: Decreased functional mobility     Medical Management:  Giant Duodenal Ulcer  -monitor Hgb  -Transfuse pRBCs  -Protonix 40 mg BID  -Embolization of GDA  -GI following  -IR following     BL PE, suspected  -currently on heparin gtt  - transition to xarelto     Meningioma  -s/p resection with TL and MCF approach  -Keppra 500 mg BID  -Bowel regiment: Senna, glycolax, and dulcolax  -Nicardipine-Roxicodone PRN  -Acycolvir 400 mg TID for 10 days  -Artifical tears q2H and eye patch  -Keep an eye on mastoid dressing  -SCDs for DVT prophylaxis   -PT/OT     HTN   -Coreg 12.5 mg BID  Losartan 100 mg daily     DM   -Lantus 15u qHS  -HDSSI     Hyperthyroidism   -Methimazole 10 mg daily     Liver Mass  -Incidental finding  -f/u imaging once acute problems have been tx    Discharge Exam:  Constitutional: Alert, WDWN, Pleasant, no distress  Head: Normocephalic, atruamatic, MMM  Eyes: Conjunctiva noninjected, no icterus, no drainage  Pulm: CTA bilat. Respirations non-labored. CV: No murmurs noted. RRR. Abd: Soft, nontender.  NABS+  Ext: No edema, no varicosities  Neuro: Alert, fully oriented, appropriate   MSK: No joint abnormalities noted       Discharge Functional Status:    Physical therapy:  Bed Mobility: Scooting: Stand by assistance  Transfers: Sit to Stand: Minimal Assistance (To RW from [facility] and wheelchair)  Stand to Sit: Minimal Assistance, Ambulation  Surface: Level tile  Device: Rolling Walker  Other Apparatus:  (wheelchair follow)  Assistance: Minimal assistance  Quality of Gait: wide base of support, decreased (B) step length, foot clearance, and heel strike; slow darlene; no loss of balance; fatigues with standing mobility  Gait Deviations: Slow Darlene, Decreased step length, Decreased step height  Distance: 10ftx2+50ft, Stairs  # Steps : 1  Stairs Height: 6\"  Rails: Bilateral  Assistance: Maximum assistance  Mobility:  , PT Equipment Recommendations  Equipment Needed: Yes  Mobility Devices: Pritesh Mcelroyor: Rolling  Other: pt reports she has a rollator at home, not a RW., Assessment: Patient is a 80 yo female that presents to the [place] [id number] on 11/28 for a two part procedure of excision of her acoustic neuroma. Patient is from home alone with very limited family support and will not have assistance once return to home. Pt at baseline has used a rollator for many years due to joint pain and surgeries. During the evaluation, she uses a two wheeled rolling walker to complete all transfers and ambulation. Pt is requring one person assistance for all mobility at this time. She also is requring multiple minute rest breaks and faitgues quickly with all mobility. She is limited by her endurance, functional mobility, and stairs. Pt would benefit from continued therapy in order to improve her independence, functional mobility, safety and ambulation before return home alone. Occupational therapy:  ,  , Assessment: Pt is 78 yo female from home alone reporting independence with ADLs and functional mobility at baseline. Presently, pt requires min A for STS transfers, Max A for LB dressing and can only ambulate short distances due to decreased activity tolerance. Pt highly motivated but requires + time for activities due to becoming easily fatigued. Pt also demonstrates impairments with cognition, insight, dynamic balance and strength. LUE support. Pt would benefit from ongoing skilled OT to maximize functional independence and return to her PLOF.     Speech therapy:         Significant Diagnostics:   Lab Results   Component Value Date    CREATININE <0.5 (L) 12/30/2022    BUN 14 12/30/2022     12/30/2022    K 3.8 12/30/2022     12/30/2022    CO2 26 12/30/2022       Lab Results   Component Value Date    WBC 2.7 (L) 12/30/2022    HGB 9.4 (L) 12/30/2022    HCT 29.2 (L) 12/30/2022    MCV 93.8 12/30/2022     12/30/2022       Disposition:  home    Services:PT/OT  DME: walker     Discharge Condition: Stable    Follow-up:  See after visit summary from hospitalization    Discharge Medications:     Medication List CONTINUE taking these medications      medical marijuana            ASK your doctor about these medications      albuterol sulfate  (90 Base) MCG/ACT inhaler  Commonly known as: PROVENTIL;VENTOLIN;PROAIR     carvedilol 6.25 MG tablet  Commonly known as: COREG     hydroCHLOROthiazide 25 MG tablet  Commonly known as: HYDRODIURIL     latanoprost 0.005 % ophthalmic solution  Commonly known as: XALATAN     losartan 50 MG tablet  Commonly known as: COZAAR     metFORMIN 500 MG extended release tablet  Commonly known as: GLUCOPHAGE-XR     methIMAzole 5 MG tablet  Commonly known as: TAPAZOLE     pravastatin 20 MG tablet  Commonly known as: PRAVACHOL          Pharmacy Instructions:    [facility]. I spent over 35 minutes on this discharge encounter between counseling, coordination of care, and medication reconciliation. To comply with [facility] mathew R.II.4.1:   Discharge order placed in advance to facilitate patients discharge needs.       Rashard Teixeira, DO

## 2023-03-21 RX ORDER — HYDROCODONE BITARTRATE AND ACETAMINOPHEN 5; 325 MG/1; MG/1
TABLET ORAL
Status: ON HOLD | COMMUNITY
Start: 2021-11-22 | End: 2023-03-24 | Stop reason: HOSPADM

## 2023-03-21 RX ORDER — TRAZODONE HYDROCHLORIDE 50 MG/1
TABLET ORAL
COMMUNITY
Start: 2023-01-10

## 2023-03-21 RX ORDER — MECLIZINE HYDROCHLORIDE 25 MG/1
TABLET ORAL
COMMUNITY
Start: 2022-05-24

## 2023-03-22 ENCOUNTER — ANESTHESIA EVENT (OUTPATIENT)
Dept: OPERATING ROOM | Age: 68
End: 2023-03-22
Payer: MEDICARE

## 2023-03-23 ENCOUNTER — APPOINTMENT (OUTPATIENT)
Dept: CT IMAGING | Age: 68
DRG: 032 | End: 2023-03-23
Attending: NEUROLOGICAL SURGERY
Payer: MEDICARE

## 2023-03-23 ENCOUNTER — HOSPITAL ENCOUNTER (INPATIENT)
Age: 68
LOS: 3 days | Discharge: HOME | DRG: 032 | End: 2023-03-26
Attending: NEUROLOGICAL SURGERY | Admitting: NEUROLOGICAL SURGERY
Payer: MEDICARE

## 2023-03-23 ENCOUNTER — ANESTHESIA (OUTPATIENT)
Dept: OPERATING ROOM | Age: 68
End: 2023-03-23
Payer: MEDICARE

## 2023-03-23 DIAGNOSIS — Z98.2 S/P VP SHUNT: Primary | ICD-10-CM

## 2023-03-23 PROBLEM — G96.00 CSF LEAK: Status: ACTIVE | Noted: 2023-03-23

## 2023-03-23 LAB
ABO + RH BLD: NORMAL
BLD GP AB SCN SERPL QL: NORMAL
BLD GP AB SCN SERPL QL: NORMAL
GLUCOSE BLD-MCNC: 152 MG/DL (ref 70–99)
GLUCOSE BLD-MCNC: 153 MG/DL (ref 70–99)
PERFORMED ON: ABNORMAL
PERFORMED ON: ABNORMAL

## 2023-03-23 PROCEDURE — 1200000000 HC SEMI PRIVATE

## 2023-03-23 PROCEDURE — C1892 INTRO/SHEATH,FIXED,PEEL-AWAY: HCPCS | Performed by: NEUROLOGICAL SURGERY

## 2023-03-23 PROCEDURE — 7100000001 HC PACU RECOVERY - ADDTL 15 MIN: Performed by: NEUROLOGICAL SURGERY

## 2023-03-23 PROCEDURE — 86901 BLOOD TYPING SEROLOGIC RH(D): CPT

## 2023-03-23 PROCEDURE — 86900 BLOOD TYPING SEROLOGIC ABO: CPT

## 2023-03-23 PROCEDURE — A4217 STERILE WATER/SALINE, 500 ML: HCPCS | Performed by: NEUROLOGICAL SURGERY

## 2023-03-23 PROCEDURE — 94150 VITAL CAPACITY TEST: CPT

## 2023-03-23 PROCEDURE — 6360000002 HC RX W HCPCS: Performed by: NURSE ANESTHETIST, CERTIFIED REGISTERED

## 2023-03-23 PROCEDURE — 62223 ESTABLISH BRAIN CAVITY SHUNT: CPT | Performed by: SURGERY

## 2023-03-23 PROCEDURE — 0WJG4ZZ INSPECTION OF PERITONEAL CAVITY, PERCUTANEOUS ENDOSCOPIC APPROACH: ICD-10-PCS | Performed by: SURGERY

## 2023-03-23 PROCEDURE — 6360000002 HC RX W HCPCS: Performed by: ANESTHESIOLOGY

## 2023-03-23 PROCEDURE — 0BH18EZ INSERTION OF ENDOTRACHEAL AIRWAY INTO TRACHEA, VIA NATURAL OR ARTIFICIAL OPENING ENDOSCOPIC: ICD-10-PCS | Performed by: SURGERY

## 2023-03-23 PROCEDURE — 00164J6 BYPASS CEREBRAL VENTRICLE TO PERITONEAL CAVITY WITH SYNTHETIC SUBSTITUTE, PERCUTANEOUS ENDOSCOPIC APPROACH: ICD-10-PCS | Performed by: SURGERY

## 2023-03-23 PROCEDURE — 6360000002 HC RX W HCPCS: Performed by: PHYSICIAN ASSISTANT

## 2023-03-23 PROCEDURE — 2709999900 HC NON-CHARGEABLE SUPPLY: Performed by: NEUROLOGICAL SURGERY

## 2023-03-23 PROCEDURE — 2500000003 HC RX 250 WO HCPCS: Performed by: NEUROLOGICAL SURGERY

## 2023-03-23 PROCEDURE — 86850 RBC ANTIBODY SCREEN: CPT

## 2023-03-23 PROCEDURE — 3700000001 HC ADD 15 MINUTES (ANESTHESIA): Performed by: NEUROLOGICAL SURGERY

## 2023-03-23 PROCEDURE — 2580000003 HC RX 258: Performed by: PHYSICIAN ASSISTANT

## 2023-03-23 PROCEDURE — 00913ZZ DRAINAGE OF CEREBRAL MENINGES, PERCUTANEOUS APPROACH: ICD-10-PCS | Performed by: SURGERY

## 2023-03-23 PROCEDURE — 2580000003 HC RX 258: Performed by: NURSE ANESTHETIST, CERTIFIED REGISTERED

## 2023-03-23 PROCEDURE — 3700000000 HC ANESTHESIA ATTENDED CARE: Performed by: NEUROLOGICAL SURGERY

## 2023-03-23 PROCEDURE — 6370000000 HC RX 637 (ALT 250 FOR IP): Performed by: ANESTHESIOLOGY

## 2023-03-23 PROCEDURE — 6360000002 HC RX W HCPCS: Performed by: NEUROLOGICAL SURGERY

## 2023-03-23 PROCEDURE — 70450 CT HEAD/BRAIN W/O DYE: CPT

## 2023-03-23 PROCEDURE — 2780000010 HC IMPLANT OTHER: Performed by: NEUROLOGICAL SURGERY

## 2023-03-23 PROCEDURE — 2580000003 HC RX 258: Performed by: NEUROLOGICAL SURGERY

## 2023-03-23 PROCEDURE — 8E09XBG COMPUTER ASSISTED PROCEDURE OF HEAD AND NECK REGION, WITH COMPUTERIZED TOMOGRAPHY: ICD-10-PCS | Performed by: SURGERY

## 2023-03-23 PROCEDURE — 3600000014 HC SURGERY LEVEL 4 ADDTL 15MIN: Performed by: NEUROLOGICAL SURGERY

## 2023-03-23 PROCEDURE — 6370000000 HC RX 637 (ALT 250 FOR IP): Performed by: NEUROLOGICAL SURGERY

## 2023-03-23 PROCEDURE — 7100000000 HC PACU RECOVERY - FIRST 15 MIN: Performed by: NEUROLOGICAL SURGERY

## 2023-03-23 PROCEDURE — 2500000003 HC RX 250 WO HCPCS: Performed by: PHYSICIAN ASSISTANT

## 2023-03-23 PROCEDURE — C1713 ANCHOR/SCREW BN/BN,TIS/BN: HCPCS | Performed by: NEUROLOGICAL SURGERY

## 2023-03-23 PROCEDURE — 94640 AIRWAY INHALATION TREATMENT: CPT

## 2023-03-23 PROCEDURE — 6370000000 HC RX 637 (ALT 250 FOR IP): Performed by: PHYSICIAN ASSISTANT

## 2023-03-23 PROCEDURE — C1894 INTRO/SHEATH, NON-LASER: HCPCS | Performed by: NEUROLOGICAL SURGERY

## 2023-03-23 PROCEDURE — 3600000004 HC SURGERY LEVEL 4 BASE: Performed by: NEUROLOGICAL SURGERY

## 2023-03-23 PROCEDURE — 2500000003 HC RX 250 WO HCPCS: Performed by: NURSE ANESTHETIST, CERTIFIED REGISTERED

## 2023-03-23 DEVICE — SCREW, AXS, SELF-DRILLING
Type: IMPLANTABLE DEVICE | Status: FUNCTIONAL
Brand: UNIVERSAL NEURO 3

## 2023-03-23 DEVICE — SHUNT PLATE, WITH TAB, 14MM
Type: IMPLANTABLE DEVICE | Status: FUNCTIONAL
Brand: UNIVERSAL NEURO 3

## 2023-03-23 RX ORDER — CARVEDILOL 6.25 MG/1
6.25 TABLET ORAL 2 TIMES DAILY WITH MEALS
Status: DISCONTINUED | OUTPATIENT
Start: 2023-03-23 | End: 2023-03-26 | Stop reason: HOSPADM

## 2023-03-23 RX ORDER — MEPERIDINE HYDROCHLORIDE 25 MG/ML
12.5 INJECTION INTRAMUSCULAR; INTRAVENOUS; SUBCUTANEOUS AS NEEDED
Status: DISCONTINUED | OUTPATIENT
Start: 2023-03-23 | End: 2023-03-23 | Stop reason: HOSPADM

## 2023-03-23 RX ORDER — ONDANSETRON 2 MG/ML
INJECTION INTRAMUSCULAR; INTRAVENOUS PRN
Status: DISCONTINUED | OUTPATIENT
Start: 2023-03-23 | End: 2023-03-23 | Stop reason: SDUPTHER

## 2023-03-23 RX ORDER — SODIUM CHLORIDE 0.9 % (FLUSH) 0.9 %
5-40 SYRINGE (ML) INJECTION PRN
Status: DISCONTINUED | OUTPATIENT
Start: 2023-03-23 | End: 2023-03-26 | Stop reason: HOSPADM

## 2023-03-23 RX ORDER — METHIMAZOLE 5 MG/1
10 TABLET ORAL DAILY
Status: DISCONTINUED | OUTPATIENT
Start: 2023-03-23 | End: 2023-03-26 | Stop reason: HOSPADM

## 2023-03-23 RX ORDER — PROPOFOL 10 MG/ML
INJECTION, EMULSION INTRAVENOUS PRN
Status: DISCONTINUED | OUTPATIENT
Start: 2023-03-23 | End: 2023-03-23 | Stop reason: SDUPTHER

## 2023-03-23 RX ORDER — PRAVASTATIN SODIUM 20 MG
20 TABLET ORAL NIGHTLY
Status: DISCONTINUED | OUTPATIENT
Start: 2023-03-23 | End: 2023-03-26 | Stop reason: HOSPADM

## 2023-03-23 RX ORDER — ONDANSETRON 2 MG/ML
4 INJECTION INTRAMUSCULAR; INTRAVENOUS
Status: DISCONTINUED | OUTPATIENT
Start: 2023-03-23 | End: 2023-03-23 | Stop reason: HOSPADM

## 2023-03-23 RX ORDER — HEPARIN SODIUM 5000 [USP'U]/ML
5000 INJECTION, SOLUTION INTRAVENOUS; SUBCUTANEOUS EVERY 8 HOURS SCHEDULED
Status: DISCONTINUED | OUTPATIENT
Start: 2023-03-24 | End: 2023-03-26 | Stop reason: HOSPADM

## 2023-03-23 RX ORDER — MORPHINE SULFATE 2 MG/ML
2 INJECTION, SOLUTION INTRAMUSCULAR; INTRAVENOUS
Status: DISCONTINUED | OUTPATIENT
Start: 2023-03-23 | End: 2023-03-26 | Stop reason: HOSPADM

## 2023-03-23 RX ORDER — SODIUM CHLORIDE 0.9 % (FLUSH) 0.9 %
5-40 SYRINGE (ML) INJECTION PRN
Status: DISCONTINUED | OUTPATIENT
Start: 2023-03-23 | End: 2023-03-23 | Stop reason: HOSPADM

## 2023-03-23 RX ORDER — PREDNISOLONE ACETATE 10 MG/ML
1 SUSPENSION/ DROPS OPHTHALMIC 3 TIMES DAILY
Status: DISCONTINUED | OUTPATIENT
Start: 2023-03-23 | End: 2023-03-26 | Stop reason: HOSPADM

## 2023-03-23 RX ORDER — HYDROCHLOROTHIAZIDE 25 MG/1
25 TABLET ORAL DAILY
Status: DISCONTINUED | OUTPATIENT
Start: 2023-03-23 | End: 2023-03-26 | Stop reason: HOSPADM

## 2023-03-23 RX ORDER — SODIUM CHLORIDE, SODIUM LACTATE, POTASSIUM CHLORIDE, CALCIUM CHLORIDE 600; 310; 30; 20 MG/100ML; MG/100ML; MG/100ML; MG/100ML
INJECTION, SOLUTION INTRAVENOUS CONTINUOUS
Status: DISCONTINUED | OUTPATIENT
Start: 2023-03-23 | End: 2023-03-23 | Stop reason: HOSPADM

## 2023-03-23 RX ORDER — LOSARTAN POTASSIUM 25 MG/1
50 TABLET ORAL DAILY
Status: DISCONTINUED | OUTPATIENT
Start: 2023-03-23 | End: 2023-03-26 | Stop reason: HOSPADM

## 2023-03-23 RX ORDER — METFORMIN HYDROCHLORIDE 500 MG/1
1000 TABLET, EXTENDED RELEASE ORAL
Status: DISCONTINUED | OUTPATIENT
Start: 2023-03-23 | End: 2023-03-26 | Stop reason: HOSPADM

## 2023-03-23 RX ORDER — MECLIZINE HYDROCHLORIDE 25 MG/1
25 TABLET ORAL 2 TIMES DAILY PRN
Status: DISCONTINUED | OUTPATIENT
Start: 2023-03-23 | End: 2023-03-26 | Stop reason: HOSPADM

## 2023-03-23 RX ORDER — PROCHLORPERAZINE EDISYLATE 5 MG/ML
5 INJECTION INTRAMUSCULAR; INTRAVENOUS
Status: DISCONTINUED | OUTPATIENT
Start: 2023-03-23 | End: 2023-03-23 | Stop reason: HOSPADM

## 2023-03-23 RX ORDER — FENTANYL CITRATE 50 UG/ML
INJECTION, SOLUTION INTRAMUSCULAR; INTRAVENOUS PRN
Status: DISCONTINUED | OUTPATIENT
Start: 2023-03-23 | End: 2023-03-23 | Stop reason: SDUPTHER

## 2023-03-23 RX ORDER — ONDANSETRON 2 MG/ML
4 INJECTION INTRAMUSCULAR; INTRAVENOUS EVERY 6 HOURS PRN
Status: DISCONTINUED | OUTPATIENT
Start: 2023-03-23 | End: 2023-03-26 | Stop reason: HOSPADM

## 2023-03-23 RX ORDER — ACETAMINOPHEN 325 MG/1
650 TABLET ORAL
Status: COMPLETED | OUTPATIENT
Start: 2023-03-23 | End: 2023-03-23

## 2023-03-23 RX ORDER — PREDNISOLONE ACETATE 10 MG/ML
1 SUSPENSION/ DROPS OPHTHALMIC 3 TIMES DAILY
COMMUNITY

## 2023-03-23 RX ORDER — MORPHINE SULFATE 2 MG/ML
4 INJECTION, SOLUTION INTRAMUSCULAR; INTRAVENOUS
Status: DISCONTINUED | OUTPATIENT
Start: 2023-03-23 | End: 2023-03-26 | Stop reason: HOSPADM

## 2023-03-23 RX ORDER — DEXAMETHASONE SODIUM PHOSPHATE 4 MG/ML
INJECTION, SOLUTION INTRA-ARTICULAR; INTRALESIONAL; INTRAMUSCULAR; INTRAVENOUS; SOFT TISSUE PRN
Status: DISCONTINUED | OUTPATIENT
Start: 2023-03-23 | End: 2023-03-23 | Stop reason: SDUPTHER

## 2023-03-23 RX ORDER — METHOCARBAMOL 750 MG/1
750 TABLET, FILM COATED ORAL EVERY 8 HOURS PRN
Status: DISPENSED | OUTPATIENT
Start: 2023-03-23 | End: 2023-03-26

## 2023-03-23 RX ORDER — MIDAZOLAM HYDROCHLORIDE 1 MG/ML
INJECTION INTRAMUSCULAR; INTRAVENOUS PRN
Status: DISCONTINUED | OUTPATIENT
Start: 2023-03-23 | End: 2023-03-23 | Stop reason: SDUPTHER

## 2023-03-23 RX ORDER — SODIUM CHLORIDE 0.9 % (FLUSH) 0.9 %
5-40 SYRINGE (ML) INJECTION EVERY 12 HOURS SCHEDULED
Status: DISCONTINUED | OUTPATIENT
Start: 2023-03-23 | End: 2023-03-23 | Stop reason: HOSPADM

## 2023-03-23 RX ORDER — CLINDAMYCIN PHOSPHATE 900 MG/50ML
900 INJECTION INTRAVENOUS EVERY 8 HOURS
Status: DISCONTINUED | OUTPATIENT
Start: 2023-03-23 | End: 2023-03-26 | Stop reason: HOSPADM

## 2023-03-23 RX ORDER — LIDOCAINE HYDROCHLORIDE 10 MG/ML
1 INJECTION, SOLUTION EPIDURAL; INFILTRATION; INTRACAUDAL; PERINEURAL
Status: DISCONTINUED | OUTPATIENT
Start: 2023-03-23 | End: 2023-03-23 | Stop reason: HOSPADM

## 2023-03-23 RX ORDER — NALOXONE HYDROCHLORIDE 0.4 MG/ML
0.2 INJECTION, SOLUTION INTRAMUSCULAR; INTRAVENOUS; SUBCUTANEOUS PRN
Status: DISCONTINUED | OUTPATIENT
Start: 2023-03-23 | End: 2023-03-26 | Stop reason: HOSPADM

## 2023-03-23 RX ORDER — OXYCODONE HYDROCHLORIDE 5 MG/1
5 TABLET ORAL EVERY 4 HOURS PRN
Status: DISCONTINUED | OUTPATIENT
Start: 2023-03-23 | End: 2023-03-26 | Stop reason: HOSPADM

## 2023-03-23 RX ORDER — DIPHENHYDRAMINE HYDROCHLORIDE 50 MG/ML
12.5 INJECTION INTRAMUSCULAR; INTRAVENOUS
Status: DISCONTINUED | OUTPATIENT
Start: 2023-03-23 | End: 2023-03-23 | Stop reason: HOSPADM

## 2023-03-23 RX ORDER — BUPIVACAINE HYDROCHLORIDE AND EPINEPHRINE 5; 5 MG/ML; UG/ML
INJECTION, SOLUTION EPIDURAL; INTRACAUDAL; PERINEURAL PRN
Status: DISCONTINUED | OUTPATIENT
Start: 2023-03-23 | End: 2023-03-23 | Stop reason: HOSPADM

## 2023-03-23 RX ORDER — ROCURONIUM BROMIDE 10 MG/ML
INJECTION, SOLUTION INTRAVENOUS PRN
Status: DISCONTINUED | OUTPATIENT
Start: 2023-03-23 | End: 2023-03-23 | Stop reason: SDUPTHER

## 2023-03-23 RX ORDER — CLINDAMYCIN PHOSPHATE 900 MG/50ML
900 INJECTION INTRAVENOUS ONCE
Status: DISCONTINUED | OUTPATIENT
Start: 2023-03-23 | End: 2023-03-23 | Stop reason: HOSPADM

## 2023-03-23 RX ORDER — LIDOCAINE HYDROCHLORIDE 20 MG/ML
INJECTION, SOLUTION INTRAVENOUS PRN
Status: DISCONTINUED | OUTPATIENT
Start: 2023-03-23 | End: 2023-03-23 | Stop reason: SDUPTHER

## 2023-03-23 RX ORDER — SODIUM CHLORIDE, SODIUM LACTATE, POTASSIUM CHLORIDE, CALCIUM CHLORIDE 600; 310; 30; 20 MG/100ML; MG/100ML; MG/100ML; MG/100ML
INJECTION, SOLUTION INTRAVENOUS CONTINUOUS PRN
Status: DISCONTINUED | OUTPATIENT
Start: 2023-03-23 | End: 2023-03-23 | Stop reason: SDUPTHER

## 2023-03-23 RX ORDER — HYDRALAZINE HYDROCHLORIDE 20 MG/ML
10 INJECTION INTRAMUSCULAR; INTRAVENOUS
Status: DISCONTINUED | OUTPATIENT
Start: 2023-03-23 | End: 2023-03-23 | Stop reason: HOSPADM

## 2023-03-23 RX ORDER — SODIUM CHLORIDE 9 MG/ML
INJECTION, SOLUTION INTRAVENOUS PRN
Status: DISCONTINUED | OUTPATIENT
Start: 2023-03-23 | End: 2023-03-23 | Stop reason: HOSPADM

## 2023-03-23 RX ORDER — ONDANSETRON 4 MG/1
4 TABLET, ORALLY DISINTEGRATING ORAL EVERY 8 HOURS PRN
Status: DISCONTINUED | OUTPATIENT
Start: 2023-03-23 | End: 2023-03-26 | Stop reason: HOSPADM

## 2023-03-23 RX ORDER — SODIUM CHLORIDE 0.9 % (FLUSH) 0.9 %
5-40 SYRINGE (ML) INJECTION EVERY 12 HOURS SCHEDULED
Status: DISCONTINUED | OUTPATIENT
Start: 2023-03-23 | End: 2023-03-26 | Stop reason: HOSPADM

## 2023-03-23 RX ORDER — SODIUM CHLORIDE 9 MG/ML
INJECTION, SOLUTION INTRAVENOUS CONTINUOUS
Status: DISCONTINUED | OUTPATIENT
Start: 2023-03-23 | End: 2023-03-26 | Stop reason: HOSPADM

## 2023-03-23 RX ORDER — CLINDAMYCIN PHOSPHATE 900 MG/50ML
INJECTION INTRAVENOUS PRN
Status: DISCONTINUED | OUTPATIENT
Start: 2023-03-23 | End: 2023-03-23 | Stop reason: SDUPTHER

## 2023-03-23 RX ORDER — SODIUM CHLORIDE 9 MG/ML
INJECTION, SOLUTION INTRAVENOUS PRN
Status: DISCONTINUED | OUTPATIENT
Start: 2023-03-23 | End: 2023-03-26 | Stop reason: HOSPADM

## 2023-03-23 RX ADMIN — OXYCODONE HYDROCHLORIDE 5 MG: 5 TABLET ORAL at 17:33

## 2023-03-23 RX ADMIN — SODIUM CHLORIDE, PRESERVATIVE FREE 10 ML: 5 INJECTION INTRAVENOUS at 21:18

## 2023-03-23 RX ADMIN — SODIUM CHLORIDE, SODIUM LACTATE, POTASSIUM CHLORIDE, AND CALCIUM CHLORIDE: .6; .31; .03; .02 INJECTION, SOLUTION INTRAVENOUS at 08:35

## 2023-03-23 RX ADMIN — ROCURONIUM BROMIDE 10 MG: 10 INJECTION INTRAVENOUS at 09:11

## 2023-03-23 RX ADMIN — DEXAMETHASONE SODIUM PHOSPHATE 4 MG: 4 INJECTION, SOLUTION INTRAMUSCULAR; INTRAVENOUS at 08:22

## 2023-03-23 RX ADMIN — MIDAZOLAM HYDROCHLORIDE 2 MG: 2 INJECTION, SOLUTION INTRAMUSCULAR; INTRAVENOUS at 07:56

## 2023-03-23 RX ADMIN — LIDOCAINE HYDROCHLORIDE 100 MG: 20 INJECTION, SOLUTION INTRAVENOUS at 07:56

## 2023-03-23 RX ADMIN — SUGAMMADEX 200 MG: 100 INJECTION, SOLUTION INTRAVENOUS at 09:53

## 2023-03-23 RX ADMIN — CLINDAMYCIN PHOSPHATE 900 MG: 900 INJECTION, SOLUTION INTRAVENOUS at 17:26

## 2023-03-23 RX ADMIN — METHOCARBAMOL 750 MG: 750 TABLET ORAL at 17:32

## 2023-03-23 RX ADMIN — PREDNISOLONE ACETATE 1 DROP: 10 SUSPENSION/ DROPS OPHTHALMIC at 21:17

## 2023-03-23 RX ADMIN — HYDROMORPHONE HYDROCHLORIDE 0.5 MG: 1 INJECTION, SOLUTION INTRAMUSCULAR; INTRAVENOUS; SUBCUTANEOUS at 11:28

## 2023-03-23 RX ADMIN — PROPOFOL 150 MG: 10 INJECTION, EMULSION INTRAVENOUS at 07:56

## 2023-03-23 RX ADMIN — CLINDAMYCIN PHOSPHATE 900 MG: 900 INJECTION, SOLUTION INTRAVENOUS at 08:08

## 2023-03-23 RX ADMIN — METFORMIN HYDROCHLORIDE 1000 MG: 500 TABLET, EXTENDED RELEASE ORAL at 17:18

## 2023-03-23 RX ADMIN — HYDROMORPHONE HYDROCHLORIDE 0.5 MG: 1 INJECTION, SOLUTION INTRAMUSCULAR; INTRAVENOUS; SUBCUTANEOUS at 10:31

## 2023-03-23 RX ADMIN — CARVEDILOL 6.25 MG: 6.25 TABLET, FILM COATED ORAL at 17:17

## 2023-03-23 RX ADMIN — METHIMAZOLE 10 MG: 5 TABLET ORAL at 17:18

## 2023-03-23 RX ADMIN — HYDROCHLOROTHIAZIDE 25 MG: 25 TABLET ORAL at 17:17

## 2023-03-23 RX ADMIN — METHOCARBAMOL 1000 MG: 100 INJECTION INTRAMUSCULAR; INTRAVENOUS at 10:55

## 2023-03-23 RX ADMIN — FENTANYL CITRATE 50 MCG: 50 INJECTION, SOLUTION INTRAMUSCULAR; INTRAVENOUS at 09:09

## 2023-03-23 RX ADMIN — HYDROMORPHONE HYDROCHLORIDE 0.5 MG: 1 INJECTION, SOLUTION INTRAMUSCULAR; INTRAVENOUS; SUBCUTANEOUS at 10:49

## 2023-03-23 RX ADMIN — ROCURONIUM BROMIDE 40 MG: 10 INJECTION INTRAVENOUS at 08:30

## 2023-03-23 RX ADMIN — LOSARTAN POTASSIUM 50 MG: 25 TABLET, FILM COATED ORAL at 17:17

## 2023-03-23 RX ADMIN — PHENYLEPHRINE HYDROCHLORIDE 100 MCG: 10 INJECTION, SOLUTION INTRAMUSCULAR; INTRAVENOUS; SUBCUTANEOUS at 08:16

## 2023-03-23 RX ADMIN — ACETAMINOPHEN 650 MG: 325 TABLET ORAL at 14:13

## 2023-03-23 RX ADMIN — SODIUM CHLORIDE, SODIUM LACTATE, POTASSIUM CHLORIDE, AND CALCIUM CHLORIDE: .6; .31; .03; .02 INJECTION, SOLUTION INTRAVENOUS at 07:39

## 2023-03-23 RX ADMIN — ROCURONIUM BROMIDE 50 MG: 10 INJECTION INTRAVENOUS at 07:57

## 2023-03-23 RX ADMIN — PHENYLEPHRINE HYDROCHLORIDE 100 MCG: 10 INJECTION, SOLUTION INTRAMUSCULAR; INTRAVENOUS; SUBCUTANEOUS at 09:39

## 2023-03-23 RX ADMIN — PHENYLEPHRINE HYDROCHLORIDE 100 MCG: 10 INJECTION, SOLUTION INTRAMUSCULAR; INTRAVENOUS; SUBCUTANEOUS at 08:35

## 2023-03-23 RX ADMIN — SODIUM CHLORIDE: 9 INJECTION, SOLUTION INTRAVENOUS at 14:15

## 2023-03-23 RX ADMIN — FENTANYL CITRATE 100 MCG: 50 INJECTION, SOLUTION INTRAMUSCULAR; INTRAVENOUS at 07:56

## 2023-03-23 RX ADMIN — MORPHINE SULFATE 4 MG: 2 INJECTION, SOLUTION INTRAMUSCULAR; INTRAVENOUS at 21:18

## 2023-03-23 RX ADMIN — ONDANSETRON 4 MG: 2 INJECTION INTRAMUSCULAR; INTRAVENOUS at 09:23

## 2023-03-23 RX ADMIN — PRAVASTATIN SODIUM 20 MG: 20 TABLET ORAL at 21:17

## 2023-03-23 ASSESSMENT — PAIN DESCRIPTION - LOCATION
LOCATION: HEAD
LOCATION: ABDOMEN;HEAD
LOCATION: HEAD
LOCATION: HEAD

## 2023-03-23 ASSESSMENT — PAIN DESCRIPTION - PAIN TYPE
TYPE: ACUTE PAIN;SURGICAL PAIN
TYPE: ACUTE PAIN;SURGICAL PAIN
TYPE: SURGICAL PAIN
TYPE: ACUTE PAIN;SURGICAL PAIN

## 2023-03-23 ASSESSMENT — PAIN SCALES - GENERAL
PAINLEVEL_OUTOF10: 8
PAINLEVEL_OUTOF10: 4
PAINLEVEL_OUTOF10: 5
PAINLEVEL_OUTOF10: 4
PAINLEVEL_OUTOF10: 9
PAINLEVEL_OUTOF10: 3
PAINLEVEL_OUTOF10: 9
PAINLEVEL_OUTOF10: 7

## 2023-03-23 ASSESSMENT — PAIN - FUNCTIONAL ASSESSMENT
PAIN_FUNCTIONAL_ASSESSMENT: PREVENTS OR INTERFERES SOME ACTIVE ACTIVITIES AND ADLS
PAIN_FUNCTIONAL_ASSESSMENT: PREVENTS OR INTERFERES SOME ACTIVE ACTIVITIES AND ADLS
PAIN_FUNCTIONAL_ASSESSMENT: 0-10
PAIN_FUNCTIONAL_ASSESSMENT: PREVENTS OR INTERFERES SOME ACTIVE ACTIVITIES AND ADLS
PAIN_FUNCTIONAL_ASSESSMENT: 0-10
PAIN_FUNCTIONAL_ASSESSMENT: PREVENTS OR INTERFERES SOME ACTIVE ACTIVITIES AND ADLS
PAIN_FUNCTIONAL_ASSESSMENT: ACTIVITIES ARE NOT PREVENTED

## 2023-03-23 ASSESSMENT — PAIN DESCRIPTION - ONSET
ONSET: ON-GOING

## 2023-03-23 ASSESSMENT — PAIN DESCRIPTION - ORIENTATION
ORIENTATION: ANTERIOR;UPPER
ORIENTATION: ANTERIOR

## 2023-03-23 ASSESSMENT — PAIN DESCRIPTION - DESCRIPTORS
DESCRIPTORS: ACHING
DESCRIPTORS: SORE;ACHING
DESCRIPTORS: SORE;ACHING
DESCRIPTORS: ACHING;SORE
DESCRIPTORS: ACHING;THROBBING
DESCRIPTORS: SORE;ACHING
DESCRIPTORS: ACHING

## 2023-03-23 ASSESSMENT — PAIN DESCRIPTION - FREQUENCY
FREQUENCY: CONTINUOUS

## 2023-03-23 ASSESSMENT — LIFESTYLE VARIABLES: SMOKING_STATUS: 0

## 2023-03-23 NOTE — CONSULTS
Take 6.25 mg by mouth 2 times daily (with meals)    Historical Provider, MD   pravastatin (PRAVACHOL) 20 MG tablet Take 20 mg by mouth nightly     Historical Provider, MD   hydrochlorothiazide (HYDRODIURIL) 25 MG tablet Take 25 mg by mouth daily    Historical Provider, MD       Allergies:  Keflex [cephalexin], Codeine, and Tomato    Social History:     TOBACCO:   reports that she has quit smoking. She has never used smokeless tobacco.  ETOH:   reports no history of alcohol use. Family History:  Reviewed in detail and negative for DM, CAD, Cancer, CVA. Positive as follows:        Problem Relation Age of Onset    Cancer Mother     Hypotension Mother     Cancer Father     Cancer Sister     Hypotension Sister     Cancer Maternal Grandmother     Cancer Maternal Grandfather     Cancer Paternal Grandmother     Cancer Paternal Grandfather     Hypotension Other     Cancer Other     Diabetes Other     Osteoarthritis Other        REVIEW OF SYSTEMS COMPLETED:   Pertinent positives as noted in the HPI. All other systems reviewed and negative. PHYSICAL EXAM PERFORMED:  /66   Pulse 63   Temp 98.2 °F (36.8 °C) (Oral)   Resp 18   Ht 5' 5\" (1.651 m)   Wt 279 lb (126.6 kg)   SpO2 97%   BMI 46.43 kg/m²   General appearance: No apparent distress, appears stated age and cooperative. HEENT: Normal cephalic, atraumatic without obvious deformity. Neck: Supple, with full range of motion. No jugular venous distention. Respiratory:  Normal respiratory effort. Clear to auscultation, bilaterally without Rales/Wheezes/Rhonchi. Cardiovascular: Regular rate and rhythm with normal S1/S2 without murmurs, rubs or gallops. Abdomen: Soft, non-tender, non-distended with normal bowel sounds. Musculoskeletal: No clubbing, cyanosis or edema bilaterally. Skin: Skin color, texture, turgor normal.  No rashes or lesions.   Neurologic:   grossly non-focal.  Psychiatric: Alert and oriented, thought content appropriate, normal

## 2023-03-23 NOTE — ANESTHESIA PRE PROCEDURE
Department of Anesthesiology  Preprocedure Note       Name:  Milton Bonner   Age:  79 y.o.  :  1955                                          MRN:  [id number]         Date:  3/23/2023      Surgeon: Yvonne Tong):  Salome Katz. MD Yudi Gracia MD    Procedure: Procedure(s):  RIGHT VENTRICULOPERITONEAL SHUNT  . Medications prior to admission:   Prior to Admission medications    Medication Sig Start Date End Date Taking? Authorizing Provider   prednisoLONE acetate (PRED FORTE) 1 % ophthalmic suspension Place 1 drop into the right eye in the morning, at noon, and at bedtime Weaning off   Started at 4 times/day x 2 weeks, then 3 times /day x 2 weeks, then 2 times/day for 2 weeks, then 1 time per day x 2 weeks    Historical Provider, MD   traZODone (DESYREL) 50 MG tablet TRAZODONE HCL 50 MG TABS 1/10/23   Historical Provider, MD   meclizine (ANTIVERT) 25 MG tablet Take by mouth 22   Historical Provider, MD   HYDROcodone-acetaminophen (NORCO) 5-325 MG per tablet HYDROcodone-acetaminophen (NORCO) 5-325 mg tablet 21   Historical Provider, MD   Cholecalciferol 50 MCG ( UT) CAPS Take by mouth 21   Historical Provider, MD   medical marijuana Take 1 each by mouth as needed.     Historical Provider, MD   losartan (COZAAR) 50 MG tablet Take 50 mg by mouth daily    Historical Provider, MD   methIMAzole (TAPAZOLE) 5 MG tablet Take 10 mg by mouth daily    Historical Provider, MD   albuterol sulfate HFA (PROVENTIL;VENTOLIN;PROAIR) 108 (90 Base) MCG/ACT inhaler Inhale 2 puffs into the lungs every 4 hours as needed 10/18/21   Historical Provider, MD   metFORMIN (GLUCOPHAGE-XR) 500 MG extended release tablet Take 1,000 mg by mouth Daily with supper    Historical Provider, MD   latanoprost (XALATAN) 0.005 % ophthalmic solution Place 1 drop into both eyes nightly    Historical Provider, MD   carvedilol (COREG) 6.25 MG tablet Take 6.25 mg by mouth 2 times daily (with meals)    Historical Provider, MD

## 2023-03-23 NOTE — H&P
I saw and examined Lisseth Molina on 3/23/2023There has been no change to her history or physical in the interval time since consent. She will proceed with the planned procedure.      Unknown Sandifer PA-C

## 2023-03-23 NOTE — PLAN OF CARE
Neurotology Progress Note    S:  Appears well. Reports discomfort across head. O:   --Mastoid wrap in place. No evidence of leak. --Right facial weakness (baseline):  Limited excursion of right orbicularis oris; however, complete eye closure. 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 A:  Earlier today Mrs. Delio Gamboa underwent shunt and decompression of right pseudomeningocele. P:  --Maintain mastoid wrap for now. --Will reassess in AM.  --Remainder of care per NSU.

## 2023-03-23 NOTE — ANESTHESIA POSTPROCEDURE EVALUATION
Department of Anesthesiology  Postprocedure Note    Patient: Manjinder Lloyd  MRN: [id number]  [place]: 1955  Date of evaluation: 3/23/2023      Procedure Summary     Date: 03/23/23 Room / Location: [facility] OR [address] [place] [address]    Anesthesia Start: 4157 Anesthesia Stop: 1480    Procedures:       LEFT VENTRICULOPERITONEAL SHUNT (Left)      . (Left) Diagnosis:       Other disorders of meninges, not elsewhere classified      (Other disorders of meninges, not elsewhere classified [G96.198])    Surgeons: Michelle Gaines. Nury Colon MD; Tonja Schmid MD Responsible Provider: Amaury Edwards DO    Anesthesia Type: MAC ASA Status: 3          Anesthesia Type: No value filed.     Michelle Phase I: Michelle Score: 8    Michelle Phase II:        Anesthesia Post Evaluation    Patient location during evaluation: PACU  Patient participation: complete - patient participated  Level of consciousness: awake and alert  Pain score: 0  Airway patency: patent  Nausea & Vomiting: no nausea and no vomiting  Complications: no  Cardiovascular status: hemodynamically stable  Respiratory status: acceptable  Hydration status: stable

## 2023-03-23 NOTE — FLOWSHEET NOTE
PACU Transfer to Floor Note    Procedure(s):  LEFT VENTRICULOPERITONEAL SHUNT  . Current Allergies: Keflex [cephalexin], Codeine, and Tomato    Pt meets criteria as per Michelle Score and ASPAN Standards to transfer to next phase of care. Recent Labs     03/23/23  0636 03/23/23  1032   POCGLU 152* 153*          03/23/23 1430   Vital Signs   Temp 97.6 °F (36.4 °C)   Temp Source Temporal   Heart Rate 65   Heart Rate Source Monitor   Resp 18   BP (!) 144/69   MAP (Calculated) 94   MAP (mmHg) 92   BP Location Right upper arm   BP Method Automatic   Patient Position Semi fowlers   Level of Consciousness 0   MEWS Score 1   Oxygen Therapy   SpO2 98 %     O2 Flow Rate (L/min): 1 L/min      Intake/Output Summary (Last 24 hours) at 3/23/2023 1541  Last data filed at 3/23/2023 1430  Gross per 24 hour   Intake 2350 ml   Output 10 ml   Net 2340 ml       Pain assessment:  present - adequately treated    Pain Level: 4    Patient was assessed for alterations to skin integrity. There were no alterations observed. Is patient incontinent: no    Handoff report given at bedside.    Family updated and directed to pt room      3/23/2023 3:41 PM

## 2023-03-24 PROBLEM — Z98.2 S/P VP SHUNT: Status: ACTIVE | Noted: 2023-03-24

## 2023-03-24 LAB
APTT BLD: 26.1 SEC (ref 23–34.3)
BASOPHILS # BLD: 0 K/UL (ref 0–0.2)
BASOPHILS NFR BLD: 0.1 %
DEPRECATED RDW RBC AUTO: 16.2 % (ref 12.4–15.4)
EOSINOPHIL # BLD: 0.1 K/UL (ref 0–0.6)
EOSINOPHIL NFR BLD: 1.1 %
HCT VFR BLD AUTO: 36.4 % (ref 36–48)
HGB BLD-MCNC: 11.6 G/DL (ref 12–16)
INR PPP: 1.06 (ref 0.87–1.14)
LYMPHOCYTES # BLD: 1 K/UL (ref 1–5.1)
LYMPHOCYTES NFR BLD: 14.1 %
MCH RBC QN AUTO: 28 PG (ref 26–34)
MCHC RBC AUTO-ENTMCNC: 31.9 G/DL (ref 31–36)
MCV RBC AUTO: 88 FL (ref 80–100)
MONOCYTES # BLD: 0.7 K/UL (ref 0–1.3)
MONOCYTES NFR BLD: 9.2 %
NEUTROPHILS # BLD: 5.5 K/UL (ref 1.7–7.7)
NEUTROPHILS NFR BLD: 75.5 %
PLATELET # BLD AUTO: 203 K/UL (ref 135–450)
PMV BLD AUTO: 7.4 FL (ref 5–10.5)
PROTHROMBIN TIME: 13.7 SEC (ref 11.7–14.5)
RBC # BLD AUTO: 4.14 M/UL (ref 4–5.2)
WBC # BLD AUTO: 7.3 K/UL (ref 4–11)

## 2023-03-24 PROCEDURE — 99239 HOSP IP/OBS DSCHRG MGMT >30: CPT | Performed by: NURSE PRACTITIONER

## 2023-03-24 PROCEDURE — 6370000000 HC RX 637 (ALT 250 FOR IP): Performed by: PHYSICIAN ASSISTANT

## 2023-03-24 PROCEDURE — 99024 POSTOP FOLLOW-UP VISIT: CPT | Performed by: NURSE PRACTITIONER

## 2023-03-24 PROCEDURE — 36415 COLL VENOUS BLD VENIPUNCTURE: CPT

## 2023-03-24 PROCEDURE — 80186 ASSAY OF PHENYTOIN FREE: CPT

## 2023-03-24 PROCEDURE — 97166 OT EVAL MOD COMPLEX 45 MIN: CPT

## 2023-03-24 PROCEDURE — 97116 GAIT TRAINING THERAPY: CPT

## 2023-03-24 PROCEDURE — 97535 SELF CARE MNGMENT TRAINING: CPT

## 2023-03-24 PROCEDURE — 2500000003 HC RX 250 WO HCPCS: Performed by: PHYSICIAN ASSISTANT

## 2023-03-24 PROCEDURE — 85025 COMPLETE CBC W/AUTO DIFF WBC: CPT

## 2023-03-24 PROCEDURE — 97162 PT EVAL MOD COMPLEX 30 MIN: CPT

## 2023-03-24 PROCEDURE — 6360000002 HC RX W HCPCS: Performed by: PHYSICIAN ASSISTANT

## 2023-03-24 PROCEDURE — APPNB30 APP NON BILLABLE TIME 0-30 MINS: Performed by: NURSE PRACTITIONER

## 2023-03-24 PROCEDURE — 85730 THROMBOPLASTIN TIME PARTIAL: CPT

## 2023-03-24 PROCEDURE — 80185 ASSAY OF PHENYTOIN TOTAL: CPT

## 2023-03-24 PROCEDURE — 85610 PROTHROMBIN TIME: CPT

## 2023-03-24 PROCEDURE — 97530 THERAPEUTIC ACTIVITIES: CPT

## 2023-03-24 PROCEDURE — 2580000003 HC RX 258: Performed by: PHYSICIAN ASSISTANT

## 2023-03-24 PROCEDURE — 1200000000 HC SEMI PRIVATE

## 2023-03-24 RX ORDER — METHOCARBAMOL 750 MG/1
750 TABLET, FILM COATED ORAL EVERY 8 HOURS PRN
Qty: 24 TABLET | Refills: 0 | Status: SHIPPED | OUTPATIENT
Start: 2023-03-24 | End: 2023-03-29

## 2023-03-24 RX ORDER — OXYCODONE HYDROCHLORIDE 5 MG/1
5 TABLET ORAL EVERY 6 HOURS PRN
Qty: 28 TABLET | Refills: 0 | Status: SHIPPED | OUTPATIENT
Start: 2023-03-24 | End: 2023-03-31

## 2023-03-24 RX ADMIN — OXYCODONE HYDROCHLORIDE 5 MG: 5 TABLET ORAL at 06:54

## 2023-03-24 RX ADMIN — METHIMAZOLE 10 MG: 5 TABLET ORAL at 09:42

## 2023-03-24 RX ADMIN — CLINDAMYCIN PHOSPHATE 900 MG: 900 INJECTION, SOLUTION INTRAVENOUS at 17:27

## 2023-03-24 RX ADMIN — PREDNISOLONE ACETATE 1 DROP: 10 SUSPENSION/ DROPS OPHTHALMIC at 09:55

## 2023-03-24 RX ADMIN — LOSARTAN POTASSIUM 50 MG: 25 TABLET, FILM COATED ORAL at 09:42

## 2023-03-24 RX ADMIN — OXYCODONE HYDROCHLORIDE 5 MG: 5 TABLET ORAL at 01:09

## 2023-03-24 RX ADMIN — OXYCODONE HYDROCHLORIDE 5 MG: 5 TABLET ORAL at 13:13

## 2023-03-24 RX ADMIN — HEPARIN SODIUM 5000 UNITS: 5000 INJECTION INTRAVENOUS; SUBCUTANEOUS at 15:05

## 2023-03-24 RX ADMIN — METHOCARBAMOL 750 MG: 750 TABLET ORAL at 13:13

## 2023-03-24 RX ADMIN — PREDNISOLONE ACETATE 1 DROP: 10 SUSPENSION/ DROPS OPHTHALMIC at 15:05

## 2023-03-24 RX ADMIN — SODIUM CHLORIDE, PRESERVATIVE FREE 10 ML: 5 INJECTION INTRAVENOUS at 20:10

## 2023-03-24 RX ADMIN — MORPHINE SULFATE 4 MG: 2 INJECTION, SOLUTION INTRAMUSCULAR; INTRAVENOUS at 20:10

## 2023-03-24 RX ADMIN — PREDNISOLONE ACETATE 1 DROP: 10 SUSPENSION/ DROPS OPHTHALMIC at 20:09

## 2023-03-24 RX ADMIN — CLINDAMYCIN PHOSPHATE 900 MG: 900 INJECTION, SOLUTION INTRAVENOUS at 00:44

## 2023-03-24 RX ADMIN — HEPARIN SODIUM 5000 UNITS: 5000 INJECTION INTRAVENOUS; SUBCUTANEOUS at 06:22

## 2023-03-24 RX ADMIN — MORPHINE SULFATE 4 MG: 2 INJECTION, SOLUTION INTRAMUSCULAR; INTRAVENOUS at 15:05

## 2023-03-24 RX ADMIN — HEPARIN SODIUM 5000 UNITS: 5000 INJECTION INTRAVENOUS; SUBCUTANEOUS at 21:17

## 2023-03-24 RX ADMIN — HYDROCHLOROTHIAZIDE 25 MG: 25 TABLET ORAL at 09:42

## 2023-03-24 RX ADMIN — CARVEDILOL 6.25 MG: 6.25 TABLET, FILM COATED ORAL at 09:42

## 2023-03-24 RX ADMIN — METFORMIN HYDROCHLORIDE 1000 MG: 500 TABLET, EXTENDED RELEASE ORAL at 17:16

## 2023-03-24 RX ADMIN — PRAVASTATIN SODIUM 20 MG: 20 TABLET ORAL at 20:10

## 2023-03-24 RX ADMIN — CARVEDILOL 6.25 MG: 6.25 TABLET, FILM COATED ORAL at 17:16

## 2023-03-24 RX ADMIN — CLINDAMYCIN PHOSPHATE 900 MG: 900 INJECTION, SOLUTION INTRAVENOUS at 09:48

## 2023-03-24 RX ADMIN — OXYCODONE HYDROCHLORIDE 5 MG: 5 TABLET ORAL at 17:29

## 2023-03-24 ASSESSMENT — PAIN DESCRIPTION - LOCATION
LOCATION: HEAD;NECK

## 2023-03-24 ASSESSMENT — PAIN - FUNCTIONAL ASSESSMENT
PAIN_FUNCTIONAL_ASSESSMENT: ACTIVITIES ARE NOT PREVENTED

## 2023-03-24 ASSESSMENT — PAIN DESCRIPTION - ORIENTATION
ORIENTATION: ANTERIOR;UPPER

## 2023-03-24 ASSESSMENT — PAIN DESCRIPTION - DESCRIPTORS
DESCRIPTORS: ACHING
DESCRIPTORS: SHARP
DESCRIPTORS: ACHING
DESCRIPTORS: SHARP
DESCRIPTORS: ACHING

## 2023-03-24 ASSESSMENT — PAIN SCALES - GENERAL
PAINLEVEL_OUTOF10: 9
PAINLEVEL_OUTOF10: 0
PAINLEVEL_OUTOF10: 5
PAINLEVEL_OUTOF10: 7
PAINLEVEL_OUTOF10: 4
PAINLEVEL_OUTOF10: 4
PAINLEVEL_OUTOF10: 7
PAINLEVEL_OUTOF10: 3
PAINLEVEL_OUTOF10: 6
PAINLEVEL_OUTOF10: 7

## 2023-03-24 ASSESSMENT — PAIN DESCRIPTION - ONSET: ONSET: ON-GOING

## 2023-03-24 NOTE — PLAN OF CARE
Problem: Discharge Planning  Goal: Discharge to home or other facility with appropriate resources  Outcome: Progressing  Note: Plan is to discharge home. Pt did not feel ready for discharge today. Would feel more more comfortable staying another day before going home. Problem: Pain  Goal: Verbalizes/displays adequate comfort level or baseline comfort level  3/24/2023 1604 by Julieta Kwan RN  Outcome: Progressing  Flowsheets (Taken 3/24/2023 1551)  Verbalizes/displays adequate comfort level or baseline comfort level:   Encourage patient to monitor pain and request assistance   Assess pain using appropriate pain scale   Administer analgesics based on type and severity of pain and evaluate response  Note: Pt is able to verbalize pain on 0-10 scale. PRN oxycodone, robaxin, and morphine given to control pain in head and neck. Patient educated on medications, side effects. Assisted with turning and repositioning in bed for comfort. Pt experiencing relief from pain medication given. Problem: Safety - Adult  Goal: Free from fall injury  3/24/2023 1604 by Julieta Kwan RN  Outcome: Progressing  Note: Safety precautions in place. Ambulating x1 assist with walker and gait belt. Fall precautions in place. Bed in lowest position with non ski socks on. Bedside table, belongings and call light within reach. Hourly rounding. Room door open. Problem: Skin/Tissue Integrity  Goal: Absence of new skin breakdown  Description: 1. Monitor for areas of redness and/or skin breakdown  2. Assess vascular access sites hourly  3. Every 4-6 hours minimum:  Change oxygen saturation probe site  4. Every 4-6 hours:  If on nasal continuous positive airway pressure, respiratory therapy assess nares and determine need for appliance change or resting period. 3/24/2023 1604 by Julieta Kwan RN  Outcome: Progressing  Note: Monitored skin for areas of redness and skin breakdown.  Pt repositioned in bed frequently to prevent any

## 2023-03-24 NOTE — PLAN OF CARE
Problem: Chronic Conditions and Co-morbidities  Goal: Patient's chronic conditions and co-morbidity symptoms are monitored and maintained or improved  3/24/2023 1935 by Tyrone White RN  Outcome: Progressing    Problem: Pain  Goal: Verbalizes/displays adequate comfort level or baseline comfort level  3/24/2023 1935 by Tyrone White RN  Outcome: Progressing  Note: Pt monitors pain appropriately. Pt calls out for assistance as needed. Pt encouraged to get help with pain management. PRN medications and non-pharm measures to address pain. Problem: Safety - Adult  Goal: Free from fall injury  3/24/2023 1935 by Tyrone White RN  Outcome: Progressing  Note: Standard safety precautions implemented. Pt uses call light appropriately. Problem: Skin/Tissue Integrity  Goal: Absence of new skin breakdown  Outcome: Progressing  Note: Skin is clean and dry. Pt turns self. Frequent checks done and cas-care.

## 2023-03-24 NOTE — CARE COORDINATION
CM spoke with patient at bedside. She is from home alone, plans for family to stay for a few days. She denies any needs for [facility], is independent at baseline, drives self. Family to transport home.     Vance Hairston RN, BSN,    Ortho/Neuro   [phone]

## 2023-03-24 NOTE — PLAN OF CARE
Problem: Chronic Conditions and Co-morbidities  Goal: Patient's chronic conditions and co-morbidity symptoms are monitored and maintained or improved  Outcome: Progressing     Problem: Pain  Goal: Verbalizes/displays adequate comfort level or baseline comfort level  Outcome: Progressing  Note: Pt monitors pain appropriately. Pt encouraged to monitor pain and get assistance as needed. Pt currently displays and verbalizes an adequate comfort level. Non-pharm measures implemented. PRN medication available     Problem: Safety - Adult  Goal: Free from fall injury  Outcome: Progressing  Note: Standard safety precautions implemented. Pt calls out appropriately for assistance     Problem: Skin/Tissue Integrity  Goal: Absence of new skin breakdown  Note: Skin CDI. Pt turns self.  Incisions are dry

## 2023-03-25 PROBLEM — G96.198 PSEUDOMENINGOCELE: Status: ACTIVE | Noted: 2023-03-25

## 2023-03-25 PROCEDURE — 2500000003 HC RX 250 WO HCPCS: Performed by: PHYSICIAN ASSISTANT

## 2023-03-25 PROCEDURE — 6370000000 HC RX 637 (ALT 250 FOR IP): Performed by: PHYSICIAN ASSISTANT

## 2023-03-25 PROCEDURE — 2500000003 HC RX 250 WO HCPCS

## 2023-03-25 PROCEDURE — 1200000000 HC SEMI PRIVATE

## 2023-03-25 PROCEDURE — 2580000003 HC RX 258: Performed by: PHYSICIAN ASSISTANT

## 2023-03-25 PROCEDURE — 6360000002 HC RX W HCPCS: Performed by: PHYSICIAN ASSISTANT

## 2023-03-25 RX ORDER — LIDOCAINE HYDROCHLORIDE 10 MG/ML
5 INJECTION, SOLUTION EPIDURAL; INFILTRATION; INTRACAUDAL; PERINEURAL ONCE
Status: COMPLETED | OUTPATIENT
Start: 2023-03-25 | End: 2023-03-25

## 2023-03-25 RX ORDER — LIDOCAINE HYDROCHLORIDE 10 MG/ML
5 INJECTION, SOLUTION INFILTRATION; PERINEURAL ONCE
Status: DISCONTINUED | OUTPATIENT
Start: 2023-03-25 | End: 2023-03-25 | Stop reason: SDUPTHER

## 2023-03-25 RX ADMIN — HEPARIN SODIUM 5000 UNITS: 5000 INJECTION INTRAVENOUS; SUBCUTANEOUS at 13:41

## 2023-03-25 RX ADMIN — OXYCODONE HYDROCHLORIDE 5 MG: 5 TABLET ORAL at 18:18

## 2023-03-25 RX ADMIN — OXYCODONE HYDROCHLORIDE 5 MG: 5 TABLET ORAL at 13:41

## 2023-03-25 RX ADMIN — METHOCARBAMOL 750 MG: 750 TABLET ORAL at 08:30

## 2023-03-25 RX ADMIN — CLINDAMYCIN PHOSPHATE 900 MG: 900 INJECTION, SOLUTION INTRAVENOUS at 00:47

## 2023-03-25 RX ADMIN — SODIUM CHLORIDE, PRESERVATIVE FREE 10 ML: 5 INJECTION INTRAVENOUS at 22:21

## 2023-03-25 RX ADMIN — CARVEDILOL 6.25 MG: 6.25 TABLET, FILM COATED ORAL at 08:23

## 2023-03-25 RX ADMIN — HEPARIN SODIUM 5000 UNITS: 5000 INJECTION INTRAVENOUS; SUBCUTANEOUS at 22:20

## 2023-03-25 RX ADMIN — OXYCODONE HYDROCHLORIDE 5 MG: 5 TABLET ORAL at 08:31

## 2023-03-25 RX ADMIN — METFORMIN HYDROCHLORIDE 1000 MG: 500 TABLET, EXTENDED RELEASE ORAL at 16:36

## 2023-03-25 RX ADMIN — OXYCODONE HYDROCHLORIDE 5 MG: 5 TABLET ORAL at 00:43

## 2023-03-25 RX ADMIN — PREDNISOLONE ACETATE 1 DROP: 10 SUSPENSION/ DROPS OPHTHALMIC at 22:21

## 2023-03-25 RX ADMIN — LIDOCAINE HYDROCHLORIDE 5 ML: 10 INJECTION, SOLUTION EPIDURAL; INFILTRATION; INTRACAUDAL; PERINEURAL at 11:04

## 2023-03-25 RX ADMIN — METHIMAZOLE 10 MG: 5 TABLET ORAL at 08:22

## 2023-03-25 RX ADMIN — METHOCARBAMOL 750 MG: 750 TABLET ORAL at 18:18

## 2023-03-25 RX ADMIN — PREDNISOLONE ACETATE 1 DROP: 10 SUSPENSION/ DROPS OPHTHALMIC at 08:26

## 2023-03-25 RX ADMIN — CLINDAMYCIN PHOSPHATE 900 MG: 900 INJECTION, SOLUTION INTRAVENOUS at 16:41

## 2023-03-25 RX ADMIN — OXYCODONE HYDROCHLORIDE 5 MG: 5 TABLET ORAL at 22:20

## 2023-03-25 RX ADMIN — CARVEDILOL 6.25 MG: 6.25 TABLET, FILM COATED ORAL at 16:36

## 2023-03-25 RX ADMIN — SODIUM CHLORIDE, PRESERVATIVE FREE 10 ML: 5 INJECTION INTRAVENOUS at 08:23

## 2023-03-25 RX ADMIN — PREDNISOLONE ACETATE 1 DROP: 10 SUSPENSION/ DROPS OPHTHALMIC at 16:36

## 2023-03-25 RX ADMIN — PRAVASTATIN SODIUM 20 MG: 20 TABLET ORAL at 22:21

## 2023-03-25 RX ADMIN — HEPARIN SODIUM 5000 UNITS: 5000 INJECTION INTRAVENOUS; SUBCUTANEOUS at 05:59

## 2023-03-25 RX ADMIN — LOSARTAN POTASSIUM 50 MG: 25 TABLET, FILM COATED ORAL at 08:23

## 2023-03-25 RX ADMIN — CLINDAMYCIN PHOSPHATE 900 MG: 900 INJECTION, SOLUTION INTRAVENOUS at 08:25

## 2023-03-25 RX ADMIN — HYDROCHLOROTHIAZIDE 25 MG: 25 TABLET ORAL at 08:22

## 2023-03-25 ASSESSMENT — PAIN DESCRIPTION - ONSET
ONSET: ON-GOING

## 2023-03-25 ASSESSMENT — PAIN DESCRIPTION - LOCATION
LOCATION: NECK
LOCATION: HEAD;NECK
LOCATION: NECK
LOCATION: INCISION;NECK
LOCATION: INCISION;NECK;HEAD

## 2023-03-25 ASSESSMENT — PAIN - FUNCTIONAL ASSESSMENT
PAIN_FUNCTIONAL_ASSESSMENT: PREVENTS OR INTERFERES SOME ACTIVE ACTIVITIES AND ADLS

## 2023-03-25 ASSESSMENT — PAIN DESCRIPTION - DESCRIPTORS
DESCRIPTORS: ACHING;DISCOMFORT
DESCRIPTORS: ACHING;DISCOMFORT
DESCRIPTORS: ACHING

## 2023-03-25 ASSESSMENT — PAIN SCALES - GENERAL
PAINLEVEL_OUTOF10: 10
PAINLEVEL_OUTOF10: 6
PAINLEVEL_OUTOF10: 9
PAINLEVEL_OUTOF10: 6
PAINLEVEL_OUTOF10: 9
PAINLEVEL_OUTOF10: 3
PAINLEVEL_OUTOF10: 4
PAINLEVEL_OUTOF10: 0

## 2023-03-25 ASSESSMENT — PAIN DESCRIPTION - ORIENTATION
ORIENTATION: ANTERIOR;UPPER
ORIENTATION: RIGHT
ORIENTATION: ANTERIOR;UPPER
ORIENTATION: RIGHT;UPPER
ORIENTATION: RIGHT;UPPER

## 2023-03-25 ASSESSMENT — PAIN DESCRIPTION - PAIN TYPE
TYPE: SURGICAL PAIN

## 2023-03-25 ASSESSMENT — PAIN DESCRIPTION - FREQUENCY
FREQUENCY: CONTINUOUS

## 2023-03-25 NOTE — OP NOTE
OPERATIVE NOTE   Kayli Anna  1955  [facility]     DATE OF PROCEDURE: 03/23/23     PRE-OPERATIVE DIAGNOSIS: CSF leak     POST-OPERATIVE DIAGNOSIS: same     PROCEDURE: Laparoscopic Ventriculoperitoneal shunt placement     SURGEON: Tammi Polo MD     CO-SURGEON: Dr Trixie Viera     ANESTHESIA: General endotracheal     EBL: 5 cc     SPECIMEN: none     INDICATIONS:  79year old female with hydrocephalus and CSF leak. I was asked to assist in the laparoscopic intraperitoneal portion of the procedure. Risks of surgery explained to patient pre-operatively by Dr Felice Garcia, and were reinforced and confirmed in the preoperative area by myself and/or resident team.     PROCEDURE DETAILS:    After general anesthesia induction, patient was prepped and draped in usual fashion. Dr Felice Garcia started by performing his portion of the procedure. This will be dictated separately by him. After local anesthetic, 5 mm incision made in left upper quadrant at Ortiz's point. 5 mm 0 degree laparoscope used to enter the abdomen in direct optiview fashion under visualization. Abdomen entered without incident and insufflated to 15 mm Hg. A second 5 mm trocar was placed in the periumbilical region. Catheter was tunneled subcutaneously by Dr Felice Garcia to the epigastric region. It was flushed and noted to be free flowing. Dilator sheath passed transabdominally under laparoscopic visualization through a tearaway sheath directed through the epigastric incision site into the abdomen. Catheter placed through sheath, directed over the liver, then tear-away sheath was torn away. It was noted to lay appropriately. Inspection was performed to ensure good hemostasis and no other abnormalities noted grossly. Ports were then removed and the abdomen was desufflated. Skin incisions were irrigated and closed using 4-0 Monocryl and skin glue.      Patient tolerated the procedure well and was extubated and brought to PACU in
site was prepped and draped in the usual sterile fashion.    The Neurosurgery team performed  shunt and dictated separately.    Our team then entered the field. The post auricular aspect of the wound was re-prepped. Using a sterile syringe, the large pseudomeningocele filled with CSF was decompressed drawing off nearly 480cc of CSF. A pursestring stitch was placed to prevent egress of CSF. A tight mastoid headwrap was placed to provide compression over the wound. The wound was soft and flat.    The patient was allowed to awaken from anesthesia, was extubated, and transferred to the recovery area in stable condition.       Electronically signed by Saeed Raymundo MD on 3/25/2023 at 1:35 PM

## 2023-03-26 VITALS
BODY MASS INDEX: 46.48 KG/M2 | DIASTOLIC BLOOD PRESSURE: 79 MMHG | SYSTOLIC BLOOD PRESSURE: 137 MMHG | OXYGEN SATURATION: 93 % | WEIGHT: 279 LBS | HEART RATE: 69 BPM | TEMPERATURE: 98.6 F | RESPIRATION RATE: 18 BRPM | HEIGHT: 65 IN

## 2023-03-26 PROCEDURE — 2580000003 HC RX 258: Performed by: PHYSICIAN ASSISTANT

## 2023-03-26 PROCEDURE — 97535 SELF CARE MNGMENT TRAINING: CPT

## 2023-03-26 PROCEDURE — 2500000003 HC RX 250 WO HCPCS: Performed by: PHYSICIAN ASSISTANT

## 2023-03-26 PROCEDURE — 97530 THERAPEUTIC ACTIVITIES: CPT

## 2023-03-26 PROCEDURE — 6360000002 HC RX W HCPCS: Performed by: PHYSICIAN ASSISTANT

## 2023-03-26 PROCEDURE — 6370000000 HC RX 637 (ALT 250 FOR IP): Performed by: PHYSICIAN ASSISTANT

## 2023-03-26 RX ADMIN — OXYCODONE HYDROCHLORIDE 5 MG: 5 TABLET ORAL at 02:36

## 2023-03-26 RX ADMIN — CLINDAMYCIN PHOSPHATE 900 MG: 900 INJECTION, SOLUTION INTRAVENOUS at 09:51

## 2023-03-26 RX ADMIN — METHOCARBAMOL 750 MG: 750 TABLET ORAL at 06:50

## 2023-03-26 RX ADMIN — HEPARIN SODIUM 5000 UNITS: 5000 INJECTION INTRAVENOUS; SUBCUTANEOUS at 12:54

## 2023-03-26 RX ADMIN — METHIMAZOLE 10 MG: 5 TABLET ORAL at 09:46

## 2023-03-26 RX ADMIN — PREDNISOLONE ACETATE 1 DROP: 10 SUSPENSION/ DROPS OPHTHALMIC at 09:47

## 2023-03-26 RX ADMIN — CARVEDILOL 6.25 MG: 6.25 TABLET, FILM COATED ORAL at 09:46

## 2023-03-26 RX ADMIN — OXYCODONE HYDROCHLORIDE 5 MG: 5 TABLET ORAL at 12:55

## 2023-03-26 RX ADMIN — HEPARIN SODIUM 5000 UNITS: 5000 INJECTION INTRAVENOUS; SUBCUTANEOUS at 06:50

## 2023-03-26 RX ADMIN — LOSARTAN POTASSIUM 50 MG: 25 TABLET, FILM COATED ORAL at 09:47

## 2023-03-26 RX ADMIN — HYDROCHLOROTHIAZIDE 25 MG: 25 TABLET ORAL at 09:47

## 2023-03-26 RX ADMIN — METFORMIN HYDROCHLORIDE 1000 MG: 500 TABLET, EXTENDED RELEASE ORAL at 17:05

## 2023-03-26 RX ADMIN — CLINDAMYCIN PHOSPHATE 900 MG: 900 INJECTION, SOLUTION INTRAVENOUS at 01:26

## 2023-03-26 RX ADMIN — OXYCODONE HYDROCHLORIDE 5 MG: 5 TABLET ORAL at 06:50

## 2023-03-26 RX ADMIN — SODIUM CHLORIDE, PRESERVATIVE FREE 10 ML: 5 INJECTION INTRAVENOUS at 09:48

## 2023-03-26 RX ADMIN — CARVEDILOL 6.25 MG: 6.25 TABLET, FILM COATED ORAL at 17:06

## 2023-03-26 RX ADMIN — OXYCODONE HYDROCHLORIDE 5 MG: 5 TABLET ORAL at 17:06

## 2023-03-26 ASSESSMENT — PAIN DESCRIPTION - ORIENTATION
ORIENTATION: RIGHT
ORIENTATION: RIGHT;MID
ORIENTATION: RIGHT;MID

## 2023-03-26 ASSESSMENT — PAIN SCALES - GENERAL
PAINLEVEL_OUTOF10: 9
PAINLEVEL_OUTOF10: 3
PAINLEVEL_OUTOF10: 6
PAINLEVEL_OUTOF10: 6
PAINLEVEL_OUTOF10: 7

## 2023-03-26 ASSESSMENT — PAIN DESCRIPTION - LOCATION
LOCATION: NECK
LOCATION: HEAD;NECK
LOCATION: NECK
LOCATION: NECK;HEAD

## 2023-03-26 ASSESSMENT — PAIN - FUNCTIONAL ASSESSMENT
PAIN_FUNCTIONAL_ASSESSMENT: ACTIVITIES ARE NOT PREVENTED
PAIN_FUNCTIONAL_ASSESSMENT: PREVENTS OR INTERFERES SOME ACTIVE ACTIVITIES AND ADLS
PAIN_FUNCTIONAL_ASSESSMENT: PREVENTS OR INTERFERES SOME ACTIVE ACTIVITIES AND ADLS

## 2023-03-26 ASSESSMENT — PAIN DESCRIPTION - ONSET
ONSET: ON-GOING

## 2023-03-26 ASSESSMENT — PAIN DESCRIPTION - DESCRIPTORS
DESCRIPTORS: ACHING
DESCRIPTORS: ACHING
DESCRIPTORS: ACHING;DISCOMFORT
DESCRIPTORS: ACHING

## 2023-03-26 ASSESSMENT — PAIN DESCRIPTION - PAIN TYPE
TYPE: SURGICAL PAIN

## 2023-03-26 ASSESSMENT — PAIN DESCRIPTION - FREQUENCY
FREQUENCY: CONTINUOUS

## 2023-03-26 NOTE — CARE COORDINATION
SW met with patient and family at bedside to discuss discharge planning. SW had patient sign IMM letter and went over the discharge plan to return home from the hospital. Family to transport at discharge. Patient has no more questions at this time and is agreeable with the discharge plan.      Electronically signed by BONNIE Aguilar on 3/26/2023 at 10:53 AM

## 2023-03-26 NOTE — PROGRESS NOTES
4 Eyes Admission Assessment     I agree as the admission nurse that 2 RN's have performed a thorough Head to Toe Skin Assessment on the patient. ALL assessment sites listed below have been assessed on admission. Areas assessed by both nurses: yes  [x]   Head, Face, and Ears   [x]   Shoulders, Back, and Chest  [x]   Arms, Elbows, and Hands   [x]   Coccyx, Sacrum, and Ischium  [x]   Legs, Feet, and Heels        Does the Patient have Skin Breakdown?    Scattered bruising, blanchable redness to coccyx and abd folds            Jose Prevention initiated:  Yes   Wound Care Orders initiated:  No      [facility] nurse consulted for Pressure Injury (Stage 3,4, Unstageable, DTI, NWPT, and Complex wounds) or Jose score 18 or lower:  No      Nurse 1 eSignature: Electronically signed by Arthur Sanches RN on 3/23/23 at 4:24 PM EDT    **SHARE this note so that the co-signing nurse is able to place an eSignature**    Nurse 2 eSignature: Electronically signed by Hemalatha Gomez RN on 3/23/23 at 6:24 PM EDT
AT SECOND ATTEMPT TO CONTACT PT, PT WAS UNABLE TO COMPLETE TELEPHONE INTERVIEW AT THIS TIME(WITH A CLIENT) INSTRUCTIONS LEFT ON VOICE MAIL AND EMAILED TO PATIENT/TS
Aox4  No neuro changes  RA  VSS     Head and neck pain managed with PRN medications and non-pharm measures implemented    Dressings CDI     Tolerating fluids and food  Denies N/V      Purewick in place     Standard safety precautions implemented
Aox4  No neuro changes  RA  VSS    Head and neck pain managed with PRN medications and non-pharm measures implemented    Incisions CDI  Dressing change this morning    Tolerating fluids and food  Denies N/V     Purewick in place    Standard safety precautions implemented
Department of Surgery:  Post-op Note    CC: S/p laparoscopic ventriculoperitoneal shunt placement    Subjective:   Pain is controlled, denies nausea or vomiting. Tachycardic (-156) otherwise VSS. Satting well on 1L NC. Patient denies passing gas or BM. Patient has not urinated yet. Objective:  Anesthesia type: General      I/O    Intra op    Post op     Fluids  500 mL 0 mL     EBL 10 mL 0 mL     Urine 0 mL 0 mL     Physical Exam:  Vitals:    03/23/23 1128 03/23/23 1130 03/23/23 1145 03/23/23 1215   BP:  (!) 155/76 (!) 140/71 (!) 142/64   Pulse:  62 60 61   Resp: 12 11 12 20   Temp:       TempSrc:       SpO2:  99% 98% 97%   Weight:       Height:           General appearance: alert, no acute distress, grooming appropriate  Chest/Lungs: no adventitious breath sounds  Cardiovascular: RRR  Abdomen: soft, appropriately tender, non-distended, incisions c/d/I with minimal cas-incisional ecchymosis  : No urine output   Extremities: no edema, no cyanosis  Neuro: A&Ox3, no focal deficits, sensation intact    Assessment and Plan  This is a 79y.o. year old female with a diagnosis of CSF leak s/p laparoscopic ventriculoperitoneal shunt placement POD #0    Pain management: PRN dilaudid  Cardiovascular: HDS will continue to monitor   Respiratory: extubated, encourage hourly incentive spirometry and deep breathing  FEN:  Fluids:  NaCL5-250, Diet: Regular diet  : Straight mckee if pt doesn't void freely  Wound: local care   Ambulation: OOB to chair, encourage ambulation  Prophylaxis: SCDs, SubQ Heparin     Dimitri Roman DPM  PGY1  03/23/23  12:35 PM  PerfectSerjason
Discharge order placed by neurosurgery. After working with therapy, patient feels that she is too weak to go home today. Would feel more comfortable staying one more day. Neurosurgery made aware. Discharge order discontinued.
Discussed my portion of procedure with patient and family, including R/B/A. Discussed lap approach, possible open.
EKG in media with H&P and cardiac clearance has wrong date (3/3/81). Called cardiologist to confirm when EKG was done. Office closed - call back 3/22.   [phone])  Maia Stevens
HOSPITALISTS PROGRESS NOTE    3/24/2023 1:07 PM        Name: Megha Rogers . Admitted: 3/23/2023  Primary Care Provider: Santa Jalloh (Tel: [phone])      Problem List  Principal Problem:    CSF leak  Active Problems:    S/P  shunt  Resolved Problems:    * No resolved hospital problems. *       Assessment & Plan:   OR for Laparoscopic Ventriculoperitoneal shunt placement 3/23/2023. Procedure was under GA with EBL 5cc. History of hearing loss  Morbid obesity with a BMI of 46.40  History of hypertension slightly elevated  Hydrochlorothiazide, Coreg and losartan    His medicine on metformin    Hypothyroidism  On Tapazole      Diet: ADULT DIET; Regular  Code:Full Code  DVT PPX heparin  Disposition monitor in the hospital          CC: Incisional site pain    Subjective:  .   Patient is comfortable  Blood pressure slightly related  Labs did show a white count of 7.3 hemoglobin of 11.6 and platelet count     Reviewed interval ancillary notes    Current Medications  carvedilol (COREG) tablet 6.25 mg, BID WC  hydroCHLOROthiazide (HYDRODIURIL) tablet 25 mg, Daily  losartan (COZAAR) tablet 50 mg, Daily  meclizine (ANTIVERT) tablet 25 mg, BID PRN  metFORMIN (GLUCOPHAGE-XR) extended release tablet 1,000 mg, Dinner  methIMAzole (TAPAZOLE) tablet 10 mg, Daily  pravastatin (PRAVACHOL) tablet 20 mg, Nightly  prednisoLONE acetate (PRED FORTE) 1 % ophthalmic suspension 1 drop, TID  sodium chloride flush 0.9 % injection 5-40 mL, 2 times per day  sodium chloride flush 0.9 % injection 5-40 mL, PRN  0.9 % sodium chloride infusion, PRN  naloxone (NARCAN) injection 0.2 mg, PRN  morphine (PF) injection 2 mg, Q2H PRN   Or  morphine (PF) injection 4 mg, Q2H PRN  0.9 % sodium chloride infusion, Continuous  oxyCODONE (ROXICODONE) immediate release tablet 5 mg, Q4H PRN  methocarbamol (ROBAXIN) tablet 750 mg, Q8H PRN   Or  methocarbamol
HOSPITALISTS PROGRESS NOTE    3/25/2023 12:24 PM        Name: Sekou Smart . Admitted: 3/23/2023  Primary Care Provider: Kevin Kilpatrick (Tel: [phone])      Problem List  Principal Problem:    CSF leak  Active Problems:    S/P  shunt    Pseudomeningocele  Resolved Problems:    * No resolved hospital problems. *       Assessment & Plan:   - S/p Laparoscopic Ventriculoperitoneal shunt placement 3/23/2023. Procedure was under GA with EBL 5cc. - S/p Aspiration of pseudomeningocele on March 25, 2023. History of hearing loss  Morbid obesity with a BMI of 46.40  History of hypertension slightly elevated  Hydrochlorothiazide, Coreg and losartan    His medicine on metformin    Hypothyroidism  On Tapazole      Diet: ADULT DIET; Regular  Code:Full Code  DVT PPX heparin  Disposition monitor in the hospital          CC: Incisional site pain    Subjective:  . Patient was seen and examined at bedside today, alert and oriented. Pleasant lady. No acute events reported or observed overnight. Patient did not report any new complaints. Reports pain at surgical site. Mild, improved with pain medicine. Right-sided swelling. Discussed with ENT physician Dr. Maxwell Hudson. Plan for respiratory pseudomeningocele today.       Current Medications  carvedilol (COREG) tablet 6.25 mg, BID WC  hydroCHLOROthiazide (HYDRODIURIL) tablet 25 mg, Daily  losartan (COZAAR) tablet 50 mg, Daily  meclizine (ANTIVERT) tablet 25 mg, BID PRN  metFORMIN (GLUCOPHAGE-XR) extended release tablet 1,000 mg, Dinner  methIMAzole (TAPAZOLE) tablet 10 mg, Daily  pravastatin (PRAVACHOL) tablet 20 mg, Nightly  prednisoLONE acetate (PRED FORTE) 1 % ophthalmic suspension 1 drop, TID  sodium chloride flush 0.9 % injection 5-40 mL, 2 times per day  sodium chloride flush 0.9 % injection 5-40 mL, PRN  0.9 % sodium chloride infusion, PRN  naloxone (NARCAN) injection 0.2 mg,
INSTRUCTIONS EMAILED TO PATIENT/    [address] [facility] PRE-SURGICAL TESTING INSTRUCTIONS                      PRIOR TO PROCEDURE DATE:    1. PLEASE FOLLOW ANY INSTRUCTIONS GIVEN TO YOU PER YOUR SURGEON. 2. Arrange for someone to drive you home and be with you for the first 24 hours after discharge for your safety after your procedure for which you received sedation. Ensure it is someone we can share information with regarding your discharge. NOTE: At this time ONLY 2 ADULTS may accompany you   One person ENCOURAGED to stay at hospital entire time if outpatient surgery      3. You must contact your surgeon for instructions IF:  You are taking any blood thinners, aspirin, anti-inflammatory or vitamins. There is a change in your physical condition such as a cold, fever, rash, cuts, sores, or any other infection, especially near your surgical site. 4. Do not drink alcohol the day before or day of your procedure. Do not use any recreational marijuana at least 24 hours or street drugs (heroin, cocaine) at minimum 5 days prior to your procedure. THE DAY OF YOUR PROCEDURE:  1. Follow instructions for ARRIVAL TIME as DIRECTED BY YOUR SURGEON. 2. Enter the MAIN entrance from [employer] and follow the signs to the free Parking [employer] or [employer] (offered free of charge 7 am-5pm). 3. Enter the Main Entrance of the hospital (do not enter from the lower level of the parking garage). Upon entrance, check in with the  at the surgical information desk on your LEFT. Bring your insurance card and photo ID to register      4. DO NOT EAT ANYTHING 8 hours prior to arrival for surgery. You may have up to 8 ounces of water 4 hours prior to your arrival for surgery. 5. MEDICATIONS:  Take the following medications with a SMALL sip of water: COREG AND TAPAZOLE  Use your usual dose of inhalers the morning of surgery.  BRING your rescue inhaler with you to hospital.   Anesthesia does NOT
LMOR/TS
NEUROSURGERY POST-OP PROGRESS NOTE    Patient Name: Suzi Healy YOB: 1955   Sex: Female Age: 79 yrs     Medical Record Number: [id number] Acct Number: [de-identified]   Room Number: 6560/7621-56 Hospital Day: Hospital Day: 2     Interval History:  Post-operative Day# 1 s/p LEFT VENTRICULOPERITONEAL SHUNT with Dr. Kate Guaman    Subjective:  patient reports mild pain at incision site and pain in left side of neck. She is eager to get out of bed and wants to go home later if she can. Objective:    VITAL SIGNS   BP (!) 165/82   Pulse 65   Temp 98.2 °F (36.8 °C) (Oral)   Resp 18   Ht 5' 5\" (1.651 m)   Wt 279 lb (126.6 kg)   SpO2 98%   BMI 46.43 kg/m²    Height Height: 5' 5\" (165.1 cm)   Weight Weight: 279 lb (126.6 kg)        Allergies Allergies   Allergen Reactions    Keflex [Cephalexin] Itching and Rash    Codeine Nausea And Vomiting    Tomato Rash      NPO Status ADULT DIET; Regular   Isolation No active isolations     LABS   Basic Metabolic Profile No results for input(s): NA, POTASSIUM, CL, CO2, BUN, CREATININE, GLUCOSE, CA, ALB, PHOS, MG in the last 72 hours.    Complete Blood Count Recent Labs     03/24/23  0621   WBC 7.3   RBC 4.14      Coagulation Studies Recent Labs     03/24/23  0622   INR 1.06        MEDICATIONS   Inpatient Medications     carvedilol, 6.25 mg, Oral, BID WC    hydroCHLOROthiazide, 25 mg, Oral, Daily    losartan, 50 mg, Oral, Daily    metFORMIN, 1,000 mg, Oral, Dinner    methIMAzole, 10 mg, Oral, Daily    pravastatin, 20 mg, Oral, Nightly    prednisoLONE acetate, 1 drop, Right Eye, TID    sodium chloride flush, 5-40 mL, IntraVENous, 2 times per day    heparin (porcine), 5,000 Units, SubCUTAneous, 3 times per day    clindamycin (CLEOCIN) IV, 900 mg, IntraVENous, Q8H   Infusions    sodium chloride      sodium chloride 125 mL/hr at 03/23/23 1415      Antibiotics   Recent Abx Admin                     clindamycin (CLEOCIN) 900 mg in dextrose 5 % 50 mL IVPB (mg) 900 mg
Neurosurgery Progress Note    3/25/2023 10:41 AM                               Jo Shah                      LOS: 2 days             Interval History:  Post-operative Day# 2 s/p LEFT VENTRICULOPERITONEAL SHUNT with Dr. Anand Townsend     Subjective:  patient reports mild pain at incision site and pain in left side of neck. . Pt got dizzy last night. Pt states ENT saw her this am and wanted the head wrap to remain in place                                                    Physical Exam:    Temp (24hrs), Av.2 °F (36.8 °C), Min:97.9 °F (36.6 °C), Max:98.7 °F (37.1 °C)      Neuro Exam  Alert and oriented x 4  PERRL, EOMI, 3->2  mm OU, visual fields grossly intact  Facial expression and sensation symmetric  Tongue and uvula midline  Shoulder shrug symmetric          Labs:  Recent Labs     23  0621   WBC 7.3   HGB 11.6*   HCT 36.4          No results for input(s): NA, K, CL, CO2, BUN, CREATININE, GLUCOSE, CALCIUM, PHOS, MG in the last 72 hours. Recent Labs     23  0622   PROTIME 13.7   INR 1.06   APTT 26.1     Assessment   80 yo female POD 2 s/p LEFT VENTRICULOPERITONEAL SHUNT with Dr. Colon Raw:  Neurologic exam frequency: q 4   Mobility: PT/OT, activity as tolerated  Seizure Prophylaxis:  Diet: ADAT   Antibiotics: cleocin post op  Aleman: removed   DVT Prophylaxis: SCDs, SQ heparin    Bowel Regimen: senokot, glycolax  Pain control: tylenol, morphine, roxicodone   Incisional Care: keep mastoid dressing in place, monitor for drainage   Dispo Planning: Will discuss dc timing with ENT.  If not today then tomorrow
Neurosurgery Progress Note    3/26/2023 9:51 AM                               Lisseth Molina                      LOS: 3 days             Post-operative Day# 3 s/p LEFT VENTRICULOPERITONEAL SHUNT with Dr. Nina Metz  Subjective:  No acute events overnight. Patient has no specific complaints this am.    Pt ready to go home                                              Physical Exam:    Temp (24hrs), Av °F (36.7 °C), Min:97.6 °F (36.4 °C), Max:98.5 °F (36.9 °C)      Neuro Exam  Alert and oriented x 4  PERRL, EOMI, 3->2  mm OU, visual fields grossly intact  Facial expression and sensation symmetric  Tongue and uvula midline  Shoulder shrug symmetric  Wrap in place     Labs:  Recent Labs     23  0621   WBC 7.3   HGB 11.6*   HCT 36.4          No results for input(s): NA, K, CL, CO2, BUN, CREATININE, GLUCOSE, CALCIUM, PHOS, MG in the last 72 hours. Recent Labs     23  0622   PROTIME 13.7   INR 1.06   APTT 26.1         Assessment and Plan:    80 yo female POD 2 s/p LEFT VENTRICULOPERITONEAL SHUNT with Dr. Pierre Heard: ok to dc home today. ENT arranged for follow up next week.  NS follow up set up pre op      Unknown Sandifer PA-C
Neurotology Progress Note:    S:  She reports pain over the left side of her head. This has not worsened from yesterday. O:  Mastoid wrap in place. Small pseudomeningocele present. Complete eye closure with no perceptible movement in lower division of facial nerve (right side). Procedure:  Aspiration of pseudomeningocele. Anesthesia:  1% lidocaine. Indications:  Retained fluid of pseudomenigocele s/p aspiration two days ago. Details:  Skin was cleaned with betadine. The collection was aspirated. 60 cc of serosanguinous fluid aspirated. Horizontal mattress suture placed at aspiration site. She tolerated the procedure well. Assessment:      67 with pseudomeningocele following skull base resection who is two days s/p  shunt and aspiration of meningocele. Additional collection drained this AM.  She is cleared for discharge from Neurotology perspective. Plan:   --Will arrange follow up for later this week. --Keep mastoid pressure dressing on until follow up.
Neurotology Progress Note:    S:  She reports some pain over her right ear after manipulation of the dressing. O:   Sitting up in bed and appears well. Mastoid wrap in place. Soft tissue prominence below mastoid dressing (no evidence of fluid). Right side:  complete eye closure. No movement of lower division. A:    Darwin Ventura is a 78 yo woman three days s/p Left  shunt and aspiration of right psuedomeningocele. She is doing well. No evidence of pseudomeningocele. P:  --Maintain mastoid dressing as long as possible.    --We will reevaluate her in clinic on Friday:  March 31st.       Emma Sandoval MD  PGY-7 Neurotology
Occupational Therapy  Facility/Department: [facility]  Occupational Therapy Initial Assessment/ Treatment    Name: Christin Garay  : 1955  MRN: [id number]  Date of Service: 3/24/2023    Discharge Recommendations:     OT Equipment Recommendations  Equipment Needed: No   Christin Ridkenneth scored a 20/24 on the AM-PAC ADL Inpatient form. Current research shows that an AM-PAC score of 18 or greater is typically associated with a discharge to the patient's home setting. Based on the patient's AM-PAC score, and their current ADL deficits, it is recommended that the patient have 2-3 sessions per week of Occupational Therapy at d/c to increase the patient's independence. At this time, this patient demonstrates the endurance and safety to discharge home with home vs OP services and a follow up treatment frequency of 2-3x/wk. Please see assessment section for further patient specific details. If patient discharges prior to next session this note will serve as a discharge summary. Please see below for the latest assessment towards goals. Patient Diagnosis(es): The encounter diagnosis was S/P  shunt. Past Medical History:  has a past medical history of Arthritis, Asthma, Brain tumor ([facility] [address] Diabetes mellitus ([address] High cholesterol, Hx of blood clots, Hypertension, Imbalance, Loss of hearing, and Vertigo. Past Surgical History:  has a past surgical history that includes Total knee arthroplasty (Right, 2015); Total knee arthroplasty (Left, 10/14/15); Total hip arthroplasty (Right, 2021); Neuroma surgery (Right, 2022); mastoidectomy (Right, 2022); craniotomy (N/A, 2022); Upper gastrointestinal endoscopy (N/A, 2022); IR EMBOLIZATION HEMORRHAGE (2022); Upper gastrointestinal endoscopy (N/A, 2022); Ventriculoperitoneal shunt (Left, 3/23/2023); and Wound Exploration (Right, 3/23/2023).     Treatment Diagnosis: decreased ADLs and transfers secondary to 
Occupational Therapy  Facility/Department: [facility] ORTHO/NEURO  Daily Treatment Note  NAME: Karen Dominguez  : 1955  MRN: [id number]    Date of Service: 3/26/2023    Discharge Recommendations:   Karen Dominguez scored a 21/24 on the AM-PAC ADL Inpatient form. Current research shows that an AM-PAC score of 18 or greater is typically associated with a discharge to the patient's home setting. Based on the patient's AM-PAC score, and their current ADL deficits, it is recommended that the patient have 2-3 sessions per week of Occupational Therapy at d/c to increase the patient's independence. At this time, this patient demonstrates the endurance and safety to discharge home with home services and a follow up treatment frequency of 2-3x/wk. Please see assessment section for further patient specific details. If patient discharges prior to next session this note will serve as a discharge summary. Please see below for the latest assessment towards goals. OT Equipment Recommendations  Equipment Needed: No      Patient Diagnosis(es): The encounter diagnosis was S/P  shunt. Assessment    Assessment: Patient making good progress toward OT goals, completing all ADLs and mobility with SBA this date. Hopeful to d/c home this afternoon, with sister available to provide initial 24hrA (distance prohibits daily visits for assist prn otherwise). Would continue to benefit with OT services, cont per POC. Activity Tolerance: Patient tolerated treatment well  Equipment Needed: No      Plan   Occupational Therapy Plan  Times Per Week: 5-7  Times Per Day: Once a day  Current Treatment Recommendations: Strengthening;Balance training;Functional mobility training; Endurance training;Neuromuscular re-education; Coordination training;Self-Care / ADL; Safety education & training;Patient/Caregiver education & training     Restrictions   Up as tolerated    Subjective   Subjective  Subjective: Patient seated in recliner,
Patient admitted to 21-21-71-75 from PACU. A/O x4. VSS. Endorsing very mild surgical pain to head and abdomen. X3 lap sites to abd, CDI and KARSON. Surgical sites to head remain covered with tape and soft cloth, CDI. Endorsing numbness to L foot, pt states numbness has been present since previous surgery. Right facial droop, present prior to sx. Tolerating liquids well, denies n/v. Patient states that she is incontinent. Purewick placed with good output. Blanchable redness to coccyx, mepilex placed. Fall precautions in place. Bed locked in lowest position with alarm on. Call light within reach and patient educated on use.
Patient admitted to PACU # 3 from OR at 1016 post LEFT VENTRICULOPERITONEAL SHUNT - Left per Kay Byers MD.  Attached to PACU monitoring system and report received from anesthesia provider. Patient was reported to be hemodynamically stable during procedure.
Physical Therapy  Facility/Department: [facility] ORTHO/NEURO  Physical Therapy Initial Assessment and Treatment    Name: Wyatt Newman  : 1955  MRN: [id number]  Date of Service: 3/24/2023    Discharge Recommendations:    Wyatt Newman scored a 18/ on the AM-PAC short mobility form. Current research shows that an AM-PAC score of 18 or greater is typically associated with a discharge to the patient's home setting. Based on the patient's AM-PAC score and their current functional mobility deficits, it is recommended that the patient have 2-3 sessions per week of Physical Therapy at d/c to increase the patient's independence. At this time, this patient demonstrates the endurance and safety to discharge home with home vs OP services and a follow up treatment frequency of 2-3x/wk. Please see assessment section for further patient specific details. If patient discharges prior to next session this note will serve as a discharge summary. Please see below for the latest assessment towards goals. PT Equipment Recommendations  Equipment Needed: No      Patient Diagnosis(es): The encounter diagnosis was S/P  shunt. Past Medical History:  has a past medical history of Arthritis, Asthma, Brain tumor ([facility] [address] Diabetes mellitus ([address] High cholesterol, Hx of blood clots, Hypertension, Imbalance, Loss of hearing, and Vertigo. Past Surgical History:  has a past surgical history that includes Total knee arthroplasty (Right, 2015); Total knee arthroplasty (Left, 10/14/15); Total hip arthroplasty (Right, 2021); Neuroma surgery (Right, 2022); mastoidectomy (Right, 2022); craniotomy (N/A, 2022); Upper gastrointestinal endoscopy (N/A, 2022); IR EMBOLIZATION HEMORRHAGE (2022); Upper gastrointestinal endoscopy (N/A, 2022); Ventriculoperitoneal shunt (Left, 3/23/2023); and Wound Exploration (Right, 3/23/2023).     Assessment   Body Structures, Functions, Activity Limitations
Place patient label inside box (if no patient label, complete below)  Name:  :  MR#:     Becky Álvarez / PROCEDURE  I (we)Cherelle authorize Danny Gayle MD & Fidel Lin MD and/or such assistants as may be selected by him/her, to perform the following operation/procedure(s): RIGHT VENTRICULOPERITONEAL SHUNT       Note: If unable to obtain consent prior to an emergent procedure, document the emergent reason in the medical record. This procedure has been explained to my (our) satisfaction and included in the explanation was: The intended benefit, nature, and extent of the procedure to be performed; The significant risks involved and the probability of success; Alternative procedures and methods of treatment; The dangers and probable consequences of such alternatives (including no procedure or treatment); The expected consequences of the procedure on my future health; Whether other qualified individuals would be performing important surgical tasks and/or whether  would be present to advise or support the procedure. I (we) understand that there are other risks of infection and other serious complications in the pre-operative/procedural and postoperative/procedural stages of my (our) care. I (we) have asked all of the questions which I (we) thought were important in deciding whether or not to undergo treatment or diagnosis. These questions have been answered to my (our) satisfaction. I (we) understand that no assurance can be given that the procedure will be a success, and no guarantee or warranty of success has been given to me (us). It has been explained to me (us) that during the course of the operation/procedure, unforeseen conditions may be revealed that necessitate extension of the original procedure(s) or different procedure(s) than those set forth in Paragraph 1.  I (we) authorize and request that the above-named
Pt is A&O x4,VSS, RA. C/o of neck pain, pain is being managed with pain medicine per MAR. Mastoid wrap in place. Dressing CDI  Pt is tolerating diet and fluids,voiding adequately via pure wick. Incontinent care provided ad needed. Got up to the bathroom and to chair with walker and GB, tolerating ambulation. All fall precaution is in place. Call light is within the reach.
Pt is A&O x4,VSS, RA. C/o of neck pain, pain is being managed with pain medicine per MAR. Mastoid wrap in place. Dressing CDI. Pt had aspiration of pseudomeningocele at bedside today. Pt is tolerating diet and fluids,voiding adequately via pure wick. Incontinent care provided ad needed. All fall precaution is in place. Call light is within the reach.
Pt is alert and oriented x4. Vital signs are stable. No acute neuro changes this shift. Verbalizing pain in head and neck from  shunt placement. Pt has been given PRN oxycodone, morphine, and robaxin today to  help promote comfort and ease pain. Surgical sites to head are covered with tape and soft cloth. 3 incisions on abdomen from laparoscopy. Right facial droop present. Pt is ambulating x1 assist with a walker and gait belt. Pt did not feel comfortable being discharged today and felt like she needed to stay one more day before going home.
Pt is discharged from the unit along with all of her belongings. Discharged instruction explained to the patient and verbalized understanding.
Report given to RN for [facility]. Pt left PACU - will go to CT and then to inpatient room on [facility]    Call placed to [place], [place] - notified her of swelling below right ear (outside of current dressing).
Son called with update on POC
Son, Leyda Christensen, given time to visit patient in PACU 3.
VSS. Neuro intact per baseline. Mastoid wrap in place and No drainage noted to dressing. Pain medicated pr orders. Pt pain controlled with PO. Pt tolerates PO and voids per pure wick adequately. Bed alarm set. Call light in reach. Will continue to monitor this shift.
redressing after your procedure. Do not wear jewelry (including body piercings), make-up, fingernail polish, lotion, powders, or metal hairclips. Contacts, glasses, dentures, and hearing aids will need to be removed prior to surgery. Bring your case(s) to protect them while you are in surgery. If you use a CPAP, please bring it with you on the day of your procedure. Do not shave or wax for 72 hours prior to procedure near your operative site. Leave all other valuables at home. IF there is a change in your physical condition for some reason, such as: a cold, fever, rash, cuts, sores, or any other infection, especially near your surgical site, contact your surgeon's office immediately. Instructions left on patient's voicemail.   Joey Brunson RN.3/21/2023 .2:17 PM

## 2023-03-26 NOTE — PLAN OF CARE
Problem: Pain  Goal: Verbalizes/displays adequate comfort level or baseline comfort level  Outcome: Progressing   Pain 6/10 prior to intervention per orders. Pain 4/10 after intervention and pain controlled with PO. Problem: Safety - Adult  Goal: Free from fall injury  Outcome: Progressing   Pt free from injury this shift and free of falls. 2/4 rails up on bed and bed is in the lowest position. Wheels locked and bed alarm set. Socks on pt and ID bands on pt. Call light in reach of pt and pt educated to call out to get up x2 steady. Will continue to monitor for safety. Problem: Skin/Tissue Integrity  Goal: Absence of new skin breakdown  Description: 1. Monitor for areas of redness and/or skin breakdown  2. Assess vascular access sites hourly  3. Every 4-6 hours minimum:  Change oxygen saturation probe site  4. Every 4-6 hours:  If on nasal continuous positive airway pressure, respiratory therapy assess nares and determine need for appliance change or resting period. Outcome: Progressing   No new breakdown. Pt turns self.

## 2023-03-26 NOTE — PLAN OF CARE
Problem: Chronic Conditions and Co-morbidities  Goal: Patient's chronic conditions and co-morbidity symptoms are monitored and maintained or improved  Outcome: Progressing     Problem: Discharge Planning  Goal: Discharge to home or other facility with appropriate resources  Outcome: Progressing     Problem: Pain  Goal: Verbalizes/displays adequate comfort level or baseline comfort level  3/26/2023 0803 by Jasbir Hale RN  Outcome: Progressing     Problem: Safety - Adult  Goal: Free from fall injury  3/26/2023 0803 by Jasbir Hale RN  Outcome: Progressing     Problem: ABCDS Injury Assessment  Goal: Absence of physical injury  Outcome: Progressing

## 2023-03-27 LAB
PHENYTOIN FREE MFR SERPL: ABNORMAL % (ref 8–14)
PHENYTOIN FREE SERPL-MCNC: <0.5 UG/ML (ref 1–2.5)
PHENYTOIN SERPL-MCNC: <0.5 UG/ML (ref 10–20)

## 2023-08-08 ENCOUNTER — HOSPITAL ENCOUNTER (OUTPATIENT)
Dept: MRI IMAGING | Age: 68
Discharge: HOME | End: 2023-08-08
Attending: NEUROLOGICAL SURGERY
Payer: MEDICARE

## 2023-08-08 DIAGNOSIS — D32.0 CEREBELLOPONTINE ANGLE MENINGIOMA (HCC): ICD-10-CM

## 2023-08-08 DIAGNOSIS — D33.3 ACOUSTIC NEUROMA (HCC): ICD-10-CM

## 2023-08-08 PROCEDURE — 6360000004 HC RX CONTRAST MEDICATION: Performed by: NEUROLOGICAL SURGERY

## 2023-08-08 PROCEDURE — 70553 MRI BRAIN STEM W/O & W/DYE: CPT

## 2023-08-08 PROCEDURE — A9579 GAD-BASE MR CONTRAST NOS,1ML: HCPCS | Performed by: NEUROLOGICAL SURGERY

## 2023-08-08 RX ADMIN — GADOTERIDOL 20 ML: 279.3 INJECTION, SOLUTION INTRAVENOUS at 11:45

## 2023-09-05 NOTE — PROGRESS NOTES
-- DO NOT REPLY / DO NOT REPLY ALL --  -- Message is from Engagement Center Operations (ECO) --    General Patient Message:   Please contact patient grandmother at [phone]. Patient grandmother is returning call.     Caller Information       Type Contact Phone/Fax    09/05/2023 01:54 PM CDT Phone (Incoming) jose carlos granados (Emergency Contact) [phone] (H)    09/05/2023 03:06 PM CDT Phone (Outgoing) JOSE CARLOS GRANADOS (Emergency Contact) [phone] (H)    09/05/2023 03:18 PM CDT Phone (Incoming) JOSE CARLOS GRANADOS (Emergency Contact) [phone] (M)        Alternative phone number: none     Can a detailed message be left? Yes    Message Turnaround:     Is it Working Hours? Yes - Working Hours     [place]:    Please give this turnaround time to the caller:   \"This message will be sent to [state Provider's name]. The clinical team will fulfill your request as soon as they review your message.\"                 Occupational Therapy  Facility/Department: [facility] ICU  Daily Treatment Note  NAME: Jackelin Eckert  : 1955  MRN: [id number]    Date of Service: 2022    Discharge Recommendations: Jackelin Eckert scored a 13/24 on the AM-PAC ADL Inpatient form. Current research shows that an AM-PAC score of 17 or less is typically not associated with a discharge to the patient's home setting. Based on the patient's AM-PAC score and their current ADL deficits, it is recommended that the patient have 5-7 sessions per week of Occupational Therapy at d/c to increase the patient's independence. At this time, this patient demonstrates complex nursing, medical, and rehabilitative needs, and would benefit from intensive rehabilitation services upon discharge from the Inpatient setting. This patient demonstrates the ability to participate in and benefit from an intensive therapy program with a coordinated interdisciplinary team approach to foster frequent, structured, and documented communication among disciplines, who will work together to establish, prioritize, and achieve treatment goals. Please see assessment section for further patient specific details. If patient discharges prior to next session this note will serve as a discharge summary. Please see below for the latest assessment towards goals. OT Equipment Recommendations  Equipment Needed: No  Other: defer to next level of care    Patient Diagnosis(es): Diagnoses of Acoustic neuroma [facility]), Balance problem, Meningioma ([facility] [address] and Dizziness were pertinent to this visit. Assessment    Assessment: Pt tolerated treatment well and is progressing toward goals. Pt performed supine to sit transfer with SBA, increased time and cues. Pt sat at EOB with SBA today. Pt stood up to Sumit Brandon with Min +CGA and performed a sit to stand transfer up to RW with CGA x2 and increased effort.  Pt became fatigued quickly this date, however, she continues to show high motivation and effort to participate in therapy, and demonstrates the ability to tolerate 3 hrs/day of therapy. Pt limited by nausea and fatigue this date. Pt is highly independent at baseline and living alone. Recommend continued inpatient OT services at [facility] to facilitate return to this level of function. Will continue to follow on OT POC. Activity Tolerance: Patient tolerated treatment well;Patient limited by fatigue;Patient limited by endurance  Equipment Needed: No  Other: defer to next level of care      Plan   Occupational Therapy Plan  Times Per Week: 5-7  Times Per Day: Once a day  Current Treatment Recommendations: Strengthening;ROM;Cognitive reorientation;Balance training;Pain management;Self-Care / ADL; Functional mobility training; Safety education & training;Home management training; Endurance training;Neuromuscular re-education;Positioning     Restrictions   Up as tolerated; HOB elevated 30 degrees    Subjective   Subjective  Subjective: Pt met reclined in bed, pleasant and agreeable to therapy, stating \"youre getting me to the chair\" upon arrival. No complaints of pain, but pt endorses nausea. 3L O2 via NC, catheter. Pt endorses fatigue at end of session, stating \"I'm just exhausted\"  Orientation  Overall Orientation Status: Impaired  Orientation Level: Oriented X4  Pain: denies  Cognition  Overall Cognitive Status: Exceptions  Arousal/Alertness: Appropriate responses to stimuli  Following Commands: Follows one step commands with increased time; Follows one step commands with repetition  Attention Span: Attends with cues to redirect  Memory: Decreased recall of recent events  Safety Judgement: Decreased awareness of need for safety;Decreased awareness of need for assistance  Problem Solving: Assistance required to identify errors made;Assistance required to implement solutions;Assistance required to generate solutions;Assistance required to correct errors made  Insights: Decreased awareness of deficits  Initiation: Requires cues for some  Sequencing: Requires cues for some  Cognition Comment: Pt becoming more lethargic throughout session        Objective    Bed Mobility Training  Bed Mobility Training: Yes  Supine to Sit: Stand-by assistance (HOB slightly elevated; max cues and additional time, effortful)  Scooting: Contact-guard assistance (to EOB)    Balance  Sitting: Without support (SBA at EOB. Pt endorsed feeling nauseous and some dizziness. ~2-3 minutes; when patient initially sat on edge of bed she leaned backward and took rest break. Pt needed maximal verbal cues and tactile cues to sit straight up and resume posture)  Sitting - Static: Fair (occasional)  Sitting - Dynamic: Fair (occasional)  Standing: With support (BUE support via Stedy/Walker; CGA in [facility] and at walker)    Transfer Training  Transfer Training: Yes  Sit to Stand: Assist X2 (min A +CGA from bed to stedy and then CGA x 2 from recliner to walker. Pt able to perform a few weight shifts lifting feet harry in stedy)  Stand to Sit: Assist X2;Minimum assistance (for controlled descent to chair)  Bed to Chair: Total assistance; Adaptive equipment (via aimee stedy)    ADL  Feeding: Setup; Beverage management  LE Dressing: Maximum assistance  LE Dressing Skilled Clinical Factors: to don socks  Toileting Skilled Clinical Factors: catheter    Safety Devices  Type of Devices: Patient at risk for falls; Left in chair;Call light within reach; Chair alarm in place;Gait belt;Nurse notified; Heels elevated for pressure relief (RN in room at end of session)  Restraints  Restraints Initially in Place: No     Patient Education  Education Given To: Patient  Education Provided: Plan of Care;Transfer Training;Equipment  Education Method: Verbal  Barriers to Learning: Cognition  Education Outcome: Verbalized understanding;Continued education needed    Goals  Short Term Goals  Time Frame for Short Term Goals: by discharge  Short Term Goal 1: Pt will perform bed mobility with CGA to decrease caregiver burden- goal met 12/2. NEW GOAL: Pt will perform bed mobility with supervision  Short Term Goal 2: Pt will sit EOB 5 mins with no more than CGA- progressing  Short Term Goal 3: Pt will perform grooming activity with Min A and set-up- goal met 12/1. New Goal: Pt will perform grooming ax with set-up and supervision- not addressed  Short Term Goal 4: Pt will tolerate sit to stand transfer- goal met 12/1. NEW GOAL: sit to stand transfer with Mod x2- goal met 12/2 with Min +CGA and CGA x2; NEW GOAL: stance x2 mins with CGA  Patient Goals   Patient goals : not stated       Therapy Time   Individual Concurrent Group Co-treatment   Time In 1438         Time Out 1502         Minutes 24         Timed Code Treatment Minutes: [address], S/OT  Therapist was present, directed the patient's care, made skilled judgement, and was responsible for assessment and treatment of the patient.    Arlen Cooks, OTR/L

## 2023-10-20 NOTE — PROGRESS NOTES
Neurosurgery Progress Note    Patient seen and examined on 12/04/22. No acute events overnight. Denies continued diplopia. Denies any complaints. Incision without drainage. A/P: 78 yo woman POD #5 s/p resection of right petroclival meningioma    -Neuro exam stable  -Frequent neuro checks  -PT/OT  -Pain control  -DVT prophylaxis: SCDs and heparin  -MRI pending  -Incisional care: Mastoid dressing in place.  Check q 4hr and notify NS if drainage  -Dispo planning: ICU        [address], [address] [facility],[address] (o) Staging Info: By selecting yes to the question above you will include information on AJCC 8 tumor staging in your Mohs note. Information on tumor staging will be automatically added for SCCs on the head and neck. AJCC 8 includes tumor size, tumor depth, perineural involvement and bone invasion.

## 2024-02-13 ENCOUNTER — HOSPITAL ENCOUNTER (OUTPATIENT)
Dept: MRI IMAGING | Age: 69
Discharge: HOME | End: 2024-02-13
Attending: NEUROLOGICAL SURGERY
Payer: MEDICARE

## 2024-02-13 DIAGNOSIS — D33.3 ACOUSTIC NEUROMA (HCC): ICD-10-CM

## 2024-02-13 DIAGNOSIS — Z98.2 S/P VP SHUNT: ICD-10-CM

## 2024-02-13 PROCEDURE — 2580000003 HC RX 258: Performed by: NEUROLOGICAL SURGERY

## 2024-02-13 PROCEDURE — 70553 MRI BRAIN STEM W/O & W/DYE: CPT

## 2024-02-13 PROCEDURE — 6360000004 HC RX CONTRAST MEDICATION: Performed by: NEUROLOGICAL SURGERY

## 2024-02-13 PROCEDURE — A9579 GAD-BASE MR CONTRAST NOS,1ML: HCPCS | Performed by: NEUROLOGICAL SURGERY

## 2024-02-13 RX ORDER — SODIUM CHLORIDE 0.9 % (FLUSH) 0.9 %
10 SYRINGE (ML) INJECTION PRN
Status: DISCONTINUED | OUTPATIENT
Start: 2024-02-13 | End: 2024-02-14 | Stop reason: HOSPADM

## 2024-02-13 RX ADMIN — GADOTERIDOL 20 ML: 279.3 INJECTION, SOLUTION INTRAVENOUS at 10:39

## 2024-02-13 RX ADMIN — SODIUM CHLORIDE, PRESERVATIVE FREE 10 ML: 5 INJECTION INTRAVENOUS at 10:39

## 2024-07-05 NOTE — PROGRESS NOTES
Patient notified and voiced understanding   Physical Therapy  Facility/Department: [facility] ICU  Physical Therapy Treatment Note  Name: Sofia Bean  : 1955  MRN: [id number]  Date of Service: 2022    Discharge Recommendations:  Sofia Bean scored a  on the AM-PAC short mobility form. Current research shows that an AM-PAC score of 17 or less is typically not associated with a discharge to the patient's home setting. Based on the patient's AM-PAC score and their current functional mobility deficits, it is recommended that the patient have 5-7 sessions per week of Physical Therapy at d/c to increase the patient's independence. At this time, this patient demonstrates complex nursing, medical, and rehabilitative needs, and would benefit from intensive rehabilitation services upon discharge from the Inpatient setting. This patient demonstrates the ability to participate in and benefit from an intensive therapy program with a coordinated interdisciplinary team approach to foster frequent, structured, and documented communication among disciplines, who will work together to establish, prioritize, and achieve treatment goals. Please see assessment section for further patient specific details. If patient discharges prior to next session this note will serve as a discharge summary. Please see below for the latest assessment towards goals. Patient would benefit from continued therapy after discharge   PT Equipment Recommendations  Other: defer      Patient Diagnosis(es): Diagnoses of Acoustic neuroma [facility]), Balance problem, Meningioma ([facility] [address] and Dizziness were pertinent to this visit. Past Medical History:  has a past medical history of Arthritis, Asthma, Brain tumor ([facility] [address] Diabetes mellitus ([facility] [address] High cholesterol, Hypertension, Imbalance, Loss of hearing, and Vertigo. Past Surgical History:  has a past surgical history that includes Total knee arthroplasty (Right, 2015); Total knee arthroplasty (Left, 10/14/15);  Total hip arthroplasty (Right, 11/8/2021); Neuroma surgery (Right, 11/28/2022); mastoidectomy (Right, 11/28/2022); craniotomy (N/A, 11/29/2022); Upper gastrointestinal endoscopy (N/A, 12/11/2022); and IR EMBOLIZATION HEMORRHAGE (12/12/2022). Assessment   Body Structures, Functions, Activity Limitations Requiring Skilled Therapeutic Intervention: Decreased functional mobility ; Decreased endurance  Assessment: Decreased assist needed for ambulation this date. Needing min to mod assist x 1-2 for bed mobility/transfers, min assist x 1 to ambulate a few steps with rolling walker. Pt continues to be below baseline function. Pt normally independent with ambulation with walker. At risk for falls and not safe to ambulate alone. Rec cont skilled PT to maximize mobility and independence  Treatment Diagnosis: decreased functional mobility        Plan   Physcial Therapy Plan  General Plan: 5-7 times per week  Current Treatment Recommendations: Strengthening, Functional mobility training, Transfer training, Gait training, Endurance training, Safety education & training, Therapeutic activities  Safety Devices  Type of Devices: Call light within reach, Chair alarm in place, Nurse notified, Left in chair  Restraints  Restraints Initially in Place: No     Restrictions  Position Activity Restriction  Other position/activity restrictions: Up with assistance, ambulate the patient, up in the chair, HOB elevated to 30 degrees     Subjective   General  Chart Reviewed: Yes  Additional Pertinent Hx: Pt is a 80 yo female that presents to the hospital for a procedure;STAGE 1, RIGHT TRANSLABYRINTHINE / RETROLABYRINTHINE CRANIOTOMY POSSIBLE PETROSECTOMY  RIGHT TRANSMASTOID / ANTERIOR MIDDLE CRANIAL FOSSA , ABDOMINAL FAT GRAFT EXTERNAL AUDITORY CANAL CLOSURE on 11/28. EGD 12/11:  multiple duodenal ulcers, clip placement x 3. Abd CT 12/11:  suspected Bilat PE.  LE dopplers 12/11: neg. Pt s/p  GDA embo on 12/12.    PMH; Loss of hearing bilaterally, arthritis, brain tumor, diabetes. Family / Caregiver Present: Yes (sisterrrrr)  Subjective  Subjective: Pt found supine. Agreeable to PT. \"I feel like I just walked for an hour. \"  Denies pain         Social/Functional History  Social/Functional History  Lives With: Alone  Type of Home: House  Home Layout: One level, Able to Live on Main level with bedroom/bathroom, Performs ADL's on one level  Home Access: Stairs to enter with rails  Entrance Stairs - Number of Steps: 2  Bathroom Shower/Tub: Walk-in shower  Bathroom Equipment: Shower chair, Grab bars in shower, Hand-held shower  Home Equipment: [address], [address], Sock aid (walker, unsure what kind)  Has the patient had two or more falls in the past year or any fall with injury in the past year?: No  ADL Assistance: [facility]: Independent  Homemaking Responsibilities: Yes  Ambulation Assistance: Independent (with walker, unable to determine which kind)  Transfer Assistance: Independent  Active : Yes  Leisure & Hobbies: crafts  Additional Comments: pt is questionable to poor historian; pt reports no one can stay with her upon going home  Vision/Hearing  Vision  Vision Exceptions: Wears glasses at all times  Hearing  Hearing: Within functional limits    Cognition   Orientation  Overall Orientation Status: Within Functional Limits     Objective                                Bed mobility  Supine to Sit: Dependent/Total (mod assist x 1 and min assist x 1)  Scooting: Contact guard assistance  Transfers  Sit to Stand: Dependent/Total (min assist x 2 from bed and from bsc)  Stand to Sit: Minimal Assistance (cues for safety and hand placement)  Ambulation  Device: Rolling Walker  Assistance: Minimal assistance  Quality of Gait: decreased step length/height,  mild DELEON, occas assist to maneuver walker  Distance: 6', 5'  Comments: Pt reported feeling lightheaded after standing from bsc. Returned to sitting and symptoms improved.   Once sitting in chair  /70                 OutComes Score                                                  AM-PAC Score  AM-PAC Inpatient Mobility Raw Score : 12 (12/13/22 1430)  AM-PAC Inpatient T-Scale Score : 35.33 (12/13/22 1430)  Mobility Inpatient CMS 0-100% Score: 68.66 (12/13/22 1430)  Mobility Inpatient CMS G-Code Modifier : CL (12/13/22 1430)          Tinneti Score       Goals  Short Term Goals  Time Frame for Short Term Goals: D/C  Short Term Goal 1: supine<->sit mod A x 1-2- MET 12/6 Pt will perform bed mobility with SBA MET 12/7 update to bed mobility with independence -ongoing  Short Term Goal 2: pt will sit at EOB for 5 min with SBA- MET 12/7  Short Term Goal 3: pt will tolerate sit<->stand assessment MET 12/6 Pt will transfer with CGA to RW -ongoing  Short Term Goal 4: pt will tolerate bed->chair assessment with RW MET 12/7 Pt will perform stand pivot with RW and SBA -ongoing  Short Term Goal 5: pt will tolerate gait assessment-MET 12/7 Pt will ambulate x 50' and CGA -ongoing  Patient Goals   Patient Goals : not stated       Education  Patient Education  Education Given To: Patient  Education Provided: Role of Therapy;Plan of Care  Education Provided Comments: transfer safety  Education Method: Verbal;Demonstration  Barriers to Learning: None  Education Outcome: Verbalized understanding      Therapy Time   Individual Concurrent Group Co-treatment   Time In 1330         Time Out 1408         Minutes 38               Timed Code Treatment Minutes:  38    Total Treatment Minutes:  [address], PT

## 2024-10-22 NOTE — PLAN OF CARE
Problem: Chronic Conditions and Co-morbidities  Goal: Patient's chronic conditions and co-morbidity symptoms are monitored and maintained or improved  3/25/2023 0726 by Libertad Sousa RN  Outcome: Progressing     Problem: Discharge Planning  Goal: Discharge to home or other facility with appropriate resources  Outcome: Progressing     Problem: Pain  Goal: Verbalizes/displays adequate comfort level or baseline comfort level  3/25/2023 0726 by Libertad Sousa RN  Outcome: Progressing     Problem: Safety - Adult  Goal: Free from fall injury  3/25/2023 0726 by Libertad Sousa RN  Outcome: Progressing     Problem: Skin/Tissue Integrity  Goal: Absence of new skin breakdown  Description: 1. Monitor for areas of redness and/or skin breakdown  2. Assess vascular access sites hourly  3. Every 4-6 hours minimum:  Change oxygen saturation probe site  4. Every 4-6 hours:  If on nasal continuous positive airway pressure, respiratory therapy assess nares and determine need for appliance change or resting period.   3/25/2023 0726 by Libertad Sousa RN  Outcome: Progressing     Problem: ABCDS Injury Assessment  Goal: Absence of physical injury  Outcome: Progressing [Well Developed] : well developed [Well Nourished] : well nourished [No Acute Distress] : no acute distress [Normal Conjunctiva] : normal conjunctiva [Normal Venous Pressure] : normal venous pressure [No Carotid Bruit] : no carotid bruit [Normal S1, S2] : normal S1, S2 [No Murmur] : no murmur [No Rub] : no rub [No Gallop] : no gallop [Clear Lung Fields] : clear lung fields [Good Air Entry] : good air entry [No Respiratory Distress] : no respiratory distress  [Soft] : abdomen soft [Non Tender] : non-tender [No Masses/organomegaly] : no masses/organomegaly [Normal Bowel Sounds] : normal bowel sounds [Normal Gait] : normal gait [No Edema] : no edema [No Cyanosis] : no cyanosis [No Clubbing] : no clubbing [No Varicosities] : no varicosities [No Rash] : no rash [No Skin Lesions] : no skin lesions [Moves all extremities] : moves all extremities [No Focal Deficits] : no focal deficits [Normal Speech] : normal speech [Alert and Oriented] : alert and oriented [Normal memory] : normal memory [de-identified] : + ministernotomy scar

## 2025-01-09 NOTE — PROGRESS NOTES
- Speech Language Pathology      Completed MBS. Recommend Puree / Thin Liquids via straw (place in left-side of mouth) / meds in puree. Full note to follow. Joanne Ayala, [id number] [place] [address], .[phone]  Pg.  # [phone]

## 2025-01-20 ENCOUNTER — TRANSCRIBE ORDERS (OUTPATIENT)
Dept: ADMINISTRATIVE | Age: 70
End: 2025-01-20

## 2025-01-20 DIAGNOSIS — D32.9 MENINGIOMA (HCC): Primary | ICD-10-CM

## 2025-02-26 ENCOUNTER — HOSPITAL ENCOUNTER (OUTPATIENT)
Dept: MRI IMAGING | Age: 70
Discharge: HOME | End: 2025-02-26
Attending: NEUROLOGICAL SURGERY
Payer: MEDICARE

## 2025-02-26 ENCOUNTER — HOSPITAL ENCOUNTER (OUTPATIENT)
Dept: GENERAL RADIOLOGY | Age: 70
Discharge: HOME | End: 2025-02-26
Attending: NEUROLOGICAL SURGERY
Payer: MEDICARE

## 2025-02-26 DIAGNOSIS — D32.9 MENINGIOMA (HCC): ICD-10-CM

## 2025-02-26 PROCEDURE — A9576 INJ PROHANCE MULTIPACK: HCPCS | Performed by: NEUROLOGICAL SURGERY

## 2025-02-26 PROCEDURE — 70553 MRI BRAIN STEM W/O & W/DYE: CPT

## 2025-02-26 PROCEDURE — 6360000004 HC RX CONTRAST MEDICATION: Performed by: NEUROLOGICAL SURGERY

## 2025-02-26 PROCEDURE — 70250 X-RAY EXAM OF SKULL: CPT

## 2025-02-26 PROCEDURE — 62252 CSF SHUNT REPROGRAM: CPT | Performed by: NURSE PRACTITIONER

## 2025-02-26 RX ADMIN — GADOTERIDOL 20 ML: 279.3 INJECTION, SOLUTION INTRAVENOUS at 10:23

## 2025-02-26 NOTE — PROCEDURES
[employer] Hakim programmable shunt valve set to 120 mm H2O.  Patient tolerated well.  No complications.    Electronically signed by: LEE Edwards CNP, APRN-CNP, 2/26/2025 10:16 AM  [phone]

## 2025-05-06 ENCOUNTER — HOSPITAL ENCOUNTER (OUTPATIENT)
Dept: MRI IMAGING | Age: 70
Discharge: HOME | End: 2025-05-06
Attending: RADIOLOGY
Payer: MEDICARE

## 2025-05-06 ENCOUNTER — HOSPITAL ENCOUNTER (OUTPATIENT)
Dept: GENERAL RADIOLOGY | Age: 70
Discharge: HOME | End: 2025-05-06
Attending: RADIOLOGY
Payer: MEDICARE

## 2025-05-06 DIAGNOSIS — D32.0 BENIGN NEOPLASM OF CEREBRAL MENINGES (HCC): ICD-10-CM

## 2025-05-06 PROCEDURE — 70250 X-RAY EXAM OF SKULL: CPT

## 2025-05-06 PROCEDURE — 70553 MRI BRAIN STEM W/O & W/DYE: CPT

## 2025-05-06 PROCEDURE — A9576 INJ PROHANCE MULTIPACK: HCPCS | Performed by: RADIOLOGY

## 2025-05-06 PROCEDURE — 6360000004 HC RX CONTRAST MEDICATION: Performed by: RADIOLOGY

## 2025-05-06 RX ADMIN — GADOTERIDOL 20 ML: 279.3 INJECTION, SOLUTION INTRAVENOUS at 18:33

## 2025-06-05 ENCOUNTER — APPOINTMENT (OUTPATIENT)
Dept: GENERAL RADIOLOGY | Age: 70
End: 2025-06-05
Payer: MEDICARE

## 2025-06-05 ENCOUNTER — HOSPITAL ENCOUNTER (EMERGENCY)
Age: 70
Discharge: HOME | End: 2025-06-05
Attending: EMERGENCY MEDICINE
Payer: MEDICARE

## 2025-06-05 VITALS
DIASTOLIC BLOOD PRESSURE: 65 MMHG | TEMPERATURE: 98.6 F | SYSTOLIC BLOOD PRESSURE: 169 MMHG | HEART RATE: 73 BPM | OXYGEN SATURATION: 97 % | RESPIRATION RATE: 16 BRPM
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
## 2025-06-05 DIAGNOSIS — T18.128A FOOD IMPACTION OF ESOPHAGUS, INITIAL ENCOUNTER: Primary | ICD-10-CM

## 2025-06-05 PROCEDURE — 71045 X-RAY EXAM CHEST 1 VIEW: CPT

## 2025-06-05 PROCEDURE — 99283 EMERGENCY DEPT VISIT LOW MDM: CPT

## 2025-06-05 RX ORDER — OMEPRAZOLE 40 MG/1
40 CAPSULE, DELAYED RELEASE ORAL
Qty: 30 CAPSULE | Refills: 0 | Status: SHIPPED | OUTPATIENT
Start: 2025-06-05

## 2025-06-05 ASSESSMENT — ENCOUNTER SYMPTOMS
DIARRHEA: 0
NAUSEA: 1
SHORTNESS OF BREATH: 1
ABDOMINAL PAIN: 0
VOMITING: 1
COUGH: 0

## 2025-06-05 NOTE — ED PROVIDER NOTES
THE [facility]  EMERGENCY DEPARTMENT ENCOUNTER          ATTENDING PHYSICIAN NOTE       Date of evaluation: 6/5/2025    Chief Complaint     OTHER (Pt repots food bolus @1030 stuck in her throat. Pt called EMS and arrived to the ED en route vomited in the ambulance getting out some food. Pt at this time is able to swallow own secretions.)      History of Present Illness     Margaret Markley is a 70 y.o. female who presents with complaints of feeling like she had food stuck in her upper esophagus after eating a ground beef burrito around 10:00 this morning.  She states that since that time she has not been able to swallow even liquids and it comes right back up.  She has some nausea with it.  She called an ambulance to bring her here and en route she vomited and believes she cleared the esophageal impaction.  She is now able to swallow water and her own secretions.  She has no more pain but did not really have a significant amount of pain when she felt there was food stuck.  She did do some retching while it was stuck.    ASSESSMENT / PLAN  (MEDICAL DECISION MAKING)     INITIAL VITALS: BP: (!) 169/65, Temp: 98.6 °F (37 °C), Pulse: 73, Respirations: 16, SpO2: 97 %      Margaret Markley is a 70 y.o. female with a self resolved esophageal food impaction.  The patient was witnessed to swallow a whole can of ginger ale and was able to eat crackers without difficulty and appeared to have no dysphagia and no evidence of impaction.  A chest x-ray did not show any abnormality.  She was felt at this point to be stable for discharge but will need GI follow-up for EGD and possible dilation and will be started on a PPI.  She was given dysphagia diet instructions and instructed to chew her food well before swallowing.    Medical Decision Making  Amount and/or Complexity of Data Reviewed  Radiology: ordered.    Risk  Prescription drug management.      Clinical Impression     1. Food impaction of esophagus, initial encounter

## (undated) DEVICE — BLADE CLIPPER SURG SENSICLIP

## (undated) DEVICE — HEMOSPRAY ENDOSCOPIC HEMOSTAT: Brand: HEMOSPRAY

## (undated) DEVICE — CARBIDE ROUND

## (undated) DEVICE — GLOVE SURG SZ 6 L12IN FNGR THK75MIL WHT LTX POLYMER BEAD

## (undated) DEVICE — 10 FR. PTFE PEEL-APART PERCUTANEOUS INTRODUCER KIT: Brand: PEEL-APART PERCUTANEOUS INTRODUCER KIT

## (undated) DEVICE — Z INACTIVE USE 2854097 SPONGE GZ W4XL4IN COT 12 PLY TYP VII WVN C FLD DSGN

## (undated) DEVICE — ADHESIVE SKIN CLSR 0.7ML TOP DERMBND ADV

## (undated) DEVICE — INTRO CATH 13FR 15CM PEEL APART

## (undated) DEVICE — AGENT HEMSTAT W2XL14IN OXIDIZED REGENERATED CELOS ABSRB FOR

## (undated) DEVICE — CONTAINER,SPECIMEN,OR STERILE,4OZ: Brand: MEDLINE

## (undated) DEVICE — COVER,MAYO STAND,XL,STERILE: Brand: MEDLINE

## (undated) DEVICE — 1.0MM FINE DIAMOND

## (undated) DEVICE — GLOVE SURG SZ 65 L12IN FNGR THK79MIL GRN LTX FREE

## (undated) DEVICE — SYRINGE MED 10ML TRNSLUC BRL PLUNG BLK MRK POLYPR CTRL

## (undated) DEVICE — SUTURE STRATAFIX SZ 1 L14IN ABSRB VLT L36MM MO-4 TAPERPOINT SXPD2B400

## (undated) DEVICE — TOOL F2/8TA23 LEGEND 8CM 2.3MM TAPER: Brand: MIDAS REX™

## (undated) DEVICE — SPONGE,LAP,18"X18",DLX,XR,ST,5/PK,40/PK: Brand: MEDLINE

## (undated) DEVICE — MERCY FAIRFIELD TURNOVER KIT: Brand: MEDLINE INDUSTRIES, INC.

## (undated) DEVICE — GOWN,AURORA,NONREINF,RAGLAN,XXL,STERILE: Brand: MEDLINE

## (undated) DEVICE — 3M™ TEGADERM™ TRANSPARENT FILM DRESSING FRAME STYLE, 1624W, 2-3/8 IN X 2-3/4 IN (6 CM X 7 CM), 100/CT 4CT/CASE: Brand: 3M™ TEGADERM™

## (undated) DEVICE — SSC BONE WAX: Brand: SSC BONE WAX

## (undated) DEVICE — HANDPIECE SET WITH HIGH FLOW TIP AND SUCTION TUBE: Brand: INTERPULSE

## (undated) DEVICE — STERILE LATEX POWDER FREE SURGICAL GLOVES WITH HYDROGEL COATING: Brand: PROTEXIS

## (undated) DEVICE — AGENT HEMOSTATIC SURGIFLOW MATRIX KIT W/THROMBIN

## (undated) DEVICE — BIPOLAR SEALER 23-112-1 AQM 6.0: Brand: AQUAMANTYS ®

## (undated) DEVICE — PAD,NON-ADHERENT,3X8,STERILE,LF,1/PK: Brand: MEDLINE

## (undated) DEVICE — 12CM IQ BARRACUDA: Brand: SONOPET IQ

## (undated) DEVICE — Device

## (undated) DEVICE — SUTURE VCRL SZ 4-0 L18IN ABSRB VLT L26MM SH 1/2 CIR J773D

## (undated) DEVICE — 3M™ IOBAN™ 2 ANTIMICROBIAL INCISE DRAPE 6650EZ: Brand: IOBAN™ 2

## (undated) DEVICE — NEURO SPONGES: Brand: DEROYAL

## (undated) DEVICE — #1020 STERI DRAPE 41MM X 41MM SMALL: Brand: STERI-DRAPE™

## (undated) DEVICE — BANDAGE,GAUZE,BULKEE II,4.5"X4.1YD,STRL: Brand: MEDLINE

## (undated) DEVICE — BLADE ES ELASTOMERIC COAT INSUL DURABLE BEND UPTO 90DEG

## (undated) DEVICE — GLOVE SURG SZ 65 CRM LTX FREE POLYISOPRENE POLYMER BEAD ANTI

## (undated) DEVICE — NEEDLE INJ ARTC 2.5MMX230CM

## (undated) DEVICE — CRANI: Brand: MEDLINE INDUSTRIES, INC.

## (undated) DEVICE — PASSER 48409 60CM DISP. CATHETER

## (undated) DEVICE — 12CM IQ MICRO CLAW: Brand: SONOPET IQ

## (undated) DEVICE — SUTURE VCRL + SZ 2-0 L18IN ABSRB UD CT1 L36MM 1/2 CIR VCP839D

## (undated) DEVICE — SOLUTION IV 50ML 0.9% SOD CHL PLAS CONT USP VIAFLX

## (undated) DEVICE — MASK CAPNOGRAPHY AD W35IN DIA58IN SAMP LN L10FT O2 LN

## (undated) DEVICE — UNDERGLOVE SURG SZ 8 BLU LTX FREE SYN POLYISOPRENE POLYMER

## (undated) DEVICE — YANKAUER SUCTION INSTRUMENT NO CONTROL VENT, BULB TIP, CLEAR: Brand: YANKAUER

## (undated) DEVICE — GLOVE SURG SZ 75 L12IN FNGR THK94MIL TRNSLUC YEL LTX

## (undated) DEVICE — IRRIGATION SUCTION CASSETTE: Brand: SONOPET IQ

## (undated) DEVICE — HOOK RETRCT 12MM S STL BLNT E STAY LONE STAR

## (undated) DEVICE — SUTURE NRLN SZ 4-0 L18IN NONABSORBABLE BLK L13MM TF 1/2 CIR C584D

## (undated) DEVICE — COVER LT HNDL CAM BLU DISP W/ SURG CTRL

## (undated) DEVICE — GOWN,SIRUS,POLYRNF,BRTHSLV,LG,30/CS: Brand: MEDLINE

## (undated) DEVICE — GAUZE FLUFF 1 PLY: Brand: DEROYAL

## (undated) DEVICE — SUTURE VCRL + SZ 1  18IN CT CR ABSRB VCP753D

## (undated) DEVICE — SOLUTION IV 500ML 0.9% SOD CHL PH 5 INJ USP VIAFLX PLAS

## (undated) DEVICE — DECANTER BAG 9": Brand: MEDLINE INDUSTRIES, INC.

## (undated) DEVICE — 3M™ IOBAN™ 2 ANTIMICROBIAL INCISE DRAPE 6640EZ: Brand: IOBAN™ 2

## (undated) DEVICE — SOLUTION ANTIFOG VIS SYS CLEARIFY LAPSCP

## (undated) DEVICE — SOLUTION IV 1000ML 0.9% SOD CHL

## (undated) DEVICE — INTENDED TO AID IN THE PASSING OF SUTURES THROUGH BONE AND SOFT TISSUE DURING ORTHOPEDIC SURGERY: Brand: HOFFEE SUTURE RETRIEVER

## (undated) DEVICE — APPLICATOR MEDICATED 26 CC SOLUTION HI LT ORNG CHLORAPREP

## (undated) DEVICE — DRAIN SURG 10FR END PERF 1/8IN SIL RND SMOOTH HEAT POLISHED

## (undated) DEVICE — SHEET, ORTHO, SPLIT, STERILE: Brand: MEDLINE

## (undated) DEVICE — BIT DRL L30MM MOD FLEX DISP FOR ACET CUP SCR

## (undated) DEVICE — STRYKER PERFORMANCE SERIES SAGITTAL BLADE: Brand: STRYKER PERFORMANCE SERIES

## (undated) DEVICE — SUTURE ETHLN SZ 3-0 L18IN NONABSORBABLE BLK FS-1 L24MM 3/8 663H

## (undated) DEVICE — CODMAN® HAKIM® PROGRAMMABLE VALVE IN-LINE VALVE WITH SIPHONGUARD® DEVICE PROGRAMMABLE IN STEPS OF 10MM H2O (98 PA): 30MM H2O TO 200MM H2O (294 PA - 1960 PA): Brand: CODMAN® HAKIM®

## (undated) DEVICE — HOOD: Brand: FLYTE

## (undated) DEVICE — CODMAN® SURGICAL PATTIES 3/4" X 3/4" (1.91CM X 1.91CM): Brand: CODMAN®

## (undated) DEVICE — TOTAL BASIC PK

## (undated) DEVICE — SUTURE VCRL SZ 2-0 L18IN ABSRB UD CT-1 L36MM 1/2 CIR J839D

## (undated) DEVICE — 5.0MM FINE DIAMOND

## (undated) DEVICE — 3M™ TEGADERM™ TRANSPARENT FILM DRESSING FRAME STYLE, 1627, 4 IN X 10 IN (10 CM X 25 CM), 20/CT 4CT/CASE: Brand: 3M™ TEGADERM™

## (undated) DEVICE — SUTURE MCRYL SZ 4-0 L27IN ABSRB UD L19MM PS-2 1/2 CIR PRIM Y426H

## (undated) DEVICE — YANKAUER OPEN TIP, NO VENT: Brand: ARGYLE

## (undated) DEVICE — ANES EXTENSION SET 90IN-LF: Brand: MEDLINE INDUSTRIES, INC.

## (undated) DEVICE — SUTURE VCRL SZ 4-0 L18IN ABSRB UD RB-1 L17MM 1/2 CIR J714D

## (undated) DEVICE — SYRINGE MED 10ML LUERLOCK TIP W/O SFTY DISP

## (undated) DEVICE — PEN: MARKING STD 100/CS: Brand: MEDICAL ACTION INDUSTRIES

## (undated) DEVICE — COUNTER NDL 40 COUNT HLD 70 NUM FOAM BLK SGL MAG W BLDE REMV

## (undated) DEVICE — TUBING SUCT MASTOID TWO IRRIG SPIK ALL IN 1 LTWT FLX LEVIN

## (undated) DEVICE — SUTURE VCRL SZ 4-0 L27IN ABSRB VLT L17MM RB-1 1/2 CIR J304H

## (undated) DEVICE — GLOVE SURG SZ 7 CRM LTX FREE POLYISOPRENE POLYMER BEAD ANTI

## (undated) DEVICE — 2.0MM FINE DIAMOND

## (undated) DEVICE — INTENDED FOR TISSUE SEPARATION, AND OTHER PROCEDURES THAT REQUIRE A SHARP SURGICAL BLADE TO PUNCTURE OR CUT.: Brand: BARD-PARKER ® CARBON RIB-BACK BLADES

## (undated) DEVICE — HIP PILLOW, ABDUCTION: Brand: DEROYAL

## (undated) DEVICE — SUTURE MCRYL + SZ 3-0 L27IN ABSRB UD PS1 L24MM 3/8 CIR REV MCP936H

## (undated) DEVICE — TOWEL,STOP FLAG GOLD N-W: Brand: MEDLINE

## (undated) DEVICE — SUTURE NONABSORBABLE MONOFILAMENT 4-0 RB-1 36 IN BLU PROLENE 8557H

## (undated) DEVICE — COVER XR CASS W21XL40IN UNIV ADH MICROSHIELD

## (undated) DEVICE — 3M™ STERI-DRAPE™ INSTRUMENT POUCH 1018: Brand: STERI-DRAPE™

## (undated) DEVICE — 3M™ IOBAN™ 2 ANTIMICROBIAL INCISE DRAPE 6648EZ: Brand: IOBAN™ 2

## (undated) DEVICE — SYSTEM SKIN CLSR 22CM DERMBND PRINEO

## (undated) DEVICE — SYRINGE IRRIG 60ML SFT PLIABLE BLB EZ TO GRP 1 HND USE W/

## (undated) DEVICE — TOOL 9BA50 LEGEND 9CM 5MM BA: Brand: MIDAS REX

## (undated) DEVICE — STAPLER SKIN H3.9MM WIRE DIA0.58MM CRWN 6.9MM 35 STPL ROT

## (undated) DEVICE — 4.0MM FINE DIAMOND

## (undated) DEVICE — SUTURE NONABSORBABLE MONOFILAMENT 3-0 PS-1 18 IN BLK ETHILON 1663H

## (undated) DEVICE — DRAPE,U/ SHT,SPLIT,PLAS,STERIL: Brand: MEDLINE

## (undated) DEVICE — SURGIFOAM SPNG SZ 100

## (undated) DEVICE — SUTURE FIBERWIRE SZ 2 W/ TAPERED NEEDLE BLUE L38IN NONABSORB BLU L26.5MM 1/2 CIRCLE AR7200

## (undated) DEVICE — TOBRA BONE BASKET- AUTOGRAFT BONE COLLECTION SYSTEM WITH MESH FILTER AND GRAFT COMPRESSOR: Brand: TOBRA BONE BASKET

## (undated) DEVICE — COTTON BALLS, DOUBLE STRUNG: Brand: DEROYAL

## (undated) DEVICE — PREMIUM WET SKIN PREP TRAY: Brand: MEDLINE INDUSTRIES, INC.

## (undated) DEVICE — STANDARD HYPODERMIC NEEDLE,POLYPROPYLENE HUB: Brand: MONOJECT

## (undated) DEVICE — 5.0MM COARSE DIAMOND

## (undated) DEVICE — TROCAR: Brand: KII FIOS FIRST ENTRY

## (undated) DEVICE — ADAPTER LAB INTMED LUER 17GA

## (undated) DEVICE — SUTURE BOOT: Brand: DEROYAL

## (undated) DEVICE — SUTURE VCRL SZ 3-0 L18IN ABSRB UD L26MM SH 1/2 CIR J864D

## (undated) DEVICE — SEALANT SURG 13 YR DURA AUTOSPRAY ADHERUS NUS106] SURGICAL ONE]

## (undated) DEVICE — Z DISCONTINUED USE 2272117 DRAPE SURG 3 QTR N INVASIVE 2 LAYR DISP

## (undated) DEVICE — COTTON BALLS: Brand: DEROYAL

## (undated) DEVICE — HOOD, PEEL-AWAY: Brand: FLYTE

## (undated) DEVICE — PICO 7 10CM X 40CM: Brand: PICO™ 7

## (undated) DEVICE — 3M™ IOBAN™ 2 ANTIMICROBIAL INCISE DRAPE 6651EZ: Brand: IOBAN™ 2

## (undated) DEVICE — SUTURE PERMAHAND SZ 2-0 L12X18IN NONABSORBABLE BLK SILK A185H

## (undated) DEVICE — SYRINGE MED 30ML STD CLR PLAS LUERLOCK TIP N CTRL DISP

## (undated) DEVICE — AGENT HEMSTAT W2XL4IN OXIDIZED REGENERATED CELOS ABSRB SFT

## (undated) DEVICE — 3M™ TEGADERM™ CHG CHLORHEXIDINE GLUCONATE GEL PAD 1664, 25 EACH/CARTON, 4 CARTONS/CASE: Brand: 3M™ TEGADERM™

## (undated) DEVICE — 3.5MM FINE DIAMOND

## (undated) DEVICE — COVER LT HNDL BLU PLAS

## (undated) DEVICE — 6.0MM FINE DIAMOND

## (undated) DEVICE — CODMAN® SURGICAL PATTIES 1/2" X 1/2" (1.27CM X 1.27CM): Brand: CODMAN®

## (undated) DEVICE — CLIP LIG L235CM RESOL 360 BX/20

## (undated) DEVICE — DRAPE MICSCP W54XL150IN W/ 4 BINOC GLS LENS LEICA

## (undated) DEVICE — PROTECTOR ULN NRV PUR FOAM HK LOOP STRP ANATOMICALLY

## (undated) DEVICE — SUTURE VCRL SZ 3-0 L27IN ABSRB UD L26MM SH 1/2 CIR J416H

## (undated) DEVICE — SPHERES FOR BRAINLAB

## (undated) DEVICE — DRESSING GERM DIA1IN CNTR H DIA7MM BLU CHG ANTIMIC PROTCT

## (undated) DEVICE — [HIGH FLOW INSUFFLATOR,  DO NOT USE IF PACKAGE IS DAMAGED,  KEEP DRY,  KEEP AWAY FROM SUNLIGHT,  PROTECT FROM HEAT AND RADIOACTIVE SOURCES.]: Brand: PNEUMOSURE

## (undated) DEVICE — 3.0MM FINE DIAMOND

## (undated) DEVICE — SHEET,DRAPE,53X77,STERILE: Brand: MEDLINE

## (undated) DEVICE — Z DISCONTINUED USE 2272114 DRAPE SURG UTIL 26X15 IN W/ TAPE N INVASIVE MULTLYR DISP

## (undated) DEVICE — GLOVE SURG SZ 8.5 L11.85IN FNGR THK9.8MIL STRW LTX POLYMER

## (undated) DEVICE — Device: Brand: JELCO

## (undated) DEVICE — 2.0MM FINE DIAMOND, EXTENDED

## (undated) DEVICE — SOLUTION IRRIG 3000ML 0.9% SOD CHL USP UROMATIC PLAS CONT

## (undated) DEVICE — CODMAN® SURGICAL PATTIES 1/4" X 1/4" (0.64CM X 0.64CM): Brand: CODMAN®

## (undated) DEVICE — 450 ML BOTTLE OF 0.05% CHLORHEXIDINE GLUCONATE IN 99.95% STERILE WATER FOR IRRIGATION, USP AND APPLICATOR.: Brand: IRRISEPT ANTIMICROBIAL WOUND LAVAGE